# Patient Record
Sex: MALE | Race: WHITE | NOT HISPANIC OR LATINO | Employment: OTHER | URBAN - NONMETROPOLITAN AREA
[De-identification: names, ages, dates, MRNs, and addresses within clinical notes are randomized per-mention and may not be internally consistent; named-entity substitution may affect disease eponyms.]

---

## 2019-05-05 ENCOUNTER — HOSPITAL ENCOUNTER (EMERGENCY)
Facility: HOSPITAL | Age: 44
Discharge: LEFT BEFORE DISCHARGE | End: 2019-05-06
Attending: EMERGENCY MEDICINE | Admitting: EMERGENCY MEDICINE

## 2019-05-05 ENCOUNTER — APPOINTMENT (OUTPATIENT)
Dept: GENERAL RADIOLOGY | Facility: HOSPITAL | Age: 44
End: 2019-05-05

## 2019-05-05 DIAGNOSIS — N18.9 CHRONIC RENAL FAILURE, UNSPECIFIED CKD STAGE: Primary | ICD-10-CM

## 2019-05-05 LAB
ALBUMIN SERPL-MCNC: 2.82 G/DL (ref 3.5–5.2)
ALBUMIN/GLOB SERPL: 0.3 G/DL
ALP SERPL-CCNC: 265 U/L (ref 39–117)
ALT SERPL W P-5'-P-CCNC: 25 U/L (ref 1–41)
ANION GAP SERPL CALCULATED.3IONS-SCNC: 18.1 MMOL/L
APTT PPP: 35.1 SECONDS (ref 23.8–36.1)
AST SERPL-CCNC: 24 U/L (ref 1–40)
BASOPHILS # BLD AUTO: 0.13 10*3/MM3 (ref 0–0.2)
BASOPHILS NFR BLD AUTO: 1 % (ref 0–1.5)
BILIRUB SERPL-MCNC: 0.4 MG/DL (ref 0.2–1.2)
BUN BLD-MCNC: 95 MG/DL (ref 6–20)
BUN/CREAT SERPL: 16.3 (ref 7–25)
CALCIUM SPEC-SCNC: 8.3 MG/DL (ref 8.6–10.5)
CHLORIDE SERPL-SCNC: 93 MMOL/L (ref 98–107)
CO2 SERPL-SCNC: 17.9 MMOL/L (ref 22–29)
CREAT BLD-MCNC: 5.83 MG/DL (ref 0.76–1.27)
D-LACTATE SERPL-SCNC: 0.9 MMOL/L (ref 0.5–2)
DEPRECATED RDW RBC AUTO: 47.9 FL (ref 37–54)
EOSINOPHIL # BLD AUTO: 0.87 10*3/MM3 (ref 0–0.4)
EOSINOPHIL NFR BLD AUTO: 6.4 % (ref 0.3–6.2)
ERYTHROCYTE [DISTWIDTH] IN BLOOD BY AUTOMATED COUNT: 15.3 % (ref 12.3–15.4)
GFR SERPL CREATININE-BSD FRML MDRD: 11 ML/MIN/1.73
GFR SERPL CREATININE-BSD FRML MDRD: ABNORMAL ML/MIN/1.73
GLOBULIN UR ELPH-MCNC: 8.5 GM/DL
GLUCOSE BLD-MCNC: 104 MG/DL (ref 65–99)
HCT VFR BLD AUTO: 26.7 % (ref 37.5–51)
HGB BLD-MCNC: 8.7 G/DL (ref 13–17.7)
IMM GRANULOCYTES # BLD AUTO: 0.1 10*3/MM3 (ref 0–0.05)
IMM GRANULOCYTES NFR BLD AUTO: 0.7 % (ref 0–0.5)
INR PPP: 1.21 (ref 0.9–1.1)
LYMPHOCYTES # BLD AUTO: 2.37 10*3/MM3 (ref 0.7–3.1)
LYMPHOCYTES NFR BLD AUTO: 17.5 % (ref 19.6–45.3)
MAGNESIUM SERPL-MCNC: 1.9 MG/DL (ref 1.6–2.6)
MCH RBC QN AUTO: 28.5 PG (ref 26.6–33)
MCHC RBC AUTO-ENTMCNC: 32.6 G/DL (ref 31.5–35.7)
MCV RBC AUTO: 87.5 FL (ref 79–97)
MONOCYTES # BLD AUTO: 0.69 10*3/MM3 (ref 0.1–0.9)
MONOCYTES NFR BLD AUTO: 5.1 % (ref 5–12)
NEUTROPHILS # BLD AUTO: 9.4 10*3/MM3 (ref 1.7–7)
NEUTROPHILS NFR BLD AUTO: 69.3 % (ref 42.7–76)
PLATELET # BLD AUTO: 355 10*3/MM3 (ref 140–450)
PMV BLD AUTO: 8.5 FL (ref 6–12)
POTASSIUM BLD-SCNC: 4.9 MMOL/L (ref 3.5–5.2)
PROT SERPL-MCNC: 11.3 G/DL (ref 6–8.5)
PROTHROMBIN TIME: 15.9 SECONDS (ref 11–15.4)
RBC # BLD AUTO: 3.05 10*6/MM3 (ref 4.14–5.8)
SODIUM BLD-SCNC: 129 MMOL/L (ref 136–145)
TROPONIN T SERPL-MCNC: 0.07 NG/ML (ref 0–0.03)
WBC NRBC COR # BLD: 13.56 10*3/MM3 (ref 3.4–10.8)

## 2019-05-05 PROCEDURE — 93005 ELECTROCARDIOGRAM TRACING: CPT | Performed by: EMERGENCY MEDICINE

## 2019-05-05 PROCEDURE — 84484 ASSAY OF TROPONIN QUANT: CPT | Performed by: EMERGENCY MEDICINE

## 2019-05-05 PROCEDURE — 71045 X-RAY EXAM CHEST 1 VIEW: CPT

## 2019-05-05 PROCEDURE — 85610 PROTHROMBIN TIME: CPT | Performed by: EMERGENCY MEDICINE

## 2019-05-05 PROCEDURE — 99284 EMERGENCY DEPT VISIT MOD MDM: CPT

## 2019-05-05 PROCEDURE — 71045 X-RAY EXAM CHEST 1 VIEW: CPT | Performed by: RADIOLOGY

## 2019-05-05 PROCEDURE — 93010 ELECTROCARDIOGRAM REPORT: CPT | Performed by: INTERNAL MEDICINE

## 2019-05-05 PROCEDURE — 83735 ASSAY OF MAGNESIUM: CPT | Performed by: EMERGENCY MEDICINE

## 2019-05-05 PROCEDURE — 83605 ASSAY OF LACTIC ACID: CPT | Performed by: EMERGENCY MEDICINE

## 2019-05-05 PROCEDURE — 85730 THROMBOPLASTIN TIME PARTIAL: CPT | Performed by: EMERGENCY MEDICINE

## 2019-05-05 PROCEDURE — 87040 BLOOD CULTURE FOR BACTERIA: CPT | Performed by: EMERGENCY MEDICINE

## 2019-05-05 PROCEDURE — 85025 COMPLETE CBC W/AUTO DIFF WBC: CPT | Performed by: EMERGENCY MEDICINE

## 2019-05-05 PROCEDURE — 80053 COMPREHEN METABOLIC PANEL: CPT | Performed by: EMERGENCY MEDICINE

## 2019-05-05 RX ORDER — SODIUM CHLORIDE 0.9 % (FLUSH) 0.9 %
10 SYRINGE (ML) INJECTION AS NEEDED
Status: DISCONTINUED | OUTPATIENT
Start: 2019-05-05 | End: 2019-05-06 | Stop reason: HOSPADM

## 2019-05-06 VITALS
BODY MASS INDEX: 20.32 KG/M2 | HEIGHT: 72 IN | SYSTOLIC BLOOD PRESSURE: 130 MMHG | OXYGEN SATURATION: 83 % | HEART RATE: 107 BPM | WEIGHT: 150 LBS | RESPIRATION RATE: 18 BRPM | DIASTOLIC BLOOD PRESSURE: 92 MMHG | TEMPERATURE: 98.5 F

## 2019-05-06 LAB
6-ACETYL MORPHINE: NEGATIVE
AMPHET+METHAMPHET UR QL: NEGATIVE
BACTERIA UR QL AUTO: ABNORMAL /HPF
BARBITURATES UR QL SCN: NEGATIVE
BENZODIAZ UR QL SCN: NEGATIVE
BILIRUB UR QL STRIP: NEGATIVE
BUPRENORPHINE SERPL-MCNC: NEGATIVE NG/ML
CANNABINOIDS SERPL QL: NEGATIVE
CLARITY UR: CLEAR
COCAINE UR QL: NEGATIVE
COLOR UR: YELLOW
GLUCOSE UR STRIP-MCNC: NEGATIVE MG/DL
HGB UR QL STRIP.AUTO: ABNORMAL
HYALINE CASTS UR QL AUTO: ABNORMAL /LPF
KETONES UR QL STRIP: NEGATIVE
LEUKOCYTE ESTERASE UR QL STRIP.AUTO: ABNORMAL
METHADONE UR QL SCN: NEGATIVE
NITRITE UR QL STRIP: NEGATIVE
OPIATES UR QL: POSITIVE
OXYCODONE UR QL SCN: NEGATIVE
PCP UR QL SCN: NEGATIVE
PH UR STRIP.AUTO: 7 [PH] (ref 5–8)
PROT UR QL STRIP: ABNORMAL
RBC # UR: ABNORMAL /HPF
REF LAB TEST METHOD: ABNORMAL
SP GR UR STRIP: 1.01 (ref 1–1.03)
SQUAMOUS #/AREA URNS HPF: ABNORMAL /HPF
UROBILINOGEN UR QL STRIP: ABNORMAL
WBC UR QL AUTO: ABNORMAL /HPF

## 2019-05-06 PROCEDURE — 80307 DRUG TEST PRSMV CHEM ANLYZR: CPT | Performed by: EMERGENCY MEDICINE

## 2019-05-06 PROCEDURE — 36415 COLL VENOUS BLD VENIPUNCTURE: CPT

## 2019-05-06 PROCEDURE — 81001 URINALYSIS AUTO W/SCOPE: CPT | Performed by: EMERGENCY MEDICINE

## 2019-05-06 PROCEDURE — 99283 EMERGENCY DEPT VISIT LOW MDM: CPT

## 2019-05-06 NOTE — ED NOTES
"Pt verbalizes not wanting an iv. Pt states \" I'm not staying in the hospital, I have to get home to my kids and get them to school and back. I stayed in the [facility] too long [facility] because I had to but I am not going to stay now, I just want to be set-up for dialysis\"     Ella Faust, RN  05/05/19 0948    "

## 2019-05-06 NOTE — ED PROVIDER NOTES
Subjective   Pt comes in with concern for need for dialysis.  Pt was admitted to [facility] for 32 days but signed out AMA on Friday for family concerns.  Pt developed renal failure due to bacteremia/sepsis likely secondary to IVDA.  Pt admits to prior use of methamphetamine and Suboxone.  He to using methamphetamine over the weekend.  He states he will not stay admitted under any circumstances.  He denies feeling ill        Illness   Location:  Systemic  Quality:  Asymptomatic  Severity:  Unable to specify  Onset quality:  Gradual  Timing:  Constant  Progression:  Unchanged  Chronicity:  Chronic  Context:  Hemodialysis dependent renal failure  Relieved by:  Hemodialysis  Worsened by:  Noncomplaince  Ineffective treatments:  None tried  Associated symptoms: no abdominal pain, no chest pain, no congestion, no cough, no diarrhea, no ear pain, no fatigue, no fever, no headaches, no loss of consciousness, no myalgias, no nausea, no rash, no rhinorrhea, no shortness of breath, no sore throat, no vomiting and no wheezing        Review of Systems   Constitutional: Positive for activity change. Negative for appetite change, fatigue and fever.   HENT: Negative.  Negative for congestion, ear pain, nosebleeds, rhinorrhea and sore throat.    Eyes: Negative.  Negative for visual disturbance.   Respiratory: Negative.  Negative for cough, chest tightness, shortness of breath and wheezing.    Cardiovascular: Negative.  Negative for chest pain.   Gastrointestinal: Negative.  Negative for abdominal pain, diarrhea, nausea and vomiting.   Endocrine: Negative.    Genitourinary: Negative.    Musculoskeletal: Negative.  Negative for myalgias.   Skin: Negative.  Negative for rash.   Allergic/Immunologic: Negative.    Neurological: Negative.  Negative for loss of consciousness and headaches.   Hematological: Negative.    Psychiatric/Behavioral: Negative.    All other systems reviewed and are negative.      No past medical history on  file.    Allergies   Allergen Reactions   • Penicillins Rash       No past surgical history on file.    No family history on file.    Social History     Socioeconomic History   • Marital status:      Spouse name: Not on file   • Number of children: Not on file   • Years of education: Not on file   • Highest education level: Not on file           Objective   Physical Exam   Constitutional: He is oriented to person, place, and time. He appears well-developed and well-nourished. No distress.   HENT:   Head: Normocephalic and atraumatic.   Nose: Nose normal.   Mouth/Throat: Oropharynx is clear and moist. No oropharyngeal exudate.   Eyes: Conjunctivae and EOM are normal. Pupils are equal, round, and reactive to light. Right eye exhibits no discharge. Left eye exhibits no discharge. No scleral icterus.   Neck: Normal range of motion. Neck supple. No JVD present. No tracheal deviation present. No thyromegaly present.   Cardiovascular: Normal heart sounds and intact distal pulses. Exam reveals no gallop and no friction rub.   No murmur heard.  Regular tachycardia   Pulmonary/Chest: Effort normal and breath sounds normal. No stridor. No respiratory distress. He has no wheezes. He has no rales.   Abdominal: Soft. He exhibits no distension and no mass. There is no tenderness. There is no guarding.   Genitourinary:   Genitourinary Comments: No CVAT   Musculoskeletal: Normal range of motion. He exhibits no edema or deformity.   Neurological: He is alert and oriented to person, place, and time. No cranial nerve deficit or sensory deficit. He exhibits normal muscle tone. Coordination normal.   Skin: Skin is warm and dry. Capillary refill takes less than 2 seconds. He is not diaphoretic. No pallor.   Nursing note and vitals reviewed.      Procedures           ED Course      XR Chest 1 View    (Results Pending)     Labs Reviewed   COMPREHENSIVE METABOLIC PANEL - Abnormal; Notable for the following components:       Result  Value    Glucose 104 (*)     BUN 95 (*)     Creatinine 5.83 (*)     Sodium 129 (*)     Chloride 93 (*)     CO2 17.9 (*)     Calcium 8.3 (*)     Total Protein 11.3 (*)     Albumin 2.82 (*)     Alkaline Phosphatase 265 (*)     eGFR Non  Amer 11 (*)     All other components within normal limits    Narrative:     GFR Normal >60  Chronic Kidney Disease <60  Kidney Failure <15   PROTIME-INR - Abnormal; Notable for the following components:    Protime 15.9 (*)     INR 1.21 (*)     All other components within normal limits    Narrative:     Suggested INR therapeutic range for stable oral anticoagulant therapy:    Low Intensity therapy:   1.5-2.0  Moderate Intensity therapy:   2.0-3.0  High Intensity therapy:   2.5-4.0   TROPONIN (IN-HOUSE) - Abnormal; Notable for the following components:    Troponin T 0.075 (*)     All other components within normal limits    Narrative:     Troponin T Reference Range:  <= 0.03 ng/mL-   Negative for AMI  >0.03 ng/mL-     Abnormal for myocardial necrosis.  Clinicians would have to utilize clinical acumen, EKG, Troponin and serial changes to determine if it is an Acute Myocardial Infarction or myocardial injury due to an underlying chronic condition.    CBC WITH AUTO DIFFERENTIAL - Abnormal; Notable for the following components:    WBC 13.56 (*)     RBC 3.05 (*)     Hemoglobin 8.7 (*)     Hematocrit 26.7 (*)     Lymphocyte % 17.5 (*)     Eosinophil % 6.4 (*)     Immature Grans % 0.7 (*)     Neutrophils, Absolute 9.40 (*)     Eosinophils, Absolute 0.87 (*)     Immature Grans, Absolute 0.10 (*)     All other components within normal limits   APTT - Normal    Narrative:     PTT Heparin Therapeutic Range:  59 - 95 seconds   MAGNESIUM - Normal   LACTIC ACID, PLASMA - Normal   BLOOD CULTURE   BLOOD CULTURE   URINALYSIS W/ MICROSCOPIC IF INDICATED (NO CULTURE)   URINE DRUG SCREEN   CBC AND DIFFERENTIAL    Narrative:     The following orders were created for panel order CBC &  Differential.  Procedure                               Abnormality         Status                     ---------                               -----------         ------                     CBC Auto Differential[[id number]]        Abnormal            Final result                 Please view results for these tests on the individual orders.        Medication List      You have not been prescribed any medications.                 MDM  Number of Diagnoses or Management Options  Chronic renal failure, unspecified CKD stage: established and worsening     Amount and/or Complexity of Data Reviewed  Clinical lab tests: ordered and reviewed  Decide to obtain previous medical records or to obtain history from someone other than the patient: yes    Risk of Complications, Morbidity, and/or Mortality  Presenting problems: high  Diagnostic procedures: high  Management options: high    Patient Progress  Patient progress: (guarded)        Final diagnoses:   Chronic renal failure, unspecified CKD stage            Juan Miguel Beck MD  05/06/19 0010

## 2019-05-07 ENCOUNTER — APPOINTMENT (OUTPATIENT)
Dept: CARDIOLOGY | Facility: HOSPITAL | Age: 44
End: 2019-05-07

## 2019-05-07 ENCOUNTER — HOSPITAL ENCOUNTER (OUTPATIENT)
Facility: HOSPITAL | Age: 44
Setting detail: OBSERVATION
Discharge: LEFT BEFORE DISCHARGE | End: 2019-05-07
Attending: EMERGENCY MEDICINE | Admitting: EMERGENCY MEDICINE

## 2019-05-07 VITALS
DIASTOLIC BLOOD PRESSURE: 62 MMHG | HEIGHT: 74 IN | RESPIRATION RATE: 20 BRPM | OXYGEN SATURATION: 100 % | WEIGHT: 148.2 LBS | TEMPERATURE: 97.5 F | BODY MASS INDEX: 19.02 KG/M2 | HEART RATE: 75 BPM | SYSTOLIC BLOOD PRESSURE: 92 MMHG

## 2019-05-07 DIAGNOSIS — N19 RENAL FAILURE, UNSPECIFIED CHRONICITY: ICD-10-CM

## 2019-05-07 DIAGNOSIS — E87.5 HYPERKALEMIA: Primary | ICD-10-CM

## 2019-05-07 DIAGNOSIS — E87.20 METABOLIC ACIDOSIS: ICD-10-CM

## 2019-05-07 LAB
A-A DO2: 55.2 MMHG (ref 0–300)
ALBUMIN SERPL-MCNC: 2.7 G/DL (ref 3.5–5.2)
ALBUMIN/GLOB SERPL: 0.3 G/DL
ALP SERPL-CCNC: 240 U/L (ref 39–117)
ALT SERPL W P-5'-P-CCNC: 24 U/L (ref 1–41)
ANION GAP SERPL CALCULATED.3IONS-SCNC: 14.8 MMOL/L
ANION GAP SERPL CALCULATED.3IONS-SCNC: 21.3 MMOL/L
APTT PPP: 37.4 SECONDS (ref 23.8–36.1)
AST SERPL-CCNC: 21 U/L (ref 1–40)
ATMOSPHERIC PRESS: 727 MMHG
BACTERIA UR QL AUTO: ABNORMAL /HPF
BASE EXCESS BLDV CALC-SCNC: -9.6 MMOL/L
BASOPHILS # BLD AUTO: 0.1 10*3/MM3 (ref 0–0.2)
BASOPHILS NFR BLD AUTO: 0.8 % (ref 0–1.5)
BDY SITE: ABNORMAL
BILIRUB SERPL-MCNC: 0.4 MG/DL (ref 0.2–1.2)
BILIRUB UR QL STRIP: NEGATIVE
BODY TEMPERATURE: 98.6 C
BUN BLD-MCNC: 106 MG/DL (ref 6–20)
BUN BLD-MCNC: 94 MG/DL (ref 6–20)
BUN/CREAT SERPL: 16.4 (ref 7–25)
BUN/CREAT SERPL: 17 (ref 7–25)
CALCIUM SPEC-SCNC: 8.3 MG/DL (ref 8.6–10.5)
CALCIUM SPEC-SCNC: 8.5 MG/DL (ref 8.6–10.5)
CHLORIDE SERPL-SCNC: 96 MMOL/L (ref 98–107)
CHLORIDE SERPL-SCNC: 97 MMOL/L (ref 98–107)
CLARITY UR: CLEAR
CO2 SERPL-SCNC: 14.7 MMOL/L (ref 22–29)
CO2 SERPL-SCNC: 19.2 MMOL/L (ref 22–29)
COLOR UR: YELLOW
CREAT BLD-MCNC: 5.52 MG/DL (ref 0.76–1.27)
CREAT BLD-MCNC: 6.45 MG/DL (ref 0.76–1.27)
DEPRECATED RDW RBC AUTO: 47.7 FL (ref 37–54)
EOSINOPHIL # BLD AUTO: 0.78 10*3/MM3 (ref 0–0.4)
EOSINOPHIL NFR BLD AUTO: 6.1 % (ref 0.3–6.2)
ERYTHROCYTE [DISTWIDTH] IN BLOOD BY AUTOMATED COUNT: 15.3 % (ref 12.3–15.4)
GFR SERPL CREATININE-BSD FRML MDRD: 11 ML/MIN/1.73
GFR SERPL CREATININE-BSD FRML MDRD: 9 ML/MIN/1.73
GFR SERPL CREATININE-BSD FRML MDRD: ABNORMAL ML/MIN/1.73
GFR SERPL CREATININE-BSD FRML MDRD: ABNORMAL ML/MIN/1.73
GLOBULIN UR ELPH-MCNC: 7.9 GM/DL
GLUCOSE BLD-MCNC: 119 MG/DL (ref 65–99)
GLUCOSE BLD-MCNC: 139 MG/DL (ref 65–99)
GLUCOSE UR STRIP-MCNC: NEGATIVE MG/DL
HAV IGM SERPL QL IA: ABNORMAL
HBA1C MFR BLD: 5.5 % (ref 4.8–5.6)
HBV CORE IGM SERPL QL IA: ABNORMAL
HBV SURFACE AG SERPL QL IA: REACTIVE
HCO3 BLDV-SCNC: 15.7 MMOL/L
HCT VFR BLD AUTO: 26.1 % (ref 37.5–51)
HCV AB SER DONR QL: REACTIVE
HGB BLD-MCNC: 8.4 G/DL (ref 13–17.7)
HGB BLDA-MCNC: 9 G/DL (ref 12–16)
HGB UR QL STRIP.AUTO: ABNORMAL
HOROWITZ INDEX BLD+IHG-RTO: 21 %
HYALINE CASTS UR QL AUTO: ABNORMAL /LPF
IMM GRANULOCYTES # BLD AUTO: 0.07 10*3/MM3 (ref 0–0.05)
IMM GRANULOCYTES NFR BLD AUTO: 0.5 % (ref 0–0.5)
INR PPP: 1.19 (ref 0.9–1.1)
KETONES UR QL STRIP: NEGATIVE
LEUKOCYTE ESTERASE UR QL STRIP.AUTO: ABNORMAL
LYMPHOCYTES # BLD AUTO: 2.19 10*3/MM3 (ref 0.7–3.1)
LYMPHOCYTES NFR BLD AUTO: 17.2 % (ref 19.6–45.3)
MCH RBC QN AUTO: 27.9 PG (ref 26.6–33)
MCHC RBC AUTO-ENTMCNC: 32.2 G/DL (ref 31.5–35.7)
MCV RBC AUTO: 86.7 FL (ref 79–97)
MODALITY: ABNORMAL
MONOCYTES # BLD AUTO: 0.65 10*3/MM3 (ref 0.1–0.9)
MONOCYTES NFR BLD AUTO: 5.1 % (ref 5–12)
NEUTROPHILS # BLD AUTO: 8.94 10*3/MM3 (ref 1.7–7)
NEUTROPHILS NFR BLD AUTO: 70.3 % (ref 42.7–76)
NITRITE UR QL STRIP: NEGATIVE
PCO2 BLDV: 31.7 MM HG
PH BLDV: 7.31 PH UNITS
PH UR STRIP.AUTO: 6.5 [PH] (ref 5–8)
PLATELET # BLD AUTO: 337 10*3/MM3 (ref 140–450)
PMV BLD AUTO: 8.4 FL (ref 6–12)
PO2 BLDV: 49.6 MM HG
POTASSIUM BLD-SCNC: 5.1 MMOL/L (ref 3.5–5.2)
POTASSIUM BLD-SCNC: 5.4 MMOL/L (ref 3.5–5.2)
PROT SERPL-MCNC: 10.6 G/DL (ref 6–8.5)
PROT UR QL STRIP: ABNORMAL
PROTHROMBIN TIME: 15.7 SECONDS (ref 11–15.4)
RBC # BLD AUTO: 3.01 10*6/MM3 (ref 4.14–5.8)
RBC # UR: ABNORMAL /HPF
REF LAB TEST METHOD: ABNORMAL
SAO2 % BLDCOV: 80.9 %
SODIUM BLD-SCNC: 131 MMOL/L (ref 136–145)
SODIUM BLD-SCNC: 132 MMOL/L (ref 136–145)
SP GR UR STRIP: 1.01 (ref 1–1.03)
SQUAMOUS #/AREA URNS HPF: ABNORMAL /HPF
TROPONIN T SERPL-MCNC: 0.07 NG/ML (ref 0–0.03)
TSH SERPL DL<=0.05 MIU/L-ACNC: 1.55 MIU/ML (ref 0.27–4.2)
UROBILINOGEN UR QL STRIP: ABNORMAL
WBC NRBC COR # BLD: 12.73 10*3/MM3 (ref 3.4–10.8)
WBC UR QL AUTO: ABNORMAL /HPF

## 2019-05-07 PROCEDURE — G0378 HOSPITAL OBSERVATION PER HR: HCPCS

## 2019-05-07 PROCEDURE — 80048 BASIC METABOLIC PNL TOTAL CA: CPT | Performed by: INTERNAL MEDICINE

## 2019-05-07 PROCEDURE — 93010 ELECTROCARDIOGRAM REPORT: CPT | Performed by: INTERNAL MEDICINE

## 2019-05-07 PROCEDURE — 80050 GENERAL HEALTH PANEL: CPT | Performed by: EMERGENCY MEDICINE

## 2019-05-07 PROCEDURE — G0257 UNSCHED DIALYSIS ESRD PT HOS: HCPCS

## 2019-05-07 PROCEDURE — 85730 THROMBOPLASTIN TIME PARTIAL: CPT | Performed by: EMERGENCY MEDICINE

## 2019-05-07 PROCEDURE — 83036 HEMOGLOBIN GLYCOSYLATED A1C: CPT | Performed by: INTERNAL MEDICINE

## 2019-05-07 PROCEDURE — 81001 URINALYSIS AUTO W/SCOPE: CPT | Performed by: EMERGENCY MEDICINE

## 2019-05-07 PROCEDURE — 82805 BLOOD GASES W/O2 SATURATION: CPT | Performed by: EMERGENCY MEDICINE

## 2019-05-07 PROCEDURE — 84484 ASSAY OF TROPONIN QUANT: CPT | Performed by: EMERGENCY MEDICINE

## 2019-05-07 PROCEDURE — 93005 ELECTROCARDIOGRAM TRACING: CPT | Performed by: EMERGENCY MEDICINE

## 2019-05-07 PROCEDURE — 87341 HEP B SURFACE AG NEUTRLZJ IA: CPT | Performed by: INTERNAL MEDICINE

## 2019-05-07 PROCEDURE — 80074 ACUTE HEPATITIS PANEL: CPT | Performed by: INTERNAL MEDICINE

## 2019-05-07 PROCEDURE — 25010000002 HEPARIN (PORCINE) PER 1000 UNITS: Performed by: INTERNAL MEDICINE

## 2019-05-07 PROCEDURE — 87086 URINE CULTURE/COLONY COUNT: CPT | Performed by: INTERNAL MEDICINE

## 2019-05-07 PROCEDURE — 85610 PROTHROMBIN TIME: CPT | Performed by: EMERGENCY MEDICINE

## 2019-05-07 PROCEDURE — 99236 HOSP IP/OBS SAME DATE HI 85: CPT | Performed by: INTERNAL MEDICINE

## 2019-05-07 RX ORDER — SODIUM CHLORIDE 0.9 % (FLUSH) 0.9 %
3-10 SYRINGE (ML) INJECTION AS NEEDED
Status: DISCONTINUED | OUTPATIENT
Start: 2019-05-07 | End: 2019-05-07 | Stop reason: HOSPADM

## 2019-05-07 RX ORDER — HEPARIN SODIUM 5000 [USP'U]/ML
5000 INJECTION, SOLUTION INTRAVENOUS; SUBCUTANEOUS EVERY 12 HOURS SCHEDULED
Status: DISCONTINUED | OUTPATIENT
Start: 2019-05-07 | End: 2019-05-07 | Stop reason: HOSPADM

## 2019-05-07 RX ORDER — SODIUM CHLORIDE 0.9 % (FLUSH) 0.9 %
3 SYRINGE (ML) INJECTION EVERY 12 HOURS SCHEDULED
Status: DISCONTINUED | OUTPATIENT
Start: 2019-05-07 | End: 2019-05-07 | Stop reason: HOSPADM

## 2019-05-07 RX ORDER — SODIUM CHLORIDE 0.9 % (FLUSH) 0.9 %
10 SYRINGE (ML) INJECTION AS NEEDED
Status: DISCONTINUED | OUTPATIENT
Start: 2019-05-07 | End: 2019-05-07 | Stop reason: HOSPADM

## 2019-05-07 RX ORDER — SODIUM POLYSTYRENE SULFONATE 4.1 MEQ/G
30 POWDER, FOR SUSPENSION ORAL; RECTAL ONCE
Status: COMPLETED | OUTPATIENT
Start: 2019-05-07 | End: 2019-05-07

## 2019-05-07 RX ORDER — SODIUM BICARBONATE 650 MG/1
650 TABLET ORAL 3 TIMES DAILY
Status: DISCONTINUED | OUTPATIENT
Start: 2019-05-07 | End: 2019-05-07 | Stop reason: HOSPADM

## 2019-05-07 RX ADMIN — SODIUM BICARBONATE TAB 650 MG 650 MG: 650 TAB at 16:24

## 2019-05-07 RX ADMIN — SODIUM BICARBONATE TAB 650 MG 650 MG: 650 TAB at 11:14

## 2019-05-07 RX ADMIN — SODIUM POLYSTYRENE SULFONATE 30 G: 1 POWDER ORAL; RECTAL at 04:11

## 2019-05-07 RX ADMIN — SODIUM CHLORIDE 1000 ML: 9 INJECTION, SOLUTION INTRAVENOUS at 11:46

## 2019-05-07 NOTE — PROGRESS NOTES
Discharge Planning Assessment  [facility]     Patient Name: Mario Nair  MRN: [id number]  Today's Date: 5/7/2019    Admit Date: 5/7/2019      Discharge Plan     Row Name 05/07/19 1717       Plan    Plan  Pt admitted on 5/7/19.  SS consulted per Physician for social situation and need for dialysis chair.  SS spoke with pt on this date.  Pt lives at home at [address] [place] [address].  Pt states he has to move from residence by Friday due to landlord evicting him.  Pt states he has another residence to move to and has paid rent.  Pt states he has custody of his two children Constance age 16 and Niko age 14.  Pt states his uncle Ravin has been caring for his children for the past approx 2 months.  SS faxed pt information to [facility] and spoke with  Hailey.  [facility] refused to accept pt.  SS notified pt.  SS explained to pt that dialysis arrangements may take some time.  Pt not agreeable to staying in hospital.  SS notified pt that [facility] SS will continue to attempt dialysis placement.  Pt stated understanding.      Final Discharge Disposition Code  07 - left AMA    Final Note  Pt signed out of hospital AMA on this date.         Destination      No service coordination in this encounter.      Durable Medical Equipment      No service coordination in this encounter.      Dialysis/Infusion      No service coordination in this encounter.      Home Medical Care      No service coordination in this encounter.      Therapy      No service coordination in this encounter.      Community Resources      No service coordination in this encounter.        Expected Discharge Date and Time     Expected Discharge Date Expected Discharge Time    May 10, 2019         Demographic Summary    No documentation.       Functional Status    No documentation.       Psychosocial    No documentation.       Abuse/Neglect    No documentation.       Legal    No documentation.       Substance Abuse     No documentation.       Patient Forms    No documentation.           Dulce Miguel

## 2019-05-07 NOTE — DISCHARGE PLACEMENT REQUEST
"Nair, Mario BRITTANY (44 y.o. Male)     Date of Birth Social Security Number Address Home Phone MRN    1975  [address] [place] [address] [phone] [id number]    Anabaptist Marital Status          None        Admission Date Admission Type Admitting Provider Attending Provider Department, Room/Bed    5/7/19 Emergency Opal Ocampo MD Khan, Hafiz Sarfraz, MD [address], 3324/    Discharge Date Discharge Disposition Discharge Destination                       Attending Provider:  Taniya Castillo MD    Allergies:  Penicillins    Isolation:  None   Infection:  None   Code Status:  CPR    Ht:  188 cm (74\")   Wt:  67.2 kg (148 lb 3.2 oz)    Admission Cmt:  None   Principal Problem:  Hyperkalemia [E87.5]                 Active Insurance as of 5/7/2019     Primary Coverage     Payor Plan Insurance Group Employer/Plan Group    [facility] MEDICAID      Payor Plan Address Payor Plan Phone Number Payor Plan Fax Number Effective Dates    [address] [address] [phone]  5/5/2019 - None Entered    [facility] [id number]       Subscriber Name Subscriber Birth Date Member ID       MARIO NAIR 1975 [id number]                 Emergency Contacts      (Rel.) Home Phone Work Phone Mobile Phone    SHANI WILKES (Relative) [phone] -- --            Emergency Contact Information     Name Relation Home Work Mobile    SHANI WILKES Relative [phone]            Insurance Information                [facility] MEDICAID Phone: [phone]    Subscriber: Mario Nair Subscriber#: [id number]    Group#:  Precert#:           Treatment Team     Provider Relationship Specialty Contact    Taniya Castillo MD Attending -- [phone]    Federico Diallo MD Consulting Physician Nephrology [phone]    Ana Davila PCT Patient Care Technician --     Opal Ocampo MD Consulting Physician Hospitalist [phone]    Markell, " KILEY Villafuerte Respiratory Therapist --     Billie Covington RN Registered Nurse --           Problem List           Codes Noted - Resolved       Hospital    * (Principal) Hyperkalemia ICD-10-CM: E87.5  ICD-9-CM: 276.7 2019 - Present             History & Physical      Opal Ocampo MD at 2019  6:30 AM              [facility] HOSPITALIST HISTORY AND PHYSICAL    Patient Identification:  Name:  Mario Nair  Age:  44 y.o.  Sex:  male  :  1975  MRN:  [id number]   Visit Number:  [id number]  Room number:  3324/1P  Primary Care Physician:  Provider, No Known     Subjective     Chief complaint:    Chief Complaint   Patient presents with   • Fatigue       History of presenting illness:  44 y.o. male who presented to [facility] ED at the request of nephrology for hemodialysis.  The patient was recently admitted to [facility] for over 1 month; per verbal report from the emergency department it was for septic shock due to endocarditis.  When asked about the reason he was in the hospital for a month, the patient was unable to tell me the details.  All he knows is that he thought that everything was going okay but he had to leave AGAINST MEDICAL ADVICE because he had lost his home and has custody of 2 of his children and needed to secure housing for the kids; the date that he left the hospital was 5/3/2019.  He came to our emergency department on 2019 and was told to follow up with nephrology.  He saw nephrology on 2019 was told to come to the emergency department again.  He states that he feels tired but other than that he denies chest pain, shortness of air, swelling, nausea, vomiting, and diarrhea.  He still has the tunneled right sided chest hemodialysis catheter.  He states that he has had a catheter for hemodialysis placed in both groins and in his left chest as well.  Please note that the patient was by himself and did not have any family present.  He is a poor  historian.  ---------------------------------------------------------------------------------------------------------------------   Review of Systems   Constitutional: Positive for fatigue. Negative for chills and fever.   HENT: Negative for postnasal drip, rhinorrhea, sneezing and sore throat.    Eyes: Negative for discharge and redness.   Respiratory: Negative for cough, shortness of breath and wheezing.    Cardiovascular: Negative for chest pain, palpitations and leg swelling.   Gastrointestinal: Negative for diarrhea, nausea and vomiting.   Genitourinary: Negative for decreased urine volume, dysuria and hematuria.   Musculoskeletal: Negative for arthralgias and joint swelling.   Skin: Negative for pallor, rash and wound.   Neurological: Negative for seizures, speech difficulty and headaches.   Hematological: Does not bruise/bleed easily.   Psychiatric/Behavioral: Negative for confusion, self-injury and suicidal ideas. The patient is not nervous/anxious.    ---------------------------------------------------------------------------------------------------------------------   Past Medical History:   Diagnosis Date   • Endocarditis    • ESRD (end stage renal disease) (CMS/[facility])    • IV drug abuse (CMS/[facility])    • Unable to read or write      Past Surgical History:   Procedure Laterality Date   • CENTRAL VENOUS CATHETER TUNNELED INSERTION DOUBLE LUMEN Right     Chest for hemodialysis     Family History   Problem Relation Age of Onset   • Alcohol abuse Mother    • Hypertension Mother    • Hypertension Other      Social History     Socioeconomic History   • Marital status:    Tobacco Use   • Smoking status: Current Every Day Smoker     Packs/day: 1.00     Years: 20.00     Pack years: 20.00     Types: Cigarettes   • Smokeless tobacco: Never Used   Substance and Sexual Activity   • Alcohol use: No     Frequency: Never   • Drug use: Yes     Types: Methamphetamines     Comment: last used on Friday 5/3/2019   • Sexual  activity: Defer     ---------------------------------------------------------------------------------------------------------------------   Allergies:  Penicillins  ---------------------------------------------------------------------------------------------------------------------   Medications below are reported home medications pulling from within the system; at this time, these medications have not been reconciled unless otherwise specified and are in the verification process for further verifcation as current home medications.    Prior to Admission Medications     None        Objective     Vital Signs:  Temp:  [96.8 °F (36 °C)-98.5 °F (36.9 °C)] 96.8 °F (36 °C)  Heart Rate:  [] 106  Resp:  [16-18] 18  BP: (125-147)/(80-86) 132/86    Mean Arterial Pressure (Non-Invasive) for the past 24 hrs (Last 3 readings):   Noninvasive MAP (mmHg)   05/07/19 0624 101     SpO2:  [99 %-100 %] 100 %  Device (Oxygen Therapy): room air  Body mass index is 19.03 kg/m².    Wt Readings from Last 3 Encounters:   05/07/19 67.2 kg (148 lb 3.2 oz)   05/05/19 68 kg (150 lb)     ---------------------------------------------------------------------------------------------------------------------   Physical Exam:  Constitutional:  Well-developed and well-nourished.  No respiratory distress.  Thin in appearance.  HENT:  Head: Normocephalic and atraumatic.  Mouth:  Moist mucous membranes.    Eyes:  Conjunctivae and EOM are normal.  Pupils are equal, round, and reactive to light.  No scleral icterus.  Neck:  Neck supple.  No JVD present.    Cardiovascular:  Normal rate, regular rhythm and normal heart sounds with no murmur.  Pulmonary/Chest:  No respiratory distress, no wheezes, no crackles, with normal breath sounds and good air movement.  Right tunneled hemodialysis catheter in place.  There is a Tegaderm over it that is dated 4/29/2019.  Abdominal:  Soft.  Bowel sounds are normal.  No distension and no tenderness.   Musculoskeletal:   No edema, no tenderness, and no deformity.  No red or swollen joints anywhere.    Neurological:  Alert and oriented to person, place, and time.  No cranial nerve deficit.  No tongue deviation.  No facial droop.  No slurred speech.   Skin:  Skin is warm and dry.  No rash noted.  No pallor.   Peripheral vascular:  No edema and strong pulses on all 4 extremities.  Genitourinary:  No resendiz catheter in place.  ---------------------------------------------------------------------------------------------------------------------  EKG:  Sinus tachycardia, heart rate 101, QTc 466 ms; I compared this to an EKG dated 5/5/2019 and I do not see a change.    Telemetry:  Sinus tachycardia 100-110.    I have personally looked at both the EKG and the telemetry strips.    Last echocardiogram:  None in our system  --------------------------------------------------------------------------------------------------------------------  Labs:  Results from last 7 days   Lab Units 05/07/19  0134 05/07/19  0133 05/05/19  2240   LACTATE mmol/L  --   --  0.9   WBC 10*3/mm3  --  12.73* 13.56*   HEMOGLOBIN g/dL  --  8.4* 8.7*   HEMATOCRIT %  --  26.1* 26.7*   MCV fL  --  86.7 87.5   MCHC g/dL  --  32.2 32.6   PLATELETS 10*3/mm3  --  337 355   INR  1.19*  --  1.21*         Results from last 7 days   Lab Units 05/07/19  0134 05/05/19  2240   SODIUM mmol/L 132* 129*   POTASSIUM mmol/L 5.4* 4.9   MAGNESIUM mg/dL  --  1.9   CHLORIDE mmol/L 96* 93*   CO2 mmol/L 14.7* 17.9*   BUN mg/dL 106* 95*   CREATININE mg/dL 6.45* 5.83*   EGFR IF NONAFRICN AM mL/min/1.73 9* 11*   CALCIUM mg/dL 8.5* 8.3*   GLUCOSE mg/dL 139* 104*   ALBUMIN g/dL 2.70* 2.82*   BILIRUBIN mg/dL 0.4 0.4   ALK PHOS U/L 240* 265*   AST (SGOT) U/L 21 24   ALT (SGPT) U/L 24 25   Estimated Creatinine Clearance: 14 mL/min (A) (by C-G formula based on SCr of 6.45 mg/dL (H)).    Results from last 7 days   Lab Units 05/07/19 0134 05/05/19  2240   TROPONIN T ng/mL 0.067* 0.075*         Brief Urine  Lab Results  (Last result in the past 365 days)      Color   Clarity   Blood   Leuk Est   Nitrite   Protein   CREAT   Urine HCG        05/07/19 0321 Yellow Clear Small (1+) Moderate (2+) Negative 30 mg/dL (1+)             Blood Culture   Date Value Ref Range Status   05/05/2019 No growth at 24 hours  Preliminary   05/05/2019 No growth at 24 hours  Preliminary         Results from last 7 days   Lab Units 05/05/19  2240   LACTATE mmol/L 0.9       I have personally looked at the labs and they are summarized above.  ----------------------------------------------------------------------------------------------------------------------    Final impressions for the last 30 days of radiology reports:    Xr Chest 1 View  Result Date: 5/6/2019  1. Right-sided dialysis catheter placement. 2. No pneumothorax. 3. Trace pleural effusions.  This report was finalized on 5/6/2019 8:24 AM by Dr. Maxi Lowry MD.      I have personally looked at the radiology images and read the final radiology report.    Assessment & Plan       -ESRD of unknown etiology but suspect due to recent septic shock diagnosis  -Prolonged QTc of 466 ms  -Uremia  -Elevated troponin, possibly due to end-stage renal disease as the troponin level has not really changed in the last 2 days  -Normocytic anemia, suspicious for anemia of chronic disease  -Pseudo-hypocalcemia due to hypoalbuminemia with a corrected calcium level of 9.53  -History of IV drug abuse  -Possible recent septic shock due to endocarditis with details unknown at this time  -Light tobacco smoking addiction  -Inability to read and write    Patient has been admitted to the telemetry floor due to the hyperkalemia noted in the ED; will send for a cortisol level and continue to monitor the electrolytes closely.  Will obtain an ECHO to evaluate his heart due to the prolonged QTc and the recent report of endocarditis and the elevated troponins.  I have attempted to obtain the medical records from  [facility] but when these were faxed to the emergency department every page was just solid black and the writing was illegible.  We have asked for repeat records to be sent.  We will consult nephrology for hemodialysis, especially since the patient's uremia has worsened since his emergency department visit 2 days ago.  We will continue to monitor his anemia closely; he may need iron infusions.  Blood cultures were obtained in the emergency department and since his urine analysis has a large white blood cell count burden we have sent the urine for culture.  We have consulted social work due to his social needs.  The patient does not know what a renal diet is and we will consult nutrition to educate him on this, especially since patient cannot read and write.       VTE Prophylaxis:   Mechanical Order History:     None      Pharmalogical Order History:     Ordered     Dose Route Frequency Stop    05/07/19 0510  heparin (porcine) 5000 UNIT/ML injection 5,000 Units      5,000 Units SC Every 12 Hours Scheduled --          The patient is high risk due to the following diagnoses/reasons:  ESRD    Opal Ocampo MD  [facility]  05/07/19  6:32 AM        Electronically signed by Opal Ocampo MD at 5/7/2019  7:04 AM       Lines, Drains & Airways    Active LDAs     Name:   Placement date:   Placement time:   Site:   Days:    Peripheral IV 05/07/19 0140 Left Forearm   05/07/19    0140    Forearm   less than 1    Hemodialysis Cath Double   05/07/19 present on admission    0525    Subclavian   less than 1                Hospital Medications (active)       Dose Frequency Start End    heparin (porcine) 5000 UNIT/ML injection 5,000 Units 5,000 Units Every 12 Hours Scheduled 5/7/2019     Sig - Route: Inject 1 mL under the skin into the appropriate area as directed Every 12 (Twelve) Hours. - Subcutaneous    sodium bicarbonate tablet 650 mg 650 mg 3 Times Daily 5/7/2019     Sig - Route: Take 1 tablet by  "mouth 3 (Three) Times a Day. - Oral    sodium chloride 0.9 % bolus 1,000 mL 1,000 mL Once in Dialysis 5/7/2019     Sig - Route: Infuse 1,000 mL into a venous catheter Once. - Intravenous    sodium chloride 0.9 % flush 10 mL 10 mL As Needed 5/7/2019     Sig - Route: Infuse 10 mL into a venous catheter As Needed for Line Care. - Intravenous    Linked Group 1:  \"And\" Linked Group Details        sodium chloride 0.9 % flush 3 mL 3 mL Every 12 Hours Scheduled 5/7/2019     Sig - Route: Infuse 3 mL into a venous catheter Every 12 (Twelve) Hours. - Intravenous    sodium chloride 0.9 % flush 3-10 mL 3-10 mL As Needed 5/7/2019     Sig - Route: Infuse 3-10 mL into a venous catheter As Needed for Line Care. - Intravenous    sodium polystyrene (KAYEXALATE) powder 30 g 30 g Once 5/7/2019 5/7/2019    Sig - Route: Take 30 g by mouth 1 (One) Time. - Oral          Lab Results (all)     Procedure Component Value Units Date/Time    Hemoglobin A1c [[id number]]  (Normal) Collected:  05/07/19 0133    Specimen:  Blood Updated:  05/07/19 0741     Hemoglobin A1C 5.50 %     Narrative:       Hemoglobin A1C Ranges:    Increased Risk for Diabetes  5.7% to 6.4%  Diabetes                     >= 6.5%  Diabetic Goal                < 7.0%    TSH [[id number]]  (Normal) Collected:  05/07/19 0134    Specimen:  Blood Updated:  05/07/19 0734     TSH 1.550 mIU/mL     Urine Culture - Urine, Urine, Clean Catch [[id number]] Collected:  05/07/19 0321    Specimen:  Urine, Clean Catch Updated:  05/07/19 0659    Blood Gas, Venous [[id number]]  (Abnormal) Collected:  05/07/19 0357    Specimen:  Venous Blood Updated:  05/07/19 0404     Site Venous     pH, Venous 7.312 pH Units      pCO2, Venous 31.7 mm Hg      pO2, Venous 49.6 mm Hg      HCO3, Venous 15.7 mmol/L      Base Excess, Venous -9.6 mmol/L      O2 Saturation, Venous 80.9 %      Hemoglobin, Blood Gas 9.0 g/dL      A-a Gradiant 55.2 mmHg      Temperature 98.6 C      Barometric Pressure for Blood Gas 727 mmHg      " Modality Room Air     FIO2 21 %     Urinalysis, Microscopic Only - Urine, Clean Catch [[id number]]  (Abnormal) Collected:  05/07/19 0321    Specimen:  Urine, Clean Catch Updated:  05/07/19 0352     RBC, UA 3-5 /HPF      WBC, UA Too Numerous to Count /HPF      Bacteria, UA Trace /HPF      Squamous Epithelial Cells, UA 0-2 /HPF      Hyaline Casts, UA None Seen /LPF      Methodology Manual Light Microscopy    Urinalysis With Microscopic If Indicated (No Culture) - Urine, Clean Catch [[id number]]  (Abnormal) Collected:  05/07/19 0321    Specimen:  Urine, Clean Catch Updated:  05/07/19 0337     Color, UA Yellow     Appearance, UA Clear     pH, UA 6.5     Specific Gravity, UA 1.011     Glucose, UA Negative     Ketones, UA Negative     Bilirubin, UA Negative     Blood, UA Small (1+)     Protein, UA 30 mg/dL (1+)     Leuk Esterase, UA Moderate (2+)     Nitrite, UA Negative     Urobilinogen, UA 0.2 E.U./dL    Troponin [[id number]]  (Abnormal) Collected:  05/07/19 0134    Specimen:  Blood Updated:  05/07/19 0200     Troponin T 0.067 ng/mL     Narrative:       Troponin T Reference Range:  <= 0.03 ng/mL-   Negative for AMI  >0.03 ng/mL-     Abnormal for myocardial necrosis.  Clinicians would have to utilize clinical acumen, EKG, Troponin and serial changes to determine if it is an Acute Myocardial Infarction or myocardial injury due to an underlying chronic condition.     Comprehensive Metabolic Panel [[id number]]  (Abnormal) Collected:  05/07/19 0134    Specimen:  Blood Updated:  05/07/19 0159     Glucose 139 mg/dL       mg/dL      Creatinine 6.45 mg/dL      Sodium 132 mmol/L      Potassium 5.4 mmol/L      Chloride 96 mmol/L      CO2 14.7 mmol/L      Calcium 8.5 mg/dL      Total Protein 10.6 g/dL      Albumin 2.70 g/dL      ALT (SGPT) 24 U/L      AST (SGOT) 21 U/L      Alkaline Phosphatase 240 U/L      Total Bilirubin 0.4 mg/dL      eGFR Non African Amer 9 mL/min/1.73      Comment: <15 Indicative of kidney failure.         eGFR   Amer -- mL/min/1.73      Comment: <15 Indicative of kidney failure.        Globulin 7.9 gm/dL      A/G Ratio 0.3 g/dL      BUN/Creatinine Ratio 16.4     Anion Gap 21.3 mmol/L     Narrative:       GFR Normal >60  Chronic Kidney Disease <60  Kidney Failure <15    Protime-INR [[id number]]  (Abnormal) Collected:  05/07/19 0134    Specimen:  Blood Updated:  05/07/19 0148     Protime 15.7 Seconds      INR 1.19    Narrative:       Suggested INR therapeutic range for stable oral anticoagulant therapy:    Low Intensity therapy:   1.5-2.0  Moderate Intensity therapy:   2.0-3.0  High Intensity therapy:   2.5-4.0    aPTT [[id number]]  (Abnormal) Collected:  05/07/19 0134    Specimen:  Blood Updated:  05/07/19 0148     PTT 37.4 seconds     Narrative:       PTT Heparin Therapeutic Range:  59 - 95 seconds    CBC & Differential [[id number]] Collected:  05/07/19 0133    Specimen:  Blood Updated:  05/07/19 0139    Narrative:       The following orders were created for panel order CBC & Differential.  Procedure                               Abnormality         Status                     ---------                               -----------         ------                     CBC Auto Differential[[id number]]        Abnormal            Final result                 Please view results for these tests on the individual orders.    CBC Auto Differential [[id number]]  (Abnormal) Collected:  05/07/19 0133    Specimen:  Blood Updated:  05/07/19 0139     WBC 12.73 10*3/mm3      RBC 3.01 10*6/mm3      Hemoglobin 8.4 g/dL      Hematocrit 26.1 %      MCV 86.7 fL      MCH 27.9 pg      MCHC 32.2 g/dL      RDW 15.3 %      RDW-SD 47.7 fl      MPV 8.4 fL      Platelets 337 10*3/mm3      Neutrophil % 70.3 %      Lymphocyte % 17.2 %      Monocyte % 5.1 %      Eosinophil % 6.1 %      Basophil % 0.8 %      Immature Grans % 0.5 %      Neutrophils, Absolute 8.94 10*3/mm3      Lymphocytes, Absolute 2.19 10*3/mm3      Monocytes, Absolute 0.65 10*3/mm3       Eosinophils, Absolute 0.78 10*3/mm3      Basophils, Absolute 0.10 10*3/mm3      Immature Grans, Absolute 0.07 10*3/mm3           Orders (last 24 hrs)     Start     Ordered    05/08/19 0600  Hepatitis Panel, Acute  Morning Draw,   Status:  Canceled      05/07/19 0847    05/07/19 1230  sodium chloride 0.9 % bolus 1,000 mL  Once in Dialysis      05/07/19 1142    05/07/19 1200  Basic Metabolic Panel  Once      05/07/19 0912    05/07/19 1012  Wound Care  Every 12 Hours      05/07/19 1011    05/07/19 1011  Hepatitis Panel, Acute  Once      05/07/19 1012    05/07/19 1009  Hemodialysis Inpatient  Once      05/07/19 1012    05/07/19 0900  sodium chloride 0.9 % flush 3 mL  Every 12 Hours Scheduled      05/07/19 0510    05/07/19 0900  heparin (porcine) 5000 UNIT/ML injection 5,000 Units  Every 12 Hours Scheduled      05/07/19 0510    05/07/19 0900  sodium bicarbonate tablet 650 mg  3 Times Daily      05/07/19 0848    05/07/19 0848  Creatinine Clearance, Urine, 24 Hour - Urine, Clean Catch  Once      05/07/19 0847    05/07/19 0704  Hemoglobin A1c  Once      05/07/19 0703    05/07/19 0703  Inpatient Nephrology Consult  Once     Specialty:  Nephrology  Provider:  Federico Diallo MD    05/07/19 0702    05/07/19 0700  Urine Culture - Urine,  Once      05/07/19 0659    05/07/19 0657  Urinalysis With Culture If Indicated - Urine, Clean Catch  Once,   Status:  Canceled      05/07/19 0656    05/07/19 0656  TSH  Once      05/07/19 0656    05/07/19 0637  Adult Transthoracic Echo Complete W/ Cont if Necessary Per Protocol  Once      05/07/19 0636    05/07/19 0548  Unstageable Pressure Ulcer Care - Dry  Continuous     Comments:  Leave Open To Air    05/07/19 0548    05/07/19 0547  Wound Ostomy Eval & Treat  Once      05/07/19 0548    05/07/19 0533  Inpatient Case Management  Consult  Once     Comments:  Patient recently lost housing while in Hospital   Provider:  (Not yet assigned)    05/07/19 0533    05/07/19 0531   Wound Ostomy Eval & Treat  Once,   Status:  Canceled      05/07/19 0533    05/07/19 0527  Inpatient Nutrition Consult  Once     Comments:  Patient needs information on Renal Diet patient is unable to read or write   Provider:  (Not yet assigned)    05/07/19 0533    05/07/19 0511  Vital Signs  Per Hospital Policy      05/07/19 0510    05/07/19 0511  Intake & Output  Every Shift      05/07/19 0510    05/07/19 0511  Weigh Patient  Once      05/07/19 0510    05/07/19 0511  Oxygen Therapy- Nasal Cannula; Titrate for SPO2: 90% - 95%  Continuous      05/07/19 0510    05/07/19 0511  Notify PCP of Patient Admission  Once      05/07/19 0510    05/07/19 0511  Insert Peripheral IV  Once      05/07/19 0510    05/07/19 0511  Saline Lock & Maintain IV Access  Continuous      05/07/19 0510    05/07/19 0511  Activity - Bed Rest With Exceptions (Specify)  Until Discontinued      05/07/19 0510    05/07/19 0511  Fall Precautions  Continuous      05/07/19 0510    05/07/19 0511  Diet Regular; Renal  Diet Effective Now      05/07/19 0510    05/07/19 0510  sodium chloride 0.9 % flush 3-10 mL  As Needed      05/07/19 0510    05/07/19 0352  Obtain medical records  Once     Comments:  Discharge summary     Notify physician when available.    05/07/19 0352    05/07/19 0351  Code Status and Medical Interventions:  Continuous      05/07/19 0351    05/07/19 0351  Inpatient Admission  Once      05/07/19 0351    05/07/19 0351  Hospitalist (on-call MD unless specified)  Once     Specialty:  Hospitalist  Provider:  Opal Ocampo MD    05/07/19 0351    05/07/19 0350  sodium polystyrene (KAYEXALATE) powder 30 g  Once      05/07/19 0348    05/07/19 0347  Blood Gas, Venous  Once      05/07/19 0346    05/07/19 0344  Fabricio Report  Until Discontinued      05/07/19 0343    05/07/19 0335  Urinalysis, Microscopic Only - Urine, Clean Catch  Once      05/07/19 0334    05/07/19 0111  CBC & Differential  Once      05/07/19 0110    05/07/19 0111   Comprehensive Metabolic Panel  Once      05/07/19 0110    05/07/19 0111  Protime-INR  Once      05/07/19 0110    05/07/19 0111  aPTT  Once      05/07/19 0110    05/07/19 0111  Urinalysis With Microscopic If Indicated (No Culture) - Urine, Clean Catch  Once      05/07/19 0110    05/07/19 0111  Insert peripheral IV  Once      05/07/19 0110    05/07/19 0111  Monitor Blood Pressure  Per Hospital Policy      05/07/19 0110    05/07/19 0111  Cardiac Monitoring  Once      05/07/19 0110    05/07/19 0111  Pulse Oximetry, Continuous  Per Hospital Policy      05/07/19 0110    05/07/19 0111  ECG 12 Lead  Once      05/07/19 0110    05/07/19 0111  Troponin  Once      05/07/19 0110    05/07/19 0111  CBC Auto Differential  PROCEDURE ONCE      05/07/19 0110    05/07/19 0110  sodium chloride 0.9 % flush 10 mL  As Needed      05/07/19 0110          Operative/Procedure Notes (last 24 hours) (Notes from 5/6/2019 11:45 AM through 5/7/2019 11:45 AM)     No notes of this type exist for this encounter.           Physician Progress Notes (last 24 hours) (Notes from 5/6/2019 11:45 AM through 5/7/2019 11:45 AM)      Federico Diallo MD at 5/7/2019  8:48 AM        Assessed.  Consult to follow    Electronically signed by Federico Diallo MD at 5/7/2019  8:48 AM       Consult Notes (last 24 hours) (Notes from 5/6/2019 11:45 AM through 5/7/2019 11:45 AM)     No notes of this type exist for this encounter.        Nutrition Notes (last 24 hours) (Notes from 5/6/2019 11:45 AM through 5/7/2019 11:45 AM)     No notes of this type exist for this encounter.        Physical Therapy Notes (last 24 hours) (Notes from 5/6/2019 11:45 AM through 5/7/2019 11:45 AM)     No notes of this type exist for this encounter.        Occupational Therapy Notes (last 24 hours) (Notes from 5/6/2019 11:45 AM through 5/7/2019 11:45 AM)     No notes of this type exist for this encounter.           Nursing Assessments (last 24 hours)      Adult PCS Body System     Row Name  05/07/19 1000 05/07/19 0830 05/07/19 0515             Pain/Comfort/Sleep    Presence of Pain  --  denies pain/discomfort  denies pain/discomfort      Preferred Pain Scale  --  number (Numeric Rating Pain Scale)  number (Numeric Rating Pain Scale)      Pain Rating (0-10): Rest  --  0  0      Pain Rating (0-10): Activity  --  0  --      POSS (Pasero Opioid-Induced Sed Scale)  --  1 - Awake and alert  --      Sleep/Rest/Relaxation  --  awake;no problem identified  awake         Pain/Comfort/Sleep Interventions    Sleep/Rest Enhancement  --  consistent schedule promoted  --         Coping/Psychosocial    Observed Emotional State  --  restless;cooperative;anxious  restless;cooperative;anxious      Plan of Care Reviewed With  --  patient  patient         Psychosocial Support    Trust Relationship/Rapport  --  care explained;choices provided  care explained;choices provided;emotional support provided;empathic listening provided;questions answered;questions encouraged;reassurance provided;thoughts/feelings acknowledged         HEENT    HEENT WDL  --  WDL  WDL         Cognitive    Cognitive/Neuro/Behavioral WDL  --  WDL;orientation  WDL;orientation      Orientation  --  oriented x 4  oriented x 4         Respiratory    Respiratory WDL  --  WDL except;breath sounds  WDL except;breath sounds      Rhythm/Pattern, Respiratory  --  no shortness of breath reported  no shortness of breath reported      Cough And Deep Breathing  --  done independently per patient  done independently per patient         Breath Sounds    Breath Sounds  --  All Fields  All Fields      All Lung Fields Breath Sounds  --  clear  clear         Oxygen Therapy    Device (Oxygen Therapy)  --  room air  room air         Cardiac    Cardiac WDL  --  WDL  WDL      Cardiac Rhythm  --  tachycardic  --         ECG    Lead Monitored  --  Lead II  Lead II      Rhythm  --  normal sinus rhythm  normal sinus rhythm         Peripheral Neurovascular    Peripheral  Neurovascular WDL  --  WDL  WDL         Neurovascular Assessment    Neurovascular Assessment  --  General  General      All Extremities Temperature  --  warm  warm      All Extremities Color  --  no discoloration  no discoloration      All Extremities Sensation  --  no tingling;no numbness  no tingling;no numbness         Gastrointestinal    GI WDL  --  WDL  WDL         Genitourinary    Genitourinary WDL  --  WDL  WDL         Skin    Skin WDL  --  WDL except;all  WDL except;all      Skin Temperature  --  warm  warm      Skin Moisture  --  dry  dry      Skin Integrity  --  bruised (ecchymotic);abrasion;scab;pressure injury  bruised (ecchymotic);abrasion;scab;pressure injury         Andrew Risk Assessment (If Andrew score </= 18, add the appropriate CPG to the care plan)    Sensory Perception  --  4-->no impairment  4-->no impairment      Moisture  --  4-->rarely moist  4-->rarely moist      Activity  --  4-->walks frequently  4-->walks frequently      Mobility  --  4-->no limitation  4-->no limitation      Nutrition  --  2-->probably inadequate  2-->probably inadequate      Friction and Shear  --  2-->potential problem  2-->potential problem      Andrew Score  --  20  20         Wound 05/07/19 0527 Bilateral midline coccyx pressure injury    Wound - Properties Group Date first assessed: 05/07/19 Time first assessed: 0527 Present On Admission : yes;picture taken Side: Bilateral Orientation: midline Location: coccyx Type: pressure injury Stage, Pressure Injury: unstageable    Dressing Appearance  --  open to air  --      Base  --  blanchable;scab;dry;pink  --      Periwound  --  intact;pink;dry  --      Drainage Amount  --  none  --      Dressing Care, Wound  --  open to air  --         Musculoskeletal    Musculoskeletal WDL  --  WDL  WDL         Functional Screen (every 3 days/change)    Ambulation  --  0 - independent  0 - independent      Transferring  --  0 - independent  0 - independent      Toileting  --  0 -  independent  0 - independent      Bathing  --  0 - independent  0 - independent      Dressing  --  0 - independent  0 - independent      Eating  --  0 - independent  0 - independent      Communication  --  0 - understands/communicates without difficulty  0 - understands/communicates without difficulty      Swallowing  --  0 - swallows foods/liquids without difficulty  0 - swallows foods/liquids without difficulty         Nutrition    Diet/Nutrition Received  --  regular renal  --      Nutrition Risk Screen  --  no indicators present  --         Hemodialysis Cath Double    Hemodialysis Properties Placement Date: 05/07/19 , present on admission  Placement Time: 0525 Placed by External Staff?: Other hospital Orientation: Right Access Location: Subclavian    Site Assessment  --  Clean;Dry;Intact  --      #1 Lumen Status  --  Capped  --      Dressing Status  --  Old drainage  --      Dressing Intervention  --  Dressing changed  --         Peripheral IV 05/07/19 0140 Left Forearm    IV Properties Placement Date: 05/07/19 Placement Time: 0140 Hand Hygiene Completed: Yes Size (Gauge): 22 G Orientation: Left Location: Forearm Site Prep: Chlorhexidine Total insertion attempts: 1 Patient Tolerance: Tolerated well    Site Assessment  --  Clean;Dry  Clean;Dry      Dressing Type  --  Transparent  Transparent      Line Status  --  Saline locked  Saline locked      Dressing Status  --  Clean;Dry;Intact  Clean;Dry;Intact      Phlebitis  --  0-->no symptoms  0-->no symptoms         Sepsis Screening Options    Which Sepsis Screen to be Used?  --  Adult Sepsis Screen  Adult Sepsis Screen         Adult Sepsis Screening Tool    Previous adult screen positive?  --  no  no      Are 2 or > of the above criteria present?  --  no: STOP/negative screen  no: STOP/negative screen         Safety    Safety WDL  --  WDL;safety factors  WDL      Safety Factors  --  upper side rails raised x 2, lower side rail raised x 1;call light in reach;wheels  locked;bed in low position;ID band on  --      All Alarms  --  none present  none present         [facility] High Risk Falls Assessment (If Fall score is >/=13, add the Fall Risk CPG to the care plan)     Fallen in past 6 months  --  0--> No  0--> No      Mental Status  --  0--> no mental status change  0--> no mental status change      Elimination  --  0--> No elimination issues  0--> No elimination issues      Mobility  --  0--> No mobility issues  0--> No mobility issues      Medications  --  0--> No meds  0--> No meds      Nurses' Clinical Judgement  --  8  7      Total Fall Risk Score  --  8  7         Safety Management    Safety Promotion/Fall Prevention  activity supervised;fall prevention program maintained;nonskid shoes/slippers when out of bed;safety round/check completed  activity supervised;fall prevention program maintained;safety round/check completed;nonskid shoes/slippers when out of bed  safety round/check completed      Medication Review/Management  medications reviewed  medications reviewed  medications reviewed      Environmental Safety Modification  assistive device/personal items within reach  assistive device/personal items within reach  assistive device/personal items within reach;clutter free environment maintained;room organization consistent         Daily Care    Activity Management  --  --  bedrest      Activity Assistance Provided  --  --  independent

## 2019-05-07 NOTE — DISCHARGE SUMMARY
[facility] HOSPITALIST MEDICINE DISCHARGE SUMMARY    Patient Identification:  Name:  Mario Nair  Age:  44 y.o.  Sex:  male  :  1975  MRN:  [id number]  Visit Number:  [id number]    Date of Admission: 2019  Date of leaving AMA:  2019     PCP: Provider, No Known    DIAGNOSIS  · End-stage renal disease  · Prolonged QTC  · Elevated troponin likely due to end-stage renal disease  · Normocytic anemia  · Recent history of septic shock due to endocarditis      CONSULTS   · Nephrology    PROCEDURES PERFORMED  · Hemodialysis    REASON FOR ADMISSION  Patient is a 44 y.o. male presented to [facility] complaining of fatigue.  Please see the admitting history and physical for further details.       HOSPITAL COURSE   Pt is a 44 years old male who was recently admitted to [facility] for possibly septic shock due to endocarditis and had developed renal failure.  Patient left AGAINST MEDICAL ADVICE before his dialysis could be set up as outpatient.  He presented to the emergency department complaints of fatigue after he was seen in nephrology clinic and advised to come to the emergency department.  Patient was found to have hyperkalemia and admitted.  Patient was seen by nephrology and advised hemodialysis.  Patient felt much better after that dialysis.  He stated that he is feeling better and will need to go home because of issues at home.  Nursing staff as well as myself tried to clean to the patient the need for completing treatment but patient remained insistent on going left AGAINST MEDICAL ADVICE.  His blood cultures, urine culture is still pending and previous records have been requested from [facility] to evaluate for the diagnosis of endocarditis and if patient has completed the treatment but patient has left before all that could be completed.       DISPOSITION   Patient left AMA.  No discharge medications of follow-up appointments could be made as patient did not complete her  treatment.  Pt was advised to fup with her PCP.     TEST  RESULTS PENDING AT DISCHARGE   Order Current Status    Hepatitis B Surface Antigen In process    Urine Culture - Urine, Urine, Clean Catch In process           Taniya Castillo MD  05/07/19  5:34 PM    Please note that this discharge summary required more than 30 minutes to complete.    Please send a copy of this dictation to the following providers:    Provider, No Known

## 2019-05-07 NOTE — ED PROVIDER NOTES
Subjective   Pt comes in with concern for renal failure.  Pt has had recent acute renal failure requiring hemodialysis.  Pt was here yesterday and wants to get set up for outpatient dialysis.  Unable to be seen in Dr Hylton's office.  Advised to come back to ED.  Pt has fatigue and malaise.  Pt appears to have developed acute and now chronic renal failure secondary to septic shock from IVDA.        History provided by:  Patient  Illness   Location:  Systemic  Severity:  Severe  Onset quality:  Unable to specify  Timing:  Constant  Progression:  Unchanged  Chronicity:  Chronic  Context:  Chronic renal failure  Relieved by:  Hemodialysis  Worsened by:  Nothing  Ineffective treatments:  Noen tried  Associated symptoms: fatigue, headaches, myalgias and nausea    Associated symptoms: no abdominal pain, no chest pain, no congestion, no cough, no diarrhea, no ear pain, no fever, no loss of consciousness, no rash, no rhinorrhea, no shortness of breath, no sore throat, no vomiting and no wheezing        Review of Systems   Constitutional: Positive for activity change, appetite change and fatigue. Negative for chills, diaphoresis and fever.   HENT: Negative for congestion, ear pain, rhinorrhea and sore throat.    Eyes: Negative.    Respiratory: Negative.  Negative for cough, chest tightness, shortness of breath and wheezing.    Cardiovascular: Negative.  Negative for chest pain.   Gastrointestinal: Positive for nausea. Negative for abdominal pain, diarrhea and vomiting.   Endocrine: Negative.    Genitourinary: Negative for difficulty urinating and flank pain.   Musculoskeletal: Positive for arthralgias and myalgias.   Skin: Negative.  Negative for color change and rash.   Allergic/Immunologic: Negative.    Neurological: Positive for weakness and headaches. Negative for loss of consciousness.   Hematological: Negative.    Psychiatric/Behavioral: Negative.    All other systems reviewed and are negative.      Past Medical  History:   Diagnosis Date   • Endocarditis    • ESRD (end stage renal disease) (CMS/[facility])    • IV drug abuse (CMS/[facility])    • Unable to read or write        Allergies   Allergen Reactions   • Penicillins Shortness Of Breath, Itching and Rash       Past Surgical History:   Procedure Laterality Date   • CENTRAL VENOUS CATHETER TUNNELED INSERTION DOUBLE LUMEN Right     Chest for hemodialysis       Family History   Problem Relation Age of Onset   • Alcohol abuse Mother    • Hypertension Mother    • Hypertension Other        Social History     Socioeconomic History   • Marital status:      Spouse name: Not on file   • Number of children: Not on file   • Years of education: Not on file   • Highest education level: Not on file   Tobacco Use   • Smoking status: Current Every Day Smoker     Packs/day: 1.00     Years: 20.00     Pack years: 20.00     Types: Cigarettes   • Smokeless tobacco: Never Used   Substance and Sexual Activity   • Alcohol use: No     Frequency: Never   • Drug use: Yes     Types: Methamphetamines     Comment: last used on Friday 5/3/2019   • Sexual activity: Defer           Objective   Physical Exam   Constitutional: He is oriented to person, place, and time. He appears well-developed and well-nourished. No distress.   HENT:   Head: Normocephalic and atraumatic.   Mouth/Throat: Oropharynx is clear and moist.   Eyes: Conjunctivae and EOM are normal. Pupils are equal, round, and reactive to light. Right eye exhibits no discharge. Left eye exhibits no discharge. No scleral icterus.   Neck: Normal range of motion. No tracheal deviation present. No thyromegaly present.   Cardiovascular: Normal heart sounds and intact distal pulses. Exam reveals no gallop and no friction rub.   No murmur heard.  Regular tachycardia   Pulmonary/Chest: Effort normal. No stridor. No respiratory distress. He has no wheezes. He has no rales.   Dialysis catheter upper right chest   Abdominal: Soft. He exhibits no distension and  no mass. There is no tenderness. There is no guarding.   Musculoskeletal: Normal range of motion. He exhibits no deformity.   Neurological: He is alert and oriented to person, place, and time. No cranial nerve deficit or sensory deficit. He exhibits normal muscle tone. Coordination normal.   Skin: Skin is warm and dry. Capillary refill takes less than 2 seconds. He is not diaphoretic. No pallor.   Psychiatric: He has a normal mood and affect. His behavior is normal. Judgment and thought content normal.   Nursing note and vitals reviewed.      Procedures           ED Course  ED Course as of May 12 0525   Tue May 07, 2019   [address] [place] [employer] report [id number] requested received and reviewed.  0 entries.  [TZ]      ED Course User Index  [TZ] Juan Miguel Beck MD      No orders to display     Labs Reviewed   COMPREHENSIVE METABOLIC PANEL - Abnormal; Notable for the following components:       Result Value    Glucose 139 (*)      (*)     Creatinine 6.45 (*)     Sodium 132 (*)     Potassium 5.4 (*)     Chloride 96 (*)     CO2 14.7 (*)     Calcium 8.5 (*)     Total Protein 10.6 (*)     Albumin 2.70 (*)     Alkaline Phosphatase 240 (*)     eGFR Non  Amer 9 (*)     All other components within normal limits    Narrative:     GFR Normal >60  Chronic Kidney Disease <60  Kidney Failure <15   PROTIME-INR - Abnormal; Notable for the following components:    Protime 15.7 (*)     INR 1.19 (*)     All other components within normal limits    Narrative:     Suggested INR therapeutic range for stable oral anticoagulant therapy:    Low Intensity therapy:   1.5-2.0  Moderate Intensity therapy:   2.0-3.0  High Intensity therapy:   2.5-4.0   APTT - Abnormal; Notable for the following components:    PTT 37.4 (*)     All other components within normal limits    Narrative:     PTT Heparin Therapeutic Range:  59 - 95 seconds   URINALYSIS W/ MICROSCOPIC IF INDICATED (NO CULTURE) - Abnormal; Notable for the  following components:    Blood, UA Small (1+) (*)     Protein, UA 30 mg/dL (1+) (*)     Leuk Esterase, UA Moderate (2+) (*)     All other components within normal limits   TROPONIN (IN-HOUSE) - Abnormal; Notable for the following components:    Troponin T 0.067 (*)     All other components within normal limits    Narrative:     Troponin T Reference Range:  <= 0.03 ng/mL-   Negative for AMI  >0.03 ng/mL-     Abnormal for myocardial necrosis.  Clinicians would have to utilize clinical acumen, EKG, Troponin and serial changes to determine if it is an Acute Myocardial Infarction or myocardial injury due to an underlying chronic condition.    CBC WITH AUTO DIFFERENTIAL - Abnormal; Notable for the following components:    WBC 12.73 (*)     RBC 3.01 (*)     Hemoglobin 8.4 (*)     Hematocrit 26.1 (*)     Lymphocyte % 17.2 (*)     Neutrophils, Absolute 8.94 (*)     Eosinophils, Absolute 0.78 (*)     Immature Grans, Absolute 0.07 (*)     All other components within normal limits   URINALYSIS, MICROSCOPIC ONLY - Abnormal; Notable for the following components:    RBC, UA 3-5 (*)     WBC, UA Too Numerous to Count (*)     Bacteria, UA Trace (*)     All other components within normal limits   BLOOD GAS, VENOUS - Abnormal; Notable for the following components:    Hemoglobin, Blood Gas 9.0 (*)     All other components within normal limits   BASIC METABOLIC PANEL - Abnormal; Notable for the following components:    Glucose 119 (*)     BUN 94 (*)     Creatinine 5.52 (*)     Sodium 131 (*)     Chloride 97 (*)     CO2 19.2 (*)     Calcium 8.3 (*)     eGFR Non  Amer 11 (*)     All other components within normal limits    Narrative:     GFR Normal >60  Chronic Kidney Disease <60  Kidney Failure <15   HEPATITIS PANEL, ACUTE - Abnormal; Notable for the following components:    Hepatitis B Surface Ag Reactive (*)     Hepatitis C Ab Reactive (*)     All other components within normal limits   ~HBSAG CONFIRMATION - Abnormal; Notable for  the following components:    Hepatitis B Surface Ag Confirm Positive (*)     All other components within normal limits    Narrative:     Performed at:  01 - [facility] [address]  [id number]  : Paolo Staley PhD, Phone:  [phone]   URINE CULTURE - Normal   TSH - Normal   HEMOGLOBIN A1C - Normal    Narrative:     Hemoglobin A1C Ranges:    Increased Risk for Diabetes  5.7% to 6.4%  Diabetes                     >= 6.5%  Diabetic Goal                < 7.0%   HEPATITIS B SURFACE ANTIGEN    Narrative:     Performed at:  01 - [facility] [address]  [id number]  : Paolo Staley PhD, Phone:  [phone]   CBC AND DIFFERENTIAL    Narrative:     The following orders were created for panel order CBC & Differential.  Procedure                               Abnormality         Status                     ---------                               -----------         ------                     CBC Auto Differential[[id number]]        Abnormal            Final result                 Please view results for these tests on the individual orders.        Medication List      You have not been prescribed any medications.                 MDM  Number of Diagnoses or Management Options  Hyperkalemia: new and requires workup  Metabolic acidosis: new and requires workup  Renal failure, unspecified chronicity: established and worsening     Amount and/or Complexity of Data Reviewed  Clinical lab tests: ordered and reviewed  Decide to obtain previous medical records or to obtain history from someone other than the patient: yes    Risk of Complications, Morbidity, and/or Mortality  Presenting problems: high  Diagnostic procedures: high  Management options: high    Patient Progress  Patient progress: (fair)        Final diagnoses:   Hyperkalemia   Metabolic acidosis   Renal failure, unspecified chronicity            Juan Miguel Beck MD  05/12/19 4953

## 2019-05-07 NOTE — ED NOTES
Checked to see if patient has provided a urine yet. He states he will try to go, but might take him a few minutes. GOPAL Faust RN is aware.      Symes, Heather  05/07/19 0220

## 2019-05-07 NOTE — PAYOR COMM NOTE
"Mario Nair (44 y.o. Male)     Date of Birth Social Security Number Address Home Phone MRN    1975  [address] [place] [address] [phone] [id number]    [employer] Marital Status          None        Admission Date Admission Type Admitting Provider Attending Provider Department, Room/Bed    19 Emergency Opal Ocampo MD Khan, Hafiz Sarfraz, MD [address], 3324/    Discharge Date Discharge Disposition Discharge Destination                       Attending Provider:  Taniya Castillo MD    Allergies:  Penicillins    Isolation:  None   Infection:  None   Code Status:  CPR    Ht:  188 cm (74\")   Wt:  67.2 kg (148 lb 3.2 oz)    Admission Cmt:  None   Principal Problem:  Hyperkalemia [E87.5]                 Active Insurance as of 2019     Primary Coverage     Payor Plan Insurance Group Employer/Plan Group    [employer] [place] [employer] [place]      Payor Plan Address Payor Plan Phone Number Payor Plan Fax Number Effective Dates    [address] [address] [phone]  2019 - None Entered    [facility] [address]       Subscriber Name Subscriber Birth Date Member ID       MARIO NAIR 1975 [id number]                 Emergency Contacts      (Rel.) Home Phone Work Phone Mobile Phone    SHANI WILKES (Relative) [phone] -- --               History & Physical      Opal Ocampo MD at 2019  6:30 AM              [facility] HOSPITALIST HISTORY AND PHYSICAL    Patient Identification:  Name:  Mario Nair  Age:  44 y.o.  Sex:  male  :  1975  MRN:  [id number]   Visit Number:  [id number]  Room number:  3324/1P  Primary Care Physician:  Provider, No Known     Subjective     Chief complaint:    Chief Complaint   Patient presents with   • Fatigue       History of presenting illness:  44 y.o. male who presented to [facility] ED at the request of nephrology for hemodialysis.  The patient was recently " admitted to [facility] for [facility]; per verbal report from the emergency department it was for septic shock due to endocarditis.  When asked about the reason he was in the hospital for a month, the patient was unable to tell me the details.  All he knows is that he thought that everything was going okay but he had to leave AGAINST MEDICAL ADVICE because he had lost his home and has custody of 2 of his children and needed to secure housing for the kids; the date that he left the hospital was 5/3/2019.  He came to our emergency department on 5/5/2019 and was told to follow up with nephrology.  He saw nephrology on 5/6/2019 was told to come to the emergency department again.  He states that he feels tired but other than that he denies chest pain, shortness of air, swelling, nausea, vomiting, and diarrhea.  He still has the tunneled right sided chest hemodialysis catheter.  He states that he has had a catheter for hemodialysis placed in both groins and in his left chest as well.  Please note that the patient was by himself and did not have any family present.  He is a poor historian.  ---------------------------------------------------------------------------------------------------------------------   Review of Systems   Constitutional: Positive for fatigue. Negative for chills and fever.   HENT: Negative for postnasal drip, rhinorrhea, sneezing and sore throat.    Eyes: Negative for discharge and redness.   Respiratory: Negative for cough, shortness of breath and wheezing.    Cardiovascular: Negative for chest pain, palpitations and leg swelling.   Gastrointestinal: Negative for diarrhea, nausea and vomiting.   Genitourinary: Negative for decreased urine volume, dysuria and hematuria.   Musculoskeletal: Negative for arthralgias and joint swelling.   Skin: Negative for pallor, rash and wound.   Neurological: Negative for seizures, speech difficulty and headaches.   Hematological: Does not bruise/bleed  easily.   Psychiatric/Behavioral: Negative for confusion, self-injury and suicidal ideas. The patient is not nervous/anxious.    ---------------------------------------------------------------------------------------------------------------------   Past Medical History:   Diagnosis Date   • Endocarditis    • ESRD (end stage renal disease) (CMS/[facility])    • IV drug abuse (CMS/[facility])    • Unable to read or write      Past Surgical History:   Procedure Laterality Date   • CENTRAL VENOUS CATHETER TUNNELED INSERTION DOUBLE LUMEN Right     Chest for hemodialysis     Family History   Problem Relation Age of Onset   • Alcohol abuse Mother    • Hypertension Mother    • Hypertension Other      Social History     Socioeconomic History   • Marital status:    Tobacco Use   • Smoking status: Current Every Day Smoker     Packs/day: 1.00     Years: 20.00     Pack years: 20.00     Types: Cigarettes   • Smokeless tobacco: Never Used   Substance and Sexual Activity   • Alcohol use: No     Frequency: Never   • Drug use: Yes     Types: Methamphetamines     Comment: last used on Friday 5/3/2019   • Sexual activity: Defer     ---------------------------------------------------------------------------------------------------------------------   Allergies:  Penicillins  ---------------------------------------------------------------------------------------------------------------------   Medications below are reported home medications pulling from within the system; at this time, these medications have not been reconciled unless otherwise specified and are in the verification process for further verifcation as current home medications.    Prior to Admission Medications     None        Objective     Vital Signs:  Temp:  [96.8 °F (36 °C)-98.5 °F (36.9 °C)] 96.8 °F (36 °C)  Heart Rate:  [] 106  Resp:  [16-18] 18  BP: (125-147)/(80-86) 132/86    Mean Arterial Pressure (Non-Invasive) for the past 24 hrs (Last 3 readings):   Noninvasive  MAP (mmHg)   05/07/19 0624 101     SpO2:  [99 %-100 %] 100 %  Device (Oxygen Therapy): room air  Body mass index is 19.03 kg/m².    Wt Readings from Last 3 Encounters:   05/07/19 67.2 kg (148 lb 3.2 oz)   05/05/19 68 kg (150 lb)     ---------------------------------------------------------------------------------------------------------------------   Physical Exam:  Constitutional:  Well-developed and well-nourished.  No respiratory distress.  Thin in appearance.  HENT:  Head: Normocephalic and atraumatic.  Mouth:  Moist mucous membranes.    Eyes:  Conjunctivae and EOM are normal.  Pupils are equal, round, and reactive to light.  No scleral icterus.  Neck:  Neck supple.  No JVD present.    Cardiovascular:  Normal rate, regular rhythm and normal heart sounds with no murmur.  Pulmonary/Chest:  No respiratory distress, no wheezes, no crackles, with normal breath sounds and good air movement.  Right tunneled hemodialysis catheter in place.  There is a Tegaderm over it that is dated 4/29/2019.  Abdominal:  Soft.  Bowel sounds are normal.  No distension and no tenderness.   Musculoskeletal:  No edema, no tenderness, and no deformity.  No red or swollen joints anywhere.    Neurological:  Alert and oriented to person, place, and time.  No cranial nerve deficit.  No tongue deviation.  No facial droop.  No slurred speech.   Skin:  Skin is warm and dry.  No rash noted.  No pallor.   Peripheral vascular:  No edema and strong pulses on all 4 extremities.  Genitourinary:  No resendiz catheter in place.  ---------------------------------------------------------------------------------------------------------------------  EKG:  Sinus tachycardia, heart rate 101, QTc 466 ms; I compared this to an EKG dated 5/5/2019 and I do not see a change.    Telemetry:  Sinus tachycardia 100-110.    I have personally looked at both the EKG and the telemetry strips.    Last echocardiogram:  None in our  system  --------------------------------------------------------------------------------------------------------------------  Labs:  Results from last 7 days   Lab Units 05/07/19 0134 05/07/19 0133 05/05/19  2240   LACTATE mmol/L  --   --  0.9   WBC 10*3/mm3  --  12.73* 13.56*   HEMOGLOBIN g/dL  --  8.4* 8.7*   HEMATOCRIT %  --  26.1* 26.7*   MCV fL  --  86.7 87.5   MCHC g/dL  --  32.2 32.6   PLATELETS 10*3/mm3  --  337 355   INR  1.19*  --  1.21*         Results from last 7 days   Lab Units 05/07/19 0134 05/05/19  2240   SODIUM mmol/L 132* 129*   POTASSIUM mmol/L 5.4* 4.9   MAGNESIUM mg/dL  --  1.9   CHLORIDE mmol/L 96* 93*   CO2 mmol/L 14.7* 17.9*   BUN mg/dL 106* 95*   CREATININE mg/dL 6.45* 5.83*   EGFR IF NONAFRICN AM mL/min/1.73 9* 11*   CALCIUM mg/dL 8.5* 8.3*   GLUCOSE mg/dL 139* 104*   ALBUMIN g/dL 2.70* 2.82*   BILIRUBIN mg/dL 0.4 0.4   ALK PHOS U/L 240* 265*   AST (SGOT) U/L 21 24   ALT (SGPT) U/L 24 25   Estimated Creatinine Clearance: 14 mL/min (A) (by C-G formula based on SCr of 6.45 mg/dL (H)).    Results from last 7 days   Lab Units 05/07/19 0134 05/05/19  2240   TROPONIN T ng/mL 0.067* 0.075*         Brief Urine Lab Results  (Last result in the past 365 days)      Color   Clarity   Blood   Leuk Est   Nitrite   Protein   CREAT   Urine HCG        05/07/19 0321 Yellow Clear Small (1+) Moderate (2+) Negative 30 mg/dL (1+)             Blood Culture   Date Value Ref Range Status   05/05/2019 No growth at 24 hours  Preliminary   05/05/2019 No growth at 24 hours  Preliminary         Results from last 7 days   Lab Units 05/05/19  2240   LACTATE mmol/L 0.9       I have personally looked at the labs and they are summarized above.  ----------------------------------------------------------------------------------------------------------------------    Final impressions for the last 30 days of radiology reports:    Xr Chest 1 View  Result Date: 5/6/2019  1. Right-sided dialysis catheter placement. 2. No  pneumothorax. 3. Trace pleural effusions.  This report was finalized on 5/6/2019 8:24 AM by Dr. Maxi Lowry MD.      I have personally looked at the radiology images and read the final radiology report.    Assessment & Plan       -ESRD of unknown etiology but suspect due to recent septic shock diagnosis  -Prolonged QTc of 466 ms  -Uremia  -Elevated troponin, possibly due to end-stage renal disease as the troponin level has not really changed in the last 2 days  -Normocytic anemia, suspicious for anemia of chronic disease  -Pseudo-hypocalcemia due to hypoalbuminemia with a corrected calcium level of 9.53  -History of IV drug abuse  -Possible recent septic shock due to endocarditis with details unknown at this time  -Light tobacco smoking addiction  -Inability to read and write    Patient has been admitted to the telemetry floor due to the hyperkalemia noted in the ED; will send for a cortisol level and continue to monitor the electrolytes closely.  Will obtain an ECHO to evaluate his heart due to the prolonged QTc and the recent report of endocarditis and the elevated troponins.  I have attempted to obtain the medical records from [facility] but when these were faxed to the emergency department every page was just solid black and the writing was illegible.  We have asked for repeat records to be sent.  We will consult nephrology for hemodialysis, especially since the patient's uremia has worsened since his emergency department visit 2 days ago.  We will continue to monitor his anemia closely; he may need iron infusions.  Blood cultures were obtained in the emergency department and since his urine analysis has a large white blood cell count burden we have sent the urine for culture.  We have consulted social work due to his social needs.  The patient does not know what a renal diet is and we will consult nutrition to educate him on this, especially since patient cannot read and write.       VTE Prophylaxis:  "  Mechanical Order History:     None      Pharmalogical Order History:     Ordered     Dose Route Frequency Stop    05/07/19 0510  heparin (porcine) 5000 UNIT/ML injection 5,000 Units      5,000 Units SC Every 12 Hours Scheduled --          The patient is high risk due to the following diagnoses/reasons:  ESRD    Opal Ocampo MD  [facility]  05/07/19  6:32 AM        Electronically signed by Opal Ocampo MD at 5/7/2019  7:04 AM          Emergency Department Notes      Ana Wyatt, RN at 5/6/2019 10:20 PM        Triage call x1 with no answer. WCTM.      Ana Wyatt RN  05/06/19 2220      Electronically signed by Ana Wyatt, RN at 5/6/2019 10:20 PM     Symes, Heather at 5/7/2019  1:42 AM        EKG completed by me @0142, and was given to Dr. Beck.      Symes, Heather  05/07/19 0147      Electronically signed by Symes, Heather at 5/7/2019  1:47 AM     Symes, Heather at 5/7/2019  2:21 AM        Checked to see if patient has provided a urine yet. He states he will try to go, but might take him a few minutes. GOPAL Faust RN is aware.      Symes, Heather  05/07/19 0221      Electronically signed by Symes, Heather at 5/7/2019  2:21 AM       ICU Vital Signs     Row Name 05/07/19 0900 05/07/19 0830 05/07/19 0624 05/07/19 0515 05/07/19 04:34:05       Height and Weight    Height  --  --  --  188 cm (74\")  --    Height Method  --  --  --  Stated  --    Weight  --  --  --  67.2 kg (148 lb 3.2 oz)  --    Weight Method  --  --  --  Bed scale  --    Ideal Body Weight (IBW) (kg)  --  --  --  87.66  --    BSA (Calculated - sq m)  --  --  --  1.91 sq meters  --    BMI (Calculated)  --  --  --  19  --    Weight in (lb) to have BMI = 25  --  --  --  194.3  --       Vitals    Temp  -- off the floor   --  96.8 °F (36 °C)  97.1 °F (36.2 °C)  98.2 °F (36.8 °C)    Temp src  --  --  Oral  Oral  --    Pulse  --  --  106  99  99    Heart Rate Source  --  --  Monitor  Monitor  --    " "Resp  --  --  18  18  18    Resp Rate Source  --  --  Visual  Visual  --    BP  --  --  132/86  143/80  125/81    Noninvasive MAP (mmHg)  --  --  101  --  --    BP Location  --  --  Right arm  Right arm  --    BP Method  --  --  Automatic  Automatic  --    Patient Position  --  --  Sitting  Lying  --       Oxygen Therapy    SpO2  --  --  100 %  99 %  100 %    Device (Oxygen Therapy)  --  room air  --  room air  --    Row Name 05/06/19 2242                   Height and Weight    Height  188 cm (74\")        Height Method  Stated        Weight  70.3 kg (155 lb)        Weight Method  Stated        Ideal Body Weight (IBW) (kg)  87.66        BSA (Calculated - sq m)  1.95 sq meters        BMI (Calculated)  19.9        Weight in (lb) to have BMI = 25  194.3           Vitals    Temp  98.5 °F (36.9 °C)        Temp src  Oral        Pulse  120        Heart Rate Source  Monitor        Resp  16        Resp Rate Source  Visual        BP  147/84        BP Location  Right arm        BP Method  Automatic           Oxygen Therapy    SpO2  100 %        Device (Oxygen Therapy)  room air            Hospital Medications (all)       Dose Frequency Start End    heparin (porcine) 5000 UNIT/ML injection 5,000 Units 5,000 Units Every 12 Hours Scheduled 5/7/2019     Sig - Route: Inject 1 mL under the skin into the appropriate area as directed Every 12 (Twelve) Hours. - Subcutaneous    sodium bicarbonate tablet 650 mg 650 mg 3 Times Daily 5/7/2019     Sig - Route: Take 1 tablet by mouth 3 (Three) Times a Day. - Oral    sodium chloride 0.9 % flush 10 mL 10 mL As Needed 5/7/2019     Sig - Route: Infuse 10 mL into a venous catheter As Needed for Line Care. - Intravenous    Linked Group 1:  \"And\" Linked Group Details        sodium chloride 0.9 % flush 3 mL 3 mL Every 12 Hours Scheduled 5/7/2019     Sig - Route: Infuse 3 mL into a venous catheter Every 12 (Twelve) Hours. - Intravenous    sodium chloride 0.9 % flush 3-10 mL 3-10 mL As Needed 5/7/2019  "    Sig - Route: Infuse 3-10 mL into a venous catheter As Needed for Line Care. - Intravenous    sodium polystyrene (KAYEXALATE) powder 30 g 30 g Once 5/7/2019 5/7/2019    Sig - Route: Take 30 g by mouth 1 (One) Time. - Oral             Physician Progress Notes (all)      Federico Diallo MD at 5/7/2019  8:48 AM        Assessed.  Consult to follow    Electronically signed by Federico Diallo MD at 5/7/2019  8:48 AM       Consult Notes (all)     No notes of this type exist for this encounter.

## 2019-05-07 NOTE — CONSULTS
Referring Provider: Dr. Ocampo  Reason for Consultation: Acute renal failure needing dialysis    Chief complaint needs dialysis    Subjective .     History of present illness: Patient has no physical complaints.  Patient is currently homeless as he was evicted from his last apartment when he was hospitalized for 4 weeks at [place].  He was found to be in acute renal failure with baseline creatinine unknown and staph bacteremia.  His hospital stay was complicated by development of pneumonia and the need for a chest tube.  It appears he was also on the ventilator.  He has a known history of intravenous drug use and he is hepatitis B positive per history.  He says he is not actively using drugs anymore.  He denies any hematemesis or melena or fever or chills or hemoptysis or backache.  He has a right internal jugular tunneled dialysis catheter.    The patient had took a discharge AGAINST MEDICAL ADVICE and does not have placement at a dialysis clinic    History  Past Medical History:   Diagnosis Date   • Endocarditis    • ESRD (end stage renal disease) (CMS/[facility])    • IV drug abuse (CMS/[facility])    • Unable to read or write    ,   Past Surgical History:   Procedure Laterality Date   • CENTRAL VENOUS CATHETER TUNNELED INSERTION DOUBLE LUMEN Right     Chest for hemodialysis   ,   Family History   Problem Relation Age of Onset   • Alcohol abuse Mother    • Hypertension Mother    • Hypertension Other    ,   Social History     Tobacco Use   • Smoking status: Current Every Day Smoker     Packs/day: 1.00     Years: 20.00     Pack years: 20.00     Types: Cigarettes   • Smokeless tobacco: Never Used   Substance Use Topics   • Alcohol use: No     Frequency: Never   • Drug use: Yes     Types: Methamphetamines     Comment: last used on Friday 5/3/2019    and Allergies:  Penicillins      Vital Signs   Temp:  [96.8 °F (36 °C)-98.5 °F (36.9 °C)] 96.8 °F (36 °C)  Heart Rate:  [] 106  Resp:  [16-18] 18  BP: (125-147)/(80-86)  132/86    Physical Exam:     General Appearance:    Alert, cooperative, in no acute distress, oriented times three   Head:    Normocephalic, without obvious abnormality   Eyes:            no icterus, no pallor, pupils CCERLA   Ears:    Ears appear intact    Throat:   No oral lesions, no thrush, oral mucosa moist   Neck:  no JVD   Back:    no tenderness to percussion, range of motion normal   Lungs:     Clear to auscultation,respirations regular, even and                  unlabored    Heart:    Regular rhythm and normal rate, normal S1 and S2, no rub   Chest Wall:   Tunneled right IJ catheter   Abdomen:     Normal bowel sounds, no tenderness   Rectal:     Deferred   Extremities:   Moves all extremities well, no edema   Pulses:   Pulses palpable and equal bilaterally   Skin:   No petechiae       Neurologic:   Cr Ns grossly intact     Results Review:   I reviewed the patient's new clinical results.      Lab Results (last 24 hours)     Procedure Component Value Units Date/Time    Hemoglobin A1c [[id number]]  (Normal) Collected:  05/07/19 0133    Specimen:  Blood Updated:  05/07/19 0741     Hemoglobin A1C 5.50 %     Narrative:       Hemoglobin A1C Ranges:    Increased Risk for Diabetes  5.7% to 6.4%  Diabetes                     >= 6.5%  Diabetic Goal                < 7.0%    TSH [[id number]]  (Normal) Collected:  05/07/19 0134    Specimen:  Blood Updated:  05/07/19 0734     TSH 1.550 mIU/mL     Urine Culture - Urine, Urine, Clean Catch [[id number]] Collected:  05/07/19 0321    Specimen:  Urine, Clean Catch Updated:  05/07/19 0659    Blood Gas, Venous [[id number]]  (Abnormal) Collected:  05/07/19 0357    Specimen:  Venous Blood Updated:  05/07/19 0404     Site Venous     pH, Venous 7.312 pH Units      pCO2, Venous 31.7 mm Hg      pO2, Venous 49.6 mm Hg      HCO3, Venous 15.7 mmol/L      Base Excess, Venous -9.6 mmol/L      O2 Saturation, Venous 80.9 %      Hemoglobin, Blood Gas 9.0 g/dL      A-a Gradiant 55.2 mmHg       Temperature 98.6 C      Barometric Pressure for Blood Gas 727 mmHg      Modality Room Air     FIO2 21 %     Urinalysis, Microscopic Only - Urine, Clean Catch [[id number]]  (Abnormal) Collected:  05/07/19 0321    Specimen:  Urine, Clean Catch Updated:  05/07/19 0352     RBC, UA 3-5 /HPF      WBC, UA Too Numerous to Count /HPF      Bacteria, UA Trace /HPF      Squamous Epithelial Cells, UA 0-2 /HPF      Hyaline Casts, UA None Seen /LPF      Methodology Manual Light Microscopy    Urinalysis With Microscopic If Indicated (No Culture) - Urine, Clean Catch [[id number]]  (Abnormal) Collected:  05/07/19 0321    Specimen:  Urine, Clean Catch Updated:  05/07/19 0337     Color, UA Yellow     Appearance, UA Clear     pH, UA 6.5     Specific Gravity, UA 1.011     Glucose, UA Negative     Ketones, UA Negative     Bilirubin, UA Negative     Blood, UA Small (1+)     Protein, UA 30 mg/dL (1+)     Leuk Esterase, UA Moderate (2+)     Nitrite, UA Negative     Urobilinogen, UA 0.2 E.U./dL    Troponin [[id number]]  (Abnormal) Collected:  05/07/19 0134    Specimen:  Blood Updated:  05/07/19 0200     Troponin T 0.067 ng/mL     Narrative:       Troponin T Reference Range:  <= 0.03 ng/mL-   Negative for AMI  >0.03 ng/mL-     Abnormal for myocardial necrosis.  Clinicians would have to utilize clinical acumen, EKG, Troponin and serial changes to determine if it is an Acute Myocardial Infarction or myocardial injury due to an underlying chronic condition.     Comprehensive Metabolic Panel [[id number]]  (Abnormal) Collected:  05/07/19 0134    Specimen:  Blood Updated:  05/07/19 0159     Glucose 139 mg/dL       mg/dL      Creatinine 6.45 mg/dL      Sodium 132 mmol/L      Potassium 5.4 mmol/L      Chloride 96 mmol/L      CO2 14.7 mmol/L      Calcium 8.5 mg/dL      Total Protein 10.6 g/dL      Albumin 2.70 g/dL      ALT (SGPT) 24 U/L      AST (SGOT) 21 U/L      Alkaline Phosphatase 240 U/L      Total Bilirubin 0.4 mg/dL      eGFR Non   Amer 9 mL/min/1.73      Comment: <15 Indicative of kidney failure.        eGFR   Amer -- mL/min/1.73      Comment: <15 Indicative of kidney failure.        Globulin 7.9 gm/dL      A/G Ratio 0.3 g/dL      BUN/Creatinine Ratio 16.4     Anion Gap 21.3 mmol/L     Narrative:       GFR Normal >60  Chronic Kidney Disease <60  Kidney Failure <15    Protime-INR [[id number]]  (Abnormal) Collected:  05/07/19 0134    Specimen:  Blood Updated:  05/07/19 0148     Protime 15.7 Seconds      INR 1.19    Narrative:       Suggested INR therapeutic range for stable oral anticoagulant therapy:    Low Intensity therapy:   1.5-2.0  Moderate Intensity therapy:   2.0-3.0  High Intensity therapy:   2.5-4.0    aPTT [[id number]]  (Abnormal) Collected:  05/07/19 0134    Specimen:  Blood Updated:  05/07/19 0148     PTT 37.4 seconds     Narrative:       PTT Heparin Therapeutic Range:  59 - 95 seconds    CBC & Differential [[id number]] Collected:  05/07/19 0133    Specimen:  Blood Updated:  05/07/19 0139    Narrative:       The following orders were created for panel order CBC & Differential.  Procedure                               Abnormality         Status                     ---------                               -----------         ------                     CBC Auto Differential[[id number]]        Abnormal            Final result                 Please view results for these tests on the individual orders.    CBC Auto Differential [[id number]]  (Abnormal) Collected:  05/07/19 0133    Specimen:  Blood Updated:  05/07/19 0139     WBC 12.73 10*3/mm3      RBC 3.01 10*6/mm3      Hemoglobin 8.4 g/dL      Hematocrit 26.1 %      MCV 86.7 fL      MCH 27.9 pg      MCHC 32.2 g/dL      RDW 15.3 %      RDW-SD 47.7 fl      MPV 8.4 fL      Platelets 337 10*3/mm3      Neutrophil % 70.3 %      Lymphocyte % 17.2 %      Monocyte % 5.1 %      Eosinophil % 6.1 %      Basophil % 0.8 %      Immature Grans % 0.5 %      Neutrophils, Absolute 8.94 10*3/mm3       Lymphocytes, Absolute 2.19 10*3/mm3      Monocytes, Absolute 0.65 10*3/mm3      Eosinophils, Absolute 0.78 10*3/mm3      Basophils, Absolute 0.10 10*3/mm3      Immature Grans, Absolute 0.07 10*3/mm3           Imaging Results (last 24 hours)     ** No results found for the last 24 hours. **                    Assessment and Plan:    1.  Dialysis dependent acute renal failure: Chronicity of disease unknown  2.  Staph bacteremia with sepsis with shock history of  3.  Positive hepatitis B status  4.  History of intravenous drug abuse  5.  Discharge AGAINST MEDICAL ADVICE from [facility]  6.  Metabolic acidosis.  7.  Hyperkalemia     Basically needs placement in an outpatient facility.  I have discussed this with the .  I am not optimistic that he will regain renal function.  I have ordered a 24-hour urine for creatinine clearance but I think he may need dialysis before the collection will be complete              Federico Diallo MD  05/07/19  9:09 AM

## 2019-05-07 NOTE — NURSING NOTE
Consult received for pressure injury.  Patient refuses assessment by myself and primary RN today.  Spoke extensively to patient regarding care per request of Dr. Diallo.  Reported patient revealed that he is homeless so therefore peritoneal dialysis is not feasible.  Will use topical medication from looking at photo and nursing admission documentation.

## 2019-05-07 NOTE — H&P
[facility] HOSPITALIST HISTORY AND PHYSICAL    Patient Identification:  Name:  Mario Nair  Age:  44 y.o.  Sex:  male  :  1975  MRN:  [id number]   Visit Number:  [id number]  Room number:  3324/1P  Primary Care Physician:  Provider, No Known     Subjective     Chief complaint:    Chief Complaint   Patient presents with   • Fatigue       History of presenting illness:  44 y.o. male who presented to [facility] ED at the request of nephrology for hemodialysis.  The patient was recently admitted to [facility] for over 1 month; per verbal report from the emergency department it was for septic shock due to endocarditis.  When asked about the reason he was in the hospital for a month, the patient was unable to tell me the details.  All he knows is that he thought that everything was going okay but he had to leave AGAINST MEDICAL ADVICE because he had lost his home and has custody of 2 of his children and needed to secure housing for the kids; the date that he left the hospital was 5/3/2019.  He came to our emergency department on 2019 and was told to follow up with nephrology.  He saw nephrology on 2019 was told to come to the emergency department again.  He states that he feels tired but other than that he denies chest pain, shortness of air, swelling, nausea, vomiting, and diarrhea.  He still has the tunneled right sided chest hemodialysis catheter.  He states that he has had a catheter for hemodialysis placed in both groins and in his left chest as well.  Please note that the patient was by himself and did not have any family present.  He is a poor historian.  ---------------------------------------------------------------------------------------------------------------------   Review of Systems   Constitutional: Positive for fatigue. Negative for chills and fever.   HENT: Negative for postnasal drip, rhinorrhea, sneezing and sore throat.    Eyes: Negative for  discharge and redness.   Respiratory: Negative for cough, shortness of breath and wheezing.    Cardiovascular: Negative for chest pain, palpitations and leg swelling.   Gastrointestinal: Negative for diarrhea, nausea and vomiting.   Genitourinary: Negative for decreased urine volume, dysuria and hematuria.   Musculoskeletal: Negative for arthralgias and joint swelling.   Skin: Negative for pallor, rash and wound.   Neurological: Negative for seizures, speech difficulty and headaches.   Hematological: Does not bruise/bleed easily.   Psychiatric/Behavioral: Negative for confusion, self-injury and suicidal ideas. The patient is not nervous/anxious.    ---------------------------------------------------------------------------------------------------------------------   Past Medical History:   Diagnosis Date   • Endocarditis    • ESRD (end stage renal disease) (CMS/[facility])    • IV drug abuse (CMS/[facility])    • Unable to read or write      Past Surgical History:   Procedure Laterality Date   • CENTRAL VENOUS CATHETER TUNNELED INSERTION DOUBLE LUMEN Right     Chest for hemodialysis     Family History   Problem Relation Age of Onset   • Alcohol abuse Mother    • Hypertension Mother    • Hypertension Other      Social History     Socioeconomic History   • Marital status:    Tobacco Use   • Smoking status: Current Every Day Smoker     Packs/day: 1.00     Years: 20.00     Pack years: 20.00     Types: Cigarettes   • Smokeless tobacco: Never Used   Substance and Sexual Activity   • Alcohol use: No     Frequency: Never   • Drug use: Yes     Types: Methamphetamines     Comment: last used on Friday 5/3/2019   • Sexual activity: Defer     ---------------------------------------------------------------------------------------------------------------------   Allergies:  Penicillins  ---------------------------------------------------------------------------------------------------------------------   Medications below are reported  home medications pulling from within the system; at this time, these medications have not been reconciled unless otherwise specified and are in the verification process for further verifcation as current home medications.    Prior to Admission Medications     None        Objective     Vital Signs:  Temp:  [96.8 °F (36 °C)-98.5 °F (36.9 °C)] 96.8 °F (36 °C)  Heart Rate:  [] 106  Resp:  [16-18] 18  BP: (125-147)/(80-86) 132/86    Mean Arterial Pressure (Non-Invasive) for the past 24 hrs (Last 3 readings):   Noninvasive MAP (mmHg)   05/07/19 0624 101     SpO2:  [99 %-100 %] 100 %  Device (Oxygen Therapy): room air  Body mass index is 19.03 kg/m².    Wt Readings from Last 3 Encounters:   05/07/19 67.2 kg (148 lb 3.2 oz)   05/05/19 68 kg (150 lb)     ---------------------------------------------------------------------------------------------------------------------   Physical Exam:  Constitutional:  Well-developed and well-nourished.  No respiratory distress.  Thin in appearance.  HENT:  Head: Normocephalic and atraumatic.  Mouth:  Moist mucous membranes.    Eyes:  Conjunctivae and EOM are normal.  Pupils are equal, round, and reactive to light.  No scleral icterus.  Neck:  Neck supple.  No JVD present.    Cardiovascular:  Normal rate, regular rhythm and normal heart sounds with no murmur.  Pulmonary/Chest:  No respiratory distress, no wheezes, no crackles, with normal breath sounds and good air movement.  Right tunneled hemodialysis catheter in place.  There is a Tegaderm over it that is dated 4/29/2019.  Abdominal:  Soft.  Bowel sounds are normal.  No distension and no tenderness.   Musculoskeletal:  No edema, no tenderness, and no deformity.  No red or swollen joints anywhere.    Neurological:  Alert and oriented to person, place, and time.  No cranial nerve deficit.  No tongue deviation.  No facial droop.  No slurred speech.   Skin:  Skin is warm and dry.  No rash noted.  No pallor.   Peripheral vascular:  No  edema and strong pulses on all 4 extremities.  Genitourinary:  No resendiz catheter in place.  ---------------------------------------------------------------------------------------------------------------------  EKG:  Sinus tachycardia, heart rate 101, QTc 466 ms; I compared this to an EKG dated 5/5/2019 and I do not see a change.    Telemetry:  Sinus tachycardia 100-110.    I have personally looked at both the EKG and the telemetry strips.    Last echocardiogram:  None in our system  --------------------------------------------------------------------------------------------------------------------  Labs:  Results from last 7 days   Lab Units 05/07/19 0134 05/07/19 0133 05/05/19  2240   LACTATE mmol/L  --   --  0.9   WBC 10*3/mm3  --  12.73* 13.56*   HEMOGLOBIN g/dL  --  8.4* 8.7*   HEMATOCRIT %  --  26.1* 26.7*   MCV fL  --  86.7 87.5   MCHC g/dL  --  32.2 32.6   PLATELETS 10*3/mm3  --  337 355   INR  1.19*  --  1.21*         Results from last 7 days   Lab Units 05/07/19 0134 05/05/19  2240   SODIUM mmol/L 132* 129*   POTASSIUM mmol/L 5.4* 4.9   MAGNESIUM mg/dL  --  1.9   CHLORIDE mmol/L 96* 93*   CO2 mmol/L 14.7* 17.9*   BUN mg/dL 106* 95*   CREATININE mg/dL 6.45* 5.83*   EGFR IF NONAFRICN AM mL/min/1.73 9* 11*   CALCIUM mg/dL 8.5* 8.3*   GLUCOSE mg/dL 139* 104*   ALBUMIN g/dL 2.70* 2.82*   BILIRUBIN mg/dL 0.4 0.4   ALK PHOS U/L 240* 265*   AST (SGOT) U/L 21 24   ALT (SGPT) U/L 24 25   Estimated Creatinine Clearance: 14 mL/min (A) (by C-G formula based on SCr of 6.45 mg/dL (H)).    Results from last 7 days   Lab Units 05/07/19  0134 05/05/19  2240   TROPONIN T ng/mL 0.067* 0.075*         Brief Urine Lab Results  (Last result in the past 365 days)      Color   Clarity   Blood   Leuk Est   Nitrite   Protein   CREAT   Urine HCG        05/07/19 0321 Yellow Clear Small (1+) Moderate (2+) Negative 30 mg/dL (1+)             Blood Culture   Date Value Ref Range Status   05/05/2019 No growth at 24 hours  Preliminary    05/05/2019 No growth at 24 hours  Preliminary         Results from last 7 days   Lab Units 05/05/19  2240   LACTATE mmol/L 0.9       I have personally looked at the labs and they are summarized above.  ----------------------------------------------------------------------------------------------------------------------    Final impressions for the last 30 days of radiology reports:    Xr Chest 1 View  Result Date: 5/6/2019  1. Right-sided dialysis catheter placement. 2. No pneumothorax. 3. Trace pleural effusions.  This report was finalized on 5/6/2019 8:24 AM by Dr. Maxi Lowry MD.      I have personally looked at the radiology images and read the final radiology report.    Assessment & Plan      -ESRD of unknown etiology but suspect due to recent septic shock diagnosis  -Prolonged QTc of 466 ms  -Uremia  -Elevated troponin, possibly due to end-stage renal disease as the troponin level has not really changed in the last 2 days  -Normocytic anemia, suspicious for anemia of chronic disease  -Pseudo-hypocalcemia due to hypoalbuminemia with a corrected calcium level of 9.53  -History of IV drug abuse  -Possible recent septic shock due to endocarditis with details unknown at this time  -Light tobacco smoking addiction  -Inability to read and write    Patient has been admitted to the telemetry floor due to the hyperkalemia noted in the ED; will send for a cortisol level and continue to monitor the electrolytes closely.  Will obtain an ECHO to evaluate his heart due to the prolonged QTc and the recent report of endocarditis and the elevated troponins.  I have attempted to obtain the medical records from [facility] but when these were faxed to the emergency department every page was just solid black and the writing was illegible.  We have asked for repeat records to be sent.  We will consult nephrology for hemodialysis, especially since the patient's uremia has worsened since his emergency department visit 2 days ago.   We will continue to monitor his anemia closely; he may need iron infusions.  Blood cultures were obtained in the emergency department and since his urine analysis has a large white blood cell count burden we have sent the urine for culture.  We have consulted social work due to his social needs.  The patient does not know what a renal diet is and we will consult nutrition to educate him on this, especially since patient cannot read and write.       VTE Prophylaxis:   Mechanical Order History:     None      Pharmalogical Order History:     Ordered     Dose Route Frequency Stop    05/07/19 0510  heparin (porcine) 5000 UNIT/ML injection 5,000 Units      5,000 Units SC Every 12 Hours Scheduled --          The patient is high risk due to the following diagnoses/reasons:  ESRD    Opal Ocampo MD  [facility] Hospitalist  05/07/19  6:32 AM

## 2019-05-08 LAB — BACTERIA SPEC AEROBE CULT: NO GROWTH

## 2019-05-09 LAB
HBV SURFACE AG SERPL QL IA: NORMAL
HBV SURFACE AG SERPL QL NT: POSITIVE

## 2019-05-10 ENCOUNTER — HOSPITAL ENCOUNTER (EMERGENCY)
Facility: HOSPITAL | Age: 44
Discharge: HOME | End: 2019-05-10
Attending: EMERGENCY MEDICINE | Admitting: EMERGENCY MEDICINE

## 2019-05-10 VITALS
DIASTOLIC BLOOD PRESSURE: 58 MMHG | HEIGHT: 74 IN | RESPIRATION RATE: 18 BRPM | WEIGHT: 148 LBS | OXYGEN SATURATION: 100 % | TEMPERATURE: 97.8 F | BODY MASS INDEX: 18.99 KG/M2 | SYSTOLIC BLOOD PRESSURE: 121 MMHG | HEART RATE: 110 BPM

## 2019-05-10 DIAGNOSIS — Z99.2 STAGE 5 CHRONIC KIDNEY DISEASE ON CHRONIC DIALYSIS (HCC): Primary | ICD-10-CM

## 2019-05-10 DIAGNOSIS — N18.6 STAGE 5 CHRONIC KIDNEY DISEASE ON CHRONIC DIALYSIS (HCC): Primary | ICD-10-CM

## 2019-05-10 LAB
ALBUMIN SERPL-MCNC: 2.74 G/DL (ref 3.5–5.2)
ALBUMIN/GLOB SERPL: 0.4 G/DL
ALP SERPL-CCNC: 174 U/L (ref 39–117)
ALT SERPL W P-5'-P-CCNC: 20 U/L (ref 1–41)
ANION GAP SERPL CALCULATED.3IONS-SCNC: 17.3 MMOL/L
AST SERPL-CCNC: 19 U/L (ref 1–40)
BACTERIA SPEC AEROBE CULT: NORMAL
BACTERIA SPEC AEROBE CULT: NORMAL
BASOPHILS # BLD AUTO: 0.08 10*3/MM3 (ref 0–0.2)
BASOPHILS NFR BLD AUTO: 0.9 % (ref 0–1.5)
BILIRUB SERPL-MCNC: 0.4 MG/DL (ref 0.2–1.2)
BUN BLD-MCNC: 91 MG/DL (ref 6–20)
BUN/CREAT SERPL: 13.3 (ref 7–25)
CALCIUM SPEC-SCNC: 8.2 MG/DL (ref 8.6–10.5)
CHLORIDE SERPL-SCNC: 95 MMOL/L (ref 98–107)
CO2 SERPL-SCNC: 19.7 MMOL/L (ref 22–29)
CREAT BLD-MCNC: 6.82 MG/DL (ref 0.76–1.27)
DEPRECATED RDW RBC AUTO: 47.7 FL (ref 37–54)
EOSINOPHIL # BLD AUTO: 0.62 10*3/MM3 (ref 0–0.4)
EOSINOPHIL NFR BLD AUTO: 7.2 % (ref 0.3–6.2)
ERYTHROCYTE [DISTWIDTH] IN BLOOD BY AUTOMATED COUNT: 15.1 % (ref 12.3–15.4)
GFR SERPL CREATININE-BSD FRML MDRD: 9 ML/MIN/1.73
GFR SERPL CREATININE-BSD FRML MDRD: ABNORMAL ML/MIN/1.73
GLOBULIN UR ELPH-MCNC: 6.7 GM/DL
GLUCOSE BLD-MCNC: 91 MG/DL (ref 65–99)
HCT VFR BLD AUTO: 24.3 % (ref 37.5–51)
HGB BLD-MCNC: 7.8 G/DL (ref 13–17.7)
IMM GRANULOCYTES # BLD AUTO: 0.02 10*3/MM3 (ref 0–0.05)
IMM GRANULOCYTES NFR BLD AUTO: 0.2 % (ref 0–0.5)
LYMPHOCYTES # BLD AUTO: 2.33 10*3/MM3 (ref 0.7–3.1)
LYMPHOCYTES NFR BLD AUTO: 27 % (ref 19.6–45.3)
MCH RBC QN AUTO: 28.2 PG (ref 26.6–33)
MCHC RBC AUTO-ENTMCNC: 32.1 G/DL (ref 31.5–35.7)
MCV RBC AUTO: 87.7 FL (ref 79–97)
MONOCYTES # BLD AUTO: 0.64 10*3/MM3 (ref 0.1–0.9)
MONOCYTES NFR BLD AUTO: 7.4 % (ref 5–12)
NEUTROPHILS # BLD AUTO: 4.93 10*3/MM3 (ref 1.7–7)
NEUTROPHILS NFR BLD AUTO: 57.3 % (ref 42.7–76)
PLATELET # BLD AUTO: 286 10*3/MM3 (ref 140–450)
PMV BLD AUTO: 8.2 FL (ref 6–12)
POTASSIUM BLD-SCNC: 4.9 MMOL/L (ref 3.5–5.2)
PROT SERPL-MCNC: 9.4 G/DL (ref 6–8.5)
RBC # BLD AUTO: 2.77 10*6/MM3 (ref 4.14–5.8)
SODIUM BLD-SCNC: 132 MMOL/L (ref 136–145)
WBC NRBC COR # BLD: 8.62 10*3/MM3 (ref 3.4–10.8)

## 2019-05-10 PROCEDURE — 85025 COMPLETE CBC W/AUTO DIFF WBC: CPT | Performed by: EMERGENCY MEDICINE

## 2019-05-10 PROCEDURE — 99283 EMERGENCY DEPT VISIT LOW MDM: CPT

## 2019-05-10 PROCEDURE — 80053 COMPREHEN METABOLIC PANEL: CPT | Performed by: EMERGENCY MEDICINE

## 2019-05-10 NOTE — ED PROVIDER NOTES
Subjective   Patient here for generalized weakness.        Weakness - Generalized   Severity:  Moderate  Onset quality:  Gradual  Timing:  Constant  Progression:  Worsening  Chronicity:  Chronic  Context comment:  Chronic renal failure  Relieved by:  Nothing  Worsened by:  Standing  Ineffective treatments:  None tried      Review of Systems   Constitutional: Positive for activity change and fatigue.   HENT: Negative.    Eyes: Negative.    Respiratory: Negative.    Cardiovascular: Negative.    Gastrointestinal: Negative.    Endocrine: Negative.    Genitourinary: Negative.    Musculoskeletal: Negative.    Skin: Negative.    Allergic/Immunologic: Negative.    Hematological: Negative.    Psychiatric/Behavioral: Negative.        Past Medical History:   Diagnosis Date   • Endocarditis    • ESRD (end stage renal disease) (CMS/[facility])    • IV drug abuse (CMS/[facility])    • Unable to read or write        Allergies   Allergen Reactions   • Penicillins Shortness Of Breath, Itching and Rash       Past Surgical History:   Procedure Laterality Date   • CENTRAL VENOUS CATHETER TUNNELED INSERTION DOUBLE LUMEN Right     Chest for hemodialysis       Family History   Problem Relation Age of Onset   • Alcohol abuse Mother    • Hypertension Mother    • Hypertension Other        Social History     Socioeconomic History   • Marital status:      Spouse name: Not on file   • Number of children: Not on file   • Years of education: Not on file   • Highest education level: Not on file   Tobacco Use   • Smoking status: Current Every Day Smoker     Packs/day: 1.00     Years: 20.00     Pack years: 20.00     Types: Cigarettes   • Smokeless tobacco: Never Used   Substance and Sexual Activity   • Alcohol use: No     Frequency: Never   • Drug use: Yes     Types: Methamphetamines     Comment: last used on Friday 5/3/2019   • Sexual activity: Defer           Objective   Physical Exam   Constitutional: He appears well-developed and well-nourished.    HENT:   Head: Normocephalic.   Eyes: EOM are normal. Pupils are equal, round, and reactive to light.   Neck: Normal range of motion.   Cardiovascular: Normal rate and regular rhythm.   Pulmonary/Chest: Effort normal.   Abdominal: Soft.   Musculoskeletal: Normal range of motion.   Neurological: He is alert.   Skin: Skin is warm.   Psychiatric: He has a normal mood and affect.   Nursing note and vitals reviewed.      Procedures           ED Course                  MDM      Final diagnoses:   Stage 5 chronic kidney disease on chronic dialysis (CMS/[facility])            Lloyd Lorenzana MD  05/10/19 9523

## 2019-05-13 ENCOUNTER — HOSPITAL ENCOUNTER (OUTPATIENT)
Facility: HOSPITAL | Age: 44
Setting detail: OBSERVATION
Discharge: LEFT BEFORE DISCHARGE | End: 2019-05-14
Attending: EMERGENCY MEDICINE | Admitting: INTERNAL MEDICINE

## 2019-05-13 DIAGNOSIS — E87.20 METABOLIC ACIDOSIS: Primary | ICD-10-CM

## 2019-05-13 DIAGNOSIS — Z99.2 STAGE 5 CHRONIC KIDNEY DISEASE ON CHRONIC DIALYSIS (HCC): ICD-10-CM

## 2019-05-13 DIAGNOSIS — N18.6 STAGE 5 CHRONIC KIDNEY DISEASE ON CHRONIC DIALYSIS (HCC): ICD-10-CM

## 2019-05-13 LAB
ALBUMIN SERPL-MCNC: 2.89 G/DL (ref 3.5–5.2)
ALBUMIN/GLOB SERPL: 0.4 G/DL
ALP SERPL-CCNC: 165 U/L (ref 39–117)
ALT SERPL W P-5'-P-CCNC: 24 U/L (ref 1–41)
ANION GAP SERPL CALCULATED.3IONS-SCNC: 17.4 MMOL/L
ANION GAP SERPL CALCULATED.3IONS-SCNC: 17.7 MMOL/L
AST SERPL-CCNC: 20 U/L (ref 1–40)
BACTERIA UR QL AUTO: ABNORMAL /HPF
BASOPHILS # BLD AUTO: 0.07 10*3/MM3 (ref 0–0.2)
BASOPHILS # BLD AUTO: 0.08 10*3/MM3 (ref 0–0.2)
BASOPHILS NFR BLD AUTO: 0.7 % (ref 0–1.5)
BASOPHILS NFR BLD AUTO: 0.8 % (ref 0–1.5)
BILIRUB SERPL-MCNC: 0.4 MG/DL (ref 0.2–1.2)
BILIRUB UR QL STRIP: NEGATIVE
BUN BLD-MCNC: 100 MG/DL (ref 6–20)
BUN BLD-MCNC: 101 MG/DL (ref 6–20)
BUN/CREAT SERPL: 13 (ref 7–25)
BUN/CREAT SERPL: 13.1 (ref 7–25)
CALCIUM SPEC-SCNC: 7.6 MG/DL (ref 8.6–10.5)
CALCIUM SPEC-SCNC: 7.9 MG/DL (ref 8.6–10.5)
CHLORIDE SERPL-SCNC: 101 MMOL/L (ref 98–107)
CHLORIDE SERPL-SCNC: 101 MMOL/L (ref 98–107)
CLARITY UR: CLEAR
CO2 SERPL-SCNC: 15.3 MMOL/L (ref 22–29)
CO2 SERPL-SCNC: 15.6 MMOL/L (ref 22–29)
COLOR UR: YELLOW
CREAT BLD-MCNC: 7.71 MG/DL (ref 0.76–1.27)
CREAT BLD-MCNC: 7.71 MG/DL (ref 0.76–1.27)
DEPRECATED RDW RBC AUTO: 47.3 FL (ref 37–54)
DEPRECATED RDW RBC AUTO: 49.6 FL (ref 37–54)
EOSINOPHIL # BLD AUTO: 0.62 10*3/MM3 (ref 0–0.4)
EOSINOPHIL # BLD AUTO: 0.81 10*3/MM3 (ref 0–0.4)
EOSINOPHIL NFR BLD AUTO: 7.1 % (ref 0.3–6.2)
EOSINOPHIL NFR BLD AUTO: 7.3 % (ref 0.3–6.2)
ERYTHROCYTE [DISTWIDTH] IN BLOOD BY AUTOMATED COUNT: 15.3 % (ref 12.3–15.4)
ERYTHROCYTE [DISTWIDTH] IN BLOOD BY AUTOMATED COUNT: 15.5 % (ref 12.3–15.4)
GFR SERPL CREATININE-BSD FRML MDRD: 8 ML/MIN/1.73
GFR SERPL CREATININE-BSD FRML MDRD: 8 ML/MIN/1.73
GFR SERPL CREATININE-BSD FRML MDRD: ABNORMAL ML/MIN/1.73
GFR SERPL CREATININE-BSD FRML MDRD: ABNORMAL ML/MIN/1.73
GLOBULIN UR ELPH-MCNC: 6.8 GM/DL
GLUCOSE BLD-MCNC: 100 MG/DL (ref 65–99)
GLUCOSE BLD-MCNC: 118 MG/DL (ref 65–99)
GLUCOSE UR STRIP-MCNC: NEGATIVE MG/DL
HCT VFR BLD AUTO: 25.4 % (ref 37.5–51)
HCT VFR BLD AUTO: 27.8 % (ref 37.5–51)
HGB BLD-MCNC: 8.1 G/DL (ref 13–17.7)
HGB BLD-MCNC: 8.8 G/DL (ref 13–17.7)
HGB UR QL STRIP.AUTO: ABNORMAL
HYALINE CASTS UR QL AUTO: ABNORMAL /LPF
IMM GRANULOCYTES # BLD AUTO: 0.01 10*3/MM3 (ref 0–0.05)
IMM GRANULOCYTES # BLD AUTO: 0.02 10*3/MM3 (ref 0–0.05)
IMM GRANULOCYTES NFR BLD AUTO: 0.1 % (ref 0–0.5)
IMM GRANULOCYTES NFR BLD AUTO: 0.2 % (ref 0–0.5)
KETONES UR QL STRIP: NEGATIVE
LEUKOCYTE ESTERASE UR QL STRIP.AUTO: ABNORMAL
LYMPHOCYTES # BLD AUTO: 2.38 10*3/MM3 (ref 0.7–3.1)
LYMPHOCYTES # BLD AUTO: 2.63 10*3/MM3 (ref 0.7–3.1)
LYMPHOCYTES NFR BLD AUTO: 23 % (ref 19.6–45.3)
LYMPHOCYTES NFR BLD AUTO: 28.1 % (ref 19.6–45.3)
MAGNESIUM SERPL-MCNC: 1.7 MG/DL (ref 1.6–2.6)
MAGNESIUM SERPL-MCNC: 1.7 MG/DL (ref 1.6–2.6)
MCH RBC QN AUTO: 27.8 PG (ref 26.6–33)
MCH RBC QN AUTO: 28.1 PG (ref 26.6–33)
MCHC RBC AUTO-ENTMCNC: 31.7 G/DL (ref 31.5–35.7)
MCHC RBC AUTO-ENTMCNC: 31.9 G/DL (ref 31.5–35.7)
MCV RBC AUTO: 87.3 FL (ref 79–97)
MCV RBC AUTO: 88.8 FL (ref 79–97)
MONOCYTES # BLD AUTO: 0.58 10*3/MM3 (ref 0.1–0.9)
MONOCYTES # BLD AUTO: 0.62 10*3/MM3 (ref 0.1–0.9)
MONOCYTES NFR BLD AUTO: 5.4 % (ref 5–12)
MONOCYTES NFR BLD AUTO: 6.8 % (ref 5–12)
NEUTROPHILS # BLD AUTO: 4.82 10*3/MM3 (ref 1.7–7)
NEUTROPHILS # BLD AUTO: 7.29 10*3/MM3 (ref 1.7–7)
NEUTROPHILS NFR BLD AUTO: 56.9 % (ref 42.7–76)
NEUTROPHILS NFR BLD AUTO: 63.6 % (ref 42.7–76)
NITRITE UR QL STRIP: NEGATIVE
PH UR STRIP.AUTO: 6.5 [PH] (ref 5–8)
PHOSPHATE SERPL-MCNC: 9.1 MG/DL (ref 2.5–4.5)
PLATELET # BLD AUTO: 302 10*3/MM3 (ref 140–450)
PLATELET # BLD AUTO: 352 10*3/MM3 (ref 140–450)
PMV BLD AUTO: 8 FL (ref 6–12)
PMV BLD AUTO: 8.4 FL (ref 6–12)
POTASSIUM BLD-SCNC: 4.7 MMOL/L (ref 3.5–5.2)
POTASSIUM BLD-SCNC: 4.8 MMOL/L (ref 3.5–5.2)
PROT SERPL-MCNC: 9.7 G/DL (ref 6–8.5)
PROT UR QL STRIP: ABNORMAL
RBC # BLD AUTO: 2.91 10*6/MM3 (ref 4.14–5.8)
RBC # BLD AUTO: 3.13 10*6/MM3 (ref 4.14–5.8)
RBC # UR: ABNORMAL /HPF
REF LAB TEST METHOD: ABNORMAL
SODIUM BLD-SCNC: 134 MMOL/L (ref 136–145)
SODIUM BLD-SCNC: 134 MMOL/L (ref 136–145)
SP GR UR STRIP: 1.01 (ref 1–1.03)
SQUAMOUS #/AREA URNS HPF: ABNORMAL /HPF
UROBILINOGEN UR QL STRIP: ABNORMAL
WBC NRBC COR # BLD: 11.45 10*3/MM3 (ref 3.4–10.8)
WBC NRBC COR # BLD: 8.48 10*3/MM3 (ref 3.4–10.8)
WBC UR QL AUTO: ABNORMAL /HPF

## 2019-05-13 PROCEDURE — 81001 URINALYSIS AUTO W/SCOPE: CPT | Performed by: EMERGENCY MEDICINE

## 2019-05-13 PROCEDURE — 94799 UNLISTED PULMONARY SVC/PX: CPT

## 2019-05-13 PROCEDURE — 83735 ASSAY OF MAGNESIUM: CPT | Performed by: NURSE PRACTITIONER

## 2019-05-13 PROCEDURE — 80053 COMPREHEN METABOLIC PANEL: CPT | Performed by: EMERGENCY MEDICINE

## 2019-05-13 PROCEDURE — 85025 COMPLETE CBC W/AUTO DIFF WBC: CPT | Performed by: EMERGENCY MEDICINE

## 2019-05-13 PROCEDURE — G0378 HOSPITAL OBSERVATION PER HR: HCPCS

## 2019-05-13 PROCEDURE — 85025 COMPLETE CBC W/AUTO DIFF WBC: CPT | Performed by: NURSE PRACTITIONER

## 2019-05-13 PROCEDURE — 84100 ASSAY OF PHOSPHORUS: CPT | Performed by: NURSE PRACTITIONER

## 2019-05-13 PROCEDURE — 99284 EMERGENCY DEPT VISIT MOD MDM: CPT

## 2019-05-13 RX ORDER — ONDANSETRON 2 MG/ML
4 INJECTION INTRAMUSCULAR; INTRAVENOUS EVERY 6 HOURS PRN
Status: DISCONTINUED | OUTPATIENT
Start: 2019-05-13 | End: 2019-05-14 | Stop reason: HOSPADM

## 2019-05-13 RX ORDER — ONDANSETRON 4 MG/1
4 TABLET, FILM COATED ORAL EVERY 6 HOURS PRN
Status: DISCONTINUED | OUTPATIENT
Start: 2019-05-13 | End: 2019-05-14 | Stop reason: HOSPADM

## 2019-05-13 RX ORDER — NITROGLYCERIN 0.4 MG/1
0.4 TABLET SUBLINGUAL
Status: DISCONTINUED | OUTPATIENT
Start: 2019-05-13 | End: 2019-05-14 | Stop reason: HOSPADM

## 2019-05-13 RX ORDER — SODIUM CHLORIDE 0.9 % (FLUSH) 0.9 %
3 SYRINGE (ML) INJECTION EVERY 12 HOURS SCHEDULED
Status: DISCONTINUED | OUTPATIENT
Start: 2019-05-13 | End: 2019-05-14 | Stop reason: HOSPADM

## 2019-05-13 RX ORDER — ACETAMINOPHEN 325 MG/1
650 TABLET ORAL EVERY 4 HOURS PRN
Status: DISCONTINUED | OUTPATIENT
Start: 2019-05-13 | End: 2019-05-14 | Stop reason: HOSPADM

## 2019-05-13 RX ORDER — SODIUM CHLORIDE 0.9 % (FLUSH) 0.9 %
3-10 SYRINGE (ML) INJECTION AS NEEDED
Status: DISCONTINUED | OUTPATIENT
Start: 2019-05-13 | End: 2019-05-14 | Stop reason: HOSPADM

## 2019-05-13 RX ORDER — ALUMINA, MAGNESIA, AND SIMETHICONE 2400; 2400; 240 MG/30ML; MG/30ML; MG/30ML
15 SUSPENSION ORAL EVERY 6 HOURS PRN
Status: DISCONTINUED | OUTPATIENT
Start: 2019-05-13 | End: 2019-05-14 | Stop reason: HOSPADM

## 2019-05-13 NOTE — PLAN OF CARE
Problem: Patient Care Overview  Goal: Plan of Care Review  Outcome: Ongoing (interventions implemented as appropriate)    Goal: Individualization and Mutuality  Outcome: Ongoing (interventions implemented as appropriate)    Goal: Discharge Needs Assessment  Outcome: Ongoing (interventions implemented as appropriate)    Goal: Interprofessional Rounds/Family Conf  Outcome: Ongoing (interventions implemented as appropriate)      Problem: Hemodialysis (Adult)  Goal: Signs and Symptoms of Listed Potential Problems Will be Absent, Minimized or Managed (Hemodialysis)  Outcome: Ongoing (interventions implemented as appropriate)      Problem: Fall Risk (Adult)  Goal: Identify Related Risk Factors and Signs and Symptoms  Outcome: Ongoing (interventions implemented as appropriate)    Goal: Absence of Fall  Outcome: Ongoing (interventions implemented as appropriate)

## 2019-05-13 NOTE — PLAN OF CARE
Problem: Hemodialysis (Adult)  Goal: Signs and Symptoms of Listed Potential Problems Will be Absent, Minimized or Managed (Hemodialysis)  Outcome: Ongoing (interventions implemented as appropriate)   05/13/19 1809   Goal/Outcome Evaluation   Problems Assessed (Hemodialysis) all   Problems Present (Hemodialysis) none       Problem: Fall Risk (Adult)  Goal: Identify Related Risk Factors and Signs and Symptoms  Outcome: Ongoing (interventions implemented as appropriate)   05/13/19 1809   Fall Risk (Adult)   Related Risk Factors (Fall Risk) bladder function altered;environment unfamiliar   Signs and Symptoms (Fall Risk) presence of risk factors     Goal: Absence of Fall  Outcome: Ongoing (interventions implemented as appropriate)   05/13/19 1809   Fall Risk (Adult)   Absence of Fall making progress toward outcome

## 2019-05-13 NOTE — NURSING NOTE
PATIENT REQUEST TO GO OFF FLOOR TO SMOKE, INFORMED OF NO SMOKING POLICY PATIENT V/U, LEFT OFF FLOOR ANYWAY.

## 2019-05-13 NOTE — NURSING NOTE
Dr BECKMAN NOTIFIED OF CONSULT, ORDERS TO CONSULT CASE MANAGEMENT FOR OUTPATIENT DIALYSIS, AND NOTIFY DIALYSIS CLINIC OF DIALYSIS IN AM, ALSO PLACE ON DR DANIEL PATIENT LIST. READ BACK TELEPHONE ORDERS/PRRN

## 2019-05-14 VITALS
HEART RATE: 111 BPM | WEIGHT: 150 LBS | DIASTOLIC BLOOD PRESSURE: 79 MMHG | SYSTOLIC BLOOD PRESSURE: 146 MMHG | RESPIRATION RATE: 20 BRPM | OXYGEN SATURATION: 97 % | TEMPERATURE: 94.6 F | BODY MASS INDEX: 19.25 KG/M2 | HEIGHT: 74 IN

## 2019-05-14 LAB
ANION GAP SERPL CALCULATED.3IONS-SCNC: 17.8 MMOL/L
BASOPHILS # BLD AUTO: 0.08 10*3/MM3 (ref 0–0.2)
BASOPHILS NFR BLD AUTO: 1 % (ref 0–1.5)
BUN BLD-MCNC: 103 MG/DL (ref 6–20)
BUN/CREAT SERPL: 13.3 (ref 7–25)
CALCIUM SPEC-SCNC: 7.5 MG/DL (ref 8.6–10.5)
CHLORIDE SERPL-SCNC: 101 MMOL/L (ref 98–107)
CO2 SERPL-SCNC: 13.2 MMOL/L (ref 22–29)
CREAT BLD-MCNC: 7.75 MG/DL (ref 0.76–1.27)
DEPRECATED RDW RBC AUTO: 48.7 FL (ref 37–54)
EOSINOPHIL # BLD AUTO: 0.62 10*3/MM3 (ref 0–0.4)
EOSINOPHIL NFR BLD AUTO: 8 % (ref 0.3–6.2)
ERYTHROCYTE [DISTWIDTH] IN BLOOD BY AUTOMATED COUNT: 15.5 % (ref 12.3–15.4)
GFR SERPL CREATININE-BSD FRML MDRD: 8 ML/MIN/1.73
GFR SERPL CREATININE-BSD FRML MDRD: ABNORMAL ML/MIN/1.73
GLUCOSE BLD-MCNC: 172 MG/DL (ref 65–99)
HAV IGM SERPL QL IA: ABNORMAL
HBV CORE IGM SERPL QL IA: ABNORMAL
HBV SURFACE AG SERPL QL IA: REACTIVE
HCT VFR BLD AUTO: 24.6 % (ref 37.5–51)
HCV AB SER DONR QL: REACTIVE
HGB BLD-MCNC: 7.6 G/DL (ref 13–17.7)
IMM GRANULOCYTES # BLD AUTO: 0.01 10*3/MM3 (ref 0–0.05)
IMM GRANULOCYTES NFR BLD AUTO: 0.1 % (ref 0–0.5)
LYMPHOCYTES # BLD AUTO: 2.28 10*3/MM3 (ref 0.7–3.1)
LYMPHOCYTES NFR BLD AUTO: 29.3 % (ref 19.6–45.3)
MAGNESIUM SERPL-MCNC: 1.6 MG/DL (ref 1.6–2.6)
MCH RBC QN AUTO: 27.6 PG (ref 26.6–33)
MCHC RBC AUTO-ENTMCNC: 30.9 G/DL (ref 31.5–35.7)
MCV RBC AUTO: 89.5 FL (ref 79–97)
MONOCYTES # BLD AUTO: 0.5 10*3/MM3 (ref 0.1–0.9)
MONOCYTES NFR BLD AUTO: 6.4 % (ref 5–12)
NEUTROPHILS # BLD AUTO: 4.28 10*3/MM3 (ref 1.7–7)
NEUTROPHILS NFR BLD AUTO: 55.2 % (ref 42.7–76)
PLATELET # BLD AUTO: 314 10*3/MM3 (ref 140–450)
PMV BLD AUTO: 8.9 FL (ref 6–12)
POTASSIUM BLD-SCNC: 4.8 MMOL/L (ref 3.5–5.2)
RBC # BLD AUTO: 2.75 10*6/MM3 (ref 4.14–5.8)
SODIUM BLD-SCNC: 132 MMOL/L (ref 136–145)
WBC NRBC COR # BLD: 7.77 10*3/MM3 (ref 3.4–10.8)

## 2019-05-14 PROCEDURE — G0378 HOSPITAL OBSERVATION PER HR: HCPCS

## 2019-05-14 PROCEDURE — 83735 ASSAY OF MAGNESIUM: CPT | Performed by: NURSE PRACTITIONER

## 2019-05-14 PROCEDURE — 85025 COMPLETE CBC W/AUTO DIFF WBC: CPT | Performed by: NURSE PRACTITIONER

## 2019-05-14 PROCEDURE — G0257 UNSCHED DIALYSIS ESRD PT HOS: HCPCS

## 2019-05-14 PROCEDURE — 80074 ACUTE HEPATITIS PANEL: CPT | Performed by: INTERNAL MEDICINE

## 2019-05-14 PROCEDURE — 80048 BASIC METABOLIC PNL TOTAL CA: CPT | Performed by: NURSE PRACTITIONER

## 2019-05-14 RX ADMIN — SODIUM CHLORIDE 1000 ML: 9 INJECTION, SOLUTION INTRAVENOUS at 08:20

## 2019-05-14 NOTE — NURSING NOTE
PATIENT RETURNED FROM DIALYSIS REQUESTING TO LEAVE, DR STEPHENSON ON UNIT NOTIFIED OF PATIENT'S REQUEST. RISK OF LEAVING DESCRIBED TO PATIENT, PATIENT VERBALIZED UNDERSTANDING, IV REMOVED, SIGNED AMA AND AMBULATED OFF FLOOR WITH BELONGINGS, NO DISTRESS NOTED.

## 2019-05-14 NOTE — H&P
"         HISTORY AND PHYSICAL        Patient Identification:  Name:  Mario Nair  Age:  44 y.o.  Sex:  male  :  1975  MRN:  [id number]   Visit Number:  [id number]  Primary Care Physician:  Provider, No Known       Subjective     Subjective     Chief complaint:     Chief Complaint   Patient presents with   • Labs       History of presenting illness:     Patient is a 44 year old male with a past medical history of endocarditis, end stage renal disease, hepatitis C, and IV drug abuse. Patient presents to the ED today with complaints of needing hemodialysis. Patient reports he is in end-stage renal failure and is unable to obtain a PCP or get in to see Nephrology to arrange outpatient dialysis. Patient reports he was recently discharged from [facility] several weeks ago related to his chronic renal failure and inability to receive dialysis. Patient states he typically just comes to the hospital when he feels like he is \"getting bad\" in order to get dialysis. Patient reports he has a history of drug abuse. He states that he recently quit taking suboxone \"30-40 days ago\" but states he used Meth about 3 days ago to help with his anxiety. He denies any ETOH use. He denies any other drug usage. Patient very anxious about receiving dialysis tomorrow, he states that he needs to be discharged from this facility by 2:45 pm to be able to pick his children up from school. He reports that if he isnt able to get his dialysis and be discharged he will sign out AMA. Patient denies any chest pain, shortness of breath, nausea, vomiting, cough, diarrhea, fever, chills, headache, syncope, dizziness, numbness or tingling.     Workup in the ED included: WBC 8.48. Hemoglobin 8.1. Creatinine 7.71. . Sodium 134. Potassium 4.7. WBC 11.45. Urinalysis reveals WBC-too numerous to count, large leukocytes.     CXR reveals right sied dialysis catheter placement. No pneumothorax. Trace pleural effusions.     Patient admitted to the " observation unit for further evaluation and monitoring.     ---------------------------------------------------------------------------------------------------------------------     Review Of Systems:    Constitutional: no fever, chills and night sweats. No appetite change or unexpected weight change. Positive for fatigue.  Eyes: no eye drainage, itching or redness.  HEENT: no mouth sores, dysphagia or nose bleed.  Respiratory: no for shortness of breath, cough or production of sputum.  Cardiovascular: no chest pain, no palpitations, no orthopnea.  Gastrointestinal: no nausea, vomiting or diarrhea. No abdominal pain, hematemesis or rectal bleeding.  Genitourinary: no dysuria or polyuria.  Hematologic/lymphatic: no lymph node abnormalities, no easy bruising or easy bleeding.  Musculoskeletal: Positive for myalgias.   Skin: No rash and no itching.  Neurological: no loss of consciousness, no seizure, no headache.  Behavioral/Psych: no depression or suicidal ideation. Anxiety noted.   Endocrine: no hot flashes.  Immunologic: negative.    ---------------------------------------------------------------------------------------------------------------------     Past Medical History    Past Medical History:   Diagnosis Date   • Endocarditis    • ESRD (end stage renal disease) (CMS/[facility])    • IV drug abuse (CMS/[facility])    • Unable to read or write        Past Surgical History    Past Surgical History:   Procedure Laterality Date   • CENTRAL VENOUS CATHETER TUNNELED INSERTION DOUBLE LUMEN Right     Chest for hemodialysis       Family History    Family History   Problem Relation Age of Onset   • Alcohol abuse Mother    • Hypertension Mother    • Hypertension Other        Social History    Social History     Tobacco Use   • Smoking status: Current Every Day Smoker     Packs/day: 1.00     Years: 20.00     Pack years: 20.00     Types: Cigarettes   • Smokeless tobacco: Never Used   Substance Use Topics   • Alcohol use: No      Frequency: Never   • Drug use: Yes     Types: Methamphetamines     Comment: last used on Friday 5/3/2019       Allergies    Penicillins  ---------------------------------------------------------------------------------------------------------------------     Home Medications:    Prior to Admission Medications     None        ---------------------------------------------------------------------------------------------------------------------    Objective     Objective     Hospital Scheduled Meds:    sodium chloride 3 mL Intravenous Q12H        ---------------------------------------------------------------------------------------------------------------------   Vital Signs:  Temp:  [97.7 °F (36.5 °C)-97.9 °F (36.6 °C)] 97.9 °F (36.6 °C)  Heart Rate:  [] 110  Resp:  [16-20] 20  BP: (125-160)/(70-93) 155/80  Mean Arterial Pressure (Non-Invasive) for the past 24 hrs (Last 3 readings):   Noninvasive MAP (mmHg)   05/13/19 1957 97 05/13/19 1754 91     SpO2 Percentage    05/13/19 1950 05/13/19 1957 05/14/19 0100   SpO2: 99% 98% 99%     SpO2:  [98 %-100 %] 99 %  on   ;   Device (Oxygen Therapy): room air    Body mass index is 19.26 kg/m².  Wt Readings from Last 3 Encounters:   05/13/19 68 kg (150 lb)   05/10/19 67.1 kg (148 lb)   05/07/19 67.2 kg (148 lb 3.2 oz)     ---------------------------------------------------------------------------------------------------------------------     Physical Exam:    Constitutional:  Well-developed and well-nourished.  No respiratory distress.      HENT:  Head: Normocephalic and atraumatic.  Mouth:  Moist mucous membranes.    Eyes:  Conjunctivae and EOM are normal.  No scleral icterus.  Neck:  Neck supple.  No JVD present.    Cardiovascular:  Normal rate, regular rhythm and normal heart sounds with no murmur. No edema.  Pulmonary/Chest:  No respiratory distress, no wheezes, no crackles, with normal breath sounds and good air movement.  Abdominal:  Soft.  Bowel sounds are normal.   No distension and no tenderness.   Musculoskeletal:  No edema, no tenderness, and no deformity.  No swelling or redness of joints.  Neurological:  Alert and oriented to person, place, and time.  No facial droop.  No slurred speech.   Skin:  Skin is warm and dry.  No rash noted.  No pallor. Scaring noted to bilateral arms and antecubital areas.   Psychiatric:  Normal mood and affect.  Behavior is normal.    ---------------------------------------------------------------------------------------------------------------------  I have personally reviewed the EKG/Telemetry strip  ---------------------------------------------------------------------------------------------------------------------             Results from last 7 days   Lab Units 05/13/19  1852 05/13/19  1553 05/10/19  1636   WBC 10*3/mm3 11.45* 8.48 8.62   HEMOGLOBIN g/dL 8.8* 8.1* 7.8*   HEMATOCRIT % 27.8* 25.4* 24.3*   MCV fL 88.8 87.3 87.7   MCHC g/dL 31.7 31.9 32.1   PLATELETS 10*3/mm3 352 302 286     Results from last 7 days   Lab Units 05/13/19  1852 05/13/19  1553 05/10/19  1636   SODIUM mmol/L 134* 134* 132*   POTASSIUM mmol/L 4.8 4.7 4.9   MAGNESIUM mg/dL 1.7 1.7  --    CHLORIDE mmol/L 101 101 95*   CO2 mmol/L 15.3* 15.6* 19.7*   BUN mg/dL 101* 100* 91*   CREATININE mg/dL 7.71* 7.71* 6.82*   EGFR IF NONAFRICN AM mL/min/1.73 8* 8* 9*   CALCIUM mg/dL 7.9* 7.6* 8.2*   GLUCOSE mg/dL 100* 118* 91   ALBUMIN g/dL  --  2.89* 2.74*   BILIRUBIN mg/dL  --  0.4 0.4   ALK PHOS U/L  --  165* 174*   AST (SGOT) U/L  --  20 19   ALT (SGPT) U/L  --  24 20   Estimated Creatinine Clearance: 11.9 mL/min (A) (by C-G formula based on SCr of 7.71 mg/dL (H)).  No results found for: AMMONIA    No results found for: HGBA1C, POCGLU  Lab Results   Component Value Date    HGBA1C 5.50 05/07/2019     Lab Results   Component Value Date    TSH 1.550 05/07/2019                      Pain Management Panel     Pain Management Panel Latest Ref Rng & Units 5/6/2019    AMPHETAMINES  "SCREEN, URINE Negative Negative    BARBITURATES SCREEN Negative Negative    BENZODIAZEPINE SCREEN, URINE Negative Negative    BUPRENORPHINEUR Negative Negative    COCAINE SCREEN, URINE Negative Negative    METHADONE SCREEN, URINE Negative Negative        I have personally reviewed the above laboratory results.   ---------------------------------------------------------------------------------------------------------------------  Imaging Results (last 7 days)     ** No results found for the last 168 hours. **        I have personally reviewed the above radiology results.   ---------------------------------------------------------------------------------------------------------------------      Assessment & Plan        Assessment/Plan       ASSESSMENT:  1. Renal failure      PLAN:  Patient is a 44 year old male with a past medical history of endocarditis, end stage renal disease, hepatitis C, and IV drug abuse. Patient presents to the ED today with complaints of needing hemodialysis. Patient reports he is in end-stage renal failure and is unable to obtain a PCP or get in to see Nephrology to arrange outpatient dialysis. Patient reports he was recently discharged from [facility] several weeks ago related to his chronic renal failure and inability to receive dialysis. Patient states he typically just comes to the hospital when he feels like he is \"getting bad\" in order to get dialysis. Patient reports he has a history of drug abuse. He states that he recently quit taking suboxone \"30-40 days ago\" but states he used Meth about 3 days ago to help with his anxiety. He denies any ETOH use. He denies any other drug usage. Patient very anxious about receiving dialysis tomorrow, he states that he needs to be discharged from this facility by 2:45 pm to be able to pick his children up from school. He reports that if he isnt able to get his dialysis and be discharged he will sign out AMA. Patient denies any chest pain, shortness " of breath, nausea, vomiting, cough, diarrhea, fever, chills, headache, syncope, dizziness, numbness or tingling.     Workup in the ED included: WBC 8.48. Hemoglobin 8.1. Creatinine 7.71. . Sodium 134. Potassium 4.7. WBC 11.45. Urinalysis reveals WBC-too numerous to count, large leukocytes.     CXR reveals right sied dialysis catheter placement. No pneumothorax. Trace pleural effusions.     Patient admitted to the observation unit for further evaluation and monitoring.     Labs in the AM. Electrolyte replacement protocol in place. VTE prophylaxsis ordered- SCUDS.     We will continue to monitor the patient on continuous pulse oximetry and cardiac monitoring.    Consult placed to Nephrology.    Consult placed to case management to help arrange outpatient dialysis.    We will continue to monitor patient for any acute changes or abnormalities.       Patient's findings and recommendations were discussed with patient, family and nursing staff      Code Status:   Code Status and Medical Interventions:   Ordered at: 05/13/19 1701     Code Status:    CPR     Medical Interventions (Level of Support Prior to Arrest):    Full           NICKY Wolf  05/14/19  4:11 AM

## 2019-05-14 NOTE — CONSULTS
Nephrology Consult Note    Referring Provider: Dr. Lynn  Reason for Consultation: For hemodialysis    Subjective Missed dialysis      History of present illness:  Mario Nair is a 44 y.o. male who presented to [facility] emergency department with chief complaint of missing dialysis.  Patient has history of endocarditis and end-stage renal disease according to the records hepatitis C and IV drug abuse patient was initially admitted at [facility] where he had been started on dialysis but he does not remember was temporary or permanent he stayed there for a month and then signed out AMA also has been to [facility] had dialysis and signed out AMA from here also patient stated that he has 2 kids and he has to take care of them and he has a home in [facility] also but he stated that he cannot stay in the hospital although his uncle took the kids to school this morning but he is also very busy patient is unable to obtain a PCP or any nephrology arrangement as outpatient dialysis because of his noncompliance patient came to the hospital only when he feels he is getting bad.  Patient has quit taking Suboxone about 30 days back but he has used meth about 3 days ago for anxiety he denies any alcohol or any other drug but has used IV drugs in the past patient right now denies any chest pain no shortness of breath no cough is making urine no fever no chills no headache no passing out has a right IJ tunneled dialysis catheter        History  Past Medical History:   Diagnosis Date   • Endocarditis    • ESRD (end stage renal disease) (CMS/[facility])    • IV drug abuse (CMS/[facility])    • Unable to read or write    , Past Surgical History:   Procedure Laterality Date   • CENTRAL VENOUS CATHETER TUNNELED INSERTION DOUBLE LUMEN Right     Chest for hemodialysis   , Family History   Problem Relation Age of Onset   • Alcohol abuse Mother    • Hypertension Mother    • Hypertension Other    , Social History      Tobacco Use   • Smoking status: Current Every Day Smoker     Packs/day: 1.00     Years: 20.00     Pack years: 20.00     Types: Cigarettes   • Smokeless tobacco: Never Used   Substance Use Topics   • Alcohol use: No     Frequency: Never   • Drug use: Yes     Types: Methamphetamines     Comment: last used on Friday 5/3/2019   , No medications prior to admission.   , Scheduled Meds:    sodium chloride 3 mL Intravenous Q12H   , Continuous Infusions:   , PRN Meds:  •  acetaminophen  •  aluminum-magnesium hydroxide-simethicone  •  magnesium hydroxide  •  nitroglycerin  •  ondansetron **OR** ondansetron  •  sodium chloride and Allergies:  Penicillins    Review of Systems  More than 10 point review of systems was done. Pertinent items are noted in HPI, all other systems reviewed and negative    Objective     Vital Signs  Temp:  [97.7 °F (36.5 °C)-98.6 °F (37 °C)] 98.6 °F (37 °C)  Heart Rate:  [] 104  Resp:  [16-20] 20  BP: (125-160)/(70-95) 146/95    No intake/output data recorded.  No intake/output data recorded.    Physical Examination:    General Appearance : alert, appears stated age, cooperative   Head : normocephalic, without obvious abnormality and atraumatic  Eyes : conjunctivae and sclerae normal, no icterus, no pallor and PERRLA  Throat : oral mucosa moist  Neck: no adenopathy, suppple, no carotid bruit and no JVD  Lungs : clear to auscultation, respirations regular and unlabored  Heart : regular rhythm & normal rate, normal S1, S2, no murmur, no pierce, no rub Abdomen :  normal bowel sounds, no masses and soft non-tender  Rectal : Deferred  Extremities : moves extremities well, no redness and no edema  Pulses :  palpable and equal bilaterally  Skin : no bleeding, bruising or rash  Neurologic : orientated to person, place, time, grossly no focal deficitis  Access: Right IJ tunneled dialysis catheter no signs of redness warmth or discharge    Laboratory Data :      WBC WBC   Date Value Ref Range Status    05/14/2019 7.77 3.40 - 10.80 10*3/mm3 Final   05/13/2019 11.45 (H) 3.40 - 10.80 10*3/mm3 Final   05/13/2019 8.48 3.40 - 10.80 10*3/mm3 Final      HGB Hemoglobin   Date Value Ref Range Status   05/14/2019 7.6 (L) 13.0 - 17.7 g/dL Final   05/13/2019 8.8 (L) 13.0 - 17.7 g/dL Final   05/13/2019 8.1 (L) 13.0 - 17.7 g/dL Final      HCT Hematocrit   Date Value Ref Range Status   05/14/2019 24.6 (L) 37.5 - 51.0 % Final   05/13/2019 27.8 (L) 37.5 - 51.0 % Final   05/13/2019 25.4 (L) 37.5 - 51.0 % Final      Platlets No results found for: LABPLAT   MCV MCV   Date Value Ref Range Status   05/14/2019 89.5 79.0 - 97.0 fL Final   05/13/2019 88.8 79.0 - 97.0 fL Final   05/13/2019 87.3 79.0 - 97.0 fL Final          Sodium Sodium   Date Value Ref Range Status   05/14/2019 132 (L) 136 - 145 mmol/L Final   05/13/2019 134 (L) 136 - 145 mmol/L Final   05/13/2019 134 (L) 136 - 145 mmol/L Final      Potassium Potassium   Date Value Ref Range Status   05/14/2019 4.8 3.5 - 5.2 mmol/L Final   05/13/2019 4.8 3.5 - 5.2 mmol/L Final   05/13/2019 4.7 3.5 - 5.2 mmol/L Final      Chloride Chloride   Date Value Ref Range Status   05/14/2019 101 98 - 107 mmol/L Final   05/13/2019 101 98 - 107 mmol/L Final   05/13/2019 101 98 - 107 mmol/L Final      CO2 CO2   Date Value Ref Range Status   05/14/2019 13.2 (L) 22.0 - 29.0 mmol/L Final   05/13/2019 15.3 (L) 22.0 - 29.0 mmol/L Final   05/13/2019 15.6 (L) 22.0 - 29.0 mmol/L Final      BUN BUN   Date Value Ref Range Status   05/14/2019 103 (H) 6 - 20 mg/dL Final   05/13/2019 101 (H) 6 - 20 mg/dL Final   05/13/2019 100 (H) 6 - 20 mg/dL Final      Creatinine Creatinine   Date Value Ref Range Status   05/14/2019 7.75 (H) 0.76 - 1.27 mg/dL Final   05/13/2019 7.71 (H) 0.76 - 1.27 mg/dL Final   05/13/2019 7.71 (H) 0.76 - 1.27 mg/dL Final      Calcium Calcium   Date Value Ref Range Status   05/14/2019 7.5 (L) 8.6 - 10.5 mg/dL Final   05/13/2019 7.9 (L) 8.6 - 10.5 mg/dL Final   05/13/2019 7.6 (L) 8.6 - 10.5  mg/dL Final      PO4 No results found for: CAPO4   Albumin Albumin   Date Value Ref Range Status   05/13/2019 2.89 (L) 3.50 - 5.20 g/dL Final      Magnesium Magnesium   Date Value Ref Range Status   05/14/2019 1.6 1.6 - 2.6 mg/dL Final   05/13/2019 1.7 1.6 - 2.6 mg/dL Final   05/13/2019 1.7 1.6 - 2.6 mg/dL Final      Uric Acid No results found for: URICACID     Radiology results :     Imaging Results (last 72 hours)     ** No results found for the last 72 hours. **            Medications:        sodium chloride 3 mL Intravenous Q12H          Assessment/Plan       Metabolic acidosis      1.  Acute renal failure requiring dialysis: Patient stated that and has that when he was started on dialysis he was told that it can be temporary we will get the records from at that hospital and see what was the cause of his acute kidney injury and needing dialysis but at this moment patient's BUN and creatinine are rising we will do a short treatment today of 3 hours and do a full treatment tomorrow.  I counseled in detail patient about staying until we have find out a spot for him for outpatient dialysis and also find out whether he will stay on dialysis permanently or temporarily from getting the records from [facility].  Patient is again insisting that he will leave after dialysis    2.  Azotemia: Most likely secondary to missing dialysis    3.  Hepatitis C: Patient has not seen anybody for hepatitis C I counseled him to get a gastroenterology    4.  Noncompliance: patient was initially admitted at [facility] where he had been started on dialysis but he does not remember was temporary or permanent he stayed there for a month and then signed out AMA also has been to [facility] had dialysis and signed out AMA from here also patient stated that he has 2 kids and he has to take care of them and he has a home in [place] [facility] [place] [facility] also but he stated that he cannot stay in the hospital although his uncle took the kids to  school this morning but he is also very busy patient is unable to obtain a PCP or any nephrology arrangement as outpatient dialysis because of his noncompliance patient came to the hospital only when he feels he is getting bad.  I counseled the patient on the consequences of noncompliance including death stroke etc. and benefits of compliance he verbalized understanding RN was present with me at this time    Prognosis is guarded    Thanks Dr loeps for the consult. We will follow with you.  I discussed the patient's findings and my recommendations with patient, nursing staff and primary care team    KATHARINA Hylton MD  05/14/19  6:34 AM

## 2019-05-14 NOTE — PROGRESS NOTES
Discharge Planning Assessment   [place]     Patient Name: Mario Nair  MRN: [id number]  Today's Date: 5/14/2019    Admit Date: 5/13/2019    Discharge Needs Assessment     Row Name 05/14/19 1317       Living Environment    Lives With  child(tammy), dependent Pt lives at home with his two children, 16 and 14.     Current Living Arrangements  home/apartment/condo    Primary Care Provided by  self    Provides Primary Care For  child(tammy)    Caregiving Concerns  -- Pt's current drug abuse.  made a referral to Central Intake (intake ID# [id number]) due to pt admitting he used meth 3 days ago and has dependent children in the home.     Family Caregiver if Needed  none    Quality of Family Relationships  helpful    Able to Return to Prior Arrangements  yes       Transition Planning    Patient/Family Anticipates Transition to  home    Transportation Anticipated  family or friend will provide       Discharge Needs Assessment    Concerns to be Addressed  substance/tobacco abuse/use;patient refuses services;compliance issue    Equipment Currently Used at Home  none    Equipment Needed After Discharge  none    Outpatient/Agency/Support Group Needs  -- Pt does not currently utilize home health services. Pt needs an outpatient dialysis chair arranged, but refuses to stay in the hospital while a dialysis chair can be obtained. Pt's states he was admitted to [facility] for over a month and they were unable to find him a dialysis chair. SS educated pt on the importance of being compliant. Pt sates understanding and was adamant to sign out AMA. Pt states he has a  with [facility] that is assisting him with finding a dialysis chair.        Discharge Plan     Row Name 05/14/19 1321       Plan    Plan  Pt lives at home with his children and plans to return home at discharge. Pt does not utilize home health services or DME. Pt does not have a PCP due to non-compliance. Pt does not have a POA or advance directive. Pt provides  his own transportation. SS will follow and assist as needed with discharge planning.     Patient/Family in Agreement with Plan  yes       Demographic Summary     Row Name 05/14/19 3377       General Information    Admission Type  observation    Referral Source  nursing    Reason for Consult  discharge planning    Preferred Language  English     Used During This Interaction  no     Josie Thompson

## 2019-05-14 NOTE — NURSING NOTE
Lab came and stated that Pt has not been in his room the past three times she has been in there. Security notified.

## 2019-05-14 NOTE — PROGRESS NOTES
Pharmacy was consulted to dose Azactam to treat urinary tract infection. Due to the patient receiving hemodialysis, will dose Azactam as 1 gram followed by 500mg every 12 hours. Pharmacy will continue to follow.    Thank you,    Melodie Reid [facility]  05/14/19  7:15 AM

## 2019-05-14 NOTE — DISCHARGE SUMMARY
DISCHARGE SUMMARY        Patient Identification:  Name:  Mario Nair  Age:  44 y.o.  Sex:  male  :  1975  MRN:  [id number]  Visit Number:  [id number]    Date of Admission: 2019  Date of Discharge:  2019    PCP: Provider, No Known    Discharging Provider: NICKY Mendoza      Discharge Diagnoses     Acute renal failure      Consults/Procedures     Consults:   Consults     Date and Time Order Name Status Description    2019 1837 Inpatient Nephrology Consult      2019 1706 IP General Consult (Use specialty-specific consult if known)      2019 1705 IP General Consult (Use specialty-specific consult if known)      2019 0702 Inpatient Nephrology Consult Completed           Procedures Performed:         History of Presenting Illness     Patient is a 44 year old male with a past medical history of endocarditis, end stage renal disease, hepatitis C, and IV drug abuse. Patient presents to the ED with complaints of needing hemodialysis. See admission history and physical.      Hospital Course     Patient was admitted to the Observation unit for further monitoring and evaluation. Nephrology was consulted, appreciate their input. Patient was evaluated by Nephrology with plans to receive 3 hour dialysis treatment today and full treatment tomorrow. However, after 3 hour treatment patient demanded to be discharged. Unable to safely discharge patient, and he signed himself out AMA.     Discharge Vitals/Physical Examination     Vital Signs:  Temp:  [94.6 °F (34.8 °C)-98.6 °F (37 °C)] 94.6 °F (34.8 °C)  Heart Rate:  [] 111  Resp:  [16-20] 20  BP: (103-160)/(59-95) 146/79  Mean Arterial Pressure (Non-Invasive) for the past 24 hrs (Last 3 readings):   Noninvasive MAP (mmHg)   19 1120 94   19 1957 97   19 1754 91     SpO2 Percentage    19 0400 19 0745 19 1120   SpO2: 97% 97% 97%     SpO2:  [97 %-100 %] 97 %  on   ;   Device (Oxygen Therapy):  room air    Body mass index is 19.26 kg/m².  Wt Readings from Last 3 Encounters:   05/13/19 68 kg (150 lb)   05/10/19 67.1 kg (148 lb)   05/07/19 67.2 kg (148 lb 3.2 oz)         Physical Exam:    Constitutional:  Well-developed and well-nourished.  No respiratory distress.      HENT:  Head: Normocephalic and atraumatic.  Mouth:  Moist mucous membranes.    Eyes:  Conjunctivae and EOM are normal.  No scleral icterus.  Neck:  Neck supple.  No JVD present.    Cardiovascular:  Normal rate, regular rhythm and normal heart sounds with no murmur. No edema.  Pulmonary/Chest:  No respiratory distress, no wheezes, no crackles, with normal breath sounds and good air movement.  Abdominal:  Soft.  Bowel sounds are normal.  No distension and no tenderness.   Musculoskeletal:  No edema, no tenderness, and no deformity.  No swelling or redness of joints.  Neurological:  Alert and oriented to person, place, and time.  No facial droop.  No slurred speech.   Skin:  Skin is warm and dry.  No rash noted.  No pallor.   Psychiatric:  Normal mood and affect.  Behavior is normal.      Pertinent Laboratory/Radiology Results     Pertinent Laboratory Results:              Results from last 7 days   Lab Units 05/14/19  0336 05/13/19  1852 05/13/19  1553   WBC 10*3/mm3 7.77 11.45* 8.48   HEMOGLOBIN g/dL 7.6* 8.8* 8.1*   HEMATOCRIT % 24.6* 27.8* 25.4*   MCV fL 89.5 88.8 87.3   MCHC g/dL 30.9* 31.7 31.9   PLATELETS 10*3/mm3 314 352 302     Results from last 7 days   Lab Units 05/14/19  0336 05/13/19  1852 05/13/19  1553 05/10/19  1636   SODIUM mmol/L 132* 134* 134* 132*   POTASSIUM mmol/L 4.8 4.8 4.7 4.9   MAGNESIUM mg/dL 1.6 1.7 1.7  --    CHLORIDE mmol/L 101 101 101 95*   CO2 mmol/L 13.2* 15.3* 15.6* 19.7*   BUN mg/dL 103* 101* 100* 91*   CREATININE mg/dL 7.75* 7.71* 7.71* 6.82*   EGFR IF NONAFRICN AM mL/min/1.73 8* 8* 8* 9*   CALCIUM mg/dL 7.5* 7.9* 7.6* 8.2*   GLUCOSE mg/dL 172* 100* 118* 91   ALBUMIN g/dL  --   --  2.89* 2.74*   BILIRUBIN  mg/dL  --   --  0.4 0.4   ALK PHOS U/L  --   --  165* 174*   AST (SGOT) U/L  --   --  20 19   ALT (SGPT) U/L  --   --  24 20   Estimated Creatinine Clearance: 11.8 mL/min (A) (by C-G formula based on SCr of 7.75 mg/dL (H)).  No results found for: AMMONIA    No results found for: HGBA1C, POCGLU  Lab Results   Component Value Date    HGBA1C 5.50 05/07/2019     Lab Results   Component Value Date    TSH 1.550 05/07/2019                      Pain Management Panel     Pain Management Panel Latest Ref Rng & Units 5/6/2019    AMPHETAMINES SCREEN, URINE Negative Negative    BARBITURATES SCREEN Negative Negative    BENZODIAZEPINE SCREEN, URINE Negative Negative    BUPRENORPHINEUR Negative Negative    COCAINE SCREEN, URINE Negative Negative    METHADONE SCREEN, URINE Negative Negative          Pertinent Radiology Results:  Imaging Results (all)     None          Test Results Pending at Discharge:   Order Current Status    HBsAg Confirmation In process          Discharge Disposition/Discharge Medications/Discharge Appointments     Discharge Disposition:   Left Against Medical Advice    Condition at Discharge:  Stable, much improved with no issues today     Code Status While Inpatient:  Code Status and Medical Interventions:   Ordered at: 05/13/19 1701     Code Status:    CPR     Medical Interventions (Level of Support Prior to Arrest):    Full       Discharge Medications:     Discharge Medications      Patient Not Prescribed Medications Upon Discharge         Discharge Diet:  Renal    Discharge Activity:  As tolerated    Discharge Appointments:     Patient left AMA          Scribed for Dr. Tiffanie Norton, APRN  05/14/19  12:33 PM          Please note that this discharge summary required more than 30 minutes to complete.    Physician Attestation:    I have personally seen and examined the patient. I reviewed the patient's data including history of present illness, review of systems, physical examination,  assessment and treatment plan and agree with findings above. The assessment and plan are my own.  I have reviewed and edited the note above after discussing the findings with the midlevel.    Tiffanie Lynn MD  Infectious Diseases  05/14/19  12:53 PM

## 2019-05-15 NOTE — ED PROVIDER NOTES
Subjective   Patient presents to the ER to get labs checked.  He is in end-stage renal failure requiring dialysis but is not in a dialysis clinic.  He has not had dialysis in 6 days.              Review of Systems   Constitutional: Negative.  Negative for fever.   HENT: Negative.    Respiratory: Negative.    Cardiovascular: Negative.  Negative for chest pain.   Gastrointestinal: Negative.  Negative for abdominal pain.   Endocrine: Negative.    Genitourinary: Negative.  Negative for dysuria.   Skin: Negative.    Neurological: Negative.    Psychiatric/Behavioral: Negative.    All other systems reviewed and are negative.      Past Medical History:   Diagnosis Date   • Endocarditis    • ESRD (end stage renal disease) (CMS/[facility])    • IV drug abuse (CMS/[facility])    • Unable to read or write        Allergies   Allergen Reactions   • Penicillins Shortness Of Breath, Itching and Rash       Past Surgical History:   Procedure Laterality Date   • CENTRAL VENOUS CATHETER TUNNELED INSERTION DOUBLE LUMEN Right     Chest for hemodialysis       Family History   Problem Relation Age of Onset   • Alcohol abuse Mother    • Hypertension Mother    • Hypertension Other        Social History     Socioeconomic History   • Marital status:      Spouse name: Not on file   • Number of children: Not on file   • Years of education: Not on file   • Highest education level: Not on file   Tobacco Use   • Smoking status: Current Every Day Smoker     Packs/day: 1.00     Years: 20.00     Pack years: 20.00     Types: Cigarettes   • Smokeless tobacco: Never Used   Substance and Sexual Activity   • Alcohol use: No     Frequency: Never   • Drug use: Yes     Types: Methamphetamines     Comment: last used on Friday 5/3/2019   • Sexual activity: Defer           Objective   Physical Exam   Constitutional: He is oriented to person, place, and time. He appears well-developed and well-nourished. No distress.   HENT:   Head: Normocephalic and atraumatic.   Right  Ear: External ear normal.   Left Ear: External ear normal.   Nose: Nose normal.   Eyes: Conjunctivae and EOM are normal. Pupils are equal, round, and reactive to light.   Neck: Normal range of motion. Neck supple. No JVD present. No tracheal deviation present.   Cardiovascular: Normal rate, regular rhythm and normal heart sounds.   No murmur heard.  Pulmonary/Chest: Effort normal and breath sounds normal. No respiratory distress. He has no wheezes.   Abdominal: Soft. Bowel sounds are normal. There is no tenderness.   Musculoskeletal: Normal range of motion. He exhibits no edema or deformity.   Neurological: He is alert and oriented to person, place, and time. No cranial nerve deficit.   Skin: Skin is warm and dry. No rash noted. He is not diaphoretic. No erythema. No pallor.   Psychiatric: He has a normal mood and affect. His behavior is normal. Thought content normal.   Nursing note and vitals reviewed.      Procedures           ED Course                  MDM  Number of Diagnoses or Management Options  Metabolic acidosis: new and requires workup  Stage 5 chronic kidney disease on chronic dialysis (CMS/HCC): established and worsening     Amount and/or Complexity of Data Reviewed  Decide to obtain previous medical records or to obtain history from someone other than the patient: yes          Final diagnoses:   Metabolic acidosis   Stage 5 chronic kidney disease on chronic dialysis (CMS/HCC)            Angela Mg, APRN  05/14/19 [id number]

## 2019-05-22 ENCOUNTER — HOSPITAL ENCOUNTER (EMERGENCY)
Facility: HOSPITAL | Age: 44
Discharge: LEFT BEFORE DISCHARGE | End: 2019-05-22
Attending: FAMILY MEDICINE | Admitting: FAMILY MEDICINE

## 2019-05-22 VITALS
SYSTOLIC BLOOD PRESSURE: 140 MMHG | HEIGHT: 72 IN | TEMPERATURE: 97.7 F | HEART RATE: 98 BPM | BODY MASS INDEX: 18.96 KG/M2 | OXYGEN SATURATION: 100 % | DIASTOLIC BLOOD PRESSURE: 84 MMHG | WEIGHT: 140 LBS | RESPIRATION RATE: 18 BRPM

## 2019-05-22 DIAGNOSIS — N18.9 ACUTE RENAL FAILURE SUPERIMPOSED ON CHRONIC KIDNEY DISEASE, UNSPECIFIED CKD STAGE, UNSPECIFIED ACUTE RENAL FAILURE TYPE (HCC): Primary | ICD-10-CM

## 2019-05-22 DIAGNOSIS — N17.9 ACUTE RENAL FAILURE SUPERIMPOSED ON CHRONIC KIDNEY DISEASE, UNSPECIFIED CKD STAGE, UNSPECIFIED ACUTE RENAL FAILURE TYPE (HCC): Primary | ICD-10-CM

## 2019-05-22 LAB
ALBUMIN SERPL-MCNC: 2.97 G/DL (ref 3.5–5.2)
ALBUMIN/GLOB SERPL: 0.5 G/DL
ALP SERPL-CCNC: 126 U/L (ref 39–117)
ALT SERPL W P-5'-P-CCNC: 41 U/L (ref 1–41)
ANION GAP SERPL CALCULATED.3IONS-SCNC: 17.3 MMOL/L
AST SERPL-CCNC: 26 U/L (ref 1–40)
BASOPHILS # BLD AUTO: 0.02 10*3/MM3 (ref 0–0.2)
BASOPHILS NFR BLD AUTO: 0.3 % (ref 0–1.5)
BILIRUB SERPL-MCNC: 0.3 MG/DL (ref 0.2–1.2)
BUN BLD-MCNC: 92 MG/DL (ref 6–20)
BUN/CREAT SERPL: 13.8 (ref 7–25)
CALCIUM SPEC-SCNC: 8 MG/DL (ref 8.6–10.5)
CHLORIDE SERPL-SCNC: 106 MMOL/L (ref 98–107)
CO2 SERPL-SCNC: 13.7 MMOL/L (ref 22–29)
CREAT BLD-MCNC: 6.66 MG/DL (ref 0.76–1.27)
DEPRECATED RDW RBC AUTO: 52.4 FL (ref 37–54)
EOSINOPHIL # BLD AUTO: 0.31 10*3/MM3 (ref 0–0.4)
EOSINOPHIL NFR BLD AUTO: 5.3 % (ref 0.3–6.2)
ERYTHROCYTE [DISTWIDTH] IN BLOOD BY AUTOMATED COUNT: 16.2 % (ref 12.3–15.4)
GFR SERPL CREATININE-BSD FRML MDRD: 9 ML/MIN/1.73
GFR SERPL CREATININE-BSD FRML MDRD: ABNORMAL ML/MIN/1.73
GLOBULIN UR ELPH-MCNC: 6 GM/DL
GLUCOSE BLD-MCNC: 110 MG/DL (ref 65–99)
HCT VFR BLD AUTO: 27.3 % (ref 37.5–51)
HGB BLD-MCNC: 8.4 G/DL (ref 13–17.7)
IMM GRANULOCYTES # BLD AUTO: 0.01 10*3/MM3 (ref 0–0.05)
IMM GRANULOCYTES NFR BLD AUTO: 0.2 % (ref 0–0.5)
LYMPHOCYTES # BLD AUTO: 1.92 10*3/MM3 (ref 0.7–3.1)
LYMPHOCYTES NFR BLD AUTO: 32.8 % (ref 19.6–45.3)
MCH RBC QN AUTO: 27.7 PG (ref 26.6–33)
MCHC RBC AUTO-ENTMCNC: 30.8 G/DL (ref 31.5–35.7)
MCV RBC AUTO: 90.1 FL (ref 79–97)
MONOCYTES # BLD AUTO: 0.36 10*3/MM3 (ref 0.1–0.9)
MONOCYTES NFR BLD AUTO: 6.1 % (ref 5–12)
NEUTROPHILS # BLD AUTO: 3.24 10*3/MM3 (ref 1.7–7)
NEUTROPHILS NFR BLD AUTO: 55.3 % (ref 42.7–76)
NT-PROBNP SERPL-MCNC: 598.9 PG/ML (ref 5–450)
PLATELET # BLD AUTO: 213 10*3/MM3 (ref 140–450)
PMV BLD AUTO: 8.2 FL (ref 6–12)
POTASSIUM BLD-SCNC: 4.5 MMOL/L (ref 3.5–5.2)
PROT SERPL-MCNC: 9 G/DL (ref 6–8.5)
RBC # BLD AUTO: 3.03 10*6/MM3 (ref 4.14–5.8)
SODIUM BLD-SCNC: 137 MMOL/L (ref 136–145)
TROPONIN T SERPL-MCNC: 0.06 NG/ML (ref 0–0.03)
WBC NRBC COR # BLD: 5.86 10*3/MM3 (ref 3.4–10.8)

## 2019-05-22 PROCEDURE — 83880 ASSAY OF NATRIURETIC PEPTIDE: CPT | Performed by: FAMILY MEDICINE

## 2019-05-22 PROCEDURE — 80053 COMPREHEN METABOLIC PANEL: CPT | Performed by: FAMILY MEDICINE

## 2019-05-22 PROCEDURE — 84484 ASSAY OF TROPONIN QUANT: CPT | Performed by: FAMILY MEDICINE

## 2019-05-22 PROCEDURE — 36415 COLL VENOUS BLD VENIPUNCTURE: CPT

## 2019-05-22 PROCEDURE — 99282 EMERGENCY DEPT VISIT SF MDM: CPT

## 2019-05-22 PROCEDURE — 85025 COMPLETE CBC W/AUTO DIFF WBC: CPT | Performed by: FAMILY MEDICINE

## 2019-05-27 ENCOUNTER — HOSPITAL ENCOUNTER (OUTPATIENT)
Facility: HOSPITAL | Age: 44
Setting detail: OBSERVATION
Discharge: LEFT BEFORE DISCHARGE | End: 2019-05-28
Attending: EMERGENCY MEDICINE | Admitting: INTERNAL MEDICINE

## 2019-05-27 DIAGNOSIS — N17.9 ACUTE RENAL FAILURE SUPERIMPOSED ON STAGE 5 CHRONIC KIDNEY DISEASE, NOT ON CHRONIC DIALYSIS, UNSPECIFIED ACUTE RENAL FAILURE TYPE (HCC): Primary | ICD-10-CM

## 2019-05-27 DIAGNOSIS — N18.5 ACUTE RENAL FAILURE SUPERIMPOSED ON STAGE 5 CHRONIC KIDNEY DISEASE, NOT ON CHRONIC DIALYSIS, UNSPECIFIED ACUTE RENAL FAILURE TYPE (HCC): Primary | ICD-10-CM

## 2019-05-27 PROBLEM — N18.9 CHRONIC RENAL FAILURE: Status: ACTIVE | Noted: 2019-05-27

## 2019-05-27 LAB
ALBUMIN SERPL-MCNC: 2.81 G/DL (ref 3.5–5.2)
ALBUMIN/GLOB SERPL: 0.5 G/DL
ALP SERPL-CCNC: 112 U/L (ref 39–117)
ALT SERPL W P-5'-P-CCNC: 52 U/L (ref 1–41)
ANION GAP SERPL CALCULATED.3IONS-SCNC: 16.5 MMOL/L
AST SERPL-CCNC: 32 U/L (ref 1–40)
BASOPHILS # BLD AUTO: 0.02 10*3/MM3 (ref 0–0.2)
BASOPHILS NFR BLD AUTO: 0.3 % (ref 0–1.5)
BILIRUB SERPL-MCNC: 0.4 MG/DL (ref 0.2–1.2)
BUN BLD-MCNC: 84 MG/DL (ref 6–20)
BUN/CREAT SERPL: 13.8 (ref 7–25)
CALCIUM SPEC-SCNC: 7.7 MG/DL (ref 8.6–10.5)
CHLORIDE SERPL-SCNC: 109 MMOL/L (ref 98–107)
CO2 SERPL-SCNC: 9.5 MMOL/L (ref 22–29)
CREAT BLD-MCNC: 6.1 MG/DL (ref 0.76–1.27)
DEPRECATED RDW RBC AUTO: 53.5 FL (ref 37–54)
EOSINOPHIL # BLD AUTO: 0.2 10*3/MM3 (ref 0–0.4)
EOSINOPHIL NFR BLD AUTO: 2.5 % (ref 0.3–6.2)
ERYTHROCYTE [DISTWIDTH] IN BLOOD BY AUTOMATED COUNT: 16.8 % (ref 12.3–15.4)
GFR SERPL CREATININE-BSD FRML MDRD: 10 ML/MIN/1.73
GFR SERPL CREATININE-BSD FRML MDRD: ABNORMAL ML/MIN/1.73
GLOBULIN UR ELPH-MCNC: 5.7 GM/DL
GLUCOSE BLD-MCNC: 101 MG/DL (ref 65–99)
HCT VFR BLD AUTO: 29.5 % (ref 37.5–51)
HGB BLD-MCNC: 9.3 G/DL (ref 13–17.7)
IMM GRANULOCYTES # BLD AUTO: 0.01 10*3/MM3 (ref 0–0.05)
IMM GRANULOCYTES NFR BLD AUTO: 0.1 % (ref 0–0.5)
LYMPHOCYTES # BLD AUTO: 2.23 10*3/MM3 (ref 0.7–3.1)
LYMPHOCYTES NFR BLD AUTO: 28 % (ref 19.6–45.3)
MCH RBC QN AUTO: 28.4 PG (ref 26.6–33)
MCHC RBC AUTO-ENTMCNC: 31.5 G/DL (ref 31.5–35.7)
MCV RBC AUTO: 90.2 FL (ref 79–97)
MONOCYTES # BLD AUTO: 0.5 10*3/MM3 (ref 0.1–0.9)
MONOCYTES NFR BLD AUTO: 6.3 % (ref 5–12)
NEUTROPHILS # BLD AUTO: 5 10*3/MM3 (ref 1.7–7)
NEUTROPHILS NFR BLD AUTO: 62.8 % (ref 42.7–76)
PLATELET # BLD AUTO: 201 10*3/MM3 (ref 140–450)
PMV BLD AUTO: 8.5 FL (ref 6–12)
POTASSIUM BLD-SCNC: 4.9 MMOL/L (ref 3.5–5.2)
PROT SERPL-MCNC: 8.5 G/DL (ref 6–8.5)
RBC # BLD AUTO: 3.27 10*6/MM3 (ref 4.14–5.8)
SODIUM BLD-SCNC: 135 MMOL/L (ref 136–145)
WBC NRBC COR # BLD: 7.96 10*3/MM3 (ref 3.4–10.8)

## 2019-05-27 PROCEDURE — 99284 EMERGENCY DEPT VISIT MOD MDM: CPT

## 2019-05-27 PROCEDURE — G0378 HOSPITAL OBSERVATION PER HR: HCPCS

## 2019-05-27 PROCEDURE — 96375 TX/PRO/DX INJ NEW DRUG ADDON: CPT

## 2019-05-27 PROCEDURE — 83735 ASSAY OF MAGNESIUM: CPT | Performed by: NURSE PRACTITIONER

## 2019-05-27 PROCEDURE — 94799 UNLISTED PULMONARY SVC/PX: CPT

## 2019-05-27 PROCEDURE — 85025 COMPLETE CBC W/AUTO DIFF WBC: CPT | Performed by: EMERGENCY MEDICINE

## 2019-05-27 PROCEDURE — 80053 COMPREHEN METABOLIC PANEL: CPT | Performed by: EMERGENCY MEDICINE

## 2019-05-27 RX ORDER — CALCIUM CARBONATE 200(500)MG
2 TABLET,CHEWABLE ORAL 2 TIMES DAILY PRN
Status: DISCONTINUED | OUTPATIENT
Start: 2019-05-27 | End: 2019-05-28 | Stop reason: HOSPADM

## 2019-05-27 RX ORDER — SODIUM CHLORIDE 0.9 % (FLUSH) 0.9 %
3 SYRINGE (ML) INJECTION EVERY 12 HOURS SCHEDULED
Status: DISCONTINUED | OUTPATIENT
Start: 2019-05-27 | End: 2019-05-28 | Stop reason: HOSPADM

## 2019-05-27 RX ORDER — SODIUM CHLORIDE 0.9 % (FLUSH) 0.9 %
10 SYRINGE (ML) INJECTION AS NEEDED
Status: DISCONTINUED | OUTPATIENT
Start: 2019-05-27 | End: 2019-05-28 | Stop reason: HOSPADM

## 2019-05-27 RX ORDER — MAGNESIUM SULFATE HEPTAHYDRATE 40 MG/ML
2 INJECTION, SOLUTION INTRAVENOUS AS NEEDED
Status: DISCONTINUED | OUTPATIENT
Start: 2019-05-27 | End: 2019-05-28 | Stop reason: HOSPADM

## 2019-05-27 RX ORDER — SENNA AND DOCUSATE SODIUM 50; 8.6 MG/1; MG/1
2 TABLET, FILM COATED ORAL NIGHTLY PRN
Status: DISCONTINUED | OUTPATIENT
Start: 2019-05-27 | End: 2019-05-28 | Stop reason: HOSPADM

## 2019-05-27 RX ORDER — ACETAMINOPHEN 325 MG/1
650 TABLET ORAL EVERY 4 HOURS PRN
Status: DISCONTINUED | OUTPATIENT
Start: 2019-05-27 | End: 2019-05-28 | Stop reason: HOSPADM

## 2019-05-27 RX ORDER — ONDANSETRON 2 MG/ML
4 INJECTION INTRAMUSCULAR; INTRAVENOUS EVERY 6 HOURS PRN
Status: DISCONTINUED | OUTPATIENT
Start: 2019-05-27 | End: 2019-05-28 | Stop reason: HOSPADM

## 2019-05-27 RX ORDER — MAGNESIUM SULFATE HEPTAHYDRATE 40 MG/ML
4 INJECTION, SOLUTION INTRAVENOUS AS NEEDED
Status: DISCONTINUED | OUTPATIENT
Start: 2019-05-27 | End: 2019-05-28 | Stop reason: HOSPADM

## 2019-05-27 RX ORDER — MAGNESIUM SULFATE 1 G/100ML
1 INJECTION INTRAVENOUS AS NEEDED
Status: DISCONTINUED | OUTPATIENT
Start: 2019-05-27 | End: 2019-05-28 | Stop reason: HOSPADM

## 2019-05-27 RX ORDER — NITROGLYCERIN 0.4 MG/1
0.4 TABLET SUBLINGUAL
Status: DISCONTINUED | OUTPATIENT
Start: 2019-05-27 | End: 2019-05-28 | Stop reason: HOSPADM

## 2019-05-27 RX ORDER — POTASSIUM CHLORIDE 20 MEQ/1
40 TABLET, EXTENDED RELEASE ORAL AS NEEDED
Status: DISCONTINUED | OUTPATIENT
Start: 2019-05-27 | End: 2019-05-28 | Stop reason: HOSPADM

## 2019-05-27 RX ORDER — POTASSIUM CHLORIDE 29.8 MG/ML
20 INJECTION INTRAVENOUS
Status: DISCONTINUED | OUTPATIENT
Start: 2019-05-27 | End: 2019-05-28 | Stop reason: HOSPADM

## 2019-05-27 RX ORDER — SODIUM CHLORIDE 0.9 % (FLUSH) 0.9 %
3-10 SYRINGE (ML) INJECTION AS NEEDED
Status: DISCONTINUED | OUTPATIENT
Start: 2019-05-27 | End: 2019-05-28 | Stop reason: HOSPADM

## 2019-05-27 RX ORDER — NICOTINE 21 MG/24HR
1 PATCH, TRANSDERMAL 24 HOURS TRANSDERMAL EVERY 24 HOURS
Status: DISCONTINUED | OUTPATIENT
Start: 2019-05-28 | End: 2019-05-28 | Stop reason: HOSPADM

## 2019-05-27 RX ORDER — ONDANSETRON 4 MG/1
4 TABLET, FILM COATED ORAL EVERY 6 HOURS PRN
Status: DISCONTINUED | OUTPATIENT
Start: 2019-05-27 | End: 2019-05-28 | Stop reason: HOSPADM

## 2019-05-27 RX ORDER — POTASSIUM CHLORIDE 1.5 G/1.77G
40 POWDER, FOR SOLUTION ORAL AS NEEDED
Status: DISCONTINUED | OUTPATIENT
Start: 2019-05-27 | End: 2019-05-28 | Stop reason: HOSPADM

## 2019-05-27 RX ORDER — HEPARIN SODIUM 5000 [USP'U]/ML
5000 INJECTION, SOLUTION INTRAVENOUS; SUBCUTANEOUS EVERY 12 HOURS SCHEDULED
Status: DISCONTINUED | OUTPATIENT
Start: 2019-05-27 | End: 2019-05-28 | Stop reason: HOSPADM

## 2019-05-27 RX ADMIN — SODIUM CHLORIDE, PRESERVATIVE FREE 3 ML: 5 INJECTION INTRAVENOUS at 23:30

## 2019-05-27 RX ADMIN — SODIUM BICARBONATE 50 MEQ: 84 INJECTION INTRAVENOUS at 21:56

## 2019-05-28 VITALS
TEMPERATURE: 98 F | WEIGHT: 148.2 LBS | HEART RATE: 60 BPM | DIASTOLIC BLOOD PRESSURE: 56 MMHG | BODY MASS INDEX: 20.07 KG/M2 | OXYGEN SATURATION: 99 % | RESPIRATION RATE: 20 BRPM | SYSTOLIC BLOOD PRESSURE: 100 MMHG | HEIGHT: 72 IN

## 2019-05-28 LAB
ANION GAP SERPL CALCULATED.3IONS-SCNC: 13.8 MMOL/L
ANION GAP SERPL CALCULATED.3IONS-SCNC: 16.2 MMOL/L
BASOPHILS # BLD AUTO: 0.02 10*3/MM3 (ref 0–0.2)
BASOPHILS NFR BLD AUTO: 0.3 % (ref 0–1.5)
BUN BLD-MCNC: 87 MG/DL (ref 6–20)
BUN BLD-MCNC: 88 MG/DL (ref 6–20)
BUN/CREAT SERPL: 14.3 (ref 7–25)
BUN/CREAT SERPL: 15.1 (ref 7–25)
CALCIUM SPEC-SCNC: 7.8 MG/DL (ref 8.6–10.5)
CALCIUM SPEC-SCNC: 7.8 MG/DL (ref 8.6–10.5)
CHLORIDE SERPL-SCNC: 109 MMOL/L (ref 98–107)
CHLORIDE SERPL-SCNC: 110 MMOL/L (ref 98–107)
CO2 SERPL-SCNC: 11.8 MMOL/L (ref 22–29)
CO2 SERPL-SCNC: 13.2 MMOL/L (ref 22–29)
CREAT BLD-MCNC: 5.83 MG/DL (ref 0.76–1.27)
CREAT BLD-MCNC: 6.1 MG/DL (ref 0.76–1.27)
DEPRECATED RDW RBC AUTO: 55.9 FL (ref 37–54)
EOSINOPHIL # BLD AUTO: 0.17 10*3/MM3 (ref 0–0.4)
EOSINOPHIL NFR BLD AUTO: 2.5 % (ref 0.3–6.2)
ERYTHROCYTE [DISTWIDTH] IN BLOOD BY AUTOMATED COUNT: 17.1 % (ref 12.3–15.4)
GFR SERPL CREATININE-BSD FRML MDRD: 10 ML/MIN/1.73
GFR SERPL CREATININE-BSD FRML MDRD: 11 ML/MIN/1.73
GFR SERPL CREATININE-BSD FRML MDRD: ABNORMAL ML/MIN/1.73
GFR SERPL CREATININE-BSD FRML MDRD: ABNORMAL ML/MIN/1.73
GLUCOSE BLD-MCNC: 112 MG/DL (ref 65–99)
GLUCOSE BLD-MCNC: 121 MG/DL (ref 65–99)
HCT VFR BLD AUTO: 28.8 % (ref 37.5–51)
HGB BLD-MCNC: 8.6 G/DL (ref 13–17.7)
IMM GRANULOCYTES # BLD AUTO: 0.02 10*3/MM3 (ref 0–0.05)
IMM GRANULOCYTES NFR BLD AUTO: 0.3 % (ref 0–0.5)
LYMPHOCYTES # BLD AUTO: 2.43 10*3/MM3 (ref 0.7–3.1)
LYMPHOCYTES NFR BLD AUTO: 35.2 % (ref 19.6–45.3)
MAGNESIUM SERPL-MCNC: 1 MG/DL (ref 1.6–2.6)
MAGNESIUM SERPL-MCNC: 1.1 MG/DL (ref 1.6–2.6)
MCH RBC QN AUTO: 28 PG (ref 26.6–33)
MCHC RBC AUTO-ENTMCNC: 29.9 G/DL (ref 31.5–35.7)
MCV RBC AUTO: 93.8 FL (ref 79–97)
MONOCYTES # BLD AUTO: 0.46 10*3/MM3 (ref 0.1–0.9)
MONOCYTES NFR BLD AUTO: 6.7 % (ref 5–12)
NEUTROPHILS # BLD AUTO: 3.81 10*3/MM3 (ref 1.7–7)
NEUTROPHILS NFR BLD AUTO: 55 % (ref 42.7–76)
PLATELET # BLD AUTO: 165 10*3/MM3 (ref 140–450)
PMV BLD AUTO: 8.6 FL (ref 6–12)
POTASSIUM BLD-SCNC: 4.4 MMOL/L (ref 3.5–5.2)
POTASSIUM BLD-SCNC: 4.5 MMOL/L (ref 3.5–5.2)
RBC # BLD AUTO: 3.07 10*6/MM3 (ref 4.14–5.8)
SODIUM BLD-SCNC: 137 MMOL/L (ref 136–145)
SODIUM BLD-SCNC: 137 MMOL/L (ref 136–145)
WBC NRBC COR # BLD: 6.91 10*3/MM3 (ref 3.4–10.8)

## 2019-05-28 PROCEDURE — G0257 UNSCHED DIALYSIS ESRD PT HOS: HCPCS

## 2019-05-28 PROCEDURE — G0378 HOSPITAL OBSERVATION PER HR: HCPCS

## 2019-05-28 PROCEDURE — 96365 THER/PROPH/DIAG IV INF INIT: CPT

## 2019-05-28 PROCEDURE — 83735 ASSAY OF MAGNESIUM: CPT | Performed by: NURSE PRACTITIONER

## 2019-05-28 PROCEDURE — 96361 HYDRATE IV INFUSION ADD-ON: CPT

## 2019-05-28 PROCEDURE — 80048 BASIC METABOLIC PNL TOTAL CA: CPT | Performed by: NURSE PRACTITIONER

## 2019-05-28 PROCEDURE — 96366 THER/PROPH/DIAG IV INF ADDON: CPT

## 2019-05-28 PROCEDURE — 85025 COMPLETE CBC W/AUTO DIFF WBC: CPT | Performed by: NURSE PRACTITIONER

## 2019-05-28 RX ORDER — MAGNESIUM SULFATE HEPTAHYDRATE 40 MG/ML
4 INJECTION, SOLUTION INTRAVENOUS ONCE
Status: COMPLETED | OUTPATIENT
Start: 2019-05-28 | End: 2019-05-28

## 2019-05-28 RX ORDER — ALBUMIN (HUMAN) 12.5 G/50ML
25 SOLUTION INTRAVENOUS AS NEEDED
Status: DISCONTINUED | OUTPATIENT
Start: 2019-05-28 | End: 2019-05-28 | Stop reason: HOSPADM

## 2019-05-28 RX ADMIN — SODIUM CHLORIDE 1000 ML: 9 INJECTION, SOLUTION INTRAVENOUS at 09:37

## 2019-05-28 RX ADMIN — MAGNESIUM SULFATE HEPTAHYDRATE 4 G: 40 INJECTION, SOLUTION INTRAVENOUS at 04:47

## 2019-06-09 ENCOUNTER — HOSPITAL ENCOUNTER (EMERGENCY)
Facility: HOSPITAL | Age: 44
Discharge: LEFT BEFORE DISCHARGE | End: 2019-06-09
Admitting: EMERGENCY MEDICINE

## 2019-06-09 DIAGNOSIS — N18.6 END STAGE RENAL DISEASE (HCC): Primary | ICD-10-CM

## 2019-06-09 LAB
ALBUMIN SERPL-MCNC: 3.27 G/DL (ref 3.5–5.2)
ALBUMIN/GLOB SERPL: 0.7 G/DL
ALP SERPL-CCNC: 110 U/L (ref 39–117)
ALT SERPL W P-5'-P-CCNC: 65 U/L (ref 1–41)
ANION GAP SERPL CALCULATED.3IONS-SCNC: 15.5 MMOL/L
AST SERPL-CCNC: 39 U/L (ref 1–40)
BASOPHILS # BLD AUTO: 0.05 10*3/MM3 (ref 0–0.2)
BASOPHILS NFR BLD AUTO: 0.8 % (ref 0–1.5)
BILIRUB SERPL-MCNC: 0.3 MG/DL (ref 0.2–1.2)
BUN BLD-MCNC: 78 MG/DL (ref 6–20)
BUN/CREAT SERPL: 11.6 (ref 7–25)
CALCIUM SPEC-SCNC: 7.5 MG/DL (ref 8.6–10.5)
CHLORIDE SERPL-SCNC: 109 MMOL/L (ref 98–107)
CO2 SERPL-SCNC: 16.5 MMOL/L (ref 22–29)
CREAT BLD-MCNC: 6.72 MG/DL (ref 0.76–1.27)
DEPRECATED RDW RBC AUTO: 55.7 FL (ref 37–54)
EOSINOPHIL # BLD AUTO: 0.27 10*3/MM3 (ref 0–0.4)
EOSINOPHIL NFR BLD AUTO: 4.1 % (ref 0.3–6.2)
ERYTHROCYTE [DISTWIDTH] IN BLOOD BY AUTOMATED COUNT: 17 % (ref 12.3–15.4)
GFR SERPL CREATININE-BSD FRML MDRD: 9 ML/MIN/1.73
GFR SERPL CREATININE-BSD FRML MDRD: ABNORMAL ML/MIN/1.73
GLOBULIN UR ELPH-MCNC: 4.8 GM/DL
GLUCOSE BLD-MCNC: 102 MG/DL (ref 65–99)
HCT VFR BLD AUTO: 26.7 % (ref 37.5–51)
HGB BLD-MCNC: 8.1 G/DL (ref 13–17.7)
IMM GRANULOCYTES # BLD AUTO: 0.01 10*3/MM3 (ref 0–0.05)
IMM GRANULOCYTES NFR BLD AUTO: 0.2 % (ref 0–0.5)
LYMPHOCYTES # BLD AUTO: 2.61 10*3/MM3 (ref 0.7–3.1)
LYMPHOCYTES NFR BLD AUTO: 39.2 % (ref 19.6–45.3)
MAGNESIUM SERPL-MCNC: 1.2 MG/DL (ref 1.6–2.6)
MCH RBC QN AUTO: 29 PG (ref 26.6–33)
MCHC RBC AUTO-ENTMCNC: 30.3 G/DL (ref 31.5–35.7)
MCV RBC AUTO: 95.7 FL (ref 79–97)
MONOCYTES # BLD AUTO: 0.47 10*3/MM3 (ref 0.1–0.9)
MONOCYTES NFR BLD AUTO: 7.1 % (ref 5–12)
NEUTROPHILS # BLD AUTO: 3.24 10*3/MM3 (ref 1.7–7)
NEUTROPHILS NFR BLD AUTO: 48.6 % (ref 42.7–76)
PHOSPHATE SERPL-MCNC: 7.3 MG/DL (ref 2.5–4.5)
PLATELET # BLD AUTO: 208 10*3/MM3 (ref 140–450)
PMV BLD AUTO: 8.5 FL (ref 6–12)
POTASSIUM BLD-SCNC: 5.1 MMOL/L (ref 3.5–5.2)
PROT SERPL-MCNC: 8.1 G/DL (ref 6–8.5)
RBC # BLD AUTO: 2.79 10*6/MM3 (ref 4.14–5.8)
SODIUM BLD-SCNC: 141 MMOL/L (ref 136–145)
WBC NRBC COR # BLD: 6.65 10*3/MM3 (ref 3.4–10.8)

## 2019-06-09 PROCEDURE — 36415 COLL VENOUS BLD VENIPUNCTURE: CPT

## 2019-06-09 PROCEDURE — 80053 COMPREHEN METABOLIC PANEL: CPT | Performed by: EMERGENCY MEDICINE

## 2019-06-09 PROCEDURE — 83735 ASSAY OF MAGNESIUM: CPT | Performed by: EMERGENCY MEDICINE

## 2019-06-09 PROCEDURE — 84100 ASSAY OF PHOSPHORUS: CPT | Performed by: EMERGENCY MEDICINE

## 2019-06-09 PROCEDURE — 85025 COMPLETE CBC W/AUTO DIFF WBC: CPT | Performed by: EMERGENCY MEDICINE

## 2019-06-09 PROCEDURE — 99283 EMERGENCY DEPT VISIT LOW MDM: CPT

## 2019-06-10 VITALS
RESPIRATION RATE: 16 BRPM | BODY MASS INDEX: 19.25 KG/M2 | HEIGHT: 74 IN | SYSTOLIC BLOOD PRESSURE: 144 MMHG | OXYGEN SATURATION: 100 % | DIASTOLIC BLOOD PRESSURE: 90 MMHG | HEART RATE: 109 BPM | TEMPERATURE: 98.5 F | WEIGHT: 150 LBS

## 2019-06-10 VITALS
OXYGEN SATURATION: 99 % | BODY MASS INDEX: 18.99 KG/M2 | SYSTOLIC BLOOD PRESSURE: 146 MMHG | DIASTOLIC BLOOD PRESSURE: 90 MMHG | RESPIRATION RATE: 18 BRPM | WEIGHT: 148 LBS | HEIGHT: 74 IN | HEART RATE: 105 BPM | TEMPERATURE: 98.2 F

## 2019-06-10 PROCEDURE — 99283 EMERGENCY DEPT VISIT LOW MDM: CPT

## 2019-06-10 PROCEDURE — 36415 COLL VENOUS BLD VENIPUNCTURE: CPT

## 2019-06-11 ENCOUNTER — HOSPITAL ENCOUNTER (EMERGENCY)
Facility: HOSPITAL | Age: 44
Discharge: LEFT BEFORE DISCHARGE | End: 2019-06-11
Attending: EMERGENCY MEDICINE | Admitting: EMERGENCY MEDICINE

## 2019-06-11 DIAGNOSIS — N18.9 CHRONIC RENAL FAILURE, UNSPECIFIED CKD STAGE: Primary | ICD-10-CM

## 2019-06-11 LAB
ALBUMIN SERPL-MCNC: 3.41 G/DL (ref 3.5–5.2)
ALBUMIN/GLOB SERPL: 0.7 G/DL
ALP SERPL-CCNC: 111 U/L (ref 39–117)
ALT SERPL W P-5'-P-CCNC: 70 U/L (ref 1–41)
ANION GAP SERPL CALCULATED.3IONS-SCNC: 14.8 MMOL/L
AST SERPL-CCNC: 45 U/L (ref 1–40)
BASOPHILS # BLD AUTO: 0.05 10*3/MM3 (ref 0–0.2)
BASOPHILS NFR BLD AUTO: 0.7 % (ref 0–1.5)
BILIRUB SERPL-MCNC: 0.3 MG/DL (ref 0.2–1.2)
BUN BLD-MCNC: 77 MG/DL (ref 6–20)
BUN/CREAT SERPL: 10.9 (ref 7–25)
CALCIUM SPEC-SCNC: 7.6 MG/DL (ref 8.6–10.5)
CHLORIDE SERPL-SCNC: 110 MMOL/L (ref 98–107)
CO2 SERPL-SCNC: 15.2 MMOL/L (ref 22–29)
CREAT BLD-MCNC: 7.09 MG/DL (ref 0.76–1.27)
DEPRECATED RDW RBC AUTO: 55.2 FL (ref 37–54)
EOSINOPHIL # BLD AUTO: 0.34 10*3/MM3 (ref 0–0.4)
EOSINOPHIL NFR BLD AUTO: 5.1 % (ref 0.3–6.2)
ERYTHROCYTE [DISTWIDTH] IN BLOOD BY AUTOMATED COUNT: 16.7 % (ref 12.3–15.4)
GFR SERPL CREATININE-BSD FRML MDRD: 9 ML/MIN/1.73
GFR SERPL CREATININE-BSD FRML MDRD: ABNORMAL ML/MIN/1.73
GLOBULIN UR ELPH-MCNC: 4.6 GM/DL
GLUCOSE BLD-MCNC: 91 MG/DL (ref 65–99)
HCT VFR BLD AUTO: 27.3 % (ref 37.5–51)
HGB BLD-MCNC: 8.4 G/DL (ref 13–17.7)
IMM GRANULOCYTES # BLD AUTO: 0.01 10*3/MM3 (ref 0–0.05)
IMM GRANULOCYTES NFR BLD AUTO: 0.1 % (ref 0–0.5)
LYMPHOCYTES # BLD AUTO: 2.56 10*3/MM3 (ref 0.7–3.1)
LYMPHOCYTES NFR BLD AUTO: 38.2 % (ref 19.6–45.3)
MCH RBC QN AUTO: 29.8 PG (ref 26.6–33)
MCHC RBC AUTO-ENTMCNC: 30.8 G/DL (ref 31.5–35.7)
MCV RBC AUTO: 96.8 FL (ref 79–97)
MONOCYTES # BLD AUTO: 0.53 10*3/MM3 (ref 0.1–0.9)
MONOCYTES NFR BLD AUTO: 7.9 % (ref 5–12)
NEUTROPHILS # BLD AUTO: 3.21 10*3/MM3 (ref 1.7–7)
NEUTROPHILS NFR BLD AUTO: 48 % (ref 42.7–76)
NT-PROBNP SERPL-MCNC: 1334 PG/ML (ref 5–450)
PLATELET # BLD AUTO: 206 10*3/MM3 (ref 140–450)
PMV BLD AUTO: 8.5 FL (ref 6–12)
POTASSIUM BLD-SCNC: 5.9 MMOL/L (ref 3.5–5.2)
PROT SERPL-MCNC: 8 G/DL (ref 6–8.5)
RBC # BLD AUTO: 2.82 10*6/MM3 (ref 4.14–5.8)
SODIUM BLD-SCNC: 140 MMOL/L (ref 136–145)
WBC NRBC COR # BLD: 6.7 10*3/MM3 (ref 3.4–10.8)

## 2019-06-11 PROCEDURE — 85025 COMPLETE CBC W/AUTO DIFF WBC: CPT | Performed by: EMERGENCY MEDICINE

## 2019-06-11 PROCEDURE — 83880 ASSAY OF NATRIURETIC PEPTIDE: CPT | Performed by: EMERGENCY MEDICINE

## 2019-06-11 PROCEDURE — 80053 COMPREHEN METABOLIC PANEL: CPT | Performed by: EMERGENCY MEDICINE

## 2019-06-14 ENCOUNTER — HOSPITAL ENCOUNTER (INPATIENT)
Facility: HOSPITAL | Age: 44
LOS: 1 days | Discharge: HOME | End: 2019-06-15
Attending: FAMILY MEDICINE | Admitting: HOSPITALIST

## 2019-06-14 DIAGNOSIS — E87.5 HYPERKALEMIA: Primary | ICD-10-CM

## 2019-06-14 LAB
ALBUMIN SERPL-MCNC: 3.37 G/DL (ref 3.5–5.2)
ALBUMIN/GLOB SERPL: 0.7 G/DL
ALP SERPL-CCNC: 126 U/L (ref 39–117)
ALT SERPL W P-5'-P-CCNC: 79 U/L (ref 1–41)
ANION GAP SERPL CALCULATED.3IONS-SCNC: 16.2 MMOL/L
AST SERPL-CCNC: 43 U/L (ref 1–40)
BASOPHILS # BLD AUTO: 0.03 10*3/MM3 (ref 0–0.2)
BASOPHILS NFR BLD AUTO: 0.6 % (ref 0–1.5)
BILIRUB SERPL-MCNC: 0.3 MG/DL (ref 0.2–1.2)
BUN BLD-MCNC: 77 MG/DL (ref 6–20)
BUN/CREAT SERPL: 11.4 (ref 7–25)
CALCIUM SPEC-SCNC: 7.9 MG/DL (ref 8.6–10.5)
CHLORIDE SERPL-SCNC: 110 MMOL/L (ref 98–107)
CO2 SERPL-SCNC: 13.8 MMOL/L (ref 22–29)
CREAT BLD-MCNC: 6.73 MG/DL (ref 0.76–1.27)
DEPRECATED RDW RBC AUTO: 51.6 FL (ref 37–54)
EOSINOPHIL # BLD AUTO: 0.18 10*3/MM3 (ref 0–0.4)
EOSINOPHIL NFR BLD AUTO: 3.3 % (ref 0.3–6.2)
ERYTHROCYTE [DISTWIDTH] IN BLOOD BY AUTOMATED COUNT: 15.7 % (ref 12.3–15.4)
GFR SERPL CREATININE-BSD FRML MDRD: 9 ML/MIN/1.73
GFR SERPL CREATININE-BSD FRML MDRD: ABNORMAL ML/MIN/1.73
GLOBULIN UR ELPH-MCNC: 4.9 GM/DL
GLUCOSE BLD-MCNC: 108 MG/DL (ref 65–99)
HCT VFR BLD AUTO: 27.5 % (ref 37.5–51)
HGB BLD-MCNC: 8.3 G/DL (ref 13–17.7)
IMM GRANULOCYTES # BLD AUTO: 0 10*3/MM3 (ref 0–0.05)
IMM GRANULOCYTES NFR BLD AUTO: 0 % (ref 0–0.5)
LYMPHOCYTES # BLD AUTO: 1.93 10*3/MM3 (ref 0.7–3.1)
LYMPHOCYTES NFR BLD AUTO: 35.9 % (ref 19.6–45.3)
MAGNESIUM SERPL-MCNC: 1.2 MG/DL (ref 1.6–2.6)
MCH RBC QN AUTO: 28.8 PG (ref 26.6–33)
MCHC RBC AUTO-ENTMCNC: 30.2 G/DL (ref 31.5–35.7)
MCV RBC AUTO: 95.5 FL (ref 79–97)
MONOCYTES # BLD AUTO: 0.39 10*3/MM3 (ref 0.1–0.9)
MONOCYTES NFR BLD AUTO: 7.2 % (ref 5–12)
NEUTROPHILS # BLD AUTO: 2.85 10*3/MM3 (ref 1.7–7)
NEUTROPHILS NFR BLD AUTO: 53 % (ref 42.7–76)
PLATELET # BLD AUTO: 198 10*3/MM3 (ref 140–450)
PMV BLD AUTO: 8.8 FL (ref 6–12)
POTASSIUM BLD-SCNC: 6.3 MMOL/L (ref 3.5–5.2)
PROT SERPL-MCNC: 8.3 G/DL (ref 6–8.5)
RBC # BLD AUTO: 2.88 10*6/MM3 (ref 4.14–5.8)
SODIUM BLD-SCNC: 140 MMOL/L (ref 136–145)
WBC NRBC COR # BLD: 5.38 10*3/MM3 (ref 3.4–10.8)

## 2019-06-14 PROCEDURE — 93005 ELECTROCARDIOGRAM TRACING: CPT | Performed by: FAMILY MEDICINE

## 2019-06-14 PROCEDURE — 80053 COMPREHEN METABOLIC PANEL: CPT | Performed by: FAMILY MEDICINE

## 2019-06-14 PROCEDURE — 93010 ELECTROCARDIOGRAM REPORT: CPT | Performed by: INTERNAL MEDICINE

## 2019-06-14 PROCEDURE — 5A1D70Z PERFORMANCE OF URINARY FILTRATION, INTERMITTENT, LESS THAN 6 HOURS PER DAY: ICD-10-PCS | Performed by: HOSPITALIST

## 2019-06-14 PROCEDURE — 25010000002 CALCIUM GLUCONATE PER 10 ML: Performed by: FAMILY MEDICINE

## 2019-06-14 PROCEDURE — 83735 ASSAY OF MAGNESIUM: CPT | Performed by: FAMILY MEDICINE

## 2019-06-14 PROCEDURE — 94640 AIRWAY INHALATION TREATMENT: CPT

## 2019-06-14 PROCEDURE — 94799 UNLISTED PULMONARY SVC/PX: CPT

## 2019-06-14 PROCEDURE — 85025 COMPLETE CBC W/AUTO DIFF WBC: CPT | Performed by: FAMILY MEDICINE

## 2019-06-14 PROCEDURE — 99223 1ST HOSP IP/OBS HIGH 75: CPT | Performed by: HOSPITALIST

## 2019-06-14 PROCEDURE — 25010000002 MAGNESIUM SULFATE IN D5W 1G/100ML (PREMIX) 1-5 GM/100ML-% SOLUTION: Performed by: HOSPITALIST

## 2019-06-14 PROCEDURE — 63710000001 INSULIN REGULAR HUMAN PER 5 UNITS: Performed by: FAMILY MEDICINE

## 2019-06-14 PROCEDURE — 25010000002 HEPARIN (PORCINE) PER 1000 UNITS: Performed by: NURSE PRACTITIONER

## 2019-06-14 PROCEDURE — 99284 EMERGENCY DEPT VISIT MOD MDM: CPT

## 2019-06-14 RX ORDER — MAGNESIUM SULFATE 1 G/100ML
1 INJECTION INTRAVENOUS AS NEEDED
Status: DISCONTINUED | OUTPATIENT
Start: 2019-06-14 | End: 2019-06-15 | Stop reason: HOSPADM

## 2019-06-14 RX ORDER — SODIUM CHLORIDE 0.9 % (FLUSH) 0.9 %
10 SYRINGE (ML) INJECTION AS NEEDED
Status: DISCONTINUED | OUTPATIENT
Start: 2019-06-14 | End: 2019-06-15 | Stop reason: HOSPADM

## 2019-06-14 RX ORDER — HEPARIN SODIUM 5000 [USP'U]/ML
5000 INJECTION, SOLUTION INTRAVENOUS; SUBCUTANEOUS EVERY 12 HOURS SCHEDULED
Status: DISCONTINUED | OUTPATIENT
Start: 2019-06-14 | End: 2019-06-15 | Stop reason: HOSPADM

## 2019-06-14 RX ORDER — DEXTROSE MONOHYDRATE 25 G/50ML
50 INJECTION, SOLUTION INTRAVENOUS
Status: DISCONTINUED | OUTPATIENT
Start: 2019-06-14 | End: 2019-06-15 | Stop reason: HOSPADM

## 2019-06-14 RX ORDER — ALBUMIN (HUMAN) 12.5 G/50ML
25 SOLUTION INTRAVENOUS AS NEEDED
Status: DISCONTINUED | OUTPATIENT
Start: 2019-06-14 | End: 2019-06-15 | Stop reason: HOSPADM

## 2019-06-14 RX ORDER — SODIUM POLYSTYRENE SULFONATE 4.1 MEQ/G
30 POWDER, FOR SUSPENSION ORAL; RECTAL ONCE
Status: DISCONTINUED | OUTPATIENT
Start: 2019-06-14 | End: 2019-06-14

## 2019-06-14 RX ORDER — ALBUTEROL SULFATE 2.5 MG/3ML
2.5 SOLUTION RESPIRATORY (INHALATION) ONCE
Status: COMPLETED | OUTPATIENT
Start: 2019-06-14 | End: 2019-06-14

## 2019-06-14 RX ORDER — SODIUM CHLORIDE 0.9 % (FLUSH) 0.9 %
3 SYRINGE (ML) INJECTION EVERY 12 HOURS SCHEDULED
Status: DISCONTINUED | OUTPATIENT
Start: 2019-06-14 | End: 2019-06-15 | Stop reason: HOSPADM

## 2019-06-14 RX ORDER — MAGNESIUM SULFATE HEPTAHYDRATE 40 MG/ML
2 INJECTION, SOLUTION INTRAVENOUS AS NEEDED
Status: DISCONTINUED | OUTPATIENT
Start: 2019-06-14 | End: 2019-06-15 | Stop reason: HOSPADM

## 2019-06-14 RX ORDER — MAGNESIUM SULFATE 1 G/100ML
1 INJECTION INTRAVENOUS
Status: COMPLETED | OUTPATIENT
Start: 2019-06-14 | End: 2019-06-14

## 2019-06-14 RX ORDER — SODIUM CHLORIDE 0.9 % (FLUSH) 0.9 %
3-10 SYRINGE (ML) INJECTION AS NEEDED
Status: DISCONTINUED | OUTPATIENT
Start: 2019-06-14 | End: 2019-06-15 | Stop reason: HOSPADM

## 2019-06-14 RX ORDER — MAGNESIUM SULFATE HEPTAHYDRATE 40 MG/ML
4 INJECTION, SOLUTION INTRAVENOUS AS NEEDED
Status: DISCONTINUED | OUTPATIENT
Start: 2019-06-14 | End: 2019-06-15 | Stop reason: HOSPADM

## 2019-06-14 RX ADMIN — MAGNESIUM SULFATE IN DEXTROSE 1 G: 10 INJECTION, SOLUTION INTRAVENOUS at 22:23

## 2019-06-14 RX ADMIN — ALBUTEROL SULFATE 2.5 MG: 2.5 SOLUTION RESPIRATORY (INHALATION) at 17:24

## 2019-06-14 RX ADMIN — HEPARIN SODIUM 5000 UNITS: 5000 INJECTION INTRAVENOUS; SUBCUTANEOUS at 22:01

## 2019-06-14 RX ADMIN — MAGNESIUM SULFATE IN DEXTROSE 1 G: 10 INJECTION, SOLUTION INTRAVENOUS at 20:16

## 2019-06-14 RX ADMIN — MAGNESIUM GLUCONATE 500 MG ORAL TABLET 400 MG: 500 TABLET ORAL at 17:04

## 2019-06-14 RX ADMIN — SODIUM CHLORIDE 1000 ML: 9 INJECTION, SOLUTION INTRAVENOUS at 20:51

## 2019-06-14 RX ADMIN — MAGNESIUM SULFATE IN DEXTROSE 1 G: 10 INJECTION, SOLUTION INTRAVENOUS at 21:21

## 2019-06-14 RX ADMIN — SODIUM CHLORIDE, PRESERVATIVE FREE 10 ML: 5 INJECTION INTRAVENOUS at 23:27

## 2019-06-14 NOTE — ED PROVIDER NOTES
Subjective   Patient presents to the ER stating that he needs dialysis.  He reports that it has been 10 days since his last session.  He does not have a dialysis clinic here yet, states he is on a list.  He has no physical complaints today.             Review of Systems   Constitutional: Negative.  Negative for fever.   HENT: Negative.    Respiratory: Negative.    Cardiovascular: Negative.  Negative for chest pain.   Gastrointestinal: Negative.  Negative for abdominal pain.   Endocrine: Negative.    Genitourinary: Negative.  Negative for dysuria.   Skin: Negative.    Neurological: Negative.    Psychiatric/Behavioral: Negative.    All other systems reviewed and are negative.      Past Medical History:   Diagnosis Date   • Endocarditis    • ESRD (end stage renal disease) (CMS/[facility])    • IV drug abuse (CMS/[facility])    • Unable to read or write        Allergies   Allergen Reactions   • Penicillins Shortness Of Breath, Itching and Rash       Past Surgical History:   Procedure Laterality Date   • CENTRAL VENOUS CATHETER TUNNELED INSERTION DOUBLE LUMEN Right     Chest for hemodialysis       Family History   Problem Relation Age of Onset   • Alcohol abuse Mother    • Hypertension Mother    • Hypertension Other        Social History     Socioeconomic History   • Marital status:      Spouse name: Not on file   • Number of children: Not on file   • Years of education: Not on file   • Highest education level: Not on file   Tobacco Use   • Smoking status: Current Every Day Smoker     Packs/day: 1.00     Years: 20.00     Pack years: 20.00     Types: Cigarettes   • Smokeless tobacco: Never Used   Substance and Sexual Activity   • Alcohol use: No     Frequency: Never   • Drug use: Yes     Types: Methamphetamines     Comment: last used on Friday 5/18/2019   • Sexual activity: Defer           Objective   Physical Exam   Constitutional: He is oriented to person, place, and time. He appears well-developed and well-nourished. No  distress.   HENT:   Head: Normocephalic and atraumatic.   Right Ear: External ear normal.   Left Ear: External ear normal.   Nose: Nose normal.   Eyes: Conjunctivae and EOM are normal. Pupils are equal, round, and reactive to light.   Neck: Normal range of motion. Neck supple. No JVD present. No tracheal deviation present.   Cardiovascular: Normal rate, regular rhythm and normal heart sounds.   No murmur heard.  Pulmonary/Chest: Effort normal and breath sounds normal. No respiratory distress. He has no wheezes.   Abdominal: Soft. Bowel sounds are normal. There is no tenderness.   Musculoskeletal: Normal range of motion. He exhibits no edema or deformity.   Neurological: He is alert and oriented to person, place, and time. No cranial nerve deficit.   Skin: Skin is warm and dry. No rash noted. He is not diaphoretic. No erythema. No pallor.   Psychiatric: He has a normal mood and affect. His behavior is normal. Thought content normal.   Nursing note and vitals reviewed.      Procedures           ED Course  ED Course as of Jun 14 1426   Sun Jun 09, 2019   2330 Patient refuses to be admitted, stating that he just wanted his levels checked.  He states he will come back in a couple of days.  He is signing out AGAINST MEDICAL ADVICE.  He verbalizes the risk in doing so including death.   [ROMEO]      ED Course User Index  [ROMEO] Angela Mg, NICKY                  [facility]  Number of Diagnoses or Management Options  End stage renal disease (CMS/HCC): new and requires workup     Amount and/or Complexity of Data Reviewed  Decide to obtain previous medical records or to obtain history from someone other than the patient: yes          Final diagnoses:   End stage renal disease (CMS/HCC)            Angela Mg APRN  06/14/19 [id number]

## 2019-06-15 VITALS
BODY MASS INDEX: 17.97 KG/M2 | DIASTOLIC BLOOD PRESSURE: 99 MMHG | WEIGHT: 140 LBS | HEART RATE: 96 BPM | OXYGEN SATURATION: 100 % | TEMPERATURE: 97.9 F | SYSTOLIC BLOOD PRESSURE: 152 MMHG | HEIGHT: 74 IN | RESPIRATION RATE: 20 BRPM

## 2019-06-15 LAB
ALBUMIN SERPL-MCNC: 3.28 G/DL (ref 3.5–5.2)
ALBUMIN/GLOB SERPL: 0.7 G/DL
ALP SERPL-CCNC: 125 U/L (ref 39–117)
ALT SERPL W P-5'-P-CCNC: 80 U/L (ref 1–41)
ANION GAP SERPL CALCULATED.3IONS-SCNC: 14.6 MMOL/L
AST SERPL-CCNC: 47 U/L (ref 1–40)
BASOPHILS # BLD AUTO: 0.03 10*3/MM3 (ref 0–0.2)
BASOPHILS NFR BLD AUTO: 0.4 % (ref 0–1.5)
BILIRUB SERPL-MCNC: 0.3 MG/DL (ref 0.2–1.2)
BUN BLD-MCNC: 29 MG/DL (ref 6–20)
BUN/CREAT SERPL: 9.6 (ref 7–25)
CALCIUM SPEC-SCNC: 8.4 MG/DL (ref 8.6–10.5)
CHLORIDE SERPL-SCNC: 98 MMOL/L (ref 98–107)
CO2 SERPL-SCNC: 25.4 MMOL/L (ref 22–29)
CREAT BLD-MCNC: 3.03 MG/DL (ref 0.76–1.27)
DEPRECATED RDW RBC AUTO: 50.3 FL (ref 37–54)
EOSINOPHIL # BLD AUTO: 0.16 10*3/MM3 (ref 0–0.4)
EOSINOPHIL NFR BLD AUTO: 2.2 % (ref 0.3–6.2)
ERYTHROCYTE [DISTWIDTH] IN BLOOD BY AUTOMATED COUNT: 15.4 % (ref 12.3–15.4)
GFR SERPL CREATININE-BSD FRML MDRD: 23 ML/MIN/1.73
GLOBULIN UR ELPH-MCNC: 5 GM/DL
GLUCOSE BLD-MCNC: 134 MG/DL (ref 65–99)
HCT VFR BLD AUTO: 27 % (ref 37.5–51)
HGB BLD-MCNC: 8.4 G/DL (ref 13–17.7)
IMM GRANULOCYTES # BLD AUTO: 0.01 10*3/MM3 (ref 0–0.05)
IMM GRANULOCYTES NFR BLD AUTO: 0.1 % (ref 0–0.5)
LYMPHOCYTES # BLD AUTO: 1.9 10*3/MM3 (ref 0.7–3.1)
LYMPHOCYTES NFR BLD AUTO: 26.5 % (ref 19.6–45.3)
MAGNESIUM SERPL-MCNC: 1.9 MG/DL (ref 1.6–2.6)
MCH RBC QN AUTO: 29.3 PG (ref 26.6–33)
MCHC RBC AUTO-ENTMCNC: 31.1 G/DL (ref 31.5–35.7)
MCV RBC AUTO: 94.1 FL (ref 79–97)
MONOCYTES # BLD AUTO: 0.4 10*3/MM3 (ref 0.1–0.9)
MONOCYTES NFR BLD AUTO: 5.6 % (ref 5–12)
NEUTROPHILS # BLD AUTO: 4.66 10*3/MM3 (ref 1.7–7)
NEUTROPHILS NFR BLD AUTO: 65.2 % (ref 42.7–76)
PHOSPHATE SERPL-MCNC: 4 MG/DL (ref 2.5–4.5)
PLATELET # BLD AUTO: 228 10*3/MM3 (ref 140–450)
PMV BLD AUTO: 8.6 FL (ref 6–12)
POTASSIUM BLD-SCNC: 3.9 MMOL/L (ref 3.5–5.2)
PROT SERPL-MCNC: 8.3 G/DL (ref 6–8.5)
RBC # BLD AUTO: 2.87 10*6/MM3 (ref 4.14–5.8)
SODIUM BLD-SCNC: 138 MMOL/L (ref 136–145)
WBC NRBC COR # BLD: 7.16 10*3/MM3 (ref 3.4–10.8)

## 2019-06-15 PROCEDURE — 84100 ASSAY OF PHOSPHORUS: CPT | Performed by: NURSE PRACTITIONER

## 2019-06-15 PROCEDURE — 83735 ASSAY OF MAGNESIUM: CPT | Performed by: NURSE PRACTITIONER

## 2019-06-15 PROCEDURE — 80053 COMPREHEN METABOLIC PANEL: CPT | Performed by: NURSE PRACTITIONER

## 2019-06-15 PROCEDURE — 85025 COMPLETE CBC W/AUTO DIFF WBC: CPT | Performed by: NURSE PRACTITIONER

## 2019-06-16 ENCOUNTER — READMISSION MANAGEMENT (OUTPATIENT)
Dept: CALL CENTER | Facility: HOSPITAL | Age: 44
End: 2019-06-16

## 2019-06-16 NOTE — OUTREACH NOTE
Prep Survey      Responses   Facility patient discharged from?  [facility]   Is patient eligible?  No   What are the reasons patient is not eligible?  Other [left AMA,  not compliant with HD and no follow-up apmts set]   Discharge diagnosis  hyperkalemia,  hypomagnesemia,  metabolic acidosis,  ESRD on HD,  left AMA after HD   Does the patient have one of the following disease processes/diagnoses(primary or secondary)?  Other   Does the patient have Home health ordered?  No   Is there a DME ordered?  No   Prep survey completed?  Yes          Zahra Guillaume RN

## 2019-06-22 ENCOUNTER — HOSPITAL ENCOUNTER (EMERGENCY)
Facility: HOSPITAL | Age: 44
Discharge: HOME | End: 2019-06-23
Attending: EMERGENCY MEDICINE | Admitting: EMERGENCY MEDICINE

## 2019-06-22 DIAGNOSIS — N18.9 CHRONIC RENAL FAILURE, UNSPECIFIED CKD STAGE: Primary | ICD-10-CM

## 2019-06-22 LAB
6-ACETYL MORPHINE: NEGATIVE
ALBUMIN SERPL-MCNC: 3.62 G/DL (ref 3.5–5.2)
ALBUMIN/GLOB SERPL: 0.7 G/DL
ALP SERPL-CCNC: 145 U/L (ref 39–117)
ALT SERPL W P-5'-P-CCNC: 98 U/L (ref 1–41)
AMPHET+METHAMPHET UR QL: POSITIVE
ANION GAP SERPL CALCULATED.3IONS-SCNC: 17.7 MMOL/L
AST SERPL-CCNC: 69 U/L (ref 1–40)
BACTERIA UR QL AUTO: ABNORMAL /HPF
BARBITURATES UR QL SCN: NEGATIVE
BASOPHILS # BLD AUTO: 0.04 10*3/MM3 (ref 0–0.2)
BASOPHILS NFR BLD AUTO: 0.6 % (ref 0–1.5)
BENZODIAZ UR QL SCN: NEGATIVE
BILIRUB SERPL-MCNC: 0.2 MG/DL (ref 0.2–1.2)
BILIRUB UR QL STRIP: NEGATIVE
BUN BLD-MCNC: 78 MG/DL (ref 6–20)
BUN/CREAT SERPL: 11.3 (ref 7–25)
BUPRENORPHINE SERPL-MCNC: NEGATIVE NG/ML
CALCIUM SPEC-SCNC: 8.5 MG/DL (ref 8.6–10.5)
CANNABINOIDS SERPL QL: NEGATIVE
CHLORIDE SERPL-SCNC: 105 MMOL/L (ref 98–107)
CLARITY UR: CLEAR
CO2 SERPL-SCNC: 14.3 MMOL/L (ref 22–29)
COCAINE UR QL: NEGATIVE
COLOR UR: YELLOW
CREAT BLD-MCNC: 6.92 MG/DL (ref 0.76–1.27)
DEPRECATED RDW RBC AUTO: 47.1 FL (ref 37–54)
EOSINOPHIL # BLD AUTO: 0.24 10*3/MM3 (ref 0–0.4)
EOSINOPHIL NFR BLD AUTO: 3.7 % (ref 0.3–6.2)
ERYTHROCYTE [DISTWIDTH] IN BLOOD BY AUTOMATED COUNT: 14.5 % (ref 12.3–15.4)
GFR SERPL CREATININE-BSD FRML MDRD: 9 ML/MIN/1.73
GFR SERPL CREATININE-BSD FRML MDRD: ABNORMAL ML/MIN/1.73
GLOBULIN UR ELPH-MCNC: 5.2 GM/DL
GLUCOSE BLD-MCNC: 112 MG/DL (ref 65–99)
GLUCOSE UR STRIP-MCNC: NEGATIVE MG/DL
HCT VFR BLD AUTO: 30.5 % (ref 37.5–51)
HGB BLD-MCNC: 9.4 G/DL (ref 13–17.7)
HGB UR QL STRIP.AUTO: ABNORMAL
HYALINE CASTS UR QL AUTO: ABNORMAL /LPF
IMM GRANULOCYTES # BLD AUTO: 0.01 10*3/MM3 (ref 0–0.05)
IMM GRANULOCYTES NFR BLD AUTO: 0.2 % (ref 0–0.5)
KETONES UR QL STRIP: NEGATIVE
LEUKOCYTE ESTERASE UR QL STRIP.AUTO: ABNORMAL
LYMPHOCYTES # BLD AUTO: 2.31 10*3/MM3 (ref 0.7–3.1)
LYMPHOCYTES NFR BLD AUTO: 35.4 % (ref 19.6–45.3)
MCH RBC QN AUTO: 29.1 PG (ref 26.6–33)
MCHC RBC AUTO-ENTMCNC: 30.8 G/DL (ref 31.5–35.7)
MCV RBC AUTO: 94.4 FL (ref 79–97)
METHADONE UR QL SCN: NEGATIVE
MONOCYTES # BLD AUTO: 0.4 10*3/MM3 (ref 0.1–0.9)
MONOCYTES NFR BLD AUTO: 6.1 % (ref 5–12)
NEUTROPHILS # BLD AUTO: 3.53 10*3/MM3 (ref 1.7–7)
NEUTROPHILS NFR BLD AUTO: 54 % (ref 42.7–76)
NITRITE UR QL STRIP: NEGATIVE
OPIATES UR QL: NEGATIVE
OXYCODONE UR QL SCN: NEGATIVE
PCP UR QL SCN: NEGATIVE
PH UR STRIP.AUTO: 6.5 [PH] (ref 5–8)
PLATELET # BLD AUTO: 220 10*3/MM3 (ref 140–450)
PMV BLD AUTO: 8.3 FL (ref 6–12)
POTASSIUM BLD-SCNC: 7.3 MMOL/L (ref 3.5–5.2)
PROT SERPL-MCNC: 8.8 G/DL (ref 6–8.5)
PROT UR QL STRIP: ABNORMAL
RBC # BLD AUTO: 3.23 10*6/MM3 (ref 4.14–5.8)
RBC # UR: ABNORMAL /HPF
REF LAB TEST METHOD: ABNORMAL
SODIUM BLD-SCNC: 137 MMOL/L (ref 136–145)
SP GR UR STRIP: 1.01 (ref 1–1.03)
SQUAMOUS #/AREA URNS HPF: ABNORMAL /HPF
UROBILINOGEN UR QL STRIP: ABNORMAL
WBC CLUMPS # UR AUTO: ABNORMAL /HPF
WBC NRBC COR # BLD: 6.53 10*3/MM3 (ref 3.4–10.8)
WBC UR QL AUTO: ABNORMAL /HPF

## 2019-06-22 PROCEDURE — 81001 URINALYSIS AUTO W/SCOPE: CPT | Performed by: PHYSICIAN ASSISTANT

## 2019-06-22 PROCEDURE — 80307 DRUG TEST PRSMV CHEM ANLYZR: CPT | Performed by: PHYSICIAN ASSISTANT

## 2019-06-22 PROCEDURE — 93005 ELECTROCARDIOGRAM TRACING: CPT | Performed by: PHYSICIAN ASSISTANT

## 2019-06-22 PROCEDURE — 85025 COMPLETE CBC W/AUTO DIFF WBC: CPT | Performed by: PHYSICIAN ASSISTANT

## 2019-06-22 PROCEDURE — 80053 COMPREHEN METABOLIC PANEL: CPT | Performed by: PHYSICIAN ASSISTANT

## 2019-06-22 PROCEDURE — 94640 AIRWAY INHALATION TREATMENT: CPT

## 2019-06-22 PROCEDURE — 63710000001 INSULIN REGULAR HUMAN PER 5 UNITS: Performed by: PHYSICIAN ASSISTANT

## 2019-06-22 PROCEDURE — 94799 UNLISTED PULMONARY SVC/PX: CPT

## 2019-06-22 PROCEDURE — G0257 UNSCHED DIALYSIS ESRD PT HOS: HCPCS

## 2019-06-22 PROCEDURE — 96361 HYDRATE IV INFUSION ADD-ON: CPT

## 2019-06-22 PROCEDURE — 99284 EMERGENCY DEPT VISIT MOD MDM: CPT

## 2019-06-22 PROCEDURE — 96374 THER/PROPH/DIAG INJ IV PUSH: CPT

## 2019-06-22 RX ORDER — DEXTROSE MONOHYDRATE 25 G/50ML
25 INJECTION, SOLUTION INTRAVENOUS ONCE
Status: COMPLETED | OUTPATIENT
Start: 2019-06-22 | End: 2019-06-22

## 2019-06-22 RX ORDER — ALBUTEROL SULFATE 2.5 MG/3ML
2.5 SOLUTION RESPIRATORY (INHALATION) ONCE
Status: COMPLETED | OUTPATIENT
Start: 2019-06-22 | End: 2019-06-22

## 2019-06-22 RX ORDER — SODIUM CHLORIDE 0.9 % (FLUSH) 0.9 %
10 SYRINGE (ML) INJECTION AS NEEDED
Status: DISCONTINUED | OUTPATIENT
Start: 2019-06-22 | End: 2019-06-23 | Stop reason: HOSPADM

## 2019-06-22 RX ORDER — SODIUM POLYSTYRENE SULFONATE 4.1 MEQ/G
15 POWDER, FOR SUSPENSION ORAL; RECTAL ONCE
Status: COMPLETED | OUTPATIENT
Start: 2019-06-22 | End: 2019-06-22

## 2019-06-22 RX ADMIN — SODIUM CHLORIDE 1000 ML: 9 INJECTION, SOLUTION INTRAVENOUS at 23:40

## 2019-06-22 RX ADMIN — HUMAN INSULIN 10 UNITS: 100 INJECTION, SOLUTION SUBCUTANEOUS at 21:51

## 2019-06-22 RX ADMIN — DEXTROSE MONOHYDRATE 25 G: 25 INJECTION, SOLUTION INTRAVENOUS at 21:51

## 2019-06-22 RX ADMIN — SODIUM POLYSTYRENE SULFONATE 15 G: 1 POWDER ORAL; RECTAL at 20:54

## 2019-06-22 RX ADMIN — ALBUTEROL SULFATE 2.5 MG: 2.5 SOLUTION RESPIRATORY (INHALATION) at 22:03

## 2019-06-23 VITALS
RESPIRATION RATE: 16 BRPM | HEART RATE: 103 BPM | HEIGHT: 74 IN | DIASTOLIC BLOOD PRESSURE: 93 MMHG | WEIGHT: 140 LBS | TEMPERATURE: 98.2 F | OXYGEN SATURATION: 99 % | BODY MASS INDEX: 17.97 KG/M2 | SYSTOLIC BLOOD PRESSURE: 138 MMHG

## 2019-06-23 LAB
ANION GAP SERPL CALCULATED.3IONS-SCNC: 16.3 MMOL/L
BASOPHILS # BLD AUTO: 0.06 10*3/MM3 (ref 0–0.2)
BASOPHILS NFR BLD AUTO: 0.8 % (ref 0–1.5)
BUN BLD-MCNC: 31 MG/DL (ref 6–20)
BUN/CREAT SERPL: 9.2 (ref 7–25)
CALCIUM SPEC-SCNC: 8.4 MG/DL (ref 8.6–10.5)
CHLORIDE SERPL-SCNC: 92 MMOL/L (ref 98–107)
CO2 SERPL-SCNC: 26.7 MMOL/L (ref 22–29)
CREAT BLD-MCNC: 3.36 MG/DL (ref 0.76–1.27)
DEPRECATED RDW RBC AUTO: 47 FL (ref 37–54)
EOSINOPHIL # BLD AUTO: 0.11 10*3/MM3 (ref 0–0.4)
EOSINOPHIL NFR BLD AUTO: 1.4 % (ref 0.3–6.2)
ERYTHROCYTE [DISTWIDTH] IN BLOOD BY AUTOMATED COUNT: 14.6 % (ref 12.3–15.4)
GFR SERPL CREATININE-BSD FRML MDRD: 20 ML/MIN/1.73
GLUCOSE BLD-MCNC: 139 MG/DL (ref 65–99)
GLUCOSE BLDC GLUCOMTR-MCNC: 135 MG/DL (ref 70–130)
HCT VFR BLD AUTO: 32.8 % (ref 37.5–51)
HGB BLD-MCNC: 10.2 G/DL (ref 13–17.7)
IMM GRANULOCYTES # BLD AUTO: 0.02 10*3/MM3 (ref 0–0.05)
IMM GRANULOCYTES NFR BLD AUTO: 0.3 % (ref 0–0.5)
LYMPHOCYTES # BLD AUTO: 2.13 10*3/MM3 (ref 0.7–3.1)
LYMPHOCYTES NFR BLD AUTO: 27.1 % (ref 19.6–45.3)
MCH RBC QN AUTO: 29.5 PG (ref 26.6–33)
MCHC RBC AUTO-ENTMCNC: 31.1 G/DL (ref 31.5–35.7)
MCV RBC AUTO: 94.8 FL (ref 79–97)
MONOCYTES # BLD AUTO: 0.67 10*3/MM3 (ref 0.1–0.9)
MONOCYTES NFR BLD AUTO: 8.5 % (ref 5–12)
NEUTROPHILS # BLD AUTO: 4.87 10*3/MM3 (ref 1.7–7)
NEUTROPHILS NFR BLD AUTO: 61.9 % (ref 42.7–76)
PLATELET # BLD AUTO: 218 10*3/MM3 (ref 140–450)
PMV BLD AUTO: 8.7 FL (ref 6–12)
POTASSIUM BLD-SCNC: 4.4 MMOL/L (ref 3.5–5.2)
RBC # BLD AUTO: 3.46 10*6/MM3 (ref 4.14–5.8)
SODIUM BLD-SCNC: 135 MMOL/L (ref 136–145)
WBC NRBC COR # BLD: 7.86 10*3/MM3 (ref 3.4–10.8)

## 2019-06-23 PROCEDURE — 85025 COMPLETE CBC W/AUTO DIFF WBC: CPT | Performed by: NURSE PRACTITIONER

## 2019-06-23 PROCEDURE — 80048 BASIC METABOLIC PNL TOTAL CA: CPT | Performed by: NURSE PRACTITIONER

## 2019-06-23 PROCEDURE — 82962 GLUCOSE BLOOD TEST: CPT

## 2019-07-01 ENCOUNTER — APPOINTMENT (OUTPATIENT)
Dept: GENERAL RADIOLOGY | Facility: HOSPITAL | Age: 44
End: 2019-07-01

## 2019-07-01 ENCOUNTER — HOSPITAL ENCOUNTER (INPATIENT)
Facility: HOSPITAL | Age: 44
LOS: 1 days | Discharge: LEFT BEFORE DISCHARGE | End: 2019-07-02
Attending: EMERGENCY MEDICINE | Admitting: FAMILY MEDICINE

## 2019-07-01 DIAGNOSIS — N18.5 CHRONIC RENAL FAILURE SYNDROME, STAGE 5 (HCC): ICD-10-CM

## 2019-07-01 DIAGNOSIS — E87.5 HYPERKALEMIA: Primary | ICD-10-CM

## 2019-07-01 LAB
6-ACETYL MORPHINE: NEGATIVE
ALBUMIN SERPL-MCNC: 3.86 G/DL (ref 3.5–5.2)
ALBUMIN/GLOB SERPL: 0.8 G/DL
ALP SERPL-CCNC: 140 U/L (ref 39–117)
ALT SERPL W P-5'-P-CCNC: 76 U/L (ref 1–41)
AMPHET+METHAMPHET UR QL: POSITIVE
ANION GAP SERPL CALCULATED.3IONS-SCNC: 13.1 MMOL/L (ref 5–15)
ANION GAP SERPL CALCULATED.3IONS-SCNC: 17.9 MMOL/L (ref 5–15)
AST SERPL-CCNC: 45 U/L (ref 1–40)
BACTERIA UR QL AUTO: ABNORMAL /HPF
BARBITURATES UR QL SCN: NEGATIVE
BASOPHILS # BLD AUTO: 0.01 10*3/MM3 (ref 0–0.2)
BASOPHILS NFR BLD AUTO: 0.1 % (ref 0–1.5)
BENZODIAZ UR QL SCN: NEGATIVE
BILIRUB SERPL-MCNC: 0.3 MG/DL (ref 0.2–1.2)
BILIRUB UR QL STRIP: NEGATIVE
BUN BLD-MCNC: 79 MG/DL (ref 6–20)
BUN BLD-MCNC: 82 MG/DL (ref 6–20)
BUN/CREAT SERPL: 9.4 (ref 7–25)
BUN/CREAT SERPL: 9.9 (ref 7–25)
BUPRENORPHINE SERPL-MCNC: NEGATIVE NG/ML
CALCIUM SPEC-SCNC: 8.6 MG/DL (ref 8.6–10.5)
CALCIUM SPEC-SCNC: 8.7 MG/DL (ref 8.6–10.5)
CANNABINOIDS SERPL QL: NEGATIVE
CHLORIDE SERPL-SCNC: 102 MMOL/L (ref 98–107)
CHLORIDE SERPL-SCNC: 105 MMOL/L (ref 98–107)
CLARITY UR: CLEAR
CO2 SERPL-SCNC: 13.1 MMOL/L (ref 22–29)
CO2 SERPL-SCNC: 13.9 MMOL/L (ref 22–29)
COCAINE UR QL: NEGATIVE
COLOR UR: YELLOW
CREAT BLD-MCNC: 8.3 MG/DL (ref 0.76–1.27)
CREAT BLD-MCNC: 8.36 MG/DL (ref 0.76–1.27)
DEPRECATED RDW RBC AUTO: 46.9 FL (ref 37–54)
EOSINOPHIL # BLD AUTO: 0.07 10*3/MM3 (ref 0–0.4)
EOSINOPHIL NFR BLD AUTO: 1 % (ref 0.3–6.2)
ERYTHROCYTE [DISTWIDTH] IN BLOOD BY AUTOMATED COUNT: 14.2 % (ref 12.3–15.4)
GFR SERPL CREATININE-BSD FRML MDRD: 7 ML/MIN/1.73
GFR SERPL CREATININE-BSD FRML MDRD: 7 ML/MIN/1.73
GFR SERPL CREATININE-BSD FRML MDRD: ABNORMAL ML/MIN/1.73
GFR SERPL CREATININE-BSD FRML MDRD: ABNORMAL ML/MIN/1.73
GLOBULIN UR ELPH-MCNC: 4.7 GM/DL
GLUCOSE BLD-MCNC: 142 MG/DL (ref 65–99)
GLUCOSE BLD-MCNC: 240 MG/DL (ref 65–99)
GLUCOSE BLDC GLUCOMTR-MCNC: 167 MG/DL (ref 70–130)
GLUCOSE UR STRIP-MCNC: NEGATIVE MG/DL
HCT VFR BLD AUTO: 28.6 % (ref 37.5–51)
HGB BLD-MCNC: 9 G/DL (ref 13–17.7)
HGB UR QL STRIP.AUTO: ABNORMAL
HOLD SPECIMEN: NORMAL
HOLD SPECIMEN: NORMAL
HYALINE CASTS UR QL AUTO: ABNORMAL /LPF
IMM GRANULOCYTES # BLD AUTO: 0 10*3/MM3 (ref 0–0.05)
IMM GRANULOCYTES NFR BLD AUTO: 0 % (ref 0–0.5)
KETONES UR QL STRIP: NEGATIVE
LEUKOCYTE ESTERASE UR QL STRIP.AUTO: ABNORMAL
LYMPHOCYTES # BLD AUTO: 1.54 10*3/MM3 (ref 0.7–3.1)
LYMPHOCYTES NFR BLD AUTO: 22.2 % (ref 19.6–45.3)
MAGNESIUM SERPL-MCNC: 2 MG/DL (ref 1.6–2.6)
MCH RBC QN AUTO: 29.5 PG (ref 26.6–33)
MCHC RBC AUTO-ENTMCNC: 31.5 G/DL (ref 31.5–35.7)
MCV RBC AUTO: 93.8 FL (ref 79–97)
METHADONE UR QL SCN: NEGATIVE
MONOCYTES # BLD AUTO: 0.59 10*3/MM3 (ref 0.1–0.9)
MONOCYTES NFR BLD AUTO: 8.5 % (ref 5–12)
NEUTROPHILS # BLD AUTO: 4.74 10*3/MM3 (ref 1.7–7)
NEUTROPHILS NFR BLD AUTO: 68.2 % (ref 42.7–76)
NITRITE UR QL STRIP: NEGATIVE
OPIATES UR QL: NEGATIVE
OXYCODONE UR QL SCN: NEGATIVE
PCP UR QL SCN: NEGATIVE
PH UR STRIP.AUTO: 6 [PH] (ref 5–8)
PLATELET # BLD AUTO: 205 10*3/MM3 (ref 140–450)
PMV BLD AUTO: 8.3 FL (ref 6–12)
POTASSIUM BLD-SCNC: 6.7 MMOL/L (ref 3.5–5.2)
POTASSIUM BLD-SCNC: 7.5 MMOL/L (ref 3.5–5.2)
PROT SERPL-MCNC: 8.6 G/DL (ref 6–8.5)
PROT UR QL STRIP: ABNORMAL
RBC # BLD AUTO: 3.05 10*6/MM3 (ref 4.14–5.8)
RBC # UR: ABNORMAL /HPF
REF LAB TEST METHOD: ABNORMAL
SODIUM BLD-SCNC: 132 MMOL/L (ref 136–145)
SODIUM BLD-SCNC: 133 MMOL/L (ref 136–145)
SP GR UR STRIP: 1.01 (ref 1–1.03)
SQUAMOUS #/AREA URNS HPF: ABNORMAL /HPF
TROPONIN T SERPL-MCNC: 0.06 NG/ML (ref 0–0.03)
UROBILINOGEN UR QL STRIP: ABNORMAL
WBC NRBC COR # BLD: 6.95 10*3/MM3 (ref 3.4–10.8)
WBC UR QL AUTO: ABNORMAL /HPF
WHOLE BLOOD HOLD SPECIMEN: NORMAL
WHOLE BLOOD HOLD SPECIMEN: NORMAL

## 2019-07-01 PROCEDURE — 80307 DRUG TEST PRSMV CHEM ANLYZR: CPT | Performed by: EMERGENCY MEDICINE

## 2019-07-01 PROCEDURE — 83605 ASSAY OF LACTIC ACID: CPT | Performed by: EMERGENCY MEDICINE

## 2019-07-01 PROCEDURE — 87040 BLOOD CULTURE FOR BACTERIA: CPT | Performed by: EMERGENCY MEDICINE

## 2019-07-01 PROCEDURE — 63710000001 INSULIN REGULAR HUMAN PER 5 UNITS: Performed by: EMERGENCY MEDICINE

## 2019-07-01 PROCEDURE — 36415 COLL VENOUS BLD VENIPUNCTURE: CPT

## 2019-07-01 PROCEDURE — 80074 ACUTE HEPATITIS PANEL: CPT | Performed by: INTERNAL MEDICINE

## 2019-07-01 PROCEDURE — 87341 HEP B SURFACE AG NEUTRLZJ IA: CPT | Performed by: FAMILY MEDICINE

## 2019-07-01 PROCEDURE — 85025 COMPLETE CBC W/AUTO DIFF WBC: CPT | Performed by: EMERGENCY MEDICINE

## 2019-07-01 PROCEDURE — 83735 ASSAY OF MAGNESIUM: CPT | Performed by: EMERGENCY MEDICINE

## 2019-07-01 PROCEDURE — 82962 GLUCOSE BLOOD TEST: CPT

## 2019-07-01 PROCEDURE — 71045 X-RAY EXAM CHEST 1 VIEW: CPT

## 2019-07-01 PROCEDURE — 99285 EMERGENCY DEPT VISIT HI MDM: CPT

## 2019-07-01 PROCEDURE — 80053 COMPREHEN METABOLIC PANEL: CPT | Performed by: EMERGENCY MEDICINE

## 2019-07-01 PROCEDURE — 81001 URINALYSIS AUTO W/SCOPE: CPT | Performed by: EMERGENCY MEDICINE

## 2019-07-01 PROCEDURE — 84484 ASSAY OF TROPONIN QUANT: CPT | Performed by: EMERGENCY MEDICINE

## 2019-07-01 PROCEDURE — 93005 ELECTROCARDIOGRAM TRACING: CPT | Performed by: EMERGENCY MEDICINE

## 2019-07-01 PROCEDURE — 71045 X-RAY EXAM CHEST 1 VIEW: CPT | Performed by: RADIOLOGY

## 2019-07-01 PROCEDURE — 25010000002 CEFTRIAXONE: Performed by: EMERGENCY MEDICINE

## 2019-07-01 RX ORDER — SODIUM POLYSTYRENE SULFONATE 4.1 MEQ/G
30 POWDER, FOR SUSPENSION ORAL; RECTAL ONCE
Status: COMPLETED | OUTPATIENT
Start: 2019-07-01 | End: 2019-07-01

## 2019-07-01 RX ORDER — SODIUM CHLORIDE 0.9 % (FLUSH) 0.9 %
10 SYRINGE (ML) INJECTION AS NEEDED
Status: DISCONTINUED | OUTPATIENT
Start: 2019-07-01 | End: 2019-07-02 | Stop reason: HOSPADM

## 2019-07-01 RX ORDER — LACTULOSE 10 G/15ML
30 SOLUTION ORAL EVERY 6 HOURS
Status: DISCONTINUED | OUTPATIENT
Start: 2019-07-01 | End: 2019-07-02 | Stop reason: HOSPADM

## 2019-07-01 RX ORDER — CALCIUM GLUCONATE 20 MG/ML
1 INJECTION, SOLUTION INTRAVENOUS ONCE
Status: COMPLETED | OUTPATIENT
Start: 2019-07-01 | End: 2019-07-01

## 2019-07-01 RX ORDER — LACTULOSE 10 G/15ML
90 SOLUTION ORAL ONCE
Status: DISCONTINUED | OUTPATIENT
Start: 2019-07-01 | End: 2019-07-01 | Stop reason: DRUGHIGH

## 2019-07-01 RX ORDER — DEXTROSE MONOHYDRATE 25 G/50ML
25 INJECTION, SOLUTION INTRAVENOUS ONCE
Status: COMPLETED | OUTPATIENT
Start: 2019-07-01 | End: 2019-07-01

## 2019-07-01 RX ADMIN — LACTULOSE 30 G: 10 SOLUTION ORAL at 23:17

## 2019-07-01 RX ADMIN — CALCIUM GLUCONATE 50 ML: 20 INJECTION, SOLUTION INTRAVENOUS at 23:35

## 2019-07-01 RX ADMIN — SODIUM POLYSTYRENE SULFONATE 30 G: 1 POWDER ORAL; RECTAL at 23:13

## 2019-07-01 RX ADMIN — Medication 50 MEQ: at 23:22

## 2019-07-01 RX ADMIN — HUMAN INSULIN 6 UNITS: 100 INJECTION, SOLUTION SUBCUTANEOUS at 23:08

## 2019-07-01 RX ADMIN — DEXTROSE MONOHYDRATE 25 G: 25 INJECTION, SOLUTION INTRAVENOUS at 23:10

## 2019-07-02 VITALS
BODY MASS INDEX: 20.79 KG/M2 | TEMPERATURE: 98.1 F | OXYGEN SATURATION: 100 % | WEIGHT: 162 LBS | HEART RATE: 102 BPM | HEIGHT: 74 IN | DIASTOLIC BLOOD PRESSURE: 85 MMHG | SYSTOLIC BLOOD PRESSURE: 117 MMHG | RESPIRATION RATE: 18 BRPM

## 2019-07-02 LAB
ALBUMIN SERPL-MCNC: 3.67 G/DL (ref 3.5–5.2)
ALBUMIN/GLOB SERPL: 0.8 G/DL
ALP SERPL-CCNC: 130 U/L (ref 39–117)
ALT SERPL W P-5'-P-CCNC: 76 U/L (ref 1–41)
ANION GAP SERPL CALCULATED.3IONS-SCNC: 16.3 MMOL/L (ref 5–15)
AST SERPL-CCNC: 42 U/L (ref 1–40)
BASOPHILS # BLD AUTO: 0.02 10*3/MM3 (ref 0–0.2)
BASOPHILS NFR BLD AUTO: 0.3 % (ref 0–1.5)
BILIRUB SERPL-MCNC: 0.3 MG/DL (ref 0.2–1.2)
BUN BLD-MCNC: 79 MG/DL (ref 6–20)
BUN/CREAT SERPL: 9.2 (ref 7–25)
CALCIUM SPEC-SCNC: 8.9 MG/DL (ref 8.6–10.5)
CHLORIDE SERPL-SCNC: 106 MMOL/L (ref 98–107)
CO2 SERPL-SCNC: 13.7 MMOL/L (ref 22–29)
CREAT BLD-MCNC: 8.63 MG/DL (ref 0.76–1.27)
D-LACTATE SERPL-SCNC: 1.6 MMOL/L (ref 0.5–2)
DEPRECATED RDW RBC AUTO: 46.7 FL (ref 37–54)
EOSINOPHIL # BLD AUTO: 0.02 10*3/MM3 (ref 0–0.4)
EOSINOPHIL NFR BLD AUTO: 0.3 % (ref 0.3–6.2)
ERYTHROCYTE [DISTWIDTH] IN BLOOD BY AUTOMATED COUNT: 14.3 % (ref 12.3–15.4)
GFR SERPL CREATININE-BSD FRML MDRD: 7 ML/MIN/1.73
GFR SERPL CREATININE-BSD FRML MDRD: ABNORMAL ML/MIN/1.73
GLOBULIN UR ELPH-MCNC: 4.7 GM/DL
GLUCOSE BLD-MCNC: 161 MG/DL (ref 65–99)
HAV IGM SERPL QL IA: ABNORMAL
HBV CORE IGM SERPL QL IA: ABNORMAL
HBV SURFACE AG SERPL QL IA: REACTIVE
HCT VFR BLD AUTO: 29.8 % (ref 37.5–51)
HCV AB SER DONR QL: REACTIVE
HGB BLD-MCNC: 9.3 G/DL (ref 13–17.7)
IMM GRANULOCYTES # BLD AUTO: 0.01 10*3/MM3 (ref 0–0.05)
IMM GRANULOCYTES NFR BLD AUTO: 0.2 % (ref 0–0.5)
LYMPHOCYTES # BLD AUTO: 1.21 10*3/MM3 (ref 0.7–3.1)
LYMPHOCYTES NFR BLD AUTO: 19.9 % (ref 19.6–45.3)
MAGNESIUM SERPL-MCNC: 2 MG/DL (ref 1.6–2.6)
MCH RBC QN AUTO: 29.3 PG (ref 26.6–33)
MCHC RBC AUTO-ENTMCNC: 31.2 G/DL (ref 31.5–35.7)
MCV RBC AUTO: 94 FL (ref 79–97)
MONOCYTES # BLD AUTO: 0.43 10*3/MM3 (ref 0.1–0.9)
MONOCYTES NFR BLD AUTO: 7.1 % (ref 5–12)
NEUTROPHILS # BLD AUTO: 4.38 10*3/MM3 (ref 1.7–7)
NEUTROPHILS NFR BLD AUTO: 72.2 % (ref 42.7–76)
PHOSPHATE SERPL-MCNC: 7.5 MG/DL (ref 2.5–4.5)
PLATELET # BLD AUTO: 176 10*3/MM3 (ref 140–450)
PMV BLD AUTO: 9 FL (ref 6–12)
POTASSIUM BLD-SCNC: 5.9 MMOL/L (ref 3.5–5.2)
POTASSIUM BLD-SCNC: 6.4 MMOL/L (ref 3.5–5.2)
PROT SERPL-MCNC: 8.4 G/DL (ref 6–8.5)
RBC # BLD AUTO: 3.17 10*6/MM3 (ref 4.14–5.8)
SODIUM BLD-SCNC: 136 MMOL/L (ref 136–145)
TROPONIN T SERPL-MCNC: 0.05 NG/ML (ref 0–0.03)
TROPONIN T SERPL-MCNC: 0.05 NG/ML (ref 0–0.03)
WBC NRBC COR # BLD: 6.07 10*3/MM3 (ref 3.4–10.8)

## 2019-07-02 PROCEDURE — 84132 ASSAY OF SERUM POTASSIUM: CPT | Performed by: INTERNAL MEDICINE

## 2019-07-02 PROCEDURE — 84100 ASSAY OF PHOSPHORUS: CPT | Performed by: INTERNAL MEDICINE

## 2019-07-02 PROCEDURE — 85025 COMPLETE CBC W/AUTO DIFF WBC: CPT | Performed by: INTERNAL MEDICINE

## 2019-07-02 PROCEDURE — 25010000002 MORPHINE PER 10 MG: Performed by: INTERNAL MEDICINE

## 2019-07-02 PROCEDURE — 99223 1ST HOSP IP/OBS HIGH 75: CPT | Performed by: INTERNAL MEDICINE

## 2019-07-02 PROCEDURE — 99253 IP/OBS CNSLTJ NEW/EST LOW 45: CPT | Performed by: NURSE PRACTITIONER

## 2019-07-02 PROCEDURE — 25010000002 CEFTRIAXONE: Performed by: EMERGENCY MEDICINE

## 2019-07-02 PROCEDURE — 25010000002 HEPARIN (PORCINE) PER 1000 UNITS: Performed by: INTERNAL MEDICINE

## 2019-07-02 PROCEDURE — 80053 COMPREHEN METABOLIC PANEL: CPT | Performed by: INTERNAL MEDICINE

## 2019-07-02 PROCEDURE — 83735 ASSAY OF MAGNESIUM: CPT | Performed by: INTERNAL MEDICINE

## 2019-07-02 PROCEDURE — 93005 ELECTROCARDIOGRAM TRACING: CPT | Performed by: INTERNAL MEDICINE

## 2019-07-02 PROCEDURE — 84484 ASSAY OF TROPONIN QUANT: CPT | Performed by: INTERNAL MEDICINE

## 2019-07-02 PROCEDURE — 5A1D70Z PERFORMANCE OF URINARY FILTRATION, INTERMITTENT, LESS THAN 6 HOURS PER DAY: ICD-10-PCS | Performed by: FAMILY MEDICINE

## 2019-07-02 RX ORDER — SODIUM CHLORIDE 0.9 % (FLUSH) 0.9 %
3-10 SYRINGE (ML) INJECTION AS NEEDED
Status: DISCONTINUED | OUTPATIENT
Start: 2019-07-02 | End: 2019-07-02 | Stop reason: HOSPADM

## 2019-07-02 RX ORDER — METOPROLOL TARTRATE 5 MG/5ML
5 INJECTION INTRAVENOUS ONCE
Status: COMPLETED | OUTPATIENT
Start: 2019-07-02 | End: 2019-07-02

## 2019-07-02 RX ORDER — HEPARIN SODIUM 5000 [USP'U]/ML
5000 INJECTION, SOLUTION INTRAVENOUS; SUBCUTANEOUS EVERY 12 HOURS SCHEDULED
Status: DISCONTINUED | OUTPATIENT
Start: 2019-07-02 | End: 2019-07-02 | Stop reason: HOSPADM

## 2019-07-02 RX ORDER — LACTULOSE 10 G/15ML
60 SOLUTION ORAL ONCE
Status: COMPLETED | OUTPATIENT
Start: 2019-07-02 | End: 2019-07-02

## 2019-07-02 RX ORDER — SODIUM CHLORIDE 0.9 % (FLUSH) 0.9 %
3 SYRINGE (ML) INJECTION EVERY 12 HOURS SCHEDULED
Status: DISCONTINUED | OUTPATIENT
Start: 2019-07-02 | End: 2019-07-02 | Stop reason: HOSPADM

## 2019-07-02 RX ORDER — ASPIRIN 81 MG/1
81 TABLET ORAL DAILY
Status: DISCONTINUED | OUTPATIENT
Start: 2019-07-03 | End: 2019-07-02 | Stop reason: HOSPADM

## 2019-07-02 RX ORDER — ASPIRIN 81 MG/1
324 TABLET, CHEWABLE ORAL ONCE
Status: COMPLETED | OUTPATIENT
Start: 2019-07-02 | End: 2019-07-02

## 2019-07-02 RX ORDER — SODIUM POLYSTYRENE SULFONATE 4.1 MEQ/G
15 POWDER, FOR SUSPENSION ORAL; RECTAL ONCE
Status: DISCONTINUED | OUTPATIENT
Start: 2019-07-02 | End: 2019-07-02 | Stop reason: HOSPADM

## 2019-07-02 RX ORDER — NITROGLYCERIN 0.4 MG/1
0.4 TABLET SUBLINGUAL
Status: DISCONTINUED | OUTPATIENT
Start: 2019-07-02 | End: 2019-07-02 | Stop reason: HOSPADM

## 2019-07-02 RX ADMIN — SODIUM CHLORIDE 1000 ML: 9 INJECTION, SOLUTION INTRAVENOUS at 07:59

## 2019-07-02 RX ADMIN — NITROGLYCERIN 0.4 MG: 0.4 TABLET, ORALLY DISINTEGRATING SUBLINGUAL at 02:44

## 2019-07-02 RX ADMIN — ASPIRIN 324 MG: 81 TABLET, CHEWABLE ORAL at 05:33

## 2019-07-02 RX ADMIN — CEFTRIAXONE 1 G: 1 INJECTION, POWDER, FOR SOLUTION INTRAMUSCULAR; INTRAVENOUS at 00:03

## 2019-07-02 RX ADMIN — ASPIRIN 324 MG: 81 TABLET, CHEWABLE ORAL at 02:30

## 2019-07-02 RX ADMIN — MORPHINE SULFATE 4 MG: 4 INJECTION INTRAVENOUS at 02:36

## 2019-07-02 RX ADMIN — SODIUM CHLORIDE, PRESERVATIVE FREE 3 ML: 5 INJECTION INTRAVENOUS at 02:37

## 2019-07-02 RX ADMIN — METOPROLOL TARTRATE 5 MG: 1 INJECTION, SOLUTION INTRAVENOUS at 05:36

## 2019-07-02 RX ADMIN — LACTULOSE 60 ML: 10 SOLUTION ORAL at 02:38

## 2019-07-02 RX ADMIN — HEPARIN SODIUM 5000 UNITS: 5000 INJECTION INTRAVENOUS; SUBCUTANEOUS at 02:36

## 2019-07-02 RX ADMIN — MORPHINE SULFATE 4 MG: 4 INJECTION INTRAVENOUS at 07:20

## 2019-07-02 RX ADMIN — NITROGLYCERIN 0.4 MG: 0.4 TABLET, ORALLY DISINTEGRATING SUBLINGUAL at 02:47

## 2019-07-02 RX ADMIN — LACTULOSE 30 G: 10 SOLUTION ORAL at 05:33

## 2019-07-02 NOTE — NURSING NOTE
"Dialysis completed at this time. Patient stated \"I wish I could stay but I've got to go.\" Signed the AMA paperwork at this time but did not state a valid reason for leaving. IVs removed. Dr. Glaser notified.   "

## 2019-07-02 NOTE — H&P
[facility] HOSPITALIST HISTORY AND PHYSICAL    Patient Identification:  Name:  Mario Nair  Age:  44 y.o.  Sex:  male  :  1975  MRN:  [id number]   Visit Number:  [id number]  Room number:  P220/1P  Primary Care Physician:  Provider, No Known     Subjective     Chief complaint:    Chief Complaint   Patient presents with   • Fatigue       History of presenting illness:  44 y.o. male who presented to [facility] emergency department as he felt that he needed dialysis.  The patient's last session was during his hospitalization 2019 through 6/15/2019.  He states that he has generalized weakness, nausea, and shortness of air; he attributes this to needing hemodialysis.  The patient is no longer in a hemodialysis clinic with our nephrologist due to noncompliance and ongoing drug use.  This time however, the patient has chest pain.  He states that the chest pain never really occurs when he needs hemodialysis.  He describes chest pain is substernal, intermittent, pressure-like, and does not radiate.  It is so severe that he is having to walk around to help with the pain.  The patient has never had a heart attack before.  He does state that the chest pain is worse with laying flat on his back and better when he leans forward.  In the emergency department, the patient had a potassium level of 7.5, which came down to 6.7 with some Kayexalate and other interventions.  ---------------------------------------------------------------------------------------------------------------------   Review of Systems   Constitutional: Positive for fatigue. Negative for chills and fever.   HENT: Negative for postnasal drip, rhinorrhea, sneezing and sore throat.    Eyes: Negative for discharge and redness.   Respiratory: Positive for shortness of breath. Negative for cough and wheezing.    Cardiovascular: Positive for chest pain. Negative for palpitations and leg swelling.   Gastrointestinal: Positive  for nausea. Negative for diarrhea and vomiting.   Genitourinary: Negative for decreased urine volume, dysuria and hematuria.   Musculoskeletal: Negative for arthralgias and joint swelling.   Skin: Negative for pallor, rash and wound.   Neurological: Negative for seizures, speech difficulty and headaches.   Hematological: Does not bruise/bleed easily.   Psychiatric/Behavioral: Negative for confusion, self-injury and suicidal ideas. The patient is not nervous/anxious.      ---------------------------------------------------------------------------------------------------------------------   Past Medical History:   Diagnosis Date   • Endocarditis    • ESRD (end stage renal disease) (CMS/[facility])    • IV drug abuse (CMS/HCC)    • Unable to read or write      Past Surgical History:   Procedure Laterality Date   • CENTRAL VENOUS CATHETER TUNNELED INSERTION DOUBLE LUMEN Right     Chest for hemodialysis     Family History   Problem Relation Age of Onset   • Alcohol abuse Mother    • Hypertension Mother    • Hypertension Other      Social History     Socioeconomic History   • Marital status:    Tobacco Use   • Smoking status: Current Every Day Smoker     Packs/day: 1.00     Years: 20.00     Pack years: 20.00     Types: Cigarettes   • Smokeless tobacco: Never Used   Substance and Sexual Activity   • Alcohol use: No     Frequency: Never   • Drug use: Yes     Types: Methamphetamines     Comment: last used on Friday 5/18/2019   • Sexual activity: Defer     ---------------------------------------------------------------------------------------------------------------------   Allergies:  Penicillins  ---------------------------------------------------------------------------------------------------------------------   Medications below are reported home medications pulling from within the system; at this time, these medications have not been reconciled unless otherwise specified and are in the verification process for further  verifcation as current home medications.    Prior to Admission Medications     None     Objective     Vital Signs:  Temp:  [97.6 °F (36.4 °C)-98.2 °F (36.8 °C)] 98.2 °F (36.8 °C)  Heart Rate:  [] 118  Resp:  [18-20] 20  BP: (118-145)/() 145/100    Mean Arterial Pressure (Non-Invasive) for the past 24 hrs (Last 3 readings):   Noninvasive MAP (mmHg)   07/02/19 0205 120   07/02/19 0120 106     SpO2:  [98 %-100 %] 100 %  Device (Oxygen Therapy): room air  Body mass index is 20.72 kg/m².    Wt Readings from Last 3 Encounters:   07/02/19 73.2 kg (161 lb 6.4 oz)   06/22/19 63.5 kg (140 lb)   06/14/19 63.5 kg (140 lb)      ---------------------------------------------------------------------------------------------------------------------   Physical Exam:  Constitutional:  Well-developed and well-nourished.  No respiratory distress.  Appears to be in pain.  HENT:  Head: Normocephalic and atraumatic.  Mouth:  Moist mucous membranes.    Eyes:  Conjunctivae and EOM are normal.  Pupils are equal, round, and reactive to light.  No scleral icterus.  Neck:  Neck supple.  No JVD present.    Cardiovascular:  Normal rate, regular rhythm and normal heart sounds with no murmur.  Pulmonary/Chest:  No respiratory distress, no wheezes, no crackles, with normal breath sounds and good air movement.  Right chest tunneled hemodialysis catheter.  Abdominal:  Soft.  Bowel sounds are normal.  No distension and no tenderness.   Musculoskeletal:  No edema, no tenderness, and no deformity.  No red or swollen joints anywhere.    Neurological:  Alert and oriented to person, place, and time.  No cranial nerve deficit.  No tongue deviation.  No facial droop.  No slurred speech.   Skin:  Skin is warm and dry.  No rash noted.  No pallor.   Peripheral vascular:  No edema and strong pulses on all 4 extremities.  Genitourinary: No Fry catheter in  place.  ---------------------------------------------------------------------------------------------------------------------  EKG: Sinus tachycardia of 112, QTc 447 ms, no ischemic changes.  The EKG prior to that in the emergency department showed a heart rate of 97 with a normal sinus rhythm and a  ms.  There may be some mild and diffuse ST elevation in most of the leads that is 1 mm or less on both EKGs.    Telemetry: Sinus tachycardia with heart rates 110.    I have personally looked at both the EKG and the telemetry strips.    Last echocardiogram:  None   --------------------------------------------------------------------------------------------------------------------  Labs:  Results from last 7 days   Lab Units 07/01/19 2355 07/01/19 2114   LACTATE mmol/L 1.6  --    WBC 10*3/mm3  --  6.95   HEMOGLOBIN g/dL  --  9.0*   HEMATOCRIT %  --  28.6*   MCV fL  --  93.8   MCHC g/dL  --  31.5   PLATELETS 10*3/mm3  --  205       Results from last 7 days   Lab Units 07/02/19  0158 07/01/19 2318 07/01/19 2114   SODIUM mmol/L  --  132* 133*   POTASSIUM mmol/L 5.9* 6.7* 7.5*   MAGNESIUM mg/dL  --   --  2.0   CHLORIDE mmol/L  --  105 102   CO2 mmol/L  --  13.9* 13.1*   BUN mg/dL  --  79* 82*   CREATININE mg/dL  --  8.36* 8.30*   EGFR IF NONAFRICN AM mL/min/1.73  --  7* 7*   CALCIUM mg/dL  --  8.6 8.7   GLUCOSE mg/dL  --  240* 142*   ALBUMIN g/dL  --   --  3.86   BILIRUBIN mg/dL  --   --  0.3   ALK PHOS U/L  --   --  140*   AST (SGOT) U/L  --   --  45*   ALT (SGPT) U/L  --   --  76*   Estimated Creatinine Clearance: 11.8 mL/min (A) (by C-G formula based on SCr of 8.36 mg/dL (H)).    Results from last 7 days   Lab Units 07/02/19  0158 07/01/19  2114   TROPONIN T ng/mL 0.049* 0.064*       Glucose   Date/Time Value Ref Range Status   07/01/2019 2327 167 (H) 70 - 130 mg/dL Final     Lab Results   Component Value Date    TSH 1.550 05/07/2019         Brief Urine Lab Results  (Last result in the past 365 days)      Color    Clarity   Blood   Leuk Est   Nitrite   Protein   CREAT   Urine HCG        07/01/19 2153 Yellow Clear Small (1+) Trace Negative 30 mg/dL (1+)             I have personally looked at the labs and they are summarized above.  ----------------------------------------------------------------------------------------------------------------------  Detailed radiology reports for the last 24 hours:    Imaging Results (last 24 hours)     Procedure Component Value Units Date/Time    XR Chest 1 View [[id number]]: I have personally at the chest x-ray and compared to one dated 5/6/2019.  There are no infiltrates.  There is a right-sided tunneled catheter. Updated:  07/01/19 2146        Assessment & Plan       -Life-threatening hyperkalemia due to lack of hemodialysis  -Ongoing methamphetamine use in a patient with known IV drug abuse  -Atypical chest pain suspicious for pericarditis  -End-stage renal disease  -Inability to read and write  -Ongoing tobacco smoking addiction    Dr. Diallo told the emergency department that if the potassium level came down with medical interventions that the hemodialysis would occur on the morning of 7/2/2019.  I am repeating 1 more dose of Kayexalate we will continue to monitor the letter lites closely.  This is a very difficult situation as the patient lost his hemodialysis chair due to noncompliance and ongoing drug use.  We will have social work see the patient, if he does not leave AGAINST MEDICAL ADVICE as he is wanting to go outside to smoke, to see if we can secure the patient a different hemodialysis chair.  I will obtain an echocardiogram to evaluate the patient's chest pain; I will also perform serial cardiac enzymes and give him aspirin.  The patient could have uremic pericarditis in addition to a multitude of other reasons for the atypical chest pain.  The patient is tachycardic with the chest pain; I will give a one-time dose of Lopressor 5 mg IV and see how his blood pressure and heart  rates respond.    VTE Prophylaxis:   Mechanical Order History:     None      Pharmalogical Order History:     Ordered     Dose Route Frequency Stop    07/02/19 0028  heparin (porcine) 5000 UNIT/ML injection 5,000 Units      5,000 Units SC Every 12 Hours Scheduled --        The patient is high risk due to the following diagnoses/reasons: Life-threatening hyperkalemia    Opal Ocampo MD  [facility]  07/02/19  2:32 AM

## 2019-07-02 NOTE — ED NOTES
Pt resting quietly on stretcher with continued complaints of chest pain, Dr. Lorenzana aware  Pt AAOx4 with no resp distress noted, respirations even and unlabored.  Pt denies any needs at this time.  Pt family at bedside. Will continue to monitor and follow plan of care.  Bed rails up x2, bed in lowest position, call light in reach.           Josie Lockhart, RN  07/02/19 0025

## 2019-07-02 NOTE — PROGRESS NOTES
[facility] HOSPITALIST PROGRESS NOTE     Patient Identification:  Name:  Mario Nair  Age:  44 y.o.  Sex:  male  :  1975  MRN:  [id number]  Visit Number:  [id number]  ROOM: [address]     Primary Care Provider:  Provider, No Known    Length of stay in inpatient status:  1    Subjective     Chief Compliant:    Chief Complaint   Patient presents with   • Fatigue       History of Presenting Illness: 44-year-old gentleman with a history of end-stage renal disease, substance abuse, noncompliance.  Patient has not had dialysis for the last 9 to 10 days.  Patient came in severely hyperkalemic last evening.  Patient did have severe chest pain last evening and did have gray zone troponins.  EKG did not show any acute ischemic changes last evening.  Patient no longer having chest pain this morning.  No other complaints    Objective     Current Hospital Meds:  [START ON 7/3/2019] aspirin 81 mg Oral Daily   heparin (porcine) 5,000 Units Subcutaneous Q12H   lactulose 30 g Oral Q6H   sodium chloride 1,000 mL Intravenous Once in Dialysis   sodium chloride 3 mL Intravenous Q12H   sodium polystyrene 15 g Oral Once      ----------------------------------------------------------------------------------------------------------------------  Vital Signs:  Temp:  [97.6 °F (36.4 °C)-98.8 °F (37.1 °C)] 98 °F (36.7 °C)  Heart Rate:  [] 79  Resp:  [16-20] 18  BP: (115-146)/() 134/95  SpO2:  [98 %-100 %] 99 %  on   ;   Device (Oxygen Therapy): room air  Body mass index is 20.8 kg/m².    Wt Readings from Last 3 Encounters:   19 73.5 kg (162 lb)   19 63.5 kg (140 lb)   19 63.5 kg (140 lb)     Intake & Output (last 3 days)       701 -  -  07    Urine (mL/kg/hr)    200 (1.2)    Total Output    200    Net    -200            Urine Unmeasured Occurrence   2 x         NPO  Diet  ----------------------------------------------------------------------------------------------------------------------  Physical exam:  Constitutional: No acute distress  HEENT: Normocephalic atraumatic  Neck: Supple  Cardiovascular: Regular rate and rhythm  Pulmonary/Chest: Clear to auscultation  Abdominal: Positive bowel sounds soft.   Musculoskeletal: No arthropathy  Neurological: No focal deficits  Skin: No rash  Peripheral vascular:  Genitourinary:  ----------------------------------------------------------------------------------------------------------------------    Last echocardiogram:     ----------------------------------------------------------------------------------------------------------------------  Results from last 7 days   Lab Units 07/02/19  0512 07/01/19  6278 07/01/19  2114   LACTATE mmol/L  --  1.6  --    WBC 10*3/mm3 6.07  --  6.95   HEMOGLOBIN g/dL 9.3*  --  9.0*   HEMATOCRIT % 29.8*  --  28.6*   MCV fL 94.0  --  93.8   MCHC g/dL 31.2*  --  31.5   PLATELETS 10*3/mm3 176  --  205         Results from last 7 days   Lab Units 07/02/19 0512 07/02/19 0158 07/01/19 2318 07/01/19 2114   SODIUM mmol/L 136  --  132* 133*   POTASSIUM mmol/L 6.4* 5.9* 6.7* 7.5*   MAGNESIUM mg/dL 2.0  --   --  2.0   CHLORIDE mmol/L 106  --  105 102   CO2 mmol/L 13.7*  --  13.9* 13.1*   BUN mg/dL 79*  --  79* 82*   CREATININE mg/dL 8.63*  --  8.36* 8.30*   EGFR IF NONAFRICN AM mL/min/1.73 7*  --  7* 7*   CALCIUM mg/dL 8.9  --  8.6 8.7   PHOSPHORUS mg/dL 7.5*  --   --   --    GLUCOSE mg/dL 161*  --  240* 142*   ALBUMIN g/dL 3.67  --   --  3.86   BILIRUBIN mg/dL 0.3  --   --  0.3   ALK PHOS U/L 130*  --   --  140*   AST (SGOT) U/L 42*  --   --  45*   ALT (SGPT) U/L 76*  --   --  76*   Estimated Creatinine Clearance: 11.5 mL/min (A) (by C-G formula based on SCr of 8.63 mg/dL (H)).  No results found for: AMMONIA  Results from last 7 days   Lab Units 07/02/19 0512 07/02/19 0158 07/01/19 2114   TROPONIN T ng/mL  0.048* 0.049* 0.064*             Glucose   Date/Time Value Ref Range Status   07/01/2019 2327 167 (H) 70 - 130 mg/dL Final     Lab Results   Component Value Date    TSH 1.550 05/07/2019     No results found for: PREGTESTUR, PREGSERUM, HCG, HCGQUANT  Pain Management Panel     Pain Management Panel Latest Ref Rng & Units 7/1/2019 6/22/2019    AMPHETAMINES SCREEN, URINE Negative Positive(A) Positive(A)    BARBITURATES SCREEN Negative Negative Negative    BENZODIAZEPINE SCREEN, URINE Negative Negative Negative    BUPRENORPHINEUR Negative Negative Negative    COCAINE SCREEN, URINE Negative Negative Negative    METHADONE SCREEN, URINE Negative Negative Negative        Brief Urine Lab Results  (Last result in the past 365 days)      Color   Clarity   Blood   Leuk Est   Nitrite   Protein   CREAT   Urine HCG        07/01/19 2153 Yellow Clear Small (1+) Trace Negative 30 mg/dL (1+)                Results from last 7 days   Lab Units 07/01/19  2153   NITRITE UA  Negative   WBC UA /HPF 6-12*   BACTERIA UA /HPF None Seen   SQUAM EPITHEL UA /HPF 0-2              Results from last 7 days   Lab Units 07/01/19  2355   LACTATE mmol/L 1.6       I have personally looked at the labs and they are summarized above.  ----------------------------------------------------------------------------------------------------------------------  Detailed radiology reports for the last 24 hours:    Imaging Results (last 24 hours)     Procedure Component Value Units Date/Time    XR Chest 1 View [[id number]] Collected:  07/02/19 0736     Updated:  07/02/19 0739    Narrative:       XR CHEST 1 VW-     CLINICAL INDICATION: Weak/Dizzy/AMS triage protocol        COMPARISON: 5/5/2019      TECHNIQUE: Single frontal view of the chest.     FINDINGS:     There is no focal alveolar infiltrate or effusion.  The cardiac silhouette is normal. The pulmonary vasculature is  unremarkable.  There is no evidence of an acute osseous abnormality.   There are no  suspicious-appearing parenchymal soft tissue nodules.          Impression:       No evidence of active or acute cardiopulmonary disease on today's chest  radiograph.     This report was finalized on 7/2/2019 7:37 AM by Dr. Wesly Reynolds MD.           Final impressions for the last 30 days of radiology reports:    Xr Chest 1 View    Result Date: 7/2/2019  No evidence of active or acute cardiopulmonary disease on today's chest radiograph.  This report was finalized on 7/2/2019 7:37 AM by Dr. Wesly Reynolds MD.      I have personally looked at the radiology images and read the final radiology report.    Assessment & Plan    End Stage renal disease with severe hyperkalemia--patient receiving hemodialysis at this time.    Chest pain--patient did have gray zone troponins which may be secondary to his end-stage renal disease.  Echocardiogram is pending.  Will obtain cardiology consultation for help with evaluation.  Obtain lipid panel in the a.m.    Substance abuse--recommend cessation    Medical noncompliance.            VTE Prophylaxis:   Mechanical Order History:     None      Pharmalogical Order History:     Ordered     Dose Route Frequency Stop    07/02/19 0028  heparin (porcine) 5000 UNIT/ML injection 5,000 Units      5,000 Units SC Every 12 Hours Scheduled --          The patient is high risk due to the following diagnoses/reasons: End-stage renal disease    Mauri Glaser MD  [facility]  07/02/19  9:11 AM

## 2019-07-02 NOTE — ED NOTES
"Pt presents to ED with complaints of fatigue and chest pain, pt reports he is due for dialysis because \"I always know I need to have dialysis when my chest starts hurting like this\". Pt reports he has been unable to be placed in a dialysis clinic due to his hepatitis B. Pt states \"I always have to come here for dialysis and I think I waited too long because I feel bad.\"     Josie Lockhart, RN  07/02/19 0027    "

## 2019-07-02 NOTE — CONSULTS
Consults  Date of Admit: 7/1/2019  Date of Consult: 07/02/19  No ref. provider found  Mario Nair  1975    Consulting Physician: Mandeep Brown MD    Cardiology consultation    Reason for consultation:  Chest pain    Assessment:  1. Chest pain, atypical  2. End stage renal disease on hemodialysis  3. IV drug abuse  4. Medical noncompliance      Recommendations:  1. Pt has informed me that he intends to sign out AMA today.  I have advised him against this, stating that with new onset of chest pain and mildly elevated troponin T, even in the setting of no acute ischemic changes on EKG, he should have an echocardiogram and possibly nuclear stress test for coronary artery disease.  He states that he has to go home and take care of his children.  He did agree to outpatient testing.  I will order outpatient echocardiogram and have him see me in the office in 3 weeks.        History of Present Illness    Subjective     Chief Complaint   Patient presents with   • Fatigue       Mario Nair is a 44 y.o. male with past medical history significant for IV drug use, end-stage renal disease requiring hemodialysis, tobacco use, and medical noncompliance.  He presented to the ED on 7/1/2019 with report of chest pain.  He described the pain as a tightness which lasted all day.  He denied any associated shortness of breath or radiation of pain.  He denied nausea and diaphoresis.  Tightness was mid substernal and pressure-like.  The pain was intermittent.  In the ED the patient's potassium level was 7.5.  The patient apparently is no longer associated with a dialysis clinic due to noncompliance and ongoing drug use.  Nephrology dismissed him.  Cardiology was consulted due to patient report of pain.    Patient was seen and examined.  He states that the chest tightness went away about 1/2-hour after starting dialysis.  He reports a history of end-stage renal disease on hemodialysis x2 to 3 months.  He also reports that  he uses both meth a ketamine's and Suboxone x25 years.  Last use was about 1 month ago.    The patient denies known family history.  He states that he is an alcoholic and left when he was 3 days old.  His father is also an alcoholic and  of liver disease.      Cardiac risk factors: smoking, Sedentary life style and ESRD    Last Echo: Ordered, not yet done    Last Stress: NA    Last Cath: NA      Past Medical History:   Diagnosis Date   • Endocarditis    • ESRD (end stage renal disease) (CMS/[facility])    • IV drug abuse (CMS/HCC)    • Unable to read or write      Past Surgical History:   Procedure Laterality Date   • CENTRAL VENOUS CATHETER TUNNELED INSERTION DOUBLE LUMEN Right     Chest for hemodialysis     Family History   Problem Relation Age of Onset   • Alcohol abuse Mother    • Hypertension Mother    • Hypertension Other      Social History     Tobacco Use   • Smoking status: Current Every Day Smoker     Packs/day: 1.00     Years: 20.00     Pack years: 20.00     Types: Cigarettes   • Smokeless tobacco: Never Used   Substance Use Topics   • Alcohol use: No     Frequency: Never   • Drug use: Yes     Types: Methamphetamines     Comment: last used on 2019     No medications prior to admission.     Allergies:  Penicillins    Review of Systems   Constitutional: Negative for fatigue.   Respiratory: Negative for shortness of breath.    Cardiovascular: Negative for chest pain, palpitations and leg swelling.   Gastrointestinal: Negative for blood in stool.   Neurological: Negative for dizziness, syncope, weakness and light-headedness.   Hematological: Does not bruise/bleed easily.         Objective      Vital Signs  Temp:  [97.6 °F (36.4 °C)-98.8 °F (37.1 °C)] 98 °F (36.7 °C)  Heart Rate:  [] 93  Resp:  [10-20] 10  BP: (112-146)/() 112/77  Body mass index is 20.8 kg/m².    Intake/Output Summary (Last 24 hours) at 2019 1147  Last data filed at 2019 1000  Gross per 24 hour   Intake --    Output 200 ml   Net -200 ml       Physical Exam   Constitutional: He appears well-developed and well-nourished.   HENT:   Head: Normocephalic and atraumatic.   Eyes: Pupils are equal, round, and reactive to light.   Neck: No JVD present. Carotid bruit is not present.   Cardiovascular: Normal rate, regular rhythm and intact distal pulses. Exam reveals no gallop and no friction rub.   No murmur heard.  No LE edema   Pulmonary/Chest: Effort normal and breath sounds normal. No respiratory distress. He has no wheezes. He has no rales.   Abdominal: Soft. He exhibits no mass. There is no tenderness. No hernia.   Skin: Skin is warm and dry.   Psychiatric: He has a normal mood and affect.   Vitals reviewed.      Telemetry:  Sinus 90s  Results Review:   I reviewed the patient's new clinical results.  Results from last 7 days   Lab Units 07/02/19  0512 07/02/19  0158 07/01/19  2114   TROPONIN T ng/mL 0.048* 0.049* 0.064*     Results from last 7 days   Lab Units 07/02/19  0512 07/01/19  2114   WBC 10*3/mm3 6.07 6.95   HEMOGLOBIN g/dL 9.3* 9.0*   PLATELETS 10*3/mm3 176 205     Results from last 7 days   Lab Units 07/02/19  0512 07/02/19  0158 07/01/19  2318 07/01/19  2114   SODIUM mmol/L 136  --  132* 133*   POTASSIUM mmol/L 6.4* 5.9* 6.7* 7.5*   CHLORIDE mmol/L 106  --  105 102   CO2 mmol/L 13.7*  --  13.9* 13.1*   BUN mg/dL 79*  --  79* 82*   CREATININE mg/dL 8.63*  --  8.36* 8.30*   CALCIUM mg/dL 8.9  --  8.6 8.7   GLUCOSE mg/dL 161*  --  240* 142*   ALT (SGPT) U/L 76*  --   --  76*   AST (SGOT) U/L 42*  --   --  45*     Lab Results   Component Value Date    INR 1.19 (H) 05/07/2019    INR 1.21 (H) 05/05/2019     Lab Results   Component Value Date    MG 2.0 07/02/2019    MG 2.0 07/01/2019    MG 1.9 06/15/2019     Lab Results   Component Value Date    TSH 1.550 05/07/2019      No results found for: BNP     EKG:         Imaging Results (last 72 hours)     Procedure Component Value Units Date/Time    XR Chest 1 View [[id number]]  Collected:  07/02/19 0736     Updated:  07/02/19 0739    Narrative:       XR CHEST 1 VW-     CLINICAL INDICATION: Weak/Dizzy/AMS triage protocol        COMPARISON: 5/5/2019      TECHNIQUE: Single frontal view of the chest.     FINDINGS:     There is no focal alveolar infiltrate or effusion.  The cardiac silhouette is normal. The pulmonary vasculature is  unremarkable.  There is no evidence of an acute osseous abnormality.   There are no suspicious-appearing parenchymal soft tissue nodules.          Impression:       No evidence of active or acute cardiopulmonary disease on today's chest  radiograph.     This report was finalized on 7/2/2019 7:37 AM by Dr. Wesly Reynolds MD.                Thank you very much for asking us to be involved in this patient's care.  We will follow along with you.    Mariama Souza, NICKY   07/02/19  11:47 AM

## 2019-07-02 NOTE — PROGRESS NOTES
Discharge Planning Assessment   Reza     Patient Name: Mario Nair  MRN: [id number]  Today's Date: 7/2/2019    Admit Date: 7/1/2019    Discharge Needs Assessment    No documentation.       Discharge Plan     Row Name 07/02/19 1257       Plan    Final Discharge Disposition Code  07 - left AMA    Final Note  Patient left AMA on 7-2-19.        Destination      No service coordination in this encounter.      Durable Medical Equipment      No service coordination in this encounter.      Dialysis/Infusion      No service coordination in this encounter.      Home Medical Care      No service coordination in this encounter.      Therapy      No service coordination in this encounter.      Community Resources      No service coordination in this encounter.          Demographic Summary    No documentation.       Functional Status    No documentation.       Psychosocial    No documentation.       Abuse/Neglect    No documentation.       Legal    No documentation.       Substance Abuse    No documentation.       Patient Forms    No documentation.           Ella Monzon RN

## 2019-07-02 NOTE — DISCHARGE SUMMARY
[facility] DISCHARGE SUMMARY    Patient Identification:  Name:  Mario Nair  Age:  44 y.o.  Sex:  male  :  1975  MRN:  [id number]  Visit Number:  [id number]    Date of Admission: 2019  Date of Discharge:  2019     PCP: Provider, No Known    DISCHARGE DIAGNOSIS  End-stage renal disease with severe hyperkalemia  Chest pain    CONSULTS   Dr Hylton  cardiology  PROCEDURES PERFORMED    Dialysis  HOSPITAL COURSE  Patient is a 44 y.o. male presented to [facility] presented to the emergency department because he had not had recent dialysis.  She did complain of generalized weakness, nausea, shortness of breath.  Patient apparently is no longer in hemodialysis clinic secondary to noncompliance and ongoing drug use.  Patient did present with chest pain.  Patient states that the chest pain was substernal, intermittent and pressure-like with no radiation.  He states that it was fairly severe.  EKG did not show any acute ST or T wave changes but did have gray zone troponins.  In the emergency department patient had potassium of 7.5 was given Kayexalate.  Patient was brought in for hemodialysis.  Dialysis was performed this morning.  We had ordered echocardiogram and cardiology consultation for help with the evaluation of chest pain.  However, patient after dialysis states that he has to go and he left the hospital AGAINST MEDICAL ADVICE.  It has been explained to patient that if this noncompliance that he continues to exhibit is going to cause some potentially life-threatening circumstances.  Patient states he understands and decided leaving AMA in any event.      VITAL SIGNS:  Temp:  [97.6 °F (36.4 °C)-98.8 °F (37.1 °C)] 98 °F (36.7 °C)  Heart Rate:  [] 102  Resp:  [10-20] 10  BP: (112-146)/() 117/85  SpO2:  [98 %-100 %] 100 %  on   ;   Device (Oxygen Therapy): room air    Body mass index is 20.8 kg/m².  Wt Readings from Last 3 Encounters:   19 73.5 kg  (162 lb)   06/22/19 63.5 kg (140 lb)   06/14/19 63.5 kg (140 lb)       PHYSICAL EXAM:  Constitutional: No acute distress  HEENT: Normocephalic atraumatic  Neck: Supple  Cardiovascular: Regular rate and rhythm  Pulmonary/Chest: Clear to auscultation  Abdominal: Positive bowel sounds soft.   Musculoskeletal: No arthropathy  Neurological: No focal deficits  Skin: No rashes  Peripheral vascular:  Genitourinary::    DISCHARGE DISPOSITION   Stable    DISCHARGE MEDICATIONS:     Discharge Medications      Patient Not Prescribed Medications Upon Discharge          Your medication list      You have not been prescribed any medications.           No future appointments.       TEST  RESULTS PENDING AT DISCHARGE   Order Current Status    Blood Culture - Blood, Arm, Left In process    Blood Culture - Blood, Hand, Left In process    Hepatitis B Surface Antigen In process           CODE STATUS  Code Status and Medical Interventions:   Ordered at: 07/01/19 3732     Level Of Support Discussed With:    Patient     Code Status:    CPR     Medical Interventions (Level of Support Prior to Arrest):    Full       Mauri Glaser MD  [facility]  07/02/19  12:29 PM

## 2019-07-02 NOTE — ED PROVIDER NOTES
Subjective   Patient presents to ER with fatigue        Fatigue   Severity:  Moderate  Onset quality:  Gradual  Timing:  Constant  Progression:  Worsening  Chronicity:  Chronic  Associated symptoms: fatigue and nausea        Review of Systems   Constitutional: Positive for fatigue.   HENT: Negative.    Eyes: Negative.    Respiratory: Negative.    Cardiovascular: Negative.    Gastrointestinal: Positive for nausea.   Endocrine: Negative.    Genitourinary: Negative.    Musculoskeletal: Negative.    Skin: Negative.    Allergic/Immunologic: Negative.    Neurological: Negative.    Hematological: Negative.    Psychiatric/Behavioral: Negative.        Past Medical History:   Diagnosis Date   • Endocarditis    • ESRD (end stage renal disease) (CMS/[facility])    • IV drug abuse (CMS/[facility])    • Unable to read or write        Allergies   Allergen Reactions   • Penicillins Shortness Of Breath, Itching and Rash       Past Surgical History:   Procedure Laterality Date   • CENTRAL VENOUS CATHETER TUNNELED INSERTION DOUBLE LUMEN Right     Chest for hemodialysis       Family History   Problem Relation Age of Onset   • Alcohol abuse Mother    • Hypertension Mother    • Hypertension Other        Social History     Socioeconomic History   • Marital status:      Spouse name: Not on file   • Number of children: Not on file   • Years of education: Not on file   • Highest education level: Not on file   Tobacco Use   • Smoking status: Current Every Day Smoker     Packs/day: 1.00     Years: 20.00     Pack years: 20.00     Types: Cigarettes   • Smokeless tobacco: Never Used   Substance and Sexual Activity   • Alcohol use: No     Frequency: Never   • Drug use: Yes     Types: Methamphetamines     Comment: last used on Friday 5/18/2019   • Sexual activity: Defer           Objective   Physical Exam   Constitutional: He appears well-developed and well-nourished.   HENT:   Head: Normocephalic and atraumatic.   Eyes: EOM are normal. Pupils are equal,  round, and reactive to light.   Neck: Normal range of motion.   Cardiovascular: Normal rate.   Pulmonary/Chest: Effort normal.   Abdominal: Soft. There is no tenderness.   Mildly tender RUQ   Musculoskeletal: Normal range of motion.   Neurological: He is alert.   Skin: Skin is warm and dry.   Psychiatric: He has a normal mood and affect.   Nursing note and vitals reviewed.      Procedures           ED Course  ED Course as of Jul 02 0811   Mon Jul 01, 2019   2224 ECG 22:10 NSR rate 97. No acute ST-T changes noted  [LINUS]   2306 Dr Diallo consulted. Recommends Lactulose 90, Kayexelate 30GM repeat BMP at 23:30.  [LINUS]   2307 Pharmacy advised lactulose 90 exceeds limit, but can do 30 TID  [LINUS]      ED Course User Index  [LINUS] Lloyd Lorenzana MD                  [facility]      Final diagnoses:   Hyperkalemia   Chronic renal failure syndrome, stage 5 (CMS/HCC)            Lloyd Lorenzana MD  07/02/19 0811

## 2019-07-02 NOTE — PLAN OF CARE
Problem: Fall Risk (Adult)  Goal: Identify Related Risk Factors and Signs and Symptoms  Outcome: Ongoing (interventions implemented as appropriate)    Goal: Absence of Fall  Outcome: Ongoing (interventions implemented as appropriate)      Problem: Pain, Acute (Adult)  Goal: Acceptable Pain Control/Comfort Level  Outcome: Ongoing (interventions implemented as appropriate)

## 2019-07-02 NOTE — PLAN OF CARE
Problem: Patient Care Overview  Goal: Plan of Care Review  Outcome: Ongoing (interventions implemented as appropriate)   07/02/19 0519   Coping/Psychosocial   Plan of Care Reviewed With patient;son     Goal: Discharge Needs Assessment  Outcome: Ongoing (interventions implemented as appropriate)   07/02/19 0519   Discharge Needs Assessment   Patient/Family Anticipates Transition to home   Patient/Family Anticipated Services at Transition none   Transportation Anticipated car, drives self   Outpatient/Agency/Support Group Needs outpatient hemodialysis   Disability   Equipment Currently Used at Home none       Problem: Fall Risk (Adult)  Goal: Identify Related Risk Factors and Signs and Symptoms  Outcome: Ongoing (interventions implemented as appropriate)   07/02/19 0519   Fall Risk (Adult)   Related Risk Factors (Fall Risk) environment unfamiliar;sleep pattern alteration   Signs and Symptoms (Fall Risk) presence of risk factors     Goal: Absence of Fall  Outcome: Ongoing (interventions implemented as appropriate)   07/02/19 0519   Fall Risk (Adult)   Absence of Fall making progress toward outcome       Problem: Pain, Acute (Adult)  Goal: Acceptable Pain Control/Comfort Level  Outcome: Ongoing (interventions implemented as appropriate)   07/02/19 0519   Pain, Acute (Adult)   Acceptable Pain Control/Comfort Level making progress toward outcome

## 2019-07-02 NOTE — PAYOR COMM NOTE
"[facility]   MARCELL NUNO  PHONE  [phone]  FAX  [phone]  NPI:  [id number]    REQUEST FOR INPATIENT AUTH    Mario Nair (44 y.o. Male)     Date of Birth Social Security Number Address Home Phone MRN    1975  [address] [place] [address] [phone] [id number]    Islam Marital Status          None        Admission Date Admission Type Admitting Provider Attending Provider Department, Room/Bed    19 Emergency Opal Ocampo MD Hays, Ray G, MD [facility] PROGRESS CARE, P22    Discharge Date Discharge Disposition Discharge Destination                       Attending Provider:  Mauri Glaser MD    Allergies:  Penicillins    Isolation:  None   Infection:  Hepatitis B (19)   Code Status:  CPR    Ht:  188 cm (74\")   Wt:  73.5 kg (162 lb)    Admission Cmt:  None   Principal Problem:  None                Active Insurance as of 2019     Primary Coverage     Payor Plan Insurance Group Employer/Plan Group    [employer] [place] [employer] [place]      Payor Plan Address Payor Plan Phone Number Payor Plan Fax Number Effective Dates    [address] [address] [phone]  2019 - None Entered    [facility] [id number]       Subscriber Name Subscriber Birth Date Member ID       MARIO NAIR 1975 [id number]                 Emergency Contacts      (Rel.) Home Phone Work Phone Mobile Phone    SHANI WILKES (Relative) [phone] -- --    Constance Nair (Daughter) -- -- [phone]               History & Physical      Opal Ocampo MD at 2019 12:42 AM              [facility] HOSPITALIST HISTORY AND PHYSICAL    Patient Identification:  Name:  Mario Nair  Age:  44 y.o.  Sex:  male  :  1975  MRN:  [id number]   Visit Number:  [id number]  Room number:    Primary Care Physician:  Provider, No Known     Subjective     Chief complaint:    Chief Complaint   Patient presents with   • Fatigue       History " of presenting illness:  44 y.o. male who presented to [facility] emergency department as he felt that he needed dialysis.  The patient's last session was during his hospitalization 6/14/2019 through 6/15/2019.  He states that he has generalized weakness, nausea, and shortness of air; he attributes this to needing hemodialysis.  The patient is no longer in a hemodialysis clinic with our nephrologist due to noncompliance and ongoing drug use.  This time however, the patient has chest pain.  He states that the chest pain never really occurs when he needs hemodialysis.  He describes chest pain is substernal, intermittent, pressure-like, and does not radiate.  It is so severe that he is having to walk around to help with the pain.  The patient has never had a heart attack before.  He does state that the chest pain is worse with laying flat on his back and better when he leans forward.  In the emergency department, the patient had a potassium level of 7.5, which came down to 6.7 with some Kayexalate and other interventions.  ---------------------------------------------------------------------------------------------------------------------   Review of Systems   Constitutional: Positive for fatigue. Negative for chills and fever.   HENT: Negative for postnasal drip, rhinorrhea, sneezing and sore throat.    Eyes: Negative for discharge and redness.   Respiratory: Positive for shortness of breath. Negative for cough and wheezing.    Cardiovascular: Positive for chest pain. Negative for palpitations and leg swelling.   Gastrointestinal: Positive for nausea. Negative for diarrhea and vomiting.   Genitourinary: Negative for decreased urine volume, dysuria and hematuria.   Musculoskeletal: Negative for arthralgias and joint swelling.   Skin: Negative for pallor, rash and wound.   Neurological: Negative for seizures, speech difficulty and headaches.   Hematological: Does not bruise/bleed easily.    Psychiatric/Behavioral: Negative for confusion, self-injury and suicidal ideas. The patient is not nervous/anxious.      ---------------------------------------------------------------------------------------------------------------------   Past Medical History:   Diagnosis Date   • Endocarditis    • ESRD (end stage renal disease) (CMS/[facility])    • IV drug abuse (CMS/[facility])    • Unable to read or write      Past Surgical History:   Procedure Laterality Date   • CENTRAL VENOUS CATHETER TUNNELED INSERTION DOUBLE LUMEN Right     Chest for hemodialysis     Family History   Problem Relation Age of Onset   • Alcohol abuse Mother    • Hypertension Mother    • Hypertension Other      Social History     Socioeconomic History   • Marital status:    Tobacco Use   • Smoking status: Current Every Day Smoker     Packs/day: 1.00     Years: 20.00     Pack years: 20.00     Types: Cigarettes   • Smokeless tobacco: Never Used   Substance and Sexual Activity   • Alcohol use: No     Frequency: Never   • Drug use: Yes     Types: Methamphetamines     Comment: last used on Friday 5/18/2019   • Sexual activity: Defer     ---------------------------------------------------------------------------------------------------------------------   Allergies:  Penicillins  ---------------------------------------------------------------------------------------------------------------------   Medications below are reported home medications pulling from within the system; at this time, these medications have not been reconciled unless otherwise specified and are in the verification process for further verifcation as current home medications.    Prior to Admission Medications     None     Objective     Vital Signs:  Temp:  [97.6 °F (36.4 °C)-98.2 °F (36.8 °C)] 98.2 °F (36.8 °C)  Heart Rate:  [] 118  Resp:  [18-20] 20  BP: (118-145)/() 145/100    Mean Arterial Pressure (Non-Invasive) for the past 24 hrs (Last 3 readings):   Noninvasive MAP  (mmHg)   07/02/19 0205 120   07/02/19 0120 106     SpO2:  [98 %-100 %] 100 %  Device (Oxygen Therapy): room air  Body mass index is 20.72 kg/m².    Wt Readings from Last 3 Encounters:   07/02/19 73.2 kg (161 lb 6.4 oz)   06/22/19 63.5 kg (140 lb)   06/14/19 63.5 kg (140 lb)      ---------------------------------------------------------------------------------------------------------------------   Physical Exam:  Constitutional:  Well-developed and well-nourished.  No respiratory distress.  Appears to be in pain.  HENT:  Head: Normocephalic and atraumatic.  Mouth:  Moist mucous membranes.    Eyes:  Conjunctivae and EOM are normal.  Pupils are equal, round, and reactive to light.  No scleral icterus.  Neck:  Neck supple.  No JVD present.    Cardiovascular:  Normal rate, regular rhythm and normal heart sounds with no murmur.  Pulmonary/Chest:  No respiratory distress, no wheezes, no crackles, with normal breath sounds and good air movement.  Right chest tunneled hemodialysis catheter.  Abdominal:  Soft.  Bowel sounds are normal.  No distension and no tenderness.   Musculoskeletal:  No edema, no tenderness, and no deformity.  No red or swollen joints anywhere.    Neurological:  Alert and oriented to person, place, and time.  No cranial nerve deficit.  No tongue deviation.  No facial droop.  No slurred speech.   Skin:  Skin is warm and dry.  No rash noted.  No pallor.   Peripheral vascular:  No edema and strong pulses on all 4 extremities.  Genitourinary: No Fry catheter in place.  ---------------------------------------------------------------------------------------------------------------------  EKG: Sinus tachycardia of 112, QTc 447 ms, no ischemic changes.  The EKG prior to that in the emergency department showed a heart rate of 97 with a normal sinus rhythm and a  ms.  There may be some mild and diffuse ST elevation in most of the leads that is 1 mm or less on both EKGs.    Telemetry: Sinus tachycardia  with heart rates 110.    I have personally looked at both the EKG and the telemetry strips.    Last echocardiogram:  None   --------------------------------------------------------------------------------------------------------------------  Labs:  Results from last 7 days   Lab Units 07/01/19 2355 07/01/19 2114   LACTATE mmol/L 1.6  --    WBC 10*3/mm3  --  6.95   HEMOGLOBIN g/dL  --  9.0*   HEMATOCRIT %  --  28.6*   MCV fL  --  93.8   MCHC g/dL  --  31.5   PLATELETS 10*3/mm3  --  205       Results from last 7 days   Lab Units 07/02/19 0158 07/01/19 2318 07/01/19 2114   SODIUM mmol/L  --  132* 133*   POTASSIUM mmol/L 5.9* 6.7* 7.5*   MAGNESIUM mg/dL  --   --  2.0   CHLORIDE mmol/L  --  105 102   CO2 mmol/L  --  13.9* 13.1*   BUN mg/dL  --  79* 82*   CREATININE mg/dL  --  8.36* 8.30*   EGFR IF NONAFRICN AM mL/min/1.73  --  7* 7*   CALCIUM mg/dL  --  8.6 8.7   GLUCOSE mg/dL  --  240* 142*   ALBUMIN g/dL  --   --  3.86   BILIRUBIN mg/dL  --   --  0.3   ALK PHOS U/L  --   --  140*   AST (SGOT) U/L  --   --  45*   ALT (SGPT) U/L  --   --  76*   Estimated Creatinine Clearance: 11.8 mL/min (A) (by C-G formula based on SCr of 8.36 mg/dL (H)).    Results from last 7 days   Lab Units 07/02/19 0158 07/01/19 2114   TROPONIN T ng/mL 0.049* 0.064*       Glucose   Date/Time Value Ref Range Status   07/01/2019 2327 167 (H) 70 - 130 mg/dL Final     Lab Results   Component Value Date    TSH 1.550 05/07/2019         Brief Urine Lab Results  (Last result in the past 365 days)      Color   Clarity   Blood   Leuk Est   Nitrite   Protein   CREAT   Urine HCG        07/01/19 2153 Yellow Clear Small (1+) Trace Negative 30 mg/dL (1+)             I have personally looked at the labs and they are summarized above.  ----------------------------------------------------------------------------------------------------------------------  Detailed radiology reports for the last 24 hours:    Imaging Results (last 24 hours)     Procedure  Component Value Units Date/Time    XR Chest 1 View [[id number]]: I have personally at the chest x-ray and compared to one dated 5/6/2019.  There are no infiltrates.  There is a right-sided tunneled catheter. Updated:  07/01/19 2146        Assessment & Plan        -Life-threatening hyperkalemia due to lack of hemodialysis  -Ongoing methamphetamine use in a patient with known IV drug abuse  -Atypical chest pain suspicious for pericarditis  -End-stage renal disease  -Inability to read and write  -Ongoing tobacco smoking addiction    Dr. Diallo told the emergency department that if the potassium level came down with medical interventions that the hemodialysis would occur on the morning of 7/2/2019.  I am repeating 1 more dose of Kayexalate we will continue to monitor the letter lites closely.  This is a very difficult situation as the patient lost his hemodialysis chair due to noncompliance and ongoing drug use.  We will have social work see the patient, if he does not leave AGAINST MEDICAL ADVICE as he is wanting to go outside to smoke, to see if we can secure the patient a different hemodialysis chair.  I will obtain an echocardiogram to evaluate the patient's chest pain; I will also perform serial cardiac enzymes and give him aspirin.  The patient could have uremic pericarditis in addition to a multitude of other reasons for the atypical chest pain.  The patient is tachycardic with the chest pain; I will give a one-time dose of Lopressor 5 mg IV and see how his blood pressure and heart rates respond.    VTE Prophylaxis:   Mechanical Order History:     None      Pharmalogical Order History:     Ordered     Dose Route Frequency Stop    07/02/19 0028  heparin (porcine) 5000 UNIT/ML injection 5,000 Units      5,000 Units SC Every 12 Hours Scheduled --        The patient is high risk due to the following diagnoses/reasons: Life-threatening hyperkalemia    Opal Ocampo MD  Psychiatric  "Hospitalist  07/02/19  2:32 AM      Electronically signed by Opal Ocampo MD at 7/2/2019  2:58 AM          Emergency Department Notes      Rinku Gomez at 7/1/2019  8:38 PM        The patient said he is going to go outside for a little bit.     Gomez Rinku Garcia  07/01/19 2038      Electronically signed by Rinku Gomez at 7/1/2019  8:38 PM     Chely Metz, RN at 7/1/2019  9:17 PM        Urine cup provided to patient at this time     Chely Metz, KALA  07/01/19 2117      Electronically signed by Chely Metz, RN at 7/1/2019  9:17 PM     Rinku Gomez at 7/1/2019 10:10 PM        EKG performed by me @ 2210 and was shown to Dr. Luna @ 2213.     Rinku Gomez  07/01/19 2215      Electronically signed by Rinku Gomez at 7/1/2019 10:15 PM     Josie Lockhart, RN at 7/1/2019 10:15 PM        Pt presents to ED with complaints of fatigue and chest pain, pt reports he is due for dialysis because \"I always know I need to have dialysis when my chest starts hurting like this\". Pt reports he has been unable to be placed in a dialysis clinic due to his hepatitis B. Pt states \"I always have to come here for dialysis and I think I waited too long because I feel bad.\"     Josie Lockhart RN  07/02/19 0027      Electronically signed by Josie Lockhart, RN at 7/2/2019 12:27 AM     Josie Lockhart, RN at 7/1/2019 10:20 PM        Pt refusing to undress and gown.      Josie Lockhart, KALA  07/02/19 0025      Electronically signed by Josie Lockhart, RN at 7/2/2019 12:25 AM     Jillian Reddy at 7/1/2019 10:27 PM        Called Dr. Diallo per Dr. Lorenzana. Dr. Lorenzana speaking with Dr. Diallo at this time.     Jillian Reddy  07/01/19 2228      Electronically signed by Jillian Reddy at 7/1/2019 10:28 PM     Josie Lockhart, RN at 7/1/2019 11:15 PM        Pt resting quietly on stretcher with continuing complaints of chest " pain, Dr. Lorenzana is aware.  Pt AAOx4 with no resp distress noted, respirations even and unlabored.  Pt denies any needs at this time.  Pt family at bedside. Will continue to monitor and follow plan of care.  Bed rails up x2, bed in lowest position, call light in reach.           Josie Lockhart, RN  07/02/19 0023      Electronically signed by Josie Lockhart RN at 7/2/2019 12:23 AM     Josie Lockhart RN at 7/2/2019 12:10 AM        Report called to Sarita ARMENDARIZ at this time.      Josie Lockhart RN  07/02/19 0011      Electronically signed by Josie Lockhart RN at 7/2/2019 12:11 AM     Josie Lockhart RN at 7/2/2019 12:15 AM        Pt resting quietly on stretcher with continued complaints of chest pain, Dr. Lorenzana aware  Pt AAOx4 with no resp distress noted, respirations even and unlabored.  Pt denies any needs at this time.  Pt family at bedside. Will continue to monitor and follow plan of care.  Bed rails up x2, bed in lowest position, call light in reach.           Josie Lockhart RN  07/02/19 0025      Electronically signed by Josie Lockhart RN at 7/2/2019 12:25 AM     Josie Lockhart RN at 7/2/2019 12:23 AM        Pt transferred to inpatient floor at this time. NADN, skin PWD, no resp distress noted. IV intact with no signs of infiltration. All pt belongings transferred with pt. Pt transferred via stretcher with ED tech and Nurse, Zols monitor in place.        Josie Lockhart RN  07/02/19 0024      Electronically signed by Josie Lockhart RN at 7/2/2019 12:24 AM     Lloyd Lorenzana MD at 7/2/2019  8:07 AM          Subjective   Patient presents to ER with fatigue        Fatigue   Severity:  Moderate  Onset quality:  Gradual  Timing:  Constant  Progression:  Worsening  Chronicity:  Chronic  Associated symptoms: fatigue and nausea        Review of Systems   Constitutional: Positive for fatigue.   HENT: Negative.    Eyes: Negative.    Respiratory: Negative.     Cardiovascular: Negative.    Gastrointestinal: Positive for nausea.   Endocrine: Negative.    Genitourinary: Negative.    Musculoskeletal: Negative.    Skin: Negative.    Allergic/Immunologic: Negative.    Neurological: Negative.    Hematological: Negative.    Psychiatric/Behavioral: Negative.        Past Medical History:   Diagnosis Date   • Endocarditis    • ESRD (end stage renal disease) (CMS/[facility])    • IV drug abuse (CMS/[facility])    • Unable to read or write        Allergies   Allergen Reactions   • Penicillins Shortness Of Breath, Itching and Rash       Past Surgical History:   Procedure Laterality Date   • CENTRAL VENOUS CATHETER TUNNELED INSERTION DOUBLE LUMEN Right     Chest for hemodialysis       Family History   Problem Relation Age of Onset   • Alcohol abuse Mother    • Hypertension Mother    • Hypertension Other        Social History     Socioeconomic History   • Marital status:      Spouse name: Not on file   • Number of children: Not on file   • Years of education: Not on file   • Highest education level: Not on file   Tobacco Use   • Smoking status: Current Every Day Smoker     Packs/day: 1.00     Years: 20.00     Pack years: 20.00     Types: Cigarettes   • Smokeless tobacco: Never Used   Substance and Sexual Activity   • Alcohol use: No     Frequency: Never   • Drug use: Yes     Types: Methamphetamines     Comment: last used on Friday 5/18/2019   • Sexual activity: Defer           Objective   Physical Exam   Constitutional: He appears well-developed and well-nourished.   HENT:   Head: Normocephalic and atraumatic.   Eyes: EOM are normal. Pupils are equal, round, and reactive to light.   Neck: Normal range of motion.   Cardiovascular: Normal rate.   Pulmonary/Chest: Effort normal.   Abdominal: Soft. There is no tenderness.   Mildly tender RUQ   Musculoskeletal: Normal range of motion.   Neurological: He is alert.   Skin: Skin is warm and dry.   Psychiatric: He has a normal mood and affect.    Nursing note and vitals reviewed.      Procedures          ED Course  ED Course as of Jul 02 0811   Mon Jul 01, 2019   2224 ECG 22:10 NSR rate 97. No acute ST-T changes noted  [LINUS]   2306 Dr Diallo consulted. Recommends Lactulose 90, Kayexelate 30GM repeat BMP at 23:30.  [LINUS]   2307 Pharmacy advised lactulose 90 exceeds limit, but can do 30 TID  [LINUS]      ED Course User Index  [LINUS] Lloyd Lorenzana MD                  [facility]      Final diagnoses:   Hyperkalemia   Chronic renal failure syndrome, stage 5 (CMS/HCC)            Lloyd Lorenzana MD  07/02/19 0811      Electronically signed by Lloyd Lorenzana MD at 7/2/2019  8:11 AM       Hospital Medications (active)       Dose Frequency Start End    aspirin chewable tablet 324 mg 324 mg Once 7/2/2019 7/2/2019    Sig - Route: Chew 4 tablets 1 (One) Time. - Oral    aspirin chewable tablet 324 mg 324 mg Once 7/2/2019 7/2/2019    Sig - Route: Chew 4 tablets 1 (One) Time. - Oral    aspirin EC tablet 81 mg 81 mg Daily 7/3/2019     Sig - Route: Take 1 tablet by mouth Daily. - Oral    calcium gluconate 1g/50ml 0.675% NaCl IV SOLN 1 g Once 7/1/2019 7/1/2019    Sig - Route: Infuse 50 mL into a venous catheter 1 (One) Time. - Intravenous    cefTRIAXone (ROCEPHIN) 1 g/100 mL 0.9% NS (MBP) 1 g Once 7/1/2019 7/2/2019    Sig - Route: Infuse 100 mL into a venous catheter 1 (One) Time. - Intravenous    dextrose (D50W) 25 g/ 50mL Intravenous Solution 25 g 25 g Once 7/1/2019 7/1/2019    Sig - Route: Infuse 50 mL into a venous catheter 1 (One) Time. - Intravenous    heparin (porcine) 5000 UNIT/ML injection 5,000 Units 5,000 Units Every 12 Hours Scheduled 7/2/2019     Sig - Route: Inject 1 mL under the skin into the appropriate area as directed Every 12 (Twelve) Hours. - Subcutaneous    insulin regular (humuLIN R,novoLIN R) injection 6 Units 6 Units Once 7/1/2019 7/1/2019    Sig - Route: Infuse 6 Units into a venous catheter 1 (One) Time. - Intravenous    lactulose (CHRONULAC) 10 GM/15ML  solution 30 g 30 g Every 6 Hours 7/1/2019 7/2/2019    Sig - Route: Take 45 mL by mouth Every 6 (Six) Hours. - Oral    lactulose (CHRONULAC) 10 GM/15ML solution 60 mL 60 mL Once 7/2/2019 7/2/2019    Sig - Route: Take 60 mL by mouth 1 (One) Time. - Oral    metoprolol tartrate (LOPRESSOR) injection 5 mg 5 mg Once 7/2/2019 7/2/2019    Sig - Route: Infuse 5 mL into a venous catheter 1 (One) Time. - Intravenous    morphine injection 4 mg 4 mg Every 4 Hours PRN 7/2/2019 7/12/2019    Sig - Route: Infuse 1 mL into a venous catheter Every 4 (Four) Hours As Needed for Severe Pain . - Intravenous    nitroglycerin (NITROSTAT) SL tablet 0.4 mg 0.4 mg Every 5 Minutes PRN 7/2/2019     Sig - Route: Place 1 tablet under the tongue Every 5 (Five) Minutes As Needed for Chest Pain (Chest Pain With Systolic Blood Pressure Greater Than 100). - Sublingual    sodium bicarbonate injection 8.4% 50 mEq 50 mEq Once 7/1/2019 7/1/2019    Sig - Route: Infuse 50 mL into a venous catheter 1 (One) Time. - Intravenous    sodium chloride 0.9 % bolus 1,000 mL 1,000 mL Once in Dialysis 7/2/2019     Sig - Route: Infuse 1,000 mL into a venous catheter Once. - Intravenous    sodium chloride 0.9 % flush 10 mL 10 mL As Needed 7/1/2019     Sig - Route: Infuse 10 mL into a venous catheter As Needed for Line Care. - Intravenous    Cosign for Ordering: Accepted by Lloyd Lorenzana MD on 7/1/2019  9:33 PM    sodium chloride 0.9 % flush 3 mL 3 mL Every 12 Hours Scheduled 7/2/2019     Sig - Route: Infuse 3 mL into a venous catheter Every 12 (Twelve) Hours. - Intravenous    sodium chloride 0.9 % flush 3-10 mL 3-10 mL As Needed 7/2/2019     Sig - Route: Infuse 3-10 mL into a venous catheter As Needed for Line Care. - Intravenous    sodium polystyrene (KAYEXALATE) powder 15 g 15 g Once 7/2/2019     Sig - Route: Take 15 g by mouth 1 (One) Time. - Oral    sodium polystyrene (KAYEXALATE) powder 30 g 30 g Once 7/1/2019 7/1/2019    Sig - Route: Take 30 g by mouth 1 (One)  Time. - Oral    lactulose (CHRONULAC) 10 GM/15ML solution 90 g (Discontinued) 90 g Once 7/1/2019 7/1/2019    Sig - Route: Take 135 mL by mouth 1 (One) Time. - Oral    Reason for Discontinue: Dose adjustment          Orders (last 24 hrs)     Start     Ordered    07/03/19 0900  aspirin EC tablet 81 mg  Daily      07/02/19 0255    07/03/19 0400  Comprehensive Metabolic Panel  Morning Draw      07/02/19 0807    07/03/19 0400  CBC & Differential  Morning Draw      07/02/19 0807    07/03/19 0400  Magnesium  Morning Draw      07/02/19 0807    07/02/19 0745  sodium chloride 0.9 % bolus 1,000 mL  Once in Dialysis      07/02/19 0659    07/02/19 0741  HBsAg Confirmation  Once      07/02/19 0740    07/02/19 0656  Hemodialysis Inpatient  Once      07/02/19 0659    07/02/19 0625  Hepatitis Panel, Acute  Once      07/02/19 0625    07/02/19 0600  CBC & Differential  Morning Draw      07/02/19 0256    07/02/19 0600  Comprehensive Metabolic Panel  Morning Draw      07/02/19 0256    07/02/19 0600  Phosphorus  Morning Draw      07/02/19 0256    07/02/19 0600  Magnesium  Morning Draw      07/02/19 0256    07/02/19 0600  Troponin  Morning Draw      07/02/19 0256    07/02/19 0600  CBC Auto Differential  PROCEDURE ONCE      07/02/19 0256    07/02/19 0345  aspirin chewable tablet 324 mg  Once      07/02/19 0254    07/02/19 0315  metoprolol tartrate (LOPRESSOR) injection 5 mg  Once      07/02/19 0223    07/02/19 0255  Adult Transthoracic Echo Complete W/ Cont if Necessary Per Protocol  Once      07/02/19 0254    07/02/19 0223  morphine injection 4 mg  Every 4 Hours PRN      07/02/19 0223    07/02/19 0215  aspirin chewable tablet 324 mg  Once      07/02/19 0115    07/02/19 0130  heparin (porcine) 5000 UNIT/ML injection 5,000 Units  Every 12 Hours Scheduled      07/02/19 0028    07/02/19 0116  Troponin  STAT      07/02/19 0115    07/02/19 0116  Potassium  STAT      07/02/19 0115    07/02/19 0115  sodium chloride 0.9 % flush 3 mL  Every 12  Hours Scheduled      07/02/19 0028    07/02/19 0115  ECG 12 Lead  STAT      07/02/19 0115    07/02/19 0045  lactulose (CHRONULAC) 10 GM/15ML solution 60 mL  Once      07/02/19 0013    07/02/19 0029  Vital Signs  Per Hospital Policy      07/02/19 0028    07/02/19 0029  Intake & Output  Every Shift      07/02/19 0028    07/02/19 0029  Weigh Patient  Once      07/02/19 0028    07/02/19 0029  Oxygen Therapy- Nasal Cannula; Titrate for SPO2: 90% - 95%  Continuous,   Status:  Canceled      07/02/19 0028    07/02/19 0029  Insert Peripheral IV  Once      07/02/19 0028    07/02/19 0029  Saline Lock & Maintain IV Access  Continuous,   Status:  Canceled      07/02/19 0028    07/02/19 0029  Saline Lock  Continuous      07/02/19 0028    07/02/19 0029  Continuous Cardiac Monitoring  Continuous      07/02/19 0028    07/02/19 0029  Continuous Pulse Oximetry  Continuous     Comments:  For Unresponsiveness, Acute Dyspnea, Cyanosis or Suspected Hypoxemia.  Notify Physician    07/02/19 0028    07/02/19 0029  Oxygen Therapy- Nasal Cannula; Titrate for SPO2: 90% - 95%  Continuous     Comments:  For Unresponsiveness, Acute Dyspnea, Cyanosis or Suspected Hypoxemia.  Notify Physician    07/02/19 0028    07/02/19 0029  May Be Off Telemetry for Tests  Continuous      07/02/19 0028    07/02/19 0029  ACLS Protocol For Life Threatening Dysrhythmias (Unless Code Status Indicates Otherwise)  Continuous      07/02/19 0028    07/02/19 0029  Notify Provider if ACLS Protocol Activated  Until Discontinued      07/02/19 0028    07/02/19 0029  Activity - Strict Bed Rest  Until Discontinued      07/02/19 0028    07/02/19 0029  Fall Precautions  Continuous      07/02/19 0028    07/02/19 0029  NPO Diet  Diet Effective Now      07/02/19 0028    07/02/19 0028  sodium chloride 0.9 % flush 3-10 mL  As Needed      07/02/19 0028    07/02/19 0028  nitroglycerin (NITROSTAT) SL tablet 0.4 mg  Every 5 Minutes PRN      07/02/19 0028    07/02/19 0005  sodium  polystyrene (KAYEXALATE) powder 15 g  Once      07/02/19 0003    07/02/19 0004  Inpatient Nephrology Consult  Once     Specialty:  Nephrology  Provider:  Pastor Hylton MD    07/02/19 0004    07/01/19 2347  Lactic Acid, Plasma  Once      07/01/19 2347    07/01/19 2347  Blood Culture - Blood,  Once      07/01/19 2347    07/01/19 2347  Blood Culture - Blood,  Once      07/01/19 2347    07/01/19 2334  POC Glucose Once  Once      07/01/19 2327    07/01/19 2300  lactulose (CHRONULAC) 10 GM/15ML solution 30 g  Every 6 Hours      07/01/19 2244    07/01/19 2255  Basic Metabolic Panel  Once      07/01/19 2254 07/01/19 2255  Hospitalist (on-call MD unless specified)  Once     Specialty:  Hospitalist  Provider:  Opal Ocampo MD    07/01/19 2255    07/01/19 2254  Code Status and Medical Interventions:  Continuous      07/01/19 2255    07/01/19 2254  Inpatient Admission  Once      07/01/19 2255    07/01/19 2232  sodium polystyrene (KAYEXALATE) powder 30 g  Once      07/01/19 2230 07/01/19 2232  lactulose (CHRONULAC) 10 GM/15ML solution 90 g  Once,   Status:  Discontinued      07/01/19 2230 07/01/19 2226  sodium bicarbonate injection 8.4% 50 mEq  Once      07/01/19 2224    07/01/19 2226  dextrose (D50W) 25 g/ 50mL Intravenous Solution 25 g  Once      07/01/19 2224    07/01/19 2226  insulin regular (humuLIN R,novoLIN R) injection 6 Units  Once      07/01/19 2224    07/01/19 2226  calcium gluconate 1g/50ml 0.675% NaCl IV SOLN  Once      07/01/19 2224    07/01/19 2226  cefTRIAXone (ROCEPHIN) 1 g/100 mL 0.9% NS (MBP)  Once      07/01/19 2224    07/01/19 2202  Urinalysis, Microscopic Only - Urine, Clean Catch  Once      07/01/19 2201 07/01/19 2119  Urine Drug Screen - Urine, Clean Catch  STAT      07/01/19 2118 07/01/19 2110  NPO Diet  Diet Effective Now,   Status:  Canceled      07/01/19 2109 07/01/19 2110  Undress and Gown  Once      07/01/19 2109 07/01/19 2110  Cardiac monitoring  Per Hospital  Policy,   Status:  Canceled      07/01/19 2109 07/01/19 2110  Continuous Pulse Oximetry  Continuous,   Status:  Canceled      07/01/19 2109 07/01/19 2110  Vital signs  Per Hospital Policy      07/01/19 2109 07/01/19 2110  Fall precautions  Continuous      07/01/19 2109 07/01/19 2110  XR Chest 1 View  1 Time Imaging      07/01/19 2109 07/01/19 2110  ECG 12 Lead  Once      07/01/19 2109 07/01/19 2110  POC Glucose Once  Once      07/01/19 2109 07/01/19 2110  Insert peripheral IV  Once      07/01/19 2109 07/01/19 2110  Harleigh Draw  Once      07/01/19 2109 07/01/19 2110  CBC & Differential  Once      07/01/19 2109 07/01/19 2110  Comprehensive Metabolic Panel  Once      07/01/19 2109 07/01/19 2110  Troponin  Once      07/01/19 2109 07/01/19 2110  Magnesium  Once      07/01/19 2109 07/01/19 2110  Urinalysis With Microscopic If Indicated (No Culture) - Urine, Clean Catch  Once      07/01/19 2109 07/01/19 2110  Light Blue Top  PROCEDURE ONCE      07/01/19 2109 07/01/19 2110  Green Top (Gel)  PROCEDURE ONCE      07/01/19 2109 07/01/19 2110  Lavender Top  PROCEDURE ONCE      07/01/19 2109 07/01/19 2110  Gold Top - SST  PROCEDURE ONCE      07/01/19 2109 07/01/19 2110  CBC Auto Differential  PROCEDURE ONCE      07/01/19 2109 07/01/19 2109  sodium chloride 0.9 % flush 10 mL  As Needed      07/01/19 2109    Unscheduled  ECG 12 Lead  As Needed     Comments:  Nurse to Release if Patient Expericences Acute Chest Pain or Dysrhythmias    07/02/19 0028    Unscheduled  Potassium  As Needed     Comments:  For Ventricular Arrhythmias      07/02/19 0028    Unscheduled  Magnesium  As Needed     Comments:  For Ventricular Arrhythmias      07/02/19 0028    Unscheduled  Troponin  As Needed     Comments:  For Chest Pain      07/02/19 0028    Unscheduled  Digoxin Level  As Needed     Comments:  For Atrial Arrhythmias      07/02/19 0028    Unscheduled  Blood Gas, Arterial  As Needed      Comments:  Per O2 PolicyNotify Physician      07/02/19 0028          Physician Progress Notes (last 72 hours) (Notes from 6/29/2019  8:52 AM through 7/2/2019  8:52 AM)     No notes of this type exist for this encounter.           Consult Notes (last 72 hours) (Notes from 6/29/2019  8:52 AM through 7/2/2019  8:52 AM)      Pastor Hylton MD at 7/2/2019  8:07 AM          Nephrology Consult Note    Referring Provider: Dr. Ocampo  Reason for Consultation: End-stage renal disease requiring dialysis    Subjective       History of present illness:  Mario Nair is a 44 y.o. male who presented to [facility] emergency department with chief complaint of of feeling that he needs dialysis.  Patient has multiple hospitalization and had dialysis and then signed out AGAINST MEDICAL ADVICE every time patient has been very noncompliant and has been counseled multiple times now he is coming with some chest pain and thought he needs dialysis he has not had dialysis since 614 which is about 2 weeks back when he came in his potassium was 7.5 is down to 6.7 after Kayexalate patient denies any chest pain now no urgency no frequency no shortness of breath    History  Past Medical History:   Diagnosis Date   • Endocarditis    • ESRD (end stage renal disease) (CMS/[facility])    • IV drug abuse (CMS/[facility])    • Unable to read or write    , Past Surgical History:   Procedure Laterality Date   • CENTRAL VENOUS CATHETER TUNNELED INSERTION DOUBLE LUMEN Right     Chest for hemodialysis   , Family History   Problem Relation Age of Onset   • Alcohol abuse Mother    • Hypertension Mother    • Hypertension Other    , Social History     Tobacco Use   • Smoking status: Current Every Day Smoker     Packs/day: 1.00     Years: 20.00     Pack years: 20.00     Types: Cigarettes   • Smokeless tobacco: Never Used   Substance Use Topics   • Alcohol use: No     Frequency: Never   • Drug use: Yes     Types: Methamphetamines     Comment: last used on  Friday 5/18/2019   , No medications prior to admission.   , Scheduled Meds:    [START ON 7/3/2019] aspirin 81 mg Oral Daily   heparin (porcine) 5,000 Units Subcutaneous Q12H   lactulose 30 g Oral Q6H   sodium chloride 1,000 mL Intravenous Once in Dialysis   sodium chloride 3 mL Intravenous Q12H   sodium polystyrene 15 g Oral Once   , Continuous Infusions:   , PRN Meds:  Morphine  •  nitroglycerin  •  sodium chloride  •  sodium chloride and Allergies:  Penicillins    Review of Systems  More than 10 point review of systems was done. Pertinent items are noted in HPI, all other systems reviewed and negative    Objective     Vital Signs  Temp:  [97.6 °F (36.4 °C)-98.8 °F (37.1 °C)] 97.9 °F (36.6 °C)  Heart Rate:  [] 80  Resp:  [16-20] 18  BP: (118-146)/() 119/77    No intake/output data recorded.  No intake/output data recorded.    Physical Examination:    General Appearance : alert, appears stated age, cooperative   Head : normocephalic, without obvious abnormality and atraumatic  Eyes : conjunctivae and sclerae normal, no icterus, no pallor and PERRLA  Throat : oral mucosa moist  Neck: no adenopathy, suppple, no carotid bruit and NO JVD  Lungs : clear to auscultation, respirations regular and unlabored  Heart : regular rhythm & normal rate, normal S1, S2, no murmur, no pierce, no rub Abdomen :  normal bowel sounds, no masses and soft non-tender  Rectal : Deferred  Extremities : moves extremities well, no redness and NO edema  Pulses :  palpable and equal bilaterally  Skin : no bleeding, bruising or rash  Neurologic : orientated to person, place, time, grossly no focal deficitis  Right IJ tunneled hemodialysis catheter  Laboratory Data :      WBC WBC   Date Value Ref Range Status   07/02/2019 6.07 3.40 - 10.80 10*3/mm3 Final   07/01/2019 6.95 3.40 - 10.80 10*3/mm3 Final      HGB Hemoglobin   Date Value Ref Range Status   07/02/2019 9.3 (L) 13.0 - 17.7 g/dL Final   07/01/2019 9.0 (L) 13.0 - 17.7 g/dL Final       HCT Hematocrit   Date Value Ref Range Status   07/02/2019 29.8 (L) 37.5 - 51.0 % Final   07/01/2019 28.6 (L) 37.5 - 51.0 % Final      Platlets No results found for: LABPLAT   MCV MCV   Date Value Ref Range Status   07/02/2019 94.0 79.0 - 97.0 fL Final   07/01/2019 93.8 79.0 - 97.0 fL Final          Sodium Sodium   Date Value Ref Range Status   07/02/2019 136 136 - 145 mmol/L Final   07/01/2019 132 (L) 136 - 145 mmol/L Final   07/01/2019 133 (L) 136 - 145 mmol/L Final      Potassium Potassium   Date Value Ref Range Status   07/02/2019 6.4 (C) 3.5 - 5.2 mmol/L Final   07/02/2019 5.9 (H) 3.5 - 5.2 mmol/L Final   07/01/2019 6.7 (C) 3.5 - 5.2 mmol/L Final   07/01/2019 7.5 (C) 3.5 - 5.2 mmol/L Final      Chloride Chloride   Date Value Ref Range Status   07/02/2019 106 98 - 107 mmol/L Final   07/01/2019 105 98 - 107 mmol/L Final   07/01/2019 102 98 - 107 mmol/L Final      CO2 CO2   Date Value Ref Range Status   07/02/2019 13.7 (L) 22.0 - 29.0 mmol/L Final   07/01/2019 13.9 (L) 22.0 - 29.0 mmol/L Final   07/01/2019 13.1 (L) 22.0 - 29.0 mmol/L Final      BUN BUN   Date Value Ref Range Status   07/02/2019 79 (H) 6 - 20 mg/dL Final   07/01/2019 79 (H) 6 - 20 mg/dL Final   07/01/2019 82 (H) 6 - 20 mg/dL Final      Creatinine Creatinine   Date Value Ref Range Status   07/02/2019 8.63 (H) 0.76 - 1.27 mg/dL Final   07/01/2019 8.36 (H) 0.76 - 1.27 mg/dL Final   07/01/2019 8.30 (H) 0.76 - 1.27 mg/dL Final      Calcium Calcium   Date Value Ref Range Status   07/02/2019 8.9 8.6 - 10.5 mg/dL Final   07/01/2019 8.6 8.6 - 10.5 mg/dL Final   07/01/2019 8.7 8.6 - 10.5 mg/dL Final      PO4 No results found for: CAPO4   Albumin Albumin   Date Value Ref Range Status   07/02/2019 3.67 3.50 - 5.20 g/dL Final   07/01/2019 3.86 3.50 - 5.20 g/dL Final      Magnesium Magnesium   Date Value Ref Range Status   07/02/2019 2.0 1.6 - 2.6 mg/dL Final   07/01/2019 2.0 1.6 - 2.6 mg/dL Final      Uric Acid No results found for: URICACID     Radiology  results :     Imaging Results (last 72 hours)     Procedure Component Value Units Date/Time    XR Chest 1 View [[id number]] Collected:  07/02/19 0736     Updated:  07/02/19 0739    Narrative:       XR CHEST 1 VW-     CLINICAL INDICATION: Weak/Dizzy/AMS triage protocol        COMPARISON: 5/5/2019      TECHNIQUE: Single frontal view of the chest.     FINDINGS:     There is no focal alveolar infiltrate or effusion.  The cardiac silhouette is normal. The pulmonary vasculature is  unremarkable.  There is no evidence of an acute osseous abnormality.   There are no suspicious-appearing parenchymal soft tissue nodules.          Impression:       No evidence of active or acute cardiopulmonary disease on today's chest  radiograph.     This report was finalized on 7/2/2019 7:37 AM by Dr. Wesly Reynolds MD.               Medications:        [START ON 7/3/2019] aspirin 81 mg Oral Daily   heparin (porcine) 5,000 Units Subcutaneous Q12H   lactulose 30 g Oral Q6H   sodium chloride 1,000 mL Intravenous Once in Dialysis   sodium chloride 3 mL Intravenous Q12H   sodium polystyrene 15 g Oral Once          Assessment/Plan       Hyperkalemia      1.  End-stage renal disease: We will do dialysis now and repeat the potassium again in the morning    2.  Hyperkalemia: Secondary to noncompliance with dialysis patient already got Kayexalate is better to 6.7 we will do dialysis with a 1 potassium bath    3.  Medical noncompliance: Patient been admitted multiple times with similar complaints of feeling needing dialysis and then signing out AGAINST MEDICAL ADVICE patient has been counseled multiple times that he can die if he is not willing to get dialysis as prescribed 3 times a week at least but because of his noncompliance he always sounds out AGAINST MEDICAL ADVICE after getting dialysis.  Patient is again counseled on the consequences of noncompliance including but not limited to stroke death etc. also benefits of compliance has been offered  different options of dialysis also and also  has talked to him multiple times    4.  IV drug abuse: Counseled    5.  History of hepatitis B: Patient has not had any treatment for that to    6.  Chest pain: Cardiology has been consulted    Prognosis is critical  Thanks Dr Ocampo for the consult. We will follow with you.  I discussed the patient's findings and my recommendations with patient and nursing staff    KATHARINA Hylton MD  07/02/19  8:07 AM            Electronically signed by Pastor Hylton MD at 7/2/2019  8:13 AM

## 2019-07-02 NOTE — ED NOTES
Pt transferred to inpatient floor at this time. NADN, skin PWD, no resp distress noted. IV intact with no signs of infiltration. All pt belongings transferred with pt. Pt transferred via stretcher with ED tech and Nurse, Spencer monitor in place.        Josie Lockhart RN  07/02/19 0024

## 2019-07-02 NOTE — ED NOTES
Pt resting quietly on stretcher with continuing complaints of chest pain, Dr. Lorenzana is aware.  Pt AAOx4 with no resp distress noted, respirations even and unlabored.  Pt denies any needs at this time.  Pt family at bedside. Will continue to monitor and follow plan of care.  Bed rails up x2, bed in lowest position, call light in reach.           Josie Lockhart, RN  07/02/19 0023

## 2019-07-02 NOTE — ED NOTES
Called Dr. Diallo per Dr. Lorenzana. Dr. Lorenzana speaking with Dr. Diallo at this time.     Jillian Reddy  07/01/19 [id number]

## 2019-07-02 NOTE — CONSULTS
Nephrology Consult Note    Referring Provider: Dr. Ocampo  Reason for Consultation: End-stage renal disease requiring dialysis    Subjective       History of present illness:  Mario Nair is a 44 y.o. male who presented to [facility] emergency department with chief complaint of of feeling that he needs dialysis.  Patient has multiple hospitalization and had dialysis and then signed out AGAINST MEDICAL ADVICE every time patient has been very noncompliant and has been counseled multiple times now he is coming with some chest pain and thought he needs dialysis he has not had dialysis since 614 which is about 2 weeks back when he came in his potassium was 7.5 is down to 6.7 after Kayexalate patient denies any chest pain now no urgency no frequency no shortness of breath    History  Past Medical History:   Diagnosis Date   • Endocarditis    • ESRD (end stage renal disease) (CMS/[facility])    • IV drug abuse (CMS/[facility])    • Unable to read or write    , Past Surgical History:   Procedure Laterality Date   • CENTRAL VENOUS CATHETER TUNNELED INSERTION DOUBLE LUMEN Right     Chest for hemodialysis   , Family History   Problem Relation Age of Onset   • Alcohol abuse Mother    • Hypertension Mother    • Hypertension Other    , Social History     Tobacco Use   • Smoking status: Current Every Day Smoker     Packs/day: 1.00     Years: 20.00     Pack years: 20.00     Types: Cigarettes   • Smokeless tobacco: Never Used   Substance Use Topics   • Alcohol use: No     Frequency: Never   • Drug use: Yes     Types: Methamphetamines     Comment: last used on Friday 5/18/2019   , No medications prior to admission.   , Scheduled Meds:    [START ON 7/3/2019] aspirin 81 mg Oral Daily   heparin (porcine) 5,000 Units Subcutaneous Q12H   lactulose 30 g Oral Q6H   sodium chloride 1,000 mL Intravenous Once in Dialysis   sodium chloride 3 mL Intravenous Q12H   sodium polystyrene 15 g Oral Once   , Continuous Infusions:   , PRN Meds:   Morphine  •  nitroglycerin  •  sodium chloride  •  sodium chloride and Allergies:  Penicillins    Review of Systems  More than 10 point review of systems was done. Pertinent items are noted in HPI, all other systems reviewed and negative    Objective     Vital Signs  Temp:  [97.6 °F (36.4 °C)-98.8 °F (37.1 °C)] 97.9 °F (36.6 °C)  Heart Rate:  [] 80  Resp:  [16-20] 18  BP: (118-146)/() 119/77    No intake/output data recorded.  No intake/output data recorded.    Physical Examination:    General Appearance : alert, appears stated age, cooperative   Head : normocephalic, without obvious abnormality and atraumatic  Eyes : conjunctivae and sclerae normal, no icterus, no pallor and PERRLA  Throat : oral mucosa moist  Neck: no adenopathy, suppple, no carotid bruit and NO JVD  Lungs : clear to auscultation, respirations regular and unlabored  Heart : regular rhythm & normal rate, normal S1, S2, no murmur, no pierce, no rub Abdomen :  normal bowel sounds, no masses and soft non-tender  Rectal : Deferred  Extremities : moves extremities well, no redness and NO edema  Pulses :  palpable and equal bilaterally  Skin : no bleeding, bruising or rash  Neurologic : orientated to person, place, time, grossly no focal deficitis  Right IJ tunneled hemodialysis catheter  Laboratory Data :      WBC WBC   Date Value Ref Range Status   07/02/2019 6.07 3.40 - 10.80 10*3/mm3 Final   07/01/2019 6.95 3.40 - 10.80 10*3/mm3 Final      HGB Hemoglobin   Date Value Ref Range Status   07/02/2019 9.3 (L) 13.0 - 17.7 g/dL Final   07/01/2019 9.0 (L) 13.0 - 17.7 g/dL Final      HCT Hematocrit   Date Value Ref Range Status   07/02/2019 29.8 (L) 37.5 - 51.0 % Final   07/01/2019 28.6 (L) 37.5 - 51.0 % Final      Platlets No results found for: LABPLAT   MCV MCV   Date Value Ref Range Status   07/02/2019 94.0 79.0 - 97.0 fL Final   07/01/2019 93.8 79.0 - 97.0 fL Final          Sodium Sodium   Date Value Ref Range Status   07/02/2019 136 136 - 283  mmol/L Final   07/01/2019 132 (L) 136 - 145 mmol/L Final   07/01/2019 133 (L) 136 - 145 mmol/L Final      Potassium Potassium   Date Value Ref Range Status   07/02/2019 6.4 (C) 3.5 - 5.2 mmol/L Final   07/02/2019 5.9 (H) 3.5 - 5.2 mmol/L Final   07/01/2019 6.7 (C) 3.5 - 5.2 mmol/L Final   07/01/2019 7.5 (C) 3.5 - 5.2 mmol/L Final      Chloride Chloride   Date Value Ref Range Status   07/02/2019 106 98 - 107 mmol/L Final   07/01/2019 105 98 - 107 mmol/L Final   07/01/2019 102 98 - 107 mmol/L Final      CO2 CO2   Date Value Ref Range Status   07/02/2019 13.7 (L) 22.0 - 29.0 mmol/L Final   07/01/2019 13.9 (L) 22.0 - 29.0 mmol/L Final   07/01/2019 13.1 (L) 22.0 - 29.0 mmol/L Final      BUN BUN   Date Value Ref Range Status   07/02/2019 79 (H) 6 - 20 mg/dL Final   07/01/2019 79 (H) 6 - 20 mg/dL Final   07/01/2019 82 (H) 6 - 20 mg/dL Final      Creatinine Creatinine   Date Value Ref Range Status   07/02/2019 8.63 (H) 0.76 - 1.27 mg/dL Final   07/01/2019 8.36 (H) 0.76 - 1.27 mg/dL Final   07/01/2019 8.30 (H) 0.76 - 1.27 mg/dL Final      Calcium Calcium   Date Value Ref Range Status   07/02/2019 8.9 8.6 - 10.5 mg/dL Final   07/01/2019 8.6 8.6 - 10.5 mg/dL Final   07/01/2019 8.7 8.6 - 10.5 mg/dL Final      PO4 No results found for: CAPO4   Albumin Albumin   Date Value Ref Range Status   07/02/2019 3.67 3.50 - 5.20 g/dL Final   07/01/2019 3.86 3.50 - 5.20 g/dL Final      Magnesium Magnesium   Date Value Ref Range Status   07/02/2019 2.0 1.6 - 2.6 mg/dL Final   07/01/2019 2.0 1.6 - 2.6 mg/dL Final      Uric Acid No results found for: URICACID     Radiology results :     Imaging Results (last 72 hours)     Procedure Component Value Units Date/Time    XR Chest 1 View [[id number]] Collected:  07/02/19 0736     Updated:  07/02/19 0739    Narrative:       XR CHEST 1 VW-     CLINICAL INDICATION: Weak/Dizzy/AMS triage protocol        COMPARISON: 5/5/2019      TECHNIQUE: Single frontal view of the chest.     FINDINGS:     There is no  focal alveolar infiltrate or effusion.  The cardiac silhouette is normal. The pulmonary vasculature is  unremarkable.  There is no evidence of an acute osseous abnormality.   There are no suspicious-appearing parenchymal soft tissue nodules.          Impression:       No evidence of active or acute cardiopulmonary disease on today's chest  radiograph.     This report was finalized on 7/2/2019 7:37 AM by Dr. Wesly Reynolds MD.               Medications:        [START ON 7/3/2019] aspirin 81 mg Oral Daily   heparin (porcine) 5,000 Units Subcutaneous Q12H   lactulose 30 g Oral Q6H   sodium chloride 1,000 mL Intravenous Once in Dialysis   sodium chloride 3 mL Intravenous Q12H   sodium polystyrene 15 g Oral Once          Assessment/Plan       Hyperkalemia      1.  End-stage renal disease: We will do dialysis now and repeat the potassium again in the morning    2.  Hyperkalemia: Secondary to noncompliance with dialysis patient already got Kayexalate is better to 6.7 we will do dialysis with a 1 potassium bath    3.  Medical noncompliance: Patient been admitted multiple times with similar complaints of feeling needing dialysis and then signing out AGAINST MEDICAL ADVICE patient has been counseled multiple times that he can die if he is not willing to get dialysis as prescribed 3 times a week at least but because of his noncompliance he always sounds out AGAINST MEDICAL ADVICE after getting dialysis.  Patient is again counseled on the consequences of noncompliance including but not limited to stroke death etc. also benefits of compliance has been offered different options of dialysis also and also  has talked to him multiple times    4.  IV drug abuse: Counseled    5.  History of hepatitis B: Patient has not had any treatment for that to    6.  Chest pain: Cardiology has been consulted    Prognosis is critical  Thanks Dr Ocampo for the consult. We will follow with you.  I discussed the patient's findings and my  recommendations with patient and nursing staff    KATHARINA Hylton MD  07/02/19  8:07 AM

## 2019-07-03 ENCOUNTER — READMISSION MANAGEMENT (OUTPATIENT)
Dept: CALL CENTER | Facility: HOSPITAL | Age: 44
End: 2019-07-03

## 2019-07-03 LAB
HBV SURFACE AG SERPL QL IA: NORMAL
HBV SURFACE AG SERPL QL NT: POSITIVE

## 2019-07-03 NOTE — OUTREACH NOTE
Prep Survey      Responses   Facility patient discharged from?  [place]   Is patient eligible?  No   What are the reasons patient is not eligible?  Other [AMA]   Does the patient have one of the following disease processes/diagnoses(primary or secondary)?  Other   Prep survey completed?  Yes          Zahra Guillaume RN

## 2019-07-07 LAB
BACTERIA SPEC AEROBE CULT: NORMAL
BACTERIA SPEC AEROBE CULT: NORMAL

## 2019-07-10 ENCOUNTER — HOSPITAL ENCOUNTER (EMERGENCY)
Facility: HOSPITAL | Age: 44
Discharge: HOME | End: 2019-07-10
Attending: EMERGENCY MEDICINE | Admitting: EMERGENCY MEDICINE

## 2019-07-10 ENCOUNTER — APPOINTMENT (OUTPATIENT)
Dept: GENERAL RADIOLOGY | Facility: HOSPITAL | Age: 44
End: 2019-07-10

## 2019-07-10 VITALS
TEMPERATURE: 97.7 F | RESPIRATION RATE: 18 BRPM | HEIGHT: 74 IN | DIASTOLIC BLOOD PRESSURE: 76 MMHG | WEIGHT: 157 LBS | SYSTOLIC BLOOD PRESSURE: 123 MMHG | HEART RATE: 86 BPM | OXYGEN SATURATION: 95 % | BODY MASS INDEX: 20.15 KG/M2

## 2019-07-10 DIAGNOSIS — N18.9 CRF (CHRONIC RENAL FAILURE), UNSPECIFIED STAGE: Primary | ICD-10-CM

## 2019-07-10 LAB
6-ACETYL MORPHINE: NEGATIVE
ALBUMIN SERPL-MCNC: 3.36 G/DL (ref 3.5–5.2)
ALBUMIN/GLOB SERPL: 0.8 G/DL
ALP SERPL-CCNC: 110 U/L (ref 39–117)
ALT SERPL W P-5'-P-CCNC: 53 U/L (ref 1–41)
AMPHET+METHAMPHET UR QL: POSITIVE
ANION GAP SERPL CALCULATED.3IONS-SCNC: 15.2 MMOL/L (ref 5–15)
AST SERPL-CCNC: 41 U/L (ref 1–40)
BACTERIA UR QL AUTO: ABNORMAL /HPF
BARBITURATES UR QL SCN: NEGATIVE
BASOPHILS # BLD AUTO: 0.05 10*3/MM3 (ref 0–0.2)
BASOPHILS NFR BLD AUTO: 0.8 % (ref 0–1.5)
BENZODIAZ UR QL SCN: NEGATIVE
BILIRUB SERPL-MCNC: 0.2 MG/DL (ref 0.2–1.2)
BILIRUB UR QL STRIP: NEGATIVE
BUN BLD-MCNC: 65 MG/DL (ref 6–20)
BUN/CREAT SERPL: 8.4 (ref 7–25)
BUPRENORPHINE SERPL-MCNC: NEGATIVE NG/ML
CALCIUM SPEC-SCNC: 8.3 MG/DL (ref 8.6–10.5)
CANNABINOIDS SERPL QL: NEGATIVE
CHLORIDE SERPL-SCNC: 108 MMOL/L (ref 98–107)
CLARITY UR: CLEAR
CO2 SERPL-SCNC: 19.8 MMOL/L (ref 22–29)
COCAINE UR QL: NEGATIVE
COLOR UR: YELLOW
CREAT BLD-MCNC: 7.72 MG/DL (ref 0.76–1.27)
DEPRECATED RDW RBC AUTO: 45.1 FL (ref 37–54)
EOSINOPHIL # BLD AUTO: 0.26 10*3/MM3 (ref 0–0.4)
EOSINOPHIL NFR BLD AUTO: 4.1 % (ref 0.3–6.2)
ERYTHROCYTE [DISTWIDTH] IN BLOOD BY AUTOMATED COUNT: 13.9 % (ref 12.3–15.4)
GFR SERPL CREATININE-BSD FRML MDRD: 8 ML/MIN/1.73
GFR SERPL CREATININE-BSD FRML MDRD: ABNORMAL ML/MIN/1.73
GLOBULIN UR ELPH-MCNC: 4.4 GM/DL
GLUCOSE BLD-MCNC: 98 MG/DL (ref 65–99)
GLUCOSE BLDC GLUCOMTR-MCNC: 89 MG/DL (ref 70–130)
GLUCOSE UR STRIP-MCNC: NEGATIVE MG/DL
HCT VFR BLD AUTO: 29.2 % (ref 37.5–51)
HGB BLD-MCNC: 9 G/DL (ref 13–17.7)
HGB UR QL STRIP.AUTO: ABNORMAL
HOLD SPECIMEN: NORMAL
HOLD SPECIMEN: NORMAL
HYALINE CASTS UR QL AUTO: ABNORMAL /LPF
IMM GRANULOCYTES # BLD AUTO: 0.01 10*3/MM3 (ref 0–0.05)
IMM GRANULOCYTES NFR BLD AUTO: 0.2 % (ref 0–0.5)
KETONES UR QL STRIP: NEGATIVE
LEUKOCYTE ESTERASE UR QL STRIP.AUTO: ABNORMAL
LYMPHOCYTES # BLD AUTO: 2.42 10*3/MM3 (ref 0.7–3.1)
LYMPHOCYTES NFR BLD AUTO: 37.9 % (ref 19.6–45.3)
MAGNESIUM SERPL-MCNC: 2.1 MG/DL (ref 1.6–2.6)
MCH RBC QN AUTO: 29.3 PG (ref 26.6–33)
MCHC RBC AUTO-ENTMCNC: 30.8 G/DL (ref 31.5–35.7)
MCV RBC AUTO: 95.1 FL (ref 79–97)
METHADONE UR QL SCN: NEGATIVE
MONOCYTES # BLD AUTO: 0.46 10*3/MM3 (ref 0.1–0.9)
MONOCYTES NFR BLD AUTO: 7.2 % (ref 5–12)
NEUTROPHILS # BLD AUTO: 3.18 10*3/MM3 (ref 1.7–7)
NEUTROPHILS NFR BLD AUTO: 49.8 % (ref 42.7–76)
NITRITE UR QL STRIP: NEGATIVE
OPIATES UR QL: NEGATIVE
OXYCODONE UR QL SCN: NEGATIVE
PCP UR QL SCN: NEGATIVE
PH UR STRIP.AUTO: 7 [PH] (ref 5–8)
PLATELET # BLD AUTO: 210 10*3/MM3 (ref 140–450)
PMV BLD AUTO: 8.7 FL (ref 6–12)
POTASSIUM BLD-SCNC: 6.3 MMOL/L (ref 3.5–5.2)
PROT SERPL-MCNC: 7.8 G/DL (ref 6–8.5)
PROT UR QL STRIP: ABNORMAL
RBC # BLD AUTO: 3.07 10*6/MM3 (ref 4.14–5.8)
RBC # UR: ABNORMAL /HPF
REF LAB TEST METHOD: ABNORMAL
SODIUM BLD-SCNC: 143 MMOL/L (ref 136–145)
SP GR UR STRIP: 1.01 (ref 1–1.03)
SQUAMOUS #/AREA URNS HPF: ABNORMAL /HPF
TROPONIN T SERPL-MCNC: 0.06 NG/ML (ref 0–0.03)
UROBILINOGEN UR QL STRIP: ABNORMAL
WBC NRBC COR # BLD: 6.38 10*3/MM3 (ref 3.4–10.8)
WBC UR QL AUTO: ABNORMAL /HPF
WHOLE BLOOD HOLD SPECIMEN: NORMAL
WHOLE BLOOD HOLD SPECIMEN: NORMAL

## 2019-07-10 PROCEDURE — 83735 ASSAY OF MAGNESIUM: CPT | Performed by: EMERGENCY MEDICINE

## 2019-07-10 PROCEDURE — 84484 ASSAY OF TROPONIN QUANT: CPT | Performed by: EMERGENCY MEDICINE

## 2019-07-10 PROCEDURE — 80307 DRUG TEST PRSMV CHEM ANLYZR: CPT | Performed by: EMERGENCY MEDICINE

## 2019-07-10 PROCEDURE — 80053 COMPREHEN METABOLIC PANEL: CPT | Performed by: EMERGENCY MEDICINE

## 2019-07-10 PROCEDURE — 85025 COMPLETE CBC W/AUTO DIFF WBC: CPT | Performed by: EMERGENCY MEDICINE

## 2019-07-10 PROCEDURE — 93010 ELECTROCARDIOGRAM REPORT: CPT | Performed by: INTERNAL MEDICINE

## 2019-07-10 PROCEDURE — 81001 URINALYSIS AUTO W/SCOPE: CPT | Performed by: EMERGENCY MEDICINE

## 2019-07-10 PROCEDURE — 93005 ELECTROCARDIOGRAM TRACING: CPT | Performed by: EMERGENCY MEDICINE

## 2019-07-10 PROCEDURE — 82962 GLUCOSE BLOOD TEST: CPT

## 2019-07-10 PROCEDURE — 99283 EMERGENCY DEPT VISIT LOW MDM: CPT

## 2019-07-10 RX ORDER — SODIUM CHLORIDE 0.9 % (FLUSH) 0.9 %
10 SYRINGE (ML) INJECTION AS NEEDED
Status: DISCONTINUED | OUTPATIENT
Start: 2019-07-10 | End: 2019-07-11 | Stop reason: HOSPADM

## 2019-07-11 NOTE — ED PROVIDER NOTES
Subjective   Pt comes in with fatigue.  Pt is knonw ESRD and requires dialysis.  He does not have a regualr HD clinic and is seen here frequently.  Pt has not had dialysis in 8 days.  Just wants to have his levels checked.        History provided by:  Patient  Fatigue   Location:  Systemic  Quality:  Assymptomatic  Severity:  Mild  Onset quality:  Unable to specify  Timing:  Constant  Progression:  Worsening  Chronicity:  Chronic  Context:  Known HD-dependent ESRD  Relieved by:  Hemodialysis  Worsened by:  Nothing  Ineffective treatments:  Nothing  Associated symptoms: fatigue    Associated symptoms: no abdominal pain, no chest pain, no congestion, no cough, no diarrhea, no ear pain, no fever, no headaches, no loss of consciousness, no myalgias, no nausea, no rash, no rhinorrhea, no shortness of breath, no sore throat, no vomiting and no wheezing        Review of Systems   Constitutional: Positive for fatigue. Negative for activity change, appetite change and fever.   HENT: Negative.  Negative for congestion, ear pain, rhinorrhea and sore throat.    Eyes: Negative.    Respiratory: Negative.  Negative for cough, chest tightness, shortness of breath and wheezing.    Cardiovascular: Negative.  Negative for chest pain.   Gastrointestinal: Negative.  Negative for abdominal pain, diarrhea, nausea and vomiting.   Endocrine: Negative.    Genitourinary: Negative.    Musculoskeletal: Negative.  Negative for myalgias.   Skin: Negative.  Negative for rash.   Allergic/Immunologic: Negative.    Neurological: Negative.  Negative for loss of consciousness and headaches.   Hematological: Negative.    Psychiatric/Behavioral: Negative.    All other systems reviewed and are negative.      Past Medical History:   Diagnosis Date   • Endocarditis    • ESRD (end stage renal disease) (CMS/[facility])    • IV drug abuse (CMS/[facility])    • Unable to read or write        Allergies   Allergen Reactions   • Penicillins Shortness Of Breath, Itching and Rash        Past Surgical History:   Procedure Laterality Date   • CENTRAL VENOUS CATHETER TUNNELED INSERTION DOUBLE LUMEN Right     Chest for hemodialysis       Family History   Problem Relation Age of Onset   • Alcohol abuse Mother    • Hypertension Mother    • Hypertension Other        Social History     Socioeconomic History   • Marital status:      Spouse name: Not on file   • Number of children: Not on file   • Years of education: Not on file   • Highest education level: Not on file   Tobacco Use   • Smoking status: Current Every Day Smoker     Packs/day: 1.00     Years: 20.00     Pack years: 20.00     Types: Cigarettes   • Smokeless tobacco: Never Used   Substance and Sexual Activity   • Alcohol use: No     Frequency: Never   • Drug use: Yes     Types: Methamphetamines     Comment: last used on Friday 5/18/2019   • Sexual activity: Defer           Objective   Physical Exam   Constitutional: He is oriented to person, place, and time. He appears well-developed and well-nourished. No distress.   HENT:   Head: Normocephalic and atraumatic.   Nose: Nose normal.   Mouth/Throat: Oropharynx is clear and moist. No oropharyngeal exudate.   Eyes: Conjunctivae and EOM are normal. Right eye exhibits no discharge. Left eye exhibits no discharge. No scleral icterus.   Neck: Normal range of motion. Neck supple. No JVD present. No tracheal deviation present. No thyromegaly present.   Cardiovascular: Normal rate, regular rhythm and intact distal pulses. Exam reveals no gallop and no friction rub.   Murmur heard.  Pulmonary/Chest: Effort normal and breath sounds normal. No stridor. No respiratory distress. He has no wheezes. He has no rales.   Abdominal: Soft. He exhibits no distension and no mass. There is no tenderness. There is no guarding.   Musculoskeletal: Normal range of motion. He exhibits no edema, tenderness or deformity.   Neurological: He is alert and oriented to person, place, and time. He exhibits normal muscle  tone.   Skin: Skin is warm and dry. Capillary refill takes less than 2 seconds. He is not diaphoretic. No pallor.   Psychiatric: He has a normal mood and affect. His behavior is normal. Judgment and thought content normal.   Nursing note and vitals reviewed.      Procedures           ED Course  ED Course as of Jul 10 2236   Wed Jul 10, 2019   2220 Lead EKG performed at 2214 hrs.  Interpreted by me at 2216 hrs.  Normal sinus rhythm.  Normal EKG.  Ventricular rate 85.  IL interval 126.  QRS duration 90.  .  QTc 447.  No pathologic blocks.  No sustained ectopy or dysrhythmia.  No acute ischemic ST segment elevation.  No pathologic T wave changes.  No overt criteria for acute infarct/STEMI. ECG 12 Lead [TZ]      ED Course User Index  [TZ] Juan Miguel Beck MD      XR Chest 1 View    (Results Pending)     Labs Reviewed   COMPREHENSIVE METABOLIC PANEL - Abnormal; Notable for the following components:       Result Value    BUN 65 (*)     Creatinine 7.72 (*)     Potassium 6.3 (*)     Chloride 108 (*)     CO2 19.8 (*)     Calcium 8.3 (*)     Albumin 3.36 (*)     ALT (SGPT) 53 (*)     AST (SGOT) 41 (*)     eGFR Non  Amer 8 (*)     Anion Gap 15.2 (*)     All other components within normal limits    Narrative:     GFR Normal >60  Chronic Kidney Disease <60  Kidney Failure <15   TROPONIN (IN-HOUSE) - Abnormal; Notable for the following components:    Troponin T 0.061 (*)     All other components within normal limits    Narrative:     Troponin T Reference Range:  <= 0.03 ng/mL-   Negative for AMI  >0.03 ng/mL-     Abnormal for myocardial necrosis.  Clinicians would have to utilize clinical acumen, EKG, Troponin and serial changes to determine if it is an Acute Myocardial Infarction or myocardial injury due to an underlying chronic condition.    URINALYSIS W/ MICROSCOPIC IF INDICATED (NO CULTURE) - Abnormal; Notable for the following components:    Blood, UA Small (1+) (*)     Protein, UA 30 mg/dL (1+) (*)      Leuk Esterase, UA Moderate (2+) (*)     All other components within normal limits   CBC WITH AUTO DIFFERENTIAL - Abnormal; Notable for the following components:    RBC 3.07 (*)     Hemoglobin 9.0 (*)     Hematocrit 29.2 (*)     MCHC 30.8 (*)     All other components within normal limits   URINE DRUG SCREEN - Abnormal; Notable for the following components:    Amphetamine Screen, Urine Positive (*)     All other components within normal limits    Narrative:     Negative Thresholds For Drugs Screened:                  Amphetamines              1000 ng/ml               Barbiturates               200 ng/ml               Benzodiazepines            200 ng/ml              Cocaine                    300 ng/ml              Methadone                  300 ng/ml              Opiates                    300 ng/ml               Phencyclidine               25 ng/ml               THC                         50 ng/ml              6-Acetyl Morphine           10 ng/ml              Buprenorphine                5 ng/ml              Oxycodone                  300 ng/ml    The reference range for all drugs tested is negative. This report includes final unconfirmed qualitative results to be used for medical treatment purposes only. Unconfirmed results must not be used for non-medical purposes such as employment or legal testing. Clinical consideration should be applied to any drug of abuse test, especially when unconfirmed quantitative results are used.     URINALYSIS, MICROSCOPIC ONLY - Abnormal; Notable for the following components:    WBC, UA 21-30 (*)     All other components within normal limits   MAGNESIUM - Normal   POCT GLUCOSE FINGERSTICK - Normal   RAINBOW DRAW    Narrative:     The following orders were created for panel order [place] Draw.  Procedure                               Abnormality         Status                     ---------                               -----------         ------                     Light Blue  Top[[id number]]                                   Final result               Green Top (Gel)[[id number]]                                  Final result               Lavender Top[[id number]]                                     Final result               Gold Top - SST[[id number]]                                   Final result                 Please view results for these tests on the individual orders.   POCT GLUCOSE FINGERSTICK   CBC AND DIFFERENTIAL    Narrative:     The following orders were created for panel order CBC & Differential.  Procedure                               Abnormality         Status                     ---------                               -----------         ------                     CBC Auto Differential[[id number]]        Abnormal            Final result                 Please view results for these tests on the individual orders.   LIGHT BLUE TOP   GREEN TOP   LAVENDER TOP   GOLD TOP - SST        Medication List      You have not been prescribed any medications.                 MDM  Number of Diagnoses or Management Options  CRF (chronic renal failure), unspecified stage: new and requires workup     Amount and/or Complexity of Data Reviewed  Clinical lab tests: ordered and reviewed  Decide to obtain previous medical records or to obtain history from someone other than the patient: yes  Independent visualization of images, tracings, or specimens: yes    Risk of Complications, Morbidity, and/or Mortality  Presenting problems: high  Diagnostic procedures: high  Management options: moderate    Patient Progress  Patient progress: stable        Final diagnoses:   CRF (chronic renal failure), unspecified stage            Juan Miguel Beck MD  07/10/19 [id number]

## 2019-07-15 ENCOUNTER — HOSPITAL ENCOUNTER (EMERGENCY)
Facility: HOSPITAL | Age: 44
Discharge: LEFT BEFORE DISCHARGE | End: 2019-07-15
Attending: FAMILY MEDICINE | Admitting: FAMILY MEDICINE

## 2019-07-15 ENCOUNTER — APPOINTMENT (OUTPATIENT)
Dept: GENERAL RADIOLOGY | Facility: HOSPITAL | Age: 44
End: 2019-07-15

## 2019-07-15 VITALS
RESPIRATION RATE: 16 BRPM | WEIGHT: 167 LBS | BODY MASS INDEX: 21.43 KG/M2 | SYSTOLIC BLOOD PRESSURE: 127 MMHG | DIASTOLIC BLOOD PRESSURE: 89 MMHG | HEART RATE: 85 BPM | OXYGEN SATURATION: 100 % | TEMPERATURE: 97.8 F | HEIGHT: 74 IN

## 2019-07-15 DIAGNOSIS — E87.5 HYPERKALEMIA: ICD-10-CM

## 2019-07-15 DIAGNOSIS — N18.6 ESRD (END STAGE RENAL DISEASE) (HCC): Primary | ICD-10-CM

## 2019-07-15 LAB
ALBUMIN SERPL-MCNC: 3.45 G/DL (ref 3.5–5.2)
ALBUMIN/GLOB SERPL: 0.8 G/DL
ALP SERPL-CCNC: 121 U/L (ref 39–117)
ALT SERPL W P-5'-P-CCNC: 66 U/L (ref 1–41)
ANION GAP SERPL CALCULATED.3IONS-SCNC: 14.7 MMOL/L (ref 5–15)
AST SERPL-CCNC: 50 U/L (ref 1–40)
BASOPHILS # BLD AUTO: 0.06 10*3/MM3 (ref 0–0.2)
BASOPHILS NFR BLD AUTO: 1 % (ref 0–1.5)
BILIRUB SERPL-MCNC: 0.2 MG/DL (ref 0.2–1.2)
BUN BLD-MCNC: 76 MG/DL (ref 6–20)
BUN/CREAT SERPL: 8.8 (ref 7–25)
CALCIUM SPEC-SCNC: 8.7 MG/DL (ref 8.6–10.5)
CHLORIDE SERPL-SCNC: 109 MMOL/L (ref 98–107)
CO2 SERPL-SCNC: 14.3 MMOL/L (ref 22–29)
CREAT BLD-MCNC: 8.62 MG/DL (ref 0.76–1.27)
DEPRECATED RDW RBC AUTO: 45.6 FL (ref 37–54)
EOSINOPHIL # BLD AUTO: 0.39 10*3/MM3 (ref 0–0.4)
EOSINOPHIL NFR BLD AUTO: 6.5 % (ref 0.3–6.2)
ERYTHROCYTE [DISTWIDTH] IN BLOOD BY AUTOMATED COUNT: 13.9 % (ref 12.3–15.4)
GFR SERPL CREATININE-BSD FRML MDRD: 7 ML/MIN/1.73
GFR SERPL CREATININE-BSD FRML MDRD: ABNORMAL ML/MIN/1.73
GLOBULIN UR ELPH-MCNC: 4.3 GM/DL
GLUCOSE BLD-MCNC: 93 MG/DL (ref 65–99)
HCT VFR BLD AUTO: 27.4 % (ref 37.5–51)
HGB BLD-MCNC: 8.8 G/DL (ref 13–17.7)
HOLD SPECIMEN: NORMAL
HOLD SPECIMEN: NORMAL
IMM GRANULOCYTES # BLD AUTO: 0.01 10*3/MM3 (ref 0–0.05)
IMM GRANULOCYTES NFR BLD AUTO: 0.2 % (ref 0–0.5)
LYMPHOCYTES # BLD AUTO: 2.49 10*3/MM3 (ref 0.7–3.1)
LYMPHOCYTES NFR BLD AUTO: 41.3 % (ref 19.6–45.3)
MAGNESIUM SERPL-MCNC: 2.3 MG/DL (ref 1.6–2.6)
MCH RBC QN AUTO: 30.1 PG (ref 26.6–33)
MCHC RBC AUTO-ENTMCNC: 32.1 G/DL (ref 31.5–35.7)
MCV RBC AUTO: 93.8 FL (ref 79–97)
MONOCYTES # BLD AUTO: 0.63 10*3/MM3 (ref 0.1–0.9)
MONOCYTES NFR BLD AUTO: 10.4 % (ref 5–12)
NEUTROPHILS # BLD AUTO: 2.45 10*3/MM3 (ref 1.7–7)
NEUTROPHILS NFR BLD AUTO: 40.6 % (ref 42.7–76)
PLATELET # BLD AUTO: 182 10*3/MM3 (ref 140–450)
PMV BLD AUTO: 8.5 FL (ref 6–12)
POTASSIUM BLD-SCNC: 6.5 MMOL/L (ref 3.5–5.2)
PROT SERPL-MCNC: 7.7 G/DL (ref 6–8.5)
RBC # BLD AUTO: 2.92 10*6/MM3 (ref 4.14–5.8)
SODIUM BLD-SCNC: 138 MMOL/L (ref 136–145)
TROPONIN T SERPL-MCNC: 0.04 NG/ML (ref 0–0.03)
WBC NRBC COR # BLD: 6.03 10*3/MM3 (ref 3.4–10.8)
WHOLE BLOOD HOLD SPECIMEN: NORMAL
WHOLE BLOOD HOLD SPECIMEN: NORMAL

## 2019-07-15 PROCEDURE — 80053 COMPREHEN METABOLIC PANEL: CPT | Performed by: FAMILY MEDICINE

## 2019-07-15 PROCEDURE — 85025 COMPLETE CBC W/AUTO DIFF WBC: CPT | Performed by: FAMILY MEDICINE

## 2019-07-15 PROCEDURE — 84484 ASSAY OF TROPONIN QUANT: CPT | Performed by: FAMILY MEDICINE

## 2019-07-15 PROCEDURE — 99284 EMERGENCY DEPT VISIT MOD MDM: CPT

## 2019-07-15 PROCEDURE — 93010 ELECTROCARDIOGRAM REPORT: CPT | Performed by: INTERNAL MEDICINE

## 2019-07-15 PROCEDURE — 93005 ELECTROCARDIOGRAM TRACING: CPT | Performed by: FAMILY MEDICINE

## 2019-07-15 PROCEDURE — 83735 ASSAY OF MAGNESIUM: CPT | Performed by: FAMILY MEDICINE

## 2019-07-18 ENCOUNTER — HOSPITAL ENCOUNTER (INPATIENT)
Facility: HOSPITAL | Age: 44
LOS: 1 days | Discharge: LEFT BEFORE DISCHARGE | End: 2019-07-19
Attending: EMERGENCY MEDICINE | Admitting: FAMILY MEDICINE

## 2019-07-18 DIAGNOSIS — N18.6 ESRD (END STAGE RENAL DISEASE) (HCC): Primary | ICD-10-CM

## 2019-07-18 DIAGNOSIS — E87.20 METABOLIC ACIDOSIS: ICD-10-CM

## 2019-07-18 DIAGNOSIS — E87.5 HYPERKALEMIA: ICD-10-CM

## 2019-07-18 LAB
ALBUMIN SERPL-MCNC: 3.55 G/DL (ref 3.5–5.2)
ALBUMIN/GLOB SERPL: 0.7 G/DL
ALP SERPL-CCNC: 132 U/L (ref 39–117)
ALT SERPL W P-5'-P-CCNC: 54 U/L (ref 1–41)
ANION GAP SERPL CALCULATED.3IONS-SCNC: 12.8 MMOL/L (ref 5–15)
AST SERPL-CCNC: 36 U/L (ref 1–40)
BASOPHILS # BLD AUTO: 0.05 10*3/MM3 (ref 0–0.2)
BASOPHILS NFR BLD AUTO: 0.7 % (ref 0–1.5)
BILIRUB SERPL-MCNC: 0.3 MG/DL (ref 0.2–1.2)
BUN BLD-MCNC: 78 MG/DL (ref 6–20)
BUN/CREAT SERPL: 9.9 (ref 7–25)
CALCIUM SPEC-SCNC: 8.9 MG/DL (ref 8.6–10.5)
CHLORIDE SERPL-SCNC: 107 MMOL/L (ref 98–107)
CO2 SERPL-SCNC: 15.2 MMOL/L (ref 22–29)
CREAT BLD-MCNC: 7.89 MG/DL (ref 0.76–1.27)
DEPRECATED RDW RBC AUTO: 45.4 FL (ref 37–54)
EOSINOPHIL # BLD AUTO: 0.29 10*3/MM3 (ref 0–0.4)
EOSINOPHIL NFR BLD AUTO: 3.9 % (ref 0.3–6.2)
ERYTHROCYTE [DISTWIDTH] IN BLOOD BY AUTOMATED COUNT: 13.9 % (ref 12.3–15.4)
GFR SERPL CREATININE-BSD FRML MDRD: 8 ML/MIN/1.73
GFR SERPL CREATININE-BSD FRML MDRD: ABNORMAL ML/MIN/1.73
GLOBULIN UR ELPH-MCNC: 4.8 GM/DL
GLUCOSE BLD-MCNC: 90 MG/DL (ref 65–99)
HCT VFR BLD AUTO: 31.8 % (ref 37.5–51)
HGB BLD-MCNC: 9.9 G/DL (ref 13–17.7)
IMM GRANULOCYTES # BLD AUTO: 0.01 10*3/MM3 (ref 0–0.05)
IMM GRANULOCYTES NFR BLD AUTO: 0.1 % (ref 0–0.5)
LYMPHOCYTES # BLD AUTO: 2.44 10*3/MM3 (ref 0.7–3.1)
LYMPHOCYTES NFR BLD AUTO: 33 % (ref 19.6–45.3)
MAGNESIUM SERPL-MCNC: 1.9 MG/DL (ref 1.6–2.6)
MCH RBC QN AUTO: 28.8 PG (ref 26.6–33)
MCHC RBC AUTO-ENTMCNC: 31.1 G/DL (ref 31.5–35.7)
MCV RBC AUTO: 92.4 FL (ref 79–97)
MONOCYTES # BLD AUTO: 0.56 10*3/MM3 (ref 0.1–0.9)
MONOCYTES NFR BLD AUTO: 7.6 % (ref 5–12)
NEUTROPHILS # BLD AUTO: 4.04 10*3/MM3 (ref 1.7–7)
NEUTROPHILS NFR BLD AUTO: 54.7 % (ref 42.7–76)
PHOSPHATE SERPL-MCNC: 6.1 MG/DL (ref 2.5–4.5)
PLATELET # BLD AUTO: 219 10*3/MM3 (ref 140–450)
PMV BLD AUTO: 8.7 FL (ref 6–12)
POTASSIUM BLD-SCNC: 6.5 MMOL/L (ref 3.5–5.2)
PROT SERPL-MCNC: 8.3 G/DL (ref 6–8.5)
RBC # BLD AUTO: 3.44 10*6/MM3 (ref 4.14–5.8)
SODIUM BLD-SCNC: 135 MMOL/L (ref 136–145)
WBC NRBC COR # BLD: 7.39 10*3/MM3 (ref 3.4–10.8)

## 2019-07-18 PROCEDURE — 99284 EMERGENCY DEPT VISIT MOD MDM: CPT

## 2019-07-18 PROCEDURE — 93005 ELECTROCARDIOGRAM TRACING: CPT | Performed by: EMERGENCY MEDICINE

## 2019-07-18 PROCEDURE — 80053 COMPREHEN METABOLIC PANEL: CPT | Performed by: EMERGENCY MEDICINE

## 2019-07-18 PROCEDURE — 85025 COMPLETE CBC W/AUTO DIFF WBC: CPT | Performed by: EMERGENCY MEDICINE

## 2019-07-18 PROCEDURE — 83735 ASSAY OF MAGNESIUM: CPT | Performed by: EMERGENCY MEDICINE

## 2019-07-18 PROCEDURE — 84100 ASSAY OF PHOSPHORUS: CPT | Performed by: EMERGENCY MEDICINE

## 2019-07-18 RX ORDER — SODIUM CHLORIDE 0.9 % (FLUSH) 0.9 %
3-10 SYRINGE (ML) INJECTION AS NEEDED
Status: DISCONTINUED | OUTPATIENT
Start: 2019-07-18 | End: 2019-07-19 | Stop reason: HOSPADM

## 2019-07-18 RX ORDER — SODIUM CHLORIDE 0.9 % (FLUSH) 0.9 %
3 SYRINGE (ML) INJECTION EVERY 12 HOURS SCHEDULED
Status: DISCONTINUED | OUTPATIENT
Start: 2019-07-19 | End: 2019-07-19 | Stop reason: HOSPADM

## 2019-07-18 RX ORDER — HEPARIN SODIUM 5000 [USP'U]/ML
5000 INJECTION, SOLUTION INTRAVENOUS; SUBCUTANEOUS EVERY 12 HOURS SCHEDULED
Status: DISCONTINUED | OUTPATIENT
Start: 2019-07-19 | End: 2019-07-19 | Stop reason: HOSPADM

## 2019-07-18 RX ORDER — SODIUM POLYSTYRENE SULFONATE 4.1 MEQ/G
30 POWDER, FOR SUSPENSION ORAL; RECTAL ONCE
Status: COMPLETED | OUTPATIENT
Start: 2019-07-18 | End: 2019-07-18

## 2019-07-18 RX ADMIN — CALCIUM CHLORIDE 1 G: 100 INJECTION, SOLUTION INTRAVENOUS at 23:43

## 2019-07-18 RX ADMIN — SODIUM POLYSTYRENE SULFONATE 30 G: 1 POWDER ORAL; RECTAL at 23:11

## 2019-07-19 VITALS
BODY MASS INDEX: 21.34 KG/M2 | RESPIRATION RATE: 16 BRPM | WEIGHT: 166.3 LBS | TEMPERATURE: 97.7 F | SYSTOLIC BLOOD PRESSURE: 121 MMHG | DIASTOLIC BLOOD PRESSURE: 94 MMHG | OXYGEN SATURATION: 100 % | HEART RATE: 102 BPM | HEIGHT: 74 IN

## 2019-07-19 LAB
ALBUMIN SERPL-MCNC: 3.09 G/DL (ref 3.5–5.2)
ALBUMIN/GLOB SERPL: 0.6 G/DL
ALP SERPL-CCNC: 129 U/L (ref 39–117)
ALT SERPL W P-5'-P-CCNC: 53 U/L (ref 1–41)
ANION GAP SERPL CALCULATED.3IONS-SCNC: 13 MMOL/L (ref 5–15)
AST SERPL-CCNC: 39 U/L (ref 1–40)
BASOPHILS # BLD AUTO: 0.05 10*3/MM3 (ref 0–0.2)
BASOPHILS NFR BLD AUTO: 0.7 % (ref 0–1.5)
BILIRUB SERPL-MCNC: 0.2 MG/DL (ref 0.2–1.2)
BUN BLD-MCNC: 80 MG/DL (ref 6–20)
BUN/CREAT SERPL: 10.6 (ref 7–25)
CALCIUM SPEC-SCNC: 8.7 MG/DL (ref 8.6–10.5)
CHLORIDE SERPL-SCNC: 106 MMOL/L (ref 98–107)
CO2 SERPL-SCNC: 14 MMOL/L (ref 22–29)
CREAT BLD-MCNC: 7.55 MG/DL (ref 0.76–1.27)
DEPRECATED RDW RBC AUTO: 45.8 FL (ref 37–54)
EOSINOPHIL # BLD AUTO: 0.26 10*3/MM3 (ref 0–0.4)
EOSINOPHIL NFR BLD AUTO: 3.4 % (ref 0.3–6.2)
ERYTHROCYTE [DISTWIDTH] IN BLOOD BY AUTOMATED COUNT: 14 % (ref 12.3–15.4)
GFR SERPL CREATININE-BSD FRML MDRD: 8 ML/MIN/1.73
GFR SERPL CREATININE-BSD FRML MDRD: ABNORMAL ML/MIN/1.73
GLOBULIN UR ELPH-MCNC: 4.8 GM/DL
GLUCOSE BLD-MCNC: 109 MG/DL (ref 65–99)
HAV IGM SERPL QL IA: ABNORMAL
HBV CORE IGM SERPL QL IA: ABNORMAL
HBV SURFACE AG SERPL QL IA: REACTIVE
HCT VFR BLD AUTO: 31.8 % (ref 37.5–51)
HCV AB SER DONR QL: REACTIVE
HGB BLD-MCNC: 10 G/DL (ref 13–17.7)
IMM GRANULOCYTES # BLD AUTO: 0.01 10*3/MM3 (ref 0–0.05)
IMM GRANULOCYTES NFR BLD AUTO: 0.1 % (ref 0–0.5)
LYMPHOCYTES # BLD AUTO: 2.92 10*3/MM3 (ref 0.7–3.1)
LYMPHOCYTES NFR BLD AUTO: 38.3 % (ref 19.6–45.3)
MCH RBC QN AUTO: 30.2 PG (ref 26.6–33)
MCHC RBC AUTO-ENTMCNC: 31.4 G/DL (ref 31.5–35.7)
MCV RBC AUTO: 96.1 FL (ref 79–97)
MONOCYTES # BLD AUTO: 0.57 10*3/MM3 (ref 0.1–0.9)
MONOCYTES NFR BLD AUTO: 7.5 % (ref 5–12)
NEUTROPHILS # BLD AUTO: 3.82 10*3/MM3 (ref 1.7–7)
NEUTROPHILS NFR BLD AUTO: 50 % (ref 42.7–76)
PLATELET # BLD AUTO: 198 10*3/MM3 (ref 140–450)
PMV BLD AUTO: 9 FL (ref 6–12)
POTASSIUM BLD-SCNC: 5.9 MMOL/L (ref 3.5–5.2)
PROT SERPL-MCNC: 7.9 G/DL (ref 6–8.5)
RBC # BLD AUTO: 3.31 10*6/MM3 (ref 4.14–5.8)
SODIUM BLD-SCNC: 133 MMOL/L (ref 136–145)
WBC NRBC COR # BLD: 7.63 10*3/MM3 (ref 3.4–10.8)

## 2019-07-19 PROCEDURE — 25010000002 HEPARIN (PORCINE) PER 1000 UNITS: Performed by: INTERNAL MEDICINE

## 2019-07-19 PROCEDURE — 99223 1ST HOSP IP/OBS HIGH 75: CPT | Performed by: INTERNAL MEDICINE

## 2019-07-19 PROCEDURE — 85025 COMPLETE CBC W/AUTO DIFF WBC: CPT | Performed by: INTERNAL MEDICINE

## 2019-07-19 PROCEDURE — 80074 ACUTE HEPATITIS PANEL: CPT | Performed by: INTERNAL MEDICINE

## 2019-07-19 PROCEDURE — 80053 COMPREHEN METABOLIC PANEL: CPT | Performed by: INTERNAL MEDICINE

## 2019-07-19 PROCEDURE — 5A1D70Z PERFORMANCE OF URINARY FILTRATION, INTERMITTENT, LESS THAN 6 HOURS PER DAY: ICD-10-PCS | Performed by: INTERNAL MEDICINE

## 2019-07-19 PROCEDURE — 93010 ELECTROCARDIOGRAM REPORT: CPT | Performed by: INTERNAL MEDICINE

## 2019-07-19 RX ORDER — NICOTINE 21 MG/24HR
1 PATCH, TRANSDERMAL 24 HOURS TRANSDERMAL
Status: DISCONTINUED | OUTPATIENT
Start: 2019-07-19 | End: 2019-07-19 | Stop reason: HOSPADM

## 2019-07-19 RX ADMIN — SODIUM CHLORIDE, PRESERVATIVE FREE 3 ML: 5 INJECTION INTRAVENOUS at 00:01

## 2019-07-19 RX ADMIN — SODIUM BICARBONATE 150 MEQ: 84 INJECTION, SOLUTION INTRAVENOUS at 00:02

## 2019-07-19 RX ADMIN — HEPARIN SODIUM 5000 UNITS: 5000 INJECTION INTRAVENOUS; SUBCUTANEOUS at 00:01

## 2019-07-19 RX ADMIN — SODIUM CHLORIDE 1000 ML: 9 INJECTION, SOLUTION INTRAVENOUS at 08:08

## 2019-07-19 RX ADMIN — SODIUM CHLORIDE, PRESERVATIVE FREE 3 ML: 5 INJECTION INTRAVENOUS at 10:12

## 2019-07-20 LAB — HBV SURFACE AG SERPL QL IA: REACTIVE

## 2019-07-31 PROCEDURE — 99283 EMERGENCY DEPT VISIT LOW MDM: CPT

## 2019-07-31 PROCEDURE — 36415 COLL VENOUS BLD VENIPUNCTURE: CPT

## 2019-08-01 ENCOUNTER — HOSPITAL ENCOUNTER (EMERGENCY)
Facility: HOSPITAL | Age: 44
Discharge: HOME | End: 2019-08-01
Attending: EMERGENCY MEDICINE | Admitting: EMERGENCY MEDICINE

## 2019-08-01 VITALS
SYSTOLIC BLOOD PRESSURE: 160 MMHG | RESPIRATION RATE: 18 BRPM | BODY MASS INDEX: 21.82 KG/M2 | TEMPERATURE: 99.6 F | HEART RATE: 98 BPM | DIASTOLIC BLOOD PRESSURE: 89 MMHG | HEIGHT: 74 IN | OXYGEN SATURATION: 99 % | WEIGHT: 170 LBS

## 2019-08-01 DIAGNOSIS — N18.6 END STAGE RENAL DISEASE (HCC): Primary | ICD-10-CM

## 2019-08-01 LAB
ALBUMIN SERPL-MCNC: 3.72 G/DL (ref 3.5–5.2)
ALBUMIN/GLOB SERPL: 0.8 G/DL
ALP SERPL-CCNC: 137 U/L (ref 39–117)
ALT SERPL W P-5'-P-CCNC: 67 U/L (ref 1–41)
ANION GAP SERPL CALCULATED.3IONS-SCNC: 15.1 MMOL/L (ref 5–15)
APTT PPP: 36.6 SECONDS (ref 23.8–36.1)
AST SERPL-CCNC: 47 U/L (ref 1–40)
BASOPHILS # BLD AUTO: 0.02 10*3/MM3 (ref 0–0.2)
BASOPHILS NFR BLD AUTO: 0.3 % (ref 0–1.5)
BILIRUB SERPL-MCNC: 0.3 MG/DL (ref 0.2–1.2)
BUN BLD-MCNC: 87 MG/DL (ref 6–20)
BUN/CREAT SERPL: 11.9 (ref 7–25)
CALCIUM SPEC-SCNC: 9.1 MG/DL (ref 8.6–10.5)
CHLORIDE SERPL-SCNC: 106 MMOL/L (ref 98–107)
CO2 SERPL-SCNC: 14.9 MMOL/L (ref 22–29)
CREAT BLD-MCNC: 7.29 MG/DL (ref 0.76–1.27)
DEPRECATED RDW RBC AUTO: 44.5 FL (ref 37–54)
EOSINOPHIL # BLD AUTO: 0.21 10*3/MM3 (ref 0–0.4)
EOSINOPHIL NFR BLD AUTO: 3.2 % (ref 0.3–6.2)
ERYTHROCYTE [DISTWIDTH] IN BLOOD BY AUTOMATED COUNT: 13.9 % (ref 12.3–15.4)
GFR SERPL CREATININE-BSD FRML MDRD: 8 ML/MIN/1.73
GFR SERPL CREATININE-BSD FRML MDRD: ABNORMAL ML/MIN/1.73
GLOBULIN UR ELPH-MCNC: 4.9 GM/DL
GLUCOSE BLD-MCNC: 119 MG/DL (ref 65–99)
HCT VFR BLD AUTO: 34.9 % (ref 37.5–51)
HGB BLD-MCNC: 11.6 G/DL (ref 13–17.7)
IMM GRANULOCYTES # BLD AUTO: 0 10*3/MM3 (ref 0–0.05)
IMM GRANULOCYTES NFR BLD AUTO: 0 % (ref 0–0.5)
INR PPP: 1.11 (ref 0.9–1.1)
LYMPHOCYTES # BLD AUTO: 1.54 10*3/MM3 (ref 0.7–3.1)
LYMPHOCYTES NFR BLD AUTO: 23.2 % (ref 19.6–45.3)
MAGNESIUM SERPL-MCNC: 2 MG/DL (ref 1.6–2.6)
MCH RBC QN AUTO: 29.9 PG (ref 26.6–33)
MCHC RBC AUTO-ENTMCNC: 33.2 G/DL (ref 31.5–35.7)
MCV RBC AUTO: 89.9 FL (ref 79–97)
MONOCYTES # BLD AUTO: 0.46 10*3/MM3 (ref 0.1–0.9)
MONOCYTES NFR BLD AUTO: 6.9 % (ref 5–12)
NEUTROPHILS # BLD AUTO: 4.42 10*3/MM3 (ref 1.7–7)
NEUTROPHILS NFR BLD AUTO: 66.4 % (ref 42.7–76)
PHOSPHATE SERPL-MCNC: 5.1 MG/DL (ref 2.5–4.5)
PLATELET # BLD AUTO: 169 10*3/MM3 (ref 140–450)
PMV BLD AUTO: 9.2 FL (ref 6–12)
POTASSIUM BLD-SCNC: 5.6 MMOL/L (ref 3.5–5.2)
PROT SERPL-MCNC: 8.6 G/DL (ref 6–8.5)
PROTHROMBIN TIME: 14.8 SECONDS (ref 11–15.4)
RBC # BLD AUTO: 3.88 10*6/MM3 (ref 4.14–5.8)
SODIUM BLD-SCNC: 136 MMOL/L (ref 136–145)
WBC NRBC COR # BLD: 6.65 10*3/MM3 (ref 3.4–10.8)

## 2019-08-01 PROCEDURE — 85610 PROTHROMBIN TIME: CPT | Performed by: EMERGENCY MEDICINE

## 2019-08-01 PROCEDURE — 80053 COMPREHEN METABOLIC PANEL: CPT | Performed by: EMERGENCY MEDICINE

## 2019-08-01 PROCEDURE — 85025 COMPLETE CBC W/AUTO DIFF WBC: CPT | Performed by: EMERGENCY MEDICINE

## 2019-08-01 PROCEDURE — 83735 ASSAY OF MAGNESIUM: CPT | Performed by: EMERGENCY MEDICINE

## 2019-08-01 PROCEDURE — 84100 ASSAY OF PHOSPHORUS: CPT | Performed by: EMERGENCY MEDICINE

## 2019-08-01 PROCEDURE — 85730 THROMBOPLASTIN TIME PARTIAL: CPT | Performed by: EMERGENCY MEDICINE

## 2019-08-01 NOTE — ED PROVIDER NOTES
Subjective   44-year-old male patient presents to the emergency room today for lab check.  Patient states is been 2 weeks since he has had dialysis.        History provided by:  Patient   used: No        Review of Systems   Constitutional: Negative.    HENT: Negative.    Eyes: Negative.    Respiratory: Negative.    Cardiovascular: Negative.    Gastrointestinal: Negative.    Endocrine: Negative.    Genitourinary: Negative.    Musculoskeletal: Negative.    Skin: Negative.    Allergic/Immunologic: Negative.    Neurological: Negative.    Hematological: Negative.    Psychiatric/Behavioral: Negative.    All other systems reviewed and are negative.      Past Medical History:   Diagnosis Date   • Endocarditis    • ESRD (end stage renal disease) (CMS/[facility])    • IV drug abuse (CMS/[facility])    • Unable to read or write        Allergies   Allergen Reactions   • Penicillins Shortness Of Breath, Itching and Rash       Past Surgical History:   Procedure Laterality Date   • CENTRAL VENOUS CATHETER TUNNELED INSERTION DOUBLE LUMEN Right     Chest for hemodialysis       Family History   Problem Relation Age of Onset   • Alcohol abuse Mother    • Hypertension Mother    • Hypertension Other        Social History     Socioeconomic History   • Marital status:      Spouse name: Not on file   • Number of children: Not on file   • Years of education: Not on file   • Highest education level: Not on file   Tobacco Use   • Smoking status: Current Every Day Smoker     Packs/day: 1.00     Years: 20.00     Pack years: 20.00     Types: Cigarettes   • Smokeless tobacco: Never Used   Substance and Sexual Activity   • Alcohol use: No     Frequency: Never   • Drug use: Yes     Types: Methamphetamines     Comment: last used on Friday 5/18/2019   • Sexual activity: Defer           Objective   Physical Exam   Constitutional: He is oriented to person, place, and time. He appears well-developed and well-nourished.   HENT:   Head:  Normocephalic and atraumatic.   Right Ear: External ear normal.   Left Ear: External ear normal.   Nose: Nose normal.   Mouth/Throat: Oropharynx is clear and moist.   Eyes: Conjunctivae and EOM are normal. Pupils are equal, round, and reactive to light.   Neck: Normal range of motion. Neck supple.   Cardiovascular: Normal rate, regular rhythm, normal heart sounds and intact distal pulses.   Pulmonary/Chest: Effort normal and breath sounds normal.   Abdominal: Soft. Bowel sounds are normal.   Musculoskeletal: Normal range of motion.   Neurological: He is alert and oriented to person, place, and time.   Skin: Skin is warm and dry.   Nursing note and vitals reviewed.      Procedures           ED Course  ED Course as of Aug 01 0534   Thu Aug 01, 2019   0248 D/W Dr. Beck - patient will be DC home.   [ML]      ED Course User Index  [ML] Melissa Ramirez PA                  [facility]      Final diagnoses:   End stage renal disease (CMS/[facility])            Melissa Ramirez PA  08/01/19 0534

## 2019-08-09 ENCOUNTER — HOSPITAL ENCOUNTER (EMERGENCY)
Facility: HOSPITAL | Age: 44
Discharge: LEFT BEFORE DISCHARGE | End: 2019-08-09
Attending: EMERGENCY MEDICINE | Admitting: EMERGENCY MEDICINE

## 2019-08-09 ENCOUNTER — APPOINTMENT (OUTPATIENT)
Dept: GENERAL RADIOLOGY | Facility: HOSPITAL | Age: 44
End: 2019-08-09

## 2019-08-09 VITALS
DIASTOLIC BLOOD PRESSURE: 85 MMHG | BODY MASS INDEX: 21.82 KG/M2 | OXYGEN SATURATION: 100 % | TEMPERATURE: 99 F | HEART RATE: 100 BPM | HEIGHT: 74 IN | SYSTOLIC BLOOD PRESSURE: 139 MMHG | RESPIRATION RATE: 20 BRPM | WEIGHT: 170 LBS

## 2019-08-09 DIAGNOSIS — N18.6 END STAGE RENAL DISEASE (HCC): Primary | ICD-10-CM

## 2019-08-09 LAB
ALBUMIN SERPL-MCNC: 3.85 G/DL (ref 3.5–5.2)
ALBUMIN/GLOB SERPL: 0.8 G/DL
ALP SERPL-CCNC: 151 U/L (ref 39–117)
ALT SERPL W P-5'-P-CCNC: 51 U/L (ref 1–41)
ANION GAP SERPL CALCULATED.3IONS-SCNC: 16.9 MMOL/L (ref 5–15)
AST SERPL-CCNC: 33 U/L (ref 1–40)
BASOPHILS # BLD AUTO: 0.03 10*3/MM3 (ref 0–0.2)
BASOPHILS NFR BLD AUTO: 0.5 % (ref 0–1.5)
BILIRUB SERPL-MCNC: 0.3 MG/DL (ref 0.2–1.2)
BUN BLD-MCNC: 78 MG/DL (ref 6–20)
BUN/CREAT SERPL: 11.6 (ref 7–25)
CALCIUM SPEC-SCNC: 8.6 MG/DL (ref 8.6–10.5)
CHLORIDE SERPL-SCNC: 105 MMOL/L (ref 98–107)
CO2 SERPL-SCNC: 14.1 MMOL/L (ref 22–29)
CREAT BLD-MCNC: 6.72 MG/DL (ref 0.76–1.27)
DEPRECATED RDW RBC AUTO: 45.2 FL (ref 37–54)
EOSINOPHIL # BLD AUTO: 0.18 10*3/MM3 (ref 0–0.4)
EOSINOPHIL NFR BLD AUTO: 3.1 % (ref 0.3–6.2)
ERYTHROCYTE [DISTWIDTH] IN BLOOD BY AUTOMATED COUNT: 14.3 % (ref 12.3–15.4)
GFR SERPL CREATININE-BSD FRML MDRD: 9 ML/MIN/1.73
GFR SERPL CREATININE-BSD FRML MDRD: ABNORMAL ML/MIN/1.73
GLOBULIN UR ELPH-MCNC: 5.1 GM/DL
GLUCOSE BLD-MCNC: 124 MG/DL (ref 65–99)
HCT VFR BLD AUTO: 34.5 % (ref 37.5–51)
HGB BLD-MCNC: 11.1 G/DL (ref 13–17.7)
HOLD SPECIMEN: NORMAL
HOLD SPECIMEN: NORMAL
IMM GRANULOCYTES # BLD AUTO: 0.01 10*3/MM3 (ref 0–0.05)
IMM GRANULOCYTES NFR BLD AUTO: 0.2 % (ref 0–0.5)
LYMPHOCYTES # BLD AUTO: 1.87 10*3/MM3 (ref 0.7–3.1)
LYMPHOCYTES NFR BLD AUTO: 32.6 % (ref 19.6–45.3)
MCH RBC QN AUTO: 28.9 PG (ref 26.6–33)
MCHC RBC AUTO-ENTMCNC: 32.2 G/DL (ref 31.5–35.7)
MCV RBC AUTO: 89.8 FL (ref 79–97)
MONOCYTES # BLD AUTO: 0.44 10*3/MM3 (ref 0.1–0.9)
MONOCYTES NFR BLD AUTO: 7.7 % (ref 5–12)
NEUTROPHILS # BLD AUTO: 3.2 10*3/MM3 (ref 1.7–7)
NEUTROPHILS NFR BLD AUTO: 55.9 % (ref 42.7–76)
PLATELET # BLD AUTO: 164 10*3/MM3 (ref 140–450)
PMV BLD AUTO: 8.8 FL (ref 6–12)
POTASSIUM BLD-SCNC: 4.3 MMOL/L (ref 3.5–5.2)
PROT SERPL-MCNC: 8.9 G/DL (ref 6–8.5)
RBC # BLD AUTO: 3.84 10*6/MM3 (ref 4.14–5.8)
SODIUM BLD-SCNC: 136 MMOL/L (ref 136–145)
TROPONIN T SERPL-MCNC: 0.04 NG/ML (ref 0–0.03)
WBC NRBC COR # BLD: 5.73 10*3/MM3 (ref 3.4–10.8)
WHOLE BLOOD HOLD SPECIMEN: NORMAL
WHOLE BLOOD HOLD SPECIMEN: NORMAL

## 2019-08-09 PROCEDURE — 84484 ASSAY OF TROPONIN QUANT: CPT | Performed by: EMERGENCY MEDICINE

## 2019-08-09 PROCEDURE — 80053 COMPREHEN METABOLIC PANEL: CPT | Performed by: EMERGENCY MEDICINE

## 2019-08-09 PROCEDURE — 99282 EMERGENCY DEPT VISIT SF MDM: CPT

## 2019-08-09 PROCEDURE — 85025 COMPLETE CBC W/AUTO DIFF WBC: CPT | Performed by: EMERGENCY MEDICINE

## 2019-08-09 PROCEDURE — 36415 COLL VENOUS BLD VENIPUNCTURE: CPT

## 2019-08-09 RX ORDER — SODIUM CHLORIDE 0.9 % (FLUSH) 0.9 %
10 SYRINGE (ML) INJECTION AS NEEDED
Status: DISCONTINUED | OUTPATIENT
Start: 2019-08-09 | End: 2019-08-09 | Stop reason: HOSPADM

## 2019-08-09 NOTE — ED NOTES
"Patient refused EKG. Pt stated \" Im not hurting anywhere, I just want labs\" nurse made aware.      Rashawn Licea  08/09/19 1931    "

## 2019-08-09 NOTE — ED PROVIDER NOTES
"Subjective   Patient is a 44-year-old male who has chronic kidney disease.  He states that he never gets dialysis on a regular basis, has not been dialyzed in 20 days.  He presents today with a chief complaint of \"I just want to get my kidney levels checked to see if I need dialysis\".  He states that he is completely asymptomatic, not feeling ill in any way, states that he has been making plenty of urine.            Review of Systems   Constitutional: Negative for chills, diaphoresis and fever.   HENT: Negative for ear pain, sore throat and trouble swallowing.    Eyes: Negative for photophobia and pain.   Respiratory: Negative for shortness of breath, wheezing and stridor.    Cardiovascular: Negative for chest pain and palpitations.   Gastrointestinal: Negative for abdominal distention, abdominal pain, blood in stool, diarrhea, nausea and vomiting.   Endocrine: Negative for polydipsia and polyphagia.   Genitourinary: Negative for difficulty urinating and flank pain.   Musculoskeletal: Negative for back pain, neck pain and neck stiffness.   Skin: Negative for color change and pallor.   Neurological: Negative for seizures, syncope and speech difficulty.   Psychiatric/Behavioral: Negative for confusion.   All other systems reviewed and are negative.      Past Medical History:   Diagnosis Date   • Endocarditis    • ESRD (end stage renal disease) (CMS/[facility])    • IV drug abuse (CMS/[facility])    • Unable to read or write        Allergies   Allergen Reactions   • Penicillins Shortness Of Breath, Itching and Rash       Past Surgical History:   Procedure Laterality Date   • CENTRAL VENOUS CATHETER TUNNELED INSERTION DOUBLE LUMEN Right     Chest for hemodialysis       Family History   Problem Relation Age of Onset   • Alcohol abuse Mother    • Hypertension Mother    • Hypertension Other        Social History     Socioeconomic History   • Marital status:      Spouse name: Not on file   • Number of children: Not on file   • " Years of education: Not on file   • Highest education level: Not on file   Tobacco Use   • Smoking status: Current Every Day Smoker     Packs/day: 1.00     Years: 20.00     Pack years: 20.00     Types: Cigarettes   • Smokeless tobacco: Never Used   Substance and Sexual Activity   • Alcohol use: No     Frequency: Never   • Drug use: Yes     Types: Methamphetamines     Comment: last used on Friday 5/18/2019   • Sexual activity: Defer           Objective   Physical Exam   Constitutional: He is oriented to person, place, and time. He appears well-developed and well-nourished. No distress.   HENT:   Head: Normocephalic and atraumatic.   Eyes: EOM are normal. Pupils are equal, round, and reactive to light. No scleral icterus.   Neck: Normal range of motion. Neck supple. No neck rigidity. No tracheal deviation present.   Cardiovascular: Normal rate, regular rhythm and intact distal pulses.   Pulmonary/Chest: Effort normal and breath sounds normal. No respiratory distress. He exhibits no tenderness.   Abdominal: Soft. Bowel sounds are normal. There is no tenderness. There is no rebound and no guarding.   Musculoskeletal: Normal range of motion. He exhibits no tenderness.   Neurological: He is alert and oriented to person, place, and time. He has normal strength. No sensory deficit. He exhibits normal muscle tone. Coordination normal. GCS eye subscore is 4. GCS verbal subscore is 5. GCS motor subscore is 6.   Skin: Skin is warm and dry. Capillary refill takes less than 2 seconds. No cyanosis. No pallor.   Psychiatric: He has a normal mood and affect. His behavior is normal.   Nursing note and vitals reviewed.      Procedures           ED Course  ED Course as of Aug 14 2218   Fri Aug 09, 2019   1924 Endorsed to Dr. Luna at shift change.  [CM]   2109 ED stay has been uneventful. Pt reports that he can not stay for EKG and troponin and will sign out AMA - verbalizes understanding that risks include death.   [MH]      ED  Course User Index  [CM] Ajay Simmons MD  [MH] Marla Luna,                   MDM  Number of Diagnoses or Management Options  End stage renal disease (CMS/HCC): new and requires workup     Amount and/or Complexity of Data Reviewed  Clinical lab tests: ordered and reviewed  Tests in the radiology section of CPT®: reviewed and ordered  Tests in the medicine section of CPT®: reviewed and ordered  Decide to obtain previous medical records or to obtain history from someone other than the patient: yes  Independent visualization of images, tracings, or specimens: yes    Risk of Complications, Morbidity, and/or Mortality  Presenting problems: high  Diagnostic procedures: high  Management options: high    Patient Progress  Patient progress: (AMA)        Final diagnoses:   End stage renal disease (CMS/HCC)            Marla Luna DO  08/09/19 2118       Marla Luna DO  08/14/19 0929

## 2019-08-10 NOTE — ED NOTES
Patient reports that he does not want any more lab work. He refuses a IV or EKG. Patient states he wants to leave Against Medical Advice. Dr. Luna at bedside talking to patient about the risk of leaving AMA. Patient verbalized understanding of risk for leaving AMA..      Douglas Fletcher RN  08/09/19 1306

## 2019-09-09 ENCOUNTER — TELEPHONE (OUTPATIENT)
Dept: CARDIOLOGY | Facility: CLINIC | Age: 44
End: 2019-09-09

## 2020-01-01 ENCOUNTER — APPOINTMENT (OUTPATIENT)
Dept: NUCLEAR MEDICINE | Facility: HOSPITAL | Age: 45
End: 2020-01-01

## 2020-01-01 ENCOUNTER — HOSPITAL ENCOUNTER (EMERGENCY)
Facility: HOSPITAL | Age: 45
Discharge: LEFT BEFORE DISCHARGE | End: 2020-10-12
Attending: FAMILY MEDICINE | Admitting: FAMILY MEDICINE

## 2020-01-01 ENCOUNTER — OFFICE VISIT (OUTPATIENT)
Dept: FAMILY MEDICINE CLINIC | Facility: CLINIC | Age: 45
End: 2020-01-01

## 2020-01-01 ENCOUNTER — APPOINTMENT (OUTPATIENT)
Dept: GENERAL RADIOLOGY | Facility: HOSPITAL | Age: 45
End: 2020-01-01

## 2020-01-01 ENCOUNTER — HOSPITAL ENCOUNTER (INPATIENT)
Facility: HOSPITAL | Age: 45
LOS: 1 days | Discharge: LEFT BEFORE DISCHARGE | End: 2020-11-15
Attending: EMERGENCY MEDICINE | Admitting: HOSPITALIST

## 2020-01-01 ENCOUNTER — APPOINTMENT (OUTPATIENT)
Dept: CT IMAGING | Facility: HOSPITAL | Age: 45
End: 2020-01-01

## 2020-01-01 ENCOUNTER — HOSPITAL ENCOUNTER (EMERGENCY)
Facility: HOSPITAL | Age: 45
Discharge: HOME | End: 2020-10-21
Attending: EMERGENCY MEDICINE | Admitting: FAMILY MEDICINE

## 2020-01-01 ENCOUNTER — TELEPHONE (OUTPATIENT)
Dept: INTERNAL MEDICINE | Facility: HOSPITAL | Age: 45
End: 2020-01-01

## 2020-01-01 ENCOUNTER — HOSPITAL ENCOUNTER (INPATIENT)
Facility: HOSPITAL | Age: 45
LOS: 1 days | Discharge: LEFT BEFORE DISCHARGE | End: 2020-10-16
Attending: EMERGENCY MEDICINE | Admitting: HOSPITALIST

## 2020-01-01 ENCOUNTER — HOSPITAL ENCOUNTER (INPATIENT)
Facility: HOSPITAL | Age: 45
LOS: 2 days | Discharge: LEFT BEFORE DISCHARGE | End: 2021-01-02
Attending: EMERGENCY MEDICINE | Admitting: INTERNAL MEDICINE

## 2020-01-01 ENCOUNTER — HOSPITAL ENCOUNTER (EMERGENCY)
Facility: HOSPITAL | Age: 45
Discharge: LEFT BEFORE DISCHARGE | End: 2020-10-14
Attending: EMERGENCY MEDICINE | Admitting: FAMILY MEDICINE

## 2020-01-01 VITALS
HEART RATE: 80 BPM | HEIGHT: 74 IN | TEMPERATURE: 98 F | WEIGHT: 152 LBS | DIASTOLIC BLOOD PRESSURE: 72 MMHG | OXYGEN SATURATION: 100 % | SYSTOLIC BLOOD PRESSURE: 102 MMHG | RESPIRATION RATE: 16 BRPM | BODY MASS INDEX: 19.51 KG/M2

## 2020-01-01 VITALS
HEART RATE: 96 BPM | BODY MASS INDEX: 22.2 KG/M2 | RESPIRATION RATE: 14 BRPM | WEIGHT: 173 LBS | TEMPERATURE: 97.1 F | SYSTOLIC BLOOD PRESSURE: 122 MMHG | OXYGEN SATURATION: 97 % | HEIGHT: 74 IN | DIASTOLIC BLOOD PRESSURE: 60 MMHG

## 2020-01-01 VITALS
TEMPERATURE: 97 F | HEIGHT: 74 IN | OXYGEN SATURATION: 99 % | HEART RATE: 100 BPM | RESPIRATION RATE: 17 BRPM | WEIGHT: 165 LBS | DIASTOLIC BLOOD PRESSURE: 103 MMHG | BODY MASS INDEX: 21.17 KG/M2 | SYSTOLIC BLOOD PRESSURE: 158 MMHG

## 2020-01-01 VITALS
WEIGHT: 152 LBS | HEIGHT: 74 IN | OXYGEN SATURATION: 99 % | DIASTOLIC BLOOD PRESSURE: 66 MMHG | TEMPERATURE: 98 F | SYSTOLIC BLOOD PRESSURE: 96 MMHG | BODY MASS INDEX: 19.51 KG/M2 | HEART RATE: 81 BPM | RESPIRATION RATE: 18 BRPM

## 2020-01-01 VITALS
HEIGHT: 74 IN | OXYGEN SATURATION: 98 % | DIASTOLIC BLOOD PRESSURE: 80 MMHG | SYSTOLIC BLOOD PRESSURE: 132 MMHG | BODY MASS INDEX: 21.3 KG/M2 | RESPIRATION RATE: 14 BRPM | WEIGHT: 166 LBS | HEART RATE: 91 BPM | TEMPERATURE: 97.3 F

## 2020-01-01 VITALS
HEIGHT: 74 IN | TEMPERATURE: 97.2 F | OXYGEN SATURATION: 97 % | RESPIRATION RATE: 20 BRPM | BODY MASS INDEX: 23.13 KG/M2 | WEIGHT: 180.2 LBS | HEART RATE: 96 BPM | DIASTOLIC BLOOD PRESSURE: 78 MMHG | SYSTOLIC BLOOD PRESSURE: 145 MMHG

## 2020-01-01 VITALS
OXYGEN SATURATION: 99 % | TEMPERATURE: 98.1 F | RESPIRATION RATE: 20 BRPM | HEART RATE: 88 BPM | DIASTOLIC BLOOD PRESSURE: 68 MMHG | SYSTOLIC BLOOD PRESSURE: 104 MMHG | BODY MASS INDEX: 20.15 KG/M2 | HEIGHT: 74 IN | WEIGHT: 157 LBS

## 2020-01-01 DIAGNOSIS — E87.5 HYPERKALEMIA: ICD-10-CM

## 2020-01-01 DIAGNOSIS — J44.9 COPD MIXED TYPE (HCC): ICD-10-CM

## 2020-01-01 DIAGNOSIS — K21.9 GASTROESOPHAGEAL REFLUX DISEASE WITHOUT ESOPHAGITIS: Chronic | ICD-10-CM

## 2020-01-01 DIAGNOSIS — D64.9 ANEMIA, UNSPECIFIED TYPE: Primary | ICD-10-CM

## 2020-01-01 DIAGNOSIS — N18.6 ESRD (END STAGE RENAL DISEASE) (HCC): Primary | ICD-10-CM

## 2020-01-01 DIAGNOSIS — Z00.00 HEALTHCARE MAINTENANCE: ICD-10-CM

## 2020-01-01 DIAGNOSIS — E87.20 METABOLIC ACIDOSIS: ICD-10-CM

## 2020-01-01 DIAGNOSIS — R77.8 ELEVATED TROPONIN: ICD-10-CM

## 2020-01-01 DIAGNOSIS — N18.6 ESRD (END STAGE RENAL DISEASE) (HCC): ICD-10-CM

## 2020-01-01 DIAGNOSIS — N18.4 ANEMIA DUE TO STAGE 4 CHRONIC KIDNEY DISEASE (HCC): Chronic | ICD-10-CM

## 2020-01-01 DIAGNOSIS — R60.0 BILATERAL EDEMA OF LOWER EXTREMITY: Primary | ICD-10-CM

## 2020-01-01 DIAGNOSIS — R76.8 HEPATITIS C ANTIBODY TEST POSITIVE: ICD-10-CM

## 2020-01-01 DIAGNOSIS — N18.6 END STAGE RENAL DISEASE (HCC): Primary | ICD-10-CM

## 2020-01-01 DIAGNOSIS — N18.4 CHRONIC RENAL FAILURE, STAGE 4 (SEVERE) (HCC): Primary | Chronic | ICD-10-CM

## 2020-01-01 DIAGNOSIS — A49.9 BACTERIAL UTI: ICD-10-CM

## 2020-01-01 DIAGNOSIS — Z23 ENCOUNTER FOR IMMUNIZATION: ICD-10-CM

## 2020-01-01 DIAGNOSIS — N32.89 BLADDER WALL THICKENING: ICD-10-CM

## 2020-01-01 DIAGNOSIS — D63.1 ANEMIA DUE TO STAGE 5 CHRONIC KIDNEY DISEASE, NOT ON CHRONIC DIALYSIS (HCC): Primary | ICD-10-CM

## 2020-01-01 DIAGNOSIS — R59.0 RETROPERITONEAL LYMPHADENOPATHY: Primary | ICD-10-CM

## 2020-01-01 DIAGNOSIS — F17.200 CURRENT SMOKER: ICD-10-CM

## 2020-01-01 DIAGNOSIS — N18.6 END-STAGE KIDNEY DISEASE (HCC): Primary | ICD-10-CM

## 2020-01-01 DIAGNOSIS — N18.5 ANEMIA DUE TO STAGE 5 CHRONIC KIDNEY DISEASE, NOT ON CHRONIC DIALYSIS (HCC): Primary | ICD-10-CM

## 2020-01-01 DIAGNOSIS — N39.0 BACTERIAL UTI: ICD-10-CM

## 2020-01-01 DIAGNOSIS — N39.0 URINARY TRACT INFECTION WITHOUT HEMATURIA, SITE UNSPECIFIED: ICD-10-CM

## 2020-01-01 DIAGNOSIS — Z91.199 PATIENT'S NONCOMPLIANCE WITH OTHER MEDICAL TREATMENT AND REGIMEN: ICD-10-CM

## 2020-01-01 DIAGNOSIS — D63.1 ANEMIA DUE TO STAGE 4 CHRONIC KIDNEY DISEASE (HCC): Chronic | ICD-10-CM

## 2020-01-01 LAB
6-ACETYL MORPHINE: NEGATIVE
A-A DO2: 13.2 MMHG (ref 0–300)
A-A DO2: 17.4 MMHG (ref 0–300)
A-A DO2: 19 MMHG (ref 0–300)
ABO GROUP BLD: NORMAL
ALBUMIN SERPL-MCNC: 2.84 G/DL (ref 3.5–5.2)
ALBUMIN SERPL-MCNC: 2.94 G/DL (ref 3.5–5.2)
ALBUMIN SERPL-MCNC: 3.17 G/DL (ref 3.5–5.2)
ALBUMIN SERPL-MCNC: 3.29 G/DL (ref 3.5–5.2)
ALBUMIN SERPL-MCNC: 3.39 G/DL (ref 3.5–5.2)
ALBUMIN SERPL-MCNC: 3.39 G/DL (ref 3.5–5.2)
ALBUMIN SERPL-MCNC: 3.57 G/DL (ref 3.5–5.2)
ALBUMIN SERPL-MCNC: 3.6 G/DL (ref 3.5–5.2)
ALBUMIN/GLOB SERPL: 0.7 G/DL
ALBUMIN/GLOB SERPL: 0.8 G/DL
ALP SERPL-CCNC: 101 U/L (ref 39–117)
ALP SERPL-CCNC: 113 U/L (ref 39–117)
ALP SERPL-CCNC: 115 U/L (ref 39–117)
ALP SERPL-CCNC: 226 U/L (ref 39–117)
ALP SERPL-CCNC: 344 U/L (ref 39–117)
ALP SERPL-CCNC: 399 U/L (ref 39–117)
ALP SERPL-CCNC: 462 U/L (ref 39–117)
ALP SERPL-CCNC: 81 U/L (ref 39–117)
ALT SERPL W P-5'-P-CCNC: 123 U/L (ref 1–41)
ALT SERPL W P-5'-P-CCNC: 16 U/L (ref 1–41)
ALT SERPL W P-5'-P-CCNC: 16 U/L (ref 1–41)
ALT SERPL W P-5'-P-CCNC: 19 U/L (ref 1–41)
ALT SERPL W P-5'-P-CCNC: 21 U/L (ref 1–41)
ALT SERPL W P-5'-P-CCNC: 63 U/L (ref 1–41)
ALT SERPL W P-5'-P-CCNC: 71 U/L (ref 1–41)
ALT SERPL W P-5'-P-CCNC: 74 U/L (ref 1–41)
AMPHET+METHAMPHET UR QL: NEGATIVE
AMYLASE SERPL-CCNC: 144 U/L (ref 28–100)
ANION GAP SERPL CALCULATED.3IONS-SCNC: 16.5 MMOL/L (ref 5–15)
ANION GAP SERPL CALCULATED.3IONS-SCNC: 17.4 MMOL/L (ref 5–15)
ANION GAP SERPL CALCULATED.3IONS-SCNC: 18.1 MMOL/L (ref 5–15)
ANION GAP SERPL CALCULATED.3IONS-SCNC: 18.7 MMOL/L (ref 5–15)
ANION GAP SERPL CALCULATED.3IONS-SCNC: 19.6 MMOL/L (ref 5–15)
ANION GAP SERPL CALCULATED.3IONS-SCNC: 22.9 MMOL/L (ref 5–15)
ANION GAP SERPL CALCULATED.3IONS-SCNC: 23.7 MMOL/L (ref 5–15)
ANION GAP SERPL CALCULATED.3IONS-SCNC: 23.8 MMOL/L (ref 5–15)
ANION GAP SERPL CALCULATED.3IONS-SCNC: 24 MMOL/L (ref 5–15)
ANION GAP SERPL CALCULATED.3IONS-SCNC: 24.2 MMOL/L (ref 5–15)
ANTI-K: NORMAL
APAP SERPL-MCNC: <5 MCG/ML (ref 0–30)
APTT PPP: 36.9 SECONDS (ref 25.6–35.3)
APTT PPP: 37.9 SECONDS (ref 25.6–35.3)
APTT PPP: 40.8 SECONDS (ref 25.6–35.3)
ARTERIAL PATENCY WRIST A: POSITIVE
AST SERPL-CCNC: 12 U/L (ref 1–40)
AST SERPL-CCNC: 13 U/L (ref 1–40)
AST SERPL-CCNC: 13 U/L (ref 1–40)
AST SERPL-CCNC: 15 U/L (ref 1–40)
AST SERPL-CCNC: 17 U/L (ref 1–40)
AST SERPL-CCNC: 26 U/L (ref 1–40)
AST SERPL-CCNC: 51 U/L (ref 1–40)
AST SERPL-CCNC: 55 U/L (ref 1–40)
ATMOSPHERIC PRESS: 721 MMHG
ATMOSPHERIC PRESS: 723 MMHG
ATMOSPHERIC PRESS: 730 MMHG
BACTERIA SPEC AEROBE CULT: ABNORMAL
BACTERIA SPEC AEROBE CULT: NORMAL
BACTERIA UR QL AUTO: ABNORMAL /HPF
BARBITURATES UR QL SCN: NEGATIVE
BASE EXCESS BLDA CALC-SCNC: -20.5 MMOL/L (ref 0–2)
BASE EXCESS BLDA CALC-SCNC: -7.8 MMOL/L (ref 0–2)
BASE EXCESS BLDA CALC-SCNC: -8.6 MMOL/L (ref 0–2)
BASE EXCESS BLDV CALC-SCNC: -14.4 MMOL/L (ref 0–2)
BASE EXCESS BLDV CALC-SCNC: -7 MMOL/L (ref 0–2)
BASOPHILS # BLD AUTO: 0.02 10*3/MM3 (ref 0–0.2)
BASOPHILS # BLD AUTO: 0.04 10*3/MM3 (ref 0–0.2)
BASOPHILS # BLD AUTO: 0.05 10*3/MM3 (ref 0–0.2)
BASOPHILS # BLD AUTO: 0.06 10*3/MM3 (ref 0–0.2)
BASOPHILS # BLD AUTO: 0.06 10*3/MM3 (ref 0–0.2)
BASOPHILS # BLD AUTO: 0.07 10*3/MM3 (ref 0–0.2)
BASOPHILS NFR BLD AUTO: 0.3 % (ref 0–1.5)
BASOPHILS NFR BLD AUTO: 0.5 % (ref 0–1.5)
BASOPHILS NFR BLD AUTO: 0.6 % (ref 0–1.5)
BASOPHILS NFR BLD AUTO: 0.6 % (ref 0–1.5)
BASOPHILS NFR BLD AUTO: 0.7 % (ref 0–1.5)
BASOPHILS NFR BLD AUTO: 0.8 % (ref 0–1.5)
BASOPHILS NFR BLD AUTO: 0.9 % (ref 0–1.5)
BASOPHILS NFR BLD AUTO: 0.9 % (ref 0–1.5)
BDY SITE: ABNORMAL
BENZODIAZ UR QL SCN: NEGATIVE
BH BB BLOOD EXPIRATION DATE: NORMAL
BH BB BLOOD TYPE BARCODE: 5100
BH BB DISPENSE STATUS: NORMAL
BH BB PRODUCT CODE: NORMAL
BH BB UNIT NUMBER: NORMAL
BILIRUB BLD-MCNC: NEGATIVE MG/DL
BILIRUB SERPL-MCNC: 0.3 MG/DL (ref 0–1.2)
BILIRUB SERPL-MCNC: 0.4 MG/DL (ref 0–1.2)
BILIRUB UR QL STRIP: NEGATIVE
BLD GP AB SCN SERPL QL ELUTION: NEGATIVE
BLD GP AB SCN SERPL QL: POSITIVE
BNP SERPL-MCNC: 328.7 PG/ML (ref 0–100)
BODY TEMPERATURE: 0 C
BODY TEMPERATURE: 37 C
BODY TEMPERATURE: 37 C
BUN SERPL-MCNC: 127 MG/DL (ref 6–20)
BUN SERPL-MCNC: 135 MG/DL (ref 6–20)
BUN SERPL-MCNC: 135 MG/DL (ref 6–20)
BUN SERPL-MCNC: 144 MG/DL (ref 6–20)
BUN SERPL-MCNC: 148 MG/DL (ref 6–20)
BUN SERPL-MCNC: 149 MG/DL (ref 6–20)
BUN SERPL-MCNC: 149 MG/DL (ref 6–20)
BUN SERPL-MCNC: 227 MG/DL (ref 6–20)
BUN/CREAT SERPL: 11.5 (ref 7–25)
BUN/CREAT SERPL: 11.9 (ref 7–25)
BUN/CREAT SERPL: 12.1 (ref 7–25)
BUN/CREAT SERPL: 12.4 (ref 7–25)
BUN/CREAT SERPL: 14.1 (ref 7–25)
BUN/CREAT SERPL: 15.2 (ref 7–25)
BUN/CREAT SERPL: 15.3 (ref 7–25)
BUN/CREAT SERPL: 16.5 (ref 7–25)
BUPRENORPHINE SERPL-MCNC: NEGATIVE NG/ML
CALCIUM SPEC-SCNC: 6.4 MG/DL (ref 8.6–10.5)
CALCIUM SPEC-SCNC: 6.6 MG/DL (ref 8.6–10.5)
CALCIUM SPEC-SCNC: 6.9 MG/DL (ref 8.6–10.5)
CALCIUM SPEC-SCNC: 6.9 MG/DL (ref 8.6–10.5)
CALCIUM SPEC-SCNC: 7.2 MG/DL (ref 8.6–10.5)
CALCIUM SPEC-SCNC: 7.4 MG/DL (ref 8.6–10.5)
CALCIUM SPEC-SCNC: 8 MG/DL (ref 8.6–10.5)
CALCIUM SPEC-SCNC: 8.1 MG/DL (ref 8.6–10.5)
CALCIUM SPEC-SCNC: 8.2 MG/DL (ref 8.6–10.5)
CALCIUM SPEC-SCNC: 8.8 MG/DL (ref 8.6–10.5)
CANNABINOIDS SERPL QL: NEGATIVE
CHLORIDE SERPL-SCNC: 102 MMOL/L (ref 98–107)
CHLORIDE SERPL-SCNC: 103 MMOL/L (ref 98–107)
CHLORIDE SERPL-SCNC: 103 MMOL/L (ref 98–107)
CHLORIDE SERPL-SCNC: 104 MMOL/L (ref 98–107)
CHLORIDE SERPL-SCNC: 86 MMOL/L (ref 98–107)
CHLORIDE SERPL-SCNC: 90 MMOL/L (ref 98–107)
CHLORIDE SERPL-SCNC: 91 MMOL/L (ref 98–107)
CHLORIDE SERPL-SCNC: 92 MMOL/L (ref 98–107)
CHLORIDE SERPL-SCNC: 93 MMOL/L (ref 98–107)
CHLORIDE SERPL-SCNC: 98 MMOL/L (ref 98–107)
CK SERPL-CCNC: 113 U/L (ref 20–200)
CLARITY UR: ABNORMAL
CLARITY, POC: ABNORMAL
CO2 BLDA-SCNC: 12.9 MMOL/L (ref 22–33)
CO2 BLDA-SCNC: 17.5 MMOL/L (ref 22–33)
CO2 BLDA-SCNC: 17.6 MMOL/L (ref 22–33)
CO2 BLDA-SCNC: 20.6 MMOL/L (ref 22–33)
CO2 BLDA-SCNC: 7.2 MMOL/L (ref 22–33)
CO2 SERPL-SCNC: 10.9 MMOL/L (ref 22–29)
CO2 SERPL-SCNC: 11.3 MMOL/L (ref 22–29)
CO2 SERPL-SCNC: 11.4 MMOL/L (ref 22–29)
CO2 SERPL-SCNC: 12.5 MMOL/L (ref 22–29)
CO2 SERPL-SCNC: 15 MMOL/L (ref 22–29)
CO2 SERPL-SCNC: 16.1 MMOL/L (ref 22–29)
CO2 SERPL-SCNC: 16.2 MMOL/L (ref 22–29)
CO2 SERPL-SCNC: 16.3 MMOL/L (ref 22–29)
CO2 SERPL-SCNC: 18.6 MMOL/L (ref 22–29)
CO2 SERPL-SCNC: 6.8 MMOL/L (ref 22–29)
COCAINE UR QL: NEGATIVE
COHGB MFR BLD: 1 % (ref 0–5)
COHGB MFR BLD: 1.3 % (ref 0–5)
COHGB MFR BLD: 1.5 % (ref 0–5)
COHGB MFR BLD: 1.6 % (ref 0–5)
COHGB MFR BLD: 1.7 % (ref 0–5)
COLOR UR: YELLOW
CREAT SERPL-MCNC: 10.59 MG/DL (ref 0.76–1.27)
CREAT SERPL-MCNC: 11.3 MG/DL (ref 0.76–1.27)
CREAT SERPL-MCNC: 11.64 MG/DL (ref 0.76–1.27)
CREAT SERPL-MCNC: 11.74 MG/DL (ref 0.76–1.27)
CREAT SERPL-MCNC: 11.87 MG/DL (ref 0.76–1.27)
CREAT SERPL-MCNC: 13.79 MG/DL (ref 0.76–1.27)
CREAT SERPL-MCNC: 8.36 MG/DL (ref 0.76–1.27)
CREAT SERPL-MCNC: 9.42 MG/DL (ref 0.76–1.27)
CREAT SERPL-MCNC: 9.67 MG/DL (ref 0.76–1.27)
CREAT SERPL-MCNC: 9.73 MG/DL (ref 0.76–1.27)
CROSSMATCH INTERPRETATION: NORMAL
CRP SERPL-MCNC: 10 MG/DL (ref 0–0.5)
CRP SERPL-MCNC: 10.6 MG/DL (ref 0–0.5)
CRP SERPL-MCNC: 3.68 MG/DL (ref 0–0.5)
CRP SERPL-MCNC: 9.31 MG/DL (ref 0–0.5)
D DIMER PPP FEU-MCNC: 7.33 MCGFEU/ML (ref 0–0.5)
D-LACTATE SERPL-SCNC: 0.8 MMOL/L (ref 0.5–2)
DAT IGG GEL: POSITIVE
DAT POLY-SP REAG RBC QL: POSITIVE
DAT POLY-SP REAG RBC QL: POSITIVE
DEPRECATED RDW RBC AUTO: 48 FL (ref 37–54)
DEPRECATED RDW RBC AUTO: 48.1 FL (ref 37–54)
DEPRECATED RDW RBC AUTO: 50 FL (ref 37–54)
DEPRECATED RDW RBC AUTO: 50.3 FL (ref 37–54)
DEPRECATED RDW RBC AUTO: 51.9 FL (ref 37–54)
DEPRECATED RDW RBC AUTO: 52.8 FL (ref 37–54)
DEPRECATED RDW RBC AUTO: 53.1 FL (ref 37–54)
DEPRECATED RDW RBC AUTO: 53.6 FL (ref 37–54)
DEPRECATED RDW RBC AUTO: 54.2 FL (ref 37–54)
EOSINOPHIL # BLD AUTO: 0.08 10*3/MM3 (ref 0–0.4)
EOSINOPHIL # BLD AUTO: 0.15 10*3/MM3 (ref 0–0.4)
EOSINOPHIL # BLD AUTO: 0.16 10*3/MM3 (ref 0–0.4)
EOSINOPHIL # BLD AUTO: 0.19 10*3/MM3 (ref 0–0.4)
EOSINOPHIL # BLD AUTO: 0.21 10*3/MM3 (ref 0–0.4)
EOSINOPHIL # BLD AUTO: 0.28 10*3/MM3 (ref 0–0.4)
EOSINOPHIL # BLD AUTO: 0.29 10*3/MM3 (ref 0–0.4)
EOSINOPHIL # BLD AUTO: 0.31 10*3/MM3 (ref 0–0.4)
EOSINOPHIL NFR BLD AUTO: 1.3 % (ref 0.3–6.2)
EOSINOPHIL NFR BLD AUTO: 1.7 % (ref 0.3–6.2)
EOSINOPHIL NFR BLD AUTO: 2 % (ref 0.3–6.2)
EOSINOPHIL NFR BLD AUTO: 2.4 % (ref 0.3–6.2)
EOSINOPHIL NFR BLD AUTO: 3.3 % (ref 0.3–6.2)
EOSINOPHIL NFR BLD AUTO: 4.2 % (ref 0.3–6.2)
EOSINOPHIL NFR BLD AUTO: 4.4 % (ref 0.3–6.2)
EOSINOPHIL NFR BLD AUTO: 4.8 % (ref 0.3–6.2)
ERYTHROCYTE [DISTWIDTH] IN BLOOD BY AUTOMATED COUNT: 14.6 % (ref 12.3–15.4)
ERYTHROCYTE [DISTWIDTH] IN BLOOD BY AUTOMATED COUNT: 14.7 % (ref 12.3–15.4)
ERYTHROCYTE [DISTWIDTH] IN BLOOD BY AUTOMATED COUNT: 14.9 % (ref 12.3–15.4)
ERYTHROCYTE [DISTWIDTH] IN BLOOD BY AUTOMATED COUNT: 14.9 % (ref 12.3–15.4)
ERYTHROCYTE [DISTWIDTH] IN BLOOD BY AUTOMATED COUNT: 15.4 % (ref 12.3–15.4)
ERYTHROCYTE [DISTWIDTH] IN BLOOD BY AUTOMATED COUNT: 15.4 % (ref 12.3–15.4)
ERYTHROCYTE [DISTWIDTH] IN BLOOD BY AUTOMATED COUNT: 15.5 % (ref 12.3–15.4)
ERYTHROCYTE [DISTWIDTH] IN BLOOD BY AUTOMATED COUNT: 15.5 % (ref 12.3–15.4)
ERYTHROCYTE [DISTWIDTH] IN BLOOD BY AUTOMATED COUNT: 15.7 % (ref 12.3–15.4)
ERYTHROCYTE [SEDIMENTATION RATE] IN BLOOD: 74 MM/HR (ref 0–15)
ETHANOL BLD-MCNC: <10 MG/DL (ref 0–10)
ETHANOL BLD-MCNC: <10 MG/DL (ref 0–10)
ETHANOL UR QL: <0.01 %
ETHANOL UR QL: <0.01 %
FERRITIN SERPL-MCNC: 1057 NG/ML (ref 30–400)
FLUAV RNA RESP QL NAA+PROBE: NOT DETECTED
FLUBV RNA RESP QL NAA+PROBE: NOT DETECTED
FOLATE SERPL-MCNC: 11 NG/ML (ref 4.78–24.2)
GFR SERPL CREATININE-BSD FRML MDRD: 4 ML/MIN/1.73
GFR SERPL CREATININE-BSD FRML MDRD: 5 ML/MIN/1.73
GFR SERPL CREATININE-BSD FRML MDRD: 6 ML/MIN/1.73
GFR SERPL CREATININE-BSD FRML MDRD: 7 ML/MIN/1.73
GFR SERPL CREATININE-BSD FRML MDRD: ABNORMAL ML/MIN/{1.73_M2}
GLOBULIN UR ELPH-MCNC: 4.2 GM/DL
GLOBULIN UR ELPH-MCNC: 4.3 GM/DL
GLOBULIN UR ELPH-MCNC: 4.4 GM/DL
GLOBULIN UR ELPH-MCNC: 4.5 GM/DL
GLOBULIN UR ELPH-MCNC: 4.5 GM/DL
GLOBULIN UR ELPH-MCNC: 4.6 GM/DL
GLOBULIN UR ELPH-MCNC: 4.7 GM/DL
GLOBULIN UR ELPH-MCNC: 4.9 GM/DL
GLUCOSE BLDC GLUCOMTR-MCNC: 134 MG/DL (ref 70–130)
GLUCOSE BLDC GLUCOMTR-MCNC: 172 MG/DL (ref 70–130)
GLUCOSE BLDC GLUCOMTR-MCNC: 193 MG/DL (ref 70–130)
GLUCOSE SERPL-MCNC: 106 MG/DL (ref 65–99)
GLUCOSE SERPL-MCNC: 126 MG/DL (ref 65–99)
GLUCOSE SERPL-MCNC: 137 MG/DL (ref 65–99)
GLUCOSE SERPL-MCNC: 141 MG/DL (ref 65–99)
GLUCOSE SERPL-MCNC: 153 MG/DL (ref 65–99)
GLUCOSE SERPL-MCNC: 169 MG/DL (ref 65–99)
GLUCOSE SERPL-MCNC: 64 MG/DL (ref 65–99)
GLUCOSE SERPL-MCNC: 81 MG/DL (ref 65–99)
GLUCOSE SERPL-MCNC: 87 MG/DL (ref 65–99)
GLUCOSE SERPL-MCNC: 93 MG/DL (ref 65–99)
GLUCOSE UR STRIP-MCNC: NEGATIVE MG/DL
HAV IGM SERPL QL IA: ABNORMAL
HBA1C MFR BLD: 5.2 % (ref 4.8–5.6)
HBV CORE IGM SERPL QL IA: ABNORMAL
HBV SURFACE AG SERPL QL IA: ABNORMAL
HCO3 BLDA-SCNC: 16.5 MMOL/L (ref 20–26)
HCO3 BLDA-SCNC: 16.7 MMOL/L (ref 20–26)
HCO3 BLDA-SCNC: 6.6 MMOL/L (ref 20–26)
HCO3 BLDV-SCNC: 12 MMOL/L (ref 22–28)
HCO3 BLDV-SCNC: 19.3 MMOL/L (ref 22–28)
HCT VFR BLD AUTO: 18.7 % (ref 37.5–51)
HCT VFR BLD AUTO: 20.7 % (ref 37.5–51)
HCT VFR BLD AUTO: 22.2 % (ref 37.5–51)
HCT VFR BLD AUTO: 22.3 % (ref 37.5–51)
HCT VFR BLD AUTO: 23 % (ref 37.5–51)
HCT VFR BLD AUTO: 23.2 % (ref 37.5–51)
HCT VFR BLD AUTO: 24.7 % (ref 37.5–51)
HCT VFR BLD AUTO: 29.2 % (ref 37.5–51)
HCT VFR BLD AUTO: 29.2 % (ref 37.5–51)
HCT VFR BLD CALC: 18.9 % (ref 38–51)
HCT VFR BLD CALC: 21.2 % (ref 38–51)
HCT VFR BLD CALC: 23.9 % (ref 38–51)
HCV AB SER DONR QL: REACTIVE
HGB BLD-MCNC: 6 G/DL (ref 13–17.7)
HGB BLD-MCNC: 6.5 G/DL (ref 13–17.7)
HGB BLD-MCNC: 6.8 G/DL (ref 13–17.7)
HGB BLD-MCNC: 6.9 G/DL (ref 13–17.7)
HGB BLD-MCNC: 7.1 G/DL (ref 13–17.7)
HGB BLD-MCNC: 7.2 G/DL (ref 13–17.7)
HGB BLD-MCNC: 7.5 G/DL (ref 13–17.7)
HGB BLD-MCNC: 8.8 G/DL (ref 13–17.7)
HGB BLD-MCNC: 9 G/DL (ref 13–17.7)
HGB BLDA-MCNC: 6.2 G/DL (ref 14–18)
HGB BLDA-MCNC: 6.9 G/DL (ref 14–18)
HGB BLDA-MCNC: 7.1 G/DL (ref 14–18)
HGB BLDA-MCNC: 7.4 G/DL (ref 14–18)
HGB BLDA-MCNC: 7.8 G/DL (ref 14–18)
HGB UR QL STRIP.AUTO: ABNORMAL
HYALINE CASTS UR QL AUTO: ABNORMAL /LPF
IMM GRANULOCYTES # BLD AUTO: 0.03 10*3/MM3 (ref 0–0.05)
IMM GRANULOCYTES # BLD AUTO: 0.04 10*3/MM3 (ref 0–0.05)
IMM GRANULOCYTES # BLD AUTO: 0.04 10*3/MM3 (ref 0–0.05)
IMM GRANULOCYTES # BLD AUTO: 0.07 10*3/MM3 (ref 0–0.05)
IMM GRANULOCYTES # BLD AUTO: 0.13 10*3/MM3 (ref 0–0.05)
IMM GRANULOCYTES NFR BLD AUTO: 0.3 % (ref 0–0.5)
IMM GRANULOCYTES NFR BLD AUTO: 0.4 % (ref 0–0.5)
IMM GRANULOCYTES NFR BLD AUTO: 0.5 % (ref 0–0.5)
IMM GRANULOCYTES NFR BLD AUTO: 0.6 % (ref 0–0.5)
IMM GRANULOCYTES NFR BLD AUTO: 0.9 % (ref 0–0.5)
IMM GRANULOCYTES NFR BLD AUTO: 2.2 % (ref 0–0.5)
INHALED O2 CONCENTRATION: 21 %
INR PPP: 1.34 (ref 0.9–1.1)
INR PPP: 1.43 (ref 0.9–1.1)
INR PPP: 1.45 (ref 0.9–1.1)
INR PPP: 1.57 (ref 0.9–1.1)
IRON 24H UR-MRATE: 52 MCG/DL (ref 59–158)
IRON SATN MFR SERPL: 24 % (ref 20–50)
KETONES UR QL STRIP: NEGATIVE
KETONES UR QL: NEGATIVE
LDH SERPL-CCNC: 211 U/L (ref 135–225)
LEUKOCYTE EST, POC: ABNORMAL
LEUKOCYTE ESTERASE UR QL STRIP.AUTO: ABNORMAL
LIPASE SERPL-CCNC: 188 U/L (ref 13–60)
LIPASE SERPL-CCNC: 35 U/L (ref 13–60)
LYMPHOCYTES # BLD AUTO: 0.82 10*3/MM3 (ref 0.7–3.1)
LYMPHOCYTES # BLD AUTO: 1.04 10*3/MM3 (ref 0.7–3.1)
LYMPHOCYTES # BLD AUTO: 1.06 10*3/MM3 (ref 0.7–3.1)
LYMPHOCYTES # BLD AUTO: 1.07 10*3/MM3 (ref 0.7–3.1)
LYMPHOCYTES # BLD AUTO: 1.1 10*3/MM3 (ref 0.7–3.1)
LYMPHOCYTES # BLD AUTO: 1.28 10*3/MM3 (ref 0.7–3.1)
LYMPHOCYTES # BLD AUTO: 1.57 10*3/MM3 (ref 0.7–3.1)
LYMPHOCYTES # BLD AUTO: 1.78 10*3/MM3 (ref 0.7–3.1)
LYMPHOCYTES NFR BLD AUTO: 12.1 % (ref 19.6–45.3)
LYMPHOCYTES NFR BLD AUTO: 13.4 % (ref 19.6–45.3)
LYMPHOCYTES NFR BLD AUTO: 13.7 % (ref 19.6–45.3)
LYMPHOCYTES NFR BLD AUTO: 16.1 % (ref 19.6–45.3)
LYMPHOCYTES NFR BLD AUTO: 16.3 % (ref 19.6–45.3)
LYMPHOCYTES NFR BLD AUTO: 18.7 % (ref 19.6–45.3)
LYMPHOCYTES NFR BLD AUTO: 23.9 % (ref 19.6–45.3)
LYMPHOCYTES NFR BLD AUTO: 23.9 % (ref 19.6–45.3)
Lab: ABNORMAL
MAGNESIUM SERPL-MCNC: 1.3 MG/DL (ref 1.6–2.6)
MAGNESIUM SERPL-MCNC: 1.9 MG/DL (ref 1.6–2.6)
MAGNESIUM SERPL-MCNC: 2 MG/DL (ref 1.6–2.6)
MAGNESIUM SERPL-MCNC: 2.9 MG/DL (ref 1.6–2.6)
MCH RBC QN AUTO: 27.9 PG (ref 26.6–33)
MCH RBC QN AUTO: 28.1 PG (ref 26.6–33)
MCH RBC QN AUTO: 28.4 PG (ref 26.6–33)
MCH RBC QN AUTO: 28.5 PG (ref 26.6–33)
MCH RBC QN AUTO: 28.6 PG (ref 26.6–33)
MCH RBC QN AUTO: 28.8 PG (ref 26.6–33)
MCH RBC QN AUTO: 29 PG (ref 26.6–33)
MCH RBC QN AUTO: 29.1 PG (ref 26.6–33)
MCH RBC QN AUTO: 29.2 PG (ref 26.6–33)
MCHC RBC AUTO-ENTMCNC: 30.1 G/DL (ref 31.5–35.7)
MCHC RBC AUTO-ENTMCNC: 30.4 G/DL (ref 31.5–35.7)
MCHC RBC AUTO-ENTMCNC: 30.6 G/DL (ref 31.5–35.7)
MCHC RBC AUTO-ENTMCNC: 30.6 G/DL (ref 31.5–35.7)
MCHC RBC AUTO-ENTMCNC: 30.8 G/DL (ref 31.5–35.7)
MCHC RBC AUTO-ENTMCNC: 30.9 G/DL (ref 31.5–35.7)
MCHC RBC AUTO-ENTMCNC: 31.3 G/DL (ref 31.5–35.7)
MCHC RBC AUTO-ENTMCNC: 31.4 G/DL (ref 31.5–35.7)
MCHC RBC AUTO-ENTMCNC: 32.1 G/DL (ref 31.5–35.7)
MCV RBC AUTO: 88.6 FL (ref 79–97)
MCV RBC AUTO: 88.8 FL (ref 79–97)
MCV RBC AUTO: 92.1 FL (ref 79–97)
MCV RBC AUTO: 92.5 FL (ref 79–97)
MCV RBC AUTO: 92.7 FL (ref 79–97)
MCV RBC AUTO: 93.3 FL (ref 79–97)
MCV RBC AUTO: 93.5 FL (ref 79–97)
MCV RBC AUTO: 94.9 FL (ref 79–97)
MCV RBC AUTO: 97 FL (ref 79–97)
METHADONE UR QL SCN: NEGATIVE
METHGB BLD QL: 0.4 % (ref 0–3)
METHGB BLD QL: 0.6 % (ref 0–3)
METHGB BLD QL: 0.8 % (ref 0–3)
METHGB BLD QL: 1 % (ref 0–3)
METHGB BLD QL: 1.4 % (ref 0–3)
MODALITY: ABNORMAL
MONOCYTES # BLD AUTO: 0.43 10*3/MM3 (ref 0.1–0.9)
MONOCYTES # BLD AUTO: 0.47 10*3/MM3 (ref 0.1–0.9)
MONOCYTES # BLD AUTO: 0.53 10*3/MM3 (ref 0.1–0.9)
MONOCYTES # BLD AUTO: 0.56 10*3/MM3 (ref 0.1–0.9)
MONOCYTES # BLD AUTO: 0.57 10*3/MM3 (ref 0.1–0.9)
MONOCYTES # BLD AUTO: 0.59 10*3/MM3 (ref 0.1–0.9)
MONOCYTES # BLD AUTO: 0.65 10*3/MM3 (ref 0.1–0.9)
MONOCYTES # BLD AUTO: 0.72 10*3/MM3 (ref 0.1–0.9)
MONOCYTES NFR BLD AUTO: 12.3 % (ref 5–12)
MONOCYTES NFR BLD AUTO: 6.3 % (ref 5–12)
MONOCYTES NFR BLD AUTO: 6.3 % (ref 5–12)
MONOCYTES NFR BLD AUTO: 7.2 % (ref 5–12)
MONOCYTES NFR BLD AUTO: 7.3 % (ref 5–12)
MONOCYTES NFR BLD AUTO: 7.5 % (ref 5–12)
MONOCYTES NFR BLD AUTO: 8.2 % (ref 5–12)
MONOCYTES NFR BLD AUTO: 9.9 % (ref 5–12)
NEUTROPHILS NFR BLD AUTO: 3.62 10*3/MM3 (ref 1.7–7)
NEUTROPHILS NFR BLD AUTO: 3.97 10*3/MM3 (ref 1.7–7)
NEUTROPHILS NFR BLD AUTO: 4.59 10*3/MM3 (ref 1.7–7)
NEUTROPHILS NFR BLD AUTO: 4.59 10*3/MM3 (ref 1.7–7)
NEUTROPHILS NFR BLD AUTO: 4.78 10*3/MM3 (ref 1.7–7)
NEUTROPHILS NFR BLD AUTO: 5.72 10*3/MM3 (ref 1.7–7)
NEUTROPHILS NFR BLD AUTO: 5.95 10*3/MM3 (ref 1.7–7)
NEUTROPHILS NFR BLD AUTO: 6.98 10*3/MM3 (ref 1.7–7)
NEUTROPHILS NFR BLD AUTO: 60.4 % (ref 42.7–76)
NEUTROPHILS NFR BLD AUTO: 61.7 % (ref 42.7–76)
NEUTROPHILS NFR BLD AUTO: 64.3 % (ref 42.7–76)
NEUTROPHILS NFR BLD AUTO: 71.2 % (ref 42.7–76)
NEUTROPHILS NFR BLD AUTO: 73 % (ref 42.7–76)
NEUTROPHILS NFR BLD AUTO: 75.4 % (ref 42.7–76)
NEUTROPHILS NFR BLD AUTO: 76.6 % (ref 42.7–76)
NEUTROPHILS NFR BLD AUTO: 79 % (ref 42.7–76)
NITRITE UR QL STRIP: NEGATIVE
NITRITE UR-MCNC: NEGATIVE MG/ML
NOTE: ABNORMAL
NOTIFIED BY: ABNORMAL
NOTIFIED WHO: ABNORMAL
NOTIFIED WHO: ABNORMAL
NRBC BLD AUTO-RTO: 0 /100 WBC (ref 0–0.2)
NT-PROBNP SERPL-MCNC: 4428 PG/ML (ref 0–450)
NT-PROBNP SERPL-MCNC: 7002 PG/ML (ref 0–450)
NT-PROBNP SERPL-MCNC: ABNORMAL PG/ML (ref 0–450)
NT-PROBNP SERPL-MCNC: ABNORMAL PG/ML (ref 0–450)
OPIATES UR QL: NEGATIVE
OPIATES UR QL: NEGATIVE
OPIATES UR QL: POSITIVE
OPIATES UR QL: POSITIVE
OXYCODONE UR QL SCN: NEGATIVE
OXYHGB MFR BLDV: 24.3 % (ref 45–75)
OXYHGB MFR BLDV: 78.8 % (ref 45–75)
OXYHGB MFR BLDV: 94.9 % (ref 94–99)
OXYHGB MFR BLDV: 95 % (ref 94–99)
OXYHGB MFR BLDV: 95.4 % (ref 94–99)
PCO2 BLDA: 19.1 MM HG (ref 35–45)
PCO2 BLDA: 28.8 MM HG (ref 35–45)
PCO2 BLDA: 31.5 MM HG (ref 35–45)
PCO2 BLDV: 29 MM HG (ref 41–51)
PCO2 BLDV: 41.6 MM HG (ref 41–51)
PCO2 TEMP ADJ BLD: ABNORMAL MM[HG]
PCP UR QL SCN: NEGATIVE
PH BLDA: 7.15 PH UNITS (ref 7.35–7.45)
PH BLDA: 7.33 PH UNITS (ref 7.35–7.45)
PH BLDA: 7.37 PH UNITS (ref 7.35–7.45)
PH BLDV: 7.22 PH UNITS (ref 7.32–7.42)
PH BLDV: 7.28 PH UNITS (ref 7.32–7.42)
PH UR STRIP.AUTO: 5.5 [PH] (ref 5–8)
PH UR STRIP.AUTO: 5.5 [PH] (ref 5–8)
PH UR STRIP.AUTO: 6 [PH] (ref 5–8)
PH UR: 6 [PH] (ref 5–8)
PH, TEMP CORRECTED: ABNORMAL
PHOSPHATE SERPL-MCNC: 10.7 MG/DL (ref 2.5–4.5)
PHOSPHATE SERPL-MCNC: 12.6 MG/DL (ref 2.5–4.5)
PHOSPHATE SERPL-MCNC: 8.2 MG/DL (ref 2.5–4.5)
PHOSPHATE SERPL-MCNC: 8.4 MG/DL (ref 2.5–4.5)
PLATELET # BLD AUTO: 105 10*3/MM3 (ref 140–450)
PLATELET # BLD AUTO: 114 10*3/MM3 (ref 140–450)
PLATELET # BLD AUTO: 118 10*3/MM3 (ref 140–450)
PLATELET # BLD AUTO: 146 10*3/MM3 (ref 140–450)
PLATELET # BLD AUTO: 149 10*3/MM3 (ref 140–450)
PLATELET # BLD AUTO: 149 10*3/MM3 (ref 140–450)
PLATELET # BLD AUTO: 173 10*3/MM3 (ref 140–450)
PLATELET # BLD AUTO: 192 10*3/MM3 (ref 140–450)
PLATELET # BLD AUTO: 84 10*3/MM3 (ref 140–450)
PMV BLD AUTO: 10.2 FL (ref 6–12)
PMV BLD AUTO: 10.3 FL (ref 6–12)
PMV BLD AUTO: 10.3 FL (ref 6–12)
PMV BLD AUTO: 10.4 FL (ref 6–12)
PMV BLD AUTO: 10.8 FL (ref 6–12)
PMV BLD AUTO: 8.8 FL (ref 6–12)
PMV BLD AUTO: 9.5 FL (ref 6–12)
PMV BLD AUTO: 9.7 FL (ref 6–12)
PMV BLD AUTO: 9.9 FL (ref 6–12)
PO2 BLDA: 109 MM HG (ref 83–108)
PO2 BLDA: 90.4 MM HG (ref 83–108)
PO2 BLDA: 90.5 MM HG (ref 83–108)
PO2 BLDV: 20.7 MM HG (ref 27–53)
PO2 BLDV: 48.3 MM HG (ref 27–53)
PO2 TEMP ADJ BLD: ABNORMAL MM[HG]
POTASSIUM SERPL-SCNC: 4.6 MMOL/L (ref 3.5–5.2)
POTASSIUM SERPL-SCNC: 4.9 MMOL/L (ref 3.5–5.2)
POTASSIUM SERPL-SCNC: 5.1 MMOL/L (ref 3.5–5.2)
POTASSIUM SERPL-SCNC: 5.8 MMOL/L (ref 3.5–5.2)
POTASSIUM SERPL-SCNC: 5.8 MMOL/L (ref 3.5–5.2)
POTASSIUM SERPL-SCNC: 6.1 MMOL/L (ref 3.5–5.2)
POTASSIUM SERPL-SCNC: 6.2 MMOL/L (ref 3.5–5.2)
POTASSIUM SERPL-SCNC: 6.3 MMOL/L (ref 3.5–5.2)
POTASSIUM SERPL-SCNC: 6.6 MMOL/L (ref 3.5–5.2)
POTASSIUM SERPL-SCNC: 6.8 MMOL/L (ref 3.5–5.2)
POTASSIUM SERPL-SCNC: 6.9 MMOL/L (ref 3.5–5.2)
POTASSIUM SERPL-SCNC: 7.6 MMOL/L (ref 3.5–5.2)
PROCALCITONIN SERPL-MCNC: 1.22 NG/ML (ref 0–0.25)
PROT SERPL-MCNC: 7.1 G/DL (ref 6–8.5)
PROT SERPL-MCNC: 7.3 G/DL (ref 6–8.5)
PROT SERPL-MCNC: 7.5 G/DL (ref 6–8.5)
PROT SERPL-MCNC: 7.8 G/DL (ref 6–8.5)
PROT SERPL-MCNC: 7.9 G/DL (ref 6–8.5)
PROT SERPL-MCNC: 8.1 G/DL (ref 6–8.5)
PROT SERPL-MCNC: 8.3 G/DL (ref 6–8.5)
PROT SERPL-MCNC: 8.3 G/DL (ref 6–8.5)
PROT UR QL STRIP: ABNORMAL
PROT UR STRIP-MCNC: ABNORMAL MG/DL
PROTHROMBIN TIME: 16.4 SECONDS (ref 11.9–14.1)
PROTHROMBIN TIME: 17.2 SECONDS (ref 11.9–14.1)
PROTHROMBIN TIME: 17.4 SECONDS (ref 11.9–14.1)
PROTHROMBIN TIME: 18.6 SECONDS (ref 11.9–14.1)
QT INTERVAL: 368 MS
QT INTERVAL: 384 MS
QTC INTERVAL: 448 MS
QTC INTERVAL: 457 MS
RBC # BLD AUTO: 2.11 10*6/MM3 (ref 4.14–5.8)
RBC # BLD AUTO: 2.33 10*6/MM3 (ref 4.14–5.8)
RBC # BLD AUTO: 2.34 10*6/MM3 (ref 4.14–5.8)
RBC # BLD AUTO: 2.42 10*6/MM3 (ref 4.14–5.8)
RBC # BLD AUTO: 2.48 10*6/MM3 (ref 4.14–5.8)
RBC # BLD AUTO: 2.48 10*6/MM3 (ref 4.14–5.8)
RBC # BLD AUTO: 2.67 10*6/MM3 (ref 4.14–5.8)
RBC # BLD AUTO: 3.01 10*6/MM3 (ref 4.14–5.8)
RBC # BLD AUTO: 3.13 10*6/MM3 (ref 4.14–5.8)
RBC # UR STRIP: ABNORMAL /UL
RBC # UR: ABNORMAL /HPF
REF LAB TEST METHOD: ABNORMAL
RH BLD: POSITIVE
SALICYLATES SERPL-MCNC: <0.3 MG/DL
SAO2 % BLDCOA: 96.7 % (ref 94–99)
SAO2 % BLDCOA: 97.3 % (ref 94–99)
SAO2 % BLDCOA: 97.4 % (ref 94–99)
SARS-COV-2 RDRP RESP QL NAA+PROBE: NOT DETECTED
SARS-COV-2 RDRP RESP QL NAA+PROBE: NOT DETECTED
SARS-COV-2 RNA RESP QL NAA+PROBE: NOT DETECTED
SARS-COV-2 RNA RESP QL NAA+PROBE: NOT DETECTED
SODIUM SERPL-SCNC: 126 MMOL/L (ref 136–145)
SODIUM SERPL-SCNC: 126 MMOL/L (ref 136–145)
SODIUM SERPL-SCNC: 129 MMOL/L (ref 136–145)
SODIUM SERPL-SCNC: 131 MMOL/L (ref 136–145)
SODIUM SERPL-SCNC: 131 MMOL/L (ref 136–145)
SODIUM SERPL-SCNC: 132 MMOL/L (ref 136–145)
SODIUM SERPL-SCNC: 133 MMOL/L (ref 136–145)
SODIUM SERPL-SCNC: 134 MMOL/L (ref 136–145)
SP GR UR STRIP: 1.01 (ref 1–1.03)
SP GR UR: 1.01 (ref 1–1.03)
SQUAMOUS #/AREA URNS HPF: ABNORMAL /HPF
T&S EXPIRATION DATE: NORMAL
T4 FREE SERPL-MCNC: 0.85 NG/DL (ref 0.93–1.7)
TIBC SERPL-MCNC: 219 MCG/DL (ref 298–536)
TRANSFERRIN SERPL-MCNC: 147 MG/DL (ref 200–360)
TROPONIN T SERPL-MCNC: 0.06 NG/ML (ref 0–0.03)
TROPONIN T SERPL-MCNC: 0.08 NG/ML (ref 0–0.03)
TROPONIN T SERPL-MCNC: 0.09 NG/ML (ref 0–0.03)
TROPONIN T SERPL-MCNC: 0.1 NG/ML (ref 0–0.03)
TROPONIN T SERPL-MCNC: 0.1 NG/ML (ref 0–0.03)
TROPONIN T SERPL-MCNC: 0.11 NG/ML (ref 0–0.03)
TROPONIN T SERPL-MCNC: 0.15 NG/ML (ref 0–0.03)
TROPONIN T SERPL-MCNC: 0.15 NG/ML (ref 0–0.03)
TROPONIN T SERPL-MCNC: 0.16 NG/ML (ref 0–0.03)
TROPONIN T SERPL-MCNC: 0.16 NG/ML (ref 0–0.03)
TSH SERPL DL<=0.05 MIU/L-ACNC: 2.38 UIU/ML (ref 0.27–4.2)
UNIT  ABO: NORMAL
UNIT  RH: NORMAL
UROBILINOGEN UR QL STRIP: ABNORMAL
UROBILINOGEN UR QL: NORMAL
VENTILATOR MODE: ABNORMAL
VIT B12 BLD-MCNC: 660 PG/ML (ref 211–946)
WBC # BLD AUTO: 4.79 10*3/MM3 (ref 3.4–10.8)
WBC # BLD AUTO: 5.87 10*3/MM3 (ref 3.4–10.8)
WBC # BLD AUTO: 5.99 10*3/MM3 (ref 3.4–10.8)
WBC # BLD AUTO: 6.45 10*3/MM3 (ref 3.4–10.8)
WBC # BLD AUTO: 6.57 10*3/MM3 (ref 3.4–10.8)
WBC # BLD AUTO: 7.44 10*3/MM3 (ref 3.4–10.8)
WBC # BLD AUTO: 7.84 10*3/MM3 (ref 3.4–10.8)
WBC # BLD AUTO: 7.89 10*3/MM3 (ref 3.4–10.8)
WBC # BLD AUTO: 8.84 10*3/MM3 (ref 3.4–10.8)
WBC UR QL AUTO: ABNORMAL /HPF

## 2020-01-01 PROCEDURE — 84100 ASSAY OF PHOSPHORUS: CPT | Performed by: EMERGENCY MEDICINE

## 2020-01-01 PROCEDURE — 93010 ELECTROCARDIOGRAM REPORT: CPT | Performed by: INTERNAL MEDICINE

## 2020-01-01 PROCEDURE — 25010000002 MAGNESIUM SULFATE IN D5W 1G/100ML (PREMIX) 1-5 GM/100ML-% SOLUTION: Performed by: PHYSICIAN ASSISTANT

## 2020-01-01 PROCEDURE — 36415 COLL VENOUS BLD VENIPUNCTURE: CPT

## 2020-01-01 PROCEDURE — 80307 DRUG TEST PRSMV CHEM ANLYZR: CPT | Performed by: EMERGENCY MEDICINE

## 2020-01-01 PROCEDURE — 80307 DRUG TEST PRSMV CHEM ANLYZR: CPT | Performed by: HOSPITALIST

## 2020-01-01 PROCEDURE — 93005 ELECTROCARDIOGRAM TRACING: CPT | Performed by: EMERGENCY MEDICINE

## 2020-01-01 PROCEDURE — 83050 HGB METHEMOGLOBIN QUAN: CPT

## 2020-01-01 PROCEDURE — 86880 COOMBS TEST DIRECT: CPT | Performed by: EMERGENCY MEDICINE

## 2020-01-01 PROCEDURE — 83615 LACTATE (LD) (LDH) ENZYME: CPT | Performed by: PHYSICIAN ASSISTANT

## 2020-01-01 PROCEDURE — 63710000001 INSULIN REGULAR HUMAN PER 5 UNITS: Performed by: NURSE PRACTITIONER

## 2020-01-01 PROCEDURE — 36430 TRANSFUSION BLD/BLD COMPNT: CPT

## 2020-01-01 PROCEDURE — 99284 EMERGENCY DEPT VISIT MOD MDM: CPT

## 2020-01-01 PROCEDURE — 83735 ASSAY OF MAGNESIUM: CPT | Performed by: EMERGENCY MEDICINE

## 2020-01-01 PROCEDURE — 80307 DRUG TEST PRSMV CHEM ANLYZR: CPT | Performed by: FAMILY MEDICINE

## 2020-01-01 PROCEDURE — 86870 RBC ANTIBODY IDENTIFICATION: CPT

## 2020-01-01 PROCEDURE — 36600 WITHDRAWAL OF ARTERIAL BLOOD: CPT

## 2020-01-01 PROCEDURE — 86900 BLOOD TYPING SEROLOGIC ABO: CPT | Performed by: EMERGENCY MEDICINE

## 2020-01-01 PROCEDURE — 80307 DRUG TEST PRSMV CHEM ANLYZR: CPT | Performed by: PHYSICIAN ASSISTANT

## 2020-01-01 PROCEDURE — 80053 COMPREHEN METABOLIC PANEL: CPT | Performed by: EMERGENCY MEDICINE

## 2020-01-01 PROCEDURE — 0 TECHNETIUM ALBUMIN AGGREGATED: Performed by: EMERGENCY MEDICINE

## 2020-01-01 PROCEDURE — 87077 CULTURE AEROBIC IDENTIFY: CPT | Performed by: PHYSICIAN ASSISTANT

## 2020-01-01 PROCEDURE — 85025 COMPLETE CBC W/AUTO DIFF WBC: CPT | Performed by: FAMILY MEDICINE

## 2020-01-01 PROCEDURE — 74176 CT ABD & PELVIS W/O CONTRAST: CPT

## 2020-01-01 PROCEDURE — 86900 BLOOD TYPING SEROLOGIC ABO: CPT | Performed by: PHYSICIAN ASSISTANT

## 2020-01-01 PROCEDURE — 84484 ASSAY OF TROPONIN QUANT: CPT | Performed by: PHYSICIAN ASSISTANT

## 2020-01-01 PROCEDURE — 82150 ASSAY OF AMYLASE: CPT | Performed by: PHYSICIAN ASSISTANT

## 2020-01-01 PROCEDURE — 82805 BLOOD GASES W/O2 SATURATION: CPT

## 2020-01-01 PROCEDURE — 99285 EMERGENCY DEPT VISIT HI MDM: CPT

## 2020-01-01 PROCEDURE — 86850 RBC ANTIBODY SCREEN: CPT | Performed by: NURSE PRACTITIONER

## 2020-01-01 PROCEDURE — 86140 C-REACTIVE PROTEIN: CPT | Performed by: FAMILY MEDICINE

## 2020-01-01 PROCEDURE — 86880 COOMBS TEST DIRECT: CPT | Performed by: FAMILY MEDICINE

## 2020-01-01 PROCEDURE — 87077 CULTURE AEROBIC IDENTIFY: CPT | Performed by: EMERGENCY MEDICINE

## 2020-01-01 PROCEDURE — 83880 ASSAY OF NATRIURETIC PEPTIDE: CPT | Performed by: PHYSICIAN ASSISTANT

## 2020-01-01 PROCEDURE — 86900 BLOOD TYPING SEROLOGIC ABO: CPT | Performed by: FAMILY MEDICINE

## 2020-01-01 PROCEDURE — 86850 RBC ANTIBODY SCREEN: CPT | Performed by: EMERGENCY MEDICINE

## 2020-01-01 PROCEDURE — P9016 RBC LEUKOCYTES REDUCED: HCPCS

## 2020-01-01 PROCEDURE — 87341 HEP B SURFACE AG NEUTRLZJ IA: CPT | Performed by: INTERNAL MEDICINE

## 2020-01-01 PROCEDURE — 86850 RBC ANTIBODY SCREEN: CPT | Performed by: PHYSICIAN ASSISTANT

## 2020-01-01 PROCEDURE — 87040 BLOOD CULTURE FOR BACTERIA: CPT | Performed by: HOSPITALIST

## 2020-01-01 PROCEDURE — 82820 HEMOGLOBIN-OXYGEN AFFINITY: CPT

## 2020-01-01 PROCEDURE — 85025 COMPLETE CBC W/AUTO DIFF WBC: CPT | Performed by: EMERGENCY MEDICINE

## 2020-01-01 PROCEDURE — 80053 COMPREHEN METABOLIC PANEL: CPT | Performed by: FAMILY MEDICINE

## 2020-01-01 PROCEDURE — 86860 RBC ANTIBODY ELUTION: CPT | Performed by: PHYSICIAN ASSISTANT

## 2020-01-01 PROCEDURE — 84132 ASSAY OF SERUM POTASSIUM: CPT | Performed by: EMERGENCY MEDICINE

## 2020-01-01 PROCEDURE — A9540 TC99M MAA: HCPCS | Performed by: EMERGENCY MEDICINE

## 2020-01-01 PROCEDURE — 25010000002 EPOETIN ALFA PER 1000 UNITS: Performed by: INTERNAL MEDICINE

## 2020-01-01 PROCEDURE — 86860 RBC ANTIBODY ELUTION: CPT | Performed by: NURSE PRACTITIONER

## 2020-01-01 PROCEDURE — 71045 X-RAY EXAM CHEST 1 VIEW: CPT | Performed by: RADIOLOGY

## 2020-01-01 PROCEDURE — 86900 BLOOD TYPING SEROLOGIC ABO: CPT

## 2020-01-01 PROCEDURE — 85730 THROMBOPLASTIN TIME PARTIAL: CPT | Performed by: FAMILY MEDICINE

## 2020-01-01 PROCEDURE — 94640 AIRWAY INHALATION TREATMENT: CPT

## 2020-01-01 PROCEDURE — 83690 ASSAY OF LIPASE: CPT | Performed by: FAMILY MEDICINE

## 2020-01-01 PROCEDURE — 93005 ELECTROCARDIOGRAM TRACING: CPT | Performed by: PHYSICIAN ASSISTANT

## 2020-01-01 PROCEDURE — 99223 1ST HOSP IP/OBS HIGH 75: CPT | Performed by: NURSE PRACTITIONER

## 2020-01-01 PROCEDURE — 25010000002 CALCIUM GLUCONATE-NACL 1-0.675 GM/50ML-% SOLUTION: Performed by: NURSE PRACTITIONER

## 2020-01-01 PROCEDURE — 80050 GENERAL HEALTH PANEL: CPT | Performed by: STUDENT IN AN ORGANIZED HEALTH CARE EDUCATION/TRAINING PROGRAM

## 2020-01-01 PROCEDURE — 83880 ASSAY OF NATRIURETIC PEPTIDE: CPT | Performed by: FAMILY MEDICINE

## 2020-01-01 PROCEDURE — 71250 CT THORAX DX C-: CPT | Performed by: RADIOLOGY

## 2020-01-01 PROCEDURE — 86901 BLOOD TYPING SEROLOGIC RH(D): CPT | Performed by: NURSE PRACTITIONER

## 2020-01-01 PROCEDURE — 85379 FIBRIN DEGRADATION QUANT: CPT | Performed by: PHYSICIAN ASSISTANT

## 2020-01-01 PROCEDURE — 83036 HEMOGLOBIN GLYCOSYLATED A1C: CPT | Performed by: PHYSICIAN ASSISTANT

## 2020-01-01 PROCEDURE — 87086 URINE CULTURE/COLONY COUNT: CPT | Performed by: NURSE PRACTITIONER

## 2020-01-01 PROCEDURE — 96375 TX/PRO/DX INJ NEW DRUG ADDON: CPT

## 2020-01-01 PROCEDURE — 85652 RBC SED RATE AUTOMATED: CPT | Performed by: EMERGENCY MEDICINE

## 2020-01-01 PROCEDURE — 84484 ASSAY OF TROPONIN QUANT: CPT | Performed by: EMERGENCY MEDICINE

## 2020-01-01 PROCEDURE — 82962 GLUCOSE BLOOD TEST: CPT

## 2020-01-01 PROCEDURE — 82550 ASSAY OF CK (CPK): CPT | Performed by: EMERGENCY MEDICINE

## 2020-01-01 PROCEDURE — 87077 CULTURE AEROBIC IDENTIFY: CPT | Performed by: HOSPITALIST

## 2020-01-01 PROCEDURE — 96365 THER/PROPH/DIAG IV INF INIT: CPT

## 2020-01-01 PROCEDURE — 96374 THER/PROPH/DIAG INJ IV PUSH: CPT

## 2020-01-01 PROCEDURE — 86140 C-REACTIVE PROTEIN: CPT | Performed by: PHYSICIAN ASSISTANT

## 2020-01-01 PROCEDURE — 71045 X-RAY EXAM CHEST 1 VIEW: CPT

## 2020-01-01 PROCEDURE — 85025 COMPLETE CBC W/AUTO DIFF WBC: CPT | Performed by: HOSPITALIST

## 2020-01-01 PROCEDURE — 99223 1ST HOSP IP/OBS HIGH 75: CPT | Performed by: PHYSICIAN ASSISTANT

## 2020-01-01 PROCEDURE — 86860 RBC ANTIBODY ELUTION: CPT | Performed by: FAMILY MEDICINE

## 2020-01-01 PROCEDURE — 86850 RBC ANTIBODY SCREEN: CPT | Performed by: FAMILY MEDICINE

## 2020-01-01 PROCEDURE — 84466 ASSAY OF TRANSFERRIN: CPT | Performed by: NURSE PRACTITIONER

## 2020-01-01 PROCEDURE — 83735 ASSAY OF MAGNESIUM: CPT | Performed by: PHYSICIAN ASSISTANT

## 2020-01-01 PROCEDURE — 86901 BLOOD TYPING SEROLOGIC RH(D): CPT | Performed by: EMERGENCY MEDICINE

## 2020-01-01 PROCEDURE — 25010000002 HEPARIN (PORCINE) PER 1000 UNITS: Performed by: INTERNAL MEDICINE

## 2020-01-01 PROCEDURE — 83880 ASSAY OF NATRIURETIC PEPTIDE: CPT | Performed by: NURSE PRACTITIONER

## 2020-01-01 PROCEDURE — 86880 COOMBS TEST DIRECT: CPT | Performed by: NURSE PRACTITIONER

## 2020-01-01 PROCEDURE — G0378 HOSPITAL OBSERVATION PER HR: HCPCS

## 2020-01-01 PROCEDURE — 85025 COMPLETE CBC W/AUTO DIFF WBC: CPT | Performed by: NURSE PRACTITIONER

## 2020-01-01 PROCEDURE — 82746 ASSAY OF FOLIC ACID SERUM: CPT | Performed by: NURSE PRACTITIONER

## 2020-01-01 PROCEDURE — 85730 THROMBOPLASTIN TIME PARTIAL: CPT | Performed by: EMERGENCY MEDICINE

## 2020-01-01 PROCEDURE — 84439 ASSAY OF FREE THYROXINE: CPT | Performed by: EMERGENCY MEDICINE

## 2020-01-01 PROCEDURE — 87086 URINE CULTURE/COLONY COUNT: CPT | Performed by: PHYSICIAN ASSISTANT

## 2020-01-01 PROCEDURE — 86901 BLOOD TYPING SEROLOGIC RH(D): CPT | Performed by: PHYSICIAN ASSISTANT

## 2020-01-01 PROCEDURE — 86870 RBC ANTIBODY IDENTIFICATION: CPT | Performed by: NURSE PRACTITIONER

## 2020-01-01 PROCEDURE — 78580 LUNG PERFUSION IMAGING: CPT

## 2020-01-01 PROCEDURE — 86920 COMPATIBILITY TEST SPIN: CPT

## 2020-01-01 PROCEDURE — 86870 RBC ANTIBODY IDENTIFICATION: CPT | Performed by: EMERGENCY MEDICINE

## 2020-01-01 PROCEDURE — 86900 BLOOD TYPING SEROLOGIC ABO: CPT | Performed by: NURSE PRACTITIONER

## 2020-01-01 PROCEDURE — 81001 URINALYSIS AUTO W/SCOPE: CPT | Performed by: HOSPITALIST

## 2020-01-01 PROCEDURE — 25010000002 VANCOMYCIN: Performed by: PHYSICIAN ASSISTANT

## 2020-01-01 PROCEDURE — 83540 ASSAY OF IRON: CPT | Performed by: NURSE PRACTITIONER

## 2020-01-01 PROCEDURE — 36415 COLL VENOUS BLD VENIPUNCTURE: CPT | Performed by: NURSE PRACTITIONER

## 2020-01-01 PROCEDURE — 87086 URINE CULTURE/COLONY COUNT: CPT | Performed by: EMERGENCY MEDICINE

## 2020-01-01 PROCEDURE — 85610 PROTHROMBIN TIME: CPT | Performed by: EMERGENCY MEDICINE

## 2020-01-01 PROCEDURE — 63710000001 INSULIN REGULAR HUMAN PER 5 UNITS: Performed by: EMERGENCY MEDICINE

## 2020-01-01 PROCEDURE — 84484 ASSAY OF TROPONIN QUANT: CPT | Performed by: FAMILY MEDICINE

## 2020-01-01 PROCEDURE — 25010000002 ONDANSETRON PER 1 MG: Performed by: PHYSICIAN ASSISTANT

## 2020-01-01 PROCEDURE — 84100 ASSAY OF PHOSPHORUS: CPT | Performed by: PHYSICIAN ASSISTANT

## 2020-01-01 PROCEDURE — 25010000002 HEPARIN (PORCINE) PER 1000 UNITS: Performed by: HOSPITALIST

## 2020-01-01 PROCEDURE — 83690 ASSAY OF LIPASE: CPT | Performed by: PHYSICIAN ASSISTANT

## 2020-01-01 PROCEDURE — 74176 CT ABD & PELVIS W/O CONTRAST: CPT | Performed by: RADIOLOGY

## 2020-01-01 PROCEDURE — 90471 IMMUNIZATION ADMIN: CPT | Performed by: NURSE PRACTITIONER

## 2020-01-01 PROCEDURE — 85610 PROTHROMBIN TIME: CPT | Performed by: PHYSICIAN ASSISTANT

## 2020-01-01 PROCEDURE — 85025 COMPLETE CBC W/AUTO DIFF WBC: CPT | Performed by: PHYSICIAN ASSISTANT

## 2020-01-01 PROCEDURE — 99235 HOSP IP/OBS SAME DATE MOD 70: CPT | Performed by: INTERNAL MEDICINE

## 2020-01-01 PROCEDURE — 80053 COMPREHEN METABOLIC PANEL: CPT | Performed by: PHYSICIAN ASSISTANT

## 2020-01-01 PROCEDURE — 80053 COMPREHEN METABOLIC PANEL: CPT | Performed by: NURSE PRACTITIONER

## 2020-01-01 PROCEDURE — 86880 COOMBS TEST DIRECT: CPT | Performed by: PHYSICIAN ASSISTANT

## 2020-01-01 PROCEDURE — 87040 BLOOD CULTURE FOR BACTERIA: CPT | Performed by: PHYSICIAN ASSISTANT

## 2020-01-01 PROCEDURE — 80074 ACUTE HEPATITIS PANEL: CPT | Performed by: INTERNAL MEDICINE

## 2020-01-01 PROCEDURE — 83735 ASSAY OF MAGNESIUM: CPT | Performed by: NURSE PRACTITIONER

## 2020-01-01 PROCEDURE — 94799 UNLISTED PULMONARY SVC/PX: CPT

## 2020-01-01 PROCEDURE — 25010000002 VANCOMYCIN: Performed by: EMERGENCY MEDICINE

## 2020-01-01 PROCEDURE — 85027 COMPLETE CBC AUTOMATED: CPT | Performed by: INTERNAL MEDICINE

## 2020-01-01 PROCEDURE — 86922 COMPATIBILITY TEST ANTIGLOB: CPT

## 2020-01-01 PROCEDURE — 96361 HYDRATE IV INFUSION ADD-ON: CPT

## 2020-01-01 PROCEDURE — 87635 SARS-COV-2 COVID-19 AMP PRB: CPT | Performed by: FAMILY MEDICINE

## 2020-01-01 PROCEDURE — 84484 ASSAY OF TROPONIN QUANT: CPT | Performed by: HOSPITALIST

## 2020-01-01 PROCEDURE — 84145 PROCALCITONIN (PCT): CPT | Performed by: PHYSICIAN ASSISTANT

## 2020-01-01 PROCEDURE — 87635 SARS-COV-2 COVID-19 AMP PRB: CPT | Performed by: EMERGENCY MEDICINE

## 2020-01-01 PROCEDURE — 86140 C-REACTIVE PROTEIN: CPT | Performed by: HOSPITALIST

## 2020-01-01 PROCEDURE — 86850 RBC ANTIBODY SCREEN: CPT

## 2020-01-01 PROCEDURE — 82375 ASSAY CARBOXYHB QUANT: CPT

## 2020-01-01 PROCEDURE — 99214 OFFICE O/P EST MOD 30 MIN: CPT | Performed by: NURSE PRACTITIONER

## 2020-01-01 PROCEDURE — 81001 URINALYSIS AUTO W/SCOPE: CPT | Performed by: EMERGENCY MEDICINE

## 2020-01-01 PROCEDURE — 86905 BLOOD TYPING RBC ANTIGENS: CPT

## 2020-01-01 PROCEDURE — 87636 SARSCOV2 & INF A&B AMP PRB: CPT | Performed by: PHYSICIAN ASSISTANT

## 2020-01-01 PROCEDURE — 78580 LUNG PERFUSION IMAGING: CPT | Performed by: RADIOLOGY

## 2020-01-01 PROCEDURE — 93005 ELECTROCARDIOGRAM TRACING: CPT | Performed by: STUDENT IN AN ORGANIZED HEALTH CARE EDUCATION/TRAINING PROGRAM

## 2020-01-01 PROCEDURE — 86902 BLOOD TYPE ANTIGEN DONOR EA: CPT

## 2020-01-01 PROCEDURE — 86860 RBC ANTIBODY ELUTION: CPT | Performed by: EMERGENCY MEDICINE

## 2020-01-01 PROCEDURE — 87186 SC STD MICRODIL/AGAR DIL: CPT | Performed by: PHYSICIAN ASSISTANT

## 2020-01-01 PROCEDURE — 93005 ELECTROCARDIOGRAM TRACING: CPT | Performed by: FAMILY MEDICINE

## 2020-01-01 PROCEDURE — 87077 CULTURE AEROBIC IDENTIFY: CPT | Performed by: NURSE PRACTITIONER

## 2020-01-01 PROCEDURE — 99282 EMERGENCY DEPT VISIT SF MDM: CPT

## 2020-01-01 PROCEDURE — 25010000002 ONDANSETRON PER 1 MG: Performed by: NURSE PRACTITIONER

## 2020-01-01 PROCEDURE — 80053 COMPREHEN METABOLIC PANEL: CPT | Performed by: HOSPITALIST

## 2020-01-01 PROCEDURE — 90686 IIV4 VACC NO PRSV 0.5 ML IM: CPT | Performed by: NURSE PRACTITIONER

## 2020-01-01 PROCEDURE — 83605 ASSAY OF LACTIC ACID: CPT | Performed by: PHYSICIAN ASSISTANT

## 2020-01-01 PROCEDURE — 71250 CT THORAX DX C-: CPT

## 2020-01-01 PROCEDURE — 86870 RBC ANTIBODY IDENTIFICATION: CPT | Performed by: FAMILY MEDICINE

## 2020-01-01 PROCEDURE — 82607 VITAMIN B-12: CPT | Performed by: NURSE PRACTITIONER

## 2020-01-01 PROCEDURE — 87635 SARS-COV-2 COVID-19 AMP PRB: CPT | Performed by: NURSE PRACTITIONER

## 2020-01-01 PROCEDURE — 87186 SC STD MICRODIL/AGAR DIL: CPT | Performed by: NURSE PRACTITIONER

## 2020-01-01 PROCEDURE — 87086 URINE CULTURE/COLONY COUNT: CPT | Performed by: HOSPITALIST

## 2020-01-01 PROCEDURE — 99214 OFFICE O/P EST MOD 30 MIN: CPT | Performed by: GENERAL PRACTICE

## 2020-01-01 PROCEDURE — 84100 ASSAY OF PHOSPHORUS: CPT | Performed by: NURSE PRACTITIONER

## 2020-01-01 PROCEDURE — 86870 RBC ANTIBODY IDENTIFICATION: CPT | Performed by: PHYSICIAN ASSISTANT

## 2020-01-01 PROCEDURE — 85730 THROMBOPLASTIN TIME PARTIAL: CPT | Performed by: PHYSICIAN ASSISTANT

## 2020-01-01 PROCEDURE — 81001 URINALYSIS AUTO W/SCOPE: CPT | Performed by: PHYSICIAN ASSISTANT

## 2020-01-01 PROCEDURE — 82728 ASSAY OF FERRITIN: CPT | Performed by: PHYSICIAN ASSISTANT

## 2020-01-01 PROCEDURE — 63710000001 INSULIN REGULAR HUMAN PER 5 UNITS: Performed by: FAMILY MEDICINE

## 2020-01-01 PROCEDURE — 83880 ASSAY OF NATRIURETIC PEPTIDE: CPT | Performed by: EMERGENCY MEDICINE

## 2020-01-01 PROCEDURE — 25010000002 CALCIUM GLUCONATE-NACL 1-0.675 GM/50ML-% SOLUTION: Performed by: PHYSICIAN ASSISTANT

## 2020-01-01 PROCEDURE — 84132 ASSAY OF SERUM POTASSIUM: CPT | Performed by: FAMILY MEDICINE

## 2020-01-01 PROCEDURE — 63710000001 INSULIN REGULAR HUMAN PER 5 UNITS: Performed by: PHYSICIAN ASSISTANT

## 2020-01-01 PROCEDURE — 86901 BLOOD TYPING SEROLOGIC RH(D): CPT | Performed by: FAMILY MEDICINE

## 2020-01-01 PROCEDURE — 85610 PROTHROMBIN TIME: CPT | Performed by: FAMILY MEDICINE

## 2020-01-01 PROCEDURE — 86140 C-REACTIVE PROTEIN: CPT | Performed by: EMERGENCY MEDICINE

## 2020-01-01 PROCEDURE — 25010000002 MORPHINE PER 10 MG: Performed by: EMERGENCY MEDICINE

## 2020-01-01 PROCEDURE — 99221 1ST HOSP IP/OBS SF/LOW 40: CPT | Performed by: SURGERY

## 2020-01-01 PROCEDURE — 25010000002 HYDROMORPHONE 1 MG/ML SOLUTION: Performed by: EMERGENCY MEDICINE

## 2020-01-01 PROCEDURE — 87186 SC STD MICRODIL/AGAR DIL: CPT | Performed by: EMERGENCY MEDICINE

## 2020-01-01 PROCEDURE — 87186 SC STD MICRODIL/AGAR DIL: CPT | Performed by: HOSPITALIST

## 2020-01-01 PROCEDURE — 5A1D70Z PERFORMANCE OF URINARY FILTRATION, INTERMITTENT, LESS THAN 6 HOURS PER DAY: ICD-10-PCS | Performed by: INTERNAL MEDICINE

## 2020-01-01 RX ORDER — ISOSORBIDE DINITRATE 10 MG/1
10 TABLET ORAL 3 TIMES DAILY
Qty: 90 TABLET | Refills: 0 | Status: SHIPPED | OUTPATIENT
Start: 2020-01-01 | End: 2020-01-01

## 2020-01-01 RX ORDER — PANTOPRAZOLE SODIUM 40 MG/1
40 TABLET, DELAYED RELEASE ORAL DAILY
Status: DISCONTINUED | OUTPATIENT
Start: 2020-01-01 | End: 2020-01-01 | Stop reason: HOSPADM

## 2020-01-01 RX ORDER — CALCIUM ACETATE 667 MG/1
667 CAPSULE ORAL
Qty: 90 CAPSULE | Refills: 0 | Status: SHIPPED | OUTPATIENT
Start: 2020-01-01 | End: 2020-01-01 | Stop reason: SDUPTHER

## 2020-01-01 RX ORDER — ALBUMIN (HUMAN) 12.5 G/50ML
25 SOLUTION INTRAVENOUS AS NEEDED
Status: DISCONTINUED | OUTPATIENT
Start: 2020-01-01 | End: 2020-01-01 | Stop reason: HOSPADM

## 2020-01-01 RX ORDER — HYDRALAZINE HYDROCHLORIDE 10 MG/1
10 TABLET, FILM COATED ORAL EVERY 8 HOURS SCHEDULED
Status: DISCONTINUED | OUTPATIENT
Start: 2020-01-01 | End: 2020-01-01 | Stop reason: HOSPADM

## 2020-01-01 RX ORDER — SODIUM CHLORIDE 0.9 % (FLUSH) 0.9 %
10 SYRINGE (ML) INJECTION AS NEEDED
Status: DISCONTINUED | OUTPATIENT
Start: 2020-01-01 | End: 2021-01-01 | Stop reason: HOSPADM

## 2020-01-01 RX ORDER — ISOSORBIDE MONONITRATE 30 MG/1
30 TABLET, EXTENDED RELEASE ORAL DAILY
Status: ON HOLD | COMMUNITY
End: 2020-01-01

## 2020-01-01 RX ORDER — ALBUTEROL SULFATE 2.5 MG/3ML
20 SOLUTION RESPIRATORY (INHALATION) ONCE
Status: DISCONTINUED | OUTPATIENT
Start: 2020-01-01 | End: 2020-01-01

## 2020-01-01 RX ORDER — SODIUM CHLORIDE 0.9 % (FLUSH) 0.9 %
10 SYRINGE (ML) INJECTION AS NEEDED
Status: DISCONTINUED | OUTPATIENT
Start: 2020-01-01 | End: 2020-01-01 | Stop reason: HOSPADM

## 2020-01-01 RX ORDER — AMLODIPINE BESYLATE 5 MG/1
5 TABLET ORAL NIGHTLY
Qty: 30 TABLET | Refills: 2 | Status: SHIPPED | OUTPATIENT
Start: 2020-01-01 | End: 2021-01-01 | Stop reason: HOSPADM

## 2020-01-01 RX ORDER — PANTOPRAZOLE SODIUM 40 MG/1
40 TABLET, DELAYED RELEASE ORAL DAILY
Status: CANCELLED | OUTPATIENT
Start: 2020-01-01 | End: 2021-01-01

## 2020-01-01 RX ORDER — AMLODIPINE BESYLATE 5 MG/1
5 TABLET ORAL DAILY
Status: CANCELLED | OUTPATIENT
Start: 2020-01-01

## 2020-01-01 RX ORDER — CALCIUM ACETATE 667 MG/1
667 CAPSULE ORAL
Status: CANCELLED | OUTPATIENT
Start: 2020-01-01

## 2020-01-01 RX ORDER — SODIUM POLYSTYRENE SULFONATE 4.1 MEQ/G
30 POWDER, FOR SUSPENSION ORAL; RECTAL ONCE
Status: COMPLETED | OUTPATIENT
Start: 2020-01-01 | End: 2020-01-01

## 2020-01-01 RX ORDER — HEPARIN SODIUM 1000 [USP'U]/ML
2000 INJECTION, SOLUTION INTRAVENOUS; SUBCUTANEOUS
Status: COMPLETED | OUTPATIENT
Start: 2020-01-01 | End: 2020-01-01

## 2020-01-01 RX ORDER — DEXTROSE MONOHYDRATE 25 G/50ML
25 INJECTION, SOLUTION INTRAVENOUS ONCE
Status: COMPLETED | OUTPATIENT
Start: 2020-01-01 | End: 2020-01-01

## 2020-01-01 RX ORDER — HEPARIN SODIUM 5000 [USP'U]/ML
5000 INJECTION, SOLUTION INTRAVENOUS; SUBCUTANEOUS EVERY 12 HOURS SCHEDULED
Status: DISCONTINUED | OUTPATIENT
Start: 2020-01-01 | End: 2020-01-01 | Stop reason: HOSPADM

## 2020-01-01 RX ORDER — CARVEDILOL 6.25 MG/1
12.5 TABLET ORAL 2 TIMES DAILY WITH MEALS
Status: CANCELLED | OUTPATIENT
Start: 2020-01-01

## 2020-01-01 RX ORDER — CALCIUM ACETATE 667 MG/1
667 CAPSULE ORAL
COMMUNITY
End: 2020-01-01 | Stop reason: SDUPTHER

## 2020-01-01 RX ORDER — ONDANSETRON 2 MG/ML
4 INJECTION INTRAMUSCULAR; INTRAVENOUS ONCE
Status: COMPLETED | OUTPATIENT
Start: 2020-01-01 | End: 2020-01-01

## 2020-01-01 RX ORDER — ISOSORBIDE DINITRATE 10 MG/1
10 TABLET ORAL 3 TIMES DAILY
Status: CANCELLED | OUTPATIENT
Start: 2020-01-01

## 2020-01-01 RX ORDER — CALCIUM ACETATE 667 MG/1
667 CAPSULE ORAL
Status: DISCONTINUED | OUTPATIENT
Start: 2020-01-01 | End: 2020-01-01 | Stop reason: HOSPADM

## 2020-01-01 RX ORDER — SODIUM CHLORIDE 0.9 % (FLUSH) 0.9 %
10 SYRINGE (ML) INJECTION EVERY 12 HOURS SCHEDULED
Status: DISCONTINUED | OUTPATIENT
Start: 2020-01-01 | End: 2020-01-01 | Stop reason: HOSPADM

## 2020-01-01 RX ORDER — TRAMADOL HYDROCHLORIDE 50 MG/1
50 TABLET ORAL ONCE
Status: COMPLETED | OUTPATIENT
Start: 2021-01-01 | End: 2021-01-01

## 2020-01-01 RX ORDER — CALCIUM GLUCONATE 20 MG/ML
1 INJECTION, SOLUTION INTRAVENOUS ONCE
Status: COMPLETED | OUTPATIENT
Start: 2020-01-01 | End: 2020-01-01

## 2020-01-01 RX ORDER — CARVEDILOL 12.5 MG/1
12.5 TABLET ORAL 2 TIMES DAILY WITH MEALS
Qty: 60 TABLET | Refills: 2 | Status: SHIPPED | OUTPATIENT
Start: 2020-01-01 | End: 2021-01-01 | Stop reason: HOSPADM

## 2020-01-01 RX ORDER — HEPARIN SODIUM 1000 [USP'U]/ML
1000 INJECTION, SOLUTION INTRAVENOUS; SUBCUTANEOUS ONCE
Status: COMPLETED | OUTPATIENT
Start: 2020-01-01 | End: 2020-01-01

## 2020-01-01 RX ORDER — ONDANSETRON 2 MG/ML
INJECTION INTRAMUSCULAR; INTRAVENOUS
Status: DISCONTINUED
Start: 2020-01-01 | End: 2020-01-01 | Stop reason: HOSPADM

## 2020-01-01 RX ORDER — HYDRALAZINE HYDROCHLORIDE 10 MG/1
10 TABLET, FILM COATED ORAL 3 TIMES DAILY
Status: CANCELLED | OUTPATIENT
Start: 2020-01-01

## 2020-01-01 RX ORDER — PANTOPRAZOLE SODIUM 40 MG/1
40 TABLET, DELAYED RELEASE ORAL DAILY
Qty: 30 TABLET | Refills: 0 | Status: SHIPPED | OUTPATIENT
Start: 2020-01-01 | End: 2020-01-01 | Stop reason: SDUPTHER

## 2020-01-01 RX ORDER — ASPIRIN 81 MG/1
324 TABLET, CHEWABLE ORAL ONCE
Status: COMPLETED | OUTPATIENT
Start: 2020-01-01 | End: 2020-01-01

## 2020-01-01 RX ORDER — LACTULOSE 10 G/15ML
20 SOLUTION ORAL ONCE
Status: COMPLETED | OUTPATIENT
Start: 2020-01-01 | End: 2020-01-01

## 2020-01-01 RX ORDER — ISOSORBIDE DINITRATE 10 MG/1
10 TABLET ORAL
Status: DISCONTINUED | OUTPATIENT
Start: 2020-01-01 | End: 2020-01-01 | Stop reason: HOSPADM

## 2020-01-01 RX ORDER — POLYETHYLENE GLYCOL 3350 17 G/17G
17 POWDER, FOR SOLUTION ORAL DAILY
Status: DISCONTINUED | OUTPATIENT
Start: 2020-01-01 | End: 2021-01-01 | Stop reason: HOSPADM

## 2020-01-01 RX ORDER — ISOSORBIDE MONONITRATE 30 MG/1
30 TABLET, EXTENDED RELEASE ORAL EVERY MORNING
Qty: 30 TABLET | Refills: 2 | Status: SHIPPED | OUTPATIENT
Start: 2020-01-01 | End: 2021-01-01 | Stop reason: HOSPADM

## 2020-01-01 RX ORDER — CARVEDILOL 12.5 MG/1
12.5 TABLET ORAL 2 TIMES DAILY WITH MEALS
COMMUNITY
End: 2020-01-01 | Stop reason: SDUPTHER

## 2020-01-01 RX ORDER — HYDROCODONE BITARTRATE AND ACETAMINOPHEN 10; 325 MG/1; MG/1
1 TABLET ORAL ONCE
Status: COMPLETED | OUTPATIENT
Start: 2020-01-01 | End: 2020-01-01

## 2020-01-01 RX ORDER — CALCIUM ACETATE 667 MG/1
667 CAPSULE ORAL
Qty: 90 CAPSULE | Refills: 2 | Status: SHIPPED | OUTPATIENT
Start: 2020-01-01 | End: 2021-01-01 | Stop reason: HOSPADM

## 2020-01-01 RX ORDER — MAGNESIUM SULFATE 1 G/100ML
1 INJECTION INTRAVENOUS ONCE
Status: COMPLETED | OUTPATIENT
Start: 2020-01-01 | End: 2020-01-01

## 2020-01-01 RX ORDER — HYDRALAZINE HYDROCHLORIDE 10 MG/1
10 TABLET, FILM COATED ORAL 3 TIMES DAILY
COMMUNITY
End: 2020-01-01 | Stop reason: SDUPTHER

## 2020-01-01 RX ORDER — CARVEDILOL 12.5 MG/1
12.5 TABLET ORAL 2 TIMES DAILY WITH MEALS
Qty: 60 TABLET | Refills: 0 | Status: SHIPPED | OUTPATIENT
Start: 2020-01-01 | End: 2020-01-01 | Stop reason: SDUPTHER

## 2020-01-01 RX ORDER — ALBUMIN (HUMAN) 12.5 G/50ML
25 SOLUTION INTRAVENOUS AS NEEDED
Status: DISPENSED | OUTPATIENT
Start: 2020-01-01 | End: 2021-01-01

## 2020-01-01 RX ORDER — ISOSORBIDE MONONITRATE 30 MG/1
30 TABLET, EXTENDED RELEASE ORAL EVERY MORNING
Status: CANCELLED | OUTPATIENT
Start: 2021-01-01

## 2020-01-01 RX ORDER — HYDRALAZINE HYDROCHLORIDE 10 MG/1
10 TABLET, FILM COATED ORAL 3 TIMES DAILY
Qty: 90 TABLET | Refills: 2 | Status: SHIPPED | OUTPATIENT
Start: 2020-01-01 | End: 2021-01-01 | Stop reason: HOSPADM

## 2020-01-01 RX ORDER — HYDROMORPHONE HYDROCHLORIDE 1 MG/ML
0.5 INJECTION, SOLUTION INTRAMUSCULAR; INTRAVENOUS; SUBCUTANEOUS ONCE
Status: COMPLETED | OUTPATIENT
Start: 2020-01-01 | End: 2020-01-01

## 2020-01-01 RX ORDER — PANTOPRAZOLE SODIUM 40 MG/1
40 TABLET, DELAYED RELEASE ORAL DAILY
COMMUNITY
End: 2020-01-01 | Stop reason: SDUPTHER

## 2020-01-01 RX ORDER — ACETAMINOPHEN 325 MG/1
650 TABLET ORAL EVERY 6 HOURS PRN
Status: DISCONTINUED | OUTPATIENT
Start: 2020-01-01 | End: 2021-01-01 | Stop reason: HOSPADM

## 2020-01-01 RX ORDER — HYDRALAZINE HYDROCHLORIDE 10 MG/1
10 TABLET, FILM COATED ORAL 3 TIMES DAILY
Qty: 90 TABLET | Refills: 0 | Status: SHIPPED | OUTPATIENT
Start: 2020-01-01 | End: 2020-01-01 | Stop reason: SDUPTHER

## 2020-01-01 RX ORDER — HEPARIN SODIUM 5000 [USP'U]/ML
5000 INJECTION, SOLUTION INTRAVENOUS; SUBCUTANEOUS EVERY 12 HOURS SCHEDULED
Status: DISCONTINUED | OUTPATIENT
Start: 2020-01-01 | End: 2021-01-01 | Stop reason: HOSPADM

## 2020-01-01 RX ORDER — MORPHINE SULFATE 2 MG/ML
2 INJECTION, SOLUTION INTRAMUSCULAR; INTRAVENOUS ONCE
Status: COMPLETED | OUTPATIENT
Start: 2020-01-01 | End: 2020-01-01

## 2020-01-01 RX ORDER — AMLODIPINE BESYLATE 5 MG/1
5 TABLET ORAL DAILY
Qty: 30 TABLET | Refills: 0 | Status: SHIPPED | OUTPATIENT
Start: 2020-01-01 | End: 2020-01-01 | Stop reason: SDUPTHER

## 2020-01-01 RX ORDER — NITROGLYCERIN 0.4 MG/1
0.4 TABLET SUBLINGUAL
Status: DISCONTINUED | OUTPATIENT
Start: 2020-01-01 | End: 2020-01-01 | Stop reason: HOSPADM

## 2020-01-01 RX ORDER — HYDROCODONE BITARTRATE AND ACETAMINOPHEN 5; 325 MG/1; MG/1
1 TABLET ORAL ONCE
Status: COMPLETED | OUTPATIENT
Start: 2020-01-01 | End: 2020-01-01

## 2020-01-01 RX ORDER — SODIUM CHLORIDE 0.9 % (FLUSH) 0.9 %
10 SYRINGE (ML) INJECTION EVERY 12 HOURS SCHEDULED
Status: DISCONTINUED | OUTPATIENT
Start: 2020-01-01 | End: 2021-01-01 | Stop reason: HOSPADM

## 2020-01-01 RX ORDER — PANTOPRAZOLE SODIUM 40 MG/1
40 TABLET, DELAYED RELEASE ORAL DAILY
Status: CANCELLED | OUTPATIENT
Start: 2020-01-01

## 2020-01-01 RX ORDER — AMLODIPINE BESYLATE 5 MG/1
5 TABLET ORAL DAILY
COMMUNITY
End: 2020-01-01 | Stop reason: SDUPTHER

## 2020-01-01 RX ORDER — PANTOPRAZOLE SODIUM 40 MG/1
40 TABLET, DELAYED RELEASE ORAL DAILY
Qty: 30 TABLET | Refills: 2 | Status: SHIPPED | OUTPATIENT
Start: 2020-01-01 | End: 2021-01-01

## 2020-01-01 RX ORDER — ISOSORBIDE DINITRATE 10 MG/1
10 TABLET ORAL 3 TIMES DAILY
COMMUNITY
End: 2020-01-01 | Stop reason: SDUPTHER

## 2020-01-01 RX ORDER — CARVEDILOL 6.25 MG/1
12.5 TABLET ORAL 2 TIMES DAILY WITH MEALS
Status: DISCONTINUED | OUTPATIENT
Start: 2020-01-01 | End: 2020-01-01 | Stop reason: HOSPADM

## 2020-01-01 RX ORDER — DEXTROSE MONOHYDRATE 25 G/50ML
50 INJECTION, SOLUTION INTRAVENOUS ONCE
Status: COMPLETED | OUTPATIENT
Start: 2020-01-01 | End: 2020-01-01

## 2020-01-01 RX ORDER — AMOXICILLIN 250 MG
2 CAPSULE ORAL NIGHTLY
Status: DISCONTINUED | OUTPATIENT
Start: 2020-01-01 | End: 2021-01-01 | Stop reason: HOSPADM

## 2020-01-01 RX ORDER — NICOTINE 21 MG/24HR
1 PATCH, TRANSDERMAL 24 HOURS TRANSDERMAL
Status: DISCONTINUED | OUTPATIENT
Start: 2020-01-01 | End: 2020-01-01 | Stop reason: HOSPADM

## 2020-01-01 RX ADMIN — ALBUTEROL SULFATE 10 MG: 2.5 SOLUTION RESPIRATORY (INHALATION) at 17:44

## 2020-01-01 RX ADMIN — SODIUM CHLORIDE 2 G: 900 INJECTION INTRAVENOUS at 04:26

## 2020-01-01 RX ADMIN — DEXTROSE MONOHYDRATE 25 G: 500 INJECTION PARENTERAL at 02:20

## 2020-01-01 RX ADMIN — CARVEDILOL 12.5 MG: 6.25 TABLET, FILM COATED ORAL at 10:54

## 2020-01-01 RX ADMIN — ERYTHROPOIETIN 10000 UNITS: 20000 INJECTION, SOLUTION INTRAVENOUS; SUBCUTANEOUS at 12:29

## 2020-01-01 RX ADMIN — SODIUM CHLORIDE 1000 ML: 9 INJECTION, SOLUTION INTRAVENOUS at 18:34

## 2020-01-01 RX ADMIN — HEPARIN SODIUM 2000 UNITS: 1000 INJECTION, SOLUTION INTRAVENOUS; SUBCUTANEOUS at 21:30

## 2020-01-01 RX ADMIN — NICOTINE 1 PATCH: 14 PATCH, EXTENDED RELEASE TRANSDERMAL at 07:56

## 2020-01-01 RX ADMIN — HYDROMORPHONE HYDROCHLORIDE 0.5 MG: 1 INJECTION, SOLUTION INTRAMUSCULAR; INTRAVENOUS; SUBCUTANEOUS at 12:04

## 2020-01-01 RX ADMIN — HUMAN INSULIN 10 UNITS: 100 INJECTION, SOLUTION SUBCUTANEOUS at 03:49

## 2020-01-01 RX ADMIN — HEPARIN SODIUM 5000 UNITS: 5000 INJECTION INTRAVENOUS; SUBCUTANEOUS at 07:56

## 2020-01-01 RX ADMIN — DEXTROSE MONOHYDRATE 25 G: 25 INJECTION, SOLUTION INTRAVENOUS at 17:40

## 2020-01-01 RX ADMIN — LACTULOSE 20 G: 10 SOLUTION ORAL at 17:40

## 2020-01-01 RX ADMIN — ONDANSETRON 4 MG: 2 INJECTION INTRAMUSCULAR; INTRAVENOUS at 17:05

## 2020-01-01 RX ADMIN — AZTREONAM 500 MG: 1 INJECTION, POWDER, LYOPHILIZED, FOR SOLUTION INTRAMUSCULAR; INTRAVENOUS at 14:48

## 2020-01-01 RX ADMIN — HYDROCODONE BITARTRATE AND ACETAMINOPHEN 1 TABLET: 10; 325 TABLET ORAL at 11:01

## 2020-01-01 RX ADMIN — SODIUM BICARBONATE 50 MEQ: 84 INJECTION, SOLUTION INTRAVENOUS at 17:42

## 2020-01-01 RX ADMIN — DEXTROSE MONOHYDRATE 25 G: 25 INJECTION, SOLUTION INTRAVENOUS at 03:49

## 2020-01-01 RX ADMIN — CALCIUM GLUCONATE 1 G: 20 INJECTION, SOLUTION INTRAVENOUS at 08:52

## 2020-01-01 RX ADMIN — CALCIUM GLUCONATE 1 G: 20 INJECTION, SOLUTION INTRAVENOUS at 17:40

## 2020-01-01 RX ADMIN — SODIUM CHLORIDE 1000 ML: 9 INJECTION, SOLUTION INTRAVENOUS at 11:15

## 2020-01-01 RX ADMIN — HUMAN INSULIN 10 UNITS: 100 INJECTION, SOLUTION SUBCUTANEOUS at 02:49

## 2020-01-01 RX ADMIN — ASPIRIN 324 MG: 81 TABLET, CHEWABLE ORAL at 07:52

## 2020-01-01 RX ADMIN — SODIUM POLYSTYRENE SULFONATE 30 G: 1 POWDER ORAL; RECTAL at 17:41

## 2020-01-01 RX ADMIN — VANCOMYCIN HYDROCHLORIDE 1500 MG: 10 INJECTION, POWDER, LYOPHILIZED, FOR SOLUTION INTRAVENOUS at 05:03

## 2020-01-01 RX ADMIN — SODIUM POLYSTYRENE SULFONATE 30 G: 1 POWDER ORAL; RECTAL at 02:49

## 2020-01-01 RX ADMIN — SODIUM CHLORIDE 1000 ML: 9 INJECTION, SOLUTION INTRAVENOUS at 02:16

## 2020-01-01 RX ADMIN — SODIUM POLYSTYRENE SULFONATE 30 G: 1 POWDER ORAL; RECTAL at 03:48

## 2020-01-01 RX ADMIN — DEXTROSE MONOHYDRATE 25 G: 500 INJECTION PARENTERAL at 02:49

## 2020-01-01 RX ADMIN — HYDRALAZINE HYDROCHLORIDE 10 MG: 10 TABLET, FILM COATED ORAL at 12:29

## 2020-01-01 RX ADMIN — ISOSORBIDE DINITRATE 10 MG: 10 TABLET ORAL at 12:28

## 2020-01-01 RX ADMIN — MORPHINE SULFATE 2 MG: 2 INJECTION, SOLUTION INTRAMUSCULAR; INTRAVENOUS at 10:24

## 2020-01-01 RX ADMIN — SODIUM CHLORIDE 1000 ML: 9 INJECTION, SOLUTION INTRAVENOUS at 15:10

## 2020-01-01 RX ADMIN — SODIUM CHLORIDE 500 MG: 9 INJECTION, SOLUTION INTRAVENOUS at 22:59

## 2020-01-01 RX ADMIN — KIT FOR THE PREPARATION OF TECHNETIUM TC 99M ALBUMIN AGGREGATED 1 DOSE: 2.5 INJECTION, POWDER, FOR SOLUTION INTRAVENOUS at 11:15

## 2020-01-01 RX ADMIN — SODIUM POLYSTYRENE SULFONATE 30 G: 1 POWDER ORAL; RECTAL at 02:20

## 2020-01-01 RX ADMIN — SODIUM CHLORIDE 1000 ML: 9 INJECTION, SOLUTION INTRAVENOUS at 20:26

## 2020-01-01 RX ADMIN — HUMAN INSULIN 10 UNITS: 100 INJECTION, SOLUTION SUBCUTANEOUS at 17:40

## 2020-01-01 RX ADMIN — SODIUM CHLORIDE, PRESERVATIVE FREE 10 ML: 5 INJECTION INTRAVENOUS at 08:56

## 2020-01-01 RX ADMIN — DEXTROSE MONOHYDRATE 50 ML: 25 INJECTION, SOLUTION INTRAVENOUS at 08:52

## 2020-01-01 RX ADMIN — PANTOPRAZOLE SODIUM 40 MG: 40 TABLET, DELAYED RELEASE ORAL at 10:53

## 2020-01-01 RX ADMIN — SODIUM BICARBONATE 50 MEQ: 84 INJECTION, SOLUTION INTRAVENOUS at 08:52

## 2020-01-01 RX ADMIN — ACETAMINOPHEN 650 MG: 325 TABLET ORAL at 21:44

## 2020-01-01 RX ADMIN — AZTREONAM 2 G: 2 INJECTION, POWDER, FOR SOLUTION INTRAMUSCULAR; INTRAVENOUS at 10:45

## 2020-01-01 RX ADMIN — CALCIUM ACETATE 667 MG: 667 CAPSULE ORAL at 10:53

## 2020-01-01 RX ADMIN — ONDANSETRON 4 MG: 2 INJECTION INTRAMUSCULAR; INTRAVENOUS at 10:22

## 2020-01-01 RX ADMIN — HUMAN INSULIN 10 UNITS: 100 INJECTION, SOLUTION SUBCUTANEOUS at 08:52

## 2020-01-01 RX ADMIN — HYDROCODONE BITARTRATE AND ACETAMINOPHEN 1 TABLET: 5; 325 TABLET ORAL at 05:47

## 2020-01-01 RX ADMIN — SODIUM CHLORIDE, PRESERVATIVE FREE 10 ML: 5 INJECTION INTRAVENOUS at 21:46

## 2020-01-01 RX ADMIN — MAGNESIUM SULFATE 1 G: 1 INJECTION INTRAVENOUS at 21:44

## 2020-01-01 RX ADMIN — HEPARIN SODIUM 5000 UNITS: 5000 INJECTION INTRAVENOUS; SUBCUTANEOUS at 08:53

## 2020-01-01 RX ADMIN — SODIUM CHLORIDE 500 ML: 9 INJECTION, SOLUTION INTRAVENOUS at 17:05

## 2020-01-01 RX ADMIN — HUMAN INSULIN 10 UNITS: 100 INJECTION, SOLUTION SUBCUTANEOUS at 02:20

## 2020-01-01 RX ADMIN — HEPARIN SODIUM 1000 UNITS: 1000 INJECTION, SOLUTION INTRAVENOUS; SUBCUTANEOUS at 14:48

## 2020-01-01 RX ADMIN — VANCOMYCIN HYDROCHLORIDE 1500 MG: 10 INJECTION, POWDER, LYOPHILIZED, FOR SOLUTION INTRAVENOUS at 11:51

## 2020-09-03 ENCOUNTER — HOSPITAL ENCOUNTER (EMERGENCY)
Facility: HOSPITAL | Age: 45
Discharge: ANOTHER HOSPITAL | End: 2020-09-04
Attending: EMERGENCY MEDICINE | Admitting: EMERGENCY MEDICINE

## 2020-09-03 DIAGNOSIS — A41.9 SEPSIS WITH ACUTE RENAL FAILURE WITHOUT SEPTIC SHOCK, DUE TO UNSPECIFIED ORGANISM, UNSPECIFIED ACUTE RENAL FAILURE TYPE (HCC): ICD-10-CM

## 2020-09-03 DIAGNOSIS — N17.9 SEPSIS WITH ACUTE RENAL FAILURE WITHOUT SEPTIC SHOCK, DUE TO UNSPECIFIED ORGANISM, UNSPECIFIED ACUTE RENAL FAILURE TYPE (HCC): ICD-10-CM

## 2020-09-03 DIAGNOSIS — E87.5 HYPERKALEMIA: ICD-10-CM

## 2020-09-03 DIAGNOSIS — D64.9 ANEMIA, UNSPECIFIED TYPE: ICD-10-CM

## 2020-09-03 DIAGNOSIS — R65.20 SEPSIS WITH ACUTE RENAL FAILURE WITHOUT SEPTIC SHOCK, DUE TO UNSPECIFIED ORGANISM, UNSPECIFIED ACUTE RENAL FAILURE TYPE (HCC): ICD-10-CM

## 2020-09-03 DIAGNOSIS — N19 RENAL FAILURE, UNSPECIFIED CHRONICITY: Primary | ICD-10-CM

## 2020-09-03 PROCEDURE — 99285 EMERGENCY DEPT VISIT HI MDM: CPT

## 2020-09-04 ENCOUNTER — APPOINTMENT (OUTPATIENT)
Dept: CT IMAGING | Facility: HOSPITAL | Age: 45
End: 2020-09-04

## 2020-09-04 VITALS
WEIGHT: 170 LBS | HEIGHT: 68 IN | DIASTOLIC BLOOD PRESSURE: 61 MMHG | OXYGEN SATURATION: 100 % | TEMPERATURE: 98.5 F | BODY MASS INDEX: 25.76 KG/M2 | RESPIRATION RATE: 18 BRPM | SYSTOLIC BLOOD PRESSURE: 126 MMHG | HEART RATE: 85 BPM

## 2020-09-04 LAB
6-ACETYL MORPHINE: NEGATIVE
ABO GROUP BLD: NORMAL
ABO GROUP BLD: NORMAL
ALBUMIN SERPL-MCNC: 2.48 G/DL (ref 3.5–5.2)
ALBUMIN/GLOB SERPL: 0.5 G/DL
ALP SERPL-CCNC: 422 U/L (ref 39–117)
ALT SERPL W P-5'-P-CCNC: 29 U/L (ref 1–41)
AMMONIA BLD-SCNC: 18 UMOL/L (ref 16–60)
AMPHET+METHAMPHET UR QL: NEGATIVE
ANION GAP SERPL CALCULATED.3IONS-SCNC: 24.6 MMOL/L (ref 5–15)
APAP SERPL-MCNC: <5 MCG/ML (ref 10–30)
APTT PPP: 33.1 SECONDS (ref 25.6–35.3)
AST SERPL-CCNC: 19 U/L (ref 1–40)
BACTERIA BLD CULT: ABNORMAL
BARBITURATES UR QL SCN: NEGATIVE
BASOPHILS # BLD AUTO: 0.01 10*3/MM3 (ref 0–0.2)
BASOPHILS NFR BLD AUTO: 0.1 % (ref 0–1.5)
BENZODIAZ UR QL SCN: NEGATIVE
BILIRUB SERPL-MCNC: 0.3 MG/DL (ref 0–1.2)
BLD GP AB SCN SERPL QL: NEGATIVE
BUN SERPL-MCNC: 230 MG/DL (ref 6–20)
BUN/CREAT SERPL: 14.9 (ref 7–25)
BUPRENORPHINE SERPL-MCNC: NEGATIVE NG/ML
CALCIUM SPEC-SCNC: 7.2 MG/DL (ref 8.6–10.5)
CANNABINOIDS SERPL QL: NEGATIVE
CHLORIDE SERPL-SCNC: 86 MMOL/L (ref 98–107)
CK SERPL-CCNC: 40 U/L (ref 20–200)
CO2 SERPL-SCNC: 10.4 MMOL/L (ref 22–29)
COCAINE UR QL: NEGATIVE
CREAT SERPL-MCNC: 15.43 MG/DL (ref 0.76–1.27)
CRP SERPL-MCNC: 11.79 MG/DL (ref 0–0.5)
D-LACTATE SERPL-SCNC: 0.9 MMOL/L (ref 0.5–2)
DEPRECATED RDW RBC AUTO: 43.5 FL (ref 37–54)
EOSINOPHIL # BLD AUTO: 0.01 10*3/MM3 (ref 0–0.4)
EOSINOPHIL NFR BLD AUTO: 0.1 % (ref 0.3–6.2)
ERYTHROCYTE [DISTWIDTH] IN BLOOD BY AUTOMATED COUNT: 14 % (ref 12.3–15.4)
ERYTHROCYTE [SEDIMENTATION RATE] IN BLOOD: >100 MM/HR (ref 0–15)
ETHANOL BLD-MCNC: <10 MG/DL (ref 0–10)
ETHANOL UR QL: <0.01 %
GFR SERPL CREATININE-BSD FRML MDRD: 3 ML/MIN/1.73
GFR SERPL CREATININE-BSD FRML MDRD: ABNORMAL ML/MIN/{1.73_M2}
GLOBULIN UR ELPH-MCNC: 4.7 GM/DL
GLUCOSE SERPL-MCNC: 142 MG/DL (ref 65–99)
HAV IGM SERPL QL IA: ABNORMAL
HBV CORE IGM SERPL QL IA: ABNORMAL
HBV SURFACE AG SERPL QL IA: ABNORMAL
HCT VFR BLD AUTO: 15.9 % (ref 37.5–51)
HCV AB SER DONR QL: REACTIVE
HGB BLD-MCNC: 5 G/DL (ref 13–17.7)
HOLD SPECIMEN: NORMAL
IMM GRANULOCYTES # BLD AUTO: 0.09 10*3/MM3 (ref 0–0.05)
IMM GRANULOCYTES NFR BLD AUTO: 0.9 % (ref 0–0.5)
INR PPP: 1.52 (ref 0.9–1.1)
LIPASE SERPL-CCNC: 27 U/L (ref 13–60)
LYMPHOCYTES # BLD AUTO: 0.49 10*3/MM3 (ref 0.7–3.1)
LYMPHOCYTES NFR BLD AUTO: 4.9 % (ref 19.6–45.3)
MAGNESIUM SERPL-MCNC: 4.5 MG/DL (ref 1.6–2.6)
MCH RBC QN AUTO: 26.9 PG (ref 26.6–33)
MCHC RBC AUTO-ENTMCNC: 31.4 G/DL (ref 31.5–35.7)
MCV RBC AUTO: 85.5 FL (ref 79–97)
METHADONE UR QL SCN: NEGATIVE
MONOCYTES # BLD AUTO: 0.96 10*3/MM3 (ref 0.1–0.9)
MONOCYTES NFR BLD AUTO: 9.6 % (ref 5–12)
NEUTROPHILS NFR BLD AUTO: 8.4 10*3/MM3 (ref 1.7–7)
NEUTROPHILS NFR BLD AUTO: 84.4 % (ref 42.7–76)
NRBC BLD AUTO-RTO: 0 /100 WBC (ref 0–0.2)
NT-PROBNP SERPL-MCNC: 8024 PG/ML (ref 0–450)
OPIATES UR QL: NEGATIVE
OXYCODONE UR QL SCN: NEGATIVE
PCP UR QL SCN: NEGATIVE
PHOSPHATE SERPL-MCNC: 10.3 MG/DL (ref 2.5–4.5)
PLATELET # BLD AUTO: 121 10*3/MM3 (ref 140–450)
PMV BLD AUTO: 10.9 FL (ref 6–12)
POTASSIUM SERPL-SCNC: 7.7 MMOL/L (ref 3.5–5.2)
PROCALCITONIN SERPL-MCNC: 10.71 NG/ML (ref 0–0.25)
PROT SERPL-MCNC: 7.2 G/DL (ref 6–8.5)
PROTHROMBIN TIME: 18.2 SECONDS (ref 11.9–14.1)
RBC # BLD AUTO: 1.86 10*6/MM3 (ref 4.14–5.8)
RH BLD: POSITIVE
RH BLD: POSITIVE
SALICYLATES SERPL-MCNC: <0.3 MG/DL
SARS-COV-2 RDRP RESP QL NAA+PROBE: NOT DETECTED
SODIUM SERPL-SCNC: 121 MMOL/L (ref 136–145)
T&S EXPIRATION DATE: NORMAL
T4 FREE SERPL-MCNC: 0.4 NG/DL (ref 0.93–1.7)
TROPONIN T SERPL-MCNC: 0.13 NG/ML (ref 0–0.03)
TSH SERPL DL<=0.05 MIU/L-ACNC: 1.78 UIU/ML (ref 0.27–4.2)
WBC # BLD AUTO: 9.96 10*3/MM3 (ref 3.4–10.8)

## 2020-09-04 PROCEDURE — 84484 ASSAY OF TROPONIN QUANT: CPT | Performed by: EMERGENCY MEDICINE

## 2020-09-04 PROCEDURE — 93010 ELECTROCARDIOGRAM REPORT: CPT | Performed by: INTERNAL MEDICINE

## 2020-09-04 PROCEDURE — 80307 DRUG TEST PRSMV CHEM ANLYZR: CPT | Performed by: EMERGENCY MEDICINE

## 2020-09-04 PROCEDURE — 80074 ACUTE HEPATITIS PANEL: CPT | Performed by: EMERGENCY MEDICINE

## 2020-09-04 PROCEDURE — 83690 ASSAY OF LIPASE: CPT | Performed by: EMERGENCY MEDICINE

## 2020-09-04 PROCEDURE — P9016 RBC LEUKOCYTES REDUCED: HCPCS

## 2020-09-04 PROCEDURE — 85652 RBC SED RATE AUTOMATED: CPT | Performed by: EMERGENCY MEDICINE

## 2020-09-04 PROCEDURE — 85730 THROMBOPLASTIN TIME PARTIAL: CPT | Performed by: EMERGENCY MEDICINE

## 2020-09-04 PROCEDURE — 86900 BLOOD TYPING SEROLOGIC ABO: CPT | Performed by: EMERGENCY MEDICINE

## 2020-09-04 PROCEDURE — 25010000002 CEFEPIME PER 500 MG: Performed by: EMERGENCY MEDICINE

## 2020-09-04 PROCEDURE — 84145 PROCALCITONIN (PCT): CPT | Performed by: EMERGENCY MEDICINE

## 2020-09-04 PROCEDURE — 70450 CT HEAD/BRAIN W/O DYE: CPT

## 2020-09-04 PROCEDURE — 96375 TX/PRO/DX INJ NEW DRUG ADDON: CPT

## 2020-09-04 PROCEDURE — 96365 THER/PROPH/DIAG IV INF INIT: CPT

## 2020-09-04 PROCEDURE — 86923 COMPATIBILITY TEST ELECTRIC: CPT

## 2020-09-04 PROCEDURE — 71250 CT THORAX DX C-: CPT

## 2020-09-04 PROCEDURE — 86900 BLOOD TYPING SEROLOGIC ABO: CPT

## 2020-09-04 PROCEDURE — 63710000001 INSULIN REGULAR HUMAN PER 5 UNITS: Performed by: EMERGENCY MEDICINE

## 2020-09-04 PROCEDURE — 86850 RBC ANTIBODY SCREEN: CPT | Performed by: EMERGENCY MEDICINE

## 2020-09-04 PROCEDURE — 85610 PROTHROMBIN TIME: CPT | Performed by: EMERGENCY MEDICINE

## 2020-09-04 PROCEDURE — 82550 ASSAY OF CK (CPK): CPT | Performed by: EMERGENCY MEDICINE

## 2020-09-04 PROCEDURE — 74176 CT ABD & PELVIS W/O CONTRAST: CPT

## 2020-09-04 PROCEDURE — 87077 CULTURE AEROBIC IDENTIFY: CPT | Performed by: EMERGENCY MEDICINE

## 2020-09-04 PROCEDURE — 83605 ASSAY OF LACTIC ACID: CPT | Performed by: EMERGENCY MEDICINE

## 2020-09-04 PROCEDURE — 84439 ASSAY OF FREE THYROXINE: CPT | Performed by: EMERGENCY MEDICINE

## 2020-09-04 PROCEDURE — 84100 ASSAY OF PHOSPHORUS: CPT | Performed by: EMERGENCY MEDICINE

## 2020-09-04 PROCEDURE — 87040 BLOOD CULTURE FOR BACTERIA: CPT | Performed by: EMERGENCY MEDICINE

## 2020-09-04 PROCEDURE — 86140 C-REACTIVE PROTEIN: CPT | Performed by: EMERGENCY MEDICINE

## 2020-09-04 PROCEDURE — 25010000002 CALCIUM GLUCONATE-NACL 1-0.675 GM/50ML-% SOLUTION: Performed by: EMERGENCY MEDICINE

## 2020-09-04 PROCEDURE — 93005 ELECTROCARDIOGRAM TRACING: CPT | Performed by: EMERGENCY MEDICINE

## 2020-09-04 PROCEDURE — 86901 BLOOD TYPING SEROLOGIC RH(D): CPT | Performed by: EMERGENCY MEDICINE

## 2020-09-04 PROCEDURE — 87341 HEP B SURFACE AG NEUTRLZJ IA: CPT | Performed by: EMERGENCY MEDICINE

## 2020-09-04 PROCEDURE — 86901 BLOOD TYPING SEROLOGIC RH(D): CPT

## 2020-09-04 PROCEDURE — 96367 TX/PROPH/DG ADDL SEQ IV INF: CPT

## 2020-09-04 PROCEDURE — 36415 COLL VENOUS BLD VENIPUNCTURE: CPT

## 2020-09-04 PROCEDURE — 87186 SC STD MICRODIL/AGAR DIL: CPT | Performed by: EMERGENCY MEDICINE

## 2020-09-04 PROCEDURE — 80050 GENERAL HEALTH PANEL: CPT | Performed by: EMERGENCY MEDICINE

## 2020-09-04 PROCEDURE — 36430 TRANSFUSION BLD/BLD COMPNT: CPT

## 2020-09-04 PROCEDURE — 87635 SARS-COV-2 COVID-19 AMP PRB: CPT | Performed by: EMERGENCY MEDICINE

## 2020-09-04 PROCEDURE — 82140 ASSAY OF AMMONIA: CPT | Performed by: EMERGENCY MEDICINE

## 2020-09-04 PROCEDURE — 87150 DNA/RNA AMPLIFIED PROBE: CPT | Performed by: EMERGENCY MEDICINE

## 2020-09-04 PROCEDURE — 25010000002 VANCOMYCIN 5 G RECONSTITUTED SOLUTION: Performed by: EMERGENCY MEDICINE

## 2020-09-04 PROCEDURE — 83880 ASSAY OF NATRIURETIC PEPTIDE: CPT | Performed by: EMERGENCY MEDICINE

## 2020-09-04 PROCEDURE — 83735 ASSAY OF MAGNESIUM: CPT | Performed by: EMERGENCY MEDICINE

## 2020-09-04 RX ORDER — DEXTROSE MONOHYDRATE 25 G/50ML
25 INJECTION, SOLUTION INTRAVENOUS ONCE
Status: COMPLETED | OUTPATIENT
Start: 2020-09-04 | End: 2020-09-04

## 2020-09-04 RX ORDER — SODIUM POLYSTYRENE SULFONATE 4.1 MEQ/G
30 POWDER, FOR SUSPENSION ORAL; RECTAL ONCE
Status: COMPLETED | OUTPATIENT
Start: 2020-09-04 | End: 2020-09-04

## 2020-09-04 RX ORDER — SODIUM CHLORIDE 0.9 % (FLUSH) 0.9 %
10 SYRINGE (ML) INJECTION AS NEEDED
Status: DISCONTINUED | OUTPATIENT
Start: 2020-09-04 | End: 2020-09-04 | Stop reason: HOSPADM

## 2020-09-04 RX ORDER — CALCIUM GLUCONATE 20 MG/ML
1 INJECTION, SOLUTION INTRAVENOUS ONCE
Status: COMPLETED | OUTPATIENT
Start: 2020-09-04 | End: 2020-09-04

## 2020-09-04 RX ADMIN — VANCOMYCIN HYDROCHLORIDE 1500 MG: 5 INJECTION, POWDER, LYOPHILIZED, FOR SOLUTION INTRAVENOUS at 06:29

## 2020-09-04 RX ADMIN — CEFEPIME 2 G: 2 INJECTION, POWDER, FOR SOLUTION INTRAVENOUS at 05:50

## 2020-09-04 RX ADMIN — DEXTROSE MONOHYDRATE 25 G: 25 INJECTION, SOLUTION INTRAVENOUS at 04:40

## 2020-09-04 RX ADMIN — CALCIUM GLUCONATE 1 G: 20 INJECTION, SOLUTION INTRAVENOUS at 04:43

## 2020-09-04 RX ADMIN — HUMAN INSULIN 10 UNITS: 100 INJECTION, SOLUTION SUBCUTANEOUS at 04:41

## 2020-09-04 RX ADMIN — SODIUM POLYSTYRENE SULFONATE 30 G: 1 POWDER ORAL; RECTAL at 04:42

## 2020-09-04 NOTE — ED NOTES
Pt alert and oriented to self, disoriented to situation and time. skin warm, dry, pale. Respirations even and unlabored, all lung fields clear and equal bilaterally. Ns noted. 18 gauge in left forearm remains patent and continued at time of transfer. 20 gauge in left ac remains patent with no s/s of infiltration and continued at time of transfer.      Shari Gil, RN  09/04/20 7441

## 2020-09-04 NOTE — ED NOTES
[facility] called back they are able to get out of airport but not into [place] yet, he will call back when it is clear.      Theresa Valerio  09/04/20 1002

## 2020-09-04 NOTE — ED PROVIDER NOTES
Subjective   45-year-old male in the emergency department with diffuse pain and confusion.  Per family the patient was found at his house confused and screaming out that he was in pain.  Patient has extensive past medical history of endocarditis, end-stage renal disease, and IV drug abuse.  Patient has not had follow-up in over a year.  Patient was supposed to follow-up with nephrology for dialysis, and has skipped for over a year at this time.  Moreover, family reports that he is a daily IV drug abuser and has poor medical compliance.  Denied nausea, vomiting, diarrhea, chest pain, or shortness of breath.      History provided by:  Relative and patient   used: No        Review of Systems   Constitutional: Positive for activity change, appetite change and fatigue. Negative for chills, diaphoresis and fever.   HENT: Negative for congestion, ear pain and sore throat.    Eyes: Negative for redness.   Respiratory: Negative for cough, chest tightness, shortness of breath and wheezing.    Cardiovascular: Negative for chest pain, palpitations and leg swelling.   Gastrointestinal: Negative for abdominal pain, diarrhea, nausea and vomiting.   Genitourinary: Negative for dysuria and urgency.   Musculoskeletal: Negative for arthralgias, back pain, myalgias and neck pain.   Skin: Negative for pallor, rash and wound.   Neurological: Positive for weakness. Negative for dizziness, speech difficulty and headaches.   Psychiatric/Behavioral: Negative for agitation, behavioral problems, confusion and decreased concentration.   All other systems reviewed and are negative.      Past Medical History:   Diagnosis Date   • Endocarditis    • ESRD (end stage renal disease) (CMS/[facility])    • IV drug abuse (CMS/[facility])    • Unable to read or write        Allergies   Allergen Reactions   • Penicillins Shortness Of Breath, Itching and Rash       Past Surgical History:   Procedure Laterality Date   • CENTRAL VENOUS CATHETER TUNNELED  INSERTION DOUBLE LUMEN Right     Chest for hemodialysis       Family History   Problem Relation Age of Onset   • Alcohol abuse Mother    • Hypertension Mother    • Hypertension Other        Social History     Socioeconomic History   • Marital status:      Spouse name: Not on file   • Number of children: Not on file   • Years of education: Not on file   • Highest education level: Not on file   Tobacco Use   • Smoking status: Current Every Day Smoker     Packs/day: 1.00     Years: 20.00     Pack years: 20.00     Types: Cigarettes   • Smokeless tobacco: Never Used   Substance and Sexual Activity   • Alcohol use: No     Frequency: Never   • Drug use: Yes     Types: Methamphetamines     Comment: last used on Friday 5/18/2019   • Sexual activity: Defer           Objective   Physical Exam   Constitutional: He is oriented to person, place, and time. He appears well-developed and well-nourished. He appears lethargic. He appears toxic. He appears ill. No distress.   HENT:   Head: Normocephalic and atraumatic.   Right Ear: External ear normal.   Left Ear: External ear normal.   Nose: Nose normal.   Mouth/Throat: Oropharynx is clear and moist and mucous membranes are normal. No oropharyngeal exudate. No tonsillar exudate.   Eyes: Pupils are equal, round, and reactive to light. Conjunctivae, EOM and lids are normal. Scleral icterus is present.   Neck: Normal range of motion and full passive range of motion without pain. Neck supple. No thyromegaly present.   Cardiovascular: Normal rate, regular rhythm, S1 normal, S2 normal, normal heart sounds, intact distal pulses and normal pulses.   Pulmonary/Chest: Effort normal and breath sounds normal. No tachypnea. No respiratory distress. He has no decreased breath sounds. He has no wheezes. He has no rales. He exhibits no tenderness.   Abdominal: Soft. Normal appearance and bowel sounds are normal. He exhibits no distension. There is no tenderness. There is no rebound and no  guarding.   Musculoskeletal: Normal range of motion. He exhibits tenderness. He exhibits no edema or deformity.   Lymphadenopathy:     He has no cervical adenopathy.   Neurological: He is oriented to person, place, and time. He has normal strength. He appears lethargic. No cranial nerve deficit or sensory deficit. GCS eye subscore is 4. GCS verbal subscore is 5. GCS motor subscore is 6.   Skin: Skin is warm, dry and intact. No rash noted. He is not diaphoretic. No erythema. No pallor.   Jaundice.   Psychiatric: He has a normal mood and affect. His speech is normal and behavior is normal. Judgment and thought content normal. Cognition and memory are normal.   Nursing note and vitals reviewed.      Procedures           ED Course  ED Course as of Sep 04 0825   Fri Sep 04, 2020   0450 IMPRESSION:  Normal, negative unenhanced head CT.   CT Head Without Contrast [ES]   0451 IMPRESSION:     1. The lungs are emphysematous and demonstrate areas of probable atelectasis/scarring. No effusion.  2. Imaging findings highly suspicious for malignancy with extensive retroperitoneal adenopathy and bilateral hydronephrosis and hydroureter with concentric wall thickening of the bladder. Abnormal appearance of both kidneys demonstrating lobulated  features which may reflect underlying renal masses. Differential considerations would include underlying urothelial malignancy with a bladder primary or potentially lymphomatous involvement of the kidneys bilaterally and the urinary collecting systems.  Renal cell carcinoma also in the differential. Suggest urologic referral for further care and management. Cystoscopy would be complementary.  3. Splenomegaly and small volume of ascites in the abdomen and pelvis.  4. Normal appendix.   CT Abdomen Pelvis Without Contrast [ES]   0451 IMPRESSION:     1. The lungs are emphysematous and demonstrate areas of probable atelectasis/scarring. No effusion.  2. Imaging findings highly suspicious for  malignancy with extensive retroperitoneal adenopathy and bilateral hydronephrosis and hydroureter with concentric wall thickening of the bladder. Abnormal appearance of both kidneys demonstrating lobulated  features which may reflect underlying renal masses. Differential considerations would include underlying urothelial malignancy with a bladder primary or potentially lymphomatous involvement of the kidneys bilaterally and the urinary collecting systems.  Renal cell carcinoma also in the differential. Suggest urologic referral for further care and management. Cystoscopy would be complementary.  3. Splenomegaly and small volume of ascites in the abdomen and pelvis.  4. Normal appendix.   CT Chest Without Contrast [ES]   0822 Normal sinus rhythm  Normal ECG  When compared with ECG of 19-JUL-2019 06:07,  QRS duration has increased  Vent. rate 81 BPM  FL interval 154 ms  QRS duration 116 ms  QT/QTc 384/446 ms  P-R-T axes 77 83 82   ECG 12 Lead [ES]   0823 Discussed case with on-call hospitalist at [facility] and accepted by Dr. Marrufo.    [ES]      ED Course User Index  [ES] Jared Roman MD                                           MDM  Number of Diagnoses or Management Options     Amount and/or Complexity of Data Reviewed  Clinical lab tests: reviewed and ordered  Tests in the radiology section of CPT®: reviewed and ordered  Tests in the medicine section of CPT®: reviewed and ordered  Review and summarize past medical records: yes  Discuss the patient with other providers: yes  Independent visualization of images, tracings, or specimens: yes    Risk of Complications, Morbidity, and/or Mortality  Presenting problems: moderate  Diagnostic procedures: moderate  Management options: moderate    Critical Care  Total time providing critical care: > 105 minutes    Patient Progress  Patient progress: other (comment) (Critical.)      Final diagnoses:   Renal failure, unspecified  chronicity   Hyperkalemia   Sepsis with acute renal failure without septic shock, due to unspecified organism, unspecified acute renal failure type (CMS/[facility])   Anemia, unspecified type            Jared Roman MD  09/04/20 7942

## 2020-09-04 NOTE — ED NOTES
Obtained and verified emtala, consent for transfer, signed by dr villasenor.      Shari Gil, RN  09/04/20 0903

## 2020-09-04 NOTE — ED NOTES
Contacted [employer] for flight, they stated they can not fly due to weather, will recheck on 2 hours.     Theresa Valerio  09/04/20 0903

## 2020-09-04 NOTE — ED NOTES
roman with [facility] called wanting an update on pts status I advised her we were having issues with weather and transportion  and as soon as we have the pt a ride we would call and let her know.      Theresa Valerio  09/04/20 0836

## 2020-09-04 NOTE — ED NOTES
[facility] called checking status. They want CBC and BMP update.     Theresa Valerio  09/04/20 1020

## 2020-09-04 NOTE — ED NOTES
Bedside report given to beverley davies with [facility]. Pt wallet given to her.      Shari Gil, KALA  09/04/20 7950

## 2020-09-04 NOTE — ED NOTES
Blood transfusion consent form signed and placed on patient chart.      Isa Gil RN  09/04/20 3414

## 2020-09-04 NOTE — ED NOTES
Pt alert and oriented to self, skin is warm, dry, and pale. Respirations even and unlabored. Ns noted. Pt resting on stretcher, unit #2 of prbc continues to infuse at 150ml/hr via 18 gauge in left forearm, no s/s of infiltration, pt tolerating well. No reaction suspected. All lung fields clear and equal bilaterally. poc updated, pt waiting on transportation for [employer]. Call light within reach of pt.      Shari Gil, RN  09/04/20 7572

## 2020-09-04 NOTE — ED NOTES
Called [employer] they couldn't fly due to fog check back in an hour     Theresa Valerio  09/04/20 0800

## 2020-09-05 LAB
BH BB BLOOD EXPIRATION DATE: NORMAL
BH BB BLOOD EXPIRATION DATE: NORMAL
BH BB BLOOD TYPE BARCODE: 5100
BH BB BLOOD TYPE BARCODE: 5100
BH BB DISPENSE STATUS: NORMAL
BH BB DISPENSE STATUS: NORMAL
BH BB PRODUCT CODE: NORMAL
BH BB PRODUCT CODE: NORMAL
BH BB UNIT NUMBER: NORMAL
BH BB UNIT NUMBER: NORMAL
CROSSMATCH INTERPRETATION: NORMAL
CROSSMATCH INTERPRETATION: NORMAL
UNIT  ABO: NORMAL
UNIT  ABO: NORMAL
UNIT  RH: NORMAL
UNIT  RH: NORMAL

## 2020-09-06 LAB
BACTERIA SPEC AEROBE CULT: ABNORMAL
BACTERIA SPEC AEROBE CULT: ABNORMAL
GRAM STN SPEC: ABNORMAL
HBV SURFACE AG SERPL QL IA: NORMAL
HBV SURFACE AG SERPL QL NT: POSITIVE
ISOLATED FROM: ABNORMAL
ISOLATED FROM: ABNORMAL

## 2020-10-12 NOTE — ED NOTES
Pt states now that his blood transfusion is complete he wants to leave AMA that he does not want any further treatment or to be transferred or admitted. Educated pt on risks of leaving AMA and refusing further treatment pt verbalized understanding. Pt has called his daughter at this time for transport home. Pt states he thanks for for everything and he feels much better and just wants to be home and sleep.      Chelsea Whalen, RN  10/12/20 0485

## 2020-10-12 NOTE — ED NOTES
Praveen from lab called to say it would be while before pt's blood is matched due to positive antibodies. Provider notified.      Chely Diaz RN  10/12/20 8605

## 2020-10-12 NOTE — ED PROVIDER NOTES
Subjective   45-year-old white male with past medical history of end-stage renal disease, endocarditis history of substance abuse presents emergency department complaining of shortness of breath.  Patient reports that he has issues with shortness of breath when he becomes anemic or if he needs dialyzed he does not dialyze regularly he is sporadic about that however 4 days ago he was at [facility] and he was dialyzed.  Patient reports he really feels like he did the last time he needed a blood transfusion which is why he came to the emergency department.   I reviewed records from [facility] patient was admitted there from 9 28-10 9 he left AGAINST MEDICAL ADVICE on that day he was getting hemodialysis as well as he had found been worked up for anemia and is secondary to his end-stage renal disease however he signed out AGAINST MEDICAL ADVICE he did have a procedure done on 10 5 he had a HDcatheter placed.      History provided by:  Patient  Weakness - Generalized  Severity:  Moderate  Onset quality:  Gradual  Timing:  Constant  Progression:  Unchanged  Associated symptoms: no abdominal pain, no chest pain, no dysuria and no fever        Review of Systems   Constitutional: Negative.  Negative for fever.   HENT: Negative.    Respiratory: Negative.    Cardiovascular: Negative.  Negative for chest pain.   Gastrointestinal: Negative.  Negative for abdominal pain.   Endocrine: Negative.    Genitourinary: Negative.  Negative for dysuria.   Skin: Negative.    Psychiatric/Behavioral: Negative.    All other systems reviewed and are negative.      Past Medical History:   Diagnosis Date   • Endocarditis    • ESRD (end stage renal disease) (CMS/[facility])    • IV drug abuse (CMS/[facility])    • Unable to read or write        Allergies   Allergen Reactions   • Penicillins Shortness Of Breath, Itching and Rash       Past Surgical History:   Procedure Laterality Date   • CENTRAL VENOUS CATHETER TUNNELED INSERTION DOUBLE LUMEN Right     Chest  for hemodialysis       Family History   Problem Relation Age of Onset   • Alcohol abuse Mother    • Hypertension Mother    • Hypertension Other        Social History     Socioeconomic History   • Marital status:      Spouse name: Not on file   • Number of children: Not on file   • Years of education: Not on file   • Highest education level: Not on file   Tobacco Use   • Smoking status: Current Every Day Smoker     Packs/day: 1.00     Years: 20.00     Pack years: 20.00     Types: Cigarettes   • Smokeless tobacco: Never Used   Substance and Sexual Activity   • Alcohol use: No     Frequency: Never   • Drug use: Yes     Types: Methamphetamines     Comment: last used on Friday 5/18/2019   • Sexual activity: Defer           Objective   Physical Exam  Vitals signs and nursing note reviewed.   Constitutional:       Appearance: He is ill-appearing.   HENT:      Head: Normocephalic.      Mouth/Throat:      Mouth: Mucous membranes are moist.      Pharynx: Oropharynx is clear.   Eyes:      Pupils: Pupils are equal, round, and reactive to light.   Neck:      Musculoskeletal: Normal range of motion and neck supple.   Cardiovascular:      Rate and Rhythm: Normal rate and regular rhythm.   Pulmonary:      Effort: Pulmonary effort is normal.      Breath sounds: Decreased breath sounds present.   Abdominal:      General: Bowel sounds are normal.      Palpations: Abdomen is soft.   Musculoskeletal: Normal range of motion.   Skin:     General: Skin is warm.      Capillary Refill: Capillary refill takes less than 2 seconds.   Neurological:      General: No focal deficit present.      Mental Status: He is alert and oriented to person, place, and time.   Psychiatric:         Mood and Affect: Mood normal.         Behavior: Behavior normal.         Procedures           ED Course  ED Course as of Oct 12 2102   Mon Oct 12, 2020   0536 I discussed with patient in detail regarding the need for transfer to another facility due to bed  availability however patient reports that he is almost lost everything dizziness and he will not be transferred to any facility that he will take the blood in and he would sign out AGAINST MEDICAL ADVICE and he is very much aware that death is a possibility.    [MH]   1057 Patient's blood is infusing.  He complains of his chronic back and bilateral hip pain, requesting pain medication.  This is ordered.    [CM]      ED Course User Index  [CM] Ajay Simmons MD  [] Marla Luna DO                                           [facility]      Final diagnoses:   Anemia due to stage 5 chronic kidney disease, not on chronic dialysis (CMS/[facility])   Patient's noncompliance with other medical treatment and regimen            Marla Luna DO  10/12/20 7830

## 2020-10-13 NOTE — ED NOTES
ORANGE FALL BRACELET PLACED ON PT, EDUC. GIVEN, PT VERB UNDERSTANDING     Caroline Pacheco, KALA  10/13/20 8515

## 2020-10-13 NOTE — ED PROVIDER NOTES
Subjective     History provided by:  Patient   used: No    Illness  Location:  Patient reports he was seen 2 days ago and was given blood  Quality:  Reports weakness  Severity:  Moderate  Onset quality:  Gradual  Duration:  3 days  Timing:  Constant  Progression:  Waxing and waning  Chronicity:  Recurrent  Context:  Was seen and given unit of blood 2 days ago  Relieved by:  Nothing  Worsened by:  Nothing  Ineffective treatments:  None tried  Associated symptoms: no chest pain, no cough, no diarrhea, no fatigue, no fever, no loss of consciousness, no rhinorrhea, no shortness of breath and no sore throat    Risk factors:  History of renal problems      Review of Systems   Constitutional: Negative.  Negative for fatigue and fever.   HENT: Negative.  Negative for rhinorrhea and sore throat.    Eyes: Negative.    Respiratory: Negative.  Negative for cough and shortness of breath.    Cardiovascular: Negative.  Negative for chest pain.   Gastrointestinal: Negative.  Negative for diarrhea.   Endocrine: Negative.    Genitourinary: Negative.    Musculoskeletal: Negative.    Skin: Negative.    Allergic/Immunologic: Negative.    Neurological: Negative.  Negative for loss of consciousness.   Hematological: Negative.    Psychiatric/Behavioral: Negative.        Past Medical History:   Diagnosis Date   • Endocarditis    • ESRD (end stage renal disease) (CMS/[facility])    • IV drug abuse (CMS/[facility])    • Unable to read or write        Allergies   Allergen Reactions   • Penicillins Shortness Of Breath, Itching and Rash       Past Surgical History:   Procedure Laterality Date   • CENTRAL VENOUS CATHETER TUNNELED INSERTION DOUBLE LUMEN Right     Chest for hemodialysis       Family History   Problem Relation Age of Onset   • Alcohol abuse Mother    • Hypertension Mother    • Hypertension Other        Social History     Socioeconomic History   • Marital status:      Spouse name: Not on file   • Number of children: Not  on file   • Years of education: Not on file   • Highest education level: Not on file   Tobacco Use   • Smoking status: Current Every Day Smoker     Packs/day: 1.00     Years: 20.00     Pack years: 20.00     Types: Cigarettes   • Smokeless tobacco: Never Used   Substance and Sexual Activity   • Alcohol use: No     Frequency: Never   • Drug use: Yes     Types: Methamphetamines     Comment: last used on Friday 5/18/2019   • Sexual activity: Defer           Objective   Physical Exam  Vitals signs and nursing note reviewed.   Constitutional:       Appearance: He is well-developed.   HENT:      Head: Normocephalic.      Right Ear: External ear normal.      Left Ear: External ear normal.   Eyes:      Conjunctiva/sclera: Conjunctivae normal.      Pupils: Pupils are equal, round, and reactive to light.   Neck:      Musculoskeletal: Normal range of motion and neck supple.   Cardiovascular:      Rate and Rhythm: Normal rate and regular rhythm.      Heart sounds: Normal heart sounds.   Pulmonary:      Effort: Pulmonary effort is normal.      Breath sounds: Normal breath sounds.   Abdominal:      General: Bowel sounds are normal.      Palpations: Abdomen is soft.   Musculoskeletal: Normal range of motion.   Skin:     General: Skin is warm and dry.      Capillary Refill: Capillary refill takes less than 2 seconds.   Neurological:      Mental Status: He is alert and oriented to person, place, and time.   Psychiatric:         Behavior: Behavior normal.         Thought Content: Thought content normal.         Procedures           ED Course  ED Course as of Oct 14 0217   Tue Oct 13, 2020   1800 Discussed with patient necessity for admission. Explained that patient needs to be admitted. Explained that he most likely needs to be evaluated for dialysis. Patient states he is aware of this and states he can't stay. Explained that with labs the way they are I advise admission and explaiend he would have to sign out AMA Patient states he  was aware he would need to and would.     [SHERRIE]   2159 Endorsed to Dr. Guaman    [SHERRIE]   Wed Oct 14, 2020   0216 I pleaded with the patient multiple times to stay in the hospital because of his metabolic acidosis and his anemia.  The patient adamantly refuses to stay.  After bagging 3 times with him to stay in the hospital he appears to have made up his mind.  The patient is awake and alert in his oriented.  He is capable of making this decision.  I feel that there is nothing more can do to convince this patient to stay.    [EG]      ED Course User Index  [EG] Chinyere Guaman,   [SHERRIE] Segun Jim, APRN                                           MDM  Number of Diagnoses or Management Options  Anemia, unspecified type: new and requires workup  Metabolic acidosis: new and requires workup     Amount and/or Complexity of Data Reviewed  Clinical lab tests: ordered and reviewed  Tests in the radiology section of CPT®: ordered and reviewed  Tests in the medicine section of CPT®: ordered and reviewed  Independent visualization of images, tracings, or specimens: yes    Risk of Complications, Morbidity, and/or Mortality  Presenting problems: high  Diagnostic procedures: high  Management options: high    Patient Progress  Patient progress: stable      Final diagnoses:   Anemia, unspecified type   Metabolic acidosis            Chinyere Guaman DO  10/14/20 0216       Chinyere Guaman DO  10/14/20 0217

## 2020-10-14 NOTE — DISCHARGE INSTRUCTIONS
Call one of the offices below to establish a primary care provider.  If you are unable to get an appointment and feel it is an emergency and need to be seen immediately please return to the Emergency Department.    Call one of the office below to set up a primary care provider.    Dr. Danny Rebolledo                                                                                                       [address] [facility] [address]  [phone]    Dr. Angela, Dr. ESTUARDO Rodriguez, Dr. GOPAL Rodriguez ([facility])  [address] [facility] [address]  [phone]    Dr. Toussaint, Dr. Burgos, Dr. Fortune ([facility])  [address] [facility] [address]  [phone]    Dr. Ballard  [address] [facility] [address]  [phone]    Dr. Taylor, Dr. Grant, Dr. Swenson, Dr. León ([facility])  [address] [place] [address] [address]  [facility] [address]  [phone]    Dr. Jennifer Patel  [address] [facility] [place] [address]  [phone]    Dr. Kendra Webster  [address]   [address]  [facility] [address]  [phone]    Dr. Rebolledo  [address] [facility] [address]  [phone]    Dr. Guerra, Dr. Frank  [address] [facility] [place] [address]  [phone]    Dr. Peck  [address]                                                              [address]  [facility] [address]  [phone]    Dr. Byers  [address] [facility] [id number] [phone]    [address]. Wendy Monzon  [address]  [address]  [facility] [address]  [phone]

## 2020-10-16 PROBLEM — N18.6 END STAGE RENAL DISEASE (HCC): Status: ACTIVE | Noted: 2020-01-01

## 2020-10-16 NOTE — ED PROVIDER NOTES
Subjective   45-year-old male in the emergency department reporting generalized lethargy and weakness.  Patient is a dialysis patient, but is highly noncompliant.  He reports he can feel when he needs dialysis, so came to emergency department for further evaluation.  He is currently not established with any dialysis clinic and/or have a nephrologist.  Patient reports he just goes to the hospital when he feels he needs dialysis at this time.  Denies any other symptoms at this time.  Has significant past medical history of endocarditis, end-stage renal disease, and IV drug abuse.      History provided by:  Patient   used: No        Review of Systems   Constitutional: Negative for activity change, appetite change, chills, diaphoresis, fatigue and fever.   HENT: Negative for congestion, ear pain and sore throat.    Eyes: Negative for redness.   Respiratory: Negative for cough, chest tightness, shortness of breath and wheezing.    Cardiovascular: Negative for chest pain, palpitations and leg swelling.   Gastrointestinal: Negative for abdominal pain, diarrhea, nausea and vomiting.   Genitourinary: Negative for dysuria and urgency.   Musculoskeletal: Negative for arthralgias, back pain, myalgias and neck pain.   Skin: Negative for pallor, rash and wound.   Neurological: Positive for weakness. Negative for dizziness, speech difficulty and headaches.   Psychiatric/Behavioral: Negative for agitation, behavioral problems, confusion and decreased concentration.   All other systems reviewed and are negative.      Past Medical History:   Diagnosis Date   • Endocarditis    • ESRD (end stage renal disease) (CMS/[facility])    • IV drug abuse (CMS/[facility])    • Unable to read or write        Allergies   Allergen Reactions   • Penicillins Shortness Of Breath, Itching and Rash       Past Surgical History:   Procedure Laterality Date   • CENTRAL VENOUS CATHETER TUNNELED INSERTION DOUBLE LUMEN Right     Chest for hemodialysis        Family History   Problem Relation Age of Onset   • Alcohol abuse Mother    • Hypertension Mother    • Hypertension Other        Social History     Socioeconomic History   • Marital status:      Spouse name: Not on file   • Number of children: Not on file   • Years of education: Not on file   • Highest education level: Not on file   Tobacco Use   • Smoking status: Current Every Day Smoker     Packs/day: 1.00     Years: 20.00     Pack years: 20.00     Types: Cigarettes   • Smokeless tobacco: Never Used   Substance and Sexual Activity   • Alcohol use: No     Frequency: Never   • Drug use: Yes     Types: Methamphetamines     Comment: last used on Friday 5/18/2019   • Sexual activity: Defer           Objective   Physical Exam  Vitals signs and nursing note reviewed.   Constitutional:       General: He is not in acute distress.     Appearance: Normal appearance. He is well-developed. He is not toxic-appearing or diaphoretic.   HENT:      Head: Normocephalic and atraumatic.      Right Ear: External ear normal.      Left Ear: External ear normal.      Nose: Nose normal.      Mouth/Throat:      Pharynx: No oropharyngeal exudate.      Tonsils: No tonsillar exudate.   Eyes:      General: Lids are normal.      Conjunctiva/sclera: Conjunctivae normal.      Pupils: Pupils are equal, round, and reactive to light.   Neck:      Musculoskeletal: Full passive range of motion without pain, normal range of motion and neck supple.      Thyroid: No thyromegaly.   Cardiovascular:      Rate and Rhythm: Normal rate and regular rhythm.      Pulses: Normal pulses.      Heart sounds: Normal heart sounds, S1 normal and S2 normal.   Pulmonary:      Effort: Pulmonary effort is normal. No tachypnea or respiratory distress.      Breath sounds: Normal breath sounds. No decreased breath sounds, wheezing or rales.   Chest:      Chest wall: No tenderness.   Abdominal:      General: Bowel sounds are normal. There is distension.       Palpations: Abdomen is soft.      Tenderness: There is abdominal tenderness. There is no guarding or rebound.   Musculoskeletal: Normal range of motion.         General: Tenderness present. No deformity.      Right lower leg: Edema present.      Left lower leg: Edema present.   Lymphadenopathy:      Cervical: No cervical adenopathy.   Skin:     General: Skin is warm and dry.      Coloration: Skin is not pale.      Findings: No erythema or rash.   Neurological:      Mental Status: He is alert and oriented to person, place, and time.      GCS: GCS eye subscore is 4. GCS verbal subscore is 5. GCS motor subscore is 6.      Cranial Nerves: No cranial nerve deficit.      Sensory: No sensory deficit.   Psychiatric:         Speech: Speech normal.         Behavior: Behavior normal.         Thought Content: Thought content normal.         Judgment: Judgment normal.         Procedures           ED Course  ED Course as of Oct 16 0625   Fri Oct 16, 2020   0624 IMPRESSION:  1.  Mild interstitial edema and small pleural effusions suggesting volume overload or vascular congestion, new since 10/12/2020.  2.  Stable cardiac silhouette enlargement.  3.  Well-positioned dialysis catheter.   XR Chest 1 View [ES]   0624 Accelerated Junctional rhythm  Low voltage QRS  Nonspecific T wave abnormality  Abnormal ECG  When compared with ECG of 12-OCT-2020 09:37,  Junctional rhythm has replaced Sinus rhythm  Vent. rate 74 BPM  RI interval * ms  QRS duration 94 ms  QT/QTc 394/437 ms  P-R-T axes * 78 109   ECG 12 Lead [ES]   0625 Discussed case with Dr. Conti and will admit for further evaluation and treatment.    [ES]      ED Course User Index  [ES] Jared Roman MD                                           MDM  Number of Diagnoses or Management Options     Amount and/or Complexity of Data Reviewed  Clinical lab tests: reviewed and ordered  Tests in the radiology section of CPT®: reviewed and ordered  Tests in the medicine section  of CPT®: reviewed and ordered  Review and summarize past medical records: yes  Discuss the patient with other providers: yes  Independent visualization of images, tracings, or specimens: yes    Risk of Complications, Morbidity, and/or Mortality  Presenting problems: moderate  Diagnostic procedures: moderate  Management options: moderate    Patient Progress  Patient progress: stable      Final diagnoses:   End stage renal disease (CMS/HCC)            Jared Roman MD  10/16/20 0625

## 2020-10-16 NOTE — PAYOR COMM NOTE
"[facility]  NPI:[id number]    Utilization Review  Contact: Risa Verde RN  Phone: [phone]  Fax:[phone]    INITIATE INPATIENT AUTHORIZATION  ICD: N18.6   E87.5    Mario Nair (45 y.o. Male)     Date of Birth Social Security Number Address Home Phone MRN    1975  [address] [place] [address] [phone] [id number]    Mormon Marital Status          None        Admission Date Admission Type Admitting Provider Attending Provider Department, Room/Bed    10/16/20 Emergency Charlie Conti MD Heinss, Karl F, MD [address], 3304/1S    Discharge Date Discharge Disposition Discharge Destination                       Attending Provider: Beka Colon MD    Allergies: Penicillins    Isolation: None   Infection: Hepatitis B (06/05/19), COVID (rule out) (10/12/20)   Code Status: CPR    Ht: 188 cm (74.02\")   Wt: 81.7 kg (180 lb 3.2 oz)    Admission Cmt: None   Principal Problem: None                Active Insurance as of 10/16/2020     Primary Coverage     Payor Plan Insurance Group Employer/Plan Group    [employer] [place] [employer] [place]      Payor Plan Address Payor Plan Phone Number Payor Plan Fax Number Effective Dates    [address] [address] [phone]  5/5/2019 - None Entered    [facility] [id number]       Subscriber Name Subscriber Birth Date Member ID       MARIO NAIR 1975 [id number]                 Emergency Contacts      (Rel.) Home Phone Work Phone Mobile Phone    SHANI WILKES (Relative) [phone] -- --    Constance Nair (Daughter) -- -- [phone]               History & Physical      Billie Covington APRN at 10/16/20 0435     Attestation signed by Charlie Conti MD at 10/16/20 0603 (Updated)    I have seen and examined the patient independently from NICKY Hernandez, and have reviewed Billie's findings, assessment and plan in the H&P below. Patient reiterates that he has not undergone HD " in 2 weeks. He does not have a dialysis chair, claiming no clinic will accept him because of his viral hepatitis, so he essentially waits until he begins to feel fluid overloaded and then goes to the nearest hospital to be dialyzed. He has rales in his lower lung zones bilaterally. He has 2+ pitting edema in both lower extremities. CXR correlates with exam, showing evidence of volume overload. Initially hyperkalemic but resolved with medication. Nephrology consulted to arrange hemodialysis while in the hospital. CRP is elevated and looking back at prior lab results, patient grew serratia marcescens in the blood in early September. It is unclear if this was ever treated, but later in the month patient was hospitalized at [facility] at which time he reports his HD catheter was replaced and he received several antibiotics, presumably for bacteremia. Will repeat blood cultures now to see if bacteremia has cleared since HD catheter changed and antibiotics administered. Requesting records from [facility] hospital stay.                       [facility]  History & Physical    Patient Identification:  Name:  Mario Nair  Age:  45 y.o.  Sex:  male  :  1975  MRN:  [id number]   Admit Date: 10/16/2020   Visit Number:  [id number]  Primary Care Physician:  Provider, No Known    I have seen the patient in conjunction with NICKY Hernandez and I agree with the following statements:    Subjective     Chief complaint: Weakness    History of presenting illness:      Mario Nair is a 45 y.o. male with past medical history significant for ESRD with non-compliance with hemodialysis, history of IV drug abuse, hx of endocarditis and tobacco abuse.     Mr. Nair presented to [facility] emergency department on 2020 with complaints of weakness.  Mr. Nair has end-stage renal disease and is medically noncompliant with hemodialysis.  Mr. Nair reports that he does  not have an assigned chair at an outpatient dialysis clinic and reports receiving dialysis only on an inpatient basis.  He explained that he has been waiting on his insurance, [employer], to approve outpatient dialysis.  Furthermore, Mr. Nair reports that he does not follow with a nephrologist, despite appointments being made post discharge from multiple facilities.  Mr. Nair reports that he was admitted to [facility] approximately 2 weeks ago during which time his temporary dialysis catheter was replaced and he was dialyzed.  He denies any further dialysis since discharge.  He reports that over the last couple days he has been experiencing shortness of breath with exertion and a nonproductive cough which he reports he recognizes as signs of fluid overload.  The patient denies any fever, chills, recent infection, abdominal pain, nausea, vomiting, diarrhea, urinary symptoms, dizziness, lightheadedness, seizures or syncopal episodes.  He does report that he still produces urine.  The patient denies any alcohol or drug use but does report smoking approximately 1 pack of cigarettes per day.    Upon arrival to the ED, vital signs were temperature 98.1, pulse 70, respirations 18, blood pressure initially 88/47 with most recent /70 and oxygen saturation 96% on room air.  Arterial blood gas notes pH 7.328, PCO2 31.5, PO2 90.5, HCO3 16.5 and oxygen saturation 96.7 on room air.  Troponin T 0.056, proBNP 7002.0, blood glucose 126, sodium 131, potassium initially 6.3 with most recent 4.6, creatinine 10.59, , EGFR 5, ALT 74, AST 26, C-RP 10.0, magnesium 2.9, WBC 8.84, hemoglobin 9.0 and hematocrit 29.2.  Ethanol <10.  Chest x-ray notes mild interstitial edema and small pleural effusions suggesting volume overload or vascular congestion, new since 10/12/2020, stable cardiac silhouette enlargement and well-positioned dialysis catheter, per  radiology.  ---------------------------------------------------------------------------------------------------------------------   Review of Systems   Constitutional: Positive for fatigue. Negative for activity change, appetite change, chills and fever.   HENT: Negative for congestion, rhinorrhea, sore throat and trouble swallowing.    Respiratory: Positive for cough (non-productive ) and shortness of breath.    Cardiovascular: Positive for leg swelling. Negative for chest pain.   Gastrointestinal: Negative for abdominal distention, abdominal pain, constipation, diarrhea, nausea and vomiting.   Genitourinary: Negative for difficulty urinating, dysuria, frequency, hematuria and urgency.   Musculoskeletal: Negative for back pain and gait problem.   Skin: Negative for color change, pallor, rash and wound.   Allergic/Immunologic: Negative for immunocompromised state.   Neurological: Negative for dizziness, seizures, syncope, light-headedness and headaches.   Psychiatric/Behavioral: Negative for agitation, confusion, hallucinations, self-injury and sleep disturbance.      ---------------------------------------------------------------------------------------------------------------------   Past Medical History:   Diagnosis Date   • Endocarditis    • ESRD (end stage renal disease) (CMS/[facility])    • IV drug abuse (CMS/[facility])    • Unable to read or write      Past Surgical History:   Procedure Laterality Date   • CENTRAL VENOUS CATHETER TUNNELED INSERTION DOUBLE LUMEN Right     Chest for hemodialysis     Family History   Problem Relation Age of Onset   • Alcohol abuse Mother    • Hypertension Mother    • Hypertension Other      Social History     Socioeconomic History   • Marital status:      Spouse name: Not on file   • Number of children: Not on file   • Years of education: Not on file   • Highest education level: Not on file   Tobacco Use   • Smoking status: Current Every Day Smoker     Packs/day: 1.00     Years:  20.00     Pack years: 20.00     Types: Cigarettes   • Smokeless tobacco: Never Used   Substance and Sexual Activity   • Alcohol use: No     Frequency: Never   • Drug use: Yes     Types: Methamphetamines     Comment: last used on Friday 5/18/2019   • Sexual activity: Defer     ---------------------------------------------------------------------------------------------------------------------   Allergies:  Penicillins  ---------------------------------------------------------------------------------------------------------------------   Home medications:    Medications below are reported home medications pulling from within the system; at this time, these medications have not been reconciled unless otherwise specified and are in the verification process for further verifcation as current home medications.  (Not in a hospital admission)      Hospital Scheduled Meds:        Current listed hospital scheduled medications may not yet reflect those currently placed in orders that are signed and held awaiting patient's arrival to floor.   ---------------------------------------------------------------------------------------------------------------------     Objective     Vital Signs:  Temp:  [98.1 °F (36.7 °C)] 98.1 °F (36.7 °C)  Heart Rate:  [70-92] 92  Resp:  [18] 18  BP: ()/(47-81) 119/70      10/15/20  2238   Weight: 69.9 kg (154 lb)     Body mass index is 19.77 kg/m².  ---------------------------------------------------------------------------------------------------------------------   Physical Exam   Constitutional: He is oriented to person, place, and time and well-developed, well-nourished, and in no distress.   HENT:   Head: Normocephalic and atraumatic.   Eyes: Pupils are equal, round, and reactive to light. EOM are normal.   Neck: Normal range of motion. No JVD present. No thyromegaly present.   Cardiovascular: Normal rate, regular rhythm and normal heart sounds.   Pulmonary/Chest: Effort normal. No  respiratory distress. He has wheezes.   Abdominal: Soft. Bowel sounds are normal. He exhibits no distension. There is no abdominal tenderness.   Musculoskeletal: Normal range of motion.         General: Edema (3+ edema noted to bilateral lower extremities) present.   Neurological: He is alert and oriented to person, place, and time.   Skin: Skin is warm and dry. No rash noted. No erythema. No pallor.   Psychiatric: Mood, memory, affect and judgment normal.   ---------------------------------------------------------------------------------------------------------------------  EKG:      ---------------------------------------------------------------------------------------------------------------------   Results from last 7 days   Lab Units 10/16/20  0038 10/13/20  1643 10/12/20  0241   CRP mg/dL 10.00*  --  10.60*   WBC 10*3/mm3 8.84 5.99 7.89   HEMOGLOBIN g/dL 9.0* 7.2* 6.8*   HEMATOCRIT % 29.2* 23.0* 22.2*   MCV fL 93.3 92.7 94.9   MCHC g/dL 30.8* 31.3* 30.6*   PLATELETS 10*3/mm3 192 146 149   INR  1.34*  --  1.43*     Results from last 7 days   Lab Units 10/13/20  2352 10/12/20  0233   PH, ARTERIAL pH units 7.328* 7.371   PO2 ART mm Hg 90.5 90.4   PCO2, ARTERIAL mm Hg 31.5* 28.8*   HCO3 ART mmol/L 16.5* 16.7*     Results from last 7 days   Lab Units 10/16/20  0337 10/16/20  0038 10/13/20  2054 10/13/20  1643  10/12/20  0241   SODIUM mmol/L  --  131* 131* 126*  --  126*   POTASSIUM mmol/L 4.6 6.3* 5.8* 6.2*   < > 7.6*   MAGNESIUM mg/dL  --  2.9*  --   --   --   --    CHLORIDE mmol/L  --  91* 92* 86*  --  90*   CO2 mmol/L  --  16.2* 16.1* 16.3*  --  18.6*   BUN mg/dL  --  149* 144* 148*  --  127*   CREATININE mg/dL  --  10.59* 9.42* 9.67*  --  8.36*   EGFR IF NONAFRICN AM mL/min/1.73  --  5* 6* 6*  --  7*   CALCIUM mg/dL  --  8.0* 8.1* 8.2*  --  8.8   GLUCOSE mg/dL  --  126* 81 141*  --  137*   ALBUMIN g/dL  --  3.17*  --  3.29*  --  3.57   BILIRUBIN mg/dL  --  0.3  --  0.3  --  0.4   ALK PHOS U/L  --  462*  --   344*  --  226*   AST (SGOT) U/L  --  26  --  51*  --  55*   ALT (SGPT) U/L  --  74*  --  123*  --  71*    < > = values in this interval not displayed.   Estimated Creatinine Clearance: 8.8 mL/min (A) (by C-G formula based on SCr of 10.59 mg/dL (H)).  No results found for: AMMONIA  Results from last 7 days   Lab Units 10/16/20  0255 10/12/20  0649 10/12/20  0241   TROPONIN T ng/mL 0.056* 0.082* 0.095*     Results from last 7 days   Lab Units 10/16/20  0038 10/12/20  0241   PROBNP pg/mL 7,002.0* 14,263.0*     Lab Results   Component Value Date    HGBA1C 5.50 05/07/2019     Lab Results   Component Value Date    TSH 1.780 09/04/2020    FREET4 0.40 (L) 09/04/2020     No results found for: PREGTESTUR, PREGSERUM, HCG, HCGQUANT  Pain Management Panel     Pain Management Panel Latest Ref Rng & Units 10/12/2020 9/4/2020    AMPHETAMINES SCREEN, URINE Negative Negative Negative    BARBITURATES SCREEN Negative Negative Negative    BENZODIAZEPINE SCREEN, URINE Negative Negative Negative    BUPRENORPHINEUR Negative Negative Negative    COCAINE SCREEN, URINE Negative Negative Negative    METHADONE SCREEN, URINE Negative Negative Negative            ---------------------------------------------------------------------------------------------------------------------  Imaging Results (Last 7 Days)     Procedure Component Value Units Date/Time    XR Chest 1 View [[id number]] Collected: 10/16/20 0253     Updated: 10/16/20 0255    Narrative:      CHEST X-RAY, 10/16/2020      HISTORY:    45-year-old male in the ED complaining of chest tightness, pain. Shortness of breath.      TECHNIQUE:  AP portable upright chest x-ray.    COMPARISON:  *  Chest x-ray, 10/12/2020.    FINDINGS:  Moderate cardiomegaly with pulmonary venous redistribution and mild diffuse interstitial edema, new or increased since the recent prior study. Correlate for vascular congestion or volume overload. Small pleural effusions and basilar atelectasis.    Dialysis catheter  remains in good position.      Impression:      1.  Mild interstitial edema and small pleural effusions suggesting volume overload or vascular congestion, new since 10/12/2020.  2.  Stable cardiac silhouette enlargement.  3.  Well-positioned dialysis catheter.    Signer Name: Rashawn Rojas MD   Signed: 10/16/2020 2:53 AM   Workstation Name: [id number]    [facility]          Cultures:  Blood cultures ordered.     Last echocardiogram:     I have personally reviewed the radiology images and read the final radiology report.  ---------------------------------------------------------------------------------------------------------------------  Assessment / Plan     There are no active hospital problems to display for this patient.      ASSESSMENT/PLAN:  · ESRD with non-compliance with outpatient hemodialysis: Creatinine 10.59,  and EGFR 5.  Patient has a temporary dialysis catheter in place and reports that he does not have a permanent chair and outpatient dialysis and receives dialysis at different facilities when he feels the need.  Patient was last dialyzed approximately 2 weeks ago in [facility].  Inpatient nephrology consult has been placed for management and initiation of inpatient dialysis.  We will continue to monitor with a.m. CMP.  Avoid nephrotoxic agents when possible.    · Elevated Troponin T in the setting of ESRD: Troponin T 0.056.  Patient denies chest pain.  Troponin T appears to be chronically elevated in the setting of ESRD.  Continuous cardiac monitoring has been ordered.    · Hyponatremia: Sodium 131. Neuro checks ordered every 4 hours.  We will continue to monitor with a.m. CMP.    · Hyperkalemia (Resolved) in the setting of ESRD: Potassium initially 6.3. Patient received 10 units of Regular insulin as well as 30 g of Kayexalate. Repeat potassium is 4.6. Nephrology consult has been placed and will arrange inpatient dialysis. Will continue to monitor with  AM CMP.     · Elevated C-RP: C-RP 10.0.  WBC WNL. Chest x-ray is unremarkable for any infectious processes. Blood cultures from 9/4/2020 grew Serratia marcescens. Repeat blood cultures have been ordered. Urinalysis ordered. Will continue to monitor with AM C-RP.     · Normocytic Anemia: Hemoglobin 9.0 and hematocrit 29.2.  No signs or symptoms of bleeding noted.  Vitamin B12, folate and iron profile ordered.  We will continue to monitor with a.m. CBC.    · Hx of IV drug abuse: Patient denies any recent drug use. UDS has been ordered.     · Tobacco abuse: Patient reports smoking approximately 1 pack cigarettes per day.  Smoking cessation education provided.  Nicotine patch ordered.    ----------  -DVT prophylaxis: SQ Heparin  -Diet: NPO  -Activity: Up With Assistance  -Expected length of stay: INPATIENT status due to the need for care which can only be reasonably provided in an hospital setting such as aggressive/expedited ancillary services and/or consultation services, the necessity for IV medications, close physician monitoring and/or the possible need for procedures.  In such, I feel patient’s risk for adverse outcomes and need for care warrant INPATIENT evaluation and predict the patient’s care encounter to likely last beyond 2 midnights.    Patient is high risk due to ESRD with non-compliance with outpatient hemodialysis.     Billie Covington, NICKY  10/16/20  04:35 EDT      Electronically signed by Charlie Conti MD at 10/16/20 0603          Emergency Department Notes      Chely Diaz RN at 10/16/20 0015        [facility] to undergo dialysis.  Pt has chronic kidney disease and states he is having difficulty finding a nephrologist that will accept him so he can get dialysis regularly.      Chely Diaz, RN  10/16/20 0120      Electronically signed by Chely Diaz RN at 10/16/20 0120     Marla Pace at 10/16/20 0050        EKG completed by me  @ 00:46 and given to Dr Roman.      Marla Pace  10/16/20 0050      Electronically signed by Marla Pace at 10/16/20 0050     Sena Dumont, RN at 10/16/20 0512        20gIV LAC in place, patent and secured upon admission. Patient is alert and oriented X4. NADN.      Sena Dumont RN  10/16/20 0513      Electronically signed by Sena Dumont, RN at 10/16/20 0513     Jared Roman MD at 10/16/20 0621          Subjective   45-year-old male in the emergency department reporting generalized lethargy and weakness.  Patient is a dialysis patient, but is highly noncompliant.  He reports he can feel when he needs dialysis, so came to emergency department for further evaluation.  He is currently not established with any dialysis clinic and/or have a nephrologist.  Patient reports he just goes to the hospital when he feels he needs dialysis at this time.  Denies any other symptoms at this time.  Has significant past medical history of endocarditis, end-stage renal disease, and IV drug abuse.      History provided by:  Patient   used: No        Review of Systems   Constitutional: Negative for activity change, appetite change, chills, diaphoresis, fatigue and fever.   HENT: Negative for congestion, ear pain and sore throat.    Eyes: Negative for redness.   Respiratory: Negative for cough, chest tightness, shortness of breath and wheezing.    Cardiovascular: Negative for chest pain, palpitations and leg swelling.   Gastrointestinal: Negative for abdominal pain, diarrhea, nausea and vomiting.   Genitourinary: Negative for dysuria and urgency.   Musculoskeletal: Negative for arthralgias, back pain, myalgias and neck pain.   Skin: Negative for pallor, rash and wound.   Neurological: Positive for weakness. Negative for dizziness, speech difficulty and headaches.   Psychiatric/Behavioral: Negative for agitation, behavioral problems, confusion and decreased  concentration.   All other systems reviewed and are negative.      Past Medical History:   Diagnosis Date   • Endocarditis    • ESRD (end stage renal disease) (CMS/[facility])    • IV drug abuse (CMS/[facility])    • Unable to read or write        Allergies   Allergen Reactions   • Penicillins Shortness Of Breath, Itching and Rash       Past Surgical History:   Procedure Laterality Date   • CENTRAL VENOUS CATHETER TUNNELED INSERTION DOUBLE LUMEN Right     Chest for hemodialysis       Family History   Problem Relation Age of Onset   • Alcohol abuse Mother    • Hypertension Mother    • Hypertension Other        Social History     Socioeconomic History   • Marital status:      Spouse name: Not on file   • Number of children: Not on file   • Years of education: Not on file   • Highest education level: Not on file   Tobacco Use   • Smoking status: Current Every Day Smoker     Packs/day: 1.00     Years: 20.00     Pack years: 20.00     Types: Cigarettes   • Smokeless tobacco: Never Used   Substance and Sexual Activity   • Alcohol use: No     Frequency: Never   • Drug use: Yes     Types: Methamphetamines     Comment: last used on Friday 5/18/2019   • Sexual activity: Defer           Objective   Physical Exam  Vitals signs and nursing note reviewed.   Constitutional:       General: He is not in acute distress.     Appearance: Normal appearance. He is well-developed. He is not toxic-appearing or diaphoretic.   HENT:      Head: Normocephalic and atraumatic.      Right Ear: External ear normal.      Left Ear: External ear normal.      Nose: Nose normal.      Mouth/Throat:      Pharynx: No oropharyngeal exudate.      Tonsils: No tonsillar exudate.   Eyes:      General: Lids are normal.      Conjunctiva/sclera: Conjunctivae normal.      Pupils: Pupils are equal, round, and reactive to light.   Neck:      Musculoskeletal: Full passive range of motion without pain, normal range of motion and neck supple.      Thyroid: No thyromegaly.    Cardiovascular:      Rate and Rhythm: Normal rate and regular rhythm.      Pulses: Normal pulses.      Heart sounds: Normal heart sounds, S1 normal and S2 normal.   Pulmonary:      Effort: Pulmonary effort is normal. No tachypnea or respiratory distress.      Breath sounds: Normal breath sounds. No decreased breath sounds, wheezing or rales.   Chest:      Chest wall: No tenderness.   Abdominal:      General: Bowel sounds are normal. There is distension.      Palpations: Abdomen is soft.      Tenderness: There is abdominal tenderness. There is no guarding or rebound.   Musculoskeletal: Normal range of motion.         General: Tenderness present. No deformity.      Right lower leg: Edema present.      Left lower leg: Edema present.   Lymphadenopathy:      Cervical: No cervical adenopathy.   Skin:     General: Skin is warm and dry.      Coloration: Skin is not pale.      Findings: No erythema or rash.   Neurological:      Mental Status: He is alert and oriented to person, place, and time.      GCS: GCS eye subscore is 4. GCS verbal subscore is 5. GCS motor subscore is 6.      Cranial Nerves: No cranial nerve deficit.      Sensory: No sensory deficit.   Psychiatric:         Speech: Speech normal.         Behavior: Behavior normal.         Thought Content: Thought content normal.         Judgment: Judgment normal.         Procedures          ED Course  ED Course as of Oct 16 0625   Fri Oct 16, 2020   0624 IMPRESSION:  1.  Mild interstitial edema and small pleural effusions suggesting volume overload or vascular congestion, new since 10/12/2020.  2.  Stable cardiac silhouette enlargement.  3.  Well-positioned dialysis catheter.   XR Chest 1 View [ES]   0624 Accelerated Junctional rhythm  Low voltage QRS  Nonspecific T wave abnormality  Abnormal ECG  When compared with ECG of 12-OCT-2020 09:37,  Junctional rhythm has replaced Sinus rhythm  Vent. rate 74 BPM  MO interval * ms  QRS duration 94 ms  QT/QTc 394/437 ms  P-R-T  axes * 78 109   ECG 12 Lead [ES]   0625 Discussed case with Dr. Cnoti and will admit for further evaluation and treatment.    [ES]      ED Course User Index  [ES] Jared Roman MD                                           MDM  Number of Diagnoses or Management Options     Amount and/or Complexity of Data Reviewed  Clinical lab tests: reviewed and ordered  Tests in the radiology section of CPT®: reviewed and ordered  Tests in the medicine section of CPT®: reviewed and ordered  Review and summarize past medical records: yes  Discuss the patient with other providers: yes  Independent visualization of images, tracings, or specimens: yes    Risk of Complications, Morbidity, and/or Mortality  Presenting problems: moderate  Diagnostic procedures: moderate  Management options: moderate    Patient Progress  Patient progress: stable      Final diagnoses:   End stage renal disease (CMS/Formerly [facility])            Jared Roman MD  10/16/20 0625      Electronically signed by Jared Roman MD at 10/16/20 0625       Vital Signs (last day)     Date/Time   Temp   Temp src   Pulse   Resp   BP   Patient Position   SpO2    10/16/20 0650   97.6 (36.4)   Temporal   83   18   140/70   Sitting   97    10/16/20 0544   97.6 (36.4)   Oral   99   18   127/59   Sitting   94    10/16/20 0502   --   --   87   --   124/63   --   94    10/16/20 0447   --   --   87   --   121/65   --   94    10/16/20 0433   --   --   86   --   120/62   --   96    10/16/20 0417   --   --   89   --   125/66   --   93    10/16/20 0402   --   --   90   --   123/65   --   92    10/16/20 0347   --   --   94   --   134/68   --   94    10/16/20 0332   --   --   92   --   128/68   --   95    10/16/20 0317   --   --   90   --   133/69   --   95    10/16/20 0304   --   --   90   --   116/79   --   94    10/16/20 0248   --   --   84   --   122/72   --   96    10/16/20 0233   --   --   92   --   119/70   --   93    10/16/20 0217   --   --   80   --    121/81   --   100    10/16/20 0202   --   --   76   --   105/78   --   97    10/16/20 0147   --   --   77   --   114/80   --   (!) 83    10/16/20 0132   --   --   79   --   103/76   --   97    10/16/20 0116   --   --   --   --   110/75   --   100    10/16/20 0110   --   --   --   --   102/73   --   (!) 82    10/15/20 2238   98.1 (36.7)   Oral   70   18   (!) 88/47   Sitting   96                Facility-Administered Medications as of 10/16/2020   Medication Dose Route Frequency Provider Last Rate Last Dose   • [COMPLETED] dextrose (D50W) 25 g/ 50mL Intravenous Solution 25 g  25 g Intravenous Once Jared Roman MD   25 g at 10/16/20 0249   • heparin (porcine) 5000 UNIT/ML injection 5,000 Units  5,000 Units Subcutaneous Q12H Charlie Conti MD       • [COMPLETED] insulin regular (humuLIN R,novoLIN R) injection 10 Units  10 Units Subcutaneous Once Jared Roman MD   10 Units at 10/16/20 0249   • nicotine (NICODERM CQ) 14 MG/24HR patch 1 patch  1 patch Transdermal Q24H Billie Covington APRN       • nitroglycerin (NITROSTAT) SL tablet 0.4 mg  0.4 mg Sublingual Q5 Min PRN Charlie Conti MD       • [COMPLETED] sodium chloride 0.9 % bolus 1,000 mL  1,000 mL Intravenous Once Jared Roman MD   Stopped at 10/16/20 0513   • sodium chloride 0.9 % flush 10 mL  10 mL Intravenous PRN Charlie Conti MD       • sodium chloride 0.9 % flush 10 mL  10 mL Intravenous Q12H Charlie Conti MD       • sodium chloride 0.9 % flush 10 mL  10 mL Intravenous PRN Charlie Conti MD       • [COMPLETED] sodium polystyrene (KAYEXALATE) powder 30 g  30 g Oral Once Jared Roman MD   30 g at 10/16/20 0249     Physician Progress Notes (all)    No notes of this type exist for this encounter.         Consult Notes (all)    No notes of this type exist for this encounter.

## 2020-10-16 NOTE — PLAN OF CARE
Goal Outcome Evaluation:   Patient to have dialysis today. Otherwise no complaints. SS working to get outpatient dialysis chair. Will continue to monitor and follow plan of care.

## 2020-10-16 NOTE — H&P
[facility] Medicine Services  History & Physical    Patient Identification:  Name:  Mario Nair  Age:  45 y.o.  Sex:  male  :  1975  MRN:  [id number]   Admit Date: 10/16/2020   Visit Number:  [id number]  Primary Care Physician:  Provider, No Known    I have seen the patient in conjunction with NICKY Hernandez and I agree with the following statements:    Subjective     Chief complaint: Weakness    History of presenting illness:      Mario Nair is a 45 y.o. male with past medical history significant for ESRD with non-compliance with hemodialysis, history of IV drug abuse, hx of endocarditis and tobacco abuse.     Mr. Nair presented to [facility] emergency department on 2020 with complaints of weakness.  Mr. Nari has end-stage renal disease and is medically noncompliant with hemodialysis.  Mr. Nair reports that he does not have an assigned chair at an outpatient dialysis clinic and reports receiving dialysis only on an inpatient basis.  He explained that he has been waiting on his insurance, [employer], to approve outpatient dialysis.  Furthermore, Mr. Nair reports that he does not follow with a nephrologist, despite appointments being made post discharge from multiple facilities.  Mr. Nair reports that he was admitted to [facility] approximately 2 weeks ago during which time his temporary dialysis catheter was replaced and he was dialyzed.  He denies any further dialysis since discharge.  He reports that over the last couple days he has been experiencing shortness of breath with exertion and a nonproductive cough which he reports he recognizes as signs of fluid overload.  The patient denies any fever, chills, recent infection, abdominal pain, nausea, vomiting, diarrhea, urinary symptoms, dizziness, lightheadedness, seizures or syncopal episodes.  He does report that he still produces urine.  The patient denies any alcohol or drug use but does  report smoking approximately 1 pack of cigarettes per day.    Upon arrival to the ED, vital signs were temperature 98.1, pulse 70, respirations 18, blood pressure initially 88/47 with most recent /70 and oxygen saturation 96% on room air.  Arterial blood gas notes pH 7.328, PCO2 31.5, PO2 90.5, HCO3 16.5 and oxygen saturation 96.7 on room air.  Troponin T 0.056, proBNP 7002.0, blood glucose 126, sodium 131, potassium initially 6.3 with most recent 4.6, creatinine 10.59, , EGFR 5, ALT 74, AST 26, C-RP 10.0, magnesium 2.9, WBC 8.84, hemoglobin 9.0 and hematocrit 29.2.  Ethanol <10.  Chest x-ray notes mild interstitial edema and small pleural effusions suggesting volume overload or vascular congestion, new since 10/12/2020, stable cardiac silhouette enlargement and well-positioned dialysis catheter, per radiology.  ---------------------------------------------------------------------------------------------------------------------   Review of Systems   Constitutional: Positive for fatigue. Negative for activity change, appetite change, chills and fever.   HENT: Negative for congestion, rhinorrhea, sore throat and trouble swallowing.    Respiratory: Positive for cough (non-productive ) and shortness of breath.    Cardiovascular: Positive for leg swelling. Negative for chest pain.   Gastrointestinal: Negative for abdominal distention, abdominal pain, constipation, diarrhea, nausea and vomiting.   Genitourinary: Negative for difficulty urinating, dysuria, frequency, hematuria and urgency.   Musculoskeletal: Negative for back pain and gait problem.   Skin: Negative for color change, pallor, rash and wound.   Allergic/Immunologic: Negative for immunocompromised state.   Neurological: Negative for dizziness, seizures, syncope, light-headedness and headaches.   Psychiatric/Behavioral: Negative for agitation, confusion, hallucinations, self-injury and sleep disturbance.       ---------------------------------------------------------------------------------------------------------------------   Past Medical History:   Diagnosis Date   • Endocarditis    • ESRD (end stage renal disease) (CMS/Formerly [facility])    • IV drug abuse (CMS/Formerly [facility])    • Unable to read or write      Past Surgical History:   Procedure Laterality Date   • CENTRAL VENOUS CATHETER TUNNELED INSERTION DOUBLE LUMEN Right     Chest for hemodialysis     Family History   Problem Relation Age of Onset   • Alcohol abuse Mother    • Hypertension Mother    • Hypertension Other      Social History     Socioeconomic History   • Marital status:      Spouse name: Not on file   • Number of children: Not on file   • Years of education: Not on file   • Highest education level: Not on file   Tobacco Use   • Smoking status: Current Every Day Smoker     Packs/day: 1.00     Years: 20.00     Pack years: 20.00     Types: Cigarettes   • Smokeless tobacco: Never Used   Substance and Sexual Activity   • Alcohol use: No     Frequency: Never   • Drug use: Yes     Types: Methamphetamines     Comment: last used on Friday 5/18/2019   • Sexual activity: Defer     ---------------------------------------------------------------------------------------------------------------------   Allergies:  Penicillins  ---------------------------------------------------------------------------------------------------------------------   Home medications:    Medications below are reported home medications pulling from within the system; at this time, these medications have not been reconciled unless otherwise specified and are in the verification process for further verifcation as current home medications.  (Not in a hospital admission)      Hospital Scheduled Meds:        Current listed hospital scheduled medications may not yet reflect those currently placed in orders that are signed and held awaiting patient's arrival to floor.    ---------------------------------------------------------------------------------------------------------------------     Objective     Vital Signs:  Temp:  [98.1 °F (36.7 °C)] 98.1 °F (36.7 °C)  Heart Rate:  [70-92] 92  Resp:  [18] 18  BP: ()/(47-81) 119/70      10/15/20  2238   Weight: 69.9 kg (154 lb)     Body mass index is 19.77 kg/m².  ---------------------------------------------------------------------------------------------------------------------   Physical Exam   Constitutional: He is oriented to person, place, and time and well-developed, well-nourished, and in no distress.   HENT:   Head: Normocephalic and atraumatic.   Eyes: Pupils are equal, round, and reactive to light. EOM are normal.   Neck: Normal range of motion. No JVD present. No thyromegaly present.   Cardiovascular: Normal rate, regular rhythm and normal heart sounds.   Pulmonary/Chest: Effort normal. No respiratory distress. He has wheezes.   Abdominal: Soft. Bowel sounds are normal. He exhibits no distension. There is no abdominal tenderness.   Musculoskeletal: Normal range of motion.         General: Edema (3+ edema noted to bilateral lower extremities) present.   Neurological: He is alert and oriented to person, place, and time.   Skin: Skin is warm and dry. No rash noted. No erythema. No pallor.   Psychiatric: Mood, memory, affect and judgment normal.   ---------------------------------------------------------------------------------------------------------------------  EKG:      ---------------------------------------------------------------------------------------------------------------------   Results from last 7 days   Lab Units 10/16/20  0038 10/13/20  1643 10/12/20  0241   CRP mg/dL 10.00*  --  10.60*   WBC 10*3/mm3 8.84 5.99 7.89   HEMOGLOBIN g/dL 9.0* 7.2* 6.8*   HEMATOCRIT % 29.2* 23.0* 22.2*   MCV fL 93.3 92.7 94.9   MCHC g/dL 30.8* 31.3* 30.6*   PLATELETS 10*3/mm3 192 146 149   INR  1.34*  --  1.43*     Results from  last 7 days   Lab Units 10/13/20  2352 10/12/20  0233   PH, ARTERIAL pH units 7.328* 7.371   PO2 ART mm Hg 90.5 90.4   PCO2, ARTERIAL mm Hg 31.5* 28.8*   HCO3 ART mmol/L 16.5* 16.7*     Results from last 7 days   Lab Units 10/16/20  0337 10/16/20  0038 10/13/20  2054 10/13/20  1643  10/12/20  0241   SODIUM mmol/L  --  131* 131* 126*  --  126*   POTASSIUM mmol/L 4.6 6.3* 5.8* 6.2*   < > 7.6*   MAGNESIUM mg/dL  --  2.9*  --   --   --   --    CHLORIDE mmol/L  --  91* 92* 86*  --  90*   CO2 mmol/L  --  16.2* 16.1* 16.3*  --  18.6*   BUN mg/dL  --  149* 144* 148*  --  127*   CREATININE mg/dL  --  10.59* 9.42* 9.67*  --  8.36*   EGFR IF NONAFRICN AM mL/min/1.73  --  5* 6* 6*  --  7*   CALCIUM mg/dL  --  8.0* 8.1* 8.2*  --  8.8   GLUCOSE mg/dL  --  126* 81 141*  --  137*   ALBUMIN g/dL  --  3.17*  --  3.29*  --  3.57   BILIRUBIN mg/dL  --  0.3  --  0.3  --  0.4   ALK PHOS U/L  --  462*  --  344*  --  226*   AST (SGOT) U/L  --  26  --  51*  --  55*   ALT (SGPT) U/L  --  74*  --  123*  --  71*    < > = values in this interval not displayed.   Estimated Creatinine Clearance: 8.8 mL/min (A) (by C-G formula based on SCr of 10.59 mg/dL (H)).  No results found for: AMMONIA  Results from last 7 days   Lab Units 10/16/20  0255 10/12/20  0649 10/12/20  0241   TROPONIN T ng/mL 0.056* 0.082* 0.095*     Results from last 7 days   Lab Units 10/16/20  0038 10/12/20  0241   PROBNP pg/mL 7,002.0* 14,263.0*     Lab Results   Component Value Date    HGBA1C 5.50 05/07/2019     Lab Results   Component Value Date    TSH 1.780 09/04/2020    FREET4 0.40 (L) 09/04/2020     No results found for: PREGTESTUR, PREGSERUM, HCG, HCGQUANT  Pain Management Panel     Pain Management Panel Latest Ref Rng & Units 10/12/2020 9/4/2020    AMPHETAMINES SCREEN, URINE Negative Negative Negative    BARBITURATES SCREEN Negative Negative Negative    BENZODIAZEPINE SCREEN, URINE Negative Negative Negative    BUPRENORPHINEUR Negative Negative Negative    COCAINE SCREEN,  URINE Negative Negative Negative    METHADONE SCREEN, URINE Negative Negative Negative            ---------------------------------------------------------------------------------------------------------------------  Imaging Results (Last 7 Days)     Procedure Component Value Units Date/Time    XR Chest 1 View [[id number]] Collected: 10/16/20 0253     Updated: 10/16/20 0255    Narrative:      CHEST X-RAY, 10/16/2020      HISTORY:    45-year-old male in the ED complaining of chest tightness, pain. Shortness of breath.      TECHNIQUE:  AP portable upright chest x-ray.    COMPARISON:  *  Chest x-ray, 10/12/2020.    FINDINGS:  Moderate cardiomegaly with pulmonary venous redistribution and mild diffuse interstitial edema, new or increased since the recent prior study. Correlate for vascular congestion or volume overload. Small pleural effusions and basilar atelectasis.    Dialysis catheter remains in good position.      Impression:      1.  Mild interstitial edema and small pleural effusions suggesting volume overload or vascular congestion, new since 10/12/2020.  2.  Stable cardiac silhouette enlargement.  3.  Well-positioned dialysis catheter.    Signer Name: Rashawn Rojas MD   Signed: 10/16/2020 2:53 AM   Workstation Name: [email or web address]    [facility]          Cultures:  Blood cultures ordered.     Last echocardiogram:     I have personally reviewed the radiology images and read the final radiology report.  ---------------------------------------------------------------------------------------------------------------------  Assessment / Plan     There are no active hospital problems to display for this patient.      ASSESSMENT/PLAN:  · ESRD with non-compliance with outpatient hemodialysis: Creatinine 10.59,  and EGFR 5.  Patient has a temporary dialysis catheter in place and reports that he does not have a permanent chair and outpatient dialysis and receives dialysis at different  facilities when he feels the need.  Patient was last dialyzed approximately 2 weeks ago in [facility].  Inpatient nephrology consult has been placed for management and initiation of inpatient dialysis.  We will continue to monitor with a.m. CMP.  Avoid nephrotoxic agents when possible.    · Elevated Troponin T in the setting of ESRD: Troponin T 0.056.  Patient denies chest pain.  Troponin T appears to be chronically elevated in the setting of ESRD.  Continuous cardiac monitoring has been ordered.    · Hyponatremia: Sodium 131. Neuro checks ordered every 4 hours.  We will continue to monitor with a.m. CMP.    · Hyperkalemia (Resolved) in the setting of ESRD: Potassium initially 6.3. Patient received 10 units of Regular insulin as well as 30 g of Kayexalate. Repeat potassium is 4.6. Nephrology consult has been placed and will arrange inpatient dialysis. Will continue to monitor with AM CMP.     · Elevated C-RP: C-RP 10.0.  WBC WNL. Chest x-ray is unremarkable for any infectious processes. Blood cultures from 9/4/2020 grew Serratia marcescens. Repeat blood cultures have been ordered. Urinalysis ordered. Will continue to monitor with AM C-RP.     · Normocytic Anemia: Hemoglobin 9.0 and hematocrit 29.2.  No signs or symptoms of bleeding noted.  Vitamin B12, folate and iron profile ordered.  We will continue to monitor with a.m. CBC.    · Hx of IV drug abuse: Patient denies any recent drug use. UDS has been ordered.     · Tobacco abuse: Patient reports smoking approximately 1 pack cigarettes per day.  Smoking cessation education provided.  Nicotine patch ordered.    ----------  -DVT prophylaxis: SQ Heparin  -Diet: NPO  -Activity: Up With Assistance  -Expected length of stay: INPATIENT status due to the need for care which can only be reasonably provided in an hospital setting such as aggressive/expedited ancillary services and/or consultation services, the necessity for IV medications, close physician monitoring  and/or the possible need for procedures.  In such, I feel patient’s risk for adverse outcomes and need for care warrant INPATIENT evaluation and predict the patient’s care encounter to likely last beyond 2 midnights.    Patient is high risk due to ESRD with non-compliance with outpatient hemodialysis.     Billie Covington, APRN  10/16/20  04:35 EDT

## 2020-10-16 NOTE — DISCHARGE PLACEMENT REQUEST
"Mario Nair (45 y.o. Male)     Date of Birth Social Security Number Address Home Phone MRN    1975  [address] [place] [address] [phone] [id number]    Muslim Marital Status          None        Admission Date Admission Type Admitting Provider Attending Provider Department, Room/Bed    10/16/20 Emergency Charlie Conti MD Heinss, Karl F, MD [address], 3304/1S    Discharge Date Discharge Disposition Discharge Destination                       Attending Provider: Beka Colon MD    Allergies: Penicillins    Isolation: None   Infection: Hepatitis B (06/05/19)   Code Status: CPR    Ht: 188 cm (74.02\")   Wt: 81.7 kg (180 lb 3.2 oz)    Admission Cmt: None   Principal Problem: None                Active Insurance as of 10/16/2020     Primary Coverage     Payor Plan Insurance Group Employer/Plan Group    [facility] MEDICAID      Payor Plan Address Payor Plan Phone Number Payor Plan Fax Number Effective Dates    [address] [address] [phone]  5/5/2019 - None Entered    [facility] [id number]       Subscriber Name Subscriber Birth Date Member ID       ANKUSH NAIRVIJAYA SOTO 1975 [id number]                 Emergency Contacts      (Rel.) Home Phone Work Phone Mobile Phone    SHANI WILKES (Relative) [phone] -- --    Constance Nair (Daughter) -- -- [phone]            Emergency Contact Information     Name Relation Home Work Mobile    SHANI WILKES Relative [phone]      Constance Nair Daughter   [phone]          Insurance Information                [facility] MEDICAID Phone: [phone]    Subscriber: Mario Nair Subscriber#: [id number]    Group#:  Precert#:           Treatment Team     Provider Relationship Specialty Contact    Beka Colon MD Attending --  [phone]    Marjorie Edward, RRT Respiratory Therapist --  Tara Cardenas Patient Care Technician --     Azul Guzman " L Unit [place] --     Isa Burger, Nursing Student Nursing Student --     Ella Sandhu Patient Care Technician --     Jennifer Barr RN Dialysis Nurse --     Thiago Guillaume, RN Registered Nurse --     Taniya Corado MD Consulting Physician Nephrology  [phone]          Problem List           Codes Noted - Resolved       Hospital    End stage renal disease (CMS/[facility]) ICD-10-CM: N18.6  ICD-9-CM: 585.6 10/16/2020 - Present       Non-Hospital    ESRD (end stage renal disease) (CMS/[facility]ly [facility]) ICD-10-CM: N18.6  ICD-9-CM: 585.6 7/18/2019 - Present    Chronic renal failure ICD-10-CM: N18.9  ICD-9-CM: 585.9 5/27/2019 - Present    Metabolic acidosis ICD-10-CM: E87.2  ICD-9-CM: 276.2 5/13/2019 - Present    Hyperkalemia ICD-10-CM: E87.5  ICD-9-CM: 276.7 5/7/2019 - Present             History & Physical      Billie Covington APRN at 10/16/20 0435     Attestation signed by Charlie Conti MD at 10/16/20 0603 (Updated)    I have seen and examined the patient independently from NICKY Hernandez, and have reviewed Billie's findings, assessment and plan in the H&P below. Patient reiterates that he has not undergone HD in 2 weeks. He does not have a dialysis chair, claiming no clinic will accept him because of his viral hepatitis, so he essentially waits until he begins to feel fluid overloaded and then goes to the nearest hospital to be dialyzed. He has rales in his lower lung zones bilaterally. He has 2+ pitting edema in both lower extremities. CXR correlates with exam, showing evidence of volume overload. Initially hyperkalemic but resolved with medication. Nephrology consulted to arrange hemodialysis while in the hospital. CRP is elevated and looking back at prior lab results, patient grew serratia marcescens in the blood in early September. It is unclear if this was ever treated, but later in the month patient was hospitalized at [facility] at which time he reports his HD catheter was replaced and he  received several antibiotics, presumably for bacteremia. Will repeat blood cultures now to see if bacteremia has cleared since HD catheter changed and antibiotics administered. Requesting records from [facility] hospital stay.                       [facility] Medicine Services  History & Physical    Patient Identification:  Name:  Mario Nair  Age:  45 y.o.  Sex:  male  :  1975  MRN:  [id number]   Admit Date: 10/16/2020   Visit Number:  [id number]  Primary Care Physician:  Provider, No Known    I have seen the patient in conjunction with NICKY Hernandez and I agree with the following statements:    Subjective     Chief complaint: Weakness    History of presenting illness:      Mario Nair is a 45 y.o. male with past medical history significant for ESRD with non-compliance with hemodialysis, history of IV drug abuse, hx of endocarditis and tobacco abuse.     Mr. Nair presented to [facility] emergency department on 2020 with complaints of weakness.  Mr. Nair has end-stage renal disease and is medically noncompliant with hemodialysis.  Mr. Nair reports that he does not have an assigned chair at an outpatient dialysis clinic and reports receiving dialysis only on an inpatient basis.  He explained that he has been waiting on his insurance, [employer], to approve outpatient dialysis.  Furthermore, Mr. Nair reports that he does not follow with a nephrologist, despite appointments being made post discharge from multiple facilities.  Mr. Nair reports that he was admitted to [facility] approximately 2 weeks ago during which time his temporary dialysis catheter was replaced and he was dialyzed.  He denies any further dialysis since discharge.  He reports that over the last couple days he has been experiencing shortness of breath with exertion and a nonproductive cough which he reports he recognizes as signs of fluid overload.  The patient denies any fever,  chills, recent infection, abdominal pain, nausea, vomiting, diarrhea, urinary symptoms, dizziness, lightheadedness, seizures or syncopal episodes.  He does report that he still produces urine.  The patient denies any alcohol or drug use but does report smoking approximately 1 pack of cigarettes per day.    Upon arrival to the ED, vital signs were temperature 98.1, pulse 70, respirations 18, blood pressure initially 88/47 with most recent /70 and oxygen saturation 96% on room air.  Arterial blood gas notes pH 7.328, PCO2 31.5, PO2 90.5, HCO3 16.5 and oxygen saturation 96.7 on room air.  Troponin T 0.056, proBNP 7002.0, blood glucose 126, sodium 131, potassium initially 6.3 with most recent 4.6, creatinine 10.59, , EGFR 5, ALT 74, AST 26, C-RP 10.0, magnesium 2.9, WBC 8.84, hemoglobin 9.0 and hematocrit 29.2.  Ethanol <10.  Chest x-ray notes mild interstitial edema and small pleural effusions suggesting volume overload or vascular congestion, new since 10/12/2020, stable cardiac silhouette enlargement and well-positioned dialysis catheter, per radiology.  ---------------------------------------------------------------------------------------------------------------------   Review of Systems   Constitutional: Positive for fatigue. Negative for activity change, appetite change, chills and fever.   HENT: Negative for congestion, rhinorrhea, sore throat and trouble swallowing.    Respiratory: Positive for cough (non-productive ) and shortness of breath.    Cardiovascular: Positive for leg swelling. Negative for chest pain.   Gastrointestinal: Negative for abdominal distention, abdominal pain, constipation, diarrhea, nausea and vomiting.   Genitourinary: Negative for difficulty urinating, dysuria, frequency, hematuria and urgency.   Musculoskeletal: Negative for back pain and gait problem.   Skin: Negative for color change, pallor, rash and wound.   Allergic/Immunologic: Negative for immunocompromised state.    Neurological: Negative for dizziness, seizures, syncope, light-headedness and headaches.   Psychiatric/Behavioral: Negative for agitation, confusion, hallucinations, self-injury and sleep disturbance.      ---------------------------------------------------------------------------------------------------------------------   Past Medical History:   Diagnosis Date   • Endocarditis    • ESRD (end stage renal disease) (CMS/[facility])    • IV drug abuse (CMS/[facility])    • Unable to read or write      Past Surgical History:   Procedure Laterality Date   • CENTRAL VENOUS CATHETER TUNNELED INSERTION DOUBLE LUMEN Right     Chest for hemodialysis     Family History   Problem Relation Age of Onset   • Alcohol abuse Mother    • Hypertension Mother    • Hypertension Other      Social History     Socioeconomic History   • Marital status:      Spouse name: Not on file   • Number of children: Not on file   • Years of education: Not on file   • Highest education level: Not on file   Tobacco Use   • Smoking status: Current Every Day Smoker     Packs/day: 1.00     Years: 20.00     Pack years: 20.00     Types: Cigarettes   • Smokeless tobacco: Never Used   Substance and Sexual Activity   • Alcohol use: No     Frequency: Never   • Drug use: Yes     Types: Methamphetamines     Comment: last used on Friday 5/18/2019   • Sexual activity: Defer     ---------------------------------------------------------------------------------------------------------------------   Allergies:  Penicillins  ---------------------------------------------------------------------------------------------------------------------   Home medications:    Medications below are reported home medications pulling from within the system; at this time, these medications have not been reconciled unless otherwise specified and are in the verification process for further verifcation as current home medications.  (Not in a hospital admission)      Hospital Scheduled Meds:         Current listed hospital scheduled medications may not yet reflect those currently placed in orders that are signed and held awaiting patient's arrival to floor.   ---------------------------------------------------------------------------------------------------------------------     Objective     Vital Signs:  Temp:  [98.1 °F (36.7 °C)] 98.1 °F (36.7 °C)  Heart Rate:  [70-92] 92  Resp:  [18] 18  BP: ()/(47-81) 119/70      10/15/20  2238   Weight: 69.9 kg (154 lb)     Body mass index is 19.77 kg/m².  ---------------------------------------------------------------------------------------------------------------------   Physical Exam   Constitutional: He is oriented to person, place, and time and well-developed, well-nourished, and in no distress.   HENT:   Head: Normocephalic and atraumatic.   Eyes: Pupils are equal, round, and reactive to light. EOM are normal.   Neck: Normal range of motion. No JVD present. No thyromegaly present.   Cardiovascular: Normal rate, regular rhythm and normal heart sounds.   Pulmonary/Chest: Effort normal. No respiratory distress. He has wheezes.   Abdominal: Soft. Bowel sounds are normal. He exhibits no distension. There is no abdominal tenderness.   Musculoskeletal: Normal range of motion.         General: Edema (3+ edema noted to bilateral lower extremities) present.   Neurological: He is alert and oriented to person, place, and time.   Skin: Skin is warm and dry. No rash noted. No erythema. No pallor.   Psychiatric: Mood, memory, affect and judgment normal.   ---------------------------------------------------------------------------------------------------------------------  EKG:      ---------------------------------------------------------------------------------------------------------------------   Results from last 7 days   Lab Units 10/16/20  0038 10/13/20  1643 10/12/20  0241   CRP mg/dL 10.00*  --  10.60*   WBC 10*3/mm3 8.84 5.99 7.89   HEMOGLOBIN g/dL 9.0* 7.2* 6.8*    HEMATOCRIT % 29.2* 23.0* 22.2*   MCV fL 93.3 92.7 94.9   MCHC g/dL 30.8* 31.3* 30.6*   PLATELETS 10*3/mm3 192 146 149   INR  1.34*  --  1.43*     Results from last 7 days   Lab Units 10/13/20  2352 10/12/20  0233   PH, ARTERIAL pH units 7.328* 7.371   PO2 ART mm Hg 90.5 90.4   PCO2, ARTERIAL mm Hg 31.5* 28.8*   HCO3 ART mmol/L 16.5* 16.7*     Results from last 7 days   Lab Units 10/16/20  0337 10/16/20  0038 10/13/20  2054 10/13/20  1643  10/12/20  0241   SODIUM mmol/L  --  131* 131* 126*  --  126*   POTASSIUM mmol/L 4.6 6.3* 5.8* 6.2*   < > 7.6*   MAGNESIUM mg/dL  --  2.9*  --   --   --   --    CHLORIDE mmol/L  --  91* 92* 86*  --  90*   CO2 mmol/L  --  16.2* 16.1* 16.3*  --  18.6*   BUN mg/dL  --  149* 144* 148*  --  127*   CREATININE mg/dL  --  10.59* 9.42* 9.67*  --  8.36*   EGFR IF NONAFRICN AM mL/min/1.73  --  5* 6* 6*  --  7*   CALCIUM mg/dL  --  8.0* 8.1* 8.2*  --  8.8   GLUCOSE mg/dL  --  126* 81 141*  --  137*   ALBUMIN g/dL  --  3.17*  --  3.29*  --  3.57   BILIRUBIN mg/dL  --  0.3  --  0.3  --  0.4   ALK PHOS U/L  --  462*  --  344*  --  226*   AST (SGOT) U/L  --  26  --  51*  --  55*   ALT (SGPT) U/L  --  74*  --  123*  --  71*    < > = values in this interval not displayed.   Estimated Creatinine Clearance: 8.8 mL/min (A) (by C-G formula based on SCr of 10.59 mg/dL (H)).  No results found for: AMMONIA  Results from last 7 days   Lab Units 10/16/20  0255 10/12/20  0649 10/12/20  0241   TROPONIN T ng/mL 0.056* 0.082* 0.095*     Results from last 7 days   Lab Units 10/16/20  0038 10/12/20  0241   PROBNP pg/mL 7,002.0* 14,263.0*     Lab Results   Component Value Date    HGBA1C 5.50 05/07/2019     Lab Results   Component Value Date    TSH 1.780 09/04/2020    FREET4 0.40 (L) 09/04/2020     No results found for: PREGTESTUR, PREGSERUM, HCG, HCGQUANT  Pain Management Panel     Pain Management Panel Latest Ref Rng & Units 10/12/2020 9/4/2020    AMPHETAMINES SCREEN, URINE Negative Negative Negative     BARBITURATES SCREEN Negative Negative Negative    BENZODIAZEPINE SCREEN, URINE Negative Negative Negative    BUPRENORPHINEUR Negative Negative Negative    COCAINE SCREEN, URINE Negative Negative Negative    METHADONE SCREEN, URINE Negative Negative Negative            ---------------------------------------------------------------------------------------------------------------------  Imaging Results (Last 7 Days)     Procedure Component Value Units Date/Time    XR Chest 1 View [[id number]] Collected: 10/16/20 0253     Updated: 10/16/20 0255    Narrative:      CHEST X-RAY, 10/16/2020      HISTORY:    45-year-old male in the ED complaining of chest tightness, pain. Shortness of breath.      TECHNIQUE:  AP portable upright chest x-ray.    COMPARISON:  *  Chest x-ray, 10/12/2020.    FINDINGS:  Moderate cardiomegaly with pulmonary venous redistribution and mild diffuse interstitial edema, new or increased since the recent prior study. Correlate for vascular congestion or volume overload. Small pleural effusions and basilar atelectasis.    Dialysis catheter remains in good position.      Impression:      1.  Mild interstitial edema and small pleural effusions suggesting volume overload or vascular congestion, new since 10/12/2020.  2.  Stable cardiac silhouette enlargement.  3.  Well-positioned dialysis catheter.    Signer Name: Rashawn Rojas MD   Signed: 10/16/2020 2:53 AM   Workstation Name: [facility]    [facility]          Cultures:  Blood cultures ordered.     Last echocardiogram:     I have personally reviewed the radiology images and read the final radiology report.  ---------------------------------------------------------------------------------------------------------------------  Assessment / Plan     There are no active hospital problems to display for this patient.      ASSESSMENT/PLAN:  · ESRD with non-compliance with outpatient hemodialysis: Creatinine 10.59,  and  EGFR 5.  Patient has a temporary dialysis catheter in place and reports that he does not have a permanent chair and outpatient dialysis and receives dialysis at different facilities when he feels the need.  Patient was last dialyzed approximately 2 weeks ago in [facility].  Inpatient nephrology consult has been placed for management and initiation of inpatient dialysis.  We will continue to monitor with a.m. CMP.  Avoid nephrotoxic agents when possible.    · Elevated Troponin T in the setting of ESRD: Troponin T 0.056.  Patient denies chest pain.  Troponin T appears to be chronically elevated in the setting of ESRD.  Continuous cardiac monitoring has been ordered.    · Hyponatremia: Sodium 131. Neuro checks ordered every 4 hours.  We will continue to monitor with a.m. CMP.    · Hyperkalemia (Resolved) in the setting of ESRD: Potassium initially 6.3. Patient received 10 units of Regular insulin as well as 30 g of Kayexalate. Repeat potassium is 4.6. Nephrology consult has been placed and will arrange inpatient dialysis. Will continue to monitor with AM CMP.     · Elevated C-RP: C-RP 10.0.  WBC WNL. Chest x-ray is unremarkable for any infectious processes. Blood cultures from 9/4/2020 grew Serratia marcescens. Repeat blood cultures have been ordered. Urinalysis ordered. Will continue to monitor with AM C-RP.     · Normocytic Anemia: Hemoglobin 9.0 and hematocrit 29.2.  No signs or symptoms of bleeding noted.  Vitamin B12, folate and iron profile ordered.  We will continue to monitor with a.m. CBC.    · Hx of IV drug abuse: Patient denies any recent drug use. UDS has been ordered.     · Tobacco abuse: Patient reports smoking approximately 1 pack cigarettes per day.  Smoking cessation education provided.  Nicotine patch ordered.    ----------  -DVT prophylaxis: SQ Heparin  -Diet: NPO  -Activity: Up With Assistance  -Expected length of stay: INPATIENT status due to the need for care which can only be reasonably  provided in an hospital setting such as aggressive/expedited ancillary services and/or consultation services, the necessity for IV medications, close physician monitoring and/or the possible need for procedures.  In such, I feel patient’s risk for adverse outcomes and need for care warrant INPATIENT evaluation and predict the patient’s care encounter to likely last beyond 2 midnights.    Patient is high risk due to ESRD with non-compliance with outpatient hemodialysis.     Billie Covington, APRN  10/16/20  04:35 EDT      Electronically signed by Charlie Conti MD at 10/16/20 0603       Vital Signs (last day)     Date/Time   Temp   Temp src   Pulse   Resp   BP   Patient Position   SpO2    10/16/20 1020   98 (36.7)   Oral   84   18   136/81   Lying   96    10/16/20 0650   97.6 (36.4)   Temporal   83   18   140/70   Sitting   97    10/16/20 0544   97.6 (36.4)   Oral   99   18   127/59   Sitting   94    10/16/20 0502   --   --   87   --   124/63   --   94    10/16/20 0447   --   --   87   --   121/65   --   94    10/16/20 0433   --   --   86   --   120/62   --   96    10/16/20 0417   --   --   89   --   125/66   --   93    10/16/20 0402   --   --   90   --   123/65   --   92    10/16/20 0347   --   --   94   --   134/68   --   94    10/16/20 0332   --   --   92   --   128/68   --   95    10/16/20 0317   --   --   90   --   133/69   --   95    10/16/20 0304   --   --   90   --   116/79   --   94    10/16/20 0248   --   --   84   --   122/72   --   96    10/16/20 0233   --   --   92   --   119/70   --   93    10/16/20 0217   --   --   80   --   121/81   --   100    10/16/20 0202   --   --   76   --   105/78   --   97    10/16/20 0147   --   --   77   --   114/80   --   (!) 83    10/16/20 0132   --   --   79   --   103/76   --   97    10/16/20 0116   --   --   --   --   110/75   --   100    10/16/20 0110   --   --   --   --   102/73   --   (!) 82    10/15/20 2238   98.1 (36.7)   Oral   70   18   (!) 88/47   Sitting    96              Lines, Drains & Airways    Active LDAs     Name:   Placement date:   Placement time:   Site:   Days:    Peripheral IV 10/16/20 0038 Left Antecubital   10/16/20    0038    Antecubital   less than 1    Hemodialysis Cath Double   05/07/19 present on admission    0525    Subclavian   528                  Current Facility-Administered Medications   Medication Dose Route Frequency Provider Last Rate Last Dose   • calcium acetate (PHOS BINDER)) capsule 667 mg  667 mg Oral TID With Meals Beka Colon MD   667 mg at 10/16/20 1053   • carvedilol (COREG) tablet 12.5 mg  12.5 mg Oral BID With Meals Beka Colon MD   12.5 mg at 10/16/20 1054   • epoetin mc (EPOGEN,PROCRIT) injection 10,000 Units  10,000 Units Subcutaneous Weekly Taniya Corado MD   10,000 Units at 10/16/20 1229   • heparin (porcine) 5000 UNIT/ML injection 5,000 Units  5,000 Units Subcutaneous Q12H Charlie Conti MD   5,000 Units at 10/16/20 0756   • hydrALAZINE (APRESOLINE) tablet 10 mg  10 mg Oral Q8H Beka Colon MD   10 mg at 10/16/20 1229   • isosorbide dinitrate (ISORDIL) tablet 10 mg  10 mg Oral TID - Nitrates Beka Colon MD   10 mg at 10/16/20 1228   • nicotine (NICODERM CQ) 14 MG/24HR patch 1 patch  1 patch Transdermal Q24H Billie Covington APRN   1 patch at 10/16/20 0756   • nitroglycerin (NITROSTAT) SL tablet 0.4 mg  0.4 mg Sublingual Q5 Min PRN Charlie Conti MD       • pantoprazole (PROTONIX) EC tablet 40 mg  40 mg Oral Daily Beka Colon MD   40 mg at 10/16/20 1053   • sodium chloride 0.9 % flush 10 mL  10 mL Intravenous PRN Charlie Conti MD       • sodium chloride 0.9 % flush 10 mL  10 mL Intravenous Q12H Charlie Conti MD       • sodium chloride 0.9 % flush 10 mL  10 mL Intravenous PRN Charlie Conti, MD         Lab Results (last 48 hours)     Procedure Component Value Units Date/Time    Folate [[id number]]  (Normal) Collected: 10/16/20 0629    Specimen: Blood Updated:  10/16/20 1224     Folate 11.00 ng/mL     Narrative:      Results may be falsely increased if patient taking Biotin.      Vitamin B12 [[id number]]  (Normal) Collected: 10/16/20 0629    Specimen: Blood Updated: 10/16/20 1224     Vitamin B-12 660 pg/mL     Narrative:      Results may be falsely increased if patient taking Biotin.      Urine Drug Screen - Urine, Clean Catch [[id number]]  (Abnormal) Collected: 10/16/20 0912    Specimen: Urine, Clean Catch Updated: 10/16/20 0942     Amphetamine Screen, Urine Negative     Barbiturates Screen, Urine Negative     Benzodiazepine Screen, Urine Negative     Cocaine Screen, Urine Negative     Methadone Screen, Urine Negative     Opiate Screen Positive     Phencyclidine (PCP), Urine Negative     THC, Screen, Urine Negative     6-ACETYL MORPHINE Negative     Buprenorphine, Screen, Urine Negative     Oxycodone Screen, Urine Negative    Narrative:      Negative Thresholds For Drugs Screened:                  Amphetamines              1000 ng/ml               Barbiturates               200 ng/ml               Benzodiazepines            200 ng/ml              Cocaine                    300 ng/ml              Methadone                  300 ng/ml              Opiates                    300 ng/ml               Phencyclidine               25 ng/ml               THC                         50 ng/ml              6-Acetyl Morphine           10 ng/ml              Buprenorphine                5 ng/ml              Oxycodone                  300 ng/ml    The reference range for all drugs tested is negative. This report includes final unconfirmed qualitative results to be used for medical treatment purposes only. Unconfirmed results must not be used for non-medical purposes such as employment or legal testing. Clinical consideration should be applied to any drug of abuse test, especially when unconfirmed quantitative results are used.        Urinalysis, Microscopic Only - Urine, Clean Catch  [[id number]]  (Abnormal) Collected: 10/16/20 0912    Specimen: Urine, Clean Catch Updated: 10/16/20 0929     RBC, UA 3-5 /HPF      WBC, UA Too Numerous to Count /HPF      Bacteria, UA 1+ /HPF      Squamous Epithelial Cells, UA None Seen /HPF      Hyaline Casts, UA None Seen /LPF      Methodology Automated Microscopy    Urine Culture - Urine, Urine, Clean Catch [[id number]] Collected: 10/16/20 0912    Specimen: Urine, Clean Catch Updated: 10/16/20 0929    Urinalysis With Culture If Indicated - Urine, Clean Catch [[id number]]  (Abnormal) Collected: 10/16/20 0912    Specimen: Urine, Clean Catch Updated: 10/16/20 0926     Color, UA Yellow     Appearance, UA Turbid     pH, UA 5.5     Specific Gravity, UA 1.015     Glucose, UA Negative     Ketones, UA Negative     Bilirubin, UA Negative     Blood, UA Moderate (2+)     Protein,  mg/dL (2+)     Leuk Esterase, UA Large (3+)     Nitrite, UA Negative     Urobilinogen, UA 0.2 E.U./dL    COVID PRE-OP / PRE-PROCEDURE SCREENING ORDER (NO ISOLATION) - Swab, Nasopharynx [[id number]]  (Normal) Collected: 10/16/20 0757    Specimen: Swab from Nasopharynx Updated: 10/16/20 0903    Narrative:      The following orders were created for panel order COVID PRE-OP / PRE-PROCEDURE SCREENING ORDER (NO ISOLATION) - Swab, Nasopharynx.  Procedure                               Abnormality         Status                     ---------                               -----------         ------                     COVID-19,CEPHEID,COR/CALLUM...[[id number]]  Normal              Final result                 Please view results for these tests on the individual orders.    COVID-19,CEPHEID,COR/CALLUM/PAD IN-HOUSE(OR EMERGENT/ADD-ON),NP SWAB IN TRANSPORT MEDIA 3-4 HR TAT - Swab, Nasopharynx [[id number]]  (Normal) Collected: 10/16/20 0757    Specimen: Swab from Nasopharynx Updated: 10/16/20 0903     COVID19 Not Detected    Narrative:      Fact sheet for providers: https://www.fda.gov/media/666001/download     Fact  sheet for patients: https://www.fda.gov/media/861455/download    CBC Auto Differential [[id number]]  (Abnormal) Collected: 10/16/20 0629    Specimen: Blood Updated: 10/16/20 0719     WBC 7.84 10*3/mm3      RBC 3.01 10*6/mm3      Hemoglobin 8.8 g/dL      Hematocrit 29.2 %      MCV 97.0 fL      MCH 29.2 pg      MCHC 30.1 g/dL      RDW 15.4 %      RDW-SD 54.2 fl      MPV 9.5 fL      Platelets 173 10*3/mm3      Neutrophil % 73.0 %      Lymphocyte % 16.3 %      Monocyte % 7.3 %      Eosinophil % 2.4 %      Basophil % 0.5 %      Immature Grans % 0.5 %      Neutrophils, Absolute 5.72 10*3/mm3      Lymphocytes, Absolute 1.28 10*3/mm3      Monocytes, Absolute 0.57 10*3/mm3      Eosinophils, Absolute 0.19 10*3/mm3      Basophils, Absolute 0.04 10*3/mm3      Immature Grans, Absolute 0.04 10*3/mm3      nRBC 0.0 /100 WBC     Comprehensive Metabolic Panel [[id number]]  (Abnormal) Collected: 10/16/20 0629    Specimen: Blood Updated: 10/16/20 0708     Glucose 87 mg/dL       mg/dL      Creatinine 9.73 mg/dL      Sodium 132 mmol/L      Potassium 4.9 mmol/L      Chloride 93 mmol/L      CO2 15.0 mmol/L      Calcium 7.4 mg/dL      Total Protein 7.1 g/dL      Albumin 2.84 g/dL      ALT (SGPT) 63 U/L      AST (SGOT) 17 U/L      Alkaline Phosphatase 399 U/L      Total Bilirubin 0.3 mg/dL      eGFR Non African Amer 6 mL/min/1.73      Comment: <15 Indicative of kidney failure.        eGFR   Amer --     Comment: <15 Indicative of kidney failure.        Globulin 4.3 gm/dL      A/G Ratio 0.7 g/dL      BUN/Creatinine Ratio 15.3     Anion Gap 24.0 mmol/L     Narrative:      GFR Normal >60  Chronic Kidney Disease <60  Kidney Failure <15      Iron Profile [[id number]]  (Abnormal) Collected: 10/16/20 0629    Specimen: Blood Updated: 10/16/20 0702     Iron 52 mcg/dL      Iron Saturation 24 %      Transferrin 147 mg/dL      TIBC 219 mcg/dL     C-reactive Protein [[id number]]  (Abnormal) Collected: 10/16/20 0629    Specimen: Blood Updated:  10/16/20 0702     C-Reactive Protein 9.31 mg/dL     Blood Culture - Blood, Arm, Left [[id number]] Collected: 10/16/20 0629    Specimen: Blood from Arm, Left Updated: 10/16/20 0638    Blood Culture - Blood, Arm, Left [[id number]] Collected: 10/16/20 0629    Specimen: Blood from Arm, Left Updated: 10/16/20 0638    Potassium [[id number]]  (Normal) Collected: 10/16/20 0337    Specimen: Blood Updated: 10/16/20 0359     Potassium 4.6 mmol/L     Troponin [[id number]]  (Abnormal) Collected: 10/16/20 0255    Specimen: Blood from Arm, Left Updated: 10/16/20 0319     Troponin T 0.056 ng/mL     Narrative:      Troponin T Reference Range:  <= 0.03 ng/mL-   Negative for AMI  >0.03 ng/mL-     Abnormal for myocardial necrosis.  Clinicians would have to utilize clinical acumen, EKG, Troponin and serial changes to determine if it is an Acute Myocardial Infarction or myocardial injury due to an underlying chronic condition.       Results may be falsely decreased if patient taking Biotin.      Ethanol [[id number]] Collected: 10/16/20 0255    Specimen: Blood Updated: 10/16/20 0313     Ethanol <10 mg/dL      Ethanol % <0.010 %     Narrative:      >/= 80.0 legally intoxicated    Sedimentation Rate [[id number]]  (Abnormal) Collected: 10/16/20 0038    Specimen: Blood from Arm, Left Updated: 10/16/20 0126     Sed Rate 74 mm/hr     Comprehensive Metabolic Panel [[id number]]  (Abnormal) Collected: 10/16/20 0038    Specimen: Blood from Arm, Left Updated: 10/16/20 0124     Glucose 126 mg/dL       mg/dL      Creatinine 10.59 mg/dL      Sodium 131 mmol/L      Potassium 6.3 mmol/L      Comment: 1+ Hemolysis Specimen hemolyzed.  Results may be affected.        Chloride 91 mmol/L      CO2 16.2 mmol/L      Calcium 8.0 mg/dL      Total Protein 7.8 g/dL      Albumin 3.17 g/dL      ALT (SGPT) 74 U/L      Comment: Specimen hemolyzed.  Results may be affected.        AST (SGOT) 26 U/L      Alkaline Phosphatase 462 U/L      Total Bilirubin 0.3 mg/dL       eGFR Non African Amer 5 mL/min/1.73      Comment: <15 Indicative of kidney failure.        eGFR   Amer --     Comment: <15 Indicative of kidney failure.        Globulin 4.6 gm/dL      A/G Ratio 0.7 g/dL      BUN/Creatinine Ratio 14.1     Anion Gap 23.8 mmol/L     Narrative:      GFR Normal >60  Chronic Kidney Disease <60  Kidney Failure <15      C-reactive Protein [[id number]]  (Abnormal) Collected: 10/16/20 0038    Specimen: Blood from Arm, Left Updated: 10/16/20 0109     C-Reactive Protein 10.00 mg/dL     Phosphorus [[id number]]  (Abnormal) Collected: 10/16/20 0038    Specimen: Blood from Arm, Left Updated: 10/16/20 0109     Phosphorus 12.6 mg/dL     Magnesium [[id number]]  (Abnormal) Collected: 10/16/20 0038    Specimen: Blood from Arm, Left Updated: 10/16/20 0109     Magnesium 2.9 mg/dL     BNP [[id number]]  (Abnormal) Collected: 10/16/20 0038    Specimen: Blood from Arm, Left Updated: 10/16/20 0107     proBNP 7,002.0 pg/mL     Narrative:      Among patients with dyspnea, NT-proBNP is highly sensitive for the detection of acute congestive heart failure. In addition NT-proBNP of <300 pg/ml effectively rules out acute congestive heart failure with 99% negative predictive value.    Results may be falsely decreased if patient taking Biotin.      Protime-INR [[id number]]  (Abnormal) Collected: 10/16/20 0038    Specimen: Blood from Arm, Left Updated: 10/16/20 0102     Protime 16.4 Seconds      Comment: Note new Reference Range        INR 1.34    Narrative:      Suggested INR therapeutic range for stable oral anticoagulant therapy:    Low Intensity therapy:   1.5-2.0  Moderate Intensity therapy:   2.0-3.0  High Intensity therapy:   2.5-4.0    CBC & Differential [[id number]]  (Abnormal) Collected: 10/16/20 0038    Specimen: Blood from Arm, Left Updated: 10/16/20 0048    Narrative:      The following orders were created for panel order CBC & Differential.  Procedure                               Abnormality          Status                     ---------                               -----------         ------                     CBC Auto Differential[[id number]]        Abnormal            Final result                 Please view results for these tests on the individual orders.    CBC Auto Differential [[id number]]  (Abnormal) Collected: 10/16/20 0038    Specimen: Blood from Arm, Left Updated: 10/16/20 0048     WBC 8.84 10*3/mm3      RBC 3.13 10*6/mm3      Hemoglobin 9.0 g/dL      Hematocrit 29.2 %      MCV 93.3 fL      MCH 28.8 pg      MCHC 30.8 g/dL      RDW 15.4 %      RDW-SD 51.9 fl      MPV 10.2 fL      Platelets 192 10*3/mm3      Neutrophil % 79.0 %      Lymphocyte % 12.1 %      Monocyte % 6.3 %      Eosinophil % 1.7 %      Basophil % 0.6 %      Immature Grans % 0.3 %      Neutrophils, Absolute 6.98 10*3/mm3      Lymphocytes, Absolute 1.07 10*3/mm3      Monocytes, Absolute 0.56 10*3/mm3      Eosinophils, Absolute 0.15 10*3/mm3      Basophils, Absolute 0.05 10*3/mm3      Immature Grans, Absolute 0.03 10*3/mm3      nRBC 0.0 /100 WBC           Imaging Results (Last 24 Hours)     Procedure Component Value Units Date/Time    XR Chest 1 View [[id number]] Collected: 10/16/20 0253     Updated: 10/16/20 0255    Narrative:      CHEST X-RAY, 10/16/2020      HISTORY:    45-year-old male in the ED complaining of chest tightness, pain. Shortness of breath.      TECHNIQUE:  AP portable upright chest x-ray.    COMPARISON:  *  Chest x-ray, 10/12/2020.    FINDINGS:  Moderate cardiomegaly with pulmonary venous redistribution and mild diffuse interstitial edema, new or increased since the recent prior study. Correlate for vascular congestion or volume overload. Small pleural effusions and basilar atelectasis.    Dialysis catheter remains in good position.      Impression:      1.  Mild interstitial edema and small pleural effusions suggesting volume overload or vascular congestion, new since 10/12/2020.  2.  Stable cardiac silhouette  enlargement.  3.  Well-positioned dialysis catheter.    Signer Name: Rashawn Rojas MD   Signed: 10/16/2020 2:53 AM   Workstation Name: [id number]    [facility]        ECG/EMG Results (last 24 hours)     Procedure Component Value Units Date/Time    ECG 12 Lead [[id number]] Collected: 10/16/20 0046     Updated: 10/16/20 0046          Orders (last 24 hrs)      Start     Ordered    10/16/20 1400  hydrALAZINE (APRESOLINE) tablet 10 mg  Every 8 Hours Scheduled      10/16/20 1011    10/16/20 1300  isosorbide dinitrate (ISORDIL) tablet 10 mg  3 Times Daily (Nitrates)      10/16/20 1011    10/16/20 1300  epoetin mc (EPOGEN,PROCRIT) injection 10,000 Units  Weekly      10/16/20 1054    10/16/20 1200  calcium acetate (PHOS BINDER)) capsule 667 mg  3 Times Daily With Meals      10/16/20 1011    10/16/20 1200  carvedilol (COREG) tablet 12.5 mg  2 Times Daily With Meals      10/16/20 1011    10/16/20 1200  pantoprazole (PROTONIX) EC tablet 40 mg  Daily      10/16/20 1011    10/16/20 1055  Case Management  Consult  Once     Provider:  (Not yet assigned)    10/16/20 1054    10/16/20 0930  Urine Culture - Urine, Urine, Clean Catch  Once      10/16/20 0929    10/16/20 0924  Urinalysis, Microscopic Only - Urine, Clean Catch  Once      10/16/20 0923    10/16/20 0900  sodium chloride 0.9 % flush 10 mL  Every 12 Hours Scheduled      10/16/20 0548    10/16/20 0900  heparin (porcine) 5000 UNIT/ML injection 5,000 Units  Every 12 Hours Scheduled      10/16/20 0548    10/16/20 0815  Case Management  Consult  Once     Provider:  (Not yet assigned)    10/16/20 0815    10/16/20 0800  Vital Signs  Every 8 Hours     Comments: Per per hospital policy    10/16/20 0548    10/16/20 0730  nicotine (NICODERM CQ) 14 MG/24HR patch 1 patch  Every 24 Hours Scheduled      10/16/20 0548    10/16/20 0726  Diet Regular; Renal  Diet Effective Now      10/16/20 0725    10/16/20 0600  Strict Intake &  Output  Every 6 Hours      10/16/20 0548    10/16/20 0600  CBC Auto Differential  Morning Draw      10/16/20 0548    10/16/20 0600  Comprehensive Metabolic Panel  Morning Draw      10/16/20 0548    10/16/20 0600  C-reactive Protein  Morning Draw      10/16/20 0549    10/16/20 0550  Obtain Medical Records  Until Discontinued     Comments: Obtain medical records from [facility] from most recent hospital admission.    10/16/20 0549    10/16/20 0549  Notify Physician (with Parameters)  Until Discontinued      10/16/20 0548    10/16/20 0549  Oxygen Therapy- Nasal Cannula; Titrate for SPO2: 90% - 95%  Continuous      10/16/20 0548    10/16/20 0549  Insert Peripheral IV  Once      10/16/20 0548    10/16/20 0549  Saline Lock & Maintain IV Access  Continuous,   Status:  Canceled      10/16/20 0548    10/16/20 0549  Pulse Oximetry, Continuous  Continuous      10/16/20 0548    10/16/20 0549  Cardiac Monitoring  Continuous,   Status:  Canceled      10/16/20 0548    10/16/20 0549  Telemetry - Maintain IV Access  Continuous      10/16/20 0548    10/16/20 0549  Continuous Cardiac Monitoring  Continuous      10/16/20 0548    10/16/20 0549  May Be Off Telemetry for Tests  Continuous      10/16/20 0548    10/16/20 0549  ACLS Protocol For Life Threatening Dysrhythmias (Unless Code Status Indicates Otherwise)  Continuous      10/16/20 0548    10/16/20 0549  Notify Provider if ACLS Protocol Activated  Until Discontinued      10/16/20 0548    10/16/20 0549  Activity - Bed Rest With Exceptions (Specify)  Until Discontinued      10/16/20 0548    10/16/20 0549  Daily Weights  Daily      10/16/20 0548    10/16/20 0549  NPO Diet NPO Except: Sips With Meds  Diet Effective Now,   Status:  Canceled      10/16/20 0548    10/16/20 0549  Inpatient Nephrology Consult  Once     Specialty:  Nephrology  Provider:  Taniya Corado MD    10/16/20 0548    10/16/20 0548  sodium chloride 0.9 % flush 10 mL  As Needed      10/16/20 0548    10/16/20 0548   nitroglycerin (NITROSTAT) SL tablet 0.4 mg  Every 5 Minutes PRN      10/16/20 0548    10/16/20 0548  Iron Profile  Once      10/16/20 0547    10/16/20 0548  Folate  Once      10/16/20 0547    10/16/20 0548  Vitamin B12  Once      10/16/20 0547    10/16/20 0548  COVID PRE-OP / PRE-PROCEDURE SCREENING ORDER (NO ISOLATION) - Swab, Nasopharynx  Once      10/16/20 0548    10/16/20 0548  COVID-19,CEPHEID,COR/CALLUM/PAD IN-HOUSE(OR EMERGENT/ADD-ON),NP SWAB IN TRANSPORT MEDIA 3-4 HR TAT - Swab, Nasopharynx  PROCEDURE ONCE      10/16/20 0548    10/16/20 0442  Blood Culture - Blood, Arm, Left  STAT      10/16/20 0441    10/16/20 0442  Blood Culture - Blood, Arm, Left  STAT     Comments: From a different site than #1.      10/16/20 0441    10/16/20 0436  Inpatient Admission  Once      10/16/20 0441    10/16/20 0436  Code Status and Medical Interventions:  Continuous      10/16/20 0441    10/16/20 0306  Potassium  STAT      10/16/20 0305    10/16/20 0228  insulin regular (humuLIN R,novoLIN R) injection 10 Units  Once      10/16/20 0226    10/16/20 0228  dextrose (D50W) 25 g/ 50mL Intravenous Solution 25 g  Once      10/16/20 0226    10/16/20 0228  sodium polystyrene (KAYEXALATE) powder 30 g  Once      10/16/20 0226    10/16/20 0225  Ethanol  Once      10/16/20 0226    10/16/20 0225  Urine Drug Screen - Urine, Clean Catch  Once      10/16/20 0226    10/16/20 0207  sodium chloride 0.9 % bolus 1,000 mL  Once      10/16/20 0205    10/16/20 0113  Troponin  Once      10/16/20 0022    10/16/20 0022  CBC & Differential  Once      10/16/20 0022    10/16/20 0022  Protime-INR  Once      10/16/20 0022    10/16/20 0022  Comprehensive Metabolic Panel  Once      10/16/20 0022    10/16/20 0022  Urinalysis With Culture If Indicated - Urine, Clean Catch  Once      10/16/20 0022    10/16/20 0022  BNP  Once      10/16/20 0022    10/16/20 0022  Sedimentation Rate  Once      10/16/20 0022    10/16/20 0022  C-reactive Protein  Once      10/16/20 0022     10/16/20 0022  Phosphorus  Once      10/16/20 0022    10/16/20 0022  Magnesium  Once      10/16/20 0022    10/16/20 0022  ECG 12 Lead  Once      10/16/20 0022    10/16/20 0022  Insert peripheral IV  Once      10/16/20 0022    10/16/20 0022  XR Chest 1 View  1 Time Imaging      10/16/20 0022    10/16/20 0022  CBC Auto Differential  PROCEDURE ONCE      10/16/20 0022    10/16/20 0021  sodium chloride 0.9 % flush 10 mL  As Needed      10/16/20 0022    Unscheduled  Telemetry - Pulse Oximetry  Continuous PRN     Comments: If Patient Develops Unresponsiveness, Acute Dyspnea, Cyanosis or Suspected Hypoxemia Start Continuous Pulse Ox Monitoring, Apply Oxygen & Notify Provider    10/16/20 0548    Unscheduled  Oxygen Therapy- Nasal Cannula; Titrate for SPO2: 90% - 95%  Continuous PRN     Comments: If Patient Develops Unresponsiveness, Acute Dyspnea, Cyanosis or Suspected Hypoxemia Start Continuous Pulse Ox Monitoring, Apply Oxygen & Notify Provider    10/16/20 0548    Unscheduled  ECG 12 Lead  As Needed     Comments: Nurse to Release if Patient Expericences Acute Chest Pain or Dysrhythmias    10/16/20 0548    Unscheduled  Potassium  As Needed     Comments: For Ventricular Arrhythmias      10/16/20 0548    Unscheduled  Magnesium  As Needed     Comments: For Ventricular Arrhythmias      10/16/20 0548    Unscheduled  Troponin  As Needed     Comments: For Chest Pain      10/16/20 0548    Unscheduled  Digoxin Level  As Needed     Comments: For Atrial Arrhythmias      10/16/20 0548    Unscheduled  Blood Gas, Arterial  As Needed     Comments: Per O2 PolicyNotify Physician      10/16/20 0548    Unscheduled  Up With Assistance  As Needed      10/16/20 0548    --  isosorbide dinitrate (ISORDIL) 10 MG tablet  3 Times Daily      10/16/20 0949    --  amLODIPine (NORVASC) 5 MG tablet  Daily      10/16/20 0949                Physician Progress Notes (last 24 hours) (Notes from 10/15/20 1309 through 10/16/20 1309)    No notes of this type  exist for this encounter.            Consult Notes (last 24 hours) (Notes from 10/15/20 1309 through 10/16/20 1309)      Taniya Corado MD at 10/16/20 0806      Consult Orders    1. Inpatient Nephrology Consult [[id number]] ordered by Charlie Conti MD at 10/16/20 0444               Nephrology Consult Note    Referring Provider: Charlie Conti  Reason for Consultation: ESKD    Subjective       History of present illness:  Mario Nair is a 45 y.o. male who presented to [facility] emergency department with chief complaint of not feeling well. Pt is known to have ESKD and was started on dialysis about 2 years ago, pt is very non compliant and currently does not have any established dialysis chair. He has been going to different hospitals and gets few sessions of dialysis and then leave A. About 10 days ago, he [place] [facility] [place] for 2 wks, had his right IJ TDC placed.   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 He  Has mild shortness of breath but no chest pain or palpitations.     History  Past Medical History:   Diagnosis Date   • Endocarditis    • ESRD (end stage renal disease) (CMS/HCC)    • IV drug abuse (CMS/[facility])    • Unable to read or write    ,   Past Surgical History:   Procedure Laterality Date   • CENTRAL VENOUS CATHETER TUNNELED INSERTION DOUBLE LUMEN Right     Chest for hemodialysis   ,   Family History   Problem Relation Age of Onset   • Alcohol abuse Mother    • Hypertension Mother    • Hypertension Other    ,   Social History     Tobacco Use   • Smoking status: Current Every Day Smoker     Packs/day: 1.00     Years: 20.00     Pack years: 20.00     Types: Cigarettes   • Smokeless tobacco: Never Used   Substance Use Topics   • Alcohol use: No     Frequency: Never   • Drug use: Yes     Types: Methamphetamines     Comment: last used on Friday 5/18/2019   ,   Medications Prior to Admission   Medication Sig Dispense Refill Last  Dose   • calcium acetate (PHOS BINDER,) 667 MG capsule capsule Take 1,334 mg by mouth 3 (Three) Times a Day.      • carvedilol (COREG) 12.5 MG tablet Take 12.5 mg by mouth 2 (Two) Times a Day With Meals.      • hydrALAZINE (APRESOLINE) 10 MG tablet Take 10 mg by mouth 3 (Three) Times a Day.      • isosorbide mononitrate (IMDUR) 30 MG 24 hr tablet Take 30 mg by mouth Daily.      • pantoprazole (PROTONIX) 40 MG EC tablet Take 40 mg by mouth Daily.      , Scheduled Meds:  heparin (porcine), 5,000 Units, Subcutaneous, Q12H  nicotine, 1 patch, Transdermal, Q24H  sodium chloride, 10 mL, Intravenous, Q12H    , Continuous Infusions:   , PRN Meds:  nitroglycerin  •  [COMPLETED] Insert peripheral IV **AND** sodium chloride  •  sodium chloride and Allergies:  Penicillins    Review of Systems  More than 10 point review of systems was done. Pertinent items are noted in HPI, all other systems reviewed and negative    Objective     Vital Signs  Temp:  [97.6 °F (36.4 °C)-98.1 °F (36.7 °C)] 97.6 °F (36.4 °C)  Heart Rate:  [70-99] 83  Resp:  [18] 18  BP: ()/(47-81) 140/70    No intake/output data recorded.  No intake/output data recorded.    Physical Examination:  General Appearance: Not  In acute distress  Head: Normocephalic, without obvious abnormality and atraumatic  Eyes: Conjunctivae and sclerae normal, no icterus, no pallor and PERRLA  Throat: Oral mucosa moist  Neck: No adenopathy, suppple, no carotid bruit and  No JVD  Lungs: Clear to auscultation, respirations regular and unlabored  Heart: Regular rhythm & normal rate, normal S1, S2, no murmur, no gallop, no rub   Abdomen: Normal bowel sounds, no masses and soft non-tender  Extremities: Moves extremities well, no redness and trace.  edema  Pulses: Palpable and equal bilaterally  Skin: No bleeding, bruising or rash  Neurologic: Orientated to person, place, time, grossly no focal deficitis  ACCESS: Right IJ TDC with no signs of infection    Laboratory Data :      WBC  WBC   Date Value Ref Range Status   10/16/2020 7.84 3.40 - 10.80 10*3/mm3 Final   10/16/2020 8.84 3.40 - 10.80 10*3/mm3 Final   10/13/2020 5.99 3.40 - 10.80 10*3/mm3 Final      HGB Hemoglobin   Date Value Ref Range Status   10/16/2020 8.8 (L) 13.0 - 17.7 g/dL Final   10/16/2020 9.0 (L) 13.0 - 17.7 g/dL Final   10/13/2020 7.2 (L) 13.0 - 17.7 g/dL Final      HCT Hematocrit   Date Value Ref Range Status   10/16/2020 29.2 (L) 37.5 - 51.0 % Final   10/16/2020 29.2 (L) 37.5 - 51.0 % Final   10/13/2020 23.0 (L) 37.5 - 51.0 % Final      Platlets No results found for: LABPLAT   MCV MCV   Date Value Ref Range Status   10/16/2020 97.0 79.0 - 97.0 fL Final   10/16/2020 93.3 79.0 - 97.0 fL Final   10/13/2020 92.7 79.0 - 97.0 fL Final          Sodium Sodium   Date Value Ref Range Status   10/16/2020 132 (L) 136 - 145 mmol/L Final   10/16/2020 131 (L) 136 - 145 mmol/L Final   10/13/2020 131 (L) 136 - 145 mmol/L Final   10/13/2020 126 (L) 136 - 145 mmol/L Final      Potassium Potassium   Date Value Ref Range Status   10/16/2020 4.9 3.5 - 5.2 mmol/L Final   10/16/2020 4.6 3.5 - 5.2 mmol/L Final   10/16/2020 6.3 (C) 3.5 - 5.2 mmol/L Final     Comment:     1+ Hemolysis Specimen hemolyzed.  Results may be affected.   10/13/2020 5.8 (H) 3.5 - 5.2 mmol/L Final   10/13/2020 6.2 (C) 3.5 - 5.2 mmol/L Final      Chloride Chloride   Date Value Ref Range Status   10/16/2020 93 (L) 98 - 107 mmol/L Final   10/16/2020 91 (L) 98 - 107 mmol/L Final   10/13/2020 92 (L) 98 - 107 mmol/L Final   10/13/2020 86 (L) 98 - 107 mmol/L Final      CO2 CO2   Date Value Ref Range Status   10/16/2020 15.0 (L) 22.0 - 29.0 mmol/L Final   10/16/2020 16.2 (L) 22.0 - 29.0 mmol/L Final   10/13/2020 16.1 (L) 22.0 - 29.0 mmol/L Final   10/13/2020 16.3 (L) 22.0 - 29.0 mmol/L Final      BUN BUN   Date Value Ref Range Status   10/16/2020 149 (H) 6 - 20 mg/dL Final   10/16/2020 149 (H) 6 - 20 mg/dL Final   10/13/2020 144 (H) 6 - 20 mg/dL Final   10/13/2020 148 (H) 6 - 20  mg/dL Final      Creatinine Creatinine   Date Value Ref Range Status   10/16/2020 9.73 (H) 0.76 - 1.27 mg/dL Final   10/16/2020 10.59 (H) 0.76 - 1.27 mg/dL Final   10/13/2020 9.42 (H) 0.76 - 1.27 mg/dL Final   10/13/2020 9.67 (H) 0.76 - 1.27 mg/dL Final      Calcium Calcium   Date Value Ref Range Status   10/16/2020 7.4 (L) 8.6 - 10.5 mg/dL Final   10/16/2020 8.0 (L) 8.6 - 10.5 mg/dL Final   10/13/2020 8.1 (L) 8.6 - 10.5 mg/dL Final   10/13/2020 8.2 (L) 8.6 - 10.5 mg/dL Final      PO4 No results found for: CAPO4   Albumin Albumin   Date Value Ref Range Status   10/16/2020 2.84 (L) 3.50 - 5.20 g/dL Final   10/16/2020 3.17 (L) 3.50 - 5.20 g/dL Final   10/13/2020 3.29 (L) 3.50 - 5.20 g/dL Final      Magnesium Magnesium   Date Value Ref Range Status   10/16/2020 2.9 (H) 1.6 - 2.6 mg/dL Final      Uric Acid No results found for: URICACID     Radiology results :     Imaging Results (Last 72 Hours)     Procedure Component Value Units Date/Time    XR Chest 1 View [[id number]] Collected: 10/16/20 0253     Updated: 10/16/20 0255    Narrative:      CHEST X-RAY, 10/16/2020      HISTORY:    45-year-old male in the ED complaining of chest tightness, pain. Shortness of breath.      TECHNIQUE:  AP portable upright chest x-ray.    COMPARISON:  *  Chest x-ray, 10/12/2020.    FINDINGS:  Moderate cardiomegaly with pulmonary venous redistribution and mild diffuse interstitial edema, new or increased since the recent prior study. Correlate for vascular congestion or volume overload. Small pleural effusions and basilar atelectasis.    Dialysis catheter remains in good position.      Impression:      1.  Mild interstitial edema and small pleural effusions suggesting volume overload or vascular congestion, new since 10/12/2020.  2.  Stable cardiac silhouette enlargement.  3.  Well-positioned dialysis catheter.    Signer Name: Rashawn Rojas MD   Signed: 10/16/2020 2:53 AM   Workstation Name: [email or web address]    [facility]            Medications:      heparin (porcine), 5,000 Units, Subcutaneous, Q12H  nicotine, 1 patch, Transdermal, Q24H  sodium chloride, 10 mL, Intravenous, Q12H           Assessment/Plan       End stage renal disease (CMS/HCC)    1. ESKD, non compliant to dialysis treatments  2. Hyponatremia likely hypervolemic  3. Anemia, likely 2/2 CKD  4. HO untreated infectious hepatitis  5. Substance use disorder    Pt is very non compliant, does not have outpatient dialysis chair due to his non compliance.   Will do dialysis today, SW to explore options for his out patient dialysis chair.   Anemia, Hb below goal, will start on EPO and will check iron.     Thanks Dr Conti for the consult. Nephrology will follow the patient.   I discussed the patient's findings and my recommendations with patient and nursing staff    Taniya Corado MD  10/16/20  08:06 EDT      Electronically signed by Taniya Corado MD at 10/16/20 105       ADL Documentation (last day)     Date/Time Transferring Toileting Bathing Dressing Eating Communication Swallowing Change in Functional Status Since Onset of Current Illness/Injury Equipment Currently Used at Home    10/16/20 1255  --  --  --  --  --  --  --  --  none    10/16/20 0756  2 - assistive person  2 - assistive person  2 - assistive person  0 - independent  0 - independent  0 - understands/communicates without difficulty  0 - swallows foods/liquids without difficulty  --  --    10/16/20 0623  --  --  --  --  --  --  --  no  none    10/16/20 0530  2 - assistive person  2 - assistive person  2 - assistive person  0 - independent  0 - independent  0 - understands/communicates without difficulty  0 - swallows foods/liquids without difficulty  --  --

## 2020-10-16 NOTE — PROGRESS NOTES
Discharge Planning Assessment  [facility]     Patient Name: Mario Nair  MRN: [id number]  Today's Date: 10/16/2020    Admit Date: 10/16/2020    Discharge Needs Assessment     Row Name 10/16/20 1251       Living Environment    Lives With  child(tammy), adult;child(tammy), dependent    Name(s) of Who Lives With Patient  Pt lives at home with Son and Daughter.    Current Living Arrangements  home/apartment/condo    Primary Care Provided by  self    Provides Primary Care For  no one    Family Caregiver if Needed  child(tammy), adult    Quality of Family Relationships  helpful;involved;supportive    Able to Return to Prior Arrangements  yes       Transition Planning    Patient/Family Anticipates Transition to  home with family    Transportation Anticipated  family or friend will provide;public transportation       Discharge Needs Assessment    Equipment Currently Used at Home  none        Discharge Plan     Row Name 10/16/20 9392       Plan    Plan  Pt admitted on 10/16/20.  SS received consult per Case Management for discharge planning, dialysis pt-missed tx's, reports he has no HD chair.  SS spoke with pt on this date.  Pt lives at home with family and plans to return home at discharge.  Pt currenty does not utilize home health or DME.  Pt stated PCP is Dr. Cerda.  SS spoke with Kristel at [facility] who stated that agency has denied pt for dialysis chair.  SS attempted to contact [facility] and [facility] managers without success.  SS faxed pt referral to [facility] and [facility] for review.  SS will follow.        Continued Care and Services - Admitted Since 10/16/2020    Coordination has not been started for this encounter.         Demographic Summary     Row Name 10/16/20 1244       General Information    Admission Type  inpatient    Referral Source  nursing    Reason for Consult  discharge planning    Preferred Language  English            ZANDRA Ngo

## 2020-10-16 NOTE — CONSULTS
Nephrology Consult Note    Referring Provider: Charlie Conti  Reason for Consultation: ESKD    Subjective       History of present illness:  Mario Nair is a 45 y.o. male who presented to [facility] emergency department with chief complaint of not feeling well. Pt is known to have ESKD and was started on dialysis about 2 years ago, pt is very non compliant and currently does not have any established dialysis chair. He has been going to different hospitals and gets few sessions of dialysis and then leave A. About 10 days ago, he left [facility] where he was admitted for 2 wks, had his right IJ TDC placed.   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 He  Has mild shortness of breath but no chest pain or palpitations.     History  Past Medical History:   Diagnosis Date   • Endocarditis    • ESRD (end stage renal disease) (CMS/[facility])    • IV drug abuse (CMS/[facility])    • Unable to read or write    ,   Past Surgical History:   Procedure Laterality Date   • CENTRAL VENOUS CATHETER TUNNELED INSERTION DOUBLE LUMEN Right     Chest for hemodialysis   ,   Family History   Problem Relation Age of Onset   • Alcohol abuse Mother    • Hypertension Mother    • Hypertension Other    ,   Social History     Tobacco Use   • Smoking status: Current Every Day Smoker     Packs/day: 1.00     Years: 20.00     Pack years: 20.00     Types: Cigarettes   • Smokeless tobacco: Never Used   Substance Use Topics   • Alcohol use: No     Frequency: Never   • Drug use: Yes     Types: Methamphetamines     Comment: last used on Friday 5/18/2019   ,   Medications Prior to Admission   Medication Sig Dispense Refill Last Dose   • calcium acetate (PHOS BINDER,) 667 MG capsule capsule Take 1,334 mg by mouth 3 (Three) Times a Day.      • carvedilol (COREG) 12.5 MG tablet Take 12.5 mg by mouth 2 (Two) Times a Day With Meals.      • hydrALAZINE (APRESOLINE) 10 MG tablet Take 10 mg by mouth 3 (Three) Times a  Day.      • isosorbide mononitrate (IMDUR) 30 MG 24 hr tablet Take 30 mg by mouth Daily.      • pantoprazole (PROTONIX) 40 MG EC tablet Take 40 mg by mouth Daily.      , Scheduled Meds:  heparin (porcine), 5,000 Units, Subcutaneous, Q12H  nicotine, 1 patch, Transdermal, Q24H  sodium chloride, 10 mL, Intravenous, Q12H    , Continuous Infusions:   , PRN Meds:  nitroglycerin  •  [COMPLETED] Insert peripheral IV **AND** sodium chloride  •  sodium chloride and Allergies:  Penicillins    Review of Systems  More than 10 point review of systems was done. Pertinent items are noted in HPI, all other systems reviewed and negative    Objective     Vital Signs  Temp:  [97.6 °F (36.4 °C)-98.1 °F (36.7 °C)] 97.6 °F (36.4 °C)  Heart Rate:  [70-99] 83  Resp:  [18] 18  BP: ()/(47-81) 140/70    No intake/output data recorded.  No intake/output data recorded.    Physical Examination:  General Appearance: Not  In acute distress  Head: Normocephalic, without obvious abnormality and atraumatic  Eyes: Conjunctivae and sclerae normal, no icterus, no pallor and PERRLA  Throat: Oral mucosa moist  Neck: No adenopathy, suppple, no carotid bruit and  No JVD  Lungs: Clear to auscultation, respirations regular and unlabored  Heart: Regular rhythm & normal rate, normal S1, S2, no murmur, no gallop, no rub   Abdomen: Normal bowel sounds, no masses and soft non-tender  Extremities: Moves extremities well, no redness and trace.  edema  Pulses: Palpable and equal bilaterally  Skin: No bleeding, bruising or rash  Neurologic: Orientated to person, place, time, grossly no focal deficitis  ACCESS: Right IJ TDC with no signs of infection    Laboratory Data :      WBC WBC   Date Value Ref Range Status   10/16/2020 7.84 3.40 - 10.80 10*3/mm3 Final   10/16/2020 8.84 3.40 - 10.80 10*3/mm3 Final   10/13/2020 5.99 3.40 - 10.80 10*3/mm3 Final      HGB Hemoglobin   Date Value Ref Range Status   10/16/2020 8.8 (L) 13.0 - 17.7 g/dL Final   10/16/2020 9.0 (L)  13.0 - 17.7 g/dL Final   10/13/2020 7.2 (L) 13.0 - 17.7 g/dL Final      HCT Hematocrit   Date Value Ref Range Status   10/16/2020 29.2 (L) 37.5 - 51.0 % Final   10/16/2020 29.2 (L) 37.5 - 51.0 % Final   10/13/2020 23.0 (L) 37.5 - 51.0 % Final      Platlets No results found for: LABPLAT   MCV MCV   Date Value Ref Range Status   10/16/2020 97.0 79.0 - 97.0 fL Final   10/16/2020 93.3 79.0 - 97.0 fL Final   10/13/2020 92.7 79.0 - 97.0 fL Final          Sodium Sodium   Date Value Ref Range Status   10/16/2020 132 (L) 136 - 145 mmol/L Final   10/16/2020 131 (L) 136 - 145 mmol/L Final   10/13/2020 131 (L) 136 - 145 mmol/L Final   10/13/2020 126 (L) 136 - 145 mmol/L Final      Potassium Potassium   Date Value Ref Range Status   10/16/2020 4.9 3.5 - 5.2 mmol/L Final   10/16/2020 4.6 3.5 - 5.2 mmol/L Final   10/16/2020 6.3 (C) 3.5 - 5.2 mmol/L Final     Comment:     1+ Hemolysis Specimen hemolyzed.  Results may be affected.   10/13/2020 5.8 (H) 3.5 - 5.2 mmol/L Final   10/13/2020 6.2 (C) 3.5 - 5.2 mmol/L Final      Chloride Chloride   Date Value Ref Range Status   10/16/2020 93 (L) 98 - 107 mmol/L Final   10/16/2020 91 (L) 98 - 107 mmol/L Final   10/13/2020 92 (L) 98 - 107 mmol/L Final   10/13/2020 86 (L) 98 - 107 mmol/L Final      CO2 CO2   Date Value Ref Range Status   10/16/2020 15.0 (L) 22.0 - 29.0 mmol/L Final   10/16/2020 16.2 (L) 22.0 - 29.0 mmol/L Final   10/13/2020 16.1 (L) 22.0 - 29.0 mmol/L Final   10/13/2020 16.3 (L) 22.0 - 29.0 mmol/L Final      BUN BUN   Date Value Ref Range Status   10/16/2020 149 (H) 6 - 20 mg/dL Final   10/16/2020 149 (H) 6 - 20 mg/dL Final   10/13/2020 144 (H) 6 - 20 mg/dL Final   10/13/2020 148 (H) 6 - 20 mg/dL Final      Creatinine Creatinine   Date Value Ref Range Status   10/16/2020 9.73 (H) 0.76 - 1.27 mg/dL Final   10/16/2020 10.59 (H) 0.76 - 1.27 mg/dL Final   10/13/2020 9.42 (H) 0.76 - 1.27 mg/dL Final   10/13/2020 9.67 (H) 0.76 - 1.27 mg/dL Final      Calcium Calcium   Date Value  Ref Range Status   10/16/2020 7.4 (L) 8.6 - 10.5 mg/dL Final   10/16/2020 8.0 (L) 8.6 - 10.5 mg/dL Final   10/13/2020 8.1 (L) 8.6 - 10.5 mg/dL Final   10/13/2020 8.2 (L) 8.6 - 10.5 mg/dL Final      PO4 No results found for: CAPO4   Albumin Albumin   Date Value Ref Range Status   10/16/2020 2.84 (L) 3.50 - 5.20 g/dL Final   10/16/2020 3.17 (L) 3.50 - 5.20 g/dL Final   10/13/2020 3.29 (L) 3.50 - 5.20 g/dL Final      Magnesium Magnesium   Date Value Ref Range Status   10/16/2020 2.9 (H) 1.6 - 2.6 mg/dL Final      Uric Acid No results found for: URICACID     Radiology results :     Imaging Results (Last 72 Hours)     Procedure Component Value Units Date/Time    XR Chest 1 View [[id number]] Collected: 10/16/20 0253     Updated: 10/16/20 0255    Narrative:      CHEST X-RAY, 10/16/2020      HISTORY:    45-year-old male in the ED complaining of chest tightness, pain. Shortness of breath.      TECHNIQUE:  AP portable upright chest x-ray.    COMPARISON:  *  Chest x-ray, 10/12/2020.    FINDINGS:  Moderate cardiomegaly with pulmonary venous redistribution and mild diffuse interstitial edema, new or increased since the recent prior study. Correlate for vascular congestion or volume overload. Small pleural effusions and basilar atelectasis.    Dialysis catheter remains in good position.      Impression:      1.  Mild interstitial edema and small pleural effusions suggesting volume overload or vascular congestion, new since 10/12/2020.  2.  Stable cardiac silhouette enlargement.  3.  Well-positioned dialysis catheter.    Signer Name: Rashawn Rojas MD   Signed: 10/16/2020 2:53 AM   Workstation Name: [email or web address]    [facility]            Medications:      heparin (porcine), 5,000 Units, Subcutaneous, Q12H  nicotine, 1 patch, Transdermal, Q24H  sodium chloride, 10 mL, Intravenous, Q12H           Assessment/Plan       End stage renal disease (CMS/HCC)    1. ESKD, non compliant to dialysis treatments  2.  Hyponatremia likely hypervolemic  3. Anemia, likely 2/2 CKD  4. HO untreated infectious hepatitis  5. Substance use disorder    Pt is very non compliant, does not have outpatient dialysis chair due to his non compliance.   Will do dialysis today, SW to explore options for his out patient dialysis chair.   Anemia, Hb below goal, will start on EPO and will check iron.     Thanks Dr Conti for the consult. Nephrology will follow the patient.   I discussed the patient's findings and my recommendations with patient and nursing staff    Taniya Corado MD  10/16/20  08:06 EDT

## 2020-10-16 NOTE — NURSING NOTE
After PT has returned back from Dialysis. PT requests to be AMA. VSS. Afebrile. PT has no complaints/distress noted. Provider Aware. Educated PT on risks of signing out AMA and PT understands the risks. PT signed AMA papers. IV taken out. Telemetry Box returned. PT is awaiting transportation home at this time.

## 2020-10-16 NOTE — PROGRESS NOTES
"Assisted By: Dialysis nurse    CC: Patient was seen while on dialysis with dialysis nurse, his complaint on presentation he tells me he was \"feeling bad\".    Interview History/HPI: Follow-up on Dr. Sushila SAENZ, patient has end-stage renal disease, he is uncertain why he has this however apparently he cannot find a dialysis chair, he was living with a relative but knew that he needed to have dialysis because he was \"feeling bad\".  He came here and was hyperkalemic and thought to be volume overloaded and was admitted.  He has had an hour and a half of dialysis and is continuing and tolerating this, his catheter is working well, he denies any chest pain, states his breathing is doing okay, he does not think he has significant edema, has had no fever at home.  He states he gets around at home with a cane.  He has been tolerating his diet.  He does have known hepatitis that he is seeing Dr. Cerda hoping to get this treated      Vitals:    10/16/20 1510   BP: 134/77   Pulse: 81   Resp: 18   Temp: 97.8 °F (36.6 °C)   SpO2:          Intake/Output Summary (Last 24 hours) at 10/16/2020 1706  Last data filed at 10/16/2020 1435  Gross per 24 hour   Intake 480 ml   Output 200 ml   Net 280 ml       EXAM: Overall the dialysis catheter site looks okay, seems to be functioning well, lungs are clear, heart regular rate and rhythm, trace edema of the right ankle none of the left strength is symmetric abdomen is soft benign mood is good      EKG: EKG was read as \"accelerated junctional\" but this appears to be sinus, there is much artifact with a wildly wandering baseline.    Tele: The telemetry strips appear to be sinus rhythm    LABS:   Lab Results (last 48 hours)     Procedure Component Value Units Date/Time    Folate [[id number]]  (Normal) Collected: 10/16/20 0629    Specimen: Blood Updated: 10/16/20 1224     Folate 11.00 ng/mL     Narrative:      Results may be falsely increased if patient taking Biotin.      Vitamin B12 [[id number]]  " (Normal) Collected: 10/16/20 0629    Specimen: Blood Updated: 10/16/20 1224     Vitamin B-12 660 pg/mL     Narrative:      Results may be falsely increased if patient taking Biotin.      Urine Drug Screen - Urine, Clean Catch [[id number]]  (Abnormal) Collected: 10/16/20 0912    Specimen: Urine, Clean Catch Updated: 10/16/20 0942     Amphetamine Screen, Urine Negative     Barbiturates Screen, Urine Negative     Benzodiazepine Screen, Urine Negative     Cocaine Screen, Urine Negative     Methadone Screen, Urine Negative     Opiate Screen Positive     Phencyclidine (PCP), Urine Negative     THC, Screen, Urine Negative     6-ACETYL MORPHINE Negative     Buprenorphine, Screen, Urine Negative     Oxycodone Screen, Urine Negative    Narrative:      Negative Thresholds For Drugs Screened:                  Amphetamines              1000 ng/ml               Barbiturates               200 ng/ml               Benzodiazepines            200 ng/ml              Cocaine                    300 ng/ml              Methadone                  300 ng/ml              Opiates                    300 ng/ml               Phencyclidine               25 ng/ml               THC                         50 ng/ml              6-Acetyl Morphine           10 ng/ml              Buprenorphine                5 ng/ml              Oxycodone                  300 ng/ml    The reference range for all drugs tested is negative. This report includes final unconfirmed qualitative results to be used for medical treatment purposes only. Unconfirmed results must not be used for non-medical purposes such as employment or legal testing. Clinical consideration should be applied to any drug of abuse test, especially when unconfirmed quantitative results are used.        Urinalysis, Microscopic Only - Urine, Clean Catch [[id number]]  (Abnormal) Collected: 10/16/20 0912    Specimen: Urine, Clean Catch Updated: 10/16/20 0929     RBC, UA 3-5 /HPF      WBC, UA Too Numerous to  Count /HPF      Bacteria, UA 1+ /HPF      Squamous Epithelial Cells, UA None Seen /HPF      Hyaline Casts, UA None Seen /LPF      Methodology Automated Microscopy    Urine Culture - Urine, Urine, Clean Catch [[id number]] Collected: 10/16/20 0912    Specimen: Urine, Clean Catch Updated: 10/16/20 0929    Urinalysis With Culture If Indicated - Urine, Clean Catch [[id number]]  (Abnormal) Collected: 10/16/20 0912    Specimen: Urine, Clean Catch Updated: 10/16/20 0926     Color, UA Yellow     Appearance, UA Turbid     pH, UA 5.5     Specific Gravity, UA 1.015     Glucose, UA Negative     Ketones, UA Negative     Bilirubin, UA Negative     Blood, UA Moderate (2+)     Protein,  mg/dL (2+)     Leuk Esterase, UA Large (3+)     Nitrite, UA Negative     Urobilinogen, UA 0.2 E.U./dL    COVID PRE-OP / PRE-PROCEDURE SCREENING ORDER (NO ISOLATION) - Swab, Nasopharynx [[id number]]  (Normal) Collected: 10/16/20 0757    Specimen: Swab from Nasopharynx Updated: 10/16/20 0903    Narrative:      The following orders were created for panel order COVID PRE-OP / PRE-PROCEDURE SCREENING ORDER (NO ISOLATION) - Swab, Nasopharynx.  Procedure                               Abnormality         Status                     ---------                               -----------         ------                     COVID-19,CEPHEID,COR/CALLUM...[[id number]]  Normal              Final result                 Please view results for these tests on the individual orders.    COVID-19,CEPHEID,COR/CALLUM/PAD IN-HOUSE(OR EMERGENT/ADD-ON),NP SWAB IN TRANSPORT MEDIA 3-4 HR TAT - Swab, Nasopharynx [[id number]]  (Normal) Collected: 10/16/20 0757    Specimen: Swab from Nasopharynx Updated: 10/16/20 0903     COVID19 Not Detected    Narrative:      Fact sheet for providers: https://www.fda.gov/media/965032/download     Fact sheet for patients: https://www.fda.gov/media/968414/download    CBC Auto Differential [[id number]]  (Abnormal) Collected: 10/16/20 0629    Specimen: Blood  Updated: 10/16/20 0719     WBC 7.84 10*3/mm3      RBC 3.01 10*6/mm3      Hemoglobin 8.8 g/dL      Hematocrit 29.2 %      MCV 97.0 fL      MCH 29.2 pg      MCHC 30.1 g/dL      RDW 15.4 %      RDW-SD 54.2 fl      MPV 9.5 fL      Platelets 173 10*3/mm3      Neutrophil % 73.0 %      Lymphocyte % 16.3 %      Monocyte % 7.3 %      Eosinophil % 2.4 %      Basophil % 0.5 %      Immature Grans % 0.5 %      Neutrophils, Absolute 5.72 10*3/mm3      Lymphocytes, Absolute 1.28 10*3/mm3      Monocytes, Absolute 0.57 10*3/mm3      Eosinophils, Absolute 0.19 10*3/mm3      Basophils, Absolute 0.04 10*3/mm3      Immature Grans, Absolute 0.04 10*3/mm3      nRBC 0.0 /100 WBC     Comprehensive Metabolic Panel [[id number]]  (Abnormal) Collected: 10/16/20 0629    Specimen: Blood Updated: 10/16/20 0708     Glucose 87 mg/dL       mg/dL      Creatinine 9.73 mg/dL      Sodium 132 mmol/L      Potassium 4.9 mmol/L      Chloride 93 mmol/L      CO2 15.0 mmol/L      Calcium 7.4 mg/dL      Total Protein 7.1 g/dL      Albumin 2.84 g/dL      ALT (SGPT) 63 U/L      AST (SGOT) 17 U/L      Alkaline Phosphatase 399 U/L      Total Bilirubin 0.3 mg/dL      eGFR Non African Amer 6 mL/min/1.73      Comment: <15 Indicative of kidney failure.        eGFR   Amer --     Comment: <15 Indicative of kidney failure.        Globulin 4.3 gm/dL      A/G Ratio 0.7 g/dL      BUN/Creatinine Ratio 15.3     Anion Gap 24.0 mmol/L     Narrative:      GFR Normal >60  Chronic Kidney Disease <60  Kidney Failure <15      Iron Profile [[id number]]  (Abnormal) Collected: 10/16/20 0629    Specimen: Blood Updated: 10/16/20 0702     Iron 52 mcg/dL      Iron Saturation 24 %      Transferrin 147 mg/dL      TIBC 219 mcg/dL     C-reactive Protein [[id number]]  (Abnormal) Collected: 10/16/20 0629    Specimen: Blood Updated: 10/16/20 0702     C-Reactive Protein 9.31 mg/dL     Blood Culture - Blood, Arm, Left [[id number]] Collected: 10/16/20 0629    Specimen: Blood from Arm, Left  Updated: 10/16/20 0638    Blood Culture - Blood, Arm, Left [[id number]] Collected: 10/16/20 0629    Specimen: Blood from Arm, Left Updated: 10/16/20 0638    Potassium [[id number]]  (Normal) Collected: 10/16/20 0337    Specimen: Blood Updated: 10/16/20 0359     Potassium 4.6 mmol/L     Troponin [[id number]]  (Abnormal) Collected: 10/16/20 0255    Specimen: Blood from Arm, Left Updated: 10/16/20 0319     Troponin T 0.056 ng/mL     Narrative:      Troponin T Reference Range:  <= 0.03 ng/mL-   Negative for AMI  >0.03 ng/mL-     Abnormal for myocardial necrosis.  Clinicians would have to utilize clinical acumen, EKG, Troponin and serial changes to determine if it is an Acute Myocardial Infarction or myocardial injury due to an underlying chronic condition.       Results may be falsely decreased if patient taking Biotin.      Ethanol [[id number]] Collected: 10/16/20 0255    Specimen: Blood Updated: 10/16/20 0313     Ethanol <10 mg/dL      Ethanol % <0.010 %     Narrative:      >/= 80.0 legally intoxicated    Sedimentation Rate [[id number]]  (Abnormal) Collected: 10/16/20 0038    Specimen: Blood from Arm, Left Updated: 10/16/20 0126     Sed Rate 74 mm/hr     Comprehensive Metabolic Panel [[id number]]  (Abnormal) Collected: 10/16/20 0038    Specimen: Blood from Arm, Left Updated: 10/16/20 0124     Glucose 126 mg/dL       mg/dL      Creatinine 10.59 mg/dL      Sodium 131 mmol/L      Potassium 6.3 mmol/L      Comment: 1+ Hemolysis Specimen hemolyzed.  Results may be affected.        Chloride 91 mmol/L      CO2 16.2 mmol/L      Calcium 8.0 mg/dL      Total Protein 7.8 g/dL      Albumin 3.17 g/dL      ALT (SGPT) 74 U/L      Comment: Specimen hemolyzed.  Results may be affected.        AST (SGOT) 26 U/L      Alkaline Phosphatase 462 U/L      Total Bilirubin 0.3 mg/dL      eGFR Non African Amer 5 mL/min/1.73      Comment: <15 Indicative of kidney failure.        eGFR   Amer --     Comment: <15 Indicative of kidney  failure.        Globulin 4.6 gm/dL      A/G Ratio 0.7 g/dL      BUN/Creatinine Ratio 14.1     Anion Gap 23.8 mmol/L     Narrative:      GFR Normal >60  Chronic Kidney Disease <60  Kidney Failure <15      C-reactive Protein [[id number]]  (Abnormal) Collected: 10/16/20 0038    Specimen: Blood from Arm, Left Updated: 10/16/20 0109     C-Reactive Protein 10.00 mg/dL     Phosphorus [[id number]]  (Abnormal) Collected: 10/16/20 0038    Specimen: Blood from Arm, Left Updated: 10/16/20 0109     Phosphorus 12.6 mg/dL     Magnesium [[id number]]  (Abnormal) Collected: 10/16/20 0038    Specimen: Blood from Arm, Left Updated: 10/16/20 0109     Magnesium 2.9 mg/dL     BNP [[id number]]  (Abnormal) Collected: 10/16/20 0038    Specimen: Blood from Arm, Left Updated: 10/16/20 0107     proBNP 7,002.0 pg/mL     Narrative:      Among patients with dyspnea, NT-proBNP is highly sensitive for the detection of acute congestive heart failure. In addition NT-proBNP of <300 pg/ml effectively rules out acute congestive heart failure with 99% negative predictive value.    Results may be falsely decreased if patient taking Biotin.      Protime-INR [[id number]]  (Abnormal) Collected: 10/16/20 0038    Specimen: Blood from Arm, Left Updated: 10/16/20 0102     Protime 16.4 Seconds      Comment: Note new Reference Range        INR 1.34    Narrative:      Suggested INR therapeutic range for stable oral anticoagulant therapy:    Low Intensity therapy:   1.5-2.0  Moderate Intensity therapy:   2.0-3.0  High Intensity therapy:   2.5-4.0    CBC & Differential [[id number]]  (Abnormal) Collected: 10/16/20 0038    Specimen: Blood from Arm, Left Updated: 10/16/20 0048    Narrative:      The following orders were created for panel order CBC & Differential.  Procedure                               Abnormality         Status                     ---------                               -----------         ------                     CBC Auto Differential[[id number]]         Abnormal            Final result                 Please view results for these tests on the individual orders.    CBC Auto Differential [[id number]]  (Abnormal) Collected: 10/16/20 0038    Specimen: Blood from Arm, Left Updated: 10/16/20 0048     WBC 8.84 10*3/mm3      RBC 3.13 10*6/mm3      Hemoglobin 9.0 g/dL      Hematocrit 29.2 %      MCV 93.3 fL      MCH 28.8 pg      MCHC 30.8 g/dL      RDW 15.4 %      RDW-SD 51.9 fl      MPV 10.2 fL      Platelets 192 10*3/mm3      Neutrophil % 79.0 %      Lymphocyte % 12.1 %      Monocyte % 6.3 %      Eosinophil % 1.7 %      Basophil % 0.6 %      Immature Grans % 0.3 %      Neutrophils, Absolute 6.98 10*3/mm3      Lymphocytes, Absolute 1.07 10*3/mm3      Monocytes, Absolute 0.56 10*3/mm3      Eosinophils, Absolute 0.15 10*3/mm3      Basophils, Absolute 0.05 10*3/mm3      Immature Grans, Absolute 0.03 10*3/mm3      nRBC 0.0 /100 WBC           Radiology:    Imaging Results (Last 72 Hours)     Procedure Component Value Units Date/Time    XR Chest 1 View [[id number]] Collected: 10/16/20 0253     Updated: 10/16/20 0255    Narrative:      CHEST X-RAY, 10/16/2020      HISTORY:    45-year-old male in the ED complaining of chest tightness, pain. Shortness of breath.      TECHNIQUE:  AP portable upright chest x-ray.    COMPARISON:  *  Chest x-ray, 10/12/2020.    FINDINGS:  Moderate cardiomegaly with pulmonary venous redistribution and mild diffuse interstitial edema, new or increased since the recent prior study. Correlate for vascular congestion or volume overload. Small pleural effusions and basilar atelectasis.    Dialysis catheter remains in good position.      Impression:      1.  Mild interstitial edema and small pleural effusions suggesting volume overload or vascular congestion, new since 10/12/2020.  2.  Stable cardiac silhouette enlargement.  3.  Well-positioned dialysis catheter.    Signer Name: Rashawn Rojas MD   Signed: 10/16/2020 2:53 AM   Workstation Name:  [id number]    [facility]               Assessment/Plan:   End-stage renal disease in need of dialysis with hyperkalemia and volume overload, also mildly low sodium consistent with fluid overload.  Dialysis is going on currently.   is assisting to find the patient a dialysis chair.      Abnormal UA, culture thus far negative, follow    DVT prophylaxis, subcu heparin    Tobacco abuse, counseled to quit    Gray zone troponin, asymptomatic, most likely related to his chronic renal disease.  Will check echocardiogram.    Anemia, Epogen started by nephrology

## 2020-10-16 NOTE — ED NOTES
Pt [facility] d/c [facility] approx 2 weeks ago to undergo dialysis.  Pt has chronic kidney disease and states he is having difficulty finding a nephrologist that will accept him so he can get dialysis regularly.      Chely Diaz RN  10/16/20 0124

## 2020-10-16 NOTE — ED NOTES
20gIV LAC in place, patent and secured upon admission. Patient is alert and oriented X4. Sena Kimble RN  10/16/20 0513

## 2020-10-16 NOTE — DISCHARGE PLACEMENT REQUEST
"Mario Nair (45 y.o. Male)     Date of Birth Social Security Number Address Home Phone MRN    1975  [address] [place] [address] [phone] [id number]    Sabianism Marital Status          None        Admission Date Admission Type Admitting Provider Attending Provider Department, Room/Bed    10/16/20 Emergency Charlie Conti MD Heinss, Karl F, MD [address], 3304/1S    Discharge Date Discharge Disposition Discharge Destination                       Attending Provider: Beka Colon MD    Allergies: Penicillins    Isolation: None   Infection: Hepatitis B (06/05/19)   Code Status: CPR    Ht: 188 cm (74.02\")   Wt: 81.7 kg (180 lb 3.2 oz)    Admission Cmt: None   Principal Problem: None                Active Insurance as of 10/16/2020     Primary Coverage     Payor Plan Insurance Group Employer/Plan Group    [facility] MEDICAID      Payor Plan Address Payor Plan Phone Number Payor Plan Fax Number Effective Dates    [address] [address] [phone]  5/5/2019 - None Entered    [facility] [id number]       Subscriber Name Subscriber Birth Date Member ID       ANKUSH NAIRVIJAYA SOTO 1975 [id number]                 Emergency Contacts      (Rel.) Home Phone Work Phone Mobile Phone    SHANI WILKES (Relative) [phone] -- --    Constance Nair (Daughter) -- -- [phone]            Emergency Contact Information     Name Relation Home Work Mobile    SHANI WILKES Relative [phone]      Constance Nair Daughter   [phone]          Insurance Information                [facility] MEDICAID Phone: [phone]    Subscriber: Mario Nair Subscriber#: [id number]    Group#:  Precert#:           Treatment Team     Provider Relationship Specialty Contact    Beka Colon MD Attending --  [phone]    Marjorie Edward, RRT Respiratory Therapist --  Tara Cardenas Patient Care Technician --     Azul Guzman " L Unit Reed --     Isa Burger, Nursing Student Nursing Student --     Ella Sandhu Patient Care Technician --     Jennifer Barr RN Dialysis Nurse --     Thiago Guillaume, RN Registered Nurse --     Taniya Corado MD Consulting Physician Nephrology  [phone]          Problem List           Codes Noted - Resolved       Hospital    End stage renal disease (CMS/[facility]) ICD-10-CM: N18.6  ICD-9-CM: 585.6 10/16/2020 - Present       Non-Hospital    ESRD (end stage renal disease) (CMS/[facility]) ICD-10-CM: N18.6  ICD-9-CM: 585.6 7/18/2019 - Present    Chronic renal failure ICD-10-CM: N18.9  ICD-9-CM: 585.9 5/27/2019 - Present    Metabolic acidosis ICD-10-CM: E87.2  ICD-9-CM: 276.2 5/13/2019 - Present    Hyperkalemia ICD-10-CM: E87.5  ICD-9-CM: 276.7 5/7/2019 - Present             History & Physical      Billie Covington APRN at 10/16/20 0435     Attestation signed by Charlie Conti MD at 10/16/20 0603 (Updated)    I have seen and examined the patient independently from NICKY Hernandez, and have reviewed Billie's findings, assessment and plan in the H&P below. Patient reiterates that he has not undergone HD in 2 weeks. He does not have a dialysis chair, claiming no clinic will accept him because of his viral hepatitis, so he essentially waits until he begins to feel fluid overloaded and then goes to the nearest hospital to be dialyzed. He has rales in his lower lung zones bilaterally. He has 2+ pitting edema in both lower extremities. CXR correlates with exam, showing evidence of volume overload. Initially hyperkalemic but resolved with medication. Nephrology consulted to arrange hemodialysis while in the hospital. CRP is elevated and looking back at prior lab results, patient grew serratia marcescens in the blood in early September. It is unclear if this was ever treated, but later in the month patient was hospitalized at [facility] at which time he reports his HD catheter was replaced and he  received several antibiotics, presumably for bacteremia. Will repeat blood cultures now to see if bacteremia has cleared since HD catheter changed and antibiotics administered. Requesting records from [facility] hospital stay.                       [facility]  History & Physical    Patient Identification:  Name:  Mario Nair  Age:  45 y.o.  Sex:  male  :  1975  MRN:  [id number]   Admit Date: 10/16/2020   Visit Number:  [id number]  Primary Care Physician:  Provider, No Known    I have seen the patient in conjunction with NICKY Hernandez and I agree with the following statements:    Subjective     Chief complaint: Weakness    History of presenting illness:      Mario Nair is a 45 y.o. male with past medical history significant for ESRD with non-compliance with hemodialysis, history of IV drug abuse, hx of endocarditis and tobacco abuse.     Mr. Nair presented to [facility] emergency department on 2020 with complaints of weakness.  Mr. Nair has end-stage renal disease and is medically noncompliant with hemodialysis.  Mr. Nair reports that he does not have an assigned chair at an outpatient dialysis clinic and reports receiving dialysis only on an inpatient basis.  He explained that he has been waiting on his insurance, [employer], to approve outpatient dialysis.  Furthermore, Mr. Nair reports that he does not follow with a nephrologist, despite appointments being made post discharge from multiple facilities.  Mr. Nair reports that he was admitted to [facility] approximately 2 weeks ago during which time his temporary dialysis catheter was replaced and he was dialyzed.  He denies any further dialysis since discharge.  He reports that over the last couple days he has been experiencing shortness of breath with exertion and a nonproductive cough which he reports he recognizes as signs of fluid overload.  The patient denies any fever,  chills, recent infection, abdominal pain, nausea, vomiting, diarrhea, urinary symptoms, dizziness, lightheadedness, seizures or syncopal episodes.  He does report that he still produces urine.  The patient denies any alcohol or drug use but does report smoking approximately 1 pack of cigarettes per day.    Upon arrival to the ED, vital signs were temperature 98.1, pulse 70, respirations 18, blood pressure initially 88/47 with most recent /70 and oxygen saturation 96% on room air.  Arterial blood gas notes pH 7.328, PCO2 31.5, PO2 90.5, HCO3 16.5 and oxygen saturation 96.7 on room air.  Troponin T 0.056, proBNP 7002.0, blood glucose 126, sodium 131, potassium initially 6.3 with most recent 4.6, creatinine 10.59, , EGFR 5, ALT 74, AST 26, C-RP 10.0, magnesium 2.9, WBC 8.84, hemoglobin 9.0 and hematocrit 29.2.  Ethanol <10.  Chest x-ray notes mild interstitial edema and small pleural effusions suggesting volume overload or vascular congestion, new since 10/12/2020, stable cardiac silhouette enlargement and well-positioned dialysis catheter, per radiology.  ---------------------------------------------------------------------------------------------------------------------   Review of Systems   Constitutional: Positive for fatigue. Negative for activity change, appetite change, chills and fever.   HENT: Negative for congestion, rhinorrhea, sore throat and trouble swallowing.    Respiratory: Positive for cough (non-productive ) and shortness of breath.    Cardiovascular: Positive for leg swelling. Negative for chest pain.   Gastrointestinal: Negative for abdominal distention, abdominal pain, constipation, diarrhea, nausea and vomiting.   Genitourinary: Negative for difficulty urinating, dysuria, frequency, hematuria and urgency.   Musculoskeletal: Negative for back pain and gait problem.   Skin: Negative for color change, pallor, rash and wound.   Allergic/Immunologic: Negative for immunocompromised state.    Neurological: Negative for dizziness, seizures, syncope, light-headedness and headaches.   Psychiatric/Behavioral: Negative for agitation, confusion, hallucinations, self-injury and sleep disturbance.      ---------------------------------------------------------------------------------------------------------------------   Past Medical History:   Diagnosis Date   • Endocarditis    • ESRD (end stage renal disease) (CMS/[facility])    • IV drug abuse (CMS/[facility])    • Unable to read or write      Past Surgical History:   Procedure Laterality Date   • CENTRAL VENOUS CATHETER TUNNELED INSERTION DOUBLE LUMEN Right     Chest for hemodialysis     Family History   Problem Relation Age of Onset   • Alcohol abuse Mother    • Hypertension Mother    • Hypertension Other      Social History     Socioeconomic History   • Marital status:      Spouse name: Not on file   • Number of children: Not on file   • Years of education: Not on file   • Highest education level: Not on file   Tobacco Use   • Smoking status: Current Every Day Smoker     Packs/day: 1.00     Years: 20.00     Pack years: 20.00     Types: Cigarettes   • Smokeless tobacco: Never Used   Substance and Sexual Activity   • Alcohol use: No     Frequency: Never   • Drug use: Yes     Types: Methamphetamines     Comment: last used on Friday 5/18/2019   • Sexual activity: Defer     ---------------------------------------------------------------------------------------------------------------------   Allergies:  Penicillins  ---------------------------------------------------------------------------------------------------------------------   Home medications:    Medications below are reported home medications pulling from within the system; at this time, these medications have not been reconciled unless otherwise specified and are in the verification process for further verifcation as current home medications.  (Not in a hospital admission)      Hospital Scheduled Meds:         Current listed hospital scheduled medications may not yet reflect those currently placed in orders that are signed and held awaiting patient's arrival to floor.   ---------------------------------------------------------------------------------------------------------------------     Objective     Vital Signs:  Temp:  [98.1 °F (36.7 °C)] 98.1 °F (36.7 °C)  Heart Rate:  [70-92] 92  Resp:  [18] 18  BP: ()/(47-81) 119/70      10/15/20  2238   Weight: 69.9 kg (154 lb)     Body mass index is 19.77 kg/m².  ---------------------------------------------------------------------------------------------------------------------   Physical Exam   Constitutional: He is oriented to person, place, and time and well-developed, well-nourished, and in no distress.   HENT:   Head: Normocephalic and atraumatic.   Eyes: Pupils are equal, round, and reactive to light. EOM are normal.   Neck: Normal range of motion. No JVD present. No thyromegaly present.   Cardiovascular: Normal rate, regular rhythm and normal heart sounds.   Pulmonary/Chest: Effort normal. No respiratory distress. He has wheezes.   Abdominal: Soft. Bowel sounds are normal. He exhibits no distension. There is no abdominal tenderness.   Musculoskeletal: Normal range of motion.         General: Edema (3+ edema noted to bilateral lower extremities) present.   Neurological: He is alert and oriented to person, place, and time.   Skin: Skin is warm and dry. No rash noted. No erythema. No pallor.   Psychiatric: Mood, memory, affect and judgment normal.   ---------------------------------------------------------------------------------------------------------------------  EKG:      ---------------------------------------------------------------------------------------------------------------------   Results from last 7 days   Lab Units 10/16/20  0038 10/13/20  1643 10/12/20  0241   CRP mg/dL 10.00*  --  10.60*   WBC 10*3/mm3 8.84 5.99 7.89   HEMOGLOBIN g/dL 9.0* 7.2* 6.8*    HEMATOCRIT % 29.2* 23.0* 22.2*   MCV fL 93.3 92.7 94.9   MCHC g/dL 30.8* 31.3* 30.6*   PLATELETS 10*3/mm3 192 146 149   INR  1.34*  --  1.43*     Results from last 7 days   Lab Units 10/13/20  2352 10/12/20  0233   PH, ARTERIAL pH units 7.328* 7.371   PO2 ART mm Hg 90.5 90.4   PCO2, ARTERIAL mm Hg 31.5* 28.8*   HCO3 ART mmol/L 16.5* 16.7*     Results from last 7 days   Lab Units 10/16/20  0337 10/16/20  0038 10/13/20  2054 10/13/20  1643  10/12/20  0241   SODIUM mmol/L  --  131* 131* 126*  --  126*   POTASSIUM mmol/L 4.6 6.3* 5.8* 6.2*   < > 7.6*   MAGNESIUM mg/dL  --  2.9*  --   --   --   --    CHLORIDE mmol/L  --  91* 92* 86*  --  90*   CO2 mmol/L  --  16.2* 16.1* 16.3*  --  18.6*   BUN mg/dL  --  149* 144* 148*  --  127*   CREATININE mg/dL  --  10.59* 9.42* 9.67*  --  8.36*   EGFR IF NONAFRICN AM mL/min/1.73  --  5* 6* 6*  --  7*   CALCIUM mg/dL  --  8.0* 8.1* 8.2*  --  8.8   GLUCOSE mg/dL  --  126* 81 141*  --  137*   ALBUMIN g/dL  --  3.17*  --  3.29*  --  3.57   BILIRUBIN mg/dL  --  0.3  --  0.3  --  0.4   ALK PHOS U/L  --  462*  --  344*  --  226*   AST (SGOT) U/L  --  26  --  51*  --  55*   ALT (SGPT) U/L  --  74*  --  123*  --  71*    < > = values in this interval not displayed.   Estimated Creatinine Clearance: 8.8 mL/min (A) (by C-G formula based on SCr of 10.59 mg/dL (H)).  No results found for: AMMONIA  Results from last 7 days   Lab Units 10/16/20  0255 10/12/20  0649 10/12/20  0241   TROPONIN T ng/mL 0.056* 0.082* 0.095*     Results from last 7 days   Lab Units 10/16/20  0038 10/12/20  0241   PROBNP pg/mL 7,002.0* 14,263.0*     Lab Results   Component Value Date    HGBA1C 5.50 05/07/2019     Lab Results   Component Value Date    TSH 1.780 09/04/2020    FREET4 0.40 (L) 09/04/2020     No results found for: PREGTESTUR, PREGSERUM, HCG, HCGQUANT  Pain Management Panel     Pain Management Panel Latest Ref Rng & Units 10/12/2020 9/4/2020    AMPHETAMINES SCREEN, URINE Negative Negative Negative     BARBITURATES SCREEN Negative Negative Negative    BENZODIAZEPINE SCREEN, URINE Negative Negative Negative    BUPRENORPHINEUR Negative Negative Negative    COCAINE SCREEN, URINE Negative Negative Negative    METHADONE SCREEN, URINE Negative Negative Negative            ---------------------------------------------------------------------------------------------------------------------  Imaging Results (Last 7 Days)     Procedure Component Value Units Date/Time    XR Chest 1 View [[id number]] Collected: 10/16/20 0253     Updated: 10/16/20 0255    Narrative:      CHEST X-RAY, 10/16/2020      HISTORY:    45-year-old male in the ED complaining of chest tightness, pain. Shortness of breath.      TECHNIQUE:  AP portable upright chest x-ray.    COMPARISON:  *  Chest x-ray, 10/12/2020.    FINDINGS:  Moderate cardiomegaly with pulmonary venous redistribution and mild diffuse interstitial edema, new or increased since the recent prior study. Correlate for vascular congestion or volume overload. Small pleural effusions and basilar atelectasis.    Dialysis catheter remains in good position.      Impression:      1.  Mild interstitial edema and small pleural effusions suggesting volume overload or vascular congestion, new since 10/12/2020.  2.  Stable cardiac silhouette enlargement.  3.  Well-positioned dialysis catheter.    Signer Name: Rashawn Rojas MD   Signed: 10/16/2020 2:53 AM   [email or web address]    [facility]          Cultures:  Blood cultures ordered.     Last echocardiogram:     I have personally reviewed the radiology images and read the final radiology report.  ---------------------------------------------------------------------------------------------------------------------  Assessment / Plan     There are no active hospital problems to display for this patient.      ASSESSMENT/PLAN:  · ESRD with non-compliance with outpatient hemodialysis: Creatinine 10.59,  and  EGFR 5.  Patient has a temporary dialysis catheter in place and reports that he does not have a permanent chair and outpatient dialysis and receives dialysis at different facilities when he feels the need.  Patient was last dialyzed approximately 2 weeks ago in [facility].  Inpatient nephrology consult has been placed for management and initiation of inpatient dialysis.  We will continue to monitor with a.m. CMP.  Avoid nephrotoxic agents when possible.    · Elevated Troponin T in the setting of ESRD: Troponin T 0.056.  Patient denies chest pain.  Troponin T appears to be chronically elevated in the setting of ESRD.  Continuous cardiac monitoring has been ordered.    · Hyponatremia: Sodium 131. Neuro checks ordered every 4 hours.  We will continue to monitor with a.m. CMP.    · Hyperkalemia (Resolved) in the setting of ESRD: Potassium initially 6.3. Patient received 10 units of Regular insulin as well as 30 g of Kayexalate. Repeat potassium is 4.6. Nephrology consult has been placed and will arrange inpatient dialysis. Will continue to monitor with AM CMP.     · Elevated C-RP: C-RP 10.0.  WBC WNL. Chest x-ray is unremarkable for any infectious processes. Blood cultures from 9/4/2020 grew Serratia marcescens. Repeat blood cultures have been ordered. Urinalysis ordered. Will continue to monitor with AM C-RP.     · Normocytic Anemia: Hemoglobin 9.0 and hematocrit 29.2.  No signs or symptoms of bleeding noted.  Vitamin B12, folate and iron profile ordered.  We will continue to monitor with a.m. CBC.    · Hx of IV drug abuse: Patient denies any recent drug use. UDS has been ordered.     · Tobacco abuse: Patient reports smoking approximately 1 pack cigarettes per day.  Smoking cessation education provided.  Nicotine patch ordered.    ----------  -DVT prophylaxis: SQ Heparin  -Diet: NPO  -Activity: Up With Assistance  -Expected length of stay: INPATIENT status due to the need for care which can only be reasonably  provided in an hospital setting such as aggressive/expedited ancillary services and/or consultation services, the necessity for IV medications, close physician monitoring and/or the possible need for procedures.  In such, I feel patient’s risk for adverse outcomes and need for care warrant INPATIENT evaluation and predict the patient’s care encounter to likely last beyond 2 midnights.    Patient is high risk due to ESRD with non-compliance with outpatient hemodialysis.     Billie Covington, APRN  10/16/20  04:35 EDT      Electronically signed by Charlie Conti MD at 10/16/20 0603       Vital Signs (last day)     Date/Time   Temp   Temp src   Pulse   Resp   BP   Patient Position   SpO2    10/16/20 1020   98 (36.7)   Oral   84   18   136/81   Lying   96    10/16/20 0650   97.6 (36.4)   Temporal   83   18   140/70   Sitting   97    10/16/20 0544   97.6 (36.4)   Oral   99   18   127/59   Sitting   94    10/16/20 0502   --   --   87   --   124/63   --   94    10/16/20 0447   --   --   87   --   121/65   --   94    10/16/20 0433   --   --   86   --   120/62   --   96    10/16/20 0417   --   --   89   --   125/66   --   93    10/16/20 0402   --   --   90   --   123/65   --   92    10/16/20 0347   --   --   94   --   134/68   --   94    10/16/20 0332   --   --   92   --   128/68   --   95    10/16/20 0317   --   --   90   --   133/69   --   95    10/16/20 0304   --   --   90   --   116/79   --   94    10/16/20 0248   --   --   84   --   122/72   --   96    10/16/20 0233   --   --   92   --   119/70   --   93    10/16/20 0217   --   --   80   --   121/81   --   100    10/16/20 0202   --   --   76   --   105/78   --   97    10/16/20 0147   --   --   77   --   114/80   --   (!) 83    10/16/20 0132   --   --   79   --   103/76   --   97    10/16/20 0116   --   --   --   --   110/75   --   100    10/16/20 0110   --   --   --   --   102/73   --   (!) 82    10/15/20 2238   98.1 (36.7)   Oral   70   18   (!) 88/47   Sitting    96              Lines, Drains & Airways    Active LDAs     Name:   Placement date:   Placement time:   Site:   Days:    Peripheral IV 10/16/20 0038 Left Antecubital   10/16/20    0038    Antecubital   less than 1    Hemodialysis Cath Double   05/07/19 present on admission    0525    Subclavian   528                  Current Facility-Administered Medications   Medication Dose Route Frequency Provider Last Rate Last Dose   • calcium acetate (PHOS BINDER)) capsule 667 mg  667 mg Oral TID With Meals Beka Colon MD   667 mg at 10/16/20 1053   • carvedilol (COREG) tablet 12.5 mg  12.5 mg Oral BID With Meals Beka Colon MD   12.5 mg at 10/16/20 1054   • epoetin mc (EPOGEN,PROCRIT) injection 10,000 Units  10,000 Units Subcutaneous Weekly Taniya Corado MD   10,000 Units at 10/16/20 1229   • heparin (porcine) 5000 UNIT/ML injection 5,000 Units  5,000 Units Subcutaneous Q12H Charlie Conti MD   5,000 Units at 10/16/20 0756   • hydrALAZINE (APRESOLINE) tablet 10 mg  10 mg Oral Q8H Beka Colon MD   10 mg at 10/16/20 1229   • isosorbide dinitrate (ISORDIL) tablet 10 mg  10 mg Oral TID - Nitrates Beka Colon MD   10 mg at 10/16/20 1228   • nicotine (NICODERM CQ) 14 MG/24HR patch 1 patch  1 patch Transdermal Q24H Billie Covington APRN   1 patch at 10/16/20 0756   • nitroglycerin (NITROSTAT) SL tablet 0.4 mg  0.4 mg Sublingual Q5 Min PRN Charlie Conti MD       • pantoprazole (PROTONIX) EC tablet 40 mg  40 mg Oral Daily Beka Colon MD   40 mg at 10/16/20 1053   • sodium chloride 0.9 % flush 10 mL  10 mL Intravenous PRN Charlie Conti MD       • sodium chloride 0.9 % flush 10 mL  10 mL Intravenous Q12H Charlie Conti MD       • sodium chloride 0.9 % flush 10 mL  10 mL Intravenous PRN Charlie Conti, MD           Lab Results (last 24 hours)     Procedure Component Value Units Date/Time    Folate [[id number]]  (Normal) Collected: 10/16/20 0629    Specimen: Blood Updated:  10/16/20 1224     Folate 11.00 ng/mL     Narrative:      Results may be falsely increased if patient taking Biotin.      Vitamin B12 [[id number]]  (Normal) Collected: 10/16/20 0629    Specimen: Blood Updated: 10/16/20 1224     Vitamin B-12 660 pg/mL     Narrative:      Results may be falsely increased if patient taking Biotin.      Urine Drug Screen - Urine, Clean Catch [[id number]]  (Abnormal) Collected: 10/16/20 0912    Specimen: Urine, Clean Catch Updated: 10/16/20 0942     Amphetamine Screen, Urine Negative     Barbiturates Screen, Urine Negative     Benzodiazepine Screen, Urine Negative     Cocaine Screen, Urine Negative     Methadone Screen, Urine Negative     Opiate Screen Positive     Phencyclidine (PCP), Urine Negative     THC, Screen, Urine Negative     6-ACETYL MORPHINE Negative     Buprenorphine, Screen, Urine Negative     Oxycodone Screen, Urine Negative    Narrative:      Negative Thresholds For Drugs Screened:                  Amphetamines              1000 ng/ml               Barbiturates               200 ng/ml               Benzodiazepines            200 ng/ml              Cocaine                    300 ng/ml              Methadone                  300 ng/ml              Opiates                    300 ng/ml               Phencyclidine               25 ng/ml               THC                         50 ng/ml              6-Acetyl Morphine           10 ng/ml              Buprenorphine                5 ng/ml              Oxycodone                  300 ng/ml    The reference range for all drugs tested is negative. This report includes final unconfirmed qualitative results to be used for medical treatment purposes only. Unconfirmed results must not be used for non-medical purposes such as employment or legal testing. Clinical consideration should be applied to any drug of abuse test, especially when unconfirmed quantitative results are used.        Urinalysis, Microscopic Only - Urine, Clean Catch  [[id number]]  (Abnormal) Collected: 10/16/20 0912    Specimen: Urine, Clean Catch Updated: 10/16/20 0929     RBC, UA 3-5 /HPF      WBC, UA Too Numerous to Count /HPF      Bacteria, UA 1+ /HPF      Squamous Epithelial Cells, UA None Seen /HPF      Hyaline Casts, UA None Seen /LPF      Methodology Automated Microscopy    Urine Culture - Urine, Urine, Clean Catch [[id number]] Collected: 10/16/20 0912    Specimen: Urine, Clean Catch Updated: 10/16/20 0929    Urinalysis With Culture If Indicated - Urine, Clean Catch [[id number]]  (Abnormal) Collected: 10/16/20 0912    Specimen: Urine, Clean Catch Updated: 10/16/20 0926     Color, UA Yellow     Appearance, UA Turbid     pH, UA 5.5     Specific Gravity, UA 1.015     Glucose, UA Negative     Ketones, UA Negative     Bilirubin, UA Negative     Blood, UA Moderate (2+)     Protein,  mg/dL (2+)     Leuk Esterase, UA Large (3+)     Nitrite, UA Negative     Urobilinogen, UA 0.2 E.U./dL    COVID PRE-OP / PRE-PROCEDURE SCREENING ORDER (NO ISOLATION) - Swab, Nasopharynx [[id number]]  (Normal) Collected: 10/16/20 0757    Specimen: Swab from Nasopharynx Updated: 10/16/20 0903    Narrative:      The following orders were created for panel order COVID PRE-OP / PRE-PROCEDURE SCREENING ORDER (NO ISOLATION) - Swab, Nasopharynx.  Procedure                               Abnormality         Status                     ---------                               -----------         ------                     COVID-19,CEPHEID,COR/CALLUM...[[id number]]  Normal              Final result                 Please view results for these tests on the individual orders.    COVID-19,CEPHEID,COR/CALLUM/PAD IN-HOUSE(OR EMERGENT/ADD-ON),NP SWAB IN TRANSPORT MEDIA 3-4 HR TAT - Swab, Nasopharynx [[id number]]  (Normal) Collected: 10/16/20 0757    Specimen: Swab from Nasopharynx Updated: 10/16/20 0903     COVID19 Not Detected    Narrative:      Fact sheet for providers: https://www.fda.gov/media/325038/download     Fact  sheet for patients: https://www.fda.gov/media/373635/download    CBC Auto Differential [[id number]]  (Abnormal) Collected: 10/16/20 0629    Specimen: Blood Updated: 10/16/20 0719     WBC 7.84 10*3/mm3      RBC 3.01 10*6/mm3      Hemoglobin 8.8 g/dL      Hematocrit 29.2 %      MCV 97.0 fL      MCH 29.2 pg      MCHC 30.1 g/dL      RDW 15.4 %      RDW-SD 54.2 fl      MPV 9.5 fL      Platelets 173 10*3/mm3      Neutrophil % 73.0 %      Lymphocyte % 16.3 %      Monocyte % 7.3 %      Eosinophil % 2.4 %      Basophil % 0.5 %      Immature Grans % 0.5 %      Neutrophils, Absolute 5.72 10*3/mm3      Lymphocytes, Absolute 1.28 10*3/mm3      Monocytes, Absolute 0.57 10*3/mm3      Eosinophils, Absolute 0.19 10*3/mm3      Basophils, Absolute 0.04 10*3/mm3      Immature Grans, Absolute 0.04 10*3/mm3      nRBC 0.0 /100 WBC     Comprehensive Metabolic Panel [[id number]]  (Abnormal) Collected: 10/16/20 0629    Specimen: Blood Updated: 10/16/20 0708     Glucose 87 mg/dL       mg/dL      Creatinine 9.73 mg/dL      Sodium 132 mmol/L      Potassium 4.9 mmol/L      Chloride 93 mmol/L      CO2 15.0 mmol/L      Calcium 7.4 mg/dL      Total Protein 7.1 g/dL      Albumin 2.84 g/dL      ALT (SGPT) 63 U/L      AST (SGOT) 17 U/L      Alkaline Phosphatase 399 U/L      Total Bilirubin 0.3 mg/dL      eGFR Non African Amer 6 mL/min/1.73      Comment: <15 Indicative of kidney failure.        eGFR   Amer --     Comment: <15 Indicative of kidney failure.        Globulin 4.3 gm/dL      A/G Ratio 0.7 g/dL      BUN/Creatinine Ratio 15.3     Anion Gap 24.0 mmol/L     Narrative:      GFR Normal >60  Chronic Kidney Disease <60  Kidney Failure <15      Iron Profile [[id number]]  (Abnormal) Collected: 10/16/20 0629    Specimen: Blood Updated: 10/16/20 0702     Iron 52 mcg/dL      Iron Saturation 24 %      Transferrin 147 mg/dL      TIBC 219 mcg/dL     C-reactive Protein [[id number]]  (Abnormal) Collected: 10/16/20 0629    Specimen: Blood Updated:  10/16/20 0702     C-Reactive Protein 9.31 mg/dL     Blood Culture - Blood, Arm, Left [[id number]] Collected: 10/16/20 0629    Specimen: Blood from Arm, Left Updated: 10/16/20 0638    Blood Culture - Blood, Arm, Left [[id number]] Collected: 10/16/20 0629    Specimen: Blood from Arm, Left Updated: 10/16/20 0638    Potassium [[id number]]  (Normal) Collected: 10/16/20 0337    Specimen: Blood Updated: 10/16/20 0359     Potassium 4.6 mmol/L     Troponin [[id number]]  (Abnormal) Collected: 10/16/20 0255    Specimen: Blood from Arm, Left Updated: 10/16/20 0319     Troponin T 0.056 ng/mL     Narrative:      Troponin T Reference Range:  <= 0.03 ng/mL-   Negative for AMI  >0.03 ng/mL-     Abnormal for myocardial necrosis.  Clinicians would have to utilize clinical acumen, EKG, Troponin and serial changes to determine if it is an Acute Myocardial Infarction or myocardial injury due to an underlying chronic condition.       Results may be falsely decreased if patient taking Biotin.      Ethanol [[id number]] Collected: 10/16/20 0255    Specimen: Blood Updated: 10/16/20 0313     Ethanol <10 mg/dL      Ethanol % <0.010 %     Narrative:      >/= 80.0 legally intoxicated    Sedimentation Rate [[id number]]  (Abnormal) Collected: 10/16/20 0038    Specimen: Blood from Arm, Left Updated: 10/16/20 0126     Sed Rate 74 mm/hr     Comprehensive Metabolic Panel [[id number]]  (Abnormal) Collected: 10/16/20 0038    Specimen: Blood from Arm, Left Updated: 10/16/20 0124     Glucose 126 mg/dL       mg/dL      Creatinine 10.59 mg/dL      Sodium 131 mmol/L      Potassium 6.3 mmol/L      Comment: 1+ Hemolysis Specimen hemolyzed.  Results may be affected.        Chloride 91 mmol/L      CO2 16.2 mmol/L      Calcium 8.0 mg/dL      Total Protein 7.8 g/dL      Albumin 3.17 g/dL      ALT (SGPT) 74 U/L      Comment: Specimen hemolyzed.  Results may be affected.        AST (SGOT) 26 U/L      Alkaline Phosphatase 462 U/L      Total Bilirubin 0.3 mg/dL       eGFR Non African Amer 5 mL/min/1.73      Comment: <15 Indicative of kidney failure.        eGFR   Amer --     Comment: <15 Indicative of kidney failure.        Globulin 4.6 gm/dL      A/G Ratio 0.7 g/dL      BUN/Creatinine Ratio 14.1     Anion Gap 23.8 mmol/L     Narrative:      GFR Normal >60  Chronic Kidney Disease <60  Kidney Failure <15      C-reactive Protein [[id number]]  (Abnormal) Collected: 10/16/20 0038    Specimen: Blood from Arm, Left Updated: 10/16/20 0109     C-Reactive Protein 10.00 mg/dL     Phosphorus [[id number]]  (Abnormal) Collected: 10/16/20 0038    Specimen: Blood from Arm, Left Updated: 10/16/20 0109     Phosphorus 12.6 mg/dL     Magnesium [[id number]]  (Abnormal) Collected: 10/16/20 0038    Specimen: Blood from Arm, Left Updated: 10/16/20 0109     Magnesium 2.9 mg/dL     BNP [[id number]]  (Abnormal) Collected: 10/16/20 0038    Specimen: Blood from Arm, Left Updated: 10/16/20 0107     proBNP 7,002.0 pg/mL     Narrative:      Among patients with dyspnea, NT-proBNP is highly sensitive for the detection of acute congestive heart failure. In addition NT-proBNP of <300 pg/ml effectively rules out acute congestive heart failure with 99% negative predictive value.    Results may be falsely decreased if patient taking Biotin.      Protime-INR [[id number]]  (Abnormal) Collected: 10/16/20 0038    Specimen: Blood from Arm, Left Updated: 10/16/20 0102     Protime 16.4 Seconds      Comment: Note new Reference Range        INR 1.34    Narrative:      Suggested INR therapeutic range for stable oral anticoagulant therapy:    Low Intensity therapy:   1.5-2.0  Moderate Intensity therapy:   2.0-3.0  High Intensity therapy:   2.5-4.0    CBC & Differential [[id number]]  (Abnormal) Collected: 10/16/20 0038    Specimen: Blood from Arm, Left Updated: 10/16/20 0048    Narrative:      The following orders were created for panel order CBC & Differential.  Procedure                               Abnormality          Status                     ---------                               -----------         ------                     CBC Auto Differential[[id number]]        Abnormal            Final result                 Please view results for these tests on the individual orders.    CBC Auto Differential [[id number]]  (Abnormal) Collected: 10/16/20 0038    Specimen: Blood from Arm, Left Updated: 10/16/20 0048     WBC 8.84 10*3/mm3      RBC 3.13 10*6/mm3      Hemoglobin 9.0 g/dL      Hematocrit 29.2 %      MCV 93.3 fL      MCH 28.8 pg      MCHC 30.8 g/dL      RDW 15.4 %      RDW-SD 51.9 fl      MPV 10.2 fL      Platelets 192 10*3/mm3      Neutrophil % 79.0 %      Lymphocyte % 12.1 %      Monocyte % 6.3 %      Eosinophil % 1.7 %      Basophil % 0.6 %      Immature Grans % 0.3 %      Neutrophils, Absolute 6.98 10*3/mm3      Lymphocytes, Absolute 1.07 10*3/mm3      Monocytes, Absolute 0.56 10*3/mm3      Eosinophils, Absolute 0.15 10*3/mm3      Basophils, Absolute 0.05 10*3/mm3      Immature Grans, Absolute 0.03 10*3/mm3      nRBC 0.0 /100 WBC         Imaging Results (Last 24 Hours)     Procedure Component Value Units Date/Time    XR Chest 1 View [[id number]] Collected: 10/16/20 0253     Updated: 10/16/20 0255    Narrative:      CHEST X-RAY, 10/16/2020      HISTORY:    45-year-old male in the ED complaining of chest tightness, pain. Shortness of breath.      TECHNIQUE:  AP portable upright chest x-ray.    COMPARISON:  *  Chest x-ray, 10/12/2020.    FINDINGS:  Moderate cardiomegaly with pulmonary venous redistribution and mild diffuse interstitial edema, new or increased since the recent prior study. Correlate for vascular congestion or volume overload. Small pleural effusions and basilar atelectasis.    Dialysis catheter remains in good position.      Impression:      1.  Mild interstitial edema and small pleural effusions suggesting volume overload or vascular congestion, new since 10/12/2020.  2.  Stable cardiac silhouette  enlargement.  3.  Well-positioned dialysis catheter.    Signer Name: Rashawn Rojas MD   Signed: 10/16/2020 2:53 AM   Workstation Name: [id number]    [facility]        ECG/EMG Results (last 24 hours)     Procedure Component Value Units Date/Time    ECG 12 Lead [[id number]] Collected: 10/16/20 0046     Updated: 10/16/20 0046          Orders (last 24 hrs)      Start     Ordered    10/16/20 1400  hydrALAZINE (APRESOLINE) tablet 10 mg  Every 8 Hours Scheduled      10/16/20 1011    10/16/20 1300  isosorbide dinitrate (ISORDIL) tablet 10 mg  3 Times Daily (Nitrates)      10/16/20 1011    10/16/20 1300  epoetin mc (EPOGEN,PROCRIT) injection 10,000 Units  Weekly      10/16/20 1054    10/16/20 1200  calcium acetate (PHOS BINDER)) capsule 667 mg  3 Times Daily With Meals      10/16/20 1011    10/16/20 1200  carvedilol (COREG) tablet 12.5 mg  2 Times Daily With Meals      10/16/20 1011    10/16/20 1200  pantoprazole (PROTONIX) EC tablet 40 mg  Daily      10/16/20 1011    10/16/20 1055  Case Management  Consult  Once     Provider:  (Not yet assigned)    10/16/20 1054    10/16/20 0930  Urine Culture - Urine, Urine, Clean Catch  Once      10/16/20 0929    10/16/20 0924  Urinalysis, Microscopic Only - Urine, Clean Catch  Once      10/16/20 0923    10/16/20 0900  sodium chloride 0.9 % flush 10 mL  Every 12 Hours Scheduled      10/16/20 0548    10/16/20 0900  heparin (porcine) 5000 UNIT/ML injection 5,000 Units  Every 12 Hours Scheduled      10/16/20 0548    10/16/20 0815  Case Management  Consult  Once     Provider:  (Not yet assigned)    10/16/20 0815    10/16/20 0800  Vital Signs  Every 8 Hours     Comments: Per per hospital policy    10/16/20 0548    10/16/20 0730  nicotine (NICODERM CQ) 14 MG/24HR patch 1 patch  Every 24 Hours Scheduled      10/16/20 0548    10/16/20 0726  Diet Regular; Renal  Diet Effective Now      10/16/20 0725    10/16/20 0600  Strict Intake &  Output  Every 6 Hours      10/16/20 0548    10/16/20 0600  CBC Auto Differential  Morning Draw      10/16/20 0548    10/16/20 0600  Comprehensive Metabolic Panel  Morning Draw      10/16/20 0548    10/16/20 0600  C-reactive Protein  Morning Draw      10/16/20 0549    10/16/20 0550  Obtain Medical Records  Until Discontinued     Comments: Obtain medical records from [facility] from most recent hospital admission.    10/16/20 0549    10/16/20 0549  Notify Physician (with Parameters)  Until Discontinued      10/16/20 0548    10/16/20 0549  Oxygen Therapy- Nasal Cannula; Titrate for SPO2: 90% - 95%  Continuous      10/16/20 0548    10/16/20 0549  Insert Peripheral IV  Once      10/16/20 0548    10/16/20 0549  Saline Lock & Maintain IV Access  Continuous,   Status:  Canceled      10/16/20 0548    10/16/20 0549  Pulse Oximetry, Continuous  Continuous      10/16/20 0548    10/16/20 0549  Cardiac Monitoring  Continuous,   Status:  Canceled      10/16/20 0548    10/16/20 0549  Telemetry - Maintain IV Access  Continuous      10/16/20 0548    10/16/20 0549  Continuous Cardiac Monitoring  Continuous      10/16/20 0548    10/16/20 0549  May Be Off Telemetry for Tests  Continuous      10/16/20 0548    10/16/20 0549  ACLS Protocol For Life Threatening Dysrhythmias (Unless Code Status Indicates Otherwise)  Continuous      10/16/20 0548    10/16/20 0549  Notify Provider if ACLS Protocol Activated  Until Discontinued      10/16/20 0548    10/16/20 0549  Activity - Bed Rest With Exceptions (Specify)  Until Discontinued      10/16/20 0548    10/16/20 0549  Daily Weights  Daily      10/16/20 0548    10/16/20 0549  NPO Diet NPO Except: Sips With Meds  Diet Effective Now,   Status:  Canceled      10/16/20 0548    10/16/20 0549  Inpatient Nephrology Consult  Once     Specialty:  Nephrology  Provider:  Taniya Corado MD    10/16/20 0548    10/16/20 0548  sodium chloride 0.9 % flush 10 mL  As Needed      10/16/20 0548    10/16/20 0548   nitroglycerin (NITROSTAT) SL tablet 0.4 mg  Every 5 Minutes PRN      10/16/20 0548    10/16/20 0548  Iron Profile  Once      10/16/20 0547    10/16/20 0548  Folate  Once      10/16/20 0547    10/16/20 0548  Vitamin B12  Once      10/16/20 0547    10/16/20 0548  COVID PRE-OP / PRE-PROCEDURE SCREENING ORDER (NO ISOLATION) - Swab, Nasopharynx  Once      10/16/20 0548    10/16/20 0548  COVID-19,CEPHEID,COR/CALLUM/PAD IN-HOUSE(OR EMERGENT/ADD-ON),NP SWAB IN TRANSPORT MEDIA 3-4 HR TAT - Swab, Nasopharynx  PROCEDURE ONCE      10/16/20 0548    10/16/20 0442  Blood Culture - Blood, Arm, Left  STAT      10/16/20 0441    10/16/20 0442  Blood Culture - Blood, Arm, Left  STAT     Comments: From a different site than #1.      10/16/20 0441    10/16/20 0436  Inpatient Admission  Once      10/16/20 0441    10/16/20 0436  Code Status and Medical Interventions:  Continuous      10/16/20 0441    10/16/20 0306  Potassium  STAT      10/16/20 0305    10/16/20 0228  insulin regular (humuLIN R,novoLIN R) injection 10 Units  Once      10/16/20 0226    10/16/20 0228  dextrose (D50W) 25 g/ 50mL Intravenous Solution 25 g  Once      10/16/20 0226    10/16/20 0228  sodium polystyrene (KAYEXALATE) powder 30 g  Once      10/16/20 0226    10/16/20 0225  Ethanol  Once      10/16/20 0226    10/16/20 0225  Urine Drug Screen - Urine, Clean Catch  Once      10/16/20 0226    10/16/20 0207  sodium chloride 0.9 % bolus 1,000 mL  Once      10/16/20 0205    10/16/20 0113  Troponin  Once      10/16/20 0022    10/16/20 0022  CBC & Differential  Once      10/16/20 0022    10/16/20 0022  Protime-INR  Once      10/16/20 0022    10/16/20 0022  Comprehensive Metabolic Panel  Once      10/16/20 0022    10/16/20 0022  Urinalysis With Culture If Indicated - Urine, Clean Catch  Once      10/16/20 0022    10/16/20 0022  BNP  Once      10/16/20 0022    10/16/20 0022  Sedimentation Rate  Once      10/16/20 0022    10/16/20 0022  C-reactive Protein  Once      10/16/20 0022     10/16/20 0022  Phosphorus  Once      10/16/20 0022    10/16/20 0022  Magnesium  Once      10/16/20 0022    10/16/20 0022  ECG 12 Lead  Once      10/16/20 0022    10/16/20 0022  Insert peripheral IV  Once      10/16/20 0022    10/16/20 0022  XR Chest 1 View  1 Time Imaging      10/16/20 0022    10/16/20 0022  CBC Auto Differential  PROCEDURE ONCE      10/16/20 0022    10/16/20 0021  sodium chloride 0.9 % flush 10 mL  As Needed      10/16/20 0022    Unscheduled  Telemetry - Pulse Oximetry  Continuous PRN     Comments: If Patient Develops Unresponsiveness, Acute Dyspnea, Cyanosis or Suspected Hypoxemia Start Continuous Pulse Ox Monitoring, Apply Oxygen & Notify Provider    10/16/20 0548    Unscheduled  Oxygen Therapy- Nasal Cannula; Titrate for SPO2: 90% - 95%  Continuous PRN     Comments: If Patient Develops Unresponsiveness, Acute Dyspnea, Cyanosis or Suspected Hypoxemia Start Continuous Pulse Ox Monitoring, Apply Oxygen & Notify Provider    10/16/20 0548    Unscheduled  ECG 12 Lead  As Needed     Comments: Nurse to Release if Patient Expericences Acute Chest Pain or Dysrhythmias    10/16/20 0548    Unscheduled  Potassium  As Needed     Comments: For Ventricular Arrhythmias      10/16/20 0548    Unscheduled  Magnesium  As Needed     Comments: For Ventricular Arrhythmias      10/16/20 0548    Unscheduled  Troponin  As Needed     Comments: For Chest Pain      10/16/20 0548    Unscheduled  Digoxin Level  As Needed     Comments: For Atrial Arrhythmias      10/16/20 0548    Unscheduled  Blood Gas, Arterial  As Needed     Comments: Per O2 PolicyNotify Physician      10/16/20 0548    Unscheduled  Up With Assistance  As Needed      10/16/20 0548    --  isosorbide dinitrate (ISORDIL) 10 MG tablet  3 Times Daily      10/16/20 0949    --  amLODIPine (NORVASC) 5 MG tablet  Daily      10/16/20 0949                Operative/Procedure Notes (last 48 hours) (Notes from 10/14/20 1312 through 10/16/20 1312)    No notes of this type  exist for this encounter.         Physician Progress Notes (last 48 hours) (Notes from 10/14/20 1312 through 10/16/20 1312)    No notes of this type exist for this encounter.          Consult Notes (last 24 hours) (Notes from 10/15/20 1312 through 10/16/20 1312)      Taniya Corado MD at 10/16/20 0806      Consult Orders    1. Inpatient Nephrology Consult [[id number]] ordered by Charlie Conti MD at 10/16/20 0441               Nephrology Consult Note    Referring Provider: Charlie Conti  Reason for Consultation: ESKD    Subjective       History of present illness:  Mario Nair is a 45 y.o. male who presented to [facility] emergency department with chief complaint of not feeling well. Pt is known to have ESKD and was started on dialysis about 2 years ago, pt is very non compliant and currently does not have any established dialysis chair. He has been going to different hospitals and gets few sessions of dialysis and then leave [facility]. About 10 days ago, he left [facility] where he was admitted for 2 wks, had his right IJ TDC placed.   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 He  Has mild shortness of breath but no chest pain or palpitations.     History  Past Medical History:   Diagnosis Date   • Endocarditis    • ESRD (end stage renal disease) (CMS/HCC)    • IV drug abuse (CMS/HCC)    • Unable to read or write    ,   Past Surgical History:   Procedure Laterality Date   • CENTRAL VENOUS CATHETER TUNNELED INSERTION DOUBLE LUMEN Right     Chest for hemodialysis   ,   Family History   Problem Relation Age of Onset   • Alcohol abuse Mother    • Hypertension Mother    • Hypertension Other    ,   Social History     Tobacco Use   • Smoking status: Current Every Day Smoker     Packs/day: 1.00     Years: 20.00     Pack years: 20.00     Types: Cigarettes   • Smokeless tobacco: Never Used   Substance Use Topics   • Alcohol use: No     Frequency: Never   • Drug use:  Yes     Types: Methamphetamines     Comment: last used on Friday 5/18/2019   ,   Medications Prior to Admission   Medication Sig Dispense Refill Last Dose   • calcium acetate (PHOS BINDER,) 667 MG capsule capsule Take 1,334 mg by mouth 3 (Three) Times a Day.      • carvedilol (COREG) 12.5 MG tablet Take 12.5 mg by mouth 2 (Two) Times a Day With Meals.      • hydrALAZINE (APRESOLINE) 10 MG tablet Take 10 mg by mouth 3 (Three) Times a Day.      • isosorbide mononitrate (IMDUR) 30 MG 24 hr tablet Take 30 mg by mouth Daily.      • pantoprazole (PROTONIX) 40 MG EC tablet Take 40 mg by mouth Daily.      , Scheduled Meds:  heparin (porcine), 5,000 Units, Subcutaneous, Q12H  nicotine, 1 patch, Transdermal, Q24H  sodium chloride, 10 mL, Intravenous, Q12H    , Continuous Infusions:   , PRN Meds:  nitroglycerin  •  [COMPLETED] Insert peripheral IV **AND** sodium chloride  •  sodium chloride and Allergies:  Penicillins    Review of Systems  More than 10 point review of systems was done. Pertinent items are noted in HPI, all other systems reviewed and negative    Objective     Vital Signs  Temp:  [97.6 °F (36.4 °C)-98.1 °F (36.7 °C)] 97.6 °F (36.4 °C)  Heart Rate:  [70-99] 83  Resp:  [18] 18  BP: ()/(47-81) 140/70    No intake/output data recorded.  No intake/output data recorded.    Physical Examination:  General Appearance: Not  In acute distress  Head: Normocephalic, without obvious abnormality and atraumatic  Eyes: Conjunctivae and sclerae normal, no icterus, no pallor and PERRLA  Throat: Oral mucosa moist  Neck: No adenopathy, suppple, no carotid bruit and  No JVD  Lungs: Clear to auscultation, respirations regular and unlabored  Heart: Regular rhythm & normal rate, normal S1, S2, no murmur, no gallop, no rub   Abdomen: Normal bowel sounds, no masses and soft non-tender  Extremities: Moves extremities well, no redness and trace.  edema  Pulses: Palpable and equal bilaterally  Skin: No bleeding, bruising or  rash  Neurologic: Orientated to person, place, time, grossly no focal deficitis  ACCESS: Right IJ TDC with no signs of infection    Laboratory Data :      WBC WBC   Date Value Ref Range Status   10/16/2020 7.84 3.40 - 10.80 10*3/mm3 Final   10/16/2020 8.84 3.40 - 10.80 10*3/mm3 Final   10/13/2020 5.99 3.40 - 10.80 10*3/mm3 Final      HGB Hemoglobin   Date Value Ref Range Status   10/16/2020 8.8 (L) 13.0 - 17.7 g/dL Final   10/16/2020 9.0 (L) 13.0 - 17.7 g/dL Final   10/13/2020 7.2 (L) 13.0 - 17.7 g/dL Final      HCT Hematocrit   Date Value Ref Range Status   10/16/2020 29.2 (L) 37.5 - 51.0 % Final   10/16/2020 29.2 (L) 37.5 - 51.0 % Final   10/13/2020 23.0 (L) 37.5 - 51.0 % Final      Platlets No results found for: LABPLAT   MCV MCV   Date Value Ref Range Status   10/16/2020 97.0 79.0 - 97.0 fL Final   10/16/2020 93.3 79.0 - 97.0 fL Final   10/13/2020 92.7 79.0 - 97.0 fL Final          Sodium Sodium   Date Value Ref Range Status   10/16/2020 132 (L) 136 - 145 mmol/L Final   10/16/2020 131 (L) 136 - 145 mmol/L Final   10/13/2020 131 (L) 136 - 145 mmol/L Final   10/13/2020 126 (L) 136 - 145 mmol/L Final      Potassium Potassium   Date Value Ref Range Status   10/16/2020 4.9 3.5 - 5.2 mmol/L Final   10/16/2020 4.6 3.5 - 5.2 mmol/L Final   10/16/2020 6.3 (C) 3.5 - 5.2 mmol/L Final     Comment:     1+ Hemolysis Specimen hemolyzed.  Results may be affected.   10/13/2020 5.8 (H) 3.5 - 5.2 mmol/L Final   10/13/2020 6.2 (C) 3.5 - 5.2 mmol/L Final      Chloride Chloride   Date Value Ref Range Status   10/16/2020 93 (L) 98 - 107 mmol/L Final   10/16/2020 91 (L) 98 - 107 mmol/L Final   10/13/2020 92 (L) 98 - 107 mmol/L Final   10/13/2020 86 (L) 98 - 107 mmol/L Final      CO2 CO2   Date Value Ref Range Status   10/16/2020 15.0 (L) 22.0 - 29.0 mmol/L Final   10/16/2020 16.2 (L) 22.0 - 29.0 mmol/L Final   10/13/2020 16.1 (L) 22.0 - 29.0 mmol/L Final   10/13/2020 16.3 (L) 22.0 - 29.0 mmol/L Final      BUN BUN   Date Value Ref Range  Status   10/16/2020 149 (H) 6 - 20 mg/dL Final   10/16/2020 149 (H) 6 - 20 mg/dL Final   10/13/2020 144 (H) 6 - 20 mg/dL Final   10/13/2020 148 (H) 6 - 20 mg/dL Final      Creatinine Creatinine   Date Value Ref Range Status   10/16/2020 9.73 (H) 0.76 - 1.27 mg/dL Final   10/16/2020 10.59 (H) 0.76 - 1.27 mg/dL Final   10/13/2020 9.42 (H) 0.76 - 1.27 mg/dL Final   10/13/2020 9.67 (H) 0.76 - 1.27 mg/dL Final      Calcium Calcium   Date Value Ref Range Status   10/16/2020 7.4 (L) 8.6 - 10.5 mg/dL Final   10/16/2020 8.0 (L) 8.6 - 10.5 mg/dL Final   10/13/2020 8.1 (L) 8.6 - 10.5 mg/dL Final   10/13/2020 8.2 (L) 8.6 - 10.5 mg/dL Final      PO4 No results found for: CAPO4   Albumin Albumin   Date Value Ref Range Status   10/16/2020 2.84 (L) 3.50 - 5.20 g/dL Final   10/16/2020 3.17 (L) 3.50 - 5.20 g/dL Final   10/13/2020 3.29 (L) 3.50 - 5.20 g/dL Final      Magnesium Magnesium   Date Value Ref Range Status   10/16/2020 2.9 (H) 1.6 - 2.6 mg/dL Final      Uric Acid No results found for: URICACID     Radiology results :     Imaging Results (Last 72 Hours)     Procedure Component Value Units Date/Time    XR Chest 1 View [[id number]] Collected: 10/16/20 0253     Updated: 10/16/20 0255    Narrative:      CHEST X-RAY, 10/16/2020      HISTORY:    45-year-old male in the ED complaining of chest tightness, pain. Shortness of breath.      TECHNIQUE:  AP portable upright chest x-ray.    COMPARISON:  *  Chest x-ray, 10/12/2020.    FINDINGS:  Moderate cardiomegaly with pulmonary venous redistribution and mild diffuse interstitial edema, new or increased since the recent prior study. Correlate for vascular congestion or volume overload. Small pleural effusions and basilar atelectasis.    Dialysis catheter remains in good position.      Impression:      1.  Mild interstitial edema and small pleural effusions suggesting volume overload or vascular congestion, new since 10/12/2020.  2.  Stable cardiac silhouette enlargement.  3.   Well-positioned dialysis catheter.    Signer Name: Rashawn Rojas MD   Signed: 10/16/2020 2:53 AM   Workstation Name: [email or web address]    [facility]            Medications:      heparin (porcine), 5,000 Units, Subcutaneous, Q12H  nicotine, 1 patch, Transdermal, Q24H  sodium chloride, 10 mL, Intravenous, Q12H           Assessment/Plan       End stage renal disease (CMS/HCC)    1. ESKD, non compliant to dialysis treatments  2. Hyponatremia likely hypervolemic  3. Anemia, likely 2/2 CKD  4. HO untreated infectious hepatitis  5. Substance use disorder    Pt is very non compliant, does not have outpatient dialysis chair due to his non compliance.   Will do dialysis today, SW to explore options for his out patient dialysis chair.   Anemia, Hb below goal, will start on EPO and will check iron.     Thanks Dr Conti for the consult. Nephrology will follow the patient.   I discussed the patient's findings and my recommendations with patient and nursing staff    Taniya Corado MD  10/16/20  08:06 EDT      Electronically signed by Taniya Corado MD at 10/16/20 1056       ADL Documentation (last day)     Date/Time Transferring Toileting Bathing Dressing Eating Communication Swallowing Change in Functional Status Since Onset of Current Illness/Injury Equipment Currently Used at Home    10/16/20 1255  --  --  --  --  --  --  --  --  none    10/16/20 5706  2 - assistive person  2 - assistive person  2 - assistive person  0 - independent  0 - independent  0 - understands/communicates without difficulty  0 - swallows foods/liquids without difficulty  --  --    10/16/20 0616  --  --  --  --  --  --  --  no  none    10/16/20 4630  2 - assistive person  2 - assistive person  2 - assistive person  0 - independent  0 - independent  0 - understands/communicates without difficulty  0 - swallows foods/liquids without difficulty  --  --

## 2020-10-17 NOTE — DISCHARGE SUMMARY
Date of admission: 10/16/2020  Date of discharge: 10/16/2020    Principal diagnosis: End-stage renal disease in need of dialysis with hyperkalemia  Secondary diagnoses:  Hypertension  Mild hyponatremia  Elevated troponin flat trend  Possible Proteus UTI, culture pending    Hospital course: Patient came in the need of dialysis, he was dialyzed after dialysis he left AMA even knowing that he did not have a dialysis chair as an outpatient.

## 2020-10-17 NOTE — PAYOR COMM NOTE
"[facility]   MARCELL NUNO  PHONE  [phone]  FAX  [phone]  NPI:  [id number]    PATIENT D/C 10/16/2020      Mario Nair (45 y.o. Male)     Date of Birth Social Security Number Address Home Phone MRN    1975  [address] [place] [address] [phone] [id number]    Scientologist Marital Status          None        Admission Date Admission Type Admitting Provider Attending Provider Department, Room/Bed    10/16/20 Emergency Charlie Conti MD  [address], 3304/1S    Discharge Date Discharge Disposition Discharge Destination        10/16/2020 Left Against Medical Advice              Attending Provider: (none)   Allergies: Penicillins    Isolation: None   Infection: Hepatitis B (06/05/19)   Code Status: Prior    Ht: 188 cm (74.02\")   Wt: 81.7 kg (180 lb 3.2 oz)    Admission Cmt: None   Principal Problem: None                Active Insurance as of 10/16/2020     Primary Coverage     Payor Plan Insurance Group Employer/Plan Group    [employer] [place] [employer] [place]      Payor Plan Address Payor Plan Phone Number Payor Plan Fax Number Effective Dates    [address] [address] [phone]  5/5/2019 - None Entered    [facility] [id number]       Subscriber Name Subscriber Birth Date Member ID       MARIO NAIR [id number]                 Emergency Contacts      (Rel.) Home Phone Work Phone Mobile Phone    SHANI WILKES (Relative) [phone] -- --    Korey Constance (Daughter) -- -- [phone]              "

## 2020-10-21 NOTE — DISCHARGE INSTRUCTIONS
Call one of the offices below to establish a primary care provider.  If you are unable to get an appointment and feel it is an emergency and need to be seen immediately please return to the Emergency Department.    Call one of the office below to set up a primary care provider.    Dr. Danny Rebolledo                                                                                                       [address] [facility] [address]  [phone]    Dr. Angela, Dr. ESTUARDO Rodriguez, Dr. GOPAL Rodriguez ([facility])  [address] [facility] [address]  [phone]    Dr. Toussaint, Dr. Burgos, Dr. Fortune ([facility])  [address] [facility] [address]  [phone]    Dr. Ballard  [address] [facility] [address]  [phone]    Dr. Taylor, Dr. Grant, Dr. Swenson, Dr. León (Critical access hospital)  [address] [place] [address] [address]  [facility] [address]  [phone]    Dr. Jennifer Patel  [address] [facility] [place] [address]  [phone]    Dr. Kendra Webster  [address]   [address]  [facility] [address]  [phone]    Dr. Rebolledo  [address] [facility] [address]  [phone]    Dr. Guerra, Dr. Frank  [address] [facility] [place] [address]  [phone]    Dr. Peck  [facility]                                                              [address]  [facility] [address]  [phone]    Dr. Byers  [address] [facility] [address]  [phone]    Dr. Wendy Monzon  [address]  [address]  [place] [address]  [phone]    Dr. Pacheco and [facility]   [address] [facility]  [address]  [place] [address]  [phone]

## 2020-10-23 NOTE — ED PROVIDER NOTES
Subjective   45-year-old male presents to the ER complaining of ankle swelling.  Patient states he has been on his feet for quite some time.  During history and physical patient requested to go ahead and leave secondary to feeling better.          Review of Systems   Constitutional: Negative.  Negative for fever.   HENT: Negative.    Respiratory: Negative.    Cardiovascular: Positive for leg swelling. Negative for chest pain.   Gastrointestinal: Negative.  Negative for abdominal pain.   Endocrine: Negative.    Genitourinary: Negative.  Negative for dysuria.   Skin: Negative.    Neurological: Negative.    Psychiatric/Behavioral: Negative.    All other systems reviewed and are negative.      Past Medical History:   Diagnosis Date   • Endocarditis    • ESRD (end stage renal disease) (CMS/[facility])    • IV drug abuse (CMS/[facility])    • Unable to read or write        Allergies   Allergen Reactions   • Penicillins Shortness Of Breath, Itching and Rash       Past Surgical History:   Procedure Laterality Date   • CENTRAL VENOUS CATHETER TUNNELED INSERTION DOUBLE LUMEN Right     Chest for hemodialysis       Family History   Problem Relation Age of Onset   • Alcohol abuse Mother    • Hypertension Mother    • Hypertension Other        Social History     Socioeconomic History   • Marital status:      Spouse name: Not on file   • Number of children: Not on file   • Years of education: Not on file   • Highest education level: Not on file   Tobacco Use   • Smoking status: Current Every Day Smoker     Packs/day: 1.00     Years: 20.00     Pack years: 20.00     Types: Cigarettes   • Smokeless tobacco: Never Used   Substance and Sexual Activity   • Alcohol use: No     Frequency: Never   • Drug use: Yes     Types: Methamphetamines     Comment: last used on Friday 5/18/2019   • Sexual activity: Defer           Objective   Physical Exam  Vitals signs and nursing note reviewed.   Constitutional:       General: He is not in acute distress.      Appearance: He is well-developed. He is not diaphoretic.   HENT:      Head: Normocephalic and atraumatic.      Right Ear: External ear normal.      Left Ear: External ear normal.      Nose: Nose normal.   Eyes:      Conjunctiva/sclera: Conjunctivae normal.   Neck:      Musculoskeletal: Normal range of motion and neck supple.      Vascular: No JVD.      Trachea: No tracheal deviation.   Cardiovascular:      Rate and Rhythm: Normal rate and regular rhythm.      Heart sounds: No murmur.   Pulmonary:      Effort: Pulmonary effort is normal. No respiratory distress.      Breath sounds: No wheezing.   Abdominal:      Palpations: Abdomen is soft.      Tenderness: There is no abdominal tenderness.   Musculoskeletal: Normal range of motion.         General: No deformity.      Right lower leg: Edema present.      Left lower leg: Edema present.   Skin:     General: Skin is warm and dry.      Coloration: Skin is not pale.      Findings: No erythema or rash.   Neurological:      Mental Status: He is alert and oriented to person, place, and time.      Cranial Nerves: No cranial nerve deficit.   Psychiatric:      Comments: Erratic behavior.          Procedures           ED Course  ED Course as of Oct 23 1115   Wed Oct 21, 2020   1613 After assessment pt verbalized that he thinks he would rather go home and elevate his feet.  Pt does not wish to have anything done and states he would return to ER should sx worsen.     [RB]      ED Course User Index  [RB] Raleigh Jim II, PA                                           MDM  Number of Diagnoses or Management Options  Bilateral edema of lower extremity: new and does not require workup  Risk of Complications, Morbidity, and/or Mortality  Presenting problems: minimal  Diagnostic procedures: minimal  Management options: minimal    Patient Progress  Patient progress: stable      Final diagnoses:   Bilateral edema of lower extremity            Raleigh Jim II, PA  10/23/20 1117

## 2020-11-03 NOTE — TELEPHONE ENCOUNTER
Attempted to contact the patient on behalf of Dr. Colon without success. The phone number on file is . A letter will be mailed to his address on file today asking him to contact our office.    The PCP on file, Dr. Rebolledo, will be sent a copy of the result below and recommend the patient receive antibiotics.     Of note, the patient left AMA from our facility.

## 2020-11-13 NOTE — PROGRESS NOTES
"  History of Present Illness   Mario Nair is a 45 y.o. male who presents to the clinic today to establish medical care pertaining to his ESRD and GERD. He previously was an IV drug abuser but reports he has not had IV drugs since May 2019. In addition, he has had Endocarditis. He does continue to smoke. He was hospitalized at [facility] on 10/16/2020 and after receiving dialysis left AMA.     Chronic Kidney Disease  This is a chronic problem. The current episode started more than 1 year ago. The problem occurs daily. Associated symptoms include arthralgias (Hips), coughing (Contributes to his smoking), fatigue and nausea. Pertinent negatives include no anorexia, change in bowel habit, chest pain, chills, congestion, fever, headaches, myalgias, rash, sore throat, urinary symptoms, vomiting or weakness. Treatments tried: Dialysis  The treatment provided significant relief.     Heartburn  He complains of coughing (Contributes to his smoking), heartburn, nausea and tooth decay. He reports no belching, no chest pain, no dysphagia, no sore throat, no water brash or no wheezing. The current episode started more than 1 year ago. The problem occurs frequently. The problem has been waxing and waning. The heartburn is of moderate intensity. Associated symptoms include fatigue. Pertinent negatives include no melena, muscle weakness, orthopnea or weight loss. Risk factors include smoking/tobacco exposure and lack of exercise. He has tried a PPI for the symptoms. The treatment provided moderate relief.   Vitals:    11/13/20 1348   BP: 132/80   BP Location: Left arm   Patient Position: Sitting   Cuff Size: Adult   Pulse: 91   Resp: 14   Temp: 97.3 °F (36.3 °C)   TempSrc: Temporal   SpO2: 98%   Weight: 75.3 kg (166 lb)   Height: 188 cm (74.02\")        The following portions of the patient's history were reviewed and updated as appropriate: allergies, current medications, past family history, past medical history, past social history, " past surgical history and problem list.    Review of Systems   Constitutional: Positive for fatigue. Negative for activity change, appetite change, chills, fever and unexpected weight loss.   HENT: Negative for congestion and sore throat.    Eyes: Negative for visual disturbance.   Respiratory: Positive for cough (Contributes to his smoking). Negative for shortness of breath and wheezing.    Cardiovascular: Positive for leg swelling. Negative for chest pain and palpitations.   Gastrointestinal: Positive for nausea and GERD. Negative for anal bleeding, anorexia, blood in stool, change in bowel habit, constipation, diarrhea, dysphagia, melena and vomiting.   Endocrine: Negative for cold intolerance, heat intolerance, polydipsia, polyphagia and polyuria.   Genitourinary: Negative for decreased urine volume, difficulty urinating, dysuria, flank pain, frequency and urgency.   Musculoskeletal: Positive for arthralgias (Hips). Negative for myalgias and muscle weakness.   Skin: Negative for color change and rash.   Neurological: Negative for weakness, light-headedness, headache and confusion.   Hematological: Negative for adenopathy.   Psychiatric/Behavioral: Negative for decreased concentration and depressed mood.        Physical Exam  Vitals signs reviewed.   Constitutional:       General: He is not in acute distress.     Appearance: He is well-developed.      Comments: Pleasant; daughter is with him whom he refers some of the questions to her. Wearing appropriate face covering   HENT:      Head: Normocephalic and atraumatic.      Nose: Nose normal.   Eyes:      General: No scleral icterus.        Right eye: No discharge.         Left eye: No discharge.      Conjunctiva/sclera: Conjunctivae normal.      Pupils: Pupils are equal, round, and reactive to light.   Neck:      Musculoskeletal: Neck supple.      Thyroid: No thyromegaly.      Vascular: No JVD.   Cardiovascular:      Rate and Rhythm: Normal rate and regular  rhythm.      Heart sounds: Normal heart sounds. No murmur. No friction rub.   Pulmonary:      Effort: Pulmonary effort is normal. No respiratory distress.      Breath sounds: Decreased breath sounds present. No wheezing, rhonchi or rales.   Abdominal:      General: Bowel sounds are normal. There is no distension.      Palpations: Abdomen is soft.      Tenderness: There is no abdominal tenderness. There is no right CVA tenderness, left CVA tenderness, guarding or rebound.   Musculoskeletal:         General: No tenderness.   Lymphadenopathy:      Cervical: No cervical adenopathy.   Skin:     General: Skin is warm and dry.      Capillary Refill: Capillary refill takes less than 2 seconds.      Findings: No erythema or rash.      Comments: Multiple tatoos   Neurological:      Mental Status: He is alert and oriented to person, place, and time.      Cranial Nerves: Cranial nerves are intact.   Psychiatric:         Mood and Affect: Affect normal.         Speech: Speech normal.         Behavior: Behavior is cooperative.         Thought Content: Thought content normal.       Assessment/Plan     Problems Addressed this Visit        Genitourinary    Chronic renal failure - Primary    Relevant Medications    calcium acetate (PHOS BINDER,) 667 MG capsule capsule    hydrALAZINE (APRESOLINE) 10 MG tablet    amLODIPine (NORVASC) 5 MG tablet    isosorbide dinitrate (ISORDIL) 10 MG tablet    carvedilol (COREG) 12.5 MG tablet    Other Relevant Orders    Phosphorus (Completed)    Magnesium (Completed)    BNP ([facility] Only) (Completed)    POC Urinalysis Dipstick, Automated (Completed)    Urine Culture - Urine, Urine, Clean Catch (Completed)    Comprehensive Metabolic Panel (Completed)       Hematopoietic and Hemostatic    Anemia due to chronic kidney disease       Other    Hyperkalemia    Current smoker      Other Visit Diagnoses     Gastroesophageal reflux disease without esophagitis  (Chronic)       Needing refill of his Protonix     Relevant Medications    pantoprazole (PROTONIX) 40 MG EC tablet    Other Relevant Orders    CBC & Differential (Completed)    CBC Auto Differential (Completed)    Comprehensive Metabolic Panel (Completed)    Urinary tract infection without hematuria, site unspecified        Noted with a UTI on last hospitalization. Pt was unable to be reached. Repeated UA    Encounter for immunization        Influenza vaccination given    Relevant Orders    FluLaval Quad >6 Months (2239-3023) (Completed)    Comprehensive Metabolic Panel (Completed)      Diagnoses       Codes Comments    Chronic renal failure, stage 4 (severe) (CMS/[facility])    -  Primary ICD-10-CM: N18.4  ICD-9-CM: 585.4 Is requesting referral to a Dialysis Center; discussed options. Will attempt to get into the [facility]. He is receptive to this choice.     Gastroesophageal reflux disease without esophagitis     ICD-10-CM: K21.9  ICD-9-CM: 530.81 Needing refill of his Protonix    Anemia due to stage 4 chronic kidney disease (CMS/[facility])     ICD-10-CM: N18.4, D63.1  ICD-9-CM: 285.21, 585.4 CBC reflected a HGB of 7.5 which I discussed with Dr Rebolledo. Will monitor    Hyperkalemia     ICD-10-CM: E87.5  ICD-9-CM: 276.7 CMP revealed a K+ of 6.6. Mario was notified and was instructed to go to the ED    Urinary tract infection without hematuria, site unspecified     ICD-10-CM: N39.0  ICD-9-CM: 599.0 Noted with a UTI on last hospitalization. Pt was unable to be reached. Repeated UA    Encounter for immunization     ICD-10-CM: Z23  ICD-9-CM: V03.89 Influenza vaccination given    Current smoker     ICD-10-CM: F17.200  ICD-9-CM: 305.1 Smoking cessation encouraged        Is requesting referral to a Dialysis Center; discussed options. Will attempt to get into the [facility]. He is receptive to this choice. Transportation is an issue and he will need to have the assistance of [facility]. He is receptive to a .  Labs ordered and completed. He is  needing refills on his routine medications which were provided for one month. He has an appt with Dr Rebolledo in December which I encouraged him to keep.     This document has been electronically signed by NICKY Higgins, ULISSES-BC, PRAMODE  November 13, 2020 14:42 EST

## 2020-11-13 NOTE — PATIENT INSTRUCTIONS
Food Basics for Chronic Kidney Disease  When your kidneys are not working well, they cannot remove waste and excess substances from your blood as effectively as they did before. This can lead to a buildup and imbalance of these substances, which can worsen kidney damage and affect how your body functions. Certain foods lead to a buildup of these substances in the body. By changing your diet as recommended by your diet and nutrition specialist (dietitian) or health care provider, you could help prevent further kidney damage and delay or prevent the need for dialysis.  What are tips for following this plan?  General instructions    · Work with your health care provider and dietitian to develop a meal plan that is right for you. Foods you can eat, limit, or avoid will be different for each person depending on the stage of kidney disease and any other existing health conditions.  · Talk with your health care provider about whether you should take a vitamin and mineral supplement.  · Use standard measuring cups and spoons to measure servings of foods. Use a kitchen scale to measure portions of protein foods.  · If directed by your health care provider, avoid drinking too much fluid. Measure and count all liquids, including water, ice, soups, flavored gelatin, and frozen desserts such as popsicles or ice cream.  Reading food labels  · Check the amount of sodium in foods. Choose foods that have less than 300 milligrams (mg) per serving.  · Check the ingredient list for phosphorus or potassium-based additives or preservatives.  · Check the amount of saturated and trans fat. Limit or avoid these fats as told by your dietitian.  Shopping  · Avoid buying foods that are:  ? Processed, frozen, or prepackaged.  ? Calcium-enriched or fortified.  · Do not buy foods that have salt or sodium listed among the first five ingredients.  · Do not buy canned vegetables.  Cooking  · Replace animal proteins, such as meat, fish, eggs, or  dairy, with plant proteins from beans, nuts, and soy.  ? Use soy milk instead of cow's milk.  ? Add beans or tofu to soups, casseroles, or pasta dishes instead of meat.  · Soak vegetables, such as potatoes, before cooking to reduce potassium. To do this:  ? Peel and cut into small pieces.  ? Soak in warm water for at least 2 hours. For every 1 cup of vegetables, use 10 cups of water.  ? Drain and rinse with warm water.  ? Boil for at least 5 minutes.  Meal planning  · Limit the amount of protein from plant and animal sources you eat each day.  · Do not add salt to food when cooking or before eating.  · Eat meals and snacks at around the same time each day.  If you have diabetes:  · If you have diabetes (diabetes mellitus) and chronic kidney disease, it is important to keep your blood glucose in the target range recommended by your health care provider. Follow your diabetes management plan. This may include:  ? Checking your blood glucose regularly.  ? Taking oral medicines, insulin, or both.  ? Exercising for at least 30 minutes on 5 or more days each week, or as told by your health care provider.  ? Tracking how many servings of carbohydrates you eat at each meal.  · You may be given specific guidelines on how much of certain foods and nutrients you may eat, depending on your stage of kidney disease and whether you have high blood pressure (hypertension). Follow your meal plan as told by your dietitian.  What nutrients should be limited?  The items listed are not a complete list. Talk with your dietitian about what dietary choices are best for you.  Potassium  Potassium affects how steadily your heart beats. If too much potassium builds up in your blood, it can cause an irregular heartbeat or even a heart attack.  You may need to eat less potassium, depending on your blood potassium levels and the stage of kidney disease. Talk to your dietitian about how much potassium you may have each day.  You may need to limit  or avoid foods that are high in potassium, such as:  · Milk and soy milk.  · Fruits, such as bananas, papaya, apricots, nectarines, melon, prunes, raisins, kiwi, and oranges.  · Vegetables, such as potatoes, sweet potatoes, yams, tomatoes, leafy greens, beets, okra, avocado, pumpkin, and winter squash.  · White and lima beans.  Phosphorus  Phosphorus is a mineral found in your bones. A balance between calcium and phosphorous is needed to build and maintain healthy bones. Too much phosphorus pulls calcium from your bones. This can make your bones weak and more likely to break. Too much phosphorus can also make your skin itch.  You may need to eat less phosphorus depending on your blood phosphorus levels and the stage of kidney disease. Talk to your dietitian about how much potassium you may have each day. You may need to take medicine to lower your blood phosphorus levels if diet changes do not help.  You may need to limit or avoid foods that are high in phosphorus, such as:  · Milk and dairy products.  · Dried beans and peas.  · Tofu, soy milk, and other soy-based meat replacements.  · Chucky.  · Nuts and peanut butter.  · Meat, poultry, and fish.  · Bran cereals and oatmeals.  Protein  Protein helps you to make and keep muscle. It also helps in the repair of your body’s cells and tissues. One of the natural breakdown products of protein is a waste product called urea. When your kidneys are not working properly, they cannot remove wastes, such as urea, like they did before you developed chronic kidney disease. Reducing how much protein you eat can help prevent a buildup of urea in your blood.  Depending on your stage of kidney disease, you may need to limit foods that are high in protein. Sources of animal protein include:  · Meat (all types).  · Fish and seafood.  · Poultry.  · Eggs.  · Dairy.  Other protein foods include:  · Beans and legumes.  · Nuts and nut butter.  · Soy and tofu.  Sodium  Sodium, which is found  in salt, helps maintain a healthy balance of fluids in your body. Too much sodium can increase your blood pressure and have a negative effect on the function of your heart and lungs. Too much sodium can also cause your body to retain too much fluid, making your kidneys work harder.  Most people should have less than 2,300 milligrams (mg) of sodium each day. If you have hypertension, you may need to limit your sodium to 1,500 mg each day. Talk to your dietitian about how much sodium you may have each day.  You may need to limit or avoid foods that are high in sodium, such as:  · Salt seasonings.  · Soy sauce.  · Cured and processed meats.  · Salted crackers and snack foods.  · Fast food.  · Canned soups and most canned foods.  · Pickled foods.  · Vegetable juice.  · Boxed mixes or ready-to-eat boxed meals and side dishes.  · Bottled dressings, sauces, and marinades.  Summary  · Chronic kidney disease can lead to a buildup and imbalance of waste and excess substances in the body. Certain foods lead to a buildup of these substances. By adjusting your intake of these foods, you could help prevent more kidney damage and delay or prevent the need for dialysis.  · Food adjustments are different for each person with chronic kidney disease. Work with a dietitian to set up nutrient goals and a meal plan that is right for you.  · If you have diabetes and chronic kidney disease, it is important to keep your blood glucose in the target range recommended by your health care provider.  This information is not intended to replace advice given to you by your health care provider. Make sure you discuss any questions you have with your health care provider.  Document Released: 03/09/2004 Document Revised: 04/09/2020 Document Reviewed: 12/13/2017  Elsevier Patient Education © 2020 [employer]  Eating Plan for Dialysis  Dialysis is a treatment that cleans your blood. It is used when your kidneys are damaged. When you need dialysis,  "you should watch what you eat. This is because some nutrients can build up in your blood between treatments and make you sick.  Your doctor or diet specialist (dietitian) will:  · Tell you what nutrients you should include or avoid.  · Tell you how much of these nutrients you should get each day.  · Help you plan meals.  · Tell you how much to drink each day.  What are tips for following this plan?  Reading food labels  · Check food labels for:  ? Potassium. This is found in milk, fruits, and vegetables.  ? Phosphorus. This is found in milk, cheese, beans, nuts, and carbonated beverages.  ? Salt (sodium). This is in processed meats, cured meats, ready-made frozen meals, canned vegetables, and salty snack foods.  · Try to find foods that are low in potassium, phosphorus, and sodium.  · Look for foods that are labeled \"sodium free,\" \"reduced sodium,\" or \"low sodium.\"  Shopping  · Do not buy whole-grain and high-fiber foods.  · Do not buy or use salt substitutes.  · Do not buy processed foods.  Cooking  · Drain all fluid from cooked vegetables and canned fruits before you eat them.  · Before you cook potatoes, cut them into small pieces. Then boil them in unsalted water.  · Try using herbs and spices that do not contain sodium to add flavor.  Meal planning  Most people on dialysis should try to eat:  · 6-11 servings of grains each day. One serving is equal to 1 slice of bread or ½ cup of cooked rice or pasta.  · 2-3 servings of low-potassium vegetables each day. One serving is equal to ½ cup.  · 2-3 servings of low-potassium fruits each day. One serving is equal to ½ cup.  · Protein, such as meat, poultry, fish, and eggs. Talk with your doctor or dietitian about the right amount and type of protein to eat.  · ½ cup of dairy each day.  General information  · Follow your doctor's instructions about how much to drink. You may be told to:  ? Write down what you drink.  ? Write down the foods you eat that are made mostly " from water, such as gelatin and soups.  ? Drink from small cups.  · Take vitamin and mineral supplements only as told by your doctor.  · Take over-the-counter and prescription medicines only as told by your doctor.  What foods can I eat?         Fruits  Apples. Fresh or frozen berries. Fresh or canned pears, peaches, and pineapple. Grapes. Plums.  Vegetables  Fresh or frozen broccoli, carrots, and green beans. Cabbage. Cauliflower. Celery. Cucumbers. Eggplant. Radishes. Zucchini.  Grains  White bread. White rice. Cooked cereal. Unsalted popcorn. Tortillas. Pasta.  Meats and other proteins  Fresh or frozen beef, pork, chicken, and fish. Eggs.  Dairy  Cream cheese. Heavy cream. Ricotta cheese.  Beverages  Apple cider. Cranberry juice. Grape juice. Lemonade. Black coffee. Rice milk (that is not enriched or fortified).  Seasonings and condiments  Herbs. Spices. Jam and jelly. Honey.  Sweets and desserts  Sherbet. Cakes. Cookies.  Fats and oils  Olive oil, canola oil, and safflower oil.  Other foods  Non-dairy creamer. Non-dairy whipped topping. Homemade broth without salt.  The items listed above may not be a complete list of foods and beverages you can eat. Contact your dietitian for more options.  What foods should I avoid?  Fruits  Star fruit. Bananas. Oranges. Kiwi. Nectarines. Prunes. Melon. Dried fruit. Avocado.  Vegetables  Potatoes. Beets. Tomatoes. Winter squash and pumpkin. Asparagus. Spinach. Parsnips.  Grains  Whole-grain bread. Whole-grain pasta. High-fiber cereal.  Meats and other proteins  Canned, smoked, and cured meats. Packaged lunch meat. Sardines. Nuts and seeds. Peanut butter. Beans and legumes.  Dairy  Milk. Buttermilk. Yogurt. Cheese and cottage cheese. Processed cheese spreads.  Beverages  Orange juice. Prune juice. Carbonated soft drinks.  Seasonings and condiments  Salt. Salt substitutes. Soy sauce.  Sweets and desserts  Ice cream. Chocolate. Candied nuts.  Fats and oils  Butter.  Margarine.  Other foods  Ready-made frozen meals. Canned soups.  The items listed above may not be a complete list of foods and beverages you should avoid. Contact your dietitian for more information.  Summary  · If you are having dialysis, it is important to watch what you eat. Certain nutrients and wastes can build up in your blood and cause you to get sick.  · Your dietitian will help you make an eating plan that meets your needs.  · Avoid foods that are high in potassium, salt (sodium), and phosphorus. Restrict fluids as told by your doctor or dietitian.  This information is not intended to replace advice given to you by your health care provider. Make sure you discuss any questions you have with your health care provider.  Document Released: 06/18/2013 Document Revised: 03/06/2019 Document Reviewed: 12/19/2018  Elsevier Patient Education © 2020 [employer]

## 2020-11-15 PROBLEM — N18.6 END-STAGE KIDNEY DISEASE (HCC): Status: ACTIVE | Noted: 2020-01-01

## 2020-11-15 NOTE — PROGRESS NOTES
Physician Assistant - Brief Progress Note  PERMANENT  11/15/2020 07:25    [facility]  [facility] - U - 10 - [place], [place] ([facility])    ROBBI CUEVAS    Date of Service 11/15/2020 07:25    HPI/Events of Note Rn reports K+ 5.8, this is down from 6.8; creat 11.87. 45M with h/o ESRD, IVDU, endocarditis who has been non-compliant with scheduled HD was admitted to the ICU this morning and received kayexalate, insulin (dextrose). HR 90, BP   133/85.  Will proceed to order insulin/dextrose, calcium, sodium bicarb, albuterol neb.  Pt will need HD, apparently renal service was consulted.  Rn unavailable at this time, requested that RN call AICU back.      Interventions Intermediate-Electrolyte abnormality - evaluation and management        Electronically Signed by: Enmanuel Kirkpatrick (PAC) on 11/15/2020 07:26

## 2020-11-15 NOTE — PROGRESS NOTES
[facility] Nurse Practitioner - Brief Progress Note  PERMANENT  11/15/2020 05:53    [facility]  [facility] - U - 10 - [place], [place] ([facility])    ROBBI CUEVAS    Date of Service 11/15/2020 05:53    HPI/Events of Note AICU Provider Assessment Note  45 year old male admitted to the ICU for ESRD, hyperkalemia, r/o infection/UTI. Patient does not have an established nephrologist or clinic he goes to regularly to receive HD, last session reported 10/16/2020. Presented to the ED after PCP informed   patient of critical lab values    VS-- > 98.8, HR 87, /86, SATs 99% on room air    Pmhx--  >ESRD, IVDA, Endocarditis    CXR--  >ordered    ESRD. Hyperkalemia  -Nephrology consult  -Will need HD, patient reports hasn’t had HD since 10/16 or the HD catheter dressing changed  -BUN/Cr 135/11.74  -K 6.8? Kayexalate, D50/Insulin-->STAT BMP, if remains greater than 5.0 recommend to treat  -Phos 6.8  -Na 132  -Utox negative  -Trend BMPs  -Strict I/O  -Renally dose medication, avoid nephrotoxic agents    r/o infection  -WBC 6.57  -UA +, urine culture pending  -Blood Cx pending  -CXR ordered  -Consider ECHO.Hx Endocarditis  -Received vancomycin and aztreonam per bedside    Anemia  -Hgb 6.8, will get 1 unit PRBC per notes.    Elevated troponin  -Trending down 0.106? 0.097    __x___   Video Assessment performed  __x___   Most recent labs reviewed  __x___   Vital Signs reviewed  __x___   Best Practices addressed:                 VTE prophylaxis:heparin subq                 SUP (when indicated):not indicated                 Glycemic control:goal < 180                      Please notify bedside physician when present or [facility] if glc > 180 X 2  _x____     Spoke with bedside RN  _x____     Orders written    Contact [facility] for any needs if bedside physician is not present.  Julia Camarena APRN, AGACNP-BC      Interventions Major-Acute renal failure - evaluation and management, Electrolyte abnormality  - evaluation and management  Intermediate-Best-practice therapies (e.g. VTE, beta blocker, etc.), Communication with other healthcare providers and/or family, Diagnostic test evaluation, Infection - evaluation and management        Electronically Signed by: Julia Camarena (NP) on 11/15/2020 06:22    Annotated By: Pippa Flanagan)    Date: 11/15/20 06:55  I agree with above.

## 2020-11-15 NOTE — CONSULTS
Patient Name:  Mario Nair  YOB: 1975  [id number]       Patient Care Team:  Danny Rebolledo MD as PCP - General (Family Medicine)      General Surgery Consult Note     Date of Consultation: 11/15/20    Consulting Physician - Beka Colon MD    Reason for Consult -concern for tunnel dialysis line infection    Subjective     I have been asked to see  Mario Nair , a 45 y.o. male with end-stage renal disease in consultation for possible tunneled dialysis catheter infection.     Patient is end-stage renal disease and has had right IJ tunneled dialysis catheter placement approximately 1 month ago but is been unable to get into a dialysis clinic so has not been compliant with dialysis.  He continues to make urine.  Per report, he had not changed his dressing to his dialysis line since being here a month ago.  General surgery was consulted to evaluate the dialysis line for signs of infection.      Allergy:   Allergies   Allergen Reactions   • Penicillins Shortness Of Breath, Itching and Rash       Medications:  aztreonam, 500 mg, Intravenous, Q12H  heparin (porcine), 5,000 Units, Subcutaneous, Q12H  sodium chloride, 10 mL, Intravenous, Q12H      Pharmacy Consult - Pharmacy to dose,       No current facility-administered medications on file prior to encounter.      Current Outpatient Medications on File Prior to Encounter   Medication Sig   • amLODIPine (NORVASC) 5 MG tablet Take 1 tablet by mouth Daily.   • calcium acetate (PHOS BINDER,) 667 MG capsule capsule Take 1 capsule by mouth 3 (Three) Times a Day With Meals.   • carvedilol (COREG) 12.5 MG tablet Take 1 tablet by mouth 2 (Two) Times a Day With Meals.   • hydrALAZINE (APRESOLINE) 10 MG tablet Take 1 tablet by mouth 3 (Three) Times a Day.   • isosorbide dinitrate (ISORDIL) 10 MG tablet Take 1 tablet by mouth 3 (Three) Times a Day.   • pantoprazole (PROTONIX) 40 MG EC tablet Take 1 tablet by mouth Daily for 30 days.       PMHx:    "  Past Medical History:   Diagnosis Date   • Endocarditis    • ESRD (end stage renal disease) (CMS/[facility])    • IV drug abuse (CMS/[facility])    • Kidney failure    • Unable to read or write          Past Surgical History:  Right IJ tunneled dialysis catheter    Family History: Maternal grandmother had kidney issues    Social History:   Tobacco use: Smoker   EtOH use : Denies   Illicit drug use: History of IV drug abuse     Review of Systems        Constitutional: No fevers, chills or malaise. No unintentional weight loss   Eyes: Denies visual changes    Cardiovascular: Denies chest pain, palpitations   Respiratory: Denies cough or shortness of breath   Genitourinary: Denies dysuria or hematuria   Musculoskeletal: Denies any but chronic joint aches, pains or deformities.  Some leg swelling   Psychiatric: No recent mood changes   Neurologic: No paresthesias or loss of function             Objective     Physical Exam:      Vital Signs  /78   Pulse 89   Temp 98.1 °F (36.7 °C) (Oral)   Resp 15   Ht 188 cm (74\")   Wt 74.8 kg (165 lb)   SpO2 98%   BMI 21.18 kg/m²     Intake/Output Summary (Last 24 hours) at 11/15/2020 0920  Last data filed at 11/15/2020 0503  Gross per 24 hour   Intake 500 ml   Output --   Net 500 ml         Physical Exam:    Constitution: /78   Pulse 89   Temp 98.1 °F (36.7 °C) (Oral)   Resp 15   Ht 188 cm (74\")   Wt 74.8 kg (165 lb)   SpO2 98%   BMI 21.18 kg/m²  . No acute distress  Head: Normocephalic, atraumatic.   Eyes: Aligned without strabismus. Conjunctiva noninjected   Ears, Nose, Mouth:  No lesions appreciated.  Right IJ tunneled dialysis line in place, dirt and particulate matter on exterior portion.  No signs of cellulitis or purulence.  CV: Rhythm  and rate regular  Respiratory: Symmetric chest expansion.  No respiratory distress  Lymphatics: No abnormal cervical or supraclavicular adenopathy  Neurologic: No gross deficits   Psychiatric: alert and oriented x3         Results " Review: I have personally reviewed all of the recent lab and imaging results available at this time.     Lab Results (last 24 hours)     Procedure Component Value Units Date/Time    CBC & Differential [[id number]]  (Abnormal) Collected: 11/14/20 2207    Specimen: Blood from Arm, Left Updated: 11/14/20 2239    Narrative:      The following orders were created for panel order CBC & Differential.  Procedure                               Abnormality         Status                     ---------                               -----------         ------                     CBC Auto Differential[[id number]]        Abnormal            Final result                 Please view results for these tests on the individual orders.    Comprehensive Metabolic Panel [[id number]]  (Abnormal) Collected: 11/14/20 2207    Specimen: Blood from Arm, Left Updated: 11/14/20 2248     Glucose 106 mg/dL       mg/dL      Creatinine 11.74 mg/dL      Sodium 132 mmol/L      Potassium 6.8 mmol/L      Chloride 102 mmol/L      CO2 11.3 mmol/L      Calcium 6.9 mg/dL      Total Protein 7.9 g/dL      Albumin 3.39 g/dL      ALT (SGPT) 16 U/L      AST (SGOT) 13 U/L      Alkaline Phosphatase 113 U/L      Total Bilirubin 0.4 mg/dL      eGFR Non African Amer 5 mL/min/1.73      Comment: <15 Indicative of kidney failure.        eGFR   Amer --     Comment: <15 Indicative of kidney failure.        Globulin 4.5 gm/dL      A/G Ratio 0.8 g/dL      BUN/Creatinine Ratio 11.5     Anion Gap 18.7 mmol/L     Narrative:      GFR Normal >60  Chronic Kidney Disease <60  Kidney Failure <15      CBC Auto Differential [[id number]]  (Abnormal) Collected: 11/14/20 2207    Specimen: Blood from Arm, Left Updated: 11/14/20 2239     WBC 6.57 10*3/mm3      RBC 2.42 10*6/mm3      Hemoglobin 6.9 g/dL      Hematocrit 22.3 %      MCV 92.1 fL      MCH 28.5 pg      MCHC 30.9 g/dL      RDW 15.5 %      RDW-SD 53.1 fl      MPV 9.7 fL      Platelets 118 10*3/mm3      Neutrophil % 60.4 %       Lymphocyte % 23.9 %      Monocyte % 9.9 %      Eosinophil % 4.4 %      Basophil % 0.9 %      Immature Grans % 0.5 %      Neutrophils, Absolute 3.97 10*3/mm3      Lymphocytes, Absolute 1.57 10*3/mm3      Monocytes, Absolute 0.65 10*3/mm3      Eosinophils, Absolute 0.29 10*3/mm3      Basophils, Absolute 0.06 10*3/mm3      Immature Grans, Absolute 0.03 10*3/mm3      nRBC 0.0 /100 WBC     BNP [[id number]]  (Abnormal) Collected: 11/14/20 2207    Specimen: Blood from Arm, Left Updated: 11/14/20 2330     proBNP 4,428.0 pg/mL     Narrative:      Among patients with dyspnea, NT-proBNP is highly sensitive for the detection of acute congestive heart failure. In addition NT-proBNP of <300 pg/ml effectively rules out acute congestive heart failure with 99% negative predictive value.    Results may be falsely decreased if patient taking Biotin.      Troponin [[id number]]  (Abnormal) Collected: 11/14/20 2207    Specimen: Blood from Arm, Left Updated: 11/14/20 2342     Troponin T 0.106 ng/mL     Narrative:      Troponin T Reference Range:  <= 0.03 ng/mL-   Negative for AMI  >0.03 ng/mL-     Abnormal for myocardial necrosis.  Clinicians would have to utilize clinical acumen, EKG, Troponin and serial changes to determine if it is an Acute Myocardial Infarction or myocardial injury due to an underlying chronic condition.       Results may be falsely decreased if patient taking Biotin.      T4, Free [[id number]]  (Abnormal) Collected: 11/14/20 2207    Specimen: Blood from Arm, Left Updated: 11/14/20 2330     Free T4 0.85 ng/dL     Narrative:      Results may be falsely increased if patient taking Biotin.      TSH [[id number]]  (Normal) Collected: 11/14/20 2207    Specimen: Blood from Arm, Left Updated: 11/14/20 2330     TSH 2.380 uIU/mL     Phosphorus [[id number]]  (Abnormal) Collected: 11/14/20 2207    Specimen: Blood from Arm, Left Updated: 11/14/20 2325     Phosphorus 8.4 mg/dL     Magnesium [[id number]]  (Normal) Collected: 11/14/20  2207    Specimen: Blood from Arm, Left Updated: 11/14/20 2325     Magnesium 1.9 mg/dL     CK [[id number]]  (Normal) Collected: 11/14/20 2207    Specimen: Blood from Arm, Left Updated: 11/14/20 2325     Creatine Kinase 113 U/L     Acetaminophen Level [[id number]]  (Normal) Collected: 11/14/20 2207    Specimen: Blood from Arm, Left Updated: 11/14/20 2325     Acetaminophen <5.0 mcg/mL     Ethanol [[id number]] Collected: 11/14/20 2207    Specimen: Blood from Arm, Left Updated: 11/14/20 2325     Ethanol <10 mg/dL      Ethanol % <0.010 %     Narrative:      >/= 80.0 legally intoxicated    Salicylate Level [[id number]]  (Normal) Collected: 11/14/20 2207    Specimen: Blood from Arm, Left Updated: 11/14/20 2325     Salicylate <0.3 mg/dL     Protime-INR [[id number]]  (Abnormal) Collected: 11/14/20 2340    Specimen: Blood Updated: 11/14/20 2353     Protime 17.4 Seconds      Comment: Note new Reference Range        INR 1.45    Narrative:      Suggested INR therapeutic range for stable oral anticoagulant therapy:    Low Intensity therapy:   1.5-2.0  Moderate Intensity therapy:   2.0-3.0  High Intensity therapy:   2.5-4.0    aPTT [[id number]]  (Abnormal) Collected: 11/14/20 2340    Specimen: Blood Updated: 11/14/20 2353     PTT 37.9 seconds      Comment: Note new Reference Range       Narrative:      PTT Heparin Therapeutic Range:  59 - 95 seconds      Urinalysis With Culture If Indicated - Urine, Clean Catch [[id number]]  (Abnormal) Collected: 11/15/20 0143    Specimen: Urine, Clean Catch Updated: 11/15/20 0237     Color, UA Yellow     Appearance, UA Turbid     pH, UA 5.5     Specific Gravity, UA 1.012     Glucose, UA Negative     Ketones, UA Negative     Bilirubin, UA Negative     Blood, UA Moderate (2+)     Protein,  mg/dL (2+)     Leuk Esterase, UA Large (3+)     Nitrite, UA Negative     Urobilinogen, UA 0.2 E.U./dL    Urine Drug Screen - Urine, Clean Catch [[id number]]  (Normal) Collected: 11/15/20 0143    Specimen: Urine,  Clean Catch Updated: 11/15/20 0201     Amphetamine Screen, Urine Negative     Barbiturates Screen, Urine Negative     Benzodiazepine Screen, Urine Negative     Cocaine Screen, Urine Negative     Methadone Screen, Urine Negative     Opiate Screen Negative     Phencyclidine (PCP), Urine Negative     THC, Screen, Urine Negative     6-ACETYL MORPHINE Negative     Buprenorphine, Screen, Urine Negative     Oxycodone Screen, Urine Negative    Narrative:      Negative Thresholds For Drugs Screened:                  Amphetamines              1000 ng/ml               Barbiturates               200 ng/ml               Benzodiazepines            200 ng/ml              Cocaine                    300 ng/ml              Methadone                  300 ng/ml              Opiates                    300 ng/ml               Phencyclidine               25 ng/ml               THC                         50 ng/ml              6-Acetyl Morphine           10 ng/ml              Buprenorphine                5 ng/ml              Oxycodone                  300 ng/ml    The reference range for all drugs tested is negative. This report includes final unconfirmed qualitative results to be used for medical treatment purposes only. Unconfirmed results must not be used for non-medical purposes such as employment or legal testing. Clinical consideration should be applied to any drug of abuse test, especially when unconfirmed quantitative results are used.        Urinalysis, Microscopic Only - Urine, Clean Catch [[id number]]  (Abnormal) Collected: 11/15/20 0143    Specimen: Urine, Clean Catch Updated: 11/15/20 0237     RBC, UA 3-5 /HPF      WBC, UA Too Numerous to Count /HPF      Bacteria, UA 3+ /HPF      Squamous Epithelial Cells, UA 0-2 /HPF      Hyaline Casts, UA None Seen /LPF      Methodology Manual Light Microscopy    Urine Culture - Urine, Urine, Clean Catch [[id number]] Collected: 11/15/20 0143    Specimen: Urine, Clean Catch Updated: 11/15/20  0237    COVID-19, [facility] IN-HOUSE,NP Swab (NO TRANSPORT MEDIA) 2 HR TAT - Swab, Nasopharynx [[id number]]  (Normal) Collected: 11/15/20 0327    Specimen: Swab from Nasopharynx Updated: 11/15/20 0350     COVID19 Not Detected    Narrative:      Fact sheet for providers: https://www.fda.gov/media/091095/download     Fact sheet for patients: https://www.fda.gov/media/031877/download    Troponin [[id number]]  (Abnormal) Collected: 11/15/20 0440    Specimen: Blood from Hand, Right Updated: 11/15/20 0540     Troponin T 0.097 ng/mL     Narrative:      Troponin T Reference Range:  <= 0.03 ng/mL-   Negative for AMI  >0.03 ng/mL-     Abnormal for myocardial necrosis.  Clinicians would have to utilize clinical acumen, EKG, Troponin and serial changes to determine if it is an Acute Myocardial Infarction or myocardial injury due to an underlying chronic condition.       Results may be falsely decreased if patient taking Biotin.      Basic Metabolic Panel [[id number]]  (Abnormal) Collected: 11/15/20 0440    Specimen: Blood from Hand, Right Updated: 11/15/20 0609     Glucose 64 mg/dL       mg/dL      Creatinine 11.87 mg/dL      Sodium 134 mmol/L      Potassium 5.8 mmol/L      Chloride 103 mmol/L      CO2 11.4 mmol/L      Calcium 6.9 mg/dL      eGFR   Amer --     Comment: <15 Indicative of kidney failure.        eGFR Non African Amer 5 mL/min/1.73      Comment: <15 Indicative of kidney failure.        BUN/Creatinine Ratio 12.1     Anion Gap 19.6 mmol/L     Narrative:      GFR Normal >60  Chronic Kidney Disease <60  Kidney Failure <15      POC Glucose Once [[id number]]  (Abnormal) Collected: 11/15/20 0647    Specimen: Blood Updated: 11/15/20 0655     Glucose 172 mg/dL     CBC Auto Differential [[id number]]  (Abnormal) Collected: 11/15/20 0715    Specimen: Blood Updated: 11/15/20 0752     WBC 6.45 10*3/mm3      RBC 2.48 10*6/mm3      Hemoglobin 7.1 g/dL      Hematocrit 23.2 %      MCV 93.5 fL      MCH 28.6 pg      MCHC 30.6  g/dL      RDW 15.7 %      RDW-SD 53.6 fl      MPV 8.8 fL      Platelets 114 10*3/mm3      Neutrophil % 71.2 %      Lymphocyte % 16.1 %      Monocyte % 8.2 %      Eosinophil % 3.3 %      Basophil % 0.6 %      Immature Grans % 0.6 %      Neutrophils, Absolute 4.59 10*3/mm3      Lymphocytes, Absolute 1.04 10*3/mm3      Monocytes, Absolute 0.53 10*3/mm3      Eosinophils, Absolute 0.21 10*3/mm3      Basophils, Absolute 0.04 10*3/mm3      Immature Grans, Absolute 0.04 10*3/mm3      nRBC 0.0 /100 WBC     Blood Culture - Blood, Arm, Left [[id number]] Collected: 11/15/20 0715    Specimen: Blood from Arm, Left Updated: 11/15/20 0736    Troponin [[id number]]  (Abnormal) Collected: 11/15/20 0716    Specimen: Blood Updated: 11/15/20 0822     Troponin T 0.087 ng/mL     Narrative:      Troponin T Reference Range:  <= 0.03 ng/mL-   Negative for AMI  >0.03 ng/mL-     Abnormal for myocardial necrosis.  Clinicians would have to utilize clinical acumen, EKG, Troponin and serial changes to determine if it is an Acute Myocardial Infarction or myocardial injury due to an underlying chronic condition.       Results may be falsely decreased if patient taking Biotin.      Comprehensive Metabolic Panel [[id number]]  (Abnormal) Collected: 11/15/20 0716    Specimen: Blood Updated: 11/15/20 0822     Glucose 153 mg/dL       mg/dL      Creatinine 11.64 mg/dL      Sodium 132 mmol/L      Potassium 6.1 mmol/L      Chloride 103 mmol/L      CO2 10.9 mmol/L      Calcium 6.6 mg/dL      Total Protein 7.3 g/dL      Albumin 2.94 g/dL      ALT (SGPT) 16 U/L      AST (SGOT) 12 U/L      Alkaline Phosphatase 101 U/L      Total Bilirubin 0.3 mg/dL      eGFR Non African Amer 5 mL/min/1.73      Comment: <15 Indicative of kidney failure.        eGFR   Amer --     Comment: <15 Indicative of kidney failure.        Globulin 4.4 gm/dL      A/G Ratio 0.7 g/dL      BUN/Creatinine Ratio 12.4     Anion Gap 18.1 mmol/L     Narrative:      GFR Normal >60  Chronic  Kidney Disease <60  Kidney Failure <15      Blood Culture - Blood, Arm, Left [[id number]] Collected: 11/15/20 0716    Specimen: Blood from Arm, Left Updated: 11/15/20 0736    Blood Gas, Venous With Co-Ox [[id number]] Collected: 11/15/20 0809    Specimen: Venous Blood Updated: 11/15/20 0812    POC Glucose Once [[id number]]  (Abnormal) Collected: 11/15/20 0859    Specimen: Blood Updated: 11/15/20 0905     Glucose 134 mg/dL               Assessment/Plan     Assessment and Plan:    45 y.o. male with end-stage renal disease and complex social issues contributing to poor compliance    -No signs of acute infection at site of dialysis line, however, it is filthy.  I have discussed with the patient that this increases his risk of hardware infection and sepsis.        I discussed the patient's findings and my recommendations with the patient and Dr. Tam Carl MD  [facility]  11/15/20  09:20 EST

## 2020-11-15 NOTE — CONSULTS
Referring Provider: Dr Conti  Reason for Consultation: Hyperkalemia    Chief complaint abnormal labs    Subjective .     History of present illness: Patient has end-stage renal disease.  Patient was getting chronic dialysis at an ambulatory facility in [place].  He left that facility and since then has not been able to find a local facility.  He went to his primary care physician earlier in the week and labs drawn showed a potassium of 6.6 upon which he was directed to the emergency room where repeat potassium came back at 6.8 with a hemoglobin of 6.9.  Emergency room physician whom I spoke to had already given him several medications for management of hyperkalemia.  We decided to admit the patient for transfusion and dialysis.    The patient has already expressed to Dr. Colon that he does not intend to stay in the facility after his dialysis is done and he reiterates the same to me.  He has also been made aware of his UTI by the primary team.    The patient denies any fever or chills or dysuria or nausea or vomiting or chest pain or abdominal pain.  There has been no drainage from the catheter exit site, the dressing of which has not been changed since 16 October.  The catheter blood flow is sluggish according to the dialysis nurse.    Patient says he makes a fair amount of urine.  He denies oliguria or dysuria or hematuria.    To my best recollection the patient does have hepatitis B and has seen Dr. Cerda for the same    Hemodynamic data reviewed     All other systems were reviewed and negative.    History  Past Medical History:   Diagnosis Date   • Endocarditis    • ESRD (end stage renal disease) (CMS/[facility])    • IV drug abuse (CMS/[facility])    • Kidney failure    • Unable to read or write    ,   Past Surgical History:   Procedure Laterality Date   • CENTRAL VENOUS CATHETER TUNNELED INSERTION DOUBLE LUMEN Right     Chest for hemodialysis   ,   Family History   Problem Relation Age of Onset   • Alcohol abuse  Mother    • Hypertension Mother    • Hypertension Other    • Kidney disease Maternal Grandmother    ,   Social History     Tobacco Use   • Smoking status: Current Every Day Smoker     Packs/day: 0.50     Years: 20.00     Pack years: 10.00     Types: Cigarettes   • Smokeless tobacco: Never Used   Substance Use Topics   • Alcohol use: Not Currently     Frequency: Never     Comment: Quit 18 years ago   • Drug use: Yes     Types: Methamphetamines     Comment: last used on Friday 5/18/2019    and Allergies:  Penicillins      Vital Signs   Temp:  [97.9 °F (36.6 °C)-98.8 °F (37.1 °C)] 97.9 °F (36.6 °C)  Heart Rate:  [] 96  Resp:  [15-20] 20  BP: (100-138)/(71-94) 100/94    Physical Exam:     General Appearance:    Alert, cooperative, in no acute distress, oriented times three   Head:    Normocephalic, without obvious abnormality   Eyes:            Conjunctivae and sclerae normal, no icterus, no pallor, pupils CCERLA   Ears:    Ears appear intact    Throat:   No oral lesions, no thrush, oral mucosa moist   Neck:   No JVD   Back:    range of motion normal   Lungs:     Clear to auscultation,respirations regular, even and                  unlabored    Heart:    Regular rhythm and normal rate, normal S1 and S2       Abdomen:     Normal bowel sounds, no tenderness   Rectal:     Deferred   Extremities:  No edema               Neurologic:   Cr Ns grossly intact, moves all extremities.     Results Review:   I reviewed the patient's new clinical results.      Lab Results (last 24 hours)     Procedure Component Value Units Date/Time    POC Glucose Once [[id number]]  (Abnormal) Collected: 11/15/20 0859    Specimen: Blood Updated: 11/15/20 0905     Glucose 134 mg/dL     Troponin [[id number]]  (Abnormal) Collected: 11/15/20 0716    Specimen: Blood Updated: 11/15/20 0822     Troponin T 0.087 ng/mL     Narrative:      Troponin T Reference Range:  <= 0.03 ng/mL-   Negative for AMI  >0.03 ng/mL-     Abnormal for myocardial necrosis.   Clinicians would have to utilize clinical acumen, EKG, Troponin and serial changes to determine if it is an Acute Myocardial Infarction or myocardial injury due to an underlying chronic condition.       Results may be falsely decreased if patient taking Biotin.      Comprehensive Metabolic Panel [[id number]]  (Abnormal) Collected: 11/15/20 0716    Specimen: Blood Updated: 11/15/20 0822     Glucose 153 mg/dL       mg/dL      Creatinine 11.64 mg/dL      Sodium 132 mmol/L      Potassium 6.1 mmol/L      Chloride 103 mmol/L      CO2 10.9 mmol/L      Calcium 6.6 mg/dL      Total Protein 7.3 g/dL      Albumin 2.94 g/dL      ALT (SGPT) 16 U/L      AST (SGOT) 12 U/L      Alkaline Phosphatase 101 U/L      Total Bilirubin 0.3 mg/dL      eGFR Non African Amer 5 mL/min/1.73      Comment: <15 Indicative of kidney failure.        eGFR   Amer --     Comment: <15 Indicative of kidney failure.        Globulin 4.4 gm/dL      A/G Ratio 0.7 g/dL      BUN/Creatinine Ratio 12.4     Anion Gap 18.1 mmol/L     Narrative:      GFR Normal >60  Chronic Kidney Disease <60  Kidney Failure <15      Blood Gas, Venous With Co-Ox [[id number]] Collected: 11/15/20 0809    Specimen: Venous Blood Updated: 11/15/20 0812    CBC Auto Differential [[id number]]  (Abnormal) Collected: 11/15/20 0715    Specimen: Blood Updated: 11/15/20 0752     WBC 6.45 10*3/mm3      RBC 2.48 10*6/mm3      Hemoglobin 7.1 g/dL      Hematocrit 23.2 %      MCV 93.5 fL      MCH 28.6 pg      MCHC 30.6 g/dL      RDW 15.7 %      RDW-SD 53.6 fl      MPV 8.8 fL      Platelets 114 10*3/mm3      Neutrophil % 71.2 %      Lymphocyte % 16.1 %      Monocyte % 8.2 %      Eosinophil % 3.3 %      Basophil % 0.6 %      Immature Grans % 0.6 %      Neutrophils, Absolute 4.59 10*3/mm3      Lymphocytes, Absolute 1.04 10*3/mm3      Monocytes, Absolute 0.53 10*3/mm3      Eosinophils, Absolute 0.21 10*3/mm3      Basophils, Absolute 0.04 10*3/mm3      Immature Grans, Absolute 0.04 10*3/mm3       nRBC 0.0 /100 WBC     Blood Culture - Blood, Arm, Left [[id number]] Collected: 11/15/20 0716    Specimen: Blood from Arm, Left Updated: 11/15/20 0736    Blood Culture - Blood, Arm, Left [[id number]] Collected: 11/15/20 0715    Specimen: Blood from Arm, Left Updated: 11/15/20 0736    POC Glucose Once [[id number]]  (Abnormal) Collected: 11/15/20 0647    Specimen: Blood Updated: 11/15/20 0655     Glucose 172 mg/dL     Basic Metabolic Panel [[id number]]  (Abnormal) Collected: 11/15/20 0440    Specimen: Blood from Hand, Right Updated: 11/15/20 0609     Glucose 64 mg/dL       mg/dL      Creatinine 11.87 mg/dL      Sodium 134 mmol/L      Potassium 5.8 mmol/L      Chloride 103 mmol/L      CO2 11.4 mmol/L      Calcium 6.9 mg/dL      eGFR   Amer --     Comment: <15 Indicative of kidney failure.        eGFR Non African Amer 5 mL/min/1.73      Comment: <15 Indicative of kidney failure.        BUN/Creatinine Ratio 12.1     Anion Gap 19.6 mmol/L     Narrative:      GFR Normal >60  Chronic Kidney Disease <60  Kidney Failure <15      Troponin [[id number]]  (Abnormal) Collected: 11/15/20 0440    Specimen: Blood from Hand, Right Updated: 11/15/20 0540     Troponin T 0.097 ng/mL     Narrative:      Troponin T Reference Range:  <= 0.03 ng/mL-   Negative for AMI  >0.03 ng/mL-     Abnormal for myocardial necrosis.  Clinicians would have to utilize clinical acumen, EKG, Troponin and serial changes to determine if it is an Acute Myocardial Infarction or myocardial injury due to an underlying chronic condition.       Results may be falsely decreased if patient taking Biotin.      COVID-19, [facility] IN-HOUSE,NP Swab (NO TRANSPORT MEDIA) 2 HR TAT - Swab, Nasopharynx [[id number]]  (Normal) Collected: 11/15/20 0327    Specimen: Swab from Nasopharynx Updated: 11/15/20 0350     COVID19 Not Detected    Narrative:      Fact sheet for providers: https://www.fda.gov/media/968045/download     Fact sheet for patients:  https://www.fda.gov/media/582966/download    Urinalysis With Culture If Indicated - Urine, Clean Catch [[id number]]  (Abnormal) Collected: 11/15/20 0143    Specimen: Urine, Clean Catch Updated: 11/15/20 0237     Color, UA Yellow     Appearance, UA Turbid     pH, UA 5.5     Specific Gravity, UA 1.012     Glucose, UA Negative     Ketones, UA Negative     Bilirubin, UA Negative     Blood, UA Moderate (2+)     Protein,  mg/dL (2+)     Leuk Esterase, UA Large (3+)     Nitrite, UA Negative     Urobilinogen, UA 0.2 E.U./dL    Urinalysis, Microscopic Only - Urine, Clean Catch [[id number]]  (Abnormal) Collected: 11/15/20 0143    Specimen: Urine, Clean Catch Updated: 11/15/20 0237     RBC, UA 3-5 /HPF      WBC, UA Too Numerous to Count /HPF      Bacteria, UA 3+ /HPF      Squamous Epithelial Cells, UA 0-2 /HPF      Hyaline Casts, UA None Seen /LPF      Methodology Manual Light Microscopy    Urine Culture - Urine, Urine, Clean Catch [[id number]] Collected: 11/15/20 0143    Specimen: Urine, Clean Catch Updated: 11/15/20 0237    Urine Drug Screen - Urine, Clean Catch [[id number]]  (Normal) Collected: 11/15/20 0143    Specimen: Urine, Clean Catch Updated: 11/15/20 0201     Amphetamine Screen, Urine Negative     Barbiturates Screen, Urine Negative     Benzodiazepine Screen, Urine Negative     Cocaine Screen, Urine Negative     Methadone Screen, Urine Negative     Opiate Screen Negative     Phencyclidine (PCP), Urine Negative     THC, Screen, Urine Negative     6-ACETYL MORPHINE Negative     Buprenorphine, Screen, Urine Negative     Oxycodone Screen, Urine Negative    Narrative:      Negative Thresholds For Drugs Screened:                  Amphetamines              1000 ng/ml               Barbiturates               200 ng/ml               Benzodiazepines            200 ng/ml              Cocaine                    300 ng/ml              Methadone                  300 ng/ml              Opiates                    300 ng/ml                Phencyclidine               25 ng/ml               THC                         50 ng/ml              6-Acetyl Morphine           10 ng/ml              Buprenorphine                5 ng/ml              Oxycodone                  300 ng/ml    The reference range for all drugs tested is negative. This report includes final unconfirmed qualitative results to be used for medical treatment purposes only. Unconfirmed results must not be used for non-medical purposes such as employment or legal testing. Clinical consideration should be applied to any drug of abuse test, especially when unconfirmed quantitative results are used.        Protime-INR [[id number]]  (Abnormal) Collected: 11/14/20 2340    Specimen: Blood Updated: 11/14/20 2353     Protime 17.4 Seconds      Comment: Note new Reference Range        INR 1.45    Narrative:      Suggested INR therapeutic range for stable oral anticoagulant therapy:    Low Intensity therapy:   1.5-2.0  Moderate Intensity therapy:   2.0-3.0  High Intensity therapy:   2.5-4.0    aPTT [[id number]]  (Abnormal) Collected: 11/14/20 2340    Specimen: Blood Updated: 11/14/20 2353     PTT 37.9 seconds      Comment: Note new Reference Range       Narrative:      PTT Heparin Therapeutic Range:  59 - 95 seconds      Troponin [[id number]]  (Abnormal) Collected: 11/14/20 2207    Specimen: Blood from Arm, Left Updated: 11/14/20 2342     Troponin T 0.106 ng/mL     Narrative:      Troponin T Reference Range:  <= 0.03 ng/mL-   Negative for AMI  >0.03 ng/mL-     Abnormal for myocardial necrosis.  Clinicians would have to utilize clinical acumen, EKG, Troponin and serial changes to determine if it is an Acute Myocardial Infarction or myocardial injury due to an underlying chronic condition.       Results may be falsely decreased if patient taking Biotin.      BNP [[id number]]  (Abnormal) Collected: 11/14/20 2207    Specimen: Blood from Arm, Left Updated: 11/14/20 2330     proBNP 4,428.0 pg/mL      Narrative:      Among patients with dyspnea, NT-proBNP is highly sensitive for the detection of acute congestive heart failure. In addition NT-proBNP of <300 pg/ml effectively rules out acute congestive heart failure with 99% negative predictive value.    Results may be falsely decreased if patient taking Biotin.      T4, Free [[id number]]  (Abnormal) Collected: 11/14/20 2207    Specimen: Blood from Arm, Left Updated: 11/14/20 2330     Free T4 0.85 ng/dL     Narrative:      Results may be falsely increased if patient taking Biotin.      TSH [[id number]]  (Normal) Collected: 11/14/20 2207    Specimen: Blood from Arm, Left Updated: 11/14/20 2330     TSH 2.380 uIU/mL     Phosphorus [[id number]]  (Abnormal) Collected: 11/14/20 2207    Specimen: Blood from Arm, Left Updated: 11/14/20 2325     Phosphorus 8.4 mg/dL     Magnesium [[id number]]  (Normal) Collected: 11/14/20 2207    Specimen: Blood from Arm, Left Updated: 11/14/20 2325     Magnesium 1.9 mg/dL     CK [[id number]]  (Normal) Collected: 11/14/20 2207    Specimen: Blood from Arm, Left Updated: 11/14/20 2325     Creatine Kinase 113 U/L     Acetaminophen Level [[id number]]  (Normal) Collected: 11/14/20 2207    Specimen: Blood from Arm, Left Updated: 11/14/20 2325     Acetaminophen <5.0 mcg/mL     Ethanol [[id number]] Collected: 11/14/20 2207    Specimen: Blood from Arm, Left Updated: 11/14/20 2325     Ethanol <10 mg/dL      Ethanol % <0.010 %     Narrative:      >/= 80.0 legally intoxicated    Salicylate Level [[id number]]  (Normal) Collected: 11/14/20 2207    Specimen: Blood from Arm, Left Updated: 11/14/20 2325     Salicylate <0.3 mg/dL     Comprehensive Metabolic Panel [[id number]]  (Abnormal) Collected: 11/14/20 2207    Specimen: Blood from Arm, Left Updated: 11/14/20 2248     Glucose 106 mg/dL       mg/dL      Creatinine 11.74 mg/dL      Sodium 132 mmol/L      Potassium 6.8 mmol/L      Chloride 102 mmol/L      CO2 11.3 mmol/L      Calcium 6.9 mg/dL      Total  Protein 7.9 g/dL      Albumin 3.39 g/dL      ALT (SGPT) 16 U/L      AST (SGOT) 13 U/L      Alkaline Phosphatase 113 U/L      Total Bilirubin 0.4 mg/dL      eGFR Non African Amer 5 mL/min/1.73      Comment: <15 Indicative of kidney failure.        eGFR   Amer --     Comment: <15 Indicative of kidney failure.        Globulin 4.5 gm/dL      A/G Ratio 0.8 g/dL      BUN/Creatinine Ratio 11.5     Anion Gap 18.7 mmol/L     Narrative:      GFR Normal >60  Chronic Kidney Disease <60  Kidney Failure <15      CBC & Differential [[id number]]  (Abnormal) Collected: 11/14/20 2207    Specimen: Blood from Arm, Left Updated: 11/14/20 2239    Narrative:      The following orders were created for panel order CBC & Differential.  Procedure                               Abnormality         Status                     ---------                               -----------         ------                     CBC Auto Differential[[id number]]        Abnormal            Final result                 Please view results for these tests on the individual orders.    CBC Auto Differential [[id number]]  (Abnormal) Collected: 11/14/20 2207    Specimen: Blood from Arm, Left Updated: 11/14/20 2239     WBC 6.57 10*3/mm3      RBC 2.42 10*6/mm3      Hemoglobin 6.9 g/dL      Hematocrit 22.3 %      MCV 92.1 fL      MCH 28.5 pg      MCHC 30.9 g/dL      RDW 15.5 %      RDW-SD 53.1 fl      MPV 9.7 fL      Platelets 118 10*3/mm3      Neutrophil % 60.4 %      Lymphocyte % 23.9 %      Monocyte % 9.9 %      Eosinophil % 4.4 %      Basophil % 0.9 %      Immature Grans % 0.5 %      Neutrophils, Absolute 3.97 10*3/mm3      Lymphocytes, Absolute 1.57 10*3/mm3      Monocytes, Absolute 0.65 10*3/mm3      Eosinophils, Absolute 0.29 10*3/mm3      Basophils, Absolute 0.06 10*3/mm3      Immature Grans, Absolute 0.03 10*3/mm3      nRBC 0.0 /100 WBC           Imaging Results (Last 24 Hours)     Procedure Component Value Units Date/Time    XR Chest 1 View [[id number]]  Collected: 11/15/20 1117     Updated: 11/15/20 1119    Narrative:      EXAM:    XR Chest, 1 View     EXAM DATE:    11/15/2020 6:27 AM     CLINICAL HISTORY:    r/o infection; N18.6-End stage renal disease; E87.5-Hyperkalemia     TECHNIQUE:    Frontal view of the chest.     COMPARISON:    10/16/2020     FINDINGS:       LUNGS:  Potentially patchy right lower lobe airspace disease versus  compressive atelectasis.       PLEURAL SPACE:  Small right pleural effusion appears grossly stable.   No pneumothorax.       HEART:  Cardiomegaly is stable.       MEDIASTINUM:  Unremarkable.       BONES/JOINTS:  Unremarkable.       TUBES, LINES AND DEVICES:  Stable appearance of right dialysis  catheter.       Impression:      1.  Small right pleural effusion appears grossly stable.  2.  Potentially patchy right lower lobe airspace disease versus  compressive atelectasis.     This report was finalized on 11/15/2020 11:17 AM by Dr. Robb Eddy MD.                       Assessment and Plan:    1.  Hyperkalemia  2.  Anemia of CKD  3.  End-stage renal disease on dialysis  4.  Noncompliance with dialysis  5.  Hepatitis B  6.  History of IV drug abuse with endocarditis  7.  Serratia UTI    Plan is to continue dialysis.  Transfusion of blood has already took place.  Patient has demonstrated history of noncompliance.  When he had first initiated dialysis he had been offered the option of going to the [facility] facility for peritoneal dialysis but he had declined and had signed out of the hospital AGAINST MEDICAL ADVICE.  He has left the [facility] facility where he last dialyzed.    We will follow patient while he is in the hospital.  Thank you for consultation          Federico Diallo MD  11/15/20  12:12 EST

## 2020-11-15 NOTE — H&P
Hospitalist History and Physical        Patient Identification  Name: Mario Nair  Age/Sex: 45 y.o. male  :  1975        MRN: [id number]  Visit Number: [id number]  Admit Date: 2020   PCP: Danny Rebolledo MD          Chief complaint sent by PCP for abnormal labs    History of Present Illness:  Patient is a 45 y.o. male with history of ESRD and IV drug abuse complicated by endocarditis who presents to the ED per instructions from his PCP to be evaluated for abnormal labs. He explains that he established care with a PCP recently who saw him in the office for the first time and ordered blood work on him yesterday. Today his lab work resulted, showing hyperkalemia (K+ 6.6) with elevated creatinine of 11.3. His hemoglobin was also lower at 7.5. Today, K+ is 6.8 and creatinine 11.74. Hemoglobin has trended down further to 6.9. Labs also suggest possible UTI. Patient is known to our service. He has ESRD but does not have a dialysis chair. He was admitted to our service on 10/16/2020 but left AMA as soon as he completed a round of dialysis on that date, before  had the opportunity to find him a chair for outpatient dialysis. He states he has not had dialysis since leaving on the . His HD dialysis catheter dressing has not been changed since the  as well.  His new PCP is trying to get him into a dialysis clinic in [facility]. Patient states he feels fine at the moment. He does not feel swollen or short of breath. He does not feel tired or nauseated. He denies dysuria or foul smelling urine. He says there's a possibility he will leave as soon as he receives dialysis today, just as he did before.                                                Review of Systems  Review of Systems   Constitutional: Negative for activity change, appetite change, chills, diaphoresis, fatigue, fever and unexpected weight change.   HENT: Negative for congestion, postnasal drip, rhinorrhea,  sinus pressure, sinus pain and sore throat.    Eyes: Negative for photophobia, pain, discharge, redness, itching and visual disturbance.   Respiratory: Negative for cough, shortness of breath and wheezing.    Cardiovascular: Positive for leg swelling (says it resolves with propping his legs up). Negative for chest pain and palpitations.   Gastrointestinal: Negative for abdominal distention, abdominal pain, constipation, diarrhea, nausea and vomiting.   Endocrine: Negative for cold intolerance, heat intolerance, polydipsia, polyphagia and polyuria.   Genitourinary: Negative for decreased urine volume, difficulty urinating, dysuria, flank pain, frequency and hematuria.   Musculoskeletal: Negative for arthralgias, back pain, joint swelling, myalgias, neck pain and neck stiffness.   Skin: Negative for color change, pallor, rash and wound.   Neurological: Negative for dizziness, tremors, seizures, syncope, weakness, light-headedness, numbness and headaches.   Hematological: Negative for adenopathy. Does not bruise/bleed easily.   Psychiatric/Behavioral: Negative for agitation, behavioral problems and confusion.       History  Past Medical History:   Diagnosis Date   • Endocarditis    • ESRD (end stage renal disease) (CMS/[facility])    • IV drug abuse (CMS/[facility])    • Kidney failure    • Unable to read or write      Past Surgical History:   Procedure Laterality Date   • CENTRAL VENOUS CATHETER TUNNELED INSERTION DOUBLE LUMEN Right     Chest for hemodialysis     Family History   Problem Relation Age of Onset   • Alcohol abuse Mother    • Hypertension Mother    • Hypertension Other    • Kidney disease Maternal Grandmother      Social History     Tobacco Use   • Smoking status: Current Every Day Smoker     Packs/day: 0.50     Years: 20.00     Pack years: 10.00     Types: Cigarettes   • Smokeless tobacco: Never Used   Substance Use Topics   • Alcohol use: Not Currently     Frequency: Never     Comment: Quit 18 years ago   • Drug use:  Yes     Types: Methamphetamines     Comment: last used on Friday 5/18/2019     (Not in a hospital admission)    Allergies:  Penicillins    Objective     Vital Signs  Temp:  [98.5 °F (36.9 °C)-98.8 °F (37.1 °C)] 98.8 °F (37.1 °C)  Heart Rate:  [86-93] 86  Resp:  [16-18] 16  BP: (117-138)/(71-77) 117/77  Body mass index is 20.54 kg/m².    Physical Exam:  Physical Exam  Constitutional:       General: He is not in acute distress.     Appearance: Normal appearance.   HENT:      Head: Normocephalic and atraumatic.      Right Ear: External ear normal.      Left Ear: External ear normal.      Nose: Nose normal.      Mouth/Throat:      Mouth: Mucous membranes are moist.      Pharynx: Oropharynx is clear.   Eyes:      Extraocular Movements: Extraocular movements intact.      Conjunctiva/sclera: Conjunctivae normal.      Pupils: Pupils are equal, round, and reactive to light.   Neck:      Musculoskeletal: Normal range of motion and neck supple.   Cardiovascular:      Rate and Rhythm: Normal rate and regular rhythm.      Pulses: Normal pulses.      Heart sounds: Normal heart sounds.   Pulmonary:      Effort: Pulmonary effort is normal. No respiratory distress.      Breath sounds: Rales (bilateral bases) present. No wheezing.   Abdominal:      General: Abdomen is flat. Bowel sounds are normal. There is no distension.      Palpations: Abdomen is soft.      Tenderness: There is no abdominal tenderness.   Musculoskeletal: Normal range of motion.         General: No signs of injury.      Right lower leg: Edema (1+ pitting) present.      Left lower leg: Edema (1+ pitting) present.   Skin:     General: Skin is warm and dry.      Capillary Refill: Capillary refill takes less than 2 seconds.      Coloration: Skin is pale. Skin is not jaundiced.      Findings: No bruising, lesion or rash.      Comments: HD catheter right upper chest wall. Site of insertion to skin is mildly erythematous, no active drainage. Dressing is dirty, has not  been changed since last hospitalization here on 10/16.    Neurological:      General: No focal deficit present.      Mental Status: He is alert and oriented to person, place, and time.   Psychiatric:         Mood and Affect: Mood normal.         Behavior: Behavior normal.         Thought Content: Thought content normal.         Judgment: Judgment normal.           Results Review:       Lab Results:  Results from last 7 days   Lab Units 11/14/20 2207 11/13/20  1518   WBC 10*3/mm3 6.57 7.44   HEMOGLOBIN g/dL 6.9* 7.5*   PLATELETS 10*3/mm3 118* 149         Results from last 7 days   Lab Units 11/14/20 2207 11/13/20  1518   SODIUM mmol/L 132* 133*   POTASSIUM mmol/L 6.8* 6.6*   CHLORIDE mmol/L 102 104   CO2 mmol/L 11.3* 12.5*   BUN mg/dL 135* 135*   CREATININE mg/dL 11.74* 11.30*   CALCIUM mg/dL 6.9* 7.2*   GLUCOSE mg/dL 106* 93     Results from last 7 days   Lab Units 11/14/20 2207 11/13/20  1518   MAGNESIUM mg/dL 1.9 2.0     No results found for: HGBA1C  Results from last 7 days   Lab Units 11/14/20 2207 11/13/20  1518   BILIRUBIN mg/dL 0.4 0.4   ALK PHOS U/L 113 115   AST (SGOT) U/L 13 15   ALT (SGPT) U/L 16 19     Results from last 7 days   Lab Units 11/14/20 2207   CK TOTAL U/L 113   TROPONIN T ng/mL 0.106*         Results from last 7 days   Lab Units 11/14/20  2340   INR  1.45*           I have reviewed the patient's laboratory results.    Imaging:  Imaging Results (Last 72 Hours)     ** No results found for the last 72 hours. **          I have personally reviewed the patient's radiologic imaging.        EKG: NSR, HR 82, QTc 448. T wave inversion leads I, III, v4-v6 suggesting inferolateral ischemia.     I have personally reviewed the patient's EKG.        Assessment/Plan     - Critical hyperkalemia: treated in the ED with kayexalate and D5 plus insulin. Repeat labs pending. Nephrology consulted to arrange HD in inpatient setting.  - End-stage kidney disease (CMS/HCC) noncompliant with hemodialysis: patient  reports asymptomatic. No imaging performed in the ED. Faint rales appreciated in bilateral bases but otherwise air movement is good. Legs edematous but not severe. Dialysis catheter dressing has not been changed in a month. Insertion site of HD catheter is erythematous. RN reports he is tender in affected area when she tried to clean the area after removing the dressing. Will consult general surgery to evaluate the site. Will order blood cultures.   - Troponin elevation: chronic, suspect secondary to ESRD. Denies chest pain. EKG with some subtle T wave inversion inferolaterally as noted above. No ST elevation or depression appreciated. Continue to trend troponin.    - Possible UTI: based on UA results. Patient denies urinary symptoms. Has penicillin allergy--will treat with IV vancomycin and aztreonam whlie awaiting urine culture results.   - Normocytic anemia: likely secondary to ESRD. Hemoglobin <7 so will transfuse 1 unit pRBC (patient has antibiotics on type and screen so transfusion will be delayed).     DVT Prophylaxis: SQ heparin    Estimated Length of Stay >2 midnights    I discussed the patient's findings, assessment and plan with the patient and his RN Bill who was present during my interview and exam in the CCU.     Charlie Conti MD  11/15/20  04:35 EST

## 2020-11-15 NOTE — PLAN OF CARE
Problem: Device-Related Complication Risk (Hemodialysis)  Goal: Safe, Effective Therapy Delivery  Outcome: Ongoing, Progressing   Goal Outcome Evaluation:  Plan of Care Reviewed With: patient     Outcome Summary: patient is agreeable at this time but states he will be leaving once he recieves his dyalisis. he does not plan on staying no longer than he has too. after some discussion with patient he agreed to wear monitoring leads as well as bp cuff/O2 sat probe.

## 2020-11-15 NOTE — PROGRESS NOTES
Assisted By: Ana ARMENDARIZ    CC: Follow-up on hyperkalemia    Interview History/HPI: Patient has not been dialyzed since he was last dialyzed here.  He walked out of the hospital after his last dialysis AMA.  He has had no dysuria no chest pain no shortness of breath, he was told by his primary care provider his potassium was high and was referred here.  He states his primary care provider is trying to get him in [facility] dialysis, he is not interested in hanging around to try to get a dialysis spot here.        Vitals:    11/15/20 0735   BP: 130/78   Pulse: 89   Resp: 15   Temp:    SpO2: 98%         Intake/Output Summary (Last 24 hours) at 11/15/2020 0817  Last data filed at 11/15/2020 0503  Gross per 24 hour   Intake 500 ml   Output --   Net 500 ml       EXAM: Temperature 98.1, he is in no distress he is sitting up eating, pupils were equal conjunctiva unremarkable lungs have bilateral breath sounds are clear no rhonchi rales or wheezing heard, heart regular rate and rhythm without murmur gallop, his dialysis catheter suture site has some mild erythema but the catheter insertion site itself looks okay.  Abdomen is soft benign, trace edema only mood is good skin warm and dry      EKG: Some anterior lateral T wave inversion appears new to me    Tele: Sinus rhythm    LABS:   Lab Results (last 48 hours)     Procedure Component Value Units Date/Time    Blood Gas, Venous With Co-Ox [[id number]] Collected: 11/15/20 0809    Specimen: Venous Blood Updated: 11/15/20 0812    CBC Auto Differential [[id number]]  (Abnormal) Collected: 11/15/20 0715    Specimen: Blood Updated: 11/15/20 0752     WBC 6.45 10*3/mm3      RBC 2.48 10*6/mm3      Hemoglobin 7.1 g/dL      Hematocrit 23.2 %      MCV 93.5 fL      MCH 28.6 pg      MCHC 30.6 g/dL      RDW 15.7 %      RDW-SD 53.6 fl      MPV 8.8 fL      Platelets 114 10*3/mm3      Neutrophil % 71.2 %      Lymphocyte % 16.1 %      Monocyte % 8.2 %      Eosinophil % 3.3 %      Basophil % 0.6 %       Immature Grans % 0.6 %      Neutrophils, Absolute 4.59 10*3/mm3      Lymphocytes, Absolute 1.04 10*3/mm3      Monocytes, Absolute 0.53 10*3/mm3      Eosinophils, Absolute 0.21 10*3/mm3      Basophils, Absolute 0.04 10*3/mm3      Immature Grans, Absolute 0.04 10*3/mm3      nRBC 0.0 /100 WBC     Troponin [[id number]] Collected: 11/15/20 0716    Specimen: Blood Updated: 11/15/20 0737    Comprehensive Metabolic Panel [[id number]] Collected: 11/15/20 0716    Specimen: Blood Updated: 11/15/20 0737    Blood Culture - Blood, Arm, Left [[id number]] Collected: 11/15/20 0716    Specimen: Blood from Arm, Left Updated: 11/15/20 0736    Blood Culture - Blood, Arm, Left [[id number]] Collected: 11/15/20 0715    Specimen: Blood from Arm, Left Updated: 11/15/20 0736    POC Glucose Once [[id number]]  (Abnormal) Collected: 11/15/20 0647    Specimen: Blood Updated: 11/15/20 0655     Glucose 172 mg/dL     Basic Metabolic Panel [[id number]]  (Abnormal) Collected: 11/15/20 0440    Specimen: Blood from Hand, Right Updated: 11/15/20 0609     Glucose 64 mg/dL       mg/dL      Creatinine 11.87 mg/dL      Sodium 134 mmol/L      Potassium 5.8 mmol/L      Chloride 103 mmol/L      CO2 11.4 mmol/L      Calcium 6.9 mg/dL      eGFR   Amer --     Comment: <15 Indicative of kidney failure.        eGFR Non African Amer 5 mL/min/1.73      Comment: <15 Indicative of kidney failure.        BUN/Creatinine Ratio 12.1     Anion Gap 19.6 mmol/L     Narrative:      GFR Normal >60  Chronic Kidney Disease <60  Kidney Failure <15      Troponin [[id number]]  (Abnormal) Collected: 11/15/20 0440    Specimen: Blood from Hand, Right Updated: 11/15/20 0540     Troponin T 0.097 ng/mL     Narrative:      Troponin T Reference Range:  <= 0.03 ng/mL-   Negative for AMI  >0.03 ng/mL-     Abnormal for myocardial necrosis.  Clinicians would have to utilize clinical acumen, EKG, Troponin and serial changes to determine if it is an Acute Myocardial Infarction or  myocardial injury due to an underlying chronic condition.       Results may be falsely decreased if patient taking Biotin.      COVID-19, [facility] IN-HOUSE,NP Swab (NO TRANSPORT MEDIA) 2 HR TAT - Swab, Nasopharynx [[id number]]  (Normal) Collected: 11/15/20 0327    Specimen: Swab from Nasopharynx Updated: 11/15/20 0350     COVID19 Not Detected    Narrative:      Fact sheet for providers: https://www.fda.gov/media/264278/download     Fact sheet for patients: https://www.fda.gov/media/484196/download    Urinalysis With Culture If Indicated - Urine, Clean Catch [[id number]]  (Abnormal) Collected: 11/15/20 0143    Specimen: Urine, Clean Catch Updated: 11/15/20 0237     Color, UA Yellow     Appearance, UA Turbid     pH, UA 5.5     Specific Gravity, UA 1.012     Glucose, UA Negative     Ketones, UA Negative     Bilirubin, UA Negative     Blood, UA Moderate (2+)     Protein,  mg/dL (2+)     Leuk Esterase, UA Large (3+)     Nitrite, UA Negative     Urobilinogen, UA 0.2 E.U./dL    Urinalysis, Microscopic Only - Urine, Clean Catch [[id number]]  (Abnormal) Collected: 11/15/20 0143    Specimen: Urine, Clean Catch Updated: 11/15/20 0237     RBC, UA 3-5 /HPF      WBC, UA Too Numerous to Count /HPF      Bacteria, UA 3+ /HPF      Squamous Epithelial Cells, UA 0-2 /HPF      Hyaline Casts, UA None Seen /LPF      Methodology Manual Light Microscopy    Urine Culture - Urine, Urine, Clean Catch [[id number]] Collected: 11/15/20 0143    Specimen: Urine, Clean Catch Updated: 11/15/20 0237    Urine Drug Screen - Urine, Clean Catch [[id number]]  (Normal) Collected: 11/15/20 0143    Specimen: Urine, Clean Catch Updated: 11/15/20 0201     Amphetamine Screen, Urine Negative     Barbiturates Screen, Urine Negative     Benzodiazepine Screen, Urine Negative     Cocaine Screen, Urine Negative     Methadone Screen, Urine Negative     Opiate Screen Negative     Phencyclidine (PCP), Urine Negative     THC, Screen, Urine Negative     6-ACETYL MORPHINE  Negative     Buprenorphine, Screen, Urine Negative     Oxycodone Screen, Urine Negative    Narrative:      Negative Thresholds For Drugs Screened:                  Amphetamines              1000 ng/ml               Barbiturates               200 ng/ml               Benzodiazepines            200 ng/ml              Cocaine                    300 ng/ml              Methadone                  300 ng/ml              Opiates                    300 ng/ml               Phencyclidine               25 ng/ml               THC                         50 ng/ml              6-Acetyl Morphine           10 ng/ml              Buprenorphine                5 ng/ml              Oxycodone                  300 ng/ml    The reference range for all drugs tested is negative. This report includes final unconfirmed qualitative results to be used for medical treatment purposes only. Unconfirmed results must not be used for non-medical purposes such as employment or legal testing. Clinical consideration should be applied to any drug of abuse test, especially when unconfirmed quantitative results are used.        Protime-INR [[id number]]  (Abnormal) Collected: 11/14/20 2340    Specimen: Blood Updated: 11/14/20 2353     Protime 17.4 Seconds      Comment: Note new Reference Range        INR 1.45    Narrative:      Suggested INR therapeutic range for stable oral anticoagulant therapy:    Low Intensity therapy:   1.5-2.0  Moderate Intensity therapy:   2.0-3.0  High Intensity therapy:   2.5-4.0    aPTT [[id number]]  (Abnormal) Collected: 11/14/20 2340    Specimen: Blood Updated: 11/14/20 2353     PTT 37.9 seconds      Comment: Note new Reference Range       Narrative:      PTT Heparin Therapeutic Range:  59 - 95 seconds      Troponin [[id number]]  (Abnormal) Collected: 11/14/20 2207    Specimen: Blood from Arm, Left Updated: 11/14/20 2342     Troponin T 0.106 ng/mL     Narrative:      Troponin T Reference Range:  <= 0.03 ng/mL-   Negative for  AMI  >0.03 ng/mL-     Abnormal for myocardial necrosis.  Clinicians would have to utilize clinical acumen, EKG, Troponin and serial changes to determine if it is an Acute Myocardial Infarction or myocardial injury due to an underlying chronic condition.       Results may be falsely decreased if patient taking Biotin.      BNP [[id number]]  (Abnormal) Collected: 11/14/20 2207    Specimen: Blood from Arm, Left Updated: 11/14/20 2330     proBNP 4,428.0 pg/mL     Narrative:      Among patients with dyspnea, NT-proBNP is highly sensitive for the detection of acute congestive heart failure. In addition NT-proBNP of <300 pg/ml effectively rules out acute congestive heart failure with 99% negative predictive value.    Results may be falsely decreased if patient taking Biotin.      T4, Free [[id number]]  (Abnormal) Collected: 11/14/20 2207    Specimen: Blood from Arm, Left Updated: 11/14/20 2330     Free T4 0.85 ng/dL     Narrative:      Results may be falsely increased if patient taking Biotin.      TSH [[id number]]  (Normal) Collected: 11/14/20 2207    Specimen: Blood from Arm, Left Updated: 11/14/20 2330     TSH 2.380 uIU/mL     Phosphorus [[id number]]  (Abnormal) Collected: 11/14/20 2207    Specimen: Blood from Arm, Left Updated: 11/14/20 2325     Phosphorus 8.4 mg/dL     Magnesium [[id number]]  (Normal) Collected: 11/14/20 2207    Specimen: Blood from Arm, Left Updated: 11/14/20 2325     Magnesium 1.9 mg/dL     CK [[id number]]  (Normal) Collected: 11/14/20 2207    Specimen: Blood from Arm, Left Updated: 11/14/20 2325     Creatine Kinase 113 U/L     Acetaminophen Level [[id number]]  (Normal) Collected: 11/14/20 2207    Specimen: Blood from Arm, Left Updated: 11/14/20 2325     Acetaminophen <5.0 mcg/mL     Ethanol [[id number]] Collected: 11/14/20 2207    Specimen: Blood from Arm, Left Updated: 11/14/20 2325     Ethanol <10 mg/dL      Ethanol % <0.010 %     Narrative:      >/= 80.0 legally intoxicated    Salicylate Level  [[id number]]  (Normal) Collected: 11/14/20 2207    Specimen: Blood from Arm, Left Updated: 11/14/20 2325     Salicylate <0.3 mg/dL     Comprehensive Metabolic Panel [[id number]]  (Abnormal) Collected: 11/14/20 2207    Specimen: Blood from Arm, Left Updated: 11/14/20 2248     Glucose 106 mg/dL       mg/dL      Creatinine 11.74 mg/dL      Sodium 132 mmol/L      Potassium 6.8 mmol/L      Chloride 102 mmol/L      CO2 11.3 mmol/L      Calcium 6.9 mg/dL      Total Protein 7.9 g/dL      Albumin 3.39 g/dL      ALT (SGPT) 16 U/L      AST (SGOT) 13 U/L      Alkaline Phosphatase 113 U/L      Total Bilirubin 0.4 mg/dL      eGFR Non African Amer 5 mL/min/1.73      Comment: <15 Indicative of kidney failure.        eGFR   Amer --     Comment: <15 Indicative of kidney failure.        Globulin 4.5 gm/dL      A/G Ratio 0.8 g/dL      BUN/Creatinine Ratio 11.5     Anion Gap 18.7 mmol/L     Narrative:      GFR Normal >60  Chronic Kidney Disease <60  Kidney Failure <15      CBC & Differential [[id number]]  (Abnormal) Collected: 11/14/20 2207    Specimen: Blood from Arm, Left Updated: 11/14/20 2239    Narrative:      The following orders were created for panel order CBC & Differential.  Procedure                               Abnormality         Status                     ---------                               -----------         ------                     CBC Auto Differential[[id number]]        Abnormal            Final result                 Please view results for these tests on the individual orders.    CBC Auto Differential [[id number]]  (Abnormal) Collected: 11/14/20 2207    Specimen: Blood from Arm, Left Updated: 11/14/20 2239     WBC 6.57 10*3/mm3      RBC 2.42 10*6/mm3      Hemoglobin 6.9 g/dL      Hematocrit 22.3 %      MCV 92.1 fL      MCH 28.5 pg      MCHC 30.9 g/dL      RDW 15.5 %      RDW-SD 53.1 fl      MPV 9.7 fL      Platelets 118 10*3/mm3      Neutrophil % 60.4 %      Lymphocyte % 23.9 %      Monocyte % 9.9 %   "    Eosinophil % 4.4 %      Basophil % 0.9 %      Immature Grans % 0.5 %      Neutrophils, Absolute 3.97 10*3/mm3      Lymphocytes, Absolute 1.57 10*3/mm3      Monocytes, Absolute 0.65 10*3/mm3      Eosinophils, Absolute 0.29 10*3/mm3      Basophils, Absolute 0.06 10*3/mm3      Immature Grans, Absolute 0.03 10*3/mm3      nRBC 0.0 /100 WBC           Radiology:    Imaging Results (Last 72 Hours)     Procedure Component Value Units Date/Time    XR Chest 1 View [[id number]] Resulted: 11/15/20 0627     Updated: 11/15/20 0627      During my review chest x-ray has not changed         Assessment/Plan:   Metabolic problems including metabolic acidosis and hyperkalemia as well as uremia secondary to inadequate dialysis due to noncompliance.  Patient will be dialyzed today.  Patient states that after dialysis he was leaving.  I have encouraged him to stay because it he has a UTI and to further evaluate his abnormal EKG, despite this he states he is going to leave most likely.  I told him with his hyperkalemia that he is going to have a fatal rhythm disturbance potentially and death, he voiced understanding but really is not interested in staying.    Anemia probable chronic disease from previous indices, being transfused 1 unit packed red blood cells    UTI, Serratia, on Azactam    Positive troponin and abnormal EKG, asymptomatic.  Troponin overall is flat trend from previous visits.  The EKG abnormality however is noted, the findings would not be consistent with his hyperkalemia and I recommended he stay for stress test to further evaluate for coronary disease to keep him from having a \"heart attack\" however he does not think he will stay around.    Tobacco abuse, counseled to quit  "

## 2020-11-15 NOTE — DISCHARGE SUMMARY
Date of admission: 11/14/2020  Date of discharge: 11/15/2020    Principal diagnosis: Hyperkalemia secondary to noncompliance with dialysis  Secondary diagnosis:  -Elevated troponin, flat trend, possibly due to end-stage renal disease but with some T wave inversion anterior laterally cannot rule out ischemia  -Serratia UTI  -Anemia transfuse 1 unit packed red blood cells here  -Tobacco abuse, counseled to quit  -End-stage renal disease on chronic hemodialysis    Consultants:   dental surgery   nephrology    Procedures:  Transfusion 1 unit packed red blood cells  Dialysis    Exam: See my note from earlier today    Disposition: Home AGAINST MEDICAL ADVICE    Hospital course: Patient was referred here by his primary for hyperkalemia.  This has been the patient's course, he will come in get dialyzed and he walks off the floor.  I have explained to the patient the danger of this including high potassium causing arrhythmia that could be fatal but he states that he was going to stay.  He was dialyzed and did leave the floor after dialysis.  He was anemic below 7, he was transfused 1 unit packed red blood cells.  He had a Serratia UTI, he was treated with Azactam here but left here AMA so no antibiotics given.  He also had some T wave inversion was EKG explained this and the fact that this could be heart block exact cause a heart attack and could be fatal and I recommended he stay and get a stress test to further evaluate however he refuses.  Patient left AMA see chart for full details this visit.  It is noted that his dialysis catheter was very dirty, surgery saw him and although no acute infection counseled him on the need to take care of this.

## 2020-11-15 NOTE — ED NOTES
Pt states his PCP sent him to the ER because his potassium was high. Pt is a dialysis pt but does not have an established nephrologist or clinic he goes to regularly to receive dialysis.      Chely Diaz RN  11/14/20 [id number]

## 2020-11-15 NOTE — PLAN OF CARE
Problem: Adult Inpatient Plan of Care  Goal: Plan of Care Review  Outcome: Unable to Meet, Plan Revised  Flowsheets  Taken 11/15/2020 1513 by Ana Pickens RN  Progress: no change  Outcome Summary:   Patient sighning out AMA after receiving hemodialysis   patient informed myself and Dr. Colon this morning of his plan   provided education on importance of staying and what could happen leaving AMA   education on keeping HD cath clean provided   told patient to seek further medical care if syptoms worsen  Taken 11/15/2020 0730 by Zuhair Jim RN  Plan of Care Reviewed With: patient   Goal Outcome Evaluation:  Plan of Care Reviewed With: patient  Progress: no change  Outcome Summary: Patient sighning out AMA after receiving hemodialysis; patient informed myself and Dr. Colon this morning of his plan; provided education on importance of staying and what could happen leaving AMA; education on keeping HD cath clean provided; told patient to seek further medical care if syptoms worsen  Problem: Device-Related Complication Risk (Hemodialysis)  Goal: Safe, Effective Therapy Delivery  Outcome: Unable to Meet, Plan Revised     Problem: Hemodynamic Instability (Hemodialysis)  Goal: Vital Signs Remain in Desired Range  Outcome: Unable to Meet, Plan Revised     Problem: Infection (Hemodialysis)  Goal: Absence of Infection Signs and Symptoms  Outcome: Unable to Meet, Plan Revised     Problem: Fall Injury Risk  Goal: Absence of Fall and Fall-Related Injury  Outcome: Unable to Meet, Plan Revised     Problem: Skin Injury Risk Increased  Goal: Skin Health and Integrity  Outcome: Unable to Meet, Plan Revised     Problem: Adult Inpatient Plan of Care  Goal: Plan of Care Review  Outcome: Unable to Meet, Plan Revised  Flowsheets  Taken 11/15/2020 1513 by Ana Pickens, RN  Progress: no change  Outcome Summary:   Patient sighning out AMA after receiving hemodialysis   patient informed myself and Dr. Colon this morning of his  plan   provided education on importance of staying and what could happen leaving AMA   education on keeping HD cath clean provided   told patient to seek further medical care if syptoms worsen  Taken 11/15/2020 0730 by Zuhair Jim RN  Plan of Care Reviewed With: patient  Goal: Patient-Specific Goal (Individualized)  Outcome: Unable to Meet, Plan Revised  Goal: Absence of Hospital-Acquired Illness or Injury  Outcome: Unable to Meet, Plan Revised  Goal: Optimal Comfort and Wellbeing  Outcome: Unable to Meet, Plan Revised  Goal: Readiness for Transition of Care  Outcome: Unable to Meet, Plan Revised

## 2020-11-15 NOTE — NURSING NOTE
Patient informed myself and Dr. Colon this morning he was only here to receive dialysis then he was leaving. Patient was encouraged by multiple staff members to stay to receive appropriate care. Patient is very pleasant and just stated he didn't like staying in hospitals and had more important things to do. Education provided about the importance of staying. Instructed patient to seek further medical assistance  if symptoms reoccur. Patient verbalized understanding.

## 2020-11-15 NOTE — NURSING NOTE
Progressive Mobility    Date: 11/15/20     Mobility Initiation Contradictions: None    Day of Mobility Patient is ambulatory; ambulates up in room without any assistance    Patient: tolerated    Assisted by 0 person/persons    Device(s) used: n/a    Ana Pickens RN   11/15/20

## 2020-11-15 NOTE — PAYOR COMM NOTE
"[facility]   MARCELL NUNO  PHONE  [phone]  FAX  [phone]  NPI:  [id number]    REQUEST FOR INPATIENT AUTH    Mario Nair (45 y.o. Male)     Date of Birth Social Security Number Address Home Phone MRN    1975  [address]  [facility] [address] [phone] [id number]    Caodaism Marital Status          None        Admission Date Admission Type Admitting Provider Attending Provider Department, Room/Bed    20 Emergency Charlie Conti MD Hammons, Johnny Bruce, MD [facility] CRITICAL CARE, CC01/1C    Discharge Date Discharge Disposition Discharge Destination                       Attending Provider: Charlie Conti MD    Allergies: Penicillins    Isolation: None   Infection: Hepatitis B (19), COVID (rule out) (11/15/20)   Code Status: CPR    Ht: 188 cm (74\")   Wt: 74.8 kg (165 lb)    Admission Cmt: None   Principal Problem: None                Active Insurance as of 2020     Primary Coverage     Payor Plan Insurance Group Employer/Plan Group    [employer] [place] [employer] [place]ICA[place]      Payor Plan Address Payor Plan Phone Number Payor Plan Fax Number Effective Dates    [address] [address] [phone]  2019 - None Entered    [facility] [id number]       Subscriber Name Subscriber Birth Date Member ID       MARIO NAIR 1975 [id number]                 Emergency Contacts      (Rel.) Home Phone Work Phone Mobile Phone    SHANI SILVA (Relative) [phone] -- --    Constance Nair (Daughter) -- -- [phone]               History & Physical      Charlie Conti MD at 11/15/20 0435          Hospitalist History and Physical        Patient Identification  Name: Mario Nair  Age/Sex: 45 y.o. male  :  1975        MRN: [id number]  Visit Number: [id number]  Admit Date: 2020   PCP: Danny Rebolledo MD          Chief complaint sent by PCP for abnormal labs    History of Present " Illness:  Patient is a 45 y.o. male with history of ESRD and IV drug abuse complicated by endocarditis who presents to the ED per instructions from his PCP to be evaluated for abnormal labs. He explains that he established care with a PCP recently who saw him in the office for the first time and ordered blood work on him yesterday. Today his lab work resulted, showing hyperkalemia (K+ 6.6) with elevated creatinine of 11.3. His hemoglobin was also lower at 7.5. Today, K+ is 6.8 and creatinine 11.74. Hemoglobin has trended down further to 6.9. Labs also suggest possible UTI. Patient is known to our service. He has ESRD but does not have a dialysis chair. He was admitted to our service on 10/16/2020 but left AMA as soon as he completed a round of dialysis on that date, before  had the opportunity to find him a chair for outpatient dialysis. He states he has not had dialysis since leaving on the 16th. His HD dialysis catheter dressing has not been changed since the 16th as well.  His new PCP is trying to get him into a dialysis clinic in [facility]. Patient states he feels fine at the moment. He does not feel swollen or short of breath. He does not feel tired or nauseated. He denies dysuria or foul smelling urine. He says there's a possibility he will leave as soon as he receives dialysis today, just as he did before.                                                Review of Systems  Review of Systems   Constitutional: Negative for activity change, appetite change, chills, diaphoresis, fatigue, fever and unexpected weight change.   HENT: Negative for congestion, postnasal drip, rhinorrhea, sinus pressure, sinus pain and sore throat.    Eyes: Negative for photophobia, pain, discharge, redness, itching and visual disturbance.   Respiratory: Negative for cough, shortness of breath and wheezing.    Cardiovascular: Positive for leg swelling (says it resolves with propping his legs up). Negative for chest  pain and palpitations.   Gastrointestinal: Negative for abdominal distention, abdominal pain, constipation, diarrhea, nausea and vomiting.   Endocrine: Negative for cold intolerance, heat intolerance, polydipsia, polyphagia and polyuria.   Genitourinary: Negative for decreased urine volume, difficulty urinating, dysuria, flank pain, frequency and hematuria.   Musculoskeletal: Negative for arthralgias, back pain, joint swelling, myalgias, neck pain and neck stiffness.   Skin: Negative for color change, pallor, rash and wound.   Neurological: Negative for dizziness, tremors, seizures, syncope, weakness, light-headedness, numbness and headaches.   Hematological: Negative for adenopathy. Does not bruise/bleed easily.   Psychiatric/Behavioral: Negative for agitation, behavioral problems and confusion.       History  Past Medical History:   Diagnosis Date   • Endocarditis    • ESRD (end stage renal disease) (CMS/[facility])    • IV drug abuse (CMS/[facility])    • Kidney failure    • Unable to read or write      Past Surgical History:   Procedure Laterality Date   • CENTRAL VENOUS CATHETER TUNNELED INSERTION DOUBLE LUMEN Right     Chest for hemodialysis     Family History   Problem Relation Age of Onset   • Alcohol abuse Mother    • Hypertension Mother    • Hypertension Other    • Kidney disease Maternal Grandmother      Social History     Tobacco Use   • Smoking status: Current Every Day Smoker     Packs/day: 0.50     Years: 20.00     Pack years: 10.00     Types: Cigarettes   • Smokeless tobacco: Never Used   Substance Use Topics   • Alcohol use: Not Currently     Frequency: Never     Comment: Quit 18 years ago   • Drug use: Yes     Types: Methamphetamines     Comment: last used on Friday 5/18/2019     (Not in a hospital admission)    Allergies:  Penicillins    Objective     Vital Signs  Temp:  [98.5 °F (36.9 °C)-98.8 °F (37.1 °C)] 98.8 °F (37.1 °C)  Heart Rate:  [86-93] 86  Resp:  [16-18] 16  BP: (117-138)/(71-77) 117/77  Body mass  index is 20.54 kg/m².    Physical Exam:  Physical Exam  Constitutional:       General: He is not in acute distress.     Appearance: Normal appearance.   HENT:      Head: Normocephalic and atraumatic.      Right Ear: External ear normal.      Left Ear: External ear normal.      Nose: Nose normal.      Mouth/Throat:      Mouth: Mucous membranes are moist.      Pharynx: Oropharynx is clear.   Eyes:      Extraocular Movements: Extraocular movements intact.      Conjunctiva/sclera: Conjunctivae normal.      Pupils: Pupils are equal, round, and reactive to light.   Neck:      Musculoskeletal: Normal range of motion and neck supple.   Cardiovascular:      Rate and Rhythm: Normal rate and regular rhythm.      Pulses: Normal pulses.      Heart sounds: Normal heart sounds.   Pulmonary:      Effort: Pulmonary effort is normal. No respiratory distress.      Breath sounds: Rales (bilateral bases) present. No wheezing.   Abdominal:      General: Abdomen is flat. Bowel sounds are normal. There is no distension.      Palpations: Abdomen is soft.      Tenderness: There is no abdominal tenderness.   Musculoskeletal: Normal range of motion.         General: No signs of injury.      Right lower leg: Edema (1+ pitting) present.      Left lower leg: Edema (1+ pitting) present.   Skin:     General: Skin is warm and dry.      Capillary Refill: Capillary refill takes less than 2 seconds.      Coloration: Skin is pale. Skin is not jaundiced.      Findings: No bruising, lesion or rash.      Comments: HD catheter right upper chest wall. Site of insertion to skin is mildly erythematous, no active drainage. Dressing is dirty, has not been changed since last hospitalization here on 10/16.    Neurological:      General: No focal deficit present.      Mental Status: He is alert and oriented to person, place, and time.   Psychiatric:         Mood and Affect: Mood normal.         Behavior: Behavior normal.         Thought Content: Thought content  normal.         Judgment: Judgment normal.           Results Review:       Lab Results:  Results from last 7 days   Lab Units 11/14/20 2207 11/13/20  1518   WBC 10*3/mm3 6.57 7.44   HEMOGLOBIN g/dL 6.9* 7.5*   PLATELETS 10*3/mm3 118* 149         Results from last 7 days   Lab Units 11/14/20 2207 11/13/20  1518   SODIUM mmol/L 132* 133*   POTASSIUM mmol/L 6.8* 6.6*   CHLORIDE mmol/L 102 104   CO2 mmol/L 11.3* 12.5*   BUN mg/dL 135* 135*   CREATININE mg/dL 11.74* 11.30*   CALCIUM mg/dL 6.9* 7.2*   GLUCOSE mg/dL 106* 93     Results from last 7 days   Lab Units 11/14/20 2207 11/13/20  1518   MAGNESIUM mg/dL 1.9 2.0     No results found for: HGBA1C  Results from last 7 days   Lab Units 11/14/20 2207 11/13/20  1518   BILIRUBIN mg/dL 0.4 0.4   ALK PHOS U/L 113 115   AST (SGOT) U/L 13 15   ALT (SGPT) U/L 16 19     Results from last 7 days   Lab Units 11/14/20 2207   CK TOTAL U/L 113   TROPONIN T ng/mL 0.106*         Results from last 7 days   Lab Units 11/14/20  2340   INR  1.45*           I have reviewed the patient's laboratory results.    Imaging:  Imaging Results (Last 72 Hours)     ** No results found for the last 72 hours. **          I have personally reviewed the patient's radiologic imaging.        EKG: NSR, HR 82, QTc 448. T wave inversion leads I, III, v4-v6 suggesting inferolateral ischemia.     I have personally reviewed the patient's EKG.        Assessment/Plan     - Critical hyperkalemia: treated in the ED with kayexalate and D5 plus insulin. Repeat labs pending. Nephrology consulted to arrange HD in inpatient setting.  - End-stage kidney disease (CMS/HCC) noncompliant with hemodialysis: patient reports asymptomatic. No imaging performed in the ED. Faint rales appreciated in bilateral bases but otherwise air movement is good. Legs edematous but not severe. Dialysis catheter dressing has not been changed in a month. Insertion site of HD catheter is erythematous. RN reports he is tender in affected area  when she tried to clean the area after removing the dressing. Will consult general surgery to evaluate the site. Will order blood cultures.   - Troponin elevation: chronic, suspect secondary to ESRD. Denies chest pain. EKG with some subtle T wave inversion inferolaterally as noted above. No ST elevation or depression appreciated. Continue to trend troponin.    - Possible UTI: based on UA results. Patient denies urinary symptoms. Has penicillin allergy--will treat with IV vancomycin and aztreonam whlie awaiting urine culture results.   - Normocytic anemia: likely secondary to ESRD. Hemoglobin <7 so will transfuse 1 unit pRBC (patient has antibiotics on type and screen so transfusion will be delayed).     DVT Prophylaxis: SQ heparin    Estimated Length of Stay >2 midnights    I discussed the patient's findings, assessment and plan with the patient and his RN Bill who was present during my interview and exam in the CCU.     Charlie Conti MD  11/15/20  04:35 EST      Electronically signed by Charlie Conti MD at 11/15/20 0545          Emergency Department Notes      Chely Diaz RN at 11/14/20 2100        Pt states his PCP sent him to the ER because his potassium was high. Pt is a dialysis pt but does not have an established nephrologist or clinic he goes to regularly to receive dialysis.      Chely Diaz RN  11/14/20 2214      Electronically signed by Chely Diaz RN at 11/14/20 2214     Chely Diaz RN at 11/15/20 0000        Pt refused CT scan     Chely Diaz RN  11/15/20 0003      Electronically signed by Chely Diaz RN at 11/15/20 0003         Current Facility-Administered Medications   Medication Dose Route Frequency Provider Last Rate Last Dose   • heparin (porcine) 5000 UNIT/ML injection 5,000 Units  5,000 Units Subcutaneous Q12H Charlie Conti MD       • nitroglycerin (NITROSTAT) SL tablet 0.4 mg  0.4 mg Sublingual Q5 Min PRN  Charlie Conti MD       • sodium chloride 0.9 % flush 10 mL  10 mL Intravenous PRN Charlie Conti MD       • sodium chloride 0.9 % flush 10 mL  10 mL Intravenous Q12H Charlie Conti MD       • sodium chloride 0.9 % flush 10 mL  10 mL Intravenous PRN Charlie Conti MD       • vancomycin 1500 mg/500 mL 0.9% NS IVPB ([facility])  20 mg/kg Intravenous Once Jared Roman MD   1,500 mg at 11/15/20 0503     Orders (last 24 hrs)      Start     Ordered    11/15/20 0900  sodium chloride 0.9 % flush 10 mL  Every 12 Hours Scheduled      11/15/20 0523    11/15/20 0900  heparin (porcine) 5000 UNIT/ML injection 5,000 Units  Every 12 Hours Scheduled      11/15/20 0531    11/15/20 0800  Vital Signs  Every 8 Hours     Comments: Per per hospital policy    11/15/20 0523    11/15/20 0700  CBC Auto Differential  Once      11/15/20 0523    11/15/20 0700  Comprehensive Metabolic Panel  Once      11/15/20 0523    11/15/20 0600  Strict Intake & Output  Every 6 Hours      11/15/20 0523    11/15/20 0548  XR Chest 1 View  1 Time Imaging      11/15/20 0547    11/15/20 0546  CBC (No Diff)  Once      11/15/20 0546    11/15/20 0545  Basic Metabolic Panel  STAT      11/15/20 0544    11/15/20 0528  Inpatient General Surgery Consult  Once     Specialty:  General Surgery  Provider:  (Not yet assigned)    11/15/20 0527    11/15/20 0526  Blood Culture - Blood,  Once      11/15/20 0525    11/15/20 0526  Blood Culture - Blood,  Once     Comments: From a different site than #1.      11/15/20 0525    11/15/20 0524  Notify Physician (with Parameters)  Until Discontinued      11/15/20 0523    11/15/20 0524  Oxygen Therapy- Nasal Cannula; Titrate for SPO2: 90% - 95%  Continuous      11/15/20 0523    11/15/20 0524  Insert Peripheral IV  Once      11/15/20 0523    11/15/20 0524  Saline Lock & Maintain IV Access  Continuous,   Status:  Canceled      11/15/20 0523    11/15/20 0524  Place Sequential Compression Device  Once       11/15/20 0523    11/15/20 0524  Maintain Sequential Compression Device  Continuous      11/15/20 0523    11/15/20 0524  Pulse Oximetry, Continuous  Continuous      11/15/20 0523    11/15/20 0524  Cardiac Monitoring  Continuous,   Status:  Canceled      11/15/20 0523    11/15/20 0524  Telemetry - Maintain IV Access  Continuous      11/15/20 0523    11/15/20 0524  Continuous Cardiac Monitoring  Continuous      11/15/20 0523    11/15/20 0524  May Be Off Telemetry for Tests  Continuous      11/15/20 0523    11/15/20 0524  ACLS Protocol For Life Threatening Dysrhythmias (Unless Code Status Indicates Otherwise)  Continuous      11/15/20 0523    11/15/20 0524  Notify Provider if ACLS Protocol Activated  Until Discontinued      11/15/20 0523    11/15/20 0524  Activity - Bed Rest With Exceptions (Specify)  Until Discontinued      11/15/20 0523    11/15/20 0524  Daily Weights  Daily      11/15/20 0523    11/15/20 0524  Diet Regular; Renal  Diet Effective Now      11/15/20 0523    11/15/20 0524  Inpatient Nephrology Consult  Once     Specialty:  Nephrology  Provider:  (Not yet assigned)    11/15/20 0523    11/15/20 0523  sodium chloride 0.9 % flush 10 mL  As Needed      11/15/20 0523    11/15/20 0523  nitroglycerin (NITROSTAT) SL tablet 0.4 mg  Every 5 Minutes PRN      11/15/20 0523    11/15/20 0521  [place] Antibody Screen  Once      11/15/20 0520    11/15/20 0350  vancomycin 1500 mg/500 mL 0.9% NS IVPB ([facility])  Once      11/15/20 0348    11/15/20 0350  aztreonam (AZACTAM) 2 g/100 mL 0.9% NS (mbp)  Once      11/15/20 0348    11/15/20 0330  Troponin  Now Then Every 6 Hours      11/15/20 0329    11/15/20 0328  Verify Informed Consent for Blood Product Administration  Once      11/15/20 0328    11/15/20 0328  RN To Enter The Following Labs When Transfusion Complete  Continuous      11/15/20 0328    11/15/20 0328  Prepare RBC, 1 Units  Blood - Once      11/15/20 0328    11/15/20 0321  Direct Antiglobulin Test (Direct Elizabeth)  Once       11/15/20 0320    11/15/20 0321  MARIAJOSE, IgG  Once      11/15/20 0320    11/15/20 0313  Antibody Identification  Once      11/15/20 0312    11/15/20 0308  COVID-19, ABBOTT IN-HOUSE,NP Swab (NO TRANSPORT MEDIA) 2 HR TAT - Swab, Nasopharynx  Once      11/15/20 0308    11/15/20 0308  Inpatient Admission  Once      11/15/20 0308    11/15/20 0238  Urine Culture - Urine, Urine, Clean Catch  Once      11/15/20 0237    11/15/20 0237  Inpatient Admission  Once      11/15/20 0240    11/15/20 0237  Code Status and Medical Interventions:  Continuous      11/15/20 0240    11/15/20 0153  Urinalysis, Microscopic Only - Urine, Clean Catch  Once      11/15/20 0152    11/15/20 0137  sodium polystyrene (KAYEXALATE) powder 30 g  Once      11/15/20 0135    11/15/20 0137  dextrose (D50W) 25 g/ 50mL Intravenous Solution 25 g  Once      11/15/20 0135    11/15/20 0136  insulin regular (humuLIN R,novoLIN R) injection 10 Units  Once      11/15/20 0135    11/14/20 2336  Type & Screen  STAT      11/14/20 2335    11/14/20 2257  CT Abdomen Pelvis Without Contrast  1 Time Imaging,   Status:  Canceled      11/14/20 2256 11/14/20 2256  Insert peripheral IV  Once      11/14/20 2256 11/14/20 2256  Protime-INR  Once      11/14/20 2256 11/14/20 2256  aPTT  Once      11/14/20 2256 11/14/20 2256  Urinalysis With Culture If Indicated - Urine, Clean Catch  Once      11/14/20 2256 11/14/20 2256  BNP  Once      11/14/20 2256 11/14/20 2256  Troponin  Once      11/14/20 2256 11/14/20 2256  T4, Free  Once      11/14/20 2256 11/14/20 2256  TSH  Once      11/14/20 2256 11/14/20 2256  Phosphorus  Once      11/14/20 2256 11/14/20 2256  Magnesium  Once      11/14/20 2256 11/14/20 2256  CK  Once      11/14/20 2256 11/14/20 2256  Acetaminophen Level  Once      11/14/20 2256 11/14/20 2256  Urine Drug Screen - Urine, Clean Catch  Once      11/14/20 2256 11/14/20 2256  Ethanol  Once      11/14/20 2256 11/14/20 2256  Salicylate  Level  Once      11/14/20 2256 11/14/20 2256  ECG 12 Lead  Once      11/14/20 2256 11/14/20 2255  sodium chloride 0.9 % flush 10 mL  As Needed      11/14/20 2256 11/14/20 2132  CBC & Differential  Once      11/14/20 2131 11/14/20 2132  Comprehensive Metabolic Panel  Once      11/14/20 2131 11/14/20 2132  ECG 12 Lead  Once      11/14/20 2131 11/14/20 2132  CBC Auto Differential  PROCEDURE ONCE      11/14/20 2131    Unscheduled  Telemetry - Pulse Oximetry  Continuous PRN     Comments: If Patient Develops Unresponsiveness, Acute Dyspnea, Cyanosis or Suspected Hypoxemia Start Continuous Pulse Ox Monitoring, Apply Oxygen & Notify Provider    11/15/20 0523    Unscheduled  Oxygen Therapy- Nasal Cannula; Titrate for SPO2: 90% - 95%  Continuous PRN     Comments: If Patient Develops Unresponsiveness, Acute Dyspnea, Cyanosis or Suspected Hypoxemia Start Continuous Pulse Ox Monitoring, Apply Oxygen & Notify Provider    11/15/20 0523    Unscheduled  ECG 12 Lead  As Needed     Comments: Nurse to Release if Patient Expericences Acute Chest Pain or Dysrhythmias    11/15/20 0523    Unscheduled  Potassium  As Needed     Comments: For Ventricular Arrhythmias      11/15/20 0523    Unscheduled  Magnesium  As Needed     Comments: For Ventricular Arrhythmias      11/15/20 0523    Unscheduled  Troponin  As Needed     Comments: For Chest Pain      11/15/20 0523    Unscheduled  Digoxin Level  As Needed     Comments: For Atrial Arrhythmias      11/15/20 0523    Unscheduled  Blood Gas, Arterial -With Co-Ox Panel: Yes  As Needed     Comments: Per O2 PolicyNotify Physician      11/15/20 0523    Unscheduled  Up With Assistance  As Needed      11/15/20 0523    Unscheduled  Transfuse RBC, 1 Units Infuse Each Unit Over: 3.5H  Transfusion      11/15/20 0328                   Physician Progress Notes (last 24 hours) (Notes from 11/14/20 0638 through 11/15/20 0638)      Progress Notes signed by Julia Camarena APRN at 11/15/20 0622          [facility] Nurse Practitioner - Brief Progress Note  PERMANENT  11/15/2020 05:53    [facility]  [facility] - U - 10 - [place], [place] ([facility])    ROBBI CUEVAS    Date of Service 11/15/2020 05:53    HPI/Events of Note AICU Provider Assessment Note  45 year old male admitted to the ICU for ESRD, hyperkalemia, r/o infection/UTI. Patient does not have an established nephrologist or clinic he goes to regularly to receive HD, last session reported 10/16/2020. Presented to the ED after PCP informed   patient of critical lab values    VS-- > 98.8, HR 87, /86, SATs 99% on room air    Pmhx--  >ESRD, IVDA, Endocarditis    CXR--  >ordered    ESRD. Hyperkalemia  -Nephrology consult  -Will need HD, patient reports hasn’t had HD since 10/16 or the HD catheter dressing changed  -BUN/Cr 135/11.74  -K 6.8? Kayexalate, D50/Insulin-->STAT BMP, if remains greater than 5.0 recommend to treat  -Phos 6.8  -Na 132  -Utox negative  -Trend BMPs  -Strict I/O  -Renally dose medication, avoid nephrotoxic agents    r/o infection  -WBC 6.57  -UA +, urine culture pending  -Blood Cx pending  -CXR ordered  -Consider ECHO.Hx Endocarditis  -Received vancomycin and aztreonam per bedside    Anemia  -Hgb 6.8, will get 1 unit PRBC per notes.    Elevated troponin  -Trending down 0.106? 0.097    __x___   Video Assessment performed  __x___   Most recent labs reviewed  __x___   Vital Signs reviewed  __x___   Best Practices addressed:                 VTE prophylaxis:heparin subq                 SUP (when indicated):not indicated                 Glycemic control:goal < 180                      Please notify bedside physician when present or [facility] if glc > 180 X 2  _x____     Spoke with bedside RN  _x____     Orders written    Contact [facility] for any needs if bedside physician is not present.  Julia Camarena APRN, AGACNP-BC      Interventions Major-Acute renal failure - evaluation and management, Electrolyte  abnormality - evaluation and management  Intermediate-Best-practice therapies (e.g. VTE, beta blocker, etc.), Communication with other healthcare providers and/or family, Diagnostic test evaluation, Infection - evaluation and management        Electronically Signed by: Julia Camarena (NP) on 11/15/2020 06:22    Electronically signed by Julia Camarena APRN at 11/15/20 0622       Consult Notes (last 24 hours) (Notes from 11/14/20 0638 through 11/15/20 0638)    No notes of this type exist for this encounter.

## 2020-11-16 NOTE — ED PROVIDER NOTES
Subjective   45-year-old male in the emergency department with reported abnormal labs.  Ports that he was called by his primary care physician secondary to abnormal labs.  Patient is a dialysis patient, whom is noncompliant.  Significant past medical history of endocarditis, ESRD, IV drug abuse, and kidney failure.  Denies any symptoms at this time.      History provided by:  Patient   used: No        Review of Systems   Constitutional: Negative for activity change, appetite change, chills, diaphoresis, fatigue and fever.   HENT: Negative for congestion, ear pain and sore throat.    Eyes: Negative for redness.   Respiratory: Negative for cough, chest tightness, shortness of breath and wheezing.    Cardiovascular: Negative for chest pain, palpitations and leg swelling.   Gastrointestinal: Negative for abdominal pain, diarrhea, nausea and vomiting.   Genitourinary: Negative for dysuria and urgency.   Musculoskeletal: Negative for arthralgias, back pain, myalgias and neck pain.   Skin: Negative for pallor, rash and wound.   Neurological: Negative for dizziness, speech difficulty, weakness and headaches.   Psychiatric/Behavioral: Negative for agitation, behavioral problems, confusion and decreased concentration.   All other systems reviewed and are negative.      Past Medical History:   Diagnosis Date   • Endocarditis    • ESRD (end stage renal disease) (CMS/[facility])    • IV drug abuse (CMS/[facility])    • Kidney failure    • Unable to read or write        Allergies   Allergen Reactions   • Penicillins Shortness Of Breath, Itching and Rash       Past Surgical History:   Procedure Laterality Date   • CENTRAL VENOUS CATHETER TUNNELED INSERTION DOUBLE LUMEN Right     Chest for hemodialysis       Family History   Problem Relation Age of Onset   • Alcohol abuse Mother    • Hypertension Mother    • Hypertension Other    • Kidney disease Maternal Grandmother        Social History     Socioeconomic History   • Marital  status:      Spouse name: Not on file   • Number of children: Not on file   • Years of education: Not on file   • Highest education level: Not on file   Tobacco Use   • Smoking status: Current Every Day Smoker     Packs/day: 0.50     Years: 20.00     Pack years: 10.00     Types: Cigarettes   • Smokeless tobacco: Never Used   Substance and Sexual Activity   • Alcohol use: Not Currently     Frequency: Never     Comment: Quit 18 years ago   • Drug use: Yes     Types: Methamphetamines     Comment: last used on Friday 5/18/2019   • Sexual activity: Defer           Objective   Physical Exam  Vitals signs and nursing note reviewed.   Constitutional:       General: He is not in acute distress.     Appearance: Normal appearance. He is well-developed. He is not toxic-appearing or diaphoretic.   HENT:      Head: Normocephalic and atraumatic.      Right Ear: External ear normal.      Left Ear: External ear normal.      Nose: Nose normal.      Mouth/Throat:      Pharynx: No oropharyngeal exudate.      Tonsils: No tonsillar exudate.   Eyes:      General: Lids are normal.      Conjunctiva/sclera: Conjunctivae normal.      Pupils: Pupils are equal, round, and reactive to light.   Neck:      Musculoskeletal: Full passive range of motion without pain, normal range of motion and neck supple.      Thyroid: No thyromegaly.   Cardiovascular:      Rate and Rhythm: Normal rate and regular rhythm.      Pulses: Normal pulses.      Heart sounds: Normal heart sounds, S1 normal and S2 normal.   Pulmonary:      Effort: Pulmonary effort is normal. No tachypnea or respiratory distress.      Breath sounds: Normal breath sounds. No decreased breath sounds, wheezing or rales.   Chest:      Chest wall: No tenderness.   Abdominal:      General: Bowel sounds are normal. There is no distension.      Palpations: Abdomen is soft.      Tenderness: There is no abdominal tenderness. There is no guarding or rebound.   Musculoskeletal: Normal range of  motion.         General: No tenderness or deformity.   Lymphadenopathy:      Cervical: No cervical adenopathy.   Skin:     General: Skin is warm and dry.      Coloration: Skin is not pale.      Findings: No erythema or rash.   Neurological:      Mental Status: He is alert and oriented to person, place, and time.      GCS: GCS eye subscore is 4. GCS verbal subscore is 5. GCS motor subscore is 6.      Cranial Nerves: No cranial nerve deficit.      Sensory: No sensory deficit.   Psychiatric:         Speech: Speech normal.         Behavior: Behavior normal.         Thought Content: Thought content normal.         Judgment: Judgment normal.         Procedures           ED Course  ED Course as of Nov 16 0353   Mon Nov 16, 2020   0353 Vent. Rate : 082 BPM     Atrial Rate : 082 BPM     P-R Int : 188 ms          QRS Dur : 088 ms      QT Int : 384 ms       P-R-T Axes : 067 067 215 degrees     QTc Int : 448 ms     Normal sinus rhythm  ST & T wave abnormality, consider inferolateral ischemia  Abnormal ECG  When compared with ECG of 16-OCT-2020 00:46,  Nonspecific T wave abnormality now evident in Inferior leads  Inverted T waves have replaced nonspecific T wave abnormality in Lateral  leads   ECG 12 Lead [ES]      ED Course User Index  [ES] Jared Roman MD                                           MDM  Number of Diagnoses or Management Options  End-stage kidney disease (CMS/HCC): new and requires workup  Hyperkalemia: new and requires workup     Amount and/or Complexity of Data Reviewed  Clinical lab tests: reviewed and ordered  Tests in the radiology section of CPT®: ordered and reviewed  Tests in the medicine section of CPT®: reviewed and ordered  Review and summarize past medical records: yes  Discuss the patient with other providers: yes  Independent visualization of images, tracings, or specimens: yes    Risk of Complications, Morbidity, and/or Mortality  Presenting problems: moderate  Diagnostic procedures:  moderate  Management options: moderate    Patient Progress  Patient progress: stable      Final diagnoses:   End-stage kidney disease (CMS/HCC)   Hyperkalemia            Jared Roman MD  11/16/20 0354

## 2020-12-08 PROBLEM — Z23 ENCOUNTER FOR IMMUNIZATION: Status: ACTIVE | Noted: 2020-01-01

## 2020-12-08 PROBLEM — N18.9 CHRONIC RENAL FAILURE: Status: RESOLVED | Noted: 2019-05-27 | Resolved: 2020-01-01

## 2020-12-08 PROBLEM — R59.0 RETROPERITONEAL LYMPHADENOPATHY: Status: ACTIVE | Noted: 2020-01-01

## 2020-12-08 PROBLEM — Z00.00 HEALTHCARE MAINTENANCE: Status: ACTIVE | Noted: 2020-01-01

## 2020-12-08 PROBLEM — N18.6 END-STAGE KIDNEY DISEASE (HCC): Status: RESOLVED | Noted: 2020-01-01 | Resolved: 2020-01-01

## 2020-12-08 PROBLEM — N32.89 BLADDER WALL THICKENING: Status: ACTIVE | Noted: 2020-01-01

## 2020-12-08 PROBLEM — N18.6 END STAGE RENAL DISEASE (HCC): Status: RESOLVED | Noted: 2020-01-01 | Resolved: 2020-01-01

## 2020-12-09 PROBLEM — J44.9 COPD MIXED TYPE (HCC): Status: ACTIVE | Noted: 2020-01-01

## 2020-12-09 PROBLEM — R76.8 HEPATITIS C ANTIBODY TEST POSITIVE: Status: ACTIVE | Noted: 2020-01-01

## 2020-12-09 PROBLEM — B18.1 CHRONIC HEPATITIS B (HCC): Status: ACTIVE | Noted: 2020-01-01

## 2020-12-09 PROBLEM — R76.8 HEPATITIS B SURFACE ANTIGEN POSITIVE: Status: ACTIVE | Noted: 2020-01-01

## 2020-12-09 NOTE — PROGRESS NOTES
Subjective   Mario Nair is a 45 y.o. male.     History of Present Illness     This 45 year old man with a history of IV drug use, positive hepatitis B and C serology, ESRF on hemodialysis, and previous endocarditis returns today for reassessment.    Positive Hepatitis B and C Serology  Records are unavailable but he is apparently followed by Dr. Cerda and will be undergoing reassessment in 2 days.    ESRF  He was followed by Dr. Diallo in the past but was dismissed from his practice and has been unable to obtain dialysis locally since.  He has had repeated admissions for inpatient dialysis.  CT of the chest, abdomen and pelvis performed on 9/4/2020 was reported as showing emphysematous changes, splenomegaly, and atrophic pancreas, bilateral renal atrophy and hydronephrosis with nonspecific lobulation, dilation of both ureters to the level of the bladder, hyperdense thickening of the bladder wall, perihepatic and perisplenic ascites, and degenerative changes of the lumbar spine.    The following portions of the patient's history were reviewed and updated as appropriate: allergies, current medications, past family history, past medical history, past social history, past surgical history and problem list.    Review of Systems   Constitutional: Positive for fatigue. Negative for appetite change, chills, fever and unexpected weight change.   HENT: Negative for congestion, ear pain, rhinorrhea, sneezing, sore throat and voice change.    Eyes: Negative for visual disturbance.   Respiratory: Positive for shortness of breath. Negative for cough and wheezing.    Cardiovascular: Negative for chest pain, palpitations and leg swelling.   Gastrointestinal: Negative for abdominal pain, blood in stool, constipation, diarrhea, nausea and vomiting.   Genitourinary: Negative for dysuria and hematuria.   Musculoskeletal: Positive for arthralgias and back pain. Negative for joint swelling and myalgias.   Skin: Negative for rash.    Neurological: Negative for tremors, weakness, numbness and headaches.   Psychiatric/Behavioral: Positive for sleep disturbance. Negative for dysphoric mood and suicidal ideas. The patient is nervous/anxious.      Objective   Physical Exam  Constitutional:       General: He is not in acute distress.     Appearance: Normal appearance. He is well-developed. He is not ill-appearing or diaphoretic.      Comments: Appeared older than stated age.  Bright and in fair spirits.  No apparent distress.  Right subclavian line   HENT:      Head: Atraumatic.      Right Ear: Tympanic membrane, ear canal and external ear normal.      Left Ear: Tympanic membrane, ear canal and external ear normal.   Eyes:      Conjunctiva/sclera: Conjunctivae normal.   Neck:      Thyroid: No thyroid mass or thyromegaly.      Vascular: No carotid bruit or JVD.      Trachea: Trachea normal. No tracheal deviation.   Cardiovascular:      Rate and Rhythm: Normal rate and regular rhythm.      Heart sounds: Normal heart sounds, S1 normal and S2 normal. No murmur. No gallop. No S3 or S4 sounds.    Pulmonary:      Effort: Pulmonary effort is normal.      Breath sounds: Examination of the right-lower field reveals decreased breath sounds. Examination of the left-lower field reveals decreased breath sounds. Decreased breath sounds present. No wheezing or rales.      Comments: Pulmonary hyperinflation  Abdominal:      General: Bowel sounds are normal. There is no distension.      Palpations: Abdomen is soft. There is no hepatomegaly, splenomegaly or mass.      Tenderness: There is no abdominal tenderness.      Hernia: No hernia is present.   Musculoskeletal:      Right lower leg: No edema.      Left lower leg: No edema.   Lymphadenopathy:      Head:      Right side of head: No submental, submandibular, tonsillar, preauricular, posterior auricular or occipital adenopathy.      Left side of head: No submental, submandibular, tonsillar, preauricular, posterior  auricular or occipital adenopathy.      Cervical: No cervical adenopathy.      Upper Body:      Right upper body: No supraclavicular adenopathy.      Left upper body: No supraclavicular adenopathy.   Skin:     General: Skin is warm and dry.      Coloration: Skin is not cyanotic, jaundiced or pale.      Findings: No rash.      Nails: There is no clubbing.     Neurological:      Mental Status: He is alert and oriented to person, place, and time.      Cranial Nerves: No cranial nerve deficit.      Motor: No tremor.      Coordination: Coordination normal.      Gait: Gait normal.   Psychiatric:         Attention and Perception: Attention normal.         Mood and Affect: Mood normal.         Speech: Speech normal.         Behavior: Behavior normal.       Assessment/Plan   Problems Addressed this Visit        Respiratory    COPD mixed type (CMS/[facility])  Advised of the importance of smoking cessation       Immune and Lymphatic    Retroperitoneal lymphadenopathy   Associated with bladder wall thickening and dilation of the ureters bilaterally  Recommended a urology assessment with a view toward cystoscopy    Relevant Orders    Ambulatory Referral to Urology       Genitourinary    Bladder wall thickening  As above.    Relevant Orders    Ambulatory Referral to Urology    ESRD (end stage renal disease) (CMS/[facility])  We will try to find a nephrologist who is willing to manage his dialysis locally.  If this proves impossible patient will likely have to relocate to another part of the [place]    Relevant Medications    amLODIPine (NORVASC) 5 MG tablet    isosorbide mononitrate (IMDUR) 30 MG 24 hr tablet    hydrALAZINE (APRESOLINE) 10 MG tablet    carvedilol (COREG) 12.5 MG tablet    calcium acetate (PHOS BINDER,) 667 MG capsule capsule    Other Relevant Orders    Ambulatory Referral to Nephrology       Other    Gastroesophageal reflux disease without esophagitis  (Chronic)      Needing refill of his Protonix   Relevant Medications    pantoprazole (PROTONIX) 40 MG EC tablet   Current smoker    As above.    Healthcare maintenance  Patient has already received a flu shot fall.  Recommended a Pneumovax 23 and Tdap.  Patient will consider  We will discuss hepatitis A immunization at his return    Hepatitis C antibody test positive  Follow up with GI   We will try to obtain records  Hepatitis B surface antigen positive  As above.         Diagnoses       Codes Comments    Retroperitoneal lymphadenopathy    -  Primary ICD-10-CM: R59.0  ICD-9-CM: 785.6     Gastroesophageal reflux disease without esophagitis     ICD-10-CM: K21.9  ICD-9-CM: 530.81 Needing refill of his Protonix    ESRD (end stage renal disease) (CMS/HCC)     ICD-10-CM: N18.6  ICD-9-CM: 585.6     Bladder wall thickening     ICD-10-CM: N32.89  ICD-9-CM: 596.89     Current smoker     ICD-10-CM: F17.200  ICD-9-CM: 305.1     Healthcare maintenance     ICD-10-CM: Z00.00  ICD-9-CM: V70.0     Encounter for immunization     ICD-10-CM: Z23  ICD-9-CM: V03.89     Hepatitis C antibody test positive     ICD-10-CM: R76.8  ICD-9-CM: 795.79     COPD mixed type (CMS/HCC)     ICD-10-CM: J44.9  ICD-9-CM: 496

## 2020-12-31 PROBLEM — E87.5 HYPERKALEMIA: Status: RESOLVED | Noted: 2019-05-07 | Resolved: 2020-01-01

## 2020-12-31 PROBLEM — Z23 ENCOUNTER FOR IMMUNIZATION: Status: RESOLVED | Noted: 2020-01-01 | Resolved: 2020-01-01

## 2021-01-01 ENCOUNTER — HOSPITAL ENCOUNTER (INPATIENT)
Facility: HOSPITAL | Age: 46
LOS: 3 days | Discharge: LEFT BEFORE DISCHARGE | End: 2021-08-18
Attending: FAMILY MEDICINE | Admitting: INTERNAL MEDICINE

## 2021-01-01 ENCOUNTER — HOSPITAL ENCOUNTER (INPATIENT)
Facility: HOSPITAL | Age: 46
LOS: 1 days | Discharge: LEFT BEFORE DISCHARGE | End: 2021-02-20
Attending: EMERGENCY MEDICINE | Admitting: INTERNAL MEDICINE

## 2021-01-01 ENCOUNTER — APPOINTMENT (OUTPATIENT)
Dept: GENERAL RADIOLOGY | Facility: HOSPITAL | Age: 46
End: 2021-01-01

## 2021-01-01 ENCOUNTER — APPOINTMENT (OUTPATIENT)
Dept: CT IMAGING | Facility: HOSPITAL | Age: 46
End: 2021-01-01

## 2021-01-01 ENCOUNTER — OFFICE VISIT (OUTPATIENT)
Dept: FAMILY MEDICINE CLINIC | Facility: CLINIC | Age: 46
End: 2021-01-01

## 2021-01-01 ENCOUNTER — APPOINTMENT (OUTPATIENT)
Dept: ULTRASOUND IMAGING | Facility: HOSPITAL | Age: 46
End: 2021-01-01

## 2021-01-01 ENCOUNTER — HOSPITAL ENCOUNTER (INPATIENT)
Facility: HOSPITAL | Age: 46
LOS: 3 days | Discharge: LEFT BEFORE DISCHARGE | End: 2021-02-14
Attending: EMERGENCY MEDICINE | Admitting: INTERNAL MEDICINE

## 2021-01-01 ENCOUNTER — APPOINTMENT (OUTPATIENT)
Dept: CARDIOLOGY | Facility: HOSPITAL | Age: 46
End: 2021-01-01

## 2021-01-01 ENCOUNTER — HOSPITAL ENCOUNTER (INPATIENT)
Facility: HOSPITAL | Age: 46
LOS: 2 days | Discharge: LEFT BEFORE DISCHARGE | End: 2021-02-27
Attending: FAMILY MEDICINE | Admitting: INTERNAL MEDICINE

## 2021-01-01 ENCOUNTER — ANESTHESIA EVENT (OUTPATIENT)
Dept: PERIOP | Facility: HOSPITAL | Age: 46
End: 2021-01-01

## 2021-01-01 ENCOUNTER — HOSPITAL ENCOUNTER (INPATIENT)
Facility: HOSPITAL | Age: 46
LOS: 6 days | Discharge: ANOTHER HOSPITAL | End: 2021-03-13
Attending: FAMILY MEDICINE | Admitting: STUDENT IN AN ORGANIZED HEALTH CARE EDUCATION/TRAINING PROGRAM

## 2021-01-01 ENCOUNTER — APPOINTMENT (OUTPATIENT)
Dept: MRI IMAGING | Facility: HOSPITAL | Age: 46
End: 2021-01-01

## 2021-01-01 ENCOUNTER — HOSPITAL ENCOUNTER (INPATIENT)
Facility: HOSPITAL | Age: 46
LOS: 10 days | Discharge: LEFT BEFORE DISCHARGE | End: 2021-02-04
Attending: EMERGENCY MEDICINE | Admitting: INTERNAL MEDICINE

## 2021-01-01 ENCOUNTER — TELEPHONE (OUTPATIENT)
Dept: FAMILY MEDICINE CLINIC | Facility: CLINIC | Age: 46
End: 2021-01-01

## 2021-01-01 ENCOUNTER — ANESTHESIA (OUTPATIENT)
Dept: PERIOP | Facility: HOSPITAL | Age: 46
End: 2021-01-01

## 2021-01-01 VITALS
HEART RATE: 114 BPM | RESPIRATION RATE: 18 BRPM | DIASTOLIC BLOOD PRESSURE: 97 MMHG | OXYGEN SATURATION: 93 % | BODY MASS INDEX: 19.3 KG/M2 | TEMPERATURE: 98.6 F | HEIGHT: 74 IN | SYSTOLIC BLOOD PRESSURE: 140 MMHG | WEIGHT: 150.4 LBS

## 2021-01-01 VITALS
OXYGEN SATURATION: 99 % | WEIGHT: 156.6 LBS | HEART RATE: 100 BPM | BODY MASS INDEX: 20.1 KG/M2 | SYSTOLIC BLOOD PRESSURE: 116 MMHG | HEIGHT: 74 IN | RESPIRATION RATE: 16 BRPM | DIASTOLIC BLOOD PRESSURE: 81 MMHG | TEMPERATURE: 98.4 F

## 2021-01-01 VITALS
TEMPERATURE: 98.1 F | BODY MASS INDEX: 19.84 KG/M2 | RESPIRATION RATE: 18 BRPM | SYSTOLIC BLOOD PRESSURE: 146 MMHG | WEIGHT: 154.6 LBS | HEART RATE: 99 BPM | HEIGHT: 74 IN | DIASTOLIC BLOOD PRESSURE: 96 MMHG | OXYGEN SATURATION: 95 %

## 2021-01-01 VITALS
TEMPERATURE: 97.1 F | DIASTOLIC BLOOD PRESSURE: 62 MMHG | HEART RATE: 71 BPM | HEIGHT: 74 IN | BODY MASS INDEX: 19.61 KG/M2 | RESPIRATION RATE: 14 BRPM | OXYGEN SATURATION: 97 % | SYSTOLIC BLOOD PRESSURE: 126 MMHG

## 2021-01-01 VITALS
RESPIRATION RATE: 16 BRPM | BODY MASS INDEX: 19.8 KG/M2 | HEIGHT: 74 IN | OXYGEN SATURATION: 98 % | HEART RATE: 92 BPM | TEMPERATURE: 97.5 F | SYSTOLIC BLOOD PRESSURE: 115 MMHG | WEIGHT: 154.3 LBS | DIASTOLIC BLOOD PRESSURE: 78 MMHG

## 2021-01-01 VITALS
TEMPERATURE: 97.7 F | HEART RATE: 100 BPM | DIASTOLIC BLOOD PRESSURE: 90 MMHG | HEIGHT: 74 IN | BODY MASS INDEX: 20.53 KG/M2 | OXYGEN SATURATION: 100 % | SYSTOLIC BLOOD PRESSURE: 140 MMHG | WEIGHT: 160 LBS

## 2021-01-01 VITALS
OXYGEN SATURATION: 98 % | TEMPERATURE: 97.6 F | HEART RATE: 97 BPM | SYSTOLIC BLOOD PRESSURE: 138 MMHG | DIASTOLIC BLOOD PRESSURE: 94 MMHG | WEIGHT: 140 LBS | RESPIRATION RATE: 20 BRPM | HEIGHT: 74 IN | BODY MASS INDEX: 17.97 KG/M2

## 2021-01-01 VITALS
SYSTOLIC BLOOD PRESSURE: 117 MMHG | WEIGHT: 148.8 LBS | HEIGHT: 74 IN | HEART RATE: 95 BPM | OXYGEN SATURATION: 95 % | BODY MASS INDEX: 19.1 KG/M2 | RESPIRATION RATE: 20 BRPM | DIASTOLIC BLOOD PRESSURE: 73 MMHG | TEMPERATURE: 98.1 F

## 2021-01-01 VITALS
HEIGHT: 74 IN | OXYGEN SATURATION: 98 % | RESPIRATION RATE: 12 BRPM | WEIGHT: 152.7 LBS | BODY MASS INDEX: 19.6 KG/M2 | SYSTOLIC BLOOD PRESSURE: 115 MMHG | HEART RATE: 80 BPM | TEMPERATURE: 100.1 F | DIASTOLIC BLOOD PRESSURE: 78 MMHG

## 2021-01-01 DIAGNOSIS — N18.6 ESRD (END STAGE RENAL DISEASE) (HCC): Primary | ICD-10-CM

## 2021-01-01 DIAGNOSIS — D64.9 ANEMIA, UNSPECIFIED TYPE: ICD-10-CM

## 2021-01-01 DIAGNOSIS — N39.42 URINARY INCONTINENCE WITHOUT SENSORY AWARENESS: ICD-10-CM

## 2021-01-01 DIAGNOSIS — E87.5 HYPERKALEMIA: Primary | ICD-10-CM

## 2021-01-01 DIAGNOSIS — N18.6 ESRD (END STAGE RENAL DISEASE) (HCC): ICD-10-CM

## 2021-01-01 DIAGNOSIS — N18.9 ACUTE RENAL FAILURE SUPERIMPOSED ON CHRONIC KIDNEY DISEASE, ON CHRONIC DIALYSIS, UNSPECIFIED ACUTE RENAL FAILURE TYPE (HCC): ICD-10-CM

## 2021-01-01 DIAGNOSIS — N32.89 BLADDER WALL THICKENING: ICD-10-CM

## 2021-01-01 DIAGNOSIS — M54.2 NECK PAIN: ICD-10-CM

## 2021-01-01 DIAGNOSIS — R78.81 GRAM-NEGATIVE BACTEREMIA: Primary | ICD-10-CM

## 2021-01-01 DIAGNOSIS — E87.5 HYPERKALEMIA: ICD-10-CM

## 2021-01-01 DIAGNOSIS — N18.5 CHRONIC RENAL FAILURE, STAGE 5 (HCC): Primary | ICD-10-CM

## 2021-01-01 DIAGNOSIS — N18.5 CHRONIC RENAL FAILURE, STAGE 5 (HCC): ICD-10-CM

## 2021-01-01 DIAGNOSIS — J44.9 COPD MIXED TYPE (HCC): ICD-10-CM

## 2021-01-01 DIAGNOSIS — E87.20 METABOLIC ACIDOSIS: ICD-10-CM

## 2021-01-01 DIAGNOSIS — N30.01 ACUTE CYSTITIS WITH HEMATURIA: ICD-10-CM

## 2021-01-01 DIAGNOSIS — R76.8 HEPATITIS B SURFACE ANTIGEN POSITIVE: ICD-10-CM

## 2021-01-01 DIAGNOSIS — S16.1XXA STRAIN OF NECK MUSCLE, INITIAL ENCOUNTER: ICD-10-CM

## 2021-01-01 DIAGNOSIS — J12.82 PNEUMONIA DUE TO COVID-19 VIRUS: ICD-10-CM

## 2021-01-01 DIAGNOSIS — M54.2 ACUTE NECK PAIN: ICD-10-CM

## 2021-01-01 DIAGNOSIS — Z91.199 HISTORY OF NONCOMPLIANCE WITH MEDICAL TREATMENT: ICD-10-CM

## 2021-01-01 DIAGNOSIS — F17.200 CURRENT SMOKER: ICD-10-CM

## 2021-01-01 DIAGNOSIS — D63.1 ANEMIA DUE TO STAGE 4 CHRONIC KIDNEY DISEASE (HCC): ICD-10-CM

## 2021-01-01 DIAGNOSIS — I65.21 MILD ATHEROSCLEROSIS OF CAROTID ARTERY, RIGHT: ICD-10-CM

## 2021-01-01 DIAGNOSIS — I10 ESSENTIAL HYPERTENSION: ICD-10-CM

## 2021-01-01 DIAGNOSIS — I77.6 VASCULITIS (HCC): ICD-10-CM

## 2021-01-01 DIAGNOSIS — M46.22 OSTEOMYELITIS OF CERVICAL VERTEBRA (HCC): ICD-10-CM

## 2021-01-01 DIAGNOSIS — U07.1 PNEUMONIA DUE TO COVID-19 VIRUS: ICD-10-CM

## 2021-01-01 DIAGNOSIS — R59.0 RETROPERITONEAL LYMPHADENOPATHY: ICD-10-CM

## 2021-01-01 DIAGNOSIS — R76.8 HEPATITIS C ANTIBODY TEST POSITIVE: Primary | ICD-10-CM

## 2021-01-01 DIAGNOSIS — Z00.00 HEALTHCARE MAINTENANCE: ICD-10-CM

## 2021-01-01 DIAGNOSIS — K21.9 GASTROESOPHAGEAL REFLUX DISEASE WITHOUT ESOPHAGITIS: ICD-10-CM

## 2021-01-01 DIAGNOSIS — N18.5 ACUTE RENAL FAILURE WITH ACUTE TUBULAR NECROSIS SUPERIMPOSED ON STAGE 5 CHRONIC KIDNEY DISEASE, NOT ON CHRONIC DIALYSIS (HCC): Primary | ICD-10-CM

## 2021-01-01 DIAGNOSIS — I65.21 MILD ATHEROSCLEROSIS OF CAROTID ARTERY, RIGHT: Primary | ICD-10-CM

## 2021-01-01 DIAGNOSIS — M46.20 PARASPINAL ABSCESS (HCC): ICD-10-CM

## 2021-01-01 DIAGNOSIS — R76.8 HEPATITIS C ANTIBODY TEST POSITIVE: ICD-10-CM

## 2021-01-01 DIAGNOSIS — N18.4 ANEMIA DUE TO STAGE 4 CHRONIC KIDNEY DISEASE (HCC): ICD-10-CM

## 2021-01-01 DIAGNOSIS — Z99.2 ACUTE RENAL FAILURE SUPERIMPOSED ON CHRONIC KIDNEY DISEASE, ON CHRONIC DIALYSIS, UNSPECIFIED ACUTE RENAL FAILURE TYPE (HCC): ICD-10-CM

## 2021-01-01 DIAGNOSIS — N17.0 ACUTE RENAL FAILURE WITH ACUTE TUBULAR NECROSIS SUPERIMPOSED ON STAGE 5 CHRONIC KIDNEY DISEASE, NOT ON CHRONIC DIALYSIS (HCC): Primary | ICD-10-CM

## 2021-01-01 DIAGNOSIS — R53.1 WEAKNESS: ICD-10-CM

## 2021-01-01 DIAGNOSIS — N17.9 ACUTE RENAL FAILURE SUPERIMPOSED ON CHRONIC KIDNEY DISEASE, ON CHRONIC DIALYSIS, UNSPECIFIED ACUTE RENAL FAILURE TYPE (HCC): ICD-10-CM

## 2021-01-01 LAB
25(OH)D3 SERPL-MCNC: 11.9 NG/ML (ref 30–100)
25(OH)D3 SERPL-MCNC: 16.6 NG/ML (ref 30–100)
6-ACETYL MORPHINE: NEGATIVE
A-A DO2: 16.4 MMHG (ref 0–300)
A-A DO2: 18.5 MMHG (ref 0–300)
ABO GROUP BLD: NORMAL
ALBUMIN SERPL-MCNC: 2.37 G/DL (ref 3.5–5.2)
ALBUMIN SERPL-MCNC: 2.42 G/DL (ref 3.5–5.2)
ALBUMIN SERPL-MCNC: 2.6 G/DL (ref 3.5–5.2)
ALBUMIN SERPL-MCNC: 2.61 G/DL (ref 3.5–5.2)
ALBUMIN SERPL-MCNC: 2.61 G/DL (ref 3.5–5.2)
ALBUMIN SERPL-MCNC: 2.63 G/DL (ref 3.5–5.2)
ALBUMIN SERPL-MCNC: 2.63 G/DL (ref 3.5–5.2)
ALBUMIN SERPL-MCNC: 2.69 G/DL (ref 3.5–5.2)
ALBUMIN SERPL-MCNC: 2.71 G/DL (ref 3.5–5.2)
ALBUMIN SERPL-MCNC: 2.73 G/DL (ref 3.5–5.2)
ALBUMIN SERPL-MCNC: 2.74 G/DL (ref 3.5–5.2)
ALBUMIN SERPL-MCNC: 2.75 G/DL (ref 3.5–5.2)
ALBUMIN SERPL-MCNC: 2.81 G/DL (ref 3.5–5.2)
ALBUMIN SERPL-MCNC: 2.83 G/DL (ref 3.5–5.2)
ALBUMIN SERPL-MCNC: 2.91 G/DL (ref 3.5–5.2)
ALBUMIN SERPL-MCNC: 3.03 G/DL (ref 3.5–5.2)
ALBUMIN SERPL-MCNC: 3.13 G/DL (ref 3.5–5.2)
ALBUMIN SERPL-MCNC: 3.2 G/DL (ref 3.5–5.2)
ALBUMIN SERPL-MCNC: 3.21 G/DL (ref 3.5–5.2)
ALBUMIN SERPL-MCNC: 3.22 G/DL (ref 3.5–5.2)
ALBUMIN SERPL-MCNC: 3.34 G/DL (ref 3.5–5.2)
ALBUMIN SERPL-MCNC: 3.41 G/DL (ref 3.5–5.2)
ALBUMIN SERPL-MCNC: 3.56 G/DL (ref 3.5–5.2)
ALBUMIN SERPL-MCNC: 3.58 G/DL (ref 3.5–5.2)
ALBUMIN/GLOB SERPL: 0.5 G/DL
ALBUMIN/GLOB SERPL: 0.6 G/DL
ALBUMIN/GLOB SERPL: 0.7 G/DL
ALBUMIN/GLOB SERPL: 0.9 G/DL
ALBUMIN/GLOB SERPL: 0.9 G/DL
ALP SERPL-CCNC: 107 U/L (ref 39–117)
ALP SERPL-CCNC: 116 U/L (ref 39–117)
ALP SERPL-CCNC: 118 U/L (ref 39–117)
ALP SERPL-CCNC: 119 U/L (ref 39–117)
ALP SERPL-CCNC: 122 U/L (ref 39–117)
ALP SERPL-CCNC: 125 U/L (ref 39–117)
ALP SERPL-CCNC: 126 U/L (ref 39–117)
ALP SERPL-CCNC: 127 U/L (ref 39–117)
ALP SERPL-CCNC: 128 U/L (ref 39–117)
ALP SERPL-CCNC: 130 U/L (ref 39–117)
ALP SERPL-CCNC: 136 U/L (ref 39–117)
ALP SERPL-CCNC: 145 U/L (ref 39–117)
ALP SERPL-CCNC: 165 U/L (ref 39–117)
ALP SERPL-CCNC: 174 U/L (ref 39–117)
ALP SERPL-CCNC: 179 U/L (ref 39–117)
ALP SERPL-CCNC: 195 U/L (ref 39–117)
ALP SERPL-CCNC: 195 U/L (ref 39–117)
ALP SERPL-CCNC: 211 U/L (ref 39–117)
ALP SERPL-CCNC: 229 U/L (ref 39–117)
ALP SERPL-CCNC: 231 U/L (ref 39–117)
ALP SERPL-CCNC: 246 U/L (ref 39–117)
ALP SERPL-CCNC: 75 U/L (ref 39–117)
ALP SERPL-CCNC: 97 U/L (ref 39–117)
ALP SERPL-CCNC: 99 U/L (ref 39–117)
ALT SERPL W P-5'-P-CCNC: 11 U/L (ref 1–41)
ALT SERPL W P-5'-P-CCNC: 11 U/L (ref 1–41)
ALT SERPL W P-5'-P-CCNC: 12 U/L (ref 1–41)
ALT SERPL W P-5'-P-CCNC: 12 U/L (ref 1–41)
ALT SERPL W P-5'-P-CCNC: 13 U/L (ref 1–41)
ALT SERPL W P-5'-P-CCNC: 13 U/L (ref 1–41)
ALT SERPL W P-5'-P-CCNC: 15 U/L (ref 1–41)
ALT SERPL W P-5'-P-CCNC: 17 U/L (ref 1–41)
ALT SERPL W P-5'-P-CCNC: 18 U/L (ref 1–41)
ALT SERPL W P-5'-P-CCNC: 18 U/L (ref 1–41)
ALT SERPL W P-5'-P-CCNC: 19 U/L (ref 1–41)
ALT SERPL W P-5'-P-CCNC: 25 U/L (ref 1–41)
ALT SERPL W P-5'-P-CCNC: 26 U/L (ref 1–41)
ALT SERPL W P-5'-P-CCNC: 32 U/L (ref 1–41)
ALT SERPL W P-5'-P-CCNC: 7 U/L (ref 1–41)
ALT SERPL W P-5'-P-CCNC: 8 U/L (ref 1–41)
ALT SERPL W P-5'-P-CCNC: 9 U/L (ref 1–41)
AMPHET+METHAMPHET UR QL: NEGATIVE
AMYLASE SERPL-CCNC: 491 U/L (ref 28–100)
ANION GAP SERPL CALCULATED.3IONS-SCNC: 10 MMOL/L (ref 5–15)
ANION GAP SERPL CALCULATED.3IONS-SCNC: 10.4 MMOL/L (ref 5–15)
ANION GAP SERPL CALCULATED.3IONS-SCNC: 11.4 MMOL/L (ref 5–15)
ANION GAP SERPL CALCULATED.3IONS-SCNC: 12.1 MMOL/L (ref 5–15)
ANION GAP SERPL CALCULATED.3IONS-SCNC: 12.7 MMOL/L (ref 5–15)
ANION GAP SERPL CALCULATED.3IONS-SCNC: 12.7 MMOL/L (ref 5–15)
ANION GAP SERPL CALCULATED.3IONS-SCNC: 12.9 MMOL/L (ref 5–15)
ANION GAP SERPL CALCULATED.3IONS-SCNC: 12.9 MMOL/L (ref 5–15)
ANION GAP SERPL CALCULATED.3IONS-SCNC: 13 MMOL/L (ref 5–15)
ANION GAP SERPL CALCULATED.3IONS-SCNC: 13.1 MMOL/L (ref 5–15)
ANION GAP SERPL CALCULATED.3IONS-SCNC: 13.7 MMOL/L (ref 5–15)
ANION GAP SERPL CALCULATED.3IONS-SCNC: 15.4 MMOL/L (ref 5–15)
ANION GAP SERPL CALCULATED.3IONS-SCNC: 15.7 MMOL/L (ref 5–15)
ANION GAP SERPL CALCULATED.3IONS-SCNC: 15.8 MMOL/L (ref 5–15)
ANION GAP SERPL CALCULATED.3IONS-SCNC: 15.8 MMOL/L (ref 5–15)
ANION GAP SERPL CALCULATED.3IONS-SCNC: 16.2 MMOL/L (ref 5–15)
ANION GAP SERPL CALCULATED.3IONS-SCNC: 16.3 MMOL/L (ref 5–15)
ANION GAP SERPL CALCULATED.3IONS-SCNC: 17.2 MMOL/L (ref 5–15)
ANION GAP SERPL CALCULATED.3IONS-SCNC: 17.2 MMOL/L (ref 5–15)
ANION GAP SERPL CALCULATED.3IONS-SCNC: 18.2 MMOL/L (ref 5–15)
ANION GAP SERPL CALCULATED.3IONS-SCNC: 20.2 MMOL/L (ref 5–15)
ANION GAP SERPL CALCULATED.3IONS-SCNC: 21.2 MMOL/L (ref 5–15)
ANION GAP SERPL CALCULATED.3IONS-SCNC: 21.7 MMOL/L (ref 5–15)
ANION GAP SERPL CALCULATED.3IONS-SCNC: 21.9 MMOL/L (ref 5–15)
ANION GAP SERPL CALCULATED.3IONS-SCNC: 22 MMOL/L (ref 5–15)
ANION GAP SERPL CALCULATED.3IONS-SCNC: 22.3 MMOL/L (ref 5–15)
ANION GAP SERPL CALCULATED.3IONS-SCNC: 22.4 MMOL/L (ref 5–15)
ANION GAP SERPL CALCULATED.3IONS-SCNC: 22.5 MMOL/L (ref 5–15)
ANION GAP SERPL CALCULATED.3IONS-SCNC: 23.1 MMOL/L (ref 5–15)
ANION GAP SERPL CALCULATED.3IONS-SCNC: 23.1 MMOL/L (ref 5–15)
ANION GAP SERPL CALCULATED.3IONS-SCNC: 24.2 MMOL/L (ref 5–15)
ANION GAP SERPL CALCULATED.3IONS-SCNC: 24.8 MMOL/L (ref 5–15)
ANION GAP SERPL CALCULATED.3IONS-SCNC: 26.3 MMOL/L (ref 5–15)
ANION GAP SERPL CALCULATED.3IONS-SCNC: 27.3 MMOL/L (ref 5–15)
ANION GAP SERPL CALCULATED.3IONS-SCNC: 30.5 MMOL/L (ref 5–15)
ANION GAP SERPL CALCULATED.3IONS-SCNC: 8 MMOL/L (ref 5–15)
ANISOCYTOSIS BLD QL: ABNORMAL
ANTI-K: NORMAL
APTT PPP: 26.8 SECONDS (ref 25.6–35.3)
APTT PPP: 35.7 SECONDS (ref 25.6–35.3)
APTT PPP: 36.2 SECONDS (ref 25.6–35.3)
APTT PPP: 38.3 SECONDS (ref 25.6–35.3)
APTT PPP: 39.3 SECONDS (ref 25.6–35.3)
APTT PPP: 49.2 SECONDS (ref 25.6–35.3)
APTT PPP: 49.5 SECONDS (ref 25.6–35.3)
APTT PPP: 53 SECONDS (ref 25.6–35.3)
APTT PPP: 53.8 SECONDS (ref 25.6–35.3)
APTT PPP: 55.4 SECONDS (ref 25.6–35.3)
APTT PPP: 55.6 SECONDS (ref 25.6–35.3)
APTT PPP: 55.7 SECONDS (ref 25.6–35.3)
APTT PPP: 56.8 SECONDS (ref 25.6–35.3)
APTT PPP: 65.9 SECONDS (ref 25.6–35.3)
APTT PPP: 68.8 SECONDS (ref 25.6–35.3)
APTT PPP: 73 SECONDS (ref 25.6–35.3)
APTT PPP: 96.5 SECONDS (ref 25.6–35.3)
APTT PPP: >100 SECONDS (ref 25.6–35.3)
ARTERIAL PATENCY WRIST A: ABNORMAL
ARTERIAL PATENCY WRIST A: POSITIVE
AST SERPL-CCNC: 10 U/L (ref 1–40)
AST SERPL-CCNC: 12 U/L (ref 1–40)
AST SERPL-CCNC: 13 U/L (ref 1–40)
AST SERPL-CCNC: 14 U/L (ref 1–40)
AST SERPL-CCNC: 14 U/L (ref 1–40)
AST SERPL-CCNC: 15 U/L (ref 1–40)
AST SERPL-CCNC: 17 U/L (ref 1–40)
AST SERPL-CCNC: 18 U/L (ref 1–40)
AST SERPL-CCNC: 19 U/L (ref 1–40)
AST SERPL-CCNC: 21 U/L (ref 1–40)
AST SERPL-CCNC: 21 U/L (ref 1–40)
AST SERPL-CCNC: 25 U/L (ref 1–40)
AST SERPL-CCNC: 26 U/L (ref 1–40)
AST SERPL-CCNC: 30 U/L (ref 1–40)
AST SERPL-CCNC: 30 U/L (ref 1–40)
AST SERPL-CCNC: 33 U/L (ref 1–40)
AST SERPL-CCNC: 48 U/L (ref 1–40)
AST SERPL-CCNC: 9 U/L (ref 1–40)
ATMOSPHERIC PRESS: 719 MMHG
ATMOSPHERIC PRESS: 737 MMHG
BACTERIA BLD CULT: ABNORMAL
BACTERIA SPEC AEROBE CULT: ABNORMAL
BACTERIA SPEC AEROBE CULT: NORMAL
BACTERIA UR QL AUTO: ABNORMAL /HPF
BARBITURATES UR QL SCN: NEGATIVE
BASE EXCESS BLDA CALC-SCNC: -15.9 MMOL/L (ref 0–2)
BASE EXCESS BLDA CALC-SCNC: -16 MMOL/L (ref 0–2)
BASO STIPL COARSE BLD QL SMEAR: ABNORMAL
BASOPHILS # BLD AUTO: 0.01 10*3/MM3 (ref 0–0.2)
BASOPHILS # BLD AUTO: 0.02 10*3/MM3 (ref 0–0.2)
BASOPHILS # BLD AUTO: 0.03 10*3/MM3 (ref 0–0.2)
BASOPHILS # BLD AUTO: 0.04 10*3/MM3 (ref 0–0.2)
BASOPHILS # BLD AUTO: 0.04 10*3/MM3 (ref 0–0.2)
BASOPHILS # BLD AUTO: 0.05 10*3/MM3 (ref 0–0.2)
BASOPHILS # BLD AUTO: 0.06 10*3/MM3 (ref 0–0.2)
BASOPHILS # BLD AUTO: 0.07 10*3/MM3 (ref 0–0.2)
BASOPHILS # BLD AUTO: 0.08 10*3/MM3 (ref 0–0.2)
BASOPHILS # BLD MANUAL: 0.07 10*3/MM3 (ref 0–0.2)
BASOPHILS # BLD MANUAL: 0.09 10*3/MM3 (ref 0–0.2)
BASOPHILS NFR BLD AUTO: 0.1 % (ref 0–1.5)
BASOPHILS NFR BLD AUTO: 0.2 % (ref 0–1.5)
BASOPHILS NFR BLD AUTO: 0.3 % (ref 0–1.5)
BASOPHILS NFR BLD AUTO: 0.4 % (ref 0–1.5)
BASOPHILS NFR BLD AUTO: 0.5 % (ref 0–1.5)
BASOPHILS NFR BLD AUTO: 0.5 % (ref 0–1.5)
BASOPHILS NFR BLD AUTO: 0.6 % (ref 0–1.5)
BASOPHILS NFR BLD AUTO: 0.7 % (ref 0–1.5)
BASOPHILS NFR BLD AUTO: 0.8 % (ref 0–1.5)
BASOPHILS NFR BLD AUTO: 1 % (ref 0–1.5)
BASOPHILS NFR BLD AUTO: 1 % (ref 0–1.5)
BASOPHILS NFR BLD AUTO: 1.1 % (ref 0–1.5)
BDY SITE: ABNORMAL
BDY SITE: ABNORMAL
BENZODIAZ UR QL SCN: NEGATIVE
BH BB BLOOD EXPIRATION DATE: NORMAL
BH BB BLOOD TYPE BARCODE: 5100
BH BB DISPENSE STATUS: NORMAL
BH BB PRODUCT CODE: NORMAL
BH BB UNIT NUMBER: NORMAL
BH CV ECHO MEAS - % IVS THICK: -5.3 %
BH CV ECHO MEAS - % LVPW THICK: 48 %
BH CV ECHO MEAS - ACS: 2.6 CM
BH CV ECHO MEAS - AO MAX PG: 8.5 MMHG
BH CV ECHO MEAS - AO MEAN PG: 5 MMHG
BH CV ECHO MEAS - AO ROOT AREA (BSA CORRECTED): 1.9
BH CV ECHO MEAS - AO ROOT AREA: 10.2 CM^2
BH CV ECHO MEAS - AO ROOT DIAM: 3.6 CM
BH CV ECHO MEAS - AO V2 MAX: 146 CM/SEC
BH CV ECHO MEAS - AO V2 MEAN: 103 CM/SEC
BH CV ECHO MEAS - AO V2 VTI: 23 CM
BH CV ECHO MEAS - BSA(HAYCOCK): 1.8 M^2
BH CV ECHO MEAS - BSA(HAYCOCK): 1.9 M^2
BH CV ECHO MEAS - BSA: 1.9 M^2
BH CV ECHO MEAS - BSA: 2 M^2
BH CV ECHO MEAS - BZI_BMI: 17.7 KILOGRAMS/M^2
BH CV ECHO MEAS - BZI_BMI: 20.5 KILOGRAMS/M^2
BH CV ECHO MEAS - BZI_METRIC_HEIGHT: 188 CM
BH CV ECHO MEAS - BZI_METRIC_HEIGHT: 188 CM
BH CV ECHO MEAS - BZI_METRIC_WEIGHT: 62.6 KG
BH CV ECHO MEAS - BZI_METRIC_WEIGHT: 72.6 KG
BH CV ECHO MEAS - EDV(CUBED): 121.3 ML
BH CV ECHO MEAS - EDV(MOD-SP4): 82.3 ML
BH CV ECHO MEAS - EDV(TEICH): 115.5 ML
BH CV ECHO MEAS - EF(CUBED): 71.7 %
BH CV ECHO MEAS - EF(MOD-SP4): 61.6 %
BH CV ECHO MEAS - EF(TEICH): 63.2 %
BH CV ECHO MEAS - ESV(CUBED): 34.3 ML
BH CV ECHO MEAS - ESV(MOD-SP4): 31.6 ML
BH CV ECHO MEAS - ESV(TEICH): 42.5 ML
BH CV ECHO MEAS - FS: 34.3 %
BH CV ECHO MEAS - IVS/LVPW: 1
BH CV ECHO MEAS - IVSD: 1 CM
BH CV ECHO MEAS - IVSS: 0.97 CM
BH CV ECHO MEAS - LA DIMENSION: 3.1 CM
BH CV ECHO MEAS - LA/AO: 0.85
BH CV ECHO MEAS - LV DIASTOLIC VOL/BSA (35-75): 44.3 ML/M^2
BH CV ECHO MEAS - LV MASS(C)D: 180.6 GRAMS
BH CV ECHO MEAS - LV MASS(C)DI: 97.3 GRAMS/M^2
BH CV ECHO MEAS - LV MASS(C)S: 125 GRAMS
BH CV ECHO MEAS - LV MASS(C)SI: 67.3 GRAMS/M^2
BH CV ECHO MEAS - LV SYSTOLIC VOL/BSA (12-30): 17 ML/M^2
BH CV ECHO MEAS - LVIDD: 5 CM
BH CV ECHO MEAS - LVIDS: 3.3 CM
BH CV ECHO MEAS - LVLD AP4: 6.5 CM
BH CV ECHO MEAS - LVLS AP4: 5.8 CM
BH CV ECHO MEAS - LVOT AREA (M): 2.8 CM^2
BH CV ECHO MEAS - LVOT AREA: 2.8 CM^2
BH CV ECHO MEAS - LVOT DIAM: 1.9 CM
BH CV ECHO MEAS - LVPWD: 0.99 CM
BH CV ECHO MEAS - LVPWS: 1.5 CM
BH CV ECHO MEAS - MV A MAX VEL: 78.8 CM/SEC
BH CV ECHO MEAS - MV E MAX VEL: 62.1 CM/SEC
BH CV ECHO MEAS - MV E/A: 0.79
BH CV ECHO MEAS - PA ACC TIME: 0.07 SEC
BH CV ECHO MEAS - PA PR(ACCEL): 45.7 MMHG
BH CV ECHO MEAS - SI(AO): 126.1 ML/M^2
BH CV ECHO MEAS - SI(CUBED): 46.9 ML/M^2
BH CV ECHO MEAS - SI(MOD-SP4): 27.3 ML/M^2
BH CV ECHO MEAS - SI(TEICH): 39.3 ML/M^2
BH CV ECHO MEAS - SV(AO): 234.1 ML
BH CV ECHO MEAS - SV(CUBED): 87 ML
BH CV ECHO MEAS - SV(MOD-SP4): 50.7 ML
BH CV ECHO MEAS - SV(TEICH): 73 ML
BILIRUB SERPL-MCNC: 0.2 MG/DL (ref 0–1.2)
BILIRUB SERPL-MCNC: 0.3 MG/DL (ref 0–1.2)
BILIRUB SERPL-MCNC: 0.4 MG/DL (ref 0–1.2)
BILIRUB SERPL-MCNC: 0.5 MG/DL (ref 0–1.2)
BILIRUB UR QL STRIP: NEGATIVE
BLD GP AB SCN SERPL QL ELUTION: NEGATIVE
BLD GP AB SCN SERPL QL ELUTION: POSITIVE
BLD GP AB SCN SERPL QL: NEGATIVE
BLD GP AB SCN SERPL QL: POSITIVE
BODY TEMPERATURE: 0 C
BODY TEMPERATURE: 0 C
BUN SERPL-MCNC: 100 MG/DL (ref 6–20)
BUN SERPL-MCNC: 108 MG/DL (ref 6–20)
BUN SERPL-MCNC: 117 MG/DL (ref 6–20)
BUN SERPL-MCNC: 118 MG/DL (ref 6–20)
BUN SERPL-MCNC: 119 MG/DL (ref 6–20)
BUN SERPL-MCNC: 119 MG/DL (ref 6–20)
BUN SERPL-MCNC: 129 MG/DL (ref 6–20)
BUN SERPL-MCNC: 161 MG/DL (ref 6–20)
BUN SERPL-MCNC: 163 MG/DL (ref 6–20)
BUN SERPL-MCNC: 167 MG/DL (ref 6–20)
BUN SERPL-MCNC: 180 MG/DL (ref 6–20)
BUN SERPL-MCNC: 180 MG/DL (ref 6–20)
BUN SERPL-MCNC: 185 MG/DL (ref 6–20)
BUN SERPL-MCNC: 194 MG/DL (ref 6–20)
BUN SERPL-MCNC: 196 MG/DL (ref 6–20)
BUN SERPL-MCNC: 210 MG/DL (ref 6–20)
BUN SERPL-MCNC: 35 MG/DL (ref 6–20)
BUN SERPL-MCNC: 44 MG/DL (ref 6–20)
BUN SERPL-MCNC: 47 MG/DL (ref 6–20)
BUN SERPL-MCNC: 52 MG/DL (ref 6–20)
BUN SERPL-MCNC: 57 MG/DL (ref 6–20)
BUN SERPL-MCNC: 60 MG/DL (ref 6–20)
BUN SERPL-MCNC: 63 MG/DL (ref 6–20)
BUN SERPL-MCNC: 68 MG/DL (ref 6–20)
BUN SERPL-MCNC: 69 MG/DL (ref 6–20)
BUN SERPL-MCNC: 69 MG/DL (ref 6–20)
BUN SERPL-MCNC: 73 MG/DL (ref 6–20)
BUN SERPL-MCNC: 73 MG/DL (ref 6–20)
BUN SERPL-MCNC: 75 MG/DL (ref 6–20)
BUN SERPL-MCNC: 75 MG/DL (ref 6–20)
BUN SERPL-MCNC: 79 MG/DL (ref 6–20)
BUN SERPL-MCNC: 79 MG/DL (ref 6–20)
BUN SERPL-MCNC: 83 MG/DL (ref 6–20)
BUN SERPL-MCNC: 88 MG/DL (ref 6–20)
BUN SERPL-MCNC: 93 MG/DL (ref 6–20)
BUN SERPL-MCNC: 95 MG/DL (ref 6–20)
BUN/CREAT SERPL: 10 (ref 7–25)
BUN/CREAT SERPL: 10 (ref 7–25)
BUN/CREAT SERPL: 10.6 (ref 7–25)
BUN/CREAT SERPL: 10.8 (ref 7–25)
BUN/CREAT SERPL: 10.8 (ref 7–25)
BUN/CREAT SERPL: 11 (ref 7–25)
BUN/CREAT SERPL: 11.3 (ref 7–25)
BUN/CREAT SERPL: 11.9 (ref 7–25)
BUN/CREAT SERPL: 12.1 (ref 7–25)
BUN/CREAT SERPL: 12.7 (ref 7–25)
BUN/CREAT SERPL: 13 (ref 7–25)
BUN/CREAT SERPL: 13.6 (ref 7–25)
BUN/CREAT SERPL: 14 (ref 7–25)
BUN/CREAT SERPL: 14 (ref 7–25)
BUN/CREAT SERPL: 14.3 (ref 7–25)
BUN/CREAT SERPL: 14.6 (ref 7–25)
BUN/CREAT SERPL: 15.3 (ref 7–25)
BUN/CREAT SERPL: 15.5 (ref 7–25)
BUN/CREAT SERPL: 17 (ref 7–25)
BUN/CREAT SERPL: 17 (ref 7–25)
BUN/CREAT SERPL: 17.1 (ref 7–25)
BUN/CREAT SERPL: 17.6 (ref 7–25)
BUN/CREAT SERPL: 17.7 (ref 7–25)
BUN/CREAT SERPL: 18.7 (ref 7–25)
BUN/CREAT SERPL: 19.2 (ref 7–25)
BUN/CREAT SERPL: 19.2 (ref 7–25)
BUN/CREAT SERPL: 19.5 (ref 7–25)
BUN/CREAT SERPL: 20.1 (ref 7–25)
BUN/CREAT SERPL: 20.1 (ref 7–25)
BUN/CREAT SERPL: 20.4 (ref 7–25)
BUN/CREAT SERPL: 22.6 (ref 7–25)
BUN/CREAT SERPL: 8.4 (ref 7–25)
BUN/CREAT SERPL: 9.1 (ref 7–25)
BUN/CREAT SERPL: 9.8 (ref 7–25)
BUN/CREAT SERPL: 9.8 (ref 7–25)
BUN/CREAT SERPL: 9.9 (ref 7–25)
BUPRENORPHINE SERPL-MCNC: NEGATIVE NG/ML
BURR CELLS BLD QL SMEAR: ABNORMAL
CA-I SERPL ISE-MCNC: 0.82 MMOL/L (ref 1.12–1.32)
CA-I SERPL ISE-MCNC: 0.9 MMOL/L (ref 1.12–1.32)
CALCIUM SPEC-SCNC: 5.8 MG/DL (ref 8.6–10.5)
CALCIUM SPEC-SCNC: 6 MG/DL (ref 8.6–10.5)
CALCIUM SPEC-SCNC: 6.2 MG/DL (ref 8.6–10.5)
CALCIUM SPEC-SCNC: 6.8 MG/DL (ref 8.6–10.5)
CALCIUM SPEC-SCNC: 6.8 MG/DL (ref 8.6–10.5)
CALCIUM SPEC-SCNC: 6.9 MG/DL (ref 8.6–10.5)
CALCIUM SPEC-SCNC: 7.6 MG/DL (ref 8.6–10.5)
CALCIUM SPEC-SCNC: 7.7 MG/DL (ref 8.6–10.5)
CALCIUM SPEC-SCNC: 7.7 MG/DL (ref 8.6–10.5)
CALCIUM SPEC-SCNC: 7.9 MG/DL (ref 8.6–10.5)
CALCIUM SPEC-SCNC: 7.9 MG/DL (ref 8.6–10.5)
CALCIUM SPEC-SCNC: 8 MG/DL (ref 8.6–10.5)
CALCIUM SPEC-SCNC: 8.1 MG/DL (ref 8.6–10.5)
CALCIUM SPEC-SCNC: 8.1 MG/DL (ref 8.6–10.5)
CALCIUM SPEC-SCNC: 8.2 MG/DL (ref 8.6–10.5)
CALCIUM SPEC-SCNC: 8.3 MG/DL (ref 8.6–10.5)
CALCIUM SPEC-SCNC: 8.4 MG/DL (ref 8.6–10.5)
CALCIUM SPEC-SCNC: 8.5 MG/DL (ref 8.6–10.5)
CALCIUM SPEC-SCNC: 8.6 MG/DL (ref 8.6–10.5)
CALCIUM SPEC-SCNC: 8.7 MG/DL (ref 8.6–10.5)
CALCIUM SPEC-SCNC: 8.9 MG/DL (ref 8.6–10.5)
CANNABINOIDS SERPL QL: NEGATIVE
CHLORIDE SERPL-SCNC: 85 MMOL/L (ref 98–107)
CHLORIDE SERPL-SCNC: 86 MMOL/L (ref 98–107)
CHLORIDE SERPL-SCNC: 87 MMOL/L (ref 98–107)
CHLORIDE SERPL-SCNC: 89 MMOL/L (ref 98–107)
CHLORIDE SERPL-SCNC: 90 MMOL/L (ref 98–107)
CHLORIDE SERPL-SCNC: 91 MMOL/L (ref 98–107)
CHLORIDE SERPL-SCNC: 92 MMOL/L (ref 98–107)
CHLORIDE SERPL-SCNC: 93 MMOL/L (ref 98–107)
CHLORIDE SERPL-SCNC: 94 MMOL/L (ref 98–107)
CHLORIDE SERPL-SCNC: 95 MMOL/L (ref 98–107)
CHLORIDE SERPL-SCNC: 96 MMOL/L (ref 98–107)
CHLORIDE SERPL-SCNC: 97 MMOL/L (ref 98–107)
CHLORIDE SERPL-SCNC: 99 MMOL/L (ref 98–107)
CHLORIDE SERPL-SCNC: 99 MMOL/L (ref 98–107)
CHOLEST SERPL-MCNC: 72 MG/DL (ref 0–200)
CK SERPL-CCNC: 11 U/L (ref 20–200)
CK SERPL-CCNC: 13 U/L (ref 20–200)
CK SERPL-CCNC: 13 U/L (ref 20–200)
CK SERPL-CCNC: 14 U/L (ref 20–200)
CK SERPL-CCNC: 16 U/L (ref 20–200)
CK SERPL-CCNC: 19 U/L (ref 20–200)
CK SERPL-CCNC: 21 U/L (ref 20–200)
CK SERPL-CCNC: 29 U/L (ref 20–200)
CK SERPL-CCNC: 72 U/L (ref 20–200)
CK SERPL-CCNC: 92 U/L (ref 20–200)
CLARITY UR: ABNORMAL
CLARITY UR: ABNORMAL
CLARITY UR: CLEAR
CO2 BLDA-SCNC: 10.3 MMOL/L (ref 22–33)
CO2 BLDA-SCNC: 9.7 MMOL/L (ref 22–33)
CO2 SERPL-SCNC: 10.7 MMOL/L (ref 22–29)
CO2 SERPL-SCNC: 11 MMOL/L (ref 22–29)
CO2 SERPL-SCNC: 11.1 MMOL/L (ref 22–29)
CO2 SERPL-SCNC: 12.7 MMOL/L (ref 22–29)
CO2 SERPL-SCNC: 13.8 MMOL/L (ref 22–29)
CO2 SERPL-SCNC: 17.3 MMOL/L (ref 22–29)
CO2 SERPL-SCNC: 17.8 MMOL/L (ref 22–29)
CO2 SERPL-SCNC: 18.6 MMOL/L (ref 22–29)
CO2 SERPL-SCNC: 18.8 MMOL/L (ref 22–29)
CO2 SERPL-SCNC: 19.3 MMOL/L (ref 22–29)
CO2 SERPL-SCNC: 19.8 MMOL/L (ref 22–29)
CO2 SERPL-SCNC: 20.5 MMOL/L (ref 22–29)
CO2 SERPL-SCNC: 20.6 MMOL/L (ref 22–29)
CO2 SERPL-SCNC: 20.8 MMOL/L (ref 22–29)
CO2 SERPL-SCNC: 20.9 MMOL/L (ref 22–29)
CO2 SERPL-SCNC: 21.8 MMOL/L (ref 22–29)
CO2 SERPL-SCNC: 21.8 MMOL/L (ref 22–29)
CO2 SERPL-SCNC: 22.2 MMOL/L (ref 22–29)
CO2 SERPL-SCNC: 22.3 MMOL/L (ref 22–29)
CO2 SERPL-SCNC: 22.7 MMOL/L (ref 22–29)
CO2 SERPL-SCNC: 22.9 MMOL/L (ref 22–29)
CO2 SERPL-SCNC: 23.3 MMOL/L (ref 22–29)
CO2 SERPL-SCNC: 23.6 MMOL/L (ref 22–29)
CO2 SERPL-SCNC: 24.2 MMOL/L (ref 22–29)
CO2 SERPL-SCNC: 24.9 MMOL/L (ref 22–29)
CO2 SERPL-SCNC: 25.1 MMOL/L (ref 22–29)
CO2 SERPL-SCNC: 25.3 MMOL/L (ref 22–29)
CO2 SERPL-SCNC: 26 MMOL/L (ref 22–29)
CO2 SERPL-SCNC: 26.1 MMOL/L (ref 22–29)
CO2 SERPL-SCNC: 27.6 MMOL/L (ref 22–29)
CO2 SERPL-SCNC: 28 MMOL/L (ref 22–29)
CO2 SERPL-SCNC: 28 MMOL/L (ref 22–29)
CO2 SERPL-SCNC: 7.7 MMOL/L (ref 22–29)
CO2 SERPL-SCNC: 9.2 MMOL/L (ref 22–29)
CO2 SERPL-SCNC: 9.5 MMOL/L (ref 22–29)
CO2 SERPL-SCNC: 9.9 MMOL/L (ref 22–29)
COCAINE UR QL: NEGATIVE
COD CRY URNS QL: ABNORMAL /HPF
COHGB MFR BLD: 0.9 % (ref 0–5)
COHGB MFR BLD: 1.7 % (ref 0–5)
COLOR UR: YELLOW
CREAT SERPL-MCNC: 10.02 MG/DL (ref 0.76–1.27)
CREAT SERPL-MCNC: 10.2 MG/DL (ref 0.76–1.27)
CREAT SERPL-MCNC: 10.51 MG/DL (ref 0.76–1.27)
CREAT SERPL-MCNC: 10.61 MG/DL (ref 0.76–1.27)
CREAT SERPL-MCNC: 11.65 MG/DL (ref 0.76–1.27)
CREAT SERPL-MCNC: 15.5 MG/DL (ref 0.76–1.27)
CREAT SERPL-MCNC: 16.55 MG/DL (ref 0.76–1.27)
CREAT SERPL-MCNC: 17.56 MG/DL (ref 0.76–1.27)
CREAT SERPL-MCNC: 3.63 MG/DL (ref 0.76–1.27)
CREAT SERPL-MCNC: 3.64 MG/DL (ref 0.76–1.27)
CREAT SERPL-MCNC: 3.91 MG/DL (ref 0.76–1.27)
CREAT SERPL-MCNC: 4.19 MG/DL (ref 0.76–1.27)
CREAT SERPL-MCNC: 4.27 MG/DL (ref 0.76–1.27)
CREAT SERPL-MCNC: 4.38 MG/DL (ref 0.76–1.27)
CREAT SERPL-MCNC: 4.4 MG/DL (ref 0.76–1.27)
CREAT SERPL-MCNC: 4.42 MG/DL (ref 0.76–1.27)
CREAT SERPL-MCNC: 4.71 MG/DL (ref 0.76–1.27)
CREAT SERPL-MCNC: 4.83 MG/DL (ref 0.76–1.27)
CREAT SERPL-MCNC: 5.16 MG/DL (ref 0.76–1.27)
CREAT SERPL-MCNC: 5.59 MG/DL (ref 0.76–1.27)
CREAT SERPL-MCNC: 5.65 MG/DL (ref 0.76–1.27)
CREAT SERPL-MCNC: 5.67 MG/DL (ref 0.76–1.27)
CREAT SERPL-MCNC: 5.73 MG/DL (ref 0.76–1.27)
CREAT SERPL-MCNC: 6.1 MG/DL (ref 0.76–1.27)
CREAT SERPL-MCNC: 6.11 MG/DL (ref 0.76–1.27)
CREAT SERPL-MCNC: 6.2 MG/DL (ref 0.76–1.27)
CREAT SERPL-MCNC: 6.61 MG/DL (ref 0.76–1.27)
CREAT SERPL-MCNC: 6.82 MG/DL (ref 0.76–1.27)
CREAT SERPL-MCNC: 6.9 MG/DL (ref 0.76–1.27)
CREAT SERPL-MCNC: 7.72 MG/DL (ref 0.76–1.27)
CREAT SERPL-MCNC: 8.16 MG/DL (ref 0.76–1.27)
CREAT SERPL-MCNC: 8.37 MG/DL (ref 0.76–1.27)
CREAT SERPL-MCNC: 8.68 MG/DL (ref 0.76–1.27)
CREAT SERPL-MCNC: 8.95 MG/DL (ref 0.76–1.27)
CREAT SERPL-MCNC: 8.96 MG/DL (ref 0.76–1.27)
CREAT SERPL-MCNC: 9.1 MG/DL (ref 0.76–1.27)
CROSSMATCH INTERPRETATION: NORMAL
CRP SERPL-MCNC: 10.42 MG/DL (ref 0–0.5)
CRP SERPL-MCNC: 11.71 MG/DL (ref 0–0.5)
CRP SERPL-MCNC: 13.21 MG/DL (ref 0–0.5)
CRP SERPL-MCNC: 13.61 MG/DL (ref 0–0.5)
CRP SERPL-MCNC: 15.81 MG/DL (ref 0–0.5)
CRP SERPL-MCNC: 15.95 MG/DL (ref 0–0.5)
CRP SERPL-MCNC: 3.71 MG/DL (ref 0–0.5)
CRP SERPL-MCNC: 4.65 MG/DL (ref 0–0.5)
CRP SERPL-MCNC: 4.66 MG/DL (ref 0–0.5)
CRP SERPL-MCNC: 4.69 MG/DL (ref 0–0.5)
CRP SERPL-MCNC: 4.83 MG/DL (ref 0–0.5)
CRP SERPL-MCNC: 5.44 MG/DL (ref 0–0.5)
CRP SERPL-MCNC: 5.54 MG/DL (ref 0–0.5)
CRP SERPL-MCNC: 6.95 MG/DL (ref 0–0.5)
CRP SERPL-MCNC: 8.15 MG/DL (ref 0–0.5)
CRP SERPL-MCNC: 8.33 MG/DL (ref 0–0.5)
CRP SERPL-MCNC: 9.88 MG/DL (ref 0–0.5)
CRP SERPL-MCNC: 9.9 MG/DL (ref 0–0.5)
D DIMER PPP FEU-MCNC: 14.53 MCGFEU/ML (ref 0–0.5)
D DIMER PPP FEU-MCNC: 16.35 MCGFEU/ML (ref 0–0.5)
D DIMER PPP FEU-MCNC: 19.03 MCGFEU/ML (ref 0–0.5)
D DIMER PPP FEU-MCNC: 2.49 MCGFEU/ML (ref 0–0.5)
D DIMER PPP FEU-MCNC: 2.53 MCGFEU/ML (ref 0–0.5)
D DIMER PPP FEU-MCNC: 2.75 MCGFEU/ML (ref 0–0.5)
D DIMER PPP FEU-MCNC: 2.77 MCGFEU/ML (ref 0–0.5)
D DIMER PPP FEU-MCNC: 2.96 MCGFEU/ML (ref 0–0.5)
D DIMER PPP FEU-MCNC: 3.22 MCGFEU/ML (ref 0–0.5)
D DIMER PPP FEU-MCNC: 3.4 MCGFEU/ML (ref 0–0.5)
D DIMER PPP FEU-MCNC: 4.52 MCGFEU/ML (ref 0–0.5)
D DIMER PPP FEU-MCNC: 8.54 MCGFEU/ML (ref 0–0.5)
D-LACTATE SERPL-SCNC: 0.6 MMOL/L (ref 0.5–2)
D-LACTATE SERPL-SCNC: 0.7 MMOL/L (ref 0.5–2)
D-LACTATE SERPL-SCNC: 0.7 MMOL/L (ref 0.5–2)
D-LACTATE SERPL-SCNC: 1.9 MMOL/L (ref 0.5–2)
D-LACTATE SERPL-SCNC: 2.1 MMOL/L (ref 0.5–2)
DACRYOCYTES BLD QL SMEAR: ABNORMAL
DAT IGG GEL: POSITIVE
DEPRECATED RDW RBC AUTO: 43.3 FL (ref 37–54)
DEPRECATED RDW RBC AUTO: 44.5 FL (ref 37–54)
DEPRECATED RDW RBC AUTO: 45.8 FL (ref 37–54)
DEPRECATED RDW RBC AUTO: 45.8 FL (ref 37–54)
DEPRECATED RDW RBC AUTO: 46.7 FL (ref 37–54)
DEPRECATED RDW RBC AUTO: 46.8 FL (ref 37–54)
DEPRECATED RDW RBC AUTO: 47.6 FL (ref 37–54)
DEPRECATED RDW RBC AUTO: 47.6 FL (ref 37–54)
DEPRECATED RDW RBC AUTO: 47.7 FL (ref 37–54)
DEPRECATED RDW RBC AUTO: 48.3 FL (ref 37–54)
DEPRECATED RDW RBC AUTO: 48.5 FL (ref 37–54)
DEPRECATED RDW RBC AUTO: 48.5 FL (ref 37–54)
DEPRECATED RDW RBC AUTO: 48.7 FL (ref 37–54)
DEPRECATED RDW RBC AUTO: 49.1 FL (ref 37–54)
DEPRECATED RDW RBC AUTO: 51 FL (ref 37–54)
DEPRECATED RDW RBC AUTO: 51.6 FL (ref 37–54)
DEPRECATED RDW RBC AUTO: 51.8 FL (ref 37–54)
DEPRECATED RDW RBC AUTO: 53.5 FL (ref 37–54)
DEPRECATED RDW RBC AUTO: 53.8 FL (ref 37–54)
DEPRECATED RDW RBC AUTO: 53.8 FL (ref 37–54)
DEPRECATED RDW RBC AUTO: 54.1 FL (ref 37–54)
DEPRECATED RDW RBC AUTO: 55.2 FL (ref 37–54)
DEPRECATED RDW RBC AUTO: 55.5 FL (ref 37–54)
DEPRECATED RDW RBC AUTO: 55.6 FL (ref 37–54)
DEPRECATED RDW RBC AUTO: 55.9 FL (ref 37–54)
DEPRECATED RDW RBC AUTO: 56 FL (ref 37–54)
DEPRECATED RDW RBC AUTO: 56.1 FL (ref 37–54)
DEPRECATED RDW RBC AUTO: 56.3 FL (ref 37–54)
DEPRECATED RDW RBC AUTO: 57.2 FL (ref 37–54)
DEPRECATED RDW RBC AUTO: 57.3 FL (ref 37–54)
DEPRECATED RDW RBC AUTO: 58 FL (ref 37–54)
DEPRECATED RDW RBC AUTO: 58.5 FL (ref 37–54)
DEPRECATED RDW RBC AUTO: 59.7 FL (ref 37–54)
DEPRECATED RDW RBC AUTO: 59.8 FL (ref 37–54)
EOSINOPHIL # BLD AUTO: 0 10*3/MM3 (ref 0–0.4)
EOSINOPHIL # BLD AUTO: 0.01 10*3/MM3 (ref 0–0.4)
EOSINOPHIL # BLD AUTO: 0.02 10*3/MM3 (ref 0–0.4)
EOSINOPHIL # BLD AUTO: 0.03 10*3/MM3 (ref 0–0.4)
EOSINOPHIL # BLD AUTO: 0.04 10*3/MM3 (ref 0–0.4)
EOSINOPHIL # BLD AUTO: 0.07 10*3/MM3 (ref 0–0.4)
EOSINOPHIL # BLD AUTO: 0.07 10*3/MM3 (ref 0–0.4)
EOSINOPHIL # BLD AUTO: 0.08 10*3/MM3 (ref 0–0.4)
EOSINOPHIL # BLD AUTO: 0.09 10*3/MM3 (ref 0–0.4)
EOSINOPHIL # BLD AUTO: 0.1 10*3/MM3 (ref 0–0.4)
EOSINOPHIL # BLD AUTO: 0.11 10*3/MM3 (ref 0–0.4)
EOSINOPHIL # BLD AUTO: 0.12 10*3/MM3 (ref 0–0.4)
EOSINOPHIL # BLD AUTO: 0.13 10*3/MM3 (ref 0–0.4)
EOSINOPHIL # BLD AUTO: 0.13 10*3/MM3 (ref 0–0.4)
EOSINOPHIL # BLD AUTO: 0.16 10*3/MM3 (ref 0–0.4)
EOSINOPHIL # BLD AUTO: 0.16 10*3/MM3 (ref 0–0.4)
EOSINOPHIL # BLD AUTO: 0.18 10*3/MM3 (ref 0–0.4)
EOSINOPHIL # BLD AUTO: 0.2 10*3/MM3 (ref 0–0.4)
EOSINOPHIL # BLD AUTO: 0.23 10*3/MM3 (ref 0–0.4)
EOSINOPHIL # BLD AUTO: 0.25 10*3/MM3 (ref 0–0.4)
EOSINOPHIL # BLD AUTO: 0.3 10*3/MM3 (ref 0–0.4)
EOSINOPHIL # BLD AUTO: 0.3 10*3/MM3 (ref 0–0.4)
EOSINOPHIL # BLD AUTO: 0.39 10*3/MM3 (ref 0–0.4)
EOSINOPHIL # BLD MANUAL: 0.05 10*3/MM3 (ref 0–0.4)
EOSINOPHIL # BLD MANUAL: 0.38 10*3/MM3 (ref 0–0.4)
EOSINOPHIL NFR BLD AUTO: 0 % (ref 0.3–6.2)
EOSINOPHIL NFR BLD AUTO: 0.1 % (ref 0.3–6.2)
EOSINOPHIL NFR BLD AUTO: 0.2 % (ref 0.3–6.2)
EOSINOPHIL NFR BLD AUTO: 0.2 % (ref 0.3–6.2)
EOSINOPHIL NFR BLD AUTO: 0.3 % (ref 0.3–6.2)
EOSINOPHIL NFR BLD AUTO: 0.4 % (ref 0.3–6.2)
EOSINOPHIL NFR BLD AUTO: 0.5 % (ref 0.3–6.2)
EOSINOPHIL NFR BLD AUTO: 1.1 % (ref 0.3–6.2)
EOSINOPHIL NFR BLD AUTO: 1.1 % (ref 0.3–6.2)
EOSINOPHIL NFR BLD AUTO: 1.2 % (ref 0.3–6.2)
EOSINOPHIL NFR BLD AUTO: 1.2 % (ref 0.3–6.2)
EOSINOPHIL NFR BLD AUTO: 1.3 % (ref 0.3–6.2)
EOSINOPHIL NFR BLD AUTO: 1.4 % (ref 0.3–6.2)
EOSINOPHIL NFR BLD AUTO: 1.5 % (ref 0.3–6.2)
EOSINOPHIL NFR BLD AUTO: 1.6 % (ref 0.3–6.2)
EOSINOPHIL NFR BLD AUTO: 1.7 % (ref 0.3–6.2)
EOSINOPHIL NFR BLD AUTO: 2.3 % (ref 0.3–6.2)
EOSINOPHIL NFR BLD AUTO: 2.6 % (ref 0.3–6.2)
EOSINOPHIL NFR BLD AUTO: 2.9 % (ref 0.3–6.2)
EOSINOPHIL NFR BLD AUTO: 3.1 % (ref 0.3–6.2)
EOSINOPHIL NFR BLD AUTO: 3.1 % (ref 0.3–6.2)
EOSINOPHIL NFR BLD AUTO: 3.3 % (ref 0.3–6.2)
EOSINOPHIL NFR BLD AUTO: 4 % (ref 0.3–6.2)
EOSINOPHIL NFR BLD AUTO: 4.1 % (ref 0.3–6.2)
EOSINOPHIL NFR BLD AUTO: 4.7 % (ref 0.3–6.2)
EOSINOPHIL NFR BLD MANUAL: 1 % (ref 0.3–6.2)
EOSINOPHIL NFR BLD MANUAL: 6 % (ref 0.3–6.2)
ERYTHROCYTE [DISTWIDTH] IN BLOOD BY AUTOMATED COUNT: 14 % (ref 12.3–15.4)
ERYTHROCYTE [DISTWIDTH] IN BLOOD BY AUTOMATED COUNT: 14.1 % (ref 12.3–15.4)
ERYTHROCYTE [DISTWIDTH] IN BLOOD BY AUTOMATED COUNT: 14.2 % (ref 12.3–15.4)
ERYTHROCYTE [DISTWIDTH] IN BLOOD BY AUTOMATED COUNT: 14.3 % (ref 12.3–15.4)
ERYTHROCYTE [DISTWIDTH] IN BLOOD BY AUTOMATED COUNT: 14.3 % (ref 12.3–15.4)
ERYTHROCYTE [DISTWIDTH] IN BLOOD BY AUTOMATED COUNT: 14.4 % (ref 12.3–15.4)
ERYTHROCYTE [DISTWIDTH] IN BLOOD BY AUTOMATED COUNT: 14.5 % (ref 12.3–15.4)
ERYTHROCYTE [DISTWIDTH] IN BLOOD BY AUTOMATED COUNT: 14.6 % (ref 12.3–15.4)
ERYTHROCYTE [DISTWIDTH] IN BLOOD BY AUTOMATED COUNT: 14.7 % (ref 12.3–15.4)
ERYTHROCYTE [DISTWIDTH] IN BLOOD BY AUTOMATED COUNT: 15 % (ref 12.3–15.4)
ERYTHROCYTE [DISTWIDTH] IN BLOOD BY AUTOMATED COUNT: 15.1 % (ref 12.3–15.4)
ERYTHROCYTE [DISTWIDTH] IN BLOOD BY AUTOMATED COUNT: 15.5 % (ref 12.3–15.4)
ERYTHROCYTE [DISTWIDTH] IN BLOOD BY AUTOMATED COUNT: 15.7 % (ref 12.3–15.4)
ERYTHROCYTE [DISTWIDTH] IN BLOOD BY AUTOMATED COUNT: 15.9 % (ref 12.3–15.4)
ERYTHROCYTE [DISTWIDTH] IN BLOOD BY AUTOMATED COUNT: 15.9 % (ref 12.3–15.4)
ERYTHROCYTE [DISTWIDTH] IN BLOOD BY AUTOMATED COUNT: 16.1 % (ref 12.3–15.4)
ERYTHROCYTE [DISTWIDTH] IN BLOOD BY AUTOMATED COUNT: 16.2 % (ref 12.3–15.4)
ERYTHROCYTE [DISTWIDTH] IN BLOOD BY AUTOMATED COUNT: 16.2 % (ref 12.3–15.4)
ERYTHROCYTE [DISTWIDTH] IN BLOOD BY AUTOMATED COUNT: 16.4 % (ref 12.3–15.4)
ERYTHROCYTE [DISTWIDTH] IN BLOOD BY AUTOMATED COUNT: 16.5 % (ref 12.3–15.4)
ERYTHROCYTE [DISTWIDTH] IN BLOOD BY AUTOMATED COUNT: 16.6 % (ref 12.3–15.4)
ERYTHROCYTE [DISTWIDTH] IN BLOOD BY AUTOMATED COUNT: 16.6 % (ref 12.3–15.4)
ERYTHROCYTE [DISTWIDTH] IN BLOOD BY AUTOMATED COUNT: 16.7 % (ref 12.3–15.4)
ERYTHROCYTE [DISTWIDTH] IN BLOOD BY AUTOMATED COUNT: 16.8 % (ref 12.3–15.4)
ERYTHROCYTE [DISTWIDTH] IN BLOOD BY AUTOMATED COUNT: 16.8 % (ref 12.3–15.4)
ERYTHROCYTE [DISTWIDTH] IN BLOOD BY AUTOMATED COUNT: 16.9 % (ref 12.3–15.4)
ERYTHROCYTE [DISTWIDTH] IN BLOOD BY AUTOMATED COUNT: 17 % (ref 12.3–15.4)
ERYTHROCYTE [DISTWIDTH] IN BLOOD BY AUTOMATED COUNT: 17.2 % (ref 12.3–15.4)
ERYTHROCYTE [SEDIMENTATION RATE] IN BLOOD: 99 MM/HR (ref 0–15)
ERYTHROCYTE [SEDIMENTATION RATE] IN BLOOD: >100 MM/HR (ref 0–15)
ERYTHROCYTE [SEDIMENTATION RATE] IN BLOOD: >100 MM/HR (ref 0–15)
ETHANOL BLD-MCNC: <10 MG/DL (ref 0–10)
ETHANOL UR QL: <0.01 %
FERRITIN SERPL-MCNC: 1447 NG/ML (ref 30–400)
FERRITIN SERPL-MCNC: 1458 NG/ML (ref 30–400)
FERRITIN SERPL-MCNC: 1503 NG/ML (ref 30–400)
FERRITIN SERPL-MCNC: 1648 NG/ML (ref 30–400)
FERRITIN SERPL-MCNC: 1714 NG/ML (ref 30–400)
FERRITIN SERPL-MCNC: 1786 NG/ML (ref 30–400)
FERRITIN SERPL-MCNC: 1879 NG/ML (ref 30–400)
FERRITIN SERPL-MCNC: 2072 NG/ML (ref 30–400)
FERRITIN SERPL-MCNC: 2080 NG/ML (ref 30–400)
FERRITIN SERPL-MCNC: 2325 NG/ML (ref 30–400)
FERRITIN SERPL-MCNC: 2533 NG/ML (ref 30–400)
FERRITIN SERPL-MCNC: 960.1 NG/ML (ref 30–400)
FIBRINOGEN PPP-MCNC: 410 MG/DL (ref 173–524)
FIBRINOGEN PPP-MCNC: 433 MG/DL (ref 173–524)
FIBRINOGEN PPP-MCNC: 486 MG/DL (ref 173–524)
FIBRINOGEN PPP-MCNC: 524 MG/DL (ref 173–524)
FIBRINOGEN PPP-MCNC: 576 MG/DL (ref 173–524)
FLUAV RNA RESP QL NAA+PROBE: NOT DETECTED
FLUAV SUBTYP SPEC NAA+PROBE: NOT DETECTED
FLUBV RNA ISLT QL NAA+PROBE: NOT DETECTED
FLUBV RNA RESP QL NAA+PROBE: NOT DETECTED
FOLATE SERPL-MCNC: 11.1 NG/ML (ref 4.78–24.2)
GFR SERPL CREATININE-BSD FRML MDRD: 10 ML/MIN/1.73
GFR SERPL CREATININE-BSD FRML MDRD: 11 ML/MIN/1.73
GFR SERPL CREATININE-BSD FRML MDRD: 12 ML/MIN/1.73
GFR SERPL CREATININE-BSD FRML MDRD: 13 ML/MIN/1.73
GFR SERPL CREATININE-BSD FRML MDRD: 14 ML/MIN/1.73
GFR SERPL CREATININE-BSD FRML MDRD: 15 ML/MIN/1.73
GFR SERPL CREATININE-BSD FRML MDRD: 16 ML/MIN/1.73
GFR SERPL CREATININE-BSD FRML MDRD: 17 ML/MIN/1.73
GFR SERPL CREATININE-BSD FRML MDRD: 18 ML/MIN/1.73
GFR SERPL CREATININE-BSD FRML MDRD: 18 ML/MIN/1.73
GFR SERPL CREATININE-BSD FRML MDRD: 3 ML/MIN/1.73
GFR SERPL CREATININE-BSD FRML MDRD: 5 ML/MIN/1.73
GFR SERPL CREATININE-BSD FRML MDRD: 6 ML/MIN/1.73
GFR SERPL CREATININE-BSD FRML MDRD: 7 ML/MIN/1.73
GFR SERPL CREATININE-BSD FRML MDRD: 8 ML/MIN/1.73
GFR SERPL CREATININE-BSD FRML MDRD: 9 ML/MIN/1.73
GFR SERPL CREATININE-BSD FRML MDRD: ABNORMAL ML/MIN/{1.73_M2}
GLOBULIN UR ELPH-MCNC: 3.9 GM/DL
GLOBULIN UR ELPH-MCNC: 3.9 GM/DL
GLOBULIN UR ELPH-MCNC: 4 GM/DL
GLOBULIN UR ELPH-MCNC: 4 GM/DL
GLOBULIN UR ELPH-MCNC: 4.2 GM/DL
GLOBULIN UR ELPH-MCNC: 4.3 GM/DL
GLOBULIN UR ELPH-MCNC: 4.4 GM/DL
GLOBULIN UR ELPH-MCNC: 4.5 GM/DL
GLOBULIN UR ELPH-MCNC: 4.6 GM/DL
GLOBULIN UR ELPH-MCNC: 4.7 GM/DL
GLOBULIN UR ELPH-MCNC: 5.1 GM/DL
GLOBULIN UR ELPH-MCNC: 5.1 GM/DL
GLOBULIN UR ELPH-MCNC: 5.2 GM/DL
GLOBULIN UR ELPH-MCNC: 5.4 GM/DL
GLUCOSE BLDC GLUCOMTR-MCNC: 101 MG/DL (ref 70–130)
GLUCOSE BLDC GLUCOMTR-MCNC: 102 MG/DL (ref 70–130)
GLUCOSE BLDC GLUCOMTR-MCNC: 104 MG/DL (ref 70–130)
GLUCOSE BLDC GLUCOMTR-MCNC: 104 MG/DL (ref 70–130)
GLUCOSE BLDC GLUCOMTR-MCNC: 106 MG/DL (ref 70–130)
GLUCOSE BLDC GLUCOMTR-MCNC: 107 MG/DL (ref 70–130)
GLUCOSE BLDC GLUCOMTR-MCNC: 107 MG/DL (ref 70–130)
GLUCOSE BLDC GLUCOMTR-MCNC: 108 MG/DL (ref 70–130)
GLUCOSE BLDC GLUCOMTR-MCNC: 109 MG/DL (ref 70–130)
GLUCOSE BLDC GLUCOMTR-MCNC: 112 MG/DL (ref 70–130)
GLUCOSE BLDC GLUCOMTR-MCNC: 112 MG/DL (ref 70–130)
GLUCOSE BLDC GLUCOMTR-MCNC: 113 MG/DL (ref 70–130)
GLUCOSE BLDC GLUCOMTR-MCNC: 116 MG/DL (ref 70–130)
GLUCOSE BLDC GLUCOMTR-MCNC: 117 MG/DL (ref 70–130)
GLUCOSE BLDC GLUCOMTR-MCNC: 117 MG/DL (ref 70–130)
GLUCOSE BLDC GLUCOMTR-MCNC: 120 MG/DL (ref 70–130)
GLUCOSE BLDC GLUCOMTR-MCNC: 122 MG/DL (ref 70–130)
GLUCOSE BLDC GLUCOMTR-MCNC: 122 MG/DL (ref 70–130)
GLUCOSE BLDC GLUCOMTR-MCNC: 123 MG/DL (ref 70–130)
GLUCOSE BLDC GLUCOMTR-MCNC: 124 MG/DL (ref 70–130)
GLUCOSE BLDC GLUCOMTR-MCNC: 125 MG/DL (ref 70–130)
GLUCOSE BLDC GLUCOMTR-MCNC: 126 MG/DL (ref 70–130)
GLUCOSE BLDC GLUCOMTR-MCNC: 129 MG/DL (ref 70–130)
GLUCOSE BLDC GLUCOMTR-MCNC: 132 MG/DL (ref 70–130)
GLUCOSE BLDC GLUCOMTR-MCNC: 133 MG/DL (ref 70–130)
GLUCOSE BLDC GLUCOMTR-MCNC: 133 MG/DL (ref 70–130)
GLUCOSE BLDC GLUCOMTR-MCNC: 135 MG/DL (ref 70–130)
GLUCOSE BLDC GLUCOMTR-MCNC: 136 MG/DL (ref 70–130)
GLUCOSE BLDC GLUCOMTR-MCNC: 139 MG/DL (ref 70–130)
GLUCOSE BLDC GLUCOMTR-MCNC: 144 MG/DL (ref 70–130)
GLUCOSE BLDC GLUCOMTR-MCNC: 145 MG/DL (ref 70–130)
GLUCOSE BLDC GLUCOMTR-MCNC: 146 MG/DL (ref 70–130)
GLUCOSE BLDC GLUCOMTR-MCNC: 146 MG/DL (ref 70–130)
GLUCOSE BLDC GLUCOMTR-MCNC: 147 MG/DL (ref 70–130)
GLUCOSE BLDC GLUCOMTR-MCNC: 147 MG/DL (ref 70–130)
GLUCOSE BLDC GLUCOMTR-MCNC: 148 MG/DL (ref 70–130)
GLUCOSE BLDC GLUCOMTR-MCNC: 149 MG/DL (ref 70–130)
GLUCOSE BLDC GLUCOMTR-MCNC: 154 MG/DL (ref 70–130)
GLUCOSE BLDC GLUCOMTR-MCNC: 154 MG/DL (ref 70–130)
GLUCOSE BLDC GLUCOMTR-MCNC: 156 MG/DL (ref 70–130)
GLUCOSE BLDC GLUCOMTR-MCNC: 162 MG/DL (ref 70–130)
GLUCOSE BLDC GLUCOMTR-MCNC: 163 MG/DL (ref 70–130)
GLUCOSE BLDC GLUCOMTR-MCNC: 169 MG/DL (ref 70–130)
GLUCOSE BLDC GLUCOMTR-MCNC: 172 MG/DL (ref 70–130)
GLUCOSE BLDC GLUCOMTR-MCNC: 172 MG/DL (ref 70–130)
GLUCOSE BLDC GLUCOMTR-MCNC: 174 MG/DL (ref 70–130)
GLUCOSE BLDC GLUCOMTR-MCNC: 175 MG/DL (ref 70–130)
GLUCOSE BLDC GLUCOMTR-MCNC: 189 MG/DL (ref 70–130)
GLUCOSE BLDC GLUCOMTR-MCNC: 208 MG/DL (ref 70–130)
GLUCOSE BLDC GLUCOMTR-MCNC: 208 MG/DL (ref 70–130)
GLUCOSE BLDC GLUCOMTR-MCNC: 210 MG/DL (ref 70–130)
GLUCOSE BLDC GLUCOMTR-MCNC: 221 MG/DL (ref 70–130)
GLUCOSE BLDC GLUCOMTR-MCNC: 223 MG/DL (ref 70–130)
GLUCOSE BLDC GLUCOMTR-MCNC: 228 MG/DL (ref 70–130)
GLUCOSE BLDC GLUCOMTR-MCNC: 24 MG/DL (ref 70–130)
GLUCOSE BLDC GLUCOMTR-MCNC: 257 MG/DL (ref 70–130)
GLUCOSE BLDC GLUCOMTR-MCNC: 73 MG/DL (ref 70–130)
GLUCOSE BLDC GLUCOMTR-MCNC: 73 MG/DL (ref 70–130)
GLUCOSE BLDC GLUCOMTR-MCNC: 77 MG/DL (ref 70–130)
GLUCOSE BLDC GLUCOMTR-MCNC: 79 MG/DL (ref 70–130)
GLUCOSE BLDC GLUCOMTR-MCNC: 85 MG/DL (ref 70–130)
GLUCOSE BLDC GLUCOMTR-MCNC: 92 MG/DL (ref 70–130)
GLUCOSE BLDC GLUCOMTR-MCNC: 97 MG/DL (ref 70–130)
GLUCOSE SERPL-MCNC: 103 MG/DL (ref 65–99)
GLUCOSE SERPL-MCNC: 103 MG/DL (ref 65–99)
GLUCOSE SERPL-MCNC: 104 MG/DL (ref 65–99)
GLUCOSE SERPL-MCNC: 106 MG/DL (ref 65–99)
GLUCOSE SERPL-MCNC: 106 MG/DL (ref 65–99)
GLUCOSE SERPL-MCNC: 107 MG/DL (ref 65–99)
GLUCOSE SERPL-MCNC: 110 MG/DL (ref 65–99)
GLUCOSE SERPL-MCNC: 111 MG/DL (ref 65–99)
GLUCOSE SERPL-MCNC: 115 MG/DL (ref 65–99)
GLUCOSE SERPL-MCNC: 115 MG/DL (ref 65–99)
GLUCOSE SERPL-MCNC: 118 MG/DL (ref 65–99)
GLUCOSE SERPL-MCNC: 121 MG/DL (ref 65–99)
GLUCOSE SERPL-MCNC: 121 MG/DL (ref 65–99)
GLUCOSE SERPL-MCNC: 122 MG/DL (ref 65–99)
GLUCOSE SERPL-MCNC: 122 MG/DL (ref 65–99)
GLUCOSE SERPL-MCNC: 132 MG/DL (ref 65–99)
GLUCOSE SERPL-MCNC: 134 MG/DL (ref 65–99)
GLUCOSE SERPL-MCNC: 136 MG/DL (ref 65–99)
GLUCOSE SERPL-MCNC: 138 MG/DL (ref 65–99)
GLUCOSE SERPL-MCNC: 139 MG/DL (ref 65–99)
GLUCOSE SERPL-MCNC: 140 MG/DL (ref 65–99)
GLUCOSE SERPL-MCNC: 146 MG/DL (ref 65–99)
GLUCOSE SERPL-MCNC: 149 MG/DL (ref 65–99)
GLUCOSE SERPL-MCNC: 153 MG/DL (ref 65–99)
GLUCOSE SERPL-MCNC: 153 MG/DL (ref 65–99)
GLUCOSE SERPL-MCNC: 155 MG/DL (ref 65–99)
GLUCOSE SERPL-MCNC: 155 MG/DL (ref 65–99)
GLUCOSE SERPL-MCNC: 161 MG/DL (ref 65–99)
GLUCOSE SERPL-MCNC: 175 MG/DL (ref 65–99)
GLUCOSE SERPL-MCNC: 72 MG/DL (ref 65–99)
GLUCOSE SERPL-MCNC: 72 MG/DL (ref 65–99)
GLUCOSE SERPL-MCNC: 74 MG/DL (ref 65–99)
GLUCOSE SERPL-MCNC: 81 MG/DL (ref 65–99)
GLUCOSE SERPL-MCNC: 93 MG/DL (ref 65–99)
GLUCOSE UR STRIP-MCNC: ABNORMAL MG/DL
GLUCOSE UR STRIP-MCNC: NEGATIVE MG/DL
GLUCOSE UR STRIP-MCNC: NEGATIVE MG/DL
GRAM STN SPEC: ABNORMAL
HAV IGM SERPL QL IA: ABNORMAL
HBV CORE IGM SERPL QL IA: ABNORMAL
HBV SURFACE AG SERPL QL IA: ABNORMAL
HBV SURFACE AG SERPL QL IA: NORMAL
HBV SURFACE AG SERPL QL NT: POSITIVE
HCO3 BLDA-SCNC: 9.1 MMOL/L (ref 20–26)
HCO3 BLDA-SCNC: 9.7 MMOL/L (ref 20–26)
HCT VFR BLD AUTO: 16.9 % (ref 37.5–51)
HCT VFR BLD AUTO: 19.9 % (ref 37.5–51)
HCT VFR BLD AUTO: 20.5 % (ref 37.5–51)
HCT VFR BLD AUTO: 20.5 % (ref 37.5–51)
HCT VFR BLD AUTO: 21.1 % (ref 37.5–51)
HCT VFR BLD AUTO: 21.3 % (ref 37.5–51)
HCT VFR BLD AUTO: 21.5 % (ref 37.5–51)
HCT VFR BLD AUTO: 21.6 % (ref 37.5–51)
HCT VFR BLD AUTO: 22.3 % (ref 37.5–51)
HCT VFR BLD AUTO: 22.4 % (ref 37.5–51)
HCT VFR BLD AUTO: 22.5 % (ref 37.5–51)
HCT VFR BLD AUTO: 22.5 % (ref 37.5–51)
HCT VFR BLD AUTO: 22.7 % (ref 37.5–51)
HCT VFR BLD AUTO: 22.8 % (ref 37.5–51)
HCT VFR BLD AUTO: 22.8 % (ref 37.5–51)
HCT VFR BLD AUTO: 23.1 % (ref 37.5–51)
HCT VFR BLD AUTO: 23.2 % (ref 37.5–51)
HCT VFR BLD AUTO: 23.6 % (ref 37.5–51)
HCT VFR BLD AUTO: 23.7 % (ref 37.5–51)
HCT VFR BLD AUTO: 23.8 % (ref 37.5–51)
HCT VFR BLD AUTO: 23.9 % (ref 37.5–51)
HCT VFR BLD AUTO: 23.9 % (ref 37.5–51)
HCT VFR BLD AUTO: 24.3 % (ref 37.5–51)
HCT VFR BLD AUTO: 24.4 % (ref 37.5–51)
HCT VFR BLD AUTO: 24.4 % (ref 37.5–51)
HCT VFR BLD AUTO: 24.5 % (ref 37.5–51)
HCT VFR BLD AUTO: 24.6 % (ref 37.5–51)
HCT VFR BLD AUTO: 24.7 % (ref 37.5–51)
HCT VFR BLD AUTO: 24.8 % (ref 37.5–51)
HCT VFR BLD AUTO: 24.9 % (ref 37.5–51)
HCT VFR BLD AUTO: 25.2 % (ref 37.5–51)
HCT VFR BLD AUTO: 25.2 % (ref 37.5–51)
HCT VFR BLD AUTO: 26.1 % (ref 37.5–51)
HCT VFR BLD AUTO: 26.6 % (ref 37.5–51)
HCT VFR BLD AUTO: 26.7 % (ref 37.5–51)
HCT VFR BLD AUTO: 27.1 % (ref 37.5–51)
HCT VFR BLD AUTO: 27.2 % (ref 37.5–51)
HCT VFR BLD AUTO: 27.3 % (ref 37.5–51)
HCT VFR BLD AUTO: 27.6 % (ref 37.5–51)
HCT VFR BLD AUTO: 29.7 % (ref 37.5–51)
HCT VFR BLD CALC: 23.2 % (ref 38–51)
HCT VFR BLD CALC: 25.4 % (ref 38–51)
HCV AB SER DONR QL: REACTIVE
HDLC SERPL-MCNC: 37 MG/DL (ref 40–60)
HGB BLD-MCNC: 5.6 G/DL (ref 13–17.7)
HGB BLD-MCNC: 6 G/DL (ref 13–17.7)
HGB BLD-MCNC: 6.5 G/DL (ref 13–17.7)
HGB BLD-MCNC: 6.5 G/DL (ref 13–17.7)
HGB BLD-MCNC: 6.6 G/DL (ref 13–17.7)
HGB BLD-MCNC: 6.7 G/DL (ref 13–17.7)
HGB BLD-MCNC: 6.9 G/DL (ref 13–17.7)
HGB BLD-MCNC: 7 G/DL (ref 13–17.7)
HGB BLD-MCNC: 7 G/DL (ref 13–17.7)
HGB BLD-MCNC: 7.1 G/DL (ref 13–17.7)
HGB BLD-MCNC: 7.2 G/DL (ref 13–17.7)
HGB BLD-MCNC: 7.3 G/DL (ref 13–17.7)
HGB BLD-MCNC: 7.4 G/DL (ref 13–17.7)
HGB BLD-MCNC: 7.5 G/DL (ref 13–17.7)
HGB BLD-MCNC: 7.5 G/DL (ref 13–17.7)
HGB BLD-MCNC: 7.6 G/DL (ref 13–17.7)
HGB BLD-MCNC: 7.9 G/DL (ref 13–17.7)
HGB BLD-MCNC: 8 G/DL (ref 13–17.7)
HGB BLD-MCNC: 8.2 G/DL (ref 13–17.7)
HGB BLD-MCNC: 8.4 G/DL (ref 13–17.7)
HGB BLD-MCNC: 8.4 G/DL (ref 13–17.7)
HGB BLD-MCNC: 8.6 G/DL (ref 13–17.7)
HGB BLD-MCNC: 9 G/DL (ref 13–17.7)
HGB BLD-MCNC: 9 G/DL (ref 13–17.7)
HGB BLD-MCNC: 9.1 G/DL (ref 13–17.7)
HGB BLDA-MCNC: 7.6 G/DL (ref 14–18)
HGB BLDA-MCNC: 8.3 G/DL (ref 14–18)
HGB UR QL STRIP.AUTO: ABNORMAL
HOLD SPECIMEN: NORMAL
HYALINE CASTS UR QL AUTO: ABNORMAL /LPF
HYPOCHROMIA BLD QL: ABNORMAL
IMM GRANULOCYTES # BLD AUTO: 0.02 10*3/MM3 (ref 0–0.05)
IMM GRANULOCYTES # BLD AUTO: 0.02 10*3/MM3 (ref 0–0.05)
IMM GRANULOCYTES # BLD AUTO: 0.05 10*3/MM3 (ref 0–0.05)
IMM GRANULOCYTES # BLD AUTO: 0.06 10*3/MM3 (ref 0–0.05)
IMM GRANULOCYTES # BLD AUTO: 0.07 10*3/MM3 (ref 0–0.05)
IMM GRANULOCYTES # BLD AUTO: 0.08 10*3/MM3 (ref 0–0.05)
IMM GRANULOCYTES # BLD AUTO: 0.08 10*3/MM3 (ref 0–0.05)
IMM GRANULOCYTES # BLD AUTO: 0.09 10*3/MM3 (ref 0–0.05)
IMM GRANULOCYTES # BLD AUTO: 0.1 10*3/MM3 (ref 0–0.05)
IMM GRANULOCYTES # BLD AUTO: 0.1 10*3/MM3 (ref 0–0.05)
IMM GRANULOCYTES # BLD AUTO: 0.11 10*3/MM3 (ref 0–0.05)
IMM GRANULOCYTES # BLD AUTO: 0.11 10*3/MM3 (ref 0–0.05)
IMM GRANULOCYTES # BLD AUTO: 0.13 10*3/MM3 (ref 0–0.05)
IMM GRANULOCYTES # BLD AUTO: 0.17 10*3/MM3 (ref 0–0.05)
IMM GRANULOCYTES NFR BLD AUTO: 0.3 % (ref 0–0.5)
IMM GRANULOCYTES NFR BLD AUTO: 0.3 % (ref 0–0.5)
IMM GRANULOCYTES NFR BLD AUTO: 0.5 % (ref 0–0.5)
IMM GRANULOCYTES NFR BLD AUTO: 0.6 % (ref 0–0.5)
IMM GRANULOCYTES NFR BLD AUTO: 0.6 % (ref 0–0.5)
IMM GRANULOCYTES NFR BLD AUTO: 0.8 % (ref 0–0.5)
IMM GRANULOCYTES NFR BLD AUTO: 0.9 % (ref 0–0.5)
IMM GRANULOCYTES NFR BLD AUTO: 1 % (ref 0–0.5)
IMM GRANULOCYTES NFR BLD AUTO: 1.1 % (ref 0–0.5)
IMM GRANULOCYTES NFR BLD AUTO: 1.1 % (ref 0–0.5)
IMM GRANULOCYTES NFR BLD AUTO: 1.2 % (ref 0–0.5)
IMM GRANULOCYTES NFR BLD AUTO: 1.3 % (ref 0–0.5)
IMM GRANULOCYTES NFR BLD AUTO: 1.7 % (ref 0–0.5)
IMM GRANULOCYTES NFR BLD AUTO: 1.8 % (ref 0–0.5)
IMM GRANULOCYTES NFR BLD AUTO: 2 % (ref 0–0.5)
IMM GRANULOCYTES NFR BLD AUTO: 2.7 % (ref 0–0.5)
INHALED O2 CONCENTRATION: 21 %
INHALED O2 CONCENTRATION: 21 %
INR PPP: 1.16 (ref 0.9–1.1)
INR PPP: 1.32 (ref 0.9–1.1)
INR PPP: 1.53 (ref 0.9–1.1)
IRON 24H UR-MRATE: 131 MCG/DL (ref 59–158)
IRON 24H UR-MRATE: 49 MCG/DL (ref 59–158)
IRON 24H UR-MRATE: 67 MCG/DL (ref 59–158)
IRON SATN MFR SERPL: 23 % (ref 20–50)
IRON SATN MFR SERPL: 41 % (ref 20–50)
ISOLATED FROM: ABNORMAL
KETONES UR QL STRIP: NEGATIVE
LDH SERPL-CCNC: 129 U/L (ref 135–225)
LDH SERPL-CCNC: 142 U/L (ref 135–225)
LDH SERPL-CCNC: 163 U/L (ref 135–225)
LDH SERPL-CCNC: 174 U/L (ref 135–225)
LDH SERPL-CCNC: 200 U/L (ref 135–225)
LDH SERPL-CCNC: 258 U/L (ref 135–225)
LDH SERPL-CCNC: 278 U/L (ref 135–225)
LDLC SERPL CALC-MCNC: 21 MG/DL (ref 0–100)
LDLC/HDLC SERPL: 0.62 {RATIO}
LEUKOCYTE ESTERASE UR QL STRIP.AUTO: ABNORMAL
LIPASE SERPL-CCNC: 690 U/L (ref 13–60)
LYMPHOCYTES # BLD AUTO: 0.36 10*3/MM3 (ref 0.7–3.1)
LYMPHOCYTES # BLD AUTO: 0.64 10*3/MM3 (ref 0.7–3.1)
LYMPHOCYTES # BLD AUTO: 0.73 10*3/MM3 (ref 0.7–3.1)
LYMPHOCYTES # BLD AUTO: 0.74 10*3/MM3 (ref 0.7–3.1)
LYMPHOCYTES # BLD AUTO: 0.76 10*3/MM3 (ref 0.7–3.1)
LYMPHOCYTES # BLD AUTO: 0.83 10*3/MM3 (ref 0.7–3.1)
LYMPHOCYTES # BLD AUTO: 0.84 10*3/MM3 (ref 0.7–3.1)
LYMPHOCYTES # BLD AUTO: 0.91 10*3/MM3 (ref 0.7–3.1)
LYMPHOCYTES # BLD AUTO: 0.93 10*3/MM3 (ref 0.7–3.1)
LYMPHOCYTES # BLD AUTO: 0.96 10*3/MM3 (ref 0.7–3.1)
LYMPHOCYTES # BLD AUTO: 0.97 10*3/MM3 (ref 0.7–3.1)
LYMPHOCYTES # BLD AUTO: 1.08 10*3/MM3 (ref 0.7–3.1)
LYMPHOCYTES # BLD AUTO: 1.09 10*3/MM3 (ref 0.7–3.1)
LYMPHOCYTES # BLD AUTO: 1.12 10*3/MM3 (ref 0.7–3.1)
LYMPHOCYTES # BLD AUTO: 1.19 10*3/MM3 (ref 0.7–3.1)
LYMPHOCYTES # BLD AUTO: 1.28 10*3/MM3 (ref 0.7–3.1)
LYMPHOCYTES # BLD AUTO: 1.29 10*3/MM3 (ref 0.7–3.1)
LYMPHOCYTES # BLD AUTO: 1.3 10*3/MM3 (ref 0.7–3.1)
LYMPHOCYTES # BLD AUTO: 1.35 10*3/MM3 (ref 0.7–3.1)
LYMPHOCYTES # BLD AUTO: 1.35 10*3/MM3 (ref 0.7–3.1)
LYMPHOCYTES # BLD AUTO: 1.44 10*3/MM3 (ref 0.7–3.1)
LYMPHOCYTES # BLD AUTO: 1.55 10*3/MM3 (ref 0.7–3.1)
LYMPHOCYTES # BLD AUTO: 1.57 10*3/MM3 (ref 0.7–3.1)
LYMPHOCYTES # BLD AUTO: 1.6 10*3/MM3 (ref 0.7–3.1)
LYMPHOCYTES # BLD AUTO: 1.65 10*3/MM3 (ref 0.7–3.1)
LYMPHOCYTES # BLD MANUAL: 0.21 10*3/MM3 (ref 0.7–3.1)
LYMPHOCYTES # BLD MANUAL: 0.6 10*3/MM3 (ref 0.7–3.1)
LYMPHOCYTES # BLD MANUAL: 0.7 10*3/MM3 (ref 0.7–3.1)
LYMPHOCYTES # BLD MANUAL: 1.01 10*3/MM3 (ref 0.7–3.1)
LYMPHOCYTES # BLD MANUAL: 1.38 10*3/MM3 (ref 0.7–3.1)
LYMPHOCYTES NFR BLD AUTO: 10.9 % (ref 19.6–45.3)
LYMPHOCYTES NFR BLD AUTO: 11 % (ref 19.6–45.3)
LYMPHOCYTES NFR BLD AUTO: 11.9 % (ref 19.6–45.3)
LYMPHOCYTES NFR BLD AUTO: 12 % (ref 19.6–45.3)
LYMPHOCYTES NFR BLD AUTO: 13 % (ref 19.6–45.3)
LYMPHOCYTES NFR BLD AUTO: 13.5 % (ref 19.6–45.3)
LYMPHOCYTES NFR BLD AUTO: 14.8 % (ref 19.6–45.3)
LYMPHOCYTES NFR BLD AUTO: 15.3 % (ref 19.6–45.3)
LYMPHOCYTES NFR BLD AUTO: 15.6 % (ref 19.6–45.3)
LYMPHOCYTES NFR BLD AUTO: 15.8 % (ref 19.6–45.3)
LYMPHOCYTES NFR BLD AUTO: 16 % (ref 19.6–45.3)
LYMPHOCYTES NFR BLD AUTO: 16.8 % (ref 19.6–45.3)
LYMPHOCYTES NFR BLD AUTO: 17 % (ref 19.6–45.3)
LYMPHOCYTES NFR BLD AUTO: 17.1 % (ref 19.6–45.3)
LYMPHOCYTES NFR BLD AUTO: 17.2 % (ref 19.6–45.3)
LYMPHOCYTES NFR BLD AUTO: 18.6 % (ref 19.6–45.3)
LYMPHOCYTES NFR BLD AUTO: 19.6 % (ref 19.6–45.3)
LYMPHOCYTES NFR BLD AUTO: 19.9 % (ref 19.6–45.3)
LYMPHOCYTES NFR BLD AUTO: 21.1 % (ref 19.6–45.3)
LYMPHOCYTES NFR BLD AUTO: 21.4 % (ref 19.6–45.3)
LYMPHOCYTES NFR BLD AUTO: 22.1 % (ref 19.6–45.3)
LYMPHOCYTES NFR BLD AUTO: 6.4 % (ref 19.6–45.3)
LYMPHOCYTES NFR BLD AUTO: 7.4 % (ref 19.6–45.3)
LYMPHOCYTES NFR BLD AUTO: 9 % (ref 19.6–45.3)
LYMPHOCYTES NFR BLD AUTO: 9.2 % (ref 19.6–45.3)
LYMPHOCYTES NFR BLD MANUAL: 14 % (ref 19.6–45.3)
LYMPHOCYTES NFR BLD MANUAL: 18 % (ref 5–12)
LYMPHOCYTES NFR BLD MANUAL: 22 % (ref 19.6–45.3)
LYMPHOCYTES NFR BLD MANUAL: 3 % (ref 5–12)
LYMPHOCYTES NFR BLD MANUAL: 4 % (ref 19.6–45.3)
LYMPHOCYTES NFR BLD MANUAL: 5 % (ref 5–12)
LYMPHOCYTES NFR BLD MANUAL: 6 % (ref 5–12)
LYMPHOCYTES NFR BLD MANUAL: 7 % (ref 19.6–45.3)
LYMPHOCYTES NFR BLD MANUAL: 8 % (ref 5–12)
LYMPHOCYTES NFR BLD MANUAL: 9 % (ref 19.6–45.3)
Lab: ABNORMAL
Lab: ABNORMAL
Lab: NORMAL
MAGNESIUM SERPL-MCNC: 1.5 MG/DL (ref 1.6–2.6)
MAGNESIUM SERPL-MCNC: 1.5 MG/DL (ref 1.6–2.6)
MAGNESIUM SERPL-MCNC: 1.6 MG/DL (ref 1.6–2.6)
MAGNESIUM SERPL-MCNC: 1.7 MG/DL (ref 1.6–2.6)
MAGNESIUM SERPL-MCNC: 1.8 MG/DL (ref 1.6–2.6)
MAGNESIUM SERPL-MCNC: 1.9 MG/DL (ref 1.6–2.6)
MAGNESIUM SERPL-MCNC: 2 MG/DL (ref 1.6–2.6)
MAGNESIUM SERPL-MCNC: 2.1 MG/DL (ref 1.6–2.6)
MAGNESIUM SERPL-MCNC: 2.1 MG/DL (ref 1.6–2.6)
MAGNESIUM SERPL-MCNC: 2.3 MG/DL (ref 1.6–2.6)
MAGNESIUM SERPL-MCNC: 2.3 MG/DL (ref 1.6–2.6)
MAGNESIUM SERPL-MCNC: 2.5 MG/DL (ref 1.6–2.6)
MAGNESIUM SERPL-MCNC: 2.5 MG/DL (ref 1.6–2.6)
MAGNESIUM SERPL-MCNC: 2.6 MG/DL (ref 1.6–2.6)
MAXIMAL PREDICTED HEART RATE: 176 BPM
MCH RBC QN AUTO: 27.4 PG (ref 26.6–33)
MCH RBC QN AUTO: 27.5 PG (ref 26.6–33)
MCH RBC QN AUTO: 27.6 PG (ref 26.6–33)
MCH RBC QN AUTO: 27.8 PG (ref 26.6–33)
MCH RBC QN AUTO: 27.9 PG (ref 26.6–33)
MCH RBC QN AUTO: 28 PG (ref 26.6–33)
MCH RBC QN AUTO: 28.1 PG (ref 26.6–33)
MCH RBC QN AUTO: 28.2 PG (ref 26.6–33)
MCH RBC QN AUTO: 28.2 PG (ref 26.6–33)
MCH RBC QN AUTO: 28.3 PG (ref 26.6–33)
MCH RBC QN AUTO: 28.6 PG (ref 26.6–33)
MCH RBC QN AUTO: 28.7 PG (ref 26.6–33)
MCH RBC QN AUTO: 28.7 PG (ref 26.6–33)
MCH RBC QN AUTO: 28.8 PG (ref 26.6–33)
MCH RBC QN AUTO: 28.9 PG (ref 26.6–33)
MCH RBC QN AUTO: 29 PG (ref 26.6–33)
MCH RBC QN AUTO: 29.1 PG (ref 26.6–33)
MCH RBC QN AUTO: 29.2 PG (ref 26.6–33)
MCH RBC QN AUTO: 29.3 PG (ref 26.6–33)
MCH RBC QN AUTO: 29.4 PG (ref 26.6–33)
MCH RBC QN AUTO: 29.5 PG (ref 26.6–33)
MCH RBC QN AUTO: 29.5 PG (ref 26.6–33)
MCH RBC QN AUTO: 29.7 PG (ref 26.6–33)
MCH RBC QN AUTO: 29.9 PG (ref 26.6–33)
MCHC RBC AUTO-ENTMCNC: 29.2 G/DL (ref 31.5–35.7)
MCHC RBC AUTO-ENTMCNC: 29.3 G/DL (ref 31.5–35.7)
MCHC RBC AUTO-ENTMCNC: 29.8 G/DL (ref 31.5–35.7)
MCHC RBC AUTO-ENTMCNC: 29.8 G/DL (ref 31.5–35.7)
MCHC RBC AUTO-ENTMCNC: 30.1 G/DL (ref 31.5–35.7)
MCHC RBC AUTO-ENTMCNC: 30.3 G/DL (ref 31.5–35.7)
MCHC RBC AUTO-ENTMCNC: 30.4 G/DL (ref 31.5–35.7)
MCHC RBC AUTO-ENTMCNC: 30.5 G/DL (ref 31.5–35.7)
MCHC RBC AUTO-ENTMCNC: 30.5 G/DL (ref 31.5–35.7)
MCHC RBC AUTO-ENTMCNC: 30.7 G/DL (ref 31.5–35.7)
MCHC RBC AUTO-ENTMCNC: 30.8 G/DL (ref 31.5–35.7)
MCHC RBC AUTO-ENTMCNC: 31 G/DL (ref 31.5–35.7)
MCHC RBC AUTO-ENTMCNC: 31.1 G/DL (ref 31.5–35.7)
MCHC RBC AUTO-ENTMCNC: 31.2 G/DL (ref 31.5–35.7)
MCHC RBC AUTO-ENTMCNC: 31.3 G/DL (ref 31.5–35.7)
MCHC RBC AUTO-ENTMCNC: 31.3 G/DL (ref 31.5–35.7)
MCHC RBC AUTO-ENTMCNC: 31.6 G/DL (ref 31.5–35.7)
MCHC RBC AUTO-ENTMCNC: 31.7 G/DL (ref 31.5–35.7)
MCHC RBC AUTO-ENTMCNC: 31.8 G/DL (ref 31.5–35.7)
MCHC RBC AUTO-ENTMCNC: 31.8 G/DL (ref 31.5–35.7)
MCHC RBC AUTO-ENTMCNC: 32.2 G/DL (ref 31.5–35.7)
MCHC RBC AUTO-ENTMCNC: 32.5 G/DL (ref 31.5–35.7)
MCHC RBC AUTO-ENTMCNC: 32.9 G/DL (ref 31.5–35.7)
MCHC RBC AUTO-ENTMCNC: 32.9 G/DL (ref 31.5–35.7)
MCHC RBC AUTO-ENTMCNC: 33.1 G/DL (ref 31.5–35.7)
MCHC RBC AUTO-ENTMCNC: 33.3 G/DL (ref 31.5–35.7)
MCHC RBC AUTO-ENTMCNC: 33.7 G/DL (ref 31.5–35.7)
MCV RBC AUTO: 84.5 FL (ref 79–97)
MCV RBC AUTO: 84.9 FL (ref 79–97)
MCV RBC AUTO: 86.1 FL (ref 79–97)
MCV RBC AUTO: 86.8 FL (ref 79–97)
MCV RBC AUTO: 87.7 FL (ref 79–97)
MCV RBC AUTO: 88.4 FL (ref 79–97)
MCV RBC AUTO: 88.5 FL (ref 79–97)
MCV RBC AUTO: 88.9 FL (ref 79–97)
MCV RBC AUTO: 88.9 FL (ref 79–97)
MCV RBC AUTO: 89.9 FL (ref 79–97)
MCV RBC AUTO: 90.1 FL (ref 79–97)
MCV RBC AUTO: 90.3 FL (ref 79–97)
MCV RBC AUTO: 90.6 FL (ref 79–97)
MCV RBC AUTO: 90.9 FL (ref 79–97)
MCV RBC AUTO: 91.1 FL (ref 79–97)
MCV RBC AUTO: 91.3 FL (ref 79–97)
MCV RBC AUTO: 91.9 FL (ref 79–97)
MCV RBC AUTO: 92 FL (ref 79–97)
MCV RBC AUTO: 92.1 FL (ref 79–97)
MCV RBC AUTO: 92.6 FL (ref 79–97)
MCV RBC AUTO: 92.7 FL (ref 79–97)
MCV RBC AUTO: 92.8 FL (ref 79–97)
MCV RBC AUTO: 92.8 FL (ref 79–97)
MCV RBC AUTO: 93.6 FL (ref 79–97)
MCV RBC AUTO: 93.7 FL (ref 79–97)
MCV RBC AUTO: 93.8 FL (ref 79–97)
MCV RBC AUTO: 93.9 FL (ref 79–97)
MCV RBC AUTO: 94 FL (ref 79–97)
MCV RBC AUTO: 94.1 FL (ref 79–97)
MCV RBC AUTO: 96.1 FL (ref 79–97)
MCV RBC AUTO: 96.1 FL (ref 79–97)
MCV RBC AUTO: 97.4 FL (ref 79–97)
MCV RBC AUTO: 97.9 FL (ref 79–97)
MCV RBC AUTO: 98.6 FL (ref 79–97)
METAMYELOCYTES NFR BLD MANUAL: 1 % (ref 0–0)
METHADONE UR QL SCN: NEGATIVE
METHGB BLD QL: 0.7 % (ref 0–3)
METHGB BLD QL: 1.2 % (ref 0–3)
MODALITY: ABNORMAL
MODALITY: ABNORMAL
MONOCYTES # BLD AUTO: 0.22 10*3/MM3 (ref 0.1–0.9)
MONOCYTES # BLD AUTO: 0.39 10*3/MM3 (ref 0.1–0.9)
MONOCYTES # BLD AUTO: 0.43 10*3/MM3 (ref 0.1–0.9)
MONOCYTES # BLD AUTO: 0.51 10*3/MM3 (ref 0.1–0.9)
MONOCYTES # BLD AUTO: 0.51 10*3/MM3 (ref 0.1–0.9)
MONOCYTES # BLD AUTO: 0.52 10*3/MM3 (ref 0.1–0.9)
MONOCYTES # BLD AUTO: 0.56 10*3/MM3 (ref 0.1–0.9)
MONOCYTES # BLD AUTO: 0.57 10*3/MM3 (ref 0.1–0.9)
MONOCYTES # BLD AUTO: 0.6 10*3/MM3 (ref 0.1–0.9)
MONOCYTES # BLD AUTO: 0.6 10*3/MM3 (ref 0.1–0.9)
MONOCYTES # BLD AUTO: 0.63 10*3/MM3 (ref 0.1–0.9)
MONOCYTES # BLD AUTO: 0.64 10*3/MM3 (ref 0.1–0.9)
MONOCYTES # BLD AUTO: 0.64 10*3/MM3 (ref 0.1–0.9)
MONOCYTES # BLD AUTO: 0.69 10*3/MM3 (ref 0.1–0.9)
MONOCYTES # BLD AUTO: 0.7 10*3/MM3 (ref 0.1–0.9)
MONOCYTES # BLD AUTO: 0.73 10*3/MM3 (ref 0.1–0.9)
MONOCYTES # BLD AUTO: 0.78 10*3/MM3 (ref 0.1–0.9)
MONOCYTES # BLD AUTO: 0.78 10*3/MM3 (ref 0.1–0.9)
MONOCYTES # BLD AUTO: 0.79 10*3/MM3 (ref 0.1–0.9)
MONOCYTES # BLD AUTO: 0.81 10*3/MM3 (ref 0.1–0.9)
MONOCYTES # BLD AUTO: 0.85 10*3/MM3 (ref 0.1–0.9)
MONOCYTES # BLD AUTO: 0.96 10*3/MM3 (ref 0.1–0.9)
MONOCYTES # BLD AUTO: 0.99 10*3/MM3 (ref 0.1–0.9)
MONOCYTES # BLD AUTO: 1.04 10*3/MM3 (ref 0.1–0.9)
MONOCYTES # BLD AUTO: 1.09 10*3/MM3 (ref 0.1–0.9)
MONOCYTES # BLD AUTO: 1.13 10*3/MM3 (ref 0.1–0.9)
MONOCYTES # BLD AUTO: 1.13 10*3/MM3 (ref 0.1–0.9)
MONOCYTES # BLD AUTO: 1.18 10*3/MM3 (ref 0.1–0.9)
MONOCYTES NFR BLD AUTO: 11.1 % (ref 5–12)
MONOCYTES NFR BLD AUTO: 12.1 % (ref 5–12)
MONOCYTES NFR BLD AUTO: 12.8 % (ref 5–12)
MONOCYTES NFR BLD AUTO: 13 % (ref 5–12)
MONOCYTES NFR BLD AUTO: 13.8 % (ref 5–12)
MONOCYTES NFR BLD AUTO: 14.1 % (ref 5–12)
MONOCYTES NFR BLD AUTO: 14.3 % (ref 5–12)
MONOCYTES NFR BLD AUTO: 15.6 % (ref 5–12)
MONOCYTES NFR BLD AUTO: 17.8 % (ref 5–12)
MONOCYTES NFR BLD AUTO: 18 % (ref 5–12)
MONOCYTES NFR BLD AUTO: 5.5 % (ref 5–12)
MONOCYTES NFR BLD AUTO: 6.5 % (ref 5–12)
MONOCYTES NFR BLD AUTO: 6.8 % (ref 5–12)
MONOCYTES NFR BLD AUTO: 6.9 % (ref 5–12)
MONOCYTES NFR BLD AUTO: 7.3 % (ref 5–12)
MONOCYTES NFR BLD AUTO: 7.4 % (ref 5–12)
MONOCYTES NFR BLD AUTO: 8.2 % (ref 5–12)
MONOCYTES NFR BLD AUTO: 8.2 % (ref 5–12)
MONOCYTES NFR BLD AUTO: 8.4 % (ref 5–12)
MONOCYTES NFR BLD AUTO: 8.6 % (ref 5–12)
MONOCYTES NFR BLD AUTO: 8.7 % (ref 5–12)
MONOCYTES NFR BLD AUTO: 8.8 % (ref 5–12)
MONOCYTES NFR BLD AUTO: 9 % (ref 5–12)
MONOCYTES NFR BLD AUTO: 9.8 % (ref 5–12)
MONOCYTES NFR BLD AUTO: 9.9 % (ref 5–12)
NEUTROPHILS # BLD AUTO: 3.39 10*3/MM3 (ref 1.7–7)
NEUTROPHILS # BLD AUTO: 4.65 10*3/MM3 (ref 1.7–7)
NEUTROPHILS # BLD AUTO: 5.9 10*3/MM3 (ref 1.7–7)
NEUTROPHILS # BLD AUTO: 6.66 10*3/MM3 (ref 1.7–7)
NEUTROPHILS # BLD AUTO: 7.39 10*3/MM3 (ref 1.7–7)
NEUTROPHILS NFR BLD AUTO: 10.78 10*3/MM3 (ref 1.7–7)
NEUTROPHILS NFR BLD AUTO: 2.67 10*3/MM3 (ref 1.7–7)
NEUTROPHILS NFR BLD AUTO: 3.6 10*3/MM3 (ref 1.7–7)
NEUTROPHILS NFR BLD AUTO: 3.72 10*3/MM3 (ref 1.7–7)
NEUTROPHILS NFR BLD AUTO: 3.87 10*3/MM3 (ref 1.7–7)
NEUTROPHILS NFR BLD AUTO: 3.93 10*3/MM3 (ref 1.7–7)
NEUTROPHILS NFR BLD AUTO: 4.15 10*3/MM3 (ref 1.7–7)
NEUTROPHILS NFR BLD AUTO: 4.44 10*3/MM3 (ref 1.7–7)
NEUTROPHILS NFR BLD AUTO: 4.57 10*3/MM3 (ref 1.7–7)
NEUTROPHILS NFR BLD AUTO: 4.62 10*3/MM3 (ref 1.7–7)
NEUTROPHILS NFR BLD AUTO: 4.72 10*3/MM3 (ref 1.7–7)
NEUTROPHILS NFR BLD AUTO: 4.96 10*3/MM3 (ref 1.7–7)
NEUTROPHILS NFR BLD AUTO: 4.98 10*3/MM3 (ref 1.7–7)
NEUTROPHILS NFR BLD AUTO: 5.12 10*3/MM3 (ref 1.7–7)
NEUTROPHILS NFR BLD AUTO: 5.29 10*3/MM3 (ref 1.7–7)
NEUTROPHILS NFR BLD AUTO: 5.31 10*3/MM3 (ref 1.7–7)
NEUTROPHILS NFR BLD AUTO: 5.55 10*3/MM3 (ref 1.7–7)
NEUTROPHILS NFR BLD AUTO: 5.68 10*3/MM3 (ref 1.7–7)
NEUTROPHILS NFR BLD AUTO: 6.18 10*3/MM3 (ref 1.7–7)
NEUTROPHILS NFR BLD AUTO: 6.32 10*3/MM3 (ref 1.7–7)
NEUTROPHILS NFR BLD AUTO: 6.38 10*3/MM3 (ref 1.7–7)
NEUTROPHILS NFR BLD AUTO: 6.68 10*3/MM3 (ref 1.7–7)
NEUTROPHILS NFR BLD AUTO: 60 % (ref 42.7–76)
NEUTROPHILS NFR BLD AUTO: 62.1 % (ref 42.7–76)
NEUTROPHILS NFR BLD AUTO: 62.5 % (ref 42.7–76)
NEUTROPHILS NFR BLD AUTO: 63.2 % (ref 42.7–76)
NEUTROPHILS NFR BLD AUTO: 63.4 % (ref 42.7–76)
NEUTROPHILS NFR BLD AUTO: 67.1 % (ref 42.7–76)
NEUTROPHILS NFR BLD AUTO: 67.2 % (ref 42.7–76)
NEUTROPHILS NFR BLD AUTO: 67.3 % (ref 42.7–76)
NEUTROPHILS NFR BLD AUTO: 67.9 % (ref 42.7–76)
NEUTROPHILS NFR BLD AUTO: 68.1 % (ref 42.7–76)
NEUTROPHILS NFR BLD AUTO: 69.5 % (ref 42.7–76)
NEUTROPHILS NFR BLD AUTO: 7.54 10*3/MM3 (ref 1.7–7)
NEUTROPHILS NFR BLD AUTO: 7.64 10*3/MM3 (ref 1.7–7)
NEUTROPHILS NFR BLD AUTO: 7.76 10*3/MM3 (ref 1.7–7)
NEUTROPHILS NFR BLD AUTO: 71.7 % (ref 42.7–76)
NEUTROPHILS NFR BLD AUTO: 72.4 % (ref 42.7–76)
NEUTROPHILS NFR BLD AUTO: 72.6 % (ref 42.7–76)
NEUTROPHILS NFR BLD AUTO: 72.8 % (ref 42.7–76)
NEUTROPHILS NFR BLD AUTO: 73.3 % (ref 42.7–76)
NEUTROPHILS NFR BLD AUTO: 74.4 % (ref 42.7–76)
NEUTROPHILS NFR BLD AUTO: 76.4 % (ref 42.7–76)
NEUTROPHILS NFR BLD AUTO: 76.8 % (ref 42.7–76)
NEUTROPHILS NFR BLD AUTO: 76.9 % (ref 42.7–76)
NEUTROPHILS NFR BLD AUTO: 77.8 % (ref 42.7–76)
NEUTROPHILS NFR BLD AUTO: 78 % (ref 42.7–76)
NEUTROPHILS NFR BLD AUTO: 79.4 % (ref 42.7–76)
NEUTROPHILS NFR BLD AUTO: 79.7 % (ref 42.7–76)
NEUTROPHILS NFR BLD AUTO: 83.5 % (ref 42.7–76)
NEUTROPHILS NFR BLD MANUAL: 50 % (ref 42.7–76)
NEUTROPHILS NFR BLD MANUAL: 75 % (ref 42.7–76)
NEUTROPHILS NFR BLD MANUAL: 77 % (ref 42.7–76)
NEUTROPHILS NFR BLD MANUAL: 82 % (ref 42.7–76)
NEUTROPHILS NFR BLD MANUAL: 84 % (ref 42.7–76)
NEUTS BAND NFR BLD MANUAL: 10 % (ref 0–5)
NEUTS BAND NFR BLD MANUAL: 3 % (ref 0–5)
NEUTS BAND NFR BLD MANUAL: 4 % (ref 0–5)
NEUTS BAND NFR BLD MANUAL: 9 % (ref 0–5)
NITRITE UR QL STRIP: NEGATIVE
NITRITE UR QL STRIP: NEGATIVE
NITRITE UR QL STRIP: POSITIVE
NOTE: ABNORMAL
NOTE: ABNORMAL
NRBC BLD AUTO-RTO: 0 /100 WBC (ref 0–0.2)
NT-PROBNP SERPL-MCNC: ABNORMAL PG/ML (ref 0–450)
NT-PROBNP SERPL-MCNC: ABNORMAL PG/ML (ref 0–450)
OPIATES UR QL: NEGATIVE
OVALOCYTES BLD QL SMEAR: ABNORMAL
OXYCODONE UR QL SCN: NEGATIVE
OXYHGB MFR BLDV: 94.9 % (ref 94–99)
OXYHGB MFR BLDV: 95.3 % (ref 94–99)
PCO2 BLDA: 19.8 MM HG (ref 35–45)
PCO2 BLDA: 21.7 MM HG (ref 35–45)
PCO2 TEMP ADJ BLD: ABNORMAL MM[HG]
PCO2 TEMP ADJ BLD: ABNORMAL MM[HG]
PCP UR QL SCN: NEGATIVE
PH BLDA: 7.26 PH UNITS (ref 7.35–7.45)
PH BLDA: 7.27 PH UNITS (ref 7.35–7.45)
PH UR STRIP.AUTO: 6 [PH] (ref 5–8)
PH UR STRIP.AUTO: 7 [PH] (ref 5–8)
PH UR STRIP.AUTO: 8.5 [PH] (ref 5–8)
PH, TEMP CORRECTED: ABNORMAL
PH, TEMP CORRECTED: ABNORMAL
PHOSPHATE SERPL-MCNC: 12.5 MG/DL (ref 2.5–4.5)
PHOSPHATE SERPL-MCNC: 2 MG/DL (ref 2.5–4.5)
PHOSPHATE SERPL-MCNC: 2.4 MG/DL (ref 2.5–4.5)
PHOSPHATE SERPL-MCNC: 2.5 MG/DL (ref 2.5–4.5)
PHOSPHATE SERPL-MCNC: 2.7 MG/DL (ref 2.5–4.5)
PHOSPHATE SERPL-MCNC: 3.7 MG/DL (ref 2.5–4.5)
PHOSPHATE SERPL-MCNC: 3.9 MG/DL (ref 2.5–4.5)
PHOSPHATE SERPL-MCNC: 4 MG/DL (ref 2.5–4.5)
PHOSPHATE SERPL-MCNC: 5.1 MG/DL (ref 2.5–4.5)
PHOSPHATE SERPL-MCNC: 5.3 MG/DL (ref 2.5–4.5)
PHOSPHATE SERPL-MCNC: 5.7 MG/DL (ref 2.5–4.5)
PHOSPHATE SERPL-MCNC: 7.7 MG/DL (ref 2.5–4.5)
PHOSPHATE SERPL-MCNC: 8.2 MG/DL (ref 2.5–4.5)
PLAT MORPH BLD: NORMAL
PLATELET # BLD AUTO: 100 10*3/MM3 (ref 140–450)
PLATELET # BLD AUTO: 103 10*3/MM3 (ref 140–450)
PLATELET # BLD AUTO: 109 10*3/MM3 (ref 140–450)
PLATELET # BLD AUTO: 113 10*3/MM3 (ref 140–450)
PLATELET # BLD AUTO: 114 10*3/MM3 (ref 140–450)
PLATELET # BLD AUTO: 116 10*3/MM3 (ref 140–450)
PLATELET # BLD AUTO: 120 10*3/MM3 (ref 140–450)
PLATELET # BLD AUTO: 121 10*3/MM3 (ref 140–450)
PLATELET # BLD AUTO: 126 10*3/MM3 (ref 140–450)
PLATELET # BLD AUTO: 126 10*3/MM3 (ref 140–450)
PLATELET # BLD AUTO: 130 10*3/MM3 (ref 140–450)
PLATELET # BLD AUTO: 132 10*3/MM3 (ref 140–450)
PLATELET # BLD AUTO: 132 10*3/MM3 (ref 140–450)
PLATELET # BLD AUTO: 135 10*3/MM3 (ref 140–450)
PLATELET # BLD AUTO: 142 10*3/MM3 (ref 140–450)
PLATELET # BLD AUTO: 146 10*3/MM3 (ref 140–450)
PLATELET # BLD AUTO: 148 10*3/MM3 (ref 140–450)
PLATELET # BLD AUTO: 155 10*3/MM3 (ref 140–450)
PLATELET # BLD AUTO: 166 10*3/MM3 (ref 140–450)
PLATELET # BLD AUTO: 170 10*3/MM3 (ref 140–450)
PLATELET # BLD AUTO: 171 10*3/MM3 (ref 140–450)
PLATELET # BLD AUTO: 175 10*3/MM3 (ref 140–450)
PLATELET # BLD AUTO: 178 10*3/MM3 (ref 140–450)
PLATELET # BLD AUTO: 183 10*3/MM3 (ref 140–450)
PLATELET # BLD AUTO: 198 10*3/MM3 (ref 140–450)
PLATELET # BLD AUTO: 56 10*3/MM3 (ref 140–450)
PLATELET # BLD AUTO: 74 10*3/MM3 (ref 140–450)
PLATELET # BLD AUTO: 75 10*3/MM3 (ref 140–450)
PLATELET # BLD AUTO: 81 10*3/MM3 (ref 140–450)
PLATELET # BLD AUTO: 82 10*3/MM3 (ref 140–450)
PLATELET # BLD AUTO: 83 10*3/MM3 (ref 140–450)
PLATELET # BLD AUTO: 85 10*3/MM3 (ref 140–450)
PLATELET # BLD AUTO: 88 10*3/MM3 (ref 140–450)
PLATELET # BLD AUTO: 94 10*3/MM3 (ref 140–450)
PMV BLD AUTO: 10 FL (ref 6–12)
PMV BLD AUTO: 10 FL (ref 6–12)
PMV BLD AUTO: 10.2 FL (ref 6–12)
PMV BLD AUTO: 10.3 FL (ref 6–12)
PMV BLD AUTO: 10.4 FL (ref 6–12)
PMV BLD AUTO: 10.5 FL (ref 6–12)
PMV BLD AUTO: 10.7 FL (ref 6–12)
PMV BLD AUTO: 10.9 FL (ref 6–12)
PMV BLD AUTO: 11 FL (ref 6–12)
PMV BLD AUTO: 11.3 FL (ref 6–12)
PMV BLD AUTO: 11.5 FL (ref 6–12)
PMV BLD AUTO: 11.7 FL (ref 6–12)
PMV BLD AUTO: 11.7 FL (ref 6–12)
PMV BLD AUTO: 11.8 FL (ref 6–12)
PMV BLD AUTO: 11.8 FL (ref 6–12)
PMV BLD AUTO: 12 FL (ref 6–12)
PMV BLD AUTO: 8.7 FL (ref 6–12)
PMV BLD AUTO: 8.8 FL (ref 6–12)
PMV BLD AUTO: 9 FL (ref 6–12)
PMV BLD AUTO: 9 FL (ref 6–12)
PMV BLD AUTO: 9.1 FL (ref 6–12)
PMV BLD AUTO: 9.1 FL (ref 6–12)
PMV BLD AUTO: 9.4 FL (ref 6–12)
PMV BLD AUTO: 9.6 FL (ref 6–12)
PMV BLD AUTO: 9.7 FL (ref 6–12)
PMV BLD AUTO: 9.7 FL (ref 6–12)
PMV BLD AUTO: 9.8 FL (ref 6–12)
PMV BLD AUTO: 9.9 FL (ref 6–12)
PO2 BLDA: 106 MM HG (ref 83–108)
PO2 BLDA: 98.2 MM HG (ref 83–108)
PO2 TEMP ADJ BLD: ABNORMAL MM[HG]
PO2 TEMP ADJ BLD: ABNORMAL MM[HG]
POTASSIUM SERPL-SCNC: 3 MMOL/L (ref 3.5–5.2)
POTASSIUM SERPL-SCNC: 3.3 MMOL/L (ref 3.5–5.2)
POTASSIUM SERPL-SCNC: 3.5 MMOL/L (ref 3.5–5.2)
POTASSIUM SERPL-SCNC: 3.5 MMOL/L (ref 3.5–5.2)
POTASSIUM SERPL-SCNC: 3.7 MMOL/L (ref 3.5–5.2)
POTASSIUM SERPL-SCNC: 3.8 MMOL/L (ref 3.5–5.2)
POTASSIUM SERPL-SCNC: 3.8 MMOL/L (ref 3.5–5.2)
POTASSIUM SERPL-SCNC: 3.9 MMOL/L (ref 3.5–5.2)
POTASSIUM SERPL-SCNC: 3.9 MMOL/L (ref 3.5–5.2)
POTASSIUM SERPL-SCNC: 4.1 MMOL/L (ref 3.5–5.2)
POTASSIUM SERPL-SCNC: 4.1 MMOL/L (ref 3.5–5.2)
POTASSIUM SERPL-SCNC: 4.3 MMOL/L (ref 3.5–5.2)
POTASSIUM SERPL-SCNC: 4.3 MMOL/L (ref 3.5–5.2)
POTASSIUM SERPL-SCNC: 4.4 MMOL/L (ref 3.5–5.2)
POTASSIUM SERPL-SCNC: 4.5 MMOL/L (ref 3.5–5.2)
POTASSIUM SERPL-SCNC: 4.8 MMOL/L (ref 3.5–5.2)
POTASSIUM SERPL-SCNC: 4.9 MMOL/L (ref 3.5–5.2)
POTASSIUM SERPL-SCNC: 4.9 MMOL/L (ref 3.5–5.2)
POTASSIUM SERPL-SCNC: 5.1 MMOL/L (ref 3.5–5.2)
POTASSIUM SERPL-SCNC: 5.2 MMOL/L (ref 3.5–5.2)
POTASSIUM SERPL-SCNC: 5.3 MMOL/L (ref 3.5–5.2)
POTASSIUM SERPL-SCNC: 5.3 MMOL/L (ref 3.5–5.2)
POTASSIUM SERPL-SCNC: 5.4 MMOL/L (ref 3.5–5.2)
POTASSIUM SERPL-SCNC: 5.6 MMOL/L (ref 3.5–5.2)
POTASSIUM SERPL-SCNC: 5.8 MMOL/L (ref 3.5–5.2)
POTASSIUM SERPL-SCNC: 6.1 MMOL/L (ref 3.5–5.2)
POTASSIUM SERPL-SCNC: 6.1 MMOL/L (ref 3.5–5.2)
POTASSIUM SERPL-SCNC: 6.2 MMOL/L (ref 3.5–5.2)
POTASSIUM SERPL-SCNC: 6.4 MMOL/L (ref 3.5–5.2)
POTASSIUM SERPL-SCNC: 7.3 MMOL/L (ref 3.5–5.2)
POTASSIUM SERPL-SCNC: 7.7 MMOL/L (ref 3.5–5.2)
POTASSIUM SERPL-SCNC: 8 MMOL/L (ref 3.5–5.2)
POTASSIUM SERPL-SCNC: 8.1 MMOL/L (ref 3.5–5.2)
POTASSIUM SERPL-SCNC: 8.3 MMOL/L (ref 3.5–5.2)
POTASSIUM SERPL-SCNC: 8.3 MMOL/L (ref 3.5–5.2)
POTASSIUM SERPL-SCNC: 8.6 MMOL/L (ref 3.5–5.2)
POTASSIUM SERPL-SCNC: 8.7 MMOL/L (ref 3.5–5.2)
PROCALCITONIN SERPL-MCNC: 25.39 NG/ML (ref 0–0.25)
PROT SERPL-MCNC: 6.6 G/DL (ref 6–8.5)
PROT SERPL-MCNC: 6.7 G/DL (ref 6–8.5)
PROT SERPL-MCNC: 6.8 G/DL (ref 6–8.5)
PROT SERPL-MCNC: 6.9 G/DL (ref 6–8.5)
PROT SERPL-MCNC: 7 G/DL (ref 6–8.5)
PROT SERPL-MCNC: 7 G/DL (ref 6–8.5)
PROT SERPL-MCNC: 7.1 G/DL (ref 6–8.5)
PROT SERPL-MCNC: 7.1 G/DL (ref 6–8.5)
PROT SERPL-MCNC: 7.2 G/DL (ref 6–8.5)
PROT SERPL-MCNC: 7.3 G/DL (ref 6–8.5)
PROT SERPL-MCNC: 7.6 G/DL (ref 6–8.5)
PROT SERPL-MCNC: 7.8 G/DL (ref 6–8.5)
PROT SERPL-MCNC: 7.8 G/DL (ref 6–8.5)
PROT SERPL-MCNC: 8.2 G/DL (ref 6–8.5)
PROT SERPL-MCNC: 8.3 G/DL (ref 6–8.5)
PROT SERPL-MCNC: 8.5 G/DL (ref 6–8.5)
PROT SERPL-MCNC: 8.8 G/DL (ref 6–8.5)
PROT UR QL STRIP: ABNORMAL
PROTHROMBIN TIME: 14.6 SECONDS (ref 11.9–14.1)
PROTHROMBIN TIME: 16.2 SECONDS (ref 11.9–14.1)
PROTHROMBIN TIME: 18.2 SECONDS (ref 11.9–14.1)
PTH-INTACT SERPL-MCNC: 1274 PG/ML (ref 15–65)
QT INTERVAL: 340 MS
QT INTERVAL: 372 MS
QT INTERVAL: 384 MS
QT INTERVAL: 386 MS
QT INTERVAL: 390 MS
QT INTERVAL: 396 MS
QT INTERVAL: 402 MS
QT INTERVAL: 422 MS
QT INTERVAL: 424 MS
QT INTERVAL: 424 MS
QTC INTERVAL: 464 MS
QTC INTERVAL: 464 MS
QTC INTERVAL: 474 MS
QTC INTERVAL: 484 MS
QTC INTERVAL: 490 MS
QTC INTERVAL: 494 MS
QTC INTERVAL: 498 MS
QTC INTERVAL: 507 MS
QTC INTERVAL: 530 MS
QTC INTERVAL: 599 MS
RBC # BLD AUTO: 2 10*6/MM3 (ref 4.14–5.8)
RBC # BLD AUTO: 2.08 10*6/MM3 (ref 4.14–5.8)
RBC # BLD AUTO: 2.21 10*6/MM3 (ref 4.14–5.8)
RBC # BLD AUTO: 2.29 10*6/MM3 (ref 4.14–5.8)
RBC # BLD AUTO: 2.31 10*6/MM3 (ref 4.14–5.8)
RBC # BLD AUTO: 2.31 10*6/MM3 (ref 4.14–5.8)
RBC # BLD AUTO: 2.33 10*6/MM3 (ref 4.14–5.8)
RBC # BLD AUTO: 2.34 10*6/MM3 (ref 4.14–5.8)
RBC # BLD AUTO: 2.4 10*6/MM3 (ref 4.14–5.8)
RBC # BLD AUTO: 2.41 10*6/MM3 (ref 4.14–5.8)
RBC # BLD AUTO: 2.42 10*6/MM3 (ref 4.14–5.8)
RBC # BLD AUTO: 2.51 10*6/MM3 (ref 4.14–5.8)
RBC # BLD AUTO: 2.52 10*6/MM3 (ref 4.14–5.8)
RBC # BLD AUTO: 2.52 10*6/MM3 (ref 4.14–5.8)
RBC # BLD AUTO: 2.53 10*6/MM3 (ref 4.14–5.8)
RBC # BLD AUTO: 2.53 10*6/MM3 (ref 4.14–5.8)
RBC # BLD AUTO: 2.54 10*6/MM3 (ref 4.14–5.8)
RBC # BLD AUTO: 2.57 10*6/MM3 (ref 4.14–5.8)
RBC # BLD AUTO: 2.58 10*6/MM3 (ref 4.14–5.8)
RBC # BLD AUTO: 2.58 10*6/MM3 (ref 4.14–5.8)
RBC # BLD AUTO: 2.59 10*6/MM3 (ref 4.14–5.8)
RBC # BLD AUTO: 2.61 10*6/MM3 (ref 4.14–5.8)
RBC # BLD AUTO: 2.63 10*6/MM3 (ref 4.14–5.8)
RBC # BLD AUTO: 2.66 10*6/MM3 (ref 4.14–5.8)
RBC # BLD AUTO: 2.68 10*6/MM3 (ref 4.14–5.8)
RBC # BLD AUTO: 2.7 10*6/MM3 (ref 4.14–5.8)
RBC # BLD AUTO: 2.77 10*6/MM3 (ref 4.14–5.8)
RBC # BLD AUTO: 2.79 10*6/MM3 (ref 4.14–5.8)
RBC # BLD AUTO: 2.83 10*6/MM3 (ref 4.14–5.8)
RBC # BLD AUTO: 2.87 10*6/MM3 (ref 4.14–5.8)
RBC # BLD AUTO: 2.88 10*6/MM3 (ref 4.14–5.8)
RBC # BLD AUTO: 2.89 10*6/MM3 (ref 4.14–5.8)
RBC # BLD AUTO: 2.95 10*6/MM3 (ref 4.14–5.8)
RBC # BLD AUTO: 2.98 10*6/MM3 (ref 4.14–5.8)
RBC # UR: ABNORMAL /HPF
REF LAB TEST METHOD: ABNORMAL
RH BLD: POSITIVE
SAO2 % BLDCOA: 96.9 % (ref 94–99)
SAO2 % BLDCOA: 97.6 % (ref 94–99)
SARS-COV-2 RNA PNL SPEC NAA+PROBE: NOT DETECTED
SARS-COV-2 RNA RESP QL NAA+PROBE: DETECTED
SARS-COV-2 RNA RESP QL NAA+PROBE: DETECTED
SARS-COV-2 RNA RESP QL NAA+PROBE: NOT DETECTED
SARS-COV-2 RNA RESP QL NAA+PROBE: NOT DETECTED
SCAN SLIDE: NORMAL
SCHISTOCYTES BLD QL SMEAR: ABNORMAL
SMALL PLATELETS BLD QL SMEAR: ABNORMAL
SMALL PLATELETS BLD QL SMEAR: ABNORMAL
SODIUM SERPL-SCNC: 124 MMOL/L (ref 136–145)
SODIUM SERPL-SCNC: 124 MMOL/L (ref 136–145)
SODIUM SERPL-SCNC: 125 MMOL/L (ref 136–145)
SODIUM SERPL-SCNC: 127 MMOL/L (ref 136–145)
SODIUM SERPL-SCNC: 127 MMOL/L (ref 136–145)
SODIUM SERPL-SCNC: 128 MMOL/L (ref 136–145)
SODIUM SERPL-SCNC: 128 MMOL/L (ref 136–145)
SODIUM SERPL-SCNC: 129 MMOL/L (ref 136–145)
SODIUM SERPL-SCNC: 130 MMOL/L (ref 136–145)
SODIUM SERPL-SCNC: 131 MMOL/L (ref 136–145)
SODIUM SERPL-SCNC: 132 MMOL/L (ref 136–145)
SODIUM SERPL-SCNC: 132 MMOL/L (ref 136–145)
SODIUM SERPL-SCNC: 133 MMOL/L (ref 136–145)
SODIUM SERPL-SCNC: 134 MMOL/L (ref 136–145)
SODIUM SERPL-SCNC: 135 MMOL/L (ref 136–145)
SP GR UR STRIP: 1.01 (ref 1–1.03)
SQUAMOUS #/AREA URNS HPF: ABNORMAL /HPF
STRESS TARGET HR: 150 BPM
T&S EXPIRATION DATE: NORMAL
T4 FREE SERPL-MCNC: 0.73 NG/DL (ref 0.93–1.7)
TIBC SERPL-MCNC: 165 MCG/DL (ref 298–536)
TIBC SERPL-MCNC: 215 MCG/DL (ref 298–536)
TOXIC GRANULATION: ABNORMAL
TRANSFERRIN SERPL-MCNC: 111 MG/DL (ref 200–360)
TRANSFERRIN SERPL-MCNC: 144 MG/DL (ref 200–360)
TRIGL SERPL-MCNC: 61 MG/DL (ref 0–150)
TROPONIN T SERPL-MCNC: 0.12 NG/ML (ref 0–0.03)
TROPONIN T SERPL-MCNC: 0.12 NG/ML (ref 0–0.03)
TROPONIN T SERPL-MCNC: 0.13 NG/ML (ref 0–0.03)
TROPONIN T SERPL-MCNC: 0.13 NG/ML (ref 0–0.03)
TROPONIN T SERPL-MCNC: 0.15 NG/ML (ref 0–0.03)
TROPONIN T SERPL-MCNC: 0.16 NG/ML (ref 0–0.03)
TROPONIN T SERPL-MCNC: 0.16 NG/ML (ref 0–0.03)
TROPONIN T SERPL-MCNC: 0.19 NG/ML (ref 0–0.03)
TROPONIN T SERPL-MCNC: 0.2 NG/ML (ref 0–0.03)
TROPONIN T SERPL-MCNC: 0.21 NG/ML (ref 0–0.03)
UNIT  ABO: NORMAL
UNIT  RH: NORMAL
UROBILINOGEN UR QL STRIP: ABNORMAL
VANCOMYCIN SERPL-MCNC: 18 MCG/ML (ref 5–40)
VANCOMYCIN SERPL-MCNC: 18.6 MCG/ML (ref 5–40)
VANCOMYCIN SERPL-MCNC: 25.6 MCG/ML (ref 5–40)
VANCOMYCIN SERPL-MCNC: 33.6 MCG/ML (ref 5–40)
VANCOMYCIN SERPL-MCNC: <4 MCG/ML (ref 5–40)
VANCOMYCIN TROUGH SERPL-MCNC: 21.3 MCG/ML (ref 5–20)
VENTILATOR MODE: ABNORMAL
VENTILATOR MODE: ABNORMAL
VIT B12 BLD-MCNC: 574 PG/ML (ref 211–946)
VLDLC SERPL-MCNC: 14 MG/DL (ref 5–40)
WBC # BLD AUTO: 10.15 10*3/MM3 (ref 3.4–10.8)
WBC # BLD AUTO: 12.9 10*3/MM3 (ref 3.4–10.8)
WBC # BLD AUTO: 4.45 10*3/MM3 (ref 3.4–10.8)
WBC # BLD AUTO: 4.81 10*3/MM3 (ref 3.4–10.8)
WBC # BLD AUTO: 4.84 10*3/MM3 (ref 3.4–10.8)
WBC # BLD AUTO: 5.35 10*3/MM3 (ref 3.4–10.8)
WBC # BLD AUTO: 5.77 10*3/MM3 (ref 3.4–10.8)
WBC # BLD AUTO: 6.1 10*3/MM3 (ref 3.4–10.8)
WBC # BLD AUTO: 6.12 10*3/MM3 (ref 3.4–10.8)
WBC # BLD AUTO: 6.15 10*3/MM3 (ref 3.4–10.8)
WBC # BLD AUTO: 6.16 10*3/MM3 (ref 3.4–10.8)
WBC # BLD AUTO: 6.21 10*3/MM3 (ref 3.4–10.8)
WBC # BLD AUTO: 6.27 10*3/MM3 (ref 3.4–10.8)
WBC # BLD AUTO: 6.32 10*3/MM3 (ref 3.4–10.8)
WBC # BLD AUTO: 6.43 10*3/MM3 (ref 3.4–10.8)
WBC # BLD AUTO: 6.81 10*3/MM3 (ref 3.4–10.8)
WBC # BLD AUTO: 6.92 10*3/MM3 (ref 3.4–10.8)
WBC # BLD AUTO: 6.98 10*3/MM3 (ref 3.4–10.8)
WBC # BLD AUTO: 7.2 10*3/MM3 (ref 3.4–10.8)
WBC # BLD AUTO: 7.31 10*3/MM3 (ref 3.4–10.8)
WBC # BLD AUTO: 7.34 10*3/MM3 (ref 3.4–10.8)
WBC # BLD AUTO: 7.45 10*3/MM3 (ref 3.4–10.8)
WBC # BLD AUTO: 7.65 10*3/MM3 (ref 3.4–10.8)
WBC # BLD AUTO: 7.79 10*3/MM3 (ref 3.4–10.8)
WBC # BLD AUTO: 7.83 10*3/MM3 (ref 3.4–10.8)
WBC # BLD AUTO: 7.9 10*3/MM3 (ref 3.4–10.8)
WBC # BLD AUTO: 8.2 10*3/MM3 (ref 3.4–10.8)
WBC # BLD AUTO: 8.34 10*3/MM3 (ref 3.4–10.8)
WBC # BLD AUTO: 8.38 10*3/MM3 (ref 3.4–10.8)
WBC # BLD AUTO: 8.59 10*3/MM3 (ref 3.4–10.8)
WBC # BLD AUTO: 9.1 10*3/MM3 (ref 3.4–10.8)
WBC # BLD AUTO: 9.49 10*3/MM3 (ref 3.4–10.8)
WBC # BLD AUTO: 9.81 10*3/MM3 (ref 3.4–10.8)
WBC # BLD AUTO: 9.82 10*3/MM3 (ref 3.4–10.8)
WBC CLUMPS # UR AUTO: ABNORMAL /HPF
WBC UR QL AUTO: ABNORMAL /HPF
WHOLE BLOOD HOLD SPECIMEN: NORMAL

## 2021-01-01 PROCEDURE — 80053 COMPREHEN METABOLIC PANEL: CPT | Performed by: NURSE PRACTITIONER

## 2021-01-01 PROCEDURE — 25010000002 ENOXAPARIN PER 10 MG: Performed by: INTERNAL MEDICINE

## 2021-01-01 PROCEDURE — 25010000002 MAGNESIUM SULFATE 2 GM/50ML SOLUTION

## 2021-01-01 PROCEDURE — 80053 COMPREHEN METABOLIC PANEL: CPT | Performed by: HOSPITALIST

## 2021-01-01 PROCEDURE — 82728 ASSAY OF FERRITIN: CPT | Performed by: SURGERY

## 2021-01-01 PROCEDURE — 25010000002 ONDANSETRON PER 1 MG: Performed by: FAMILY MEDICINE

## 2021-01-01 PROCEDURE — 86860 RBC ANTIBODY ELUTION: CPT | Performed by: INTERNAL MEDICINE

## 2021-01-01 PROCEDURE — 83735 ASSAY OF MAGNESIUM: CPT | Performed by: INTERNAL MEDICINE

## 2021-01-01 PROCEDURE — P9047 ALBUMIN (HUMAN), 25%, 50ML: HCPCS | Performed by: INTERNAL MEDICINE

## 2021-01-01 PROCEDURE — 99233 SBSQ HOSP IP/OBS HIGH 50: CPT | Performed by: INTERNAL MEDICINE

## 2021-01-01 PROCEDURE — 36430 TRANSFUSION BLD/BLD COMPNT: CPT

## 2021-01-01 PROCEDURE — 83605 ASSAY OF LACTIC ACID: CPT | Performed by: PHYSICIAN ASSISTANT

## 2021-01-01 PROCEDURE — 25010000002 MORPHINE PER 10 MG: Performed by: INTERNAL MEDICINE

## 2021-01-01 PROCEDURE — 84466 ASSAY OF TRANSFERRIN: CPT | Performed by: INTERNAL MEDICINE

## 2021-01-01 PROCEDURE — 87086 URINE CULTURE/COLONY COUNT: CPT | Performed by: INTERNAL MEDICINE

## 2021-01-01 PROCEDURE — 5A1D70Z PERFORMANCE OF URINARY FILTRATION, INTERMITTENT, LESS THAN 6 HOURS PER DAY: ICD-10-PCS | Performed by: INTERNAL MEDICINE

## 2021-01-01 PROCEDURE — 25010000002 MORPHINE PER 10 MG: Performed by: PHYSICIAN ASSISTANT

## 2021-01-01 PROCEDURE — 86902 BLOOD TYPE ANTIGEN DONOR EA: CPT

## 2021-01-01 PROCEDURE — 25010000002 MORPHINE PER 10 MG: Performed by: STUDENT IN AN ORGANIZED HEALTH CARE EDUCATION/TRAINING PROGRAM

## 2021-01-01 PROCEDURE — 25010000002 ALBUMIN HUMAN 25% PER 50 ML: Performed by: INTERNAL MEDICINE

## 2021-01-01 PROCEDURE — 25010000002 ONDANSETRON PER 1 MG: Performed by: EMERGENCY MEDICINE

## 2021-01-01 PROCEDURE — 85379 FIBRIN DEGRADATION QUANT: CPT | Performed by: INTERNAL MEDICINE

## 2021-01-01 PROCEDURE — 25010000002 MEROPENEM PER 100 MG: Performed by: INTERNAL MEDICINE

## 2021-01-01 PROCEDURE — 82962 GLUCOSE BLOOD TEST: CPT

## 2021-01-01 PROCEDURE — 84100 ASSAY OF PHOSPHORUS: CPT | Performed by: INTERNAL MEDICINE

## 2021-01-01 PROCEDURE — 93005 ELECTROCARDIOGRAM TRACING: CPT | Performed by: INTERNAL MEDICINE

## 2021-01-01 PROCEDURE — 86900 BLOOD TYPING SEROLOGIC ABO: CPT

## 2021-01-01 PROCEDURE — 86922 COMPATIBILITY TEST ANTIGLOB: CPT

## 2021-01-01 PROCEDURE — 93010 ELECTROCARDIOGRAM REPORT: CPT | Performed by: INTERNAL MEDICINE

## 2021-01-01 PROCEDURE — 80307 DRUG TEST PRSMV CHEM ANLYZR: CPT | Performed by: PHYSICIAN ASSISTANT

## 2021-01-01 PROCEDURE — 80053 COMPREHEN METABOLIC PANEL: CPT | Performed by: SURGERY

## 2021-01-01 PROCEDURE — 99285 EMERGENCY DEPT VISIT HI MDM: CPT

## 2021-01-01 PROCEDURE — 94799 UNLISTED PULMONARY SVC/PX: CPT

## 2021-01-01 PROCEDURE — 86140 C-REACTIVE PROTEIN: CPT | Performed by: NURSE PRACTITIONER

## 2021-01-01 PROCEDURE — P9016 RBC LEUKOCYTES REDUCED: HCPCS

## 2021-01-01 PROCEDURE — 93970 EXTREMITY STUDY: CPT

## 2021-01-01 PROCEDURE — 85027 COMPLETE CBC AUTOMATED: CPT | Performed by: INTERNAL MEDICINE

## 2021-01-01 PROCEDURE — 93308 TTE F-UP OR LMTD: CPT | Performed by: INTERNAL MEDICINE

## 2021-01-01 PROCEDURE — 80307 DRUG TEST PRSMV CHEM ANLYZR: CPT | Performed by: INTERNAL MEDICINE

## 2021-01-01 PROCEDURE — 86850 RBC ANTIBODY SCREEN: CPT | Performed by: INTERNAL MEDICINE

## 2021-01-01 PROCEDURE — 86850 RBC ANTIBODY SCREEN: CPT

## 2021-01-01 PROCEDURE — 85379 FIBRIN DEGRADATION QUANT: CPT | Performed by: SURGERY

## 2021-01-01 PROCEDURE — 85652 RBC SED RATE AUTOMATED: CPT | Performed by: FAMILY MEDICINE

## 2021-01-01 PROCEDURE — 86880 COOMBS TEST DIRECT: CPT | Performed by: INTERNAL MEDICINE

## 2021-01-01 PROCEDURE — 80074 ACUTE HEPATITIS PANEL: CPT | Performed by: PHYSICIAN ASSISTANT

## 2021-01-01 PROCEDURE — C1894 INTRO/SHEATH, NON-LASER: HCPCS | Performed by: SURGERY

## 2021-01-01 PROCEDURE — 82077 ASSAY SPEC XCP UR&BREATH IA: CPT | Performed by: PHYSICIAN ASSISTANT

## 2021-01-01 PROCEDURE — 84439 ASSAY OF FREE THYROXINE: CPT | Performed by: INTERNAL MEDICINE

## 2021-01-01 PROCEDURE — 82550 ASSAY OF CK (CPK): CPT | Performed by: INTERNAL MEDICINE

## 2021-01-01 PROCEDURE — 86972 RBC PRETX INCUBATJ W/DENSITY: CPT

## 2021-01-01 PROCEDURE — 77001 FLUOROGUIDE FOR VEIN DEVICE: CPT | Performed by: SURGERY

## 2021-01-01 PROCEDURE — 93005 ELECTROCARDIOGRAM TRACING: CPT | Performed by: PHYSICIAN ASSISTANT

## 2021-01-01 PROCEDURE — 83735 ASSAY OF MAGNESIUM: CPT | Performed by: PHYSICIAN ASSISTANT

## 2021-01-01 PROCEDURE — 25010000002 DIPHENHYDRAMINE PER 50 MG: Performed by: PHYSICIAN ASSISTANT

## 2021-01-01 PROCEDURE — 80053 COMPREHEN METABOLIC PANEL: CPT | Performed by: STUDENT IN AN ORGANIZED HEALTH CARE EDUCATION/TRAINING PROGRAM

## 2021-01-01 PROCEDURE — 85007 BL SMEAR W/DIFF WBC COUNT: CPT | Performed by: PHYSICIAN ASSISTANT

## 2021-01-01 PROCEDURE — 83615 LACTATE (LD) (LDH) ENZYME: CPT | Performed by: SURGERY

## 2021-01-01 PROCEDURE — 94640 AIRWAY INHALATION TREATMENT: CPT

## 2021-01-01 PROCEDURE — 82550 ASSAY OF CK (CPK): CPT | Performed by: SURGERY

## 2021-01-01 PROCEDURE — 86901 BLOOD TYPING SEROLOGIC RH(D): CPT | Performed by: INTERNAL MEDICINE

## 2021-01-01 PROCEDURE — 25010000002 HEPARIN (PORCINE) PER 1000 UNITS: Performed by: INTERNAL MEDICINE

## 2021-01-01 PROCEDURE — 85025 COMPLETE CBC W/AUTO DIFF WBC: CPT | Performed by: INTERNAL MEDICINE

## 2021-01-01 PROCEDURE — 85730 THROMBOPLASTIN TIME PARTIAL: CPT | Performed by: INTERNAL MEDICINE

## 2021-01-01 PROCEDURE — 83880 ASSAY OF NATRIURETIC PEPTIDE: CPT | Performed by: FAMILY MEDICINE

## 2021-01-01 PROCEDURE — 25010000002 EPOETIN ALFA-EPBX 10000 UNIT/ML SOLUTION: Performed by: INTERNAL MEDICINE

## 2021-01-01 PROCEDURE — 93005 ELECTROCARDIOGRAM TRACING: CPT | Performed by: HOSPITALIST

## 2021-01-01 PROCEDURE — 85018 HEMOGLOBIN: CPT | Performed by: PHYSICIAN ASSISTANT

## 2021-01-01 PROCEDURE — 85007 BL SMEAR W/DIFF WBC COUNT: CPT | Performed by: INTERNAL MEDICINE

## 2021-01-01 PROCEDURE — 25010000002 DEXAMETHASONE PER 1 MG: Performed by: INTERNAL MEDICINE

## 2021-01-01 PROCEDURE — 86860 RBC ANTIBODY ELUTION: CPT

## 2021-01-01 PROCEDURE — 86140 C-REACTIVE PROTEIN: CPT | Performed by: SURGERY

## 2021-01-01 PROCEDURE — 25010000002 IRON SUCROSE PER 1 MG: Performed by: INTERNAL MEDICINE

## 2021-01-01 PROCEDURE — 99291 CRITICAL CARE FIRST HOUR: CPT | Performed by: INTERNAL MEDICINE

## 2021-01-01 PROCEDURE — 99214 OFFICE O/P EST MOD 30 MIN: CPT | Performed by: NURSE PRACTITIONER

## 2021-01-01 PROCEDURE — 63710000001 DIPHENHYDRAMINE PER 50 MG: Performed by: PHYSICIAN ASSISTANT

## 2021-01-01 PROCEDURE — 25010000002 CALCIUM GLUCONATE-NACL 1-0.675 GM/50ML-% SOLUTION: Performed by: FAMILY MEDICINE

## 2021-01-01 PROCEDURE — 80048 BASIC METABOLIC PNL TOTAL CA: CPT | Performed by: INTERNAL MEDICINE

## 2021-01-01 PROCEDURE — 85014 HEMATOCRIT: CPT | Performed by: INTERNAL MEDICINE

## 2021-01-01 PROCEDURE — 25010000002 PROPOFOL 10 MG/ML EMULSION: Performed by: NURSE ANESTHETIST, CERTIFIED REGISTERED

## 2021-01-01 PROCEDURE — 85025 COMPLETE CBC W/AUTO DIFF WBC: CPT | Performed by: SURGERY

## 2021-01-01 PROCEDURE — 87077 CULTURE AEROBIC IDENTIFY: CPT | Performed by: PHYSICIAN ASSISTANT

## 2021-01-01 PROCEDURE — 99232 SBSQ HOSP IP/OBS MODERATE 35: CPT | Performed by: INTERNAL MEDICINE

## 2021-01-01 PROCEDURE — 85384 FIBRINOGEN ACTIVITY: CPT | Performed by: SURGERY

## 2021-01-01 PROCEDURE — 85025 COMPLETE CBC W/AUTO DIFF WBC: CPT | Performed by: NURSE PRACTITIONER

## 2021-01-01 PROCEDURE — 86140 C-REACTIVE PROTEIN: CPT | Performed by: PHYSICIAN ASSISTANT

## 2021-01-01 PROCEDURE — 93005 ELECTROCARDIOGRAM TRACING: CPT | Performed by: FAMILY MEDICINE

## 2021-01-01 PROCEDURE — 5A1D70Z PERFORMANCE OF URINARY FILTRATION, INTERMITTENT, LESS THAN 6 HOURS PER DAY: ICD-10-PCS | Performed by: SURGERY

## 2021-01-01 PROCEDURE — 85652 RBC SED RATE AUTOMATED: CPT | Performed by: PHYSICIAN ASSISTANT

## 2021-01-01 PROCEDURE — 80053 COMPREHEN METABOLIC PANEL: CPT | Performed by: INTERNAL MEDICINE

## 2021-01-01 PROCEDURE — 87186 SC STD MICRODIL/AGAR DIL: CPT | Performed by: PHYSICIAN ASSISTANT

## 2021-01-01 PROCEDURE — 71045 X-RAY EXAM CHEST 1 VIEW: CPT | Performed by: RADIOLOGY

## 2021-01-01 PROCEDURE — 85025 COMPLETE CBC W/AUTO DIFF WBC: CPT | Performed by: STUDENT IN AN ORGANIZED HEALTH CARE EDUCATION/TRAINING PROGRAM

## 2021-01-01 PROCEDURE — 36600 WITHDRAWAL OF ARTERIAL BLOOD: CPT

## 2021-01-01 PROCEDURE — 86920 COMPATIBILITY TEST SPIN: CPT

## 2021-01-01 PROCEDURE — 63710000001 PROMETHAZINE PER 12.5 MG: Performed by: INTERNAL MEDICINE

## 2021-01-01 PROCEDURE — 25010000002 MORPHINE PER 10 MG: Performed by: EMERGENCY MEDICINE

## 2021-01-01 PROCEDURE — 25010000002 MEROPENEM: Performed by: PHYSICIAN ASSISTANT

## 2021-01-01 PROCEDURE — 82150 ASSAY OF AMYLASE: CPT | Performed by: PHYSICIAN ASSISTANT

## 2021-01-01 PROCEDURE — 83540 ASSAY OF IRON: CPT | Performed by: INTERNAL MEDICINE

## 2021-01-01 PROCEDURE — 87070 CULTURE OTHR SPECIMN AEROBIC: CPT | Performed by: PHYSICIAN ASSISTANT

## 2021-01-01 PROCEDURE — 87341 HEP B SURFACE AG NEUTRLZJ IA: CPT | Performed by: EMERGENCY MEDICINE

## 2021-01-01 PROCEDURE — 84132 ASSAY OF SERUM POTASSIUM: CPT | Performed by: NURSE PRACTITIONER

## 2021-01-01 PROCEDURE — 87040 BLOOD CULTURE FOR BACTERIA: CPT | Performed by: PHYSICIAN ASSISTANT

## 2021-01-01 PROCEDURE — 25010000002 VANCOMYCIN 1 G RECONSTITUTED SOLUTION: Performed by: STUDENT IN AN ORGANIZED HEALTH CARE EDUCATION/TRAINING PROGRAM

## 2021-01-01 PROCEDURE — 83050 HGB METHEMOGLOBIN QUAN: CPT

## 2021-01-01 PROCEDURE — 86880 COOMBS TEST DIRECT: CPT

## 2021-01-01 PROCEDURE — 25010000002 CALCIUM GLUCONATE-NACL 1-0.675 GM/50ML-% SOLUTION: Performed by: PHYSICIAN ASSISTANT

## 2021-01-01 PROCEDURE — 86850 RBC ANTIBODY SCREEN: CPT | Performed by: PHYSICIAN ASSISTANT

## 2021-01-01 PROCEDURE — 25010000002 CEFTRIAXONE PER 250 MG: Performed by: SURGERY

## 2021-01-01 PROCEDURE — 80053 COMPREHEN METABOLIC PANEL: CPT | Performed by: PHYSICIAN ASSISTANT

## 2021-01-01 PROCEDURE — 84145 PROCALCITONIN (PCT): CPT | Performed by: NURSE PRACTITIONER

## 2021-01-01 PROCEDURE — 70498 CT ANGIOGRAPHY NECK: CPT | Performed by: RADIOLOGY

## 2021-01-01 PROCEDURE — 82375 ASSAY CARBOXYHB QUANT: CPT

## 2021-01-01 PROCEDURE — 87040 BLOOD CULTURE FOR BACTERIA: CPT | Performed by: INTERNAL MEDICINE

## 2021-01-01 PROCEDURE — 25010000002 CALCIUM GLUCONATE PER 10 ML: Performed by: PHYSICIAN ASSISTANT

## 2021-01-01 PROCEDURE — 99284 EMERGENCY DEPT VISIT MOD MDM: CPT

## 2021-01-01 PROCEDURE — 82728 ASSAY OF FERRITIN: CPT | Performed by: NURSE PRACTITIONER

## 2021-01-01 PROCEDURE — 71275 CT ANGIOGRAPHY CHEST: CPT

## 2021-01-01 PROCEDURE — 86900 BLOOD TYPING SEROLOGIC ABO: CPT | Performed by: INTERNAL MEDICINE

## 2021-01-01 PROCEDURE — 97530 THERAPEUTIC ACTIVITIES: CPT

## 2021-01-01 PROCEDURE — 72125 CT NECK SPINE W/O DYE: CPT

## 2021-01-01 PROCEDURE — 86870 RBC ANTIBODY IDENTIFICATION: CPT

## 2021-01-01 PROCEDURE — 85610 PROTHROMBIN TIME: CPT | Performed by: PHYSICIAN ASSISTANT

## 2021-01-01 PROCEDURE — 83970 ASSAY OF PARATHORMONE: CPT | Performed by: NURSE PRACTITIONER

## 2021-01-01 PROCEDURE — 85025 COMPLETE CBC W/AUTO DIFF WBC: CPT | Performed by: PHYSICIAN ASSISTANT

## 2021-01-01 PROCEDURE — 25010000002 CEFTRIAXONE PER 250 MG: Performed by: INTERNAL MEDICINE

## 2021-01-01 PROCEDURE — 25010000002 ONDANSETRON PER 1 MG: Performed by: INTERNAL MEDICINE

## 2021-01-01 PROCEDURE — 25010000002 FENTANYL CITRATE (PF) 100 MCG/2ML SOLUTION: Performed by: NURSE ANESTHETIST, CERTIFIED REGISTERED

## 2021-01-01 PROCEDURE — 70450 CT HEAD/BRAIN W/O DYE: CPT | Performed by: RADIOLOGY

## 2021-01-01 PROCEDURE — 82550 ASSAY OF CK (CPK): CPT | Performed by: NURSE PRACTITIONER

## 2021-01-01 PROCEDURE — 87636 SARSCOV2 & INF A&B AMP PRB: CPT | Performed by: PHYSICIAN ASSISTANT

## 2021-01-01 PROCEDURE — 80074 ACUTE HEPATITIS PANEL: CPT | Performed by: INTERNAL MEDICINE

## 2021-01-01 PROCEDURE — 85384 FIBRINOGEN ACTIVITY: CPT | Performed by: INTERNAL MEDICINE

## 2021-01-01 PROCEDURE — 80202 ASSAY OF VANCOMYCIN: CPT | Performed by: STUDENT IN AN ORGANIZED HEALTH CARE EDUCATION/TRAINING PROGRAM

## 2021-01-01 PROCEDURE — 05HN33Z INSERTION OF INFUSION DEVICE INTO LEFT INTERNAL JUGULAR VEIN, PERCUTANEOUS APPROACH: ICD-10-PCS | Performed by: SURGERY

## 2021-01-01 PROCEDURE — 86140 C-REACTIVE PROTEIN: CPT | Performed by: INTERNAL MEDICINE

## 2021-01-01 PROCEDURE — 0JH63XZ INSERTION OF TUNNELED VASCULAR ACCESS DEVICE INTO CHEST SUBCUTANEOUS TISSUE AND FASCIA, PERCUTANEOUS APPROACH: ICD-10-PCS | Performed by: SURGERY

## 2021-01-01 PROCEDURE — 86905 BLOOD TYPING RBC ANTIGENS: CPT

## 2021-01-01 PROCEDURE — 63710000001 INSULIN REGULAR HUMAN PER 5 UNITS: Performed by: FAMILY MEDICINE

## 2021-01-01 PROCEDURE — 99239 HOSP IP/OBS DSCHRG MGMT >30: CPT | Performed by: INTERNAL MEDICINE

## 2021-01-01 PROCEDURE — 0 IOPAMIDOL PER 1 ML: Performed by: EMERGENCY MEDICINE

## 2021-01-01 PROCEDURE — 86901 BLOOD TYPING SEROLOGIC RH(D): CPT

## 2021-01-01 PROCEDURE — 25010000003 HEPARIN LOCK FLUSH PER 10 UNITS: Performed by: SURGERY

## 2021-01-01 PROCEDURE — 87186 SC STD MICRODIL/AGAR DIL: CPT | Performed by: INTERNAL MEDICINE

## 2021-01-01 PROCEDURE — 86140 C-REACTIVE PROTEIN: CPT | Performed by: FAMILY MEDICINE

## 2021-01-01 PROCEDURE — 25010000002 EPOETIN ALFA-EPBX 3000 UNIT/ML SOLUTION: Performed by: INTERNAL MEDICINE

## 2021-01-01 PROCEDURE — 82330 ASSAY OF CALCIUM: CPT | Performed by: PHYSICIAN ASSISTANT

## 2021-01-01 PROCEDURE — 25010000002 EPOETIN ALFA-EPBX 20000 UNIT/ML SOLUTION: Performed by: INTERNAL MEDICINE

## 2021-01-01 PROCEDURE — 84100 ASSAY OF PHOSPHORUS: CPT | Performed by: PHYSICIAN ASSISTANT

## 2021-01-01 PROCEDURE — 63710000001 INSULIN REGULAR HUMAN PER 5 UNITS: Performed by: PHYSICIAN ASSISTANT

## 2021-01-01 PROCEDURE — 84132 ASSAY OF SERUM POTASSIUM: CPT | Performed by: INTERNAL MEDICINE

## 2021-01-01 PROCEDURE — 99238 HOSP IP/OBS DSCHRG MGMT 30/<: CPT | Performed by: INTERNAL MEDICINE

## 2021-01-01 PROCEDURE — 80202 ASSAY OF VANCOMYCIN: CPT | Performed by: INTERNAL MEDICINE

## 2021-01-01 PROCEDURE — 70498 CT ANGIOGRAPHY NECK: CPT

## 2021-01-01 PROCEDURE — 25010000002 CEFEPIME PER 500 MG: Performed by: NURSE PRACTITIONER

## 2021-01-01 PROCEDURE — 86901 BLOOD TYPING SEROLOGIC RH(D): CPT | Performed by: PHYSICIAN ASSISTANT

## 2021-01-01 PROCEDURE — 87205 SMEAR GRAM STAIN: CPT | Performed by: PHYSICIAN ASSISTANT

## 2021-01-01 PROCEDURE — 87636 SARSCOV2 & INF A&B AMP PRB: CPT | Performed by: FAMILY MEDICINE

## 2021-01-01 PROCEDURE — 85027 COMPLETE CBC AUTOMATED: CPT | Performed by: STUDENT IN AN ORGANIZED HEALTH CARE EDUCATION/TRAINING PROGRAM

## 2021-01-01 PROCEDURE — 99223 1ST HOSP IP/OBS HIGH 75: CPT | Performed by: INTERNAL MEDICINE

## 2021-01-01 PROCEDURE — 82607 VITAMIN B-12: CPT | Performed by: INTERNAL MEDICINE

## 2021-01-01 PROCEDURE — 85025 COMPLETE CBC W/AUTO DIFF WBC: CPT | Performed by: HOSPITALIST

## 2021-01-01 PROCEDURE — 25010000002 HYDRALAZINE PER 20 MG: Performed by: INTERNAL MEDICINE

## 2021-01-01 PROCEDURE — 71275 CT ANGIOGRAPHY CHEST: CPT | Performed by: RADIOLOGY

## 2021-01-01 PROCEDURE — 81001 URINALYSIS AUTO W/SCOPE: CPT | Performed by: PHYSICIAN ASSISTANT

## 2021-01-01 PROCEDURE — 86901 BLOOD TYPING SEROLOGIC RH(D): CPT | Performed by: FAMILY MEDICINE

## 2021-01-01 PROCEDURE — 82330 ASSAY OF CALCIUM: CPT | Performed by: NURSE PRACTITIONER

## 2021-01-01 PROCEDURE — 99233 SBSQ HOSP IP/OBS HIGH 50: CPT | Performed by: STUDENT IN AN ORGANIZED HEALTH CARE EDUCATION/TRAINING PROGRAM

## 2021-01-01 PROCEDURE — 93010 ELECTROCARDIOGRAM REPORT: CPT | Performed by: SPECIALIST

## 2021-01-01 PROCEDURE — 85384 FIBRINOGEN ACTIVITY: CPT | Performed by: NURSE PRACTITIONER

## 2021-01-01 PROCEDURE — 25010000003 MAGNESIUM SULFATE 4 GM/100ML SOLUTION: Performed by: INTERNAL MEDICINE

## 2021-01-01 PROCEDURE — 83615 LACTATE (LD) (LDH) ENZYME: CPT | Performed by: INTERNAL MEDICINE

## 2021-01-01 PROCEDURE — 85730 THROMBOPLASTIN TIME PARTIAL: CPT | Performed by: PHYSICIAN ASSISTANT

## 2021-01-01 PROCEDURE — 84484 ASSAY OF TROPONIN QUANT: CPT | Performed by: PHYSICIAN ASSISTANT

## 2021-01-01 PROCEDURE — 72125 CT NECK SPINE W/O DYE: CPT | Performed by: RADIOLOGY

## 2021-01-01 PROCEDURE — 63710000001 DEXAMETHASONE PER 0.25 MG: Performed by: NURSE PRACTITIONER

## 2021-01-01 PROCEDURE — 85018 HEMOGLOBIN: CPT | Performed by: INTERNAL MEDICINE

## 2021-01-01 PROCEDURE — 71045 X-RAY EXAM CHEST 1 VIEW: CPT

## 2021-01-01 PROCEDURE — 25010000002 VANCOMYCIN 5 G RECONSTITUTED SOLUTION: Performed by: INTERNAL MEDICINE

## 2021-01-01 PROCEDURE — 82746 ASSAY OF FOLIC ACID SERUM: CPT | Performed by: INTERNAL MEDICINE

## 2021-01-01 PROCEDURE — 86900 BLOOD TYPING SEROLOGIC ABO: CPT | Performed by: FAMILY MEDICINE

## 2021-01-01 PROCEDURE — 85014 HEMATOCRIT: CPT | Performed by: PHYSICIAN ASSISTANT

## 2021-01-01 PROCEDURE — 82306 VITAMIN D 25 HYDROXY: CPT | Performed by: PHYSICIAN ASSISTANT

## 2021-01-01 PROCEDURE — 87150 DNA/RNA AMPLIFIED PROBE: CPT | Performed by: PHYSICIAN ASSISTANT

## 2021-01-01 PROCEDURE — 84484 ASSAY OF TROPONIN QUANT: CPT | Performed by: HOSPITALIST

## 2021-01-01 PROCEDURE — 85007 BL SMEAR W/DIFF WBC COUNT: CPT | Performed by: NURSE PRACTITIONER

## 2021-01-01 PROCEDURE — 87040 BLOOD CULTURE FOR BACTERIA: CPT | Performed by: FAMILY MEDICINE

## 2021-01-01 PROCEDURE — 87341 HEP B SURFACE AG NEUTRLZJ IA: CPT | Performed by: STUDENT IN AN ORGANIZED HEALTH CARE EDUCATION/TRAINING PROGRAM

## 2021-01-01 PROCEDURE — 25010000002 PROCHLORPERAZINE 10 MG/2ML SOLUTION: Performed by: PHYSICIAN ASSISTANT

## 2021-01-01 PROCEDURE — 36415 COLL VENOUS BLD VENIPUNCTURE: CPT

## 2021-01-01 PROCEDURE — 36589 REMOVAL TUNNELED CV CATH: CPT | Performed by: SURGERY

## 2021-01-01 PROCEDURE — 84132 ASSAY OF SERUM POTASSIUM: CPT | Performed by: FAMILY MEDICINE

## 2021-01-01 PROCEDURE — 86870 RBC ANTIBODY IDENTIFICATION: CPT | Performed by: INTERNAL MEDICINE

## 2021-01-01 PROCEDURE — 83615 LACTATE (LD) (LDH) ENZYME: CPT | Performed by: PHYSICIAN ASSISTANT

## 2021-01-01 PROCEDURE — 85610 PROTHROMBIN TIME: CPT | Performed by: INTERNAL MEDICINE

## 2021-01-01 PROCEDURE — 84100 ASSAY OF PHOSPHORUS: CPT | Performed by: STUDENT IN AN ORGANIZED HEALTH CARE EDUCATION/TRAINING PROGRAM

## 2021-01-01 PROCEDURE — P9612 CATHETERIZE FOR URINE SPEC: HCPCS

## 2021-01-01 PROCEDURE — 87077 CULTURE AEROBIC IDENTIFY: CPT | Performed by: INTERNAL MEDICINE

## 2021-01-01 PROCEDURE — 85730 THROMBOPLASTIN TIME PARTIAL: CPT | Performed by: FAMILY MEDICINE

## 2021-01-01 PROCEDURE — 87147 CULTURE TYPE IMMUNOLOGIC: CPT | Performed by: PHYSICIAN ASSISTANT

## 2021-01-01 PROCEDURE — 25010000002 HEPARIN (PORCINE) PER 1000 UNITS: Performed by: SURGERY

## 2021-01-01 PROCEDURE — 83735 ASSAY OF MAGNESIUM: CPT | Performed by: STUDENT IN AN ORGANIZED HEALTH CARE EDUCATION/TRAINING PROGRAM

## 2021-01-01 PROCEDURE — C1750 CATH, HEMODIALYSIS,LONG-TERM: HCPCS | Performed by: SURGERY

## 2021-01-01 PROCEDURE — 25010000002 LORAZEPAM PER 2 MG: Performed by: FAMILY MEDICINE

## 2021-01-01 PROCEDURE — 83615 LACTATE (LD) (LDH) ENZYME: CPT | Performed by: NURSE PRACTITIONER

## 2021-01-01 PROCEDURE — 70450 CT HEAD/BRAIN W/O DYE: CPT

## 2021-01-01 PROCEDURE — 85610 PROTHROMBIN TIME: CPT | Performed by: FAMILY MEDICINE

## 2021-01-01 PROCEDURE — 84484 ASSAY OF TROPONIN QUANT: CPT | Performed by: NURSE PRACTITIONER

## 2021-01-01 PROCEDURE — 0 IOPAMIDOL PER 1 ML: Performed by: INTERNAL MEDICINE

## 2021-01-01 PROCEDURE — 82805 BLOOD GASES W/O2 SATURATION: CPT

## 2021-01-01 PROCEDURE — 93306 TTE W/DOPPLER COMPLETE: CPT | Performed by: INTERNAL MEDICINE

## 2021-01-01 PROCEDURE — 99214 OFFICE O/P EST MOD 30 MIN: CPT | Performed by: GENERAL PRACTICE

## 2021-01-01 PROCEDURE — 84484 ASSAY OF TROPONIN QUANT: CPT | Performed by: FAMILY MEDICINE

## 2021-01-01 PROCEDURE — 25010000002 MORPHINE PER 10 MG: Performed by: SURGERY

## 2021-01-01 PROCEDURE — 99223 1ST HOSP IP/OBS HIGH 75: CPT | Performed by: HOSPITALIST

## 2021-01-01 PROCEDURE — 63710000001 PROMETHAZINE PER 25 MG: Performed by: INTERNAL MEDICINE

## 2021-01-01 PROCEDURE — 85025 COMPLETE CBC W/AUTO DIFF WBC: CPT | Performed by: FAMILY MEDICINE

## 2021-01-01 PROCEDURE — 83605 ASSAY OF LACTIC ACID: CPT | Performed by: FAMILY MEDICINE

## 2021-01-01 PROCEDURE — 86900 BLOOD TYPING SEROLOGIC ABO: CPT | Performed by: PHYSICIAN ASSISTANT

## 2021-01-01 PROCEDURE — 99223 1ST HOSP IP/OBS HIGH 75: CPT | Performed by: NURSE PRACTITIONER

## 2021-01-01 PROCEDURE — 99442 PR PHYS/QHP TELEPHONE EVALUATION 11-20 MIN: CPT | Performed by: GENERAL PRACTICE

## 2021-01-01 PROCEDURE — 84100 ASSAY OF PHOSPHORUS: CPT | Performed by: NURSE PRACTITIONER

## 2021-01-01 PROCEDURE — 25010000002 VANCOMYCIN: Performed by: INTERNAL MEDICINE

## 2021-01-01 PROCEDURE — 25010000002 METHYLPREDNISOLONE PER 125 MG: Performed by: PHYSICIAN ASSISTANT

## 2021-01-01 PROCEDURE — 83735 ASSAY OF MAGNESIUM: CPT | Performed by: FAMILY MEDICINE

## 2021-01-01 PROCEDURE — 82728 ASSAY OF FERRITIN: CPT | Performed by: INTERNAL MEDICINE

## 2021-01-01 PROCEDURE — 97162 PT EVAL MOD COMPLEX 30 MIN: CPT

## 2021-01-01 PROCEDURE — 82306 VITAMIN D 25 HYDROXY: CPT | Performed by: NURSE PRACTITIONER

## 2021-01-01 PROCEDURE — 99239 HOSP IP/OBS DSCHRG MGMT >30: CPT | Performed by: STUDENT IN AN ORGANIZED HEALTH CARE EDUCATION/TRAINING PROGRAM

## 2021-01-01 PROCEDURE — 74176 CT ABD & PELVIS W/O CONTRAST: CPT

## 2021-01-01 PROCEDURE — 36558 INSERT TUNNELED CV CATH: CPT | Performed by: SURGERY

## 2021-01-01 PROCEDURE — 05PY03Z REMOVAL OF INFUSION DEVICE FROM UPPER VEIN, OPEN APPROACH: ICD-10-PCS | Performed by: SURGERY

## 2021-01-01 PROCEDURE — 25010000002 GENTAMICIN PER 80 MG: Performed by: INTERNAL MEDICINE

## 2021-01-01 PROCEDURE — 76000 FLUOROSCOPY <1 HR PHYS/QHP: CPT | Performed by: RADIOLOGY

## 2021-01-01 PROCEDURE — 81001 URINALYSIS AUTO W/SCOPE: CPT | Performed by: INTERNAL MEDICINE

## 2021-01-01 PROCEDURE — 85379 FIBRIN DEGRADATION QUANT: CPT | Performed by: PHYSICIAN ASSISTANT

## 2021-01-01 PROCEDURE — 86850 RBC ANTIBODY SCREEN: CPT | Performed by: FAMILY MEDICINE

## 2021-01-01 PROCEDURE — 76937 US GUIDE VASCULAR ACCESS: CPT | Performed by: SURGERY

## 2021-01-01 PROCEDURE — 83880 ASSAY OF NATRIURETIC PEPTIDE: CPT | Performed by: PHYSICIAN ASSISTANT

## 2021-01-01 PROCEDURE — 93308 TTE F-UP OR LMTD: CPT

## 2021-01-01 PROCEDURE — B5141ZA FLUOROSCOPY OF LEFT JUGULAR VEINS USING LOW OSMOLAR CONTRAST, GUIDANCE: ICD-10-PCS | Performed by: SURGERY

## 2021-01-01 PROCEDURE — 83690 ASSAY OF LIPASE: CPT | Performed by: PHYSICIAN ASSISTANT

## 2021-01-01 PROCEDURE — 82728 ASSAY OF FERRITIN: CPT | Performed by: PHYSICIAN ASSISTANT

## 2021-01-01 PROCEDURE — 76000 FLUOROSCOPY <1 HR PHYS/QHP: CPT

## 2021-01-01 PROCEDURE — 80053 COMPREHEN METABOLIC PANEL: CPT | Performed by: FAMILY MEDICINE

## 2021-01-01 PROCEDURE — 99223 1ST HOSP IP/OBS HIGH 75: CPT | Performed by: PHYSICIAN ASSISTANT

## 2021-01-01 PROCEDURE — 97166 OT EVAL MOD COMPLEX 45 MIN: CPT

## 2021-01-01 PROCEDURE — 93970 EXTREMITY STUDY: CPT | Performed by: RADIOLOGY

## 2021-01-01 PROCEDURE — 93306 TTE W/DOPPLER COMPLETE: CPT

## 2021-01-01 PROCEDURE — 25010000002 MORPHINE PER 10 MG: Performed by: FAMILY MEDICINE

## 2021-01-01 PROCEDURE — 80061 LIPID PANEL: CPT | Performed by: PHYSICIAN ASSISTANT

## 2021-01-01 RX ORDER — CLONIDINE HYDROCHLORIDE 0.1 MG/1
0.2 TABLET ORAL ONCE
Status: COMPLETED | OUTPATIENT
Start: 2021-01-01 | End: 2021-01-01

## 2021-01-01 RX ORDER — AMLODIPINE BESYLATE 5 MG/1
5 TABLET ORAL NIGHTLY
Status: DISCONTINUED | OUTPATIENT
Start: 2021-01-01 | End: 2021-01-01 | Stop reason: HOSPADM

## 2021-01-01 RX ORDER — LORAZEPAM 2 MG/ML
0.5 INJECTION INTRAMUSCULAR ONCE
Status: COMPLETED | OUTPATIENT
Start: 2021-01-01 | End: 2021-01-01

## 2021-01-01 RX ORDER — HEPARIN SODIUM 1000 [USP'U]/ML
1000 INJECTION, SOLUTION INTRAVENOUS; SUBCUTANEOUS ONCE
Status: COMPLETED | OUTPATIENT
Start: 2021-01-01 | End: 2021-01-01

## 2021-01-01 RX ORDER — SODIUM CHLORIDE 0.9 % (FLUSH) 0.9 %
10 SYRINGE (ML) INJECTION AS NEEDED
Status: DISCONTINUED | OUTPATIENT
Start: 2021-01-01 | End: 2021-01-01 | Stop reason: HOSPADM

## 2021-01-01 RX ORDER — AMLODIPINE BESYLATE 5 MG/1
5 TABLET ORAL NIGHTLY
COMMUNITY
End: 2021-01-01 | Stop reason: HOSPADM

## 2021-01-01 RX ORDER — HEPARIN SODIUM (PORCINE) LOCK FLUSH IV SOLN 100 UNIT/ML 100 UNIT/ML
SOLUTION INTRAVENOUS AS NEEDED
Status: DISCONTINUED | OUTPATIENT
Start: 2021-01-01 | End: 2021-01-01 | Stop reason: HOSPADM

## 2021-01-01 RX ORDER — AMLODIPINE BESYLATE 5 MG/1
5 TABLET ORAL DAILY
Status: DISCONTINUED | OUTPATIENT
Start: 2021-01-01 | End: 2021-01-01 | Stop reason: HOSPADM

## 2021-01-01 RX ORDER — LACTULOSE 10 G/15ML
10 SOLUTION ORAL ONCE
Status: COMPLETED | OUTPATIENT
Start: 2021-01-01 | End: 2021-01-01

## 2021-01-01 RX ORDER — CALCIUM ACETATE 667 MG/1
667 CAPSULE ORAL 3 TIMES DAILY
COMMUNITY
End: 2021-01-01 | Stop reason: HOSPADM

## 2021-01-01 RX ORDER — HYDRALAZINE HYDROCHLORIDE 10 MG/1
10 TABLET, FILM COATED ORAL 3 TIMES DAILY
COMMUNITY
End: 2021-01-01 | Stop reason: HOSPADM

## 2021-01-01 RX ORDER — CALCIUM ACETATE 667 MG/1
CAPSULE ORAL
Qty: 90 CAPSULE | Refills: 2 | Status: SHIPPED | OUTPATIENT
Start: 2021-01-01 | End: 2021-01-01

## 2021-01-01 RX ORDER — MORPHINE SULFATE 2 MG/ML
2 INJECTION, SOLUTION INTRAMUSCULAR; INTRAVENOUS EVERY 4 HOURS PRN
Status: DISCONTINUED | OUTPATIENT
Start: 2021-01-01 | End: 2021-01-01

## 2021-01-01 RX ORDER — OXYCODONE HYDROCHLORIDE 5 MG/1
5 TABLET ORAL ONCE
Status: DISCONTINUED | OUTPATIENT
Start: 2021-01-01 | End: 2021-01-01

## 2021-01-01 RX ORDER — HYDROCODONE BITARTRATE AND ACETAMINOPHEN 5; 325 MG/1; MG/1
2 TABLET ORAL EVERY 6 HOURS PRN
Status: DISCONTINUED | OUTPATIENT
Start: 2021-01-01 | End: 2021-01-01 | Stop reason: HOSPADM

## 2021-01-01 RX ORDER — CALCIUM ACETATE 667 MG/1
1334 CAPSULE ORAL 3 TIMES DAILY
Qty: 180 CAPSULE | Refills: 2 | Status: SHIPPED | OUTPATIENT
Start: 2021-01-01

## 2021-01-01 RX ORDER — MAGNESIUM SULFATE HEPTAHYDRATE 40 MG/ML
4 INJECTION, SOLUTION INTRAVENOUS ONCE
Status: COMPLETED | OUTPATIENT
Start: 2021-01-01 | End: 2021-01-01

## 2021-01-01 RX ORDER — ACETAMINOPHEN 325 MG/1
650 TABLET ORAL EVERY 6 HOURS PRN
Status: DISCONTINUED | OUTPATIENT
Start: 2021-01-01 | End: 2021-01-01 | Stop reason: HOSPADM

## 2021-01-01 RX ORDER — ONDANSETRON 2 MG/ML
4 INJECTION INTRAMUSCULAR; INTRAVENOUS ONCE
Status: COMPLETED | OUTPATIENT
Start: 2021-01-01 | End: 2021-01-01

## 2021-01-01 RX ORDER — HEPARIN SODIUM 5000 [USP'U]/ML
INJECTION, SOLUTION INTRAVENOUS; SUBCUTANEOUS CONTINUOUS PRN
Status: DISCONTINUED | OUTPATIENT
Start: 2021-01-01 | End: 2021-01-01 | Stop reason: HOSPADM

## 2021-01-01 RX ORDER — LIDOCAINE 50 MG/G
1 PATCH TOPICAL
Status: DISCONTINUED | OUTPATIENT
Start: 2021-01-01 | End: 2021-01-01 | Stop reason: HOSPADM

## 2021-01-01 RX ORDER — CARVEDILOL 6.25 MG/1
12.5 TABLET ORAL 2 TIMES DAILY WITH MEALS
Status: DISCONTINUED | OUTPATIENT
Start: 2021-01-01 | End: 2021-01-01

## 2021-01-01 RX ORDER — NICOTINE POLACRILEX 4 MG
15 LOZENGE BUCCAL
Start: 2021-01-01 | End: 2021-01-01

## 2021-01-01 RX ORDER — METHOCARBAMOL 500 MG/1
500 TABLET, FILM COATED ORAL 3 TIMES DAILY PRN
Status: DISCONTINUED | OUTPATIENT
Start: 2021-01-01 | End: 2021-01-01 | Stop reason: HOSPADM

## 2021-01-01 RX ORDER — HEPARIN SODIUM 5000 [USP'U]/ML
5000 INJECTION, SOLUTION INTRAVENOUS; SUBCUTANEOUS EVERY 12 HOURS SCHEDULED
Status: DISCONTINUED | OUTPATIENT
Start: 2021-01-01 | End: 2021-01-01 | Stop reason: HOSPADM

## 2021-01-01 RX ORDER — DIPHENHYDRAMINE HYDROCHLORIDE 50 MG/ML
25 INJECTION INTRAMUSCULAR; INTRAVENOUS ONCE
Status: COMPLETED | OUTPATIENT
Start: 2021-01-01 | End: 2021-01-01

## 2021-01-01 RX ORDER — MAGNESIUM SULFATE HEPTAHYDRATE 40 MG/ML
2 INJECTION, SOLUTION INTRAVENOUS AS NEEDED
Status: DISCONTINUED | OUTPATIENT
Start: 2021-01-01 | End: 2021-01-01 | Stop reason: HOSPADM

## 2021-01-01 RX ORDER — AMLODIPINE BESYLATE 5 MG/1
5 TABLET ORAL NIGHTLY
Status: CANCELLED | OUTPATIENT
Start: 2021-01-01

## 2021-01-01 RX ORDER — CARVEDILOL 12.5 MG/1
12.5 TABLET ORAL 2 TIMES DAILY WITH MEALS
Qty: 60 TABLET | Refills: 2 | Status: SHIPPED | OUTPATIENT
Start: 2021-01-01

## 2021-01-01 RX ORDER — ISOSORBIDE MONONITRATE 30 MG/1
30 TABLET, EXTENDED RELEASE ORAL DAILY
COMMUNITY
End: 2021-01-01 | Stop reason: HOSPADM

## 2021-01-01 RX ORDER — HYDRALAZINE HYDROCHLORIDE 10 MG/1
10 TABLET, FILM COATED ORAL 3 TIMES DAILY
Status: CANCELLED | OUTPATIENT
Start: 2021-01-01

## 2021-01-01 RX ORDER — ATORVASTATIN CALCIUM 40 MG/1
40 TABLET, FILM COATED ORAL NIGHTLY
Status: DISCONTINUED | OUTPATIENT
Start: 2021-01-01 | End: 2021-01-01 | Stop reason: HOSPADM

## 2021-01-01 RX ORDER — SODIUM CHLORIDE 0.9 % (FLUSH) 0.9 %
3 SYRINGE (ML) INJECTION EVERY 12 HOURS SCHEDULED
Status: DISCONTINUED | OUTPATIENT
Start: 2021-01-01 | End: 2021-01-01 | Stop reason: HOSPADM

## 2021-01-01 RX ORDER — LACTULOSE 10 G/15ML
20 SOLUTION ORAL ONCE
Status: COMPLETED | OUTPATIENT
Start: 2021-01-01 | End: 2021-01-01

## 2021-01-01 RX ORDER — PANTOPRAZOLE SODIUM 40 MG/1
40 TABLET, DELAYED RELEASE ORAL DAILY
Status: DISCONTINUED | OUTPATIENT
Start: 2021-01-01 | End: 2021-01-01 | Stop reason: HOSPADM

## 2021-01-01 RX ORDER — NITROGLYCERIN 0.4 MG/1
0.4 TABLET SUBLINGUAL
Status: DISCONTINUED | OUTPATIENT
Start: 2021-01-01 | End: 2021-01-01 | Stop reason: HOSPADM

## 2021-01-01 RX ORDER — ONDANSETRON 2 MG/ML
4 INJECTION INTRAMUSCULAR; INTRAVENOUS AS NEEDED
Status: DISCONTINUED | OUTPATIENT
Start: 2021-01-01 | End: 2021-01-01 | Stop reason: HOSPADM

## 2021-01-01 RX ORDER — DEXTROSE MONOHYDRATE 25 G/50ML
25 INJECTION, SOLUTION INTRAVENOUS ONCE
Status: COMPLETED | OUTPATIENT
Start: 2021-01-01 | End: 2021-01-01

## 2021-01-01 RX ORDER — NICOTINE 21 MG/24HR
1 PATCH, TRANSDERMAL 24 HOURS TRANSDERMAL
Status: DISCONTINUED | OUTPATIENT
Start: 2021-01-01 | End: 2021-01-01 | Stop reason: HOSPADM

## 2021-01-01 RX ORDER — PANTOPRAZOLE SODIUM 40 MG/1
40 TABLET, DELAYED RELEASE ORAL DAILY
COMMUNITY
End: 2021-01-01 | Stop reason: SDUPTHER

## 2021-01-01 RX ORDER — CARVEDILOL 6.25 MG/1
12.5 TABLET ORAL 2 TIMES DAILY WITH MEALS
Status: DISCONTINUED | OUTPATIENT
Start: 2021-01-01 | End: 2021-01-01 | Stop reason: HOSPADM

## 2021-01-01 RX ORDER — SODIUM POLYSTYRENE SULFONATE 4.1 MEQ/G
15 POWDER, FOR SUSPENSION ORAL; RECTAL ONCE
Status: COMPLETED | OUTPATIENT
Start: 2021-01-01 | End: 2021-01-01

## 2021-01-01 RX ORDER — ALBUMIN (HUMAN) 12.5 G/50ML
25 SOLUTION INTRAVENOUS AS NEEDED
Status: ACTIVE | OUTPATIENT
Start: 2021-01-01 | End: 2021-01-01

## 2021-01-01 RX ORDER — SODIUM CHLORIDE 0.9 % (FLUSH) 0.9 %
10 SYRINGE (ML) INJECTION EVERY 12 HOURS SCHEDULED
Status: DISCONTINUED | OUTPATIENT
Start: 2021-01-01 | End: 2021-01-01 | Stop reason: HOSPADM

## 2021-01-01 RX ORDER — MAGNESIUM SULFATE HEPTAHYDRATE 40 MG/ML
4 INJECTION, SOLUTION INTRAVENOUS AS NEEDED
Status: DISCONTINUED | OUTPATIENT
Start: 2021-01-01 | End: 2021-01-01 | Stop reason: HOSPADM

## 2021-01-01 RX ORDER — DIPHENHYDRAMINE HCL 25 MG
25 CAPSULE ORAL ONCE
Status: COMPLETED | OUTPATIENT
Start: 2021-01-01 | End: 2021-01-01

## 2021-01-01 RX ORDER — CARVEDILOL 6.25 MG/1
12.5 TABLET ORAL 2 TIMES DAILY WITH MEALS
Status: CANCELLED | OUTPATIENT
Start: 2021-01-01

## 2021-01-01 RX ORDER — CALCIUM ACETATE 667 MG/1
667 CAPSULE ORAL 3 TIMES DAILY
Status: DISCONTINUED | OUTPATIENT
Start: 2021-01-01 | End: 2021-01-01 | Stop reason: HOSPADM

## 2021-01-01 RX ORDER — LIDOCAINE 50 MG/G
1 PATCH TOPICAL EVERY 24 HOURS
Status: CANCELLED | OUTPATIENT
Start: 2021-01-01

## 2021-01-01 RX ORDER — AMLODIPINE BESYLATE 5 MG/1
5 TABLET ORAL DAILY
COMMUNITY
End: 2021-01-01

## 2021-01-01 RX ORDER — SODIUM CHLORIDE, SODIUM LACTATE, POTASSIUM CHLORIDE, CALCIUM CHLORIDE 600; 310; 30; 20 MG/100ML; MG/100ML; MG/100ML; MG/100ML
100 INJECTION, SOLUTION INTRAVENOUS ONCE AS NEEDED
Status: DISCONTINUED | OUTPATIENT
Start: 2021-01-01 | End: 2021-01-01 | Stop reason: HOSPADM

## 2021-01-01 RX ORDER — PANTOPRAZOLE SODIUM 40 MG/1
40 TABLET, DELAYED RELEASE ORAL DAILY
COMMUNITY
End: 2021-01-01 | Stop reason: HOSPADM

## 2021-01-01 RX ORDER — METOPROLOL TARTRATE 5 MG/5ML
5 INJECTION INTRAVENOUS EVERY 6 HOURS PRN
Qty: 15 ML
Start: 2021-01-01 | End: 2021-01-01

## 2021-01-01 RX ORDER — ALBUTEROL SULFATE 2.5 MG/3ML
2.5 SOLUTION RESPIRATORY (INHALATION) ONCE
Status: COMPLETED | OUTPATIENT
Start: 2021-01-01 | End: 2021-01-01

## 2021-01-01 RX ORDER — MORPHINE SULFATE 2 MG/ML
2 INJECTION, SOLUTION INTRAMUSCULAR; INTRAVENOUS ONCE
Status: COMPLETED | OUTPATIENT
Start: 2021-01-01 | End: 2021-01-01

## 2021-01-01 RX ORDER — LIDOCAINE 50 MG/G
1 PATCH TOPICAL EVERY 24 HOURS
Qty: 30 PATCH | Refills: 0 | Status: SHIPPED | OUTPATIENT
Start: 2021-01-01 | End: 2021-01-01 | Stop reason: HOSPADM

## 2021-01-01 RX ORDER — AMLODIPINE BESYLATE 5 MG/1
5 TABLET ORAL DAILY
Status: DISCONTINUED | OUTPATIENT
Start: 2021-01-01 | End: 2021-01-01

## 2021-01-01 RX ORDER — NICOTINE POLACRILEX 4 MG
15 LOZENGE BUCCAL
Status: DISCONTINUED | OUTPATIENT
Start: 2021-01-01 | End: 2021-01-01 | Stop reason: HOSPADM

## 2021-01-01 RX ORDER — LEVOFLOXACIN 500 MG/1
500 TABLET, FILM COATED ORAL SEE ADMIN INSTRUCTIONS
COMMUNITY
End: 2021-01-01 | Stop reason: SDUPTHER

## 2021-01-01 RX ORDER — CARVEDILOL 12.5 MG/1
12.5 TABLET ORAL 2 TIMES DAILY WITH MEALS
COMMUNITY
End: 2021-01-01 | Stop reason: SDUPTHER

## 2021-01-01 RX ORDER — CALCIUM GLUCONATE 20 MG/ML
1 INJECTION, SOLUTION INTRAVENOUS ONCE
Status: COMPLETED | OUTPATIENT
Start: 2021-01-01 | End: 2021-01-01

## 2021-01-01 RX ORDER — HYDRALAZINE HYDROCHLORIDE 10 MG/1
10 TABLET, FILM COATED ORAL EVERY 8 HOURS SCHEDULED
Status: DISCONTINUED | OUTPATIENT
Start: 2021-01-01 | End: 2021-01-01 | Stop reason: HOSPADM

## 2021-01-01 RX ORDER — DEXAMETHASONE SODIUM PHOSPHATE 4 MG/ML
6 INJECTION, SOLUTION INTRA-ARTICULAR; INTRALESIONAL; INTRAMUSCULAR; INTRAVENOUS; SOFT TISSUE
Status: DISCONTINUED | OUTPATIENT
Start: 2021-01-01 | End: 2021-01-01

## 2021-01-01 RX ORDER — HEPARIN SODIUM 5000 [USP'U]/ML
5000 INJECTION, SOLUTION INTRAVENOUS; SUBCUTANEOUS EVERY 8 HOURS SCHEDULED
Status: DISCONTINUED | OUTPATIENT
Start: 2021-01-01 | End: 2021-01-01 | Stop reason: HOSPADM

## 2021-01-01 RX ORDER — ACETAMINOPHEN 160 MG/5ML
650 SOLUTION ORAL ONCE
Status: COMPLETED | OUTPATIENT
Start: 2021-01-01 | End: 2021-01-01

## 2021-01-01 RX ORDER — ACETAMINOPHEN 325 MG/1
650 TABLET ORAL ONCE
Status: COMPLETED | OUTPATIENT
Start: 2021-01-01 | End: 2021-01-01

## 2021-01-01 RX ORDER — CALCIUM ACETATE 667 MG/1
1334 CAPSULE ORAL 3 TIMES DAILY
COMMUNITY
End: 2021-01-01 | Stop reason: SDUPTHER

## 2021-01-01 RX ORDER — HYDRALAZINE HYDROCHLORIDE 10 MG/1
10 TABLET, FILM COATED ORAL 3 TIMES DAILY
Status: DISCONTINUED | OUTPATIENT
Start: 2021-01-01 | End: 2021-01-01

## 2021-01-01 RX ORDER — ASPIRIN 81 MG/1
324 TABLET, CHEWABLE ORAL ONCE
Status: COMPLETED | OUTPATIENT
Start: 2021-01-01 | End: 2021-01-01

## 2021-01-01 RX ORDER — AMLODIPINE BESYLATE 5 MG/1
5 TABLET ORAL NIGHTLY
Status: DISCONTINUED | OUTPATIENT
Start: 2021-01-01 | End: 2021-01-01

## 2021-01-01 RX ORDER — ISOSORBIDE MONONITRATE 30 MG/1
30 TABLET, EXTENDED RELEASE ORAL EVERY MORNING
Status: DISCONTINUED | OUTPATIENT
Start: 2021-01-01 | End: 2021-01-01 | Stop reason: HOSPADM

## 2021-01-01 RX ORDER — IPRATROPIUM BROMIDE AND ALBUTEROL SULFATE 2.5; .5 MG/3ML; MG/3ML
3 SOLUTION RESPIRATORY (INHALATION) ONCE AS NEEDED
Status: DISCONTINUED | OUTPATIENT
Start: 2021-01-01 | End: 2021-01-01 | Stop reason: HOSPADM

## 2021-01-01 RX ORDER — PANTOPRAZOLE SODIUM 40 MG/1
40 TABLET, DELAYED RELEASE ORAL DAILY
Status: CANCELLED | OUTPATIENT
Start: 2021-01-01

## 2021-01-01 RX ORDER — PANTOPRAZOLE SODIUM 40 MG/1
40 TABLET, DELAYED RELEASE ORAL DAILY
Qty: 30 TABLET | Refills: 2 | Status: SHIPPED | OUTPATIENT
Start: 2021-01-01

## 2021-01-01 RX ORDER — SIMETHICONE 80 MG
80 TABLET,CHEWABLE ORAL
COMMUNITY
End: 2021-01-01

## 2021-01-01 RX ORDER — DEXAMETHASONE SODIUM PHOSPHATE 4 MG/ML
4 INJECTION, SOLUTION INTRA-ARTICULAR; INTRALESIONAL; INTRAMUSCULAR; INTRAVENOUS; SOFT TISSUE EVERY 6 HOURS
Start: 2021-01-01 | End: 2021-01-01

## 2021-01-01 RX ORDER — METHOCARBAMOL 500 MG/1
500 TABLET, FILM COATED ORAL 3 TIMES DAILY PRN
Status: CANCELLED | OUTPATIENT
Start: 2021-01-01

## 2021-01-01 RX ORDER — ISOSORBIDE MONONITRATE 30 MG/1
30 TABLET, EXTENDED RELEASE ORAL DAILY
Status: DISCONTINUED | OUTPATIENT
Start: 2021-01-01 | End: 2021-01-01 | Stop reason: HOSPADM

## 2021-01-01 RX ORDER — HYDROCODONE BITARTRATE AND ACETAMINOPHEN 5; 325 MG/1; MG/1
1 TABLET ORAL EVERY 8 HOURS PRN
Status: DISCONTINUED | OUTPATIENT
Start: 2021-01-01 | End: 2021-01-01 | Stop reason: HOSPADM

## 2021-01-01 RX ORDER — CEFDINIR 300 MG/1
300 CAPSULE ORAL 2 TIMES DAILY
Qty: 10 CAPSULE | Refills: 0 | Status: SHIPPED | OUTPATIENT
Start: 2021-01-01 | End: 2021-01-01

## 2021-01-01 RX ORDER — HYDROCODONE BITARTRATE AND ACETAMINOPHEN 5; 325 MG/1; MG/1
2 TABLET ORAL EVERY 6 HOURS PRN
Start: 2021-01-01 | End: 2021-01-01

## 2021-01-01 RX ORDER — PANTOPRAZOLE SODIUM 40 MG/1
40 TABLET, DELAYED RELEASE ORAL
Status: DISCONTINUED | OUTPATIENT
Start: 2021-01-01 | End: 2021-01-01 | Stop reason: HOSPADM

## 2021-01-01 RX ORDER — PROMETHAZINE HYDROCHLORIDE 12.5 MG/1
6.25 TABLET ORAL EVERY 6 HOURS PRN
Status: DISCONTINUED | OUTPATIENT
Start: 2021-01-01 | End: 2021-01-01 | Stop reason: HOSPADM

## 2021-01-01 RX ORDER — MIDAZOLAM HYDROCHLORIDE 1 MG/ML
1 INJECTION INTRAMUSCULAR; INTRAVENOUS
Status: DISCONTINUED | OUTPATIENT
Start: 2021-01-01 | End: 2021-01-01 | Stop reason: HOSPADM

## 2021-01-01 RX ORDER — ISOSORBIDE MONONITRATE 30 MG/1
30 TABLET, EXTENDED RELEASE ORAL DAILY
Status: CANCELLED | OUTPATIENT
Start: 2021-01-01

## 2021-01-01 RX ORDER — METOPROLOL TARTRATE 5 MG/5ML
5 INJECTION INTRAVENOUS EVERY 6 HOURS PRN
Status: DISCONTINUED | OUTPATIENT
Start: 2021-01-01 | End: 2021-01-01 | Stop reason: HOSPADM

## 2021-01-01 RX ORDER — DROPERIDOL 2.5 MG/ML
0.62 INJECTION, SOLUTION INTRAMUSCULAR; INTRAVENOUS ONCE AS NEEDED
Status: DISCONTINUED | OUTPATIENT
Start: 2021-01-01 | End: 2021-01-01 | Stop reason: HOSPADM

## 2021-01-01 RX ORDER — FENTANYL CITRATE 50 UG/ML
INJECTION, SOLUTION INTRAMUSCULAR; INTRAVENOUS AS NEEDED
Status: DISCONTINUED | OUTPATIENT
Start: 2021-01-01 | End: 2021-01-01 | Stop reason: SURG

## 2021-01-01 RX ORDER — ALBUMIN (HUMAN) 12.5 G/50ML
25 SOLUTION INTRAVENOUS
Status: COMPLETED | OUTPATIENT
Start: 2021-01-01 | End: 2021-01-01

## 2021-01-01 RX ORDER — DEXTROSE MONOHYDRATE 25 G/50ML
25 INJECTION, SOLUTION INTRAVENOUS
Status: DISCONTINUED | OUTPATIENT
Start: 2021-01-01 | End: 2021-01-01 | Stop reason: HOSPADM

## 2021-01-01 RX ORDER — CALCIUM ACETATE 667 MG/1
667 CAPSULE ORAL
Status: CANCELLED | OUTPATIENT
Start: 2021-01-01

## 2021-01-01 RX ORDER — DEXAMETHASONE 4 MG/1
6 TABLET ORAL
Status: DISCONTINUED | OUTPATIENT
Start: 2021-01-01 | End: 2021-01-01

## 2021-01-01 RX ORDER — METHOCARBAMOL 500 MG/1
500 TABLET, FILM COATED ORAL 3 TIMES DAILY PRN
COMMUNITY
End: 2021-01-01

## 2021-01-01 RX ORDER — LIDOCAINE 50 MG/G
1 PATCH TOPICAL EVERY 24 HOURS
COMMUNITY
End: 2021-01-01 | Stop reason: SDUPTHER

## 2021-01-01 RX ORDER — AMOXICILLIN 250 MG
2 CAPSULE ORAL 2 TIMES DAILY
Status: DISCONTINUED | OUTPATIENT
Start: 2021-01-01 | End: 2021-01-01 | Stop reason: HOSPADM

## 2021-01-01 RX ORDER — ALBUMIN (HUMAN) 12.5 G/50ML
25 SOLUTION INTRAVENOUS
Status: DISCONTINUED | OUTPATIENT
Start: 2021-01-01 | End: 2021-01-01 | Stop reason: HOSPADM

## 2021-01-01 RX ORDER — DEXTROSE MONOHYDRATE 25 G/50ML
INJECTION, SOLUTION INTRAVENOUS
Status: COMPLETED
Start: 2021-01-01 | End: 2021-01-01

## 2021-01-01 RX ORDER — HYDROCODONE BITARTRATE AND ACETAMINOPHEN 5; 325 MG/1; MG/1
1 TABLET ORAL EVERY 6 HOURS PRN
Status: DISCONTINUED | OUTPATIENT
Start: 2021-01-01 | End: 2021-01-01

## 2021-01-01 RX ORDER — ASPIRIN 81 MG/1
324 TABLET, CHEWABLE ORAL ONCE
Status: DISCONTINUED | OUTPATIENT
Start: 2021-01-01 | End: 2021-01-01

## 2021-01-01 RX ORDER — CALCIUM ACETATE 667 MG/1
667 CAPSULE ORAL
Status: DISCONTINUED | OUTPATIENT
Start: 2021-01-01 | End: 2021-01-01 | Stop reason: HOSPADM

## 2021-01-01 RX ORDER — SODIUM CHLORIDE, SODIUM LACTATE, POTASSIUM CHLORIDE, CALCIUM CHLORIDE 600; 310; 30; 20 MG/100ML; MG/100ML; MG/100ML; MG/100ML
125 INJECTION, SOLUTION INTRAVENOUS ONCE
Status: DISCONTINUED | OUTPATIENT
Start: 2021-01-01 | End: 2021-01-01 | Stop reason: HOSPADM

## 2021-01-01 RX ORDER — HYDRALAZINE HYDROCHLORIDE 10 MG/1
10 TABLET, FILM COATED ORAL 3 TIMES DAILY
Status: DISCONTINUED | OUTPATIENT
Start: 2021-01-01 | End: 2021-01-01 | Stop reason: HOSPADM

## 2021-01-01 RX ORDER — CALCIUM GLUCONATE 20 MG/ML
1 INJECTION, SOLUTION INTRAVENOUS ONCE
Status: DISCONTINUED | OUTPATIENT
Start: 2021-01-01 | End: 2021-01-01

## 2021-01-01 RX ORDER — HEPARIN SODIUM 10000 [USP'U]/100ML
12 INJECTION, SOLUTION INTRAVENOUS
Status: DISCONTINUED | OUTPATIENT
Start: 2021-01-01 | End: 2021-01-01

## 2021-01-01 RX ORDER — DEXTROSE MONOHYDRATE 25 G/50ML
50 INJECTION, SOLUTION INTRAVENOUS ONCE
Status: DISCONTINUED | OUTPATIENT
Start: 2021-01-01 | End: 2021-01-01

## 2021-01-01 RX ORDER — ALBUMIN (HUMAN) 12.5 G/50ML
12.5 SOLUTION INTRAVENOUS AS NEEDED
Status: DISCONTINUED | OUTPATIENT
Start: 2021-01-01 | End: 2021-01-01 | Stop reason: HOSPADM

## 2021-01-01 RX ORDER — IBUPROFEN 400 MG/1
400 TABLET ORAL ONCE
Status: COMPLETED | OUTPATIENT
Start: 2021-01-01 | End: 2021-01-01

## 2021-01-01 RX ORDER — SODIUM POLYSTYRENE SULFONATE 4.1 MEQ/G
30 POWDER, FOR SUSPENSION ORAL; RECTAL ONCE
Status: COMPLETED | OUTPATIENT
Start: 2021-01-01 | End: 2021-01-01

## 2021-01-01 RX ORDER — CARVEDILOL 6.25 MG/1
6.25 TABLET ORAL 2 TIMES DAILY WITH MEALS
Status: DISCONTINUED | OUTPATIENT
Start: 2021-01-01 | End: 2021-01-01 | Stop reason: HOSPADM

## 2021-01-01 RX ORDER — BUPRENORPHINE HYDROCHLORIDE AND NALOXONE HYDROCHLORIDE DIHYDRATE 8; 2 MG/1; MG/1
2 TABLET SUBLINGUAL DAILY
COMMUNITY
End: 2021-01-01

## 2021-01-01 RX ORDER — CALCIUM ACETATE 667 MG/1
667 CAPSULE ORAL
COMMUNITY
End: 2021-01-01

## 2021-01-01 RX ORDER — HYDROCODONE BITARTRATE AND ACETAMINOPHEN 5; 325 MG/1; MG/1
1 TABLET ORAL ONCE AS NEEDED
Status: COMPLETED | OUTPATIENT
Start: 2021-01-01 | End: 2021-01-01

## 2021-01-01 RX ORDER — MIDAZOLAM HYDROCHLORIDE 1 MG/ML
2 INJECTION INTRAMUSCULAR; INTRAVENOUS
Status: DISCONTINUED | OUTPATIENT
Start: 2021-01-01 | End: 2021-01-01 | Stop reason: HOSPADM

## 2021-01-01 RX ORDER — HYDRALAZINE HYDROCHLORIDE 20 MG/ML
10 INJECTION INTRAMUSCULAR; INTRAVENOUS EVERY 6 HOURS PRN
Status: DISCONTINUED | OUTPATIENT
Start: 2021-01-01 | End: 2021-01-01 | Stop reason: HOSPADM

## 2021-01-01 RX ORDER — LIDOCAINE HYDROCHLORIDE AND EPINEPHRINE 10; 10 MG/ML; UG/ML
20 INJECTION, SOLUTION INFILTRATION; PERINEURAL ONCE
Status: COMPLETED | OUTPATIENT
Start: 2021-01-01 | End: 2021-01-01

## 2021-01-01 RX ORDER — LIDOCAINE 50 MG/G
1 PATCH TOPICAL EVERY 24 HOURS
Qty: 30 EACH | Refills: 2 | Status: SHIPPED | OUTPATIENT
Start: 2021-01-01

## 2021-01-01 RX ORDER — SODIUM CHLORIDE 0.9 % (FLUSH) 0.9 %
3-10 SYRINGE (ML) INJECTION AS NEEDED
Status: DISCONTINUED | OUTPATIENT
Start: 2021-01-01 | End: 2021-01-01 | Stop reason: HOSPADM

## 2021-01-01 RX ORDER — BUPRENORPHINE HYDROCHLORIDE AND NALOXONE HYDROCHLORIDE DIHYDRATE 8; 2 MG/1; MG/1
1 TABLET SUBLINGUAL DAILY
Status: DISCONTINUED | OUTPATIENT
Start: 2021-01-01 | End: 2021-01-01

## 2021-01-01 RX ORDER — ACETAMINOPHEN 500 MG
1000 TABLET ORAL EVERY 8 HOURS PRN
Status: DISCONTINUED | OUTPATIENT
Start: 2021-01-01 | End: 2021-01-01 | Stop reason: HOSPADM

## 2021-01-01 RX ORDER — HEPARIN SODIUM 5000 [USP'U]/ML
60 INJECTION, SOLUTION INTRAVENOUS; SUBCUTANEOUS ONCE
Status: COMPLETED | OUTPATIENT
Start: 2021-01-01 | End: 2021-01-01

## 2021-01-01 RX ORDER — PROMETHAZINE HYDROCHLORIDE 12.5 MG/1
6.25 SUPPOSITORY RECTAL EVERY 6 HOURS PRN
Status: DISCONTINUED | OUTPATIENT
Start: 2021-01-01 | End: 2021-01-01 | Stop reason: HOSPADM

## 2021-01-01 RX ORDER — AMLODIPINE BESYLATE 5 MG/1
5 TABLET ORAL DAILY
COMMUNITY
End: 2021-01-01 | Stop reason: SDUPTHER

## 2021-01-01 RX ORDER — LACTULOSE 10 G/15ML
30 SOLUTION ORAL ONCE
Status: DISCONTINUED | OUTPATIENT
Start: 2021-01-01 | End: 2021-01-01 | Stop reason: HOSPADM

## 2021-01-01 RX ORDER — CARVEDILOL 12.5 MG/1
12.5 TABLET ORAL 2 TIMES DAILY WITH MEALS
COMMUNITY
End: 2021-01-01 | Stop reason: HOSPADM

## 2021-01-01 RX ORDER — DEXAMETHASONE SODIUM PHOSPHATE 4 MG/ML
4 INJECTION, SOLUTION INTRA-ARTICULAR; INTRALESIONAL; INTRAMUSCULAR; INTRAVENOUS; SOFT TISSUE EVERY 6 HOURS
Status: DISCONTINUED | OUTPATIENT
Start: 2021-01-01 | End: 2021-01-01 | Stop reason: HOSPADM

## 2021-01-01 RX ORDER — L.ACID,PARA/B.BIFIDUM/S.THERM 8B CELL
1 CAPSULE ORAL DAILY
Start: 2021-01-01 | End: 2021-01-01

## 2021-01-01 RX ORDER — PANTOPRAZOLE SODIUM 40 MG/1
40 TABLET, DELAYED RELEASE ORAL DAILY
Status: DISCONTINUED | OUTPATIENT
Start: 2021-01-01 | End: 2021-01-01

## 2021-01-01 RX ORDER — HYDROCODONE BITARTRATE AND ACETAMINOPHEN 5; 325 MG/1; MG/1
1 TABLET ORAL ONCE
Status: COMPLETED | OUTPATIENT
Start: 2021-01-01 | End: 2021-01-01

## 2021-01-01 RX ORDER — FENTANYL CITRATE 50 UG/ML
50 INJECTION, SOLUTION INTRAMUSCULAR; INTRAVENOUS
Status: DISCONTINUED | OUTPATIENT
Start: 2021-01-01 | End: 2021-01-01 | Stop reason: HOSPADM

## 2021-01-01 RX ORDER — PROCHLORPERAZINE EDISYLATE 5 MG/ML
5 INJECTION INTRAMUSCULAR; INTRAVENOUS EVERY 6 HOURS PRN
Status: DISCONTINUED | OUTPATIENT
Start: 2021-01-01 | End: 2021-01-01 | Stop reason: HOSPADM

## 2021-01-01 RX ORDER — METHOCARBAMOL 500 MG/1
500 TABLET, FILM COATED ORAL 3 TIMES DAILY PRN
Qty: 60 TABLET | Refills: 0 | Status: SHIPPED | OUTPATIENT
Start: 2021-01-01 | End: 2021-01-01 | Stop reason: HOSPADM

## 2021-01-01 RX ORDER — CYCLOBENZAPRINE HCL 10 MG
5 TABLET ORAL 3 TIMES DAILY PRN
Status: DISCONTINUED | OUTPATIENT
Start: 2021-01-01 | End: 2021-01-01 | Stop reason: HOSPADM

## 2021-01-01 RX ORDER — HEPARIN SODIUM 5000 [USP'U]/ML
30 INJECTION, SOLUTION INTRAVENOUS; SUBCUTANEOUS AS NEEDED
Status: DISCONTINUED | OUTPATIENT
Start: 2021-01-01 | End: 2021-01-01

## 2021-01-01 RX ORDER — ASPIRIN 81 MG/1
81 TABLET ORAL DAILY
Status: DISCONTINUED | OUTPATIENT
Start: 2021-01-01 | End: 2021-01-01 | Stop reason: HOSPADM

## 2021-01-01 RX ORDER — BUPIVACAINE HYDROCHLORIDE AND EPINEPHRINE 5; 5 MG/ML; UG/ML
INJECTION, SOLUTION EPIDURAL; INTRACAUDAL; PERINEURAL AS NEEDED
Status: DISCONTINUED | OUTPATIENT
Start: 2021-01-01 | End: 2021-01-01 | Stop reason: HOSPADM

## 2021-01-01 RX ORDER — HEPARIN SODIUM 5000 [USP'U]/ML
60 INJECTION, SOLUTION INTRAVENOUS; SUBCUTANEOUS AS NEEDED
Status: DISCONTINUED | OUTPATIENT
Start: 2021-01-01 | End: 2021-01-01

## 2021-01-01 RX ORDER — MORPHINE SULFATE 2 MG/ML
2 INJECTION, SOLUTION INTRAMUSCULAR; INTRAVENOUS
Status: DISCONTINUED | OUTPATIENT
Start: 2021-01-01 | End: 2021-01-01

## 2021-01-01 RX ORDER — LIDOCAINE 50 MG/G
1 PATCH TOPICAL EVERY 24 HOURS
COMMUNITY
End: 2021-01-01

## 2021-01-01 RX ORDER — L.ACID,PARA/B.BIFIDUM/S.THERM 8B CELL
1 CAPSULE ORAL DAILY
Status: DISCONTINUED | OUTPATIENT
Start: 2021-01-01 | End: 2021-01-01 | Stop reason: HOSPADM

## 2021-01-01 RX ORDER — SODIUM POLYSTYRENE SULFONATE 4.1 MEQ/G
30 POWDER, FOR SUSPENSION ORAL; RECTAL ONCE
Status: DISCONTINUED | OUTPATIENT
Start: 2021-01-01 | End: 2021-01-01

## 2021-01-01 RX ORDER — CYCLOBENZAPRINE HCL 10 MG
10 TABLET ORAL ONCE
Status: COMPLETED | OUTPATIENT
Start: 2021-01-01 | End: 2021-01-01

## 2021-01-01 RX ORDER — PANTOPRAZOLE SODIUM 40 MG/1
40 TABLET, DELAYED RELEASE ORAL DAILY
Qty: 30 TABLET | Refills: 5 | Status: SHIPPED | OUTPATIENT
Start: 2021-01-01 | End: 2021-01-01

## 2021-01-01 RX ORDER — SODIUM CHLORIDE 9 MG/ML
INJECTION, SOLUTION INTRAVENOUS CONTINUOUS PRN
Status: DISCONTINUED | OUTPATIENT
Start: 2021-01-01 | End: 2021-01-01 | Stop reason: SURG

## 2021-01-01 RX ORDER — PROCHLORPERAZINE EDISYLATE 5 MG/ML
10 INJECTION INTRAMUSCULAR; INTRAVENOUS EVERY 4 HOURS PRN
Status: DISCONTINUED | OUTPATIENT
Start: 2021-01-01 | End: 2021-01-01 | Stop reason: HOSPADM

## 2021-01-01 RX ORDER — SODIUM POLYSTYRENE SULFONATE 15 G/60ML
30 SUSPENSION ORAL; RECTAL ONCE
Status: DISCONTINUED | OUTPATIENT
Start: 2021-01-01 | End: 2021-01-01

## 2021-01-01 RX ORDER — TRAMADOL HYDROCHLORIDE 50 MG/1
50 TABLET ORAL ONCE
Status: COMPLETED | OUTPATIENT
Start: 2021-01-01 | End: 2021-01-01

## 2021-01-01 RX ORDER — LIDOCAINE 50 MG/G
1 PATCH TOPICAL EVERY 24 HOURS
Status: DISCONTINUED | OUTPATIENT
Start: 2021-01-01 | End: 2021-01-01 | Stop reason: SDUPTHER

## 2021-01-01 RX ORDER — LIDOCAINE 50 MG/G
1 PATCH TOPICAL
Start: 2021-01-01 | End: 2021-01-01

## 2021-01-01 RX ORDER — ACETAMINOPHEN 650 MG/1
650 SUPPOSITORY RECTAL ONCE
Status: COMPLETED | OUTPATIENT
Start: 2021-01-01 | End: 2021-01-01

## 2021-01-01 RX ORDER — METHYLPREDNISOLONE SODIUM SUCCINATE 125 MG/2ML
125 INJECTION, POWDER, LYOPHILIZED, FOR SOLUTION INTRAMUSCULAR; INTRAVENOUS ONCE
Status: COMPLETED | OUTPATIENT
Start: 2021-01-01 | End: 2021-01-01

## 2021-01-01 RX ORDER — CARVEDILOL 12.5 MG/1
12.5 TABLET ORAL 2 TIMES DAILY WITH MEALS
COMMUNITY
End: 2021-01-01

## 2021-01-01 RX ORDER — ALBUMIN (HUMAN) 12.5 G/50ML
12.5 SOLUTION INTRAVENOUS AS NEEDED
Status: DISPENSED | OUTPATIENT
Start: 2021-01-01 | End: 2021-01-01

## 2021-01-01 RX ORDER — AMLODIPINE BESYLATE 5 MG/1
5 TABLET ORAL DAILY
Qty: 30 TABLET | Refills: 2 | Status: SHIPPED | OUTPATIENT
Start: 2021-01-01

## 2021-01-01 RX ORDER — PROMETHAZINE HYDROCHLORIDE 25 MG/1
25 TABLET ORAL EVERY 4 HOURS PRN
Status: DISCONTINUED | OUTPATIENT
Start: 2021-01-01 | End: 2021-01-01

## 2021-01-01 RX ORDER — OXYCODONE HYDROCHLORIDE AND ACETAMINOPHEN 5; 325 MG/1; MG/1
1 TABLET ORAL ONCE AS NEEDED
Status: DISCONTINUED | OUTPATIENT
Start: 2021-01-01 | End: 2021-01-01 | Stop reason: HOSPADM

## 2021-01-01 RX ORDER — LIDOCAINE 50 MG/G
1 PATCH TOPICAL
Status: DISCONTINUED | OUTPATIENT
Start: 2021-01-01 | End: 2021-01-01

## 2021-01-01 RX ORDER — DEXTROSE MONOHYDRATE 25 G/50ML
25 INJECTION, SOLUTION INTRAVENOUS
Start: 2021-01-01 | End: 2021-01-01

## 2021-01-01 RX ORDER — MAGNESIUM SULFATE HEPTAHYDRATE 40 MG/ML
2 INJECTION, SOLUTION INTRAVENOUS
Status: COMPLETED | OUTPATIENT
Start: 2021-01-01 | End: 2021-01-01

## 2021-01-01 RX ORDER — LEVOFLOXACIN 500 MG/1
500 TABLET, FILM COATED ORAL EVERY OTHER DAY
Qty: 1 TABLET | Refills: 0
Start: 2021-01-01 | End: 2021-01-01

## 2021-01-01 RX ORDER — ONDANSETRON 2 MG/ML
4 INJECTION INTRAMUSCULAR; INTRAVENOUS EVERY 6 HOURS PRN
Status: DISCONTINUED | OUTPATIENT
Start: 2021-01-01 | End: 2021-01-01 | Stop reason: HOSPADM

## 2021-01-01 RX ADMIN — DOCUSATE SODIUM 50 MG AND SENNOSIDES 8.6 MG 2 TABLET: 8.6; 5 TABLET, FILM COATED ORAL at 08:27

## 2021-01-01 RX ADMIN — MORPHINE SULFATE 4 MG: 4 INJECTION, SOLUTION INTRAMUSCULAR; INTRAVENOUS at 11:02

## 2021-01-01 RX ADMIN — APIXABAN 5 MG: 5 TABLET, FILM COATED ORAL at 20:30

## 2021-01-01 RX ADMIN — CEFTRIAXONE 2 G: 2 INJECTION, POWDER, FOR SOLUTION INTRAMUSCULAR; INTRAVENOUS at 11:12

## 2021-01-01 RX ADMIN — MORPHINE SULFATE 4 MG: 4 INJECTION, SOLUTION INTRAMUSCULAR; INTRAVENOUS at 13:59

## 2021-01-01 RX ADMIN — HEPARIN SODIUM 19 UNITS/KG/HR: 10000 INJECTION, SOLUTION INTRAVENOUS at 04:01

## 2021-01-01 RX ADMIN — ALBUTEROL SULFATE 2.5 MG: 2.5 SOLUTION RESPIRATORY (INHALATION) at 22:41

## 2021-01-01 RX ADMIN — HEPARIN SODIUM 19 UNITS/KG/HR: 10000 INJECTION, SOLUTION INTRAVENOUS at 16:46

## 2021-01-01 RX ADMIN — HEPARIN SODIUM 4100 UNITS: 5000 INJECTION INTRAVENOUS; SUBCUTANEOUS at 21:59

## 2021-01-01 RX ADMIN — PROMETHAZINE HYDROCHLORIDE 6.25 MG: 25 TABLET ORAL at 10:21

## 2021-01-01 RX ADMIN — HEPARIN SODIUM 5000 UNITS: 5000 INJECTION INTRAVENOUS; SUBCUTANEOUS at 17:53

## 2021-01-01 RX ADMIN — HYDRALAZINE HYDROCHLORIDE 10 MG: 10 TABLET, FILM COATED ORAL at 13:48

## 2021-01-01 RX ADMIN — SODIUM CHLORIDE, PRESERVATIVE FREE 10 ML: 5 INJECTION INTRAVENOUS at 20:21

## 2021-01-01 RX ADMIN — CARVEDILOL 12.5 MG: 6.25 TABLET, FILM COATED ORAL at 08:48

## 2021-01-01 RX ADMIN — DEXAMETHASONE 6 MG: 4 TABLET ORAL at 08:38

## 2021-01-01 RX ADMIN — CEFTRIAXONE 2 G: 2 INJECTION, POWDER, FOR SOLUTION INTRAMUSCULAR; INTRAVENOUS at 12:07

## 2021-01-01 RX ADMIN — PANTOPRAZOLE SODIUM 40 MG: 40 TABLET, DELAYED RELEASE ORAL at 04:21

## 2021-01-01 RX ADMIN — MUPIROCIN: 20 OINTMENT TOPICAL at 21:44

## 2021-01-01 RX ADMIN — NICOTINE 1 PATCH: 14 PATCH, EXTENDED RELEASE TRANSDERMAL at 08:19

## 2021-01-01 RX ADMIN — CARVEDILOL 12.5 MG: 6.25 TABLET, FILM COATED ORAL at 17:43

## 2021-01-01 RX ADMIN — PANTOPRAZOLE SODIUM 40 MG: 40 TABLET, DELAYED RELEASE ORAL at 21:42

## 2021-01-01 RX ADMIN — CALCIUM ACETATE 667 MG: 667 CAPSULE ORAL at 12:04

## 2021-01-01 RX ADMIN — CALCIUM ACETATE 667 MG: 667 CAPSULE ORAL at 16:46

## 2021-01-01 RX ADMIN — ONDANSETRON 4 MG: 2 INJECTION INTRAMUSCULAR; INTRAVENOUS at 21:36

## 2021-01-01 RX ADMIN — SODIUM CHLORIDE, PRESERVATIVE FREE 10 ML: 5 INJECTION INTRAVENOUS at 08:06

## 2021-01-01 RX ADMIN — HEPARIN SODIUM 5000 UNITS: 5000 INJECTION INTRAVENOUS; SUBCUTANEOUS at 14:56

## 2021-01-01 RX ADMIN — HYDROCODONE BITARTRATE AND ACETAMINOPHEN 2 TABLET: 5; 325 TABLET ORAL at 11:05

## 2021-01-01 RX ADMIN — LIDOCAINE 1 PATCH: 50 PATCH CUTANEOUS at 08:18

## 2021-01-01 RX ADMIN — ATORVASTATIN CALCIUM 40 MG: 40 TABLET, FILM COATED ORAL at 20:21

## 2021-01-01 RX ADMIN — HEPARIN SODIUM 20 UNITS/KG/HR: 10000 INJECTION, SOLUTION INTRAVENOUS at 23:16

## 2021-01-01 RX ADMIN — DOCUSATE SODIUM 50 MG AND SENNOSIDES 8.6 MG 2 TABLET: 8.6; 5 TABLET, FILM COATED ORAL at 08:48

## 2021-01-01 RX ADMIN — MORPHINE SULFATE 2 MG: 2 INJECTION, SOLUTION INTRAMUSCULAR; INTRAVENOUS at 11:36

## 2021-01-01 RX ADMIN — HEPARIN SODIUM 5000 UNITS: 5000 INJECTION INTRAVENOUS; SUBCUTANEOUS at 06:47

## 2021-01-01 RX ADMIN — SODIUM CHLORIDE, PRESERVATIVE FREE 10 ML: 5 INJECTION INTRAVENOUS at 10:50

## 2021-01-01 RX ADMIN — ENOXAPARIN SODIUM 30 MG: 30 INJECTION SUBCUTANEOUS at 20:43

## 2021-01-01 RX ADMIN — Medication 1 CAPSULE: at 11:04

## 2021-01-01 RX ADMIN — Medication 1 CAPSULE: at 08:06

## 2021-01-01 RX ADMIN — LIDOCAINE 1 PATCH: 50 PATCH CUTANEOUS at 23:59

## 2021-01-01 RX ADMIN — SODIUM CHLORIDE, PRESERVATIVE FREE 10 ML: 5 INJECTION INTRAVENOUS at 08:46

## 2021-01-01 RX ADMIN — ACETAMINOPHEN 650 MG: 325 TABLET ORAL at 22:56

## 2021-01-01 RX ADMIN — HYDROCODONE BITARTRATE AND ACETAMINOPHEN 2 TABLET: 5; 325 TABLET ORAL at 08:57

## 2021-01-01 RX ADMIN — ACETAMINOPHEN 650 MG: 325 TABLET ORAL at 03:49

## 2021-01-01 RX ADMIN — CYCLOBENZAPRINE HYDROCHLORIDE 5 MG: 10 TABLET, FILM COATED ORAL at 16:02

## 2021-01-01 RX ADMIN — CARVEDILOL 12.5 MG: 6.25 TABLET, FILM COATED ORAL at 18:08

## 2021-01-01 RX ADMIN — CALCIUM ACETATE 667 MG: 667 CAPSULE ORAL at 17:08

## 2021-01-01 RX ADMIN — CALCIUM ACETATE 667 MG: 667 CAPSULE ORAL at 11:06

## 2021-01-01 RX ADMIN — MORPHINE SULFATE 2 MG: 2 INJECTION, SOLUTION INTRAMUSCULAR; INTRAVENOUS at 03:35

## 2021-01-01 RX ADMIN — CARVEDILOL 12.5 MG: 6.25 TABLET, FILM COATED ORAL at 17:04

## 2021-01-01 RX ADMIN — DEXAMETHASONE SODIUM PHOSPHATE 6 MG: 4 INJECTION, SOLUTION INTRAMUSCULAR; INTRAVENOUS at 09:00

## 2021-01-01 RX ADMIN — SODIUM CHLORIDE, PRESERVATIVE FREE 10 ML: 5 INJECTION INTRAVENOUS at 00:02

## 2021-01-01 RX ADMIN — NICOTINE 1 PATCH: 14 PATCH, EXTENDED RELEASE TRANSDERMAL at 08:06

## 2021-01-01 RX ADMIN — ONDANSETRON 4 MG: 2 INJECTION INTRAMUSCULAR; INTRAVENOUS at 19:26

## 2021-01-01 RX ADMIN — HYDROCODONE BITARTRATE AND ACETAMINOPHEN 1 TABLET: 5; 325 TABLET ORAL at 19:15

## 2021-01-01 RX ADMIN — PANTOPRAZOLE SODIUM 40 MG: 40 TABLET, DELAYED RELEASE ORAL at 11:01

## 2021-01-01 RX ADMIN — MORPHINE SULFATE 4 MG: 4 INJECTION, SOLUTION INTRAMUSCULAR; INTRAVENOUS at 20:57

## 2021-01-01 RX ADMIN — MAGNESIUM SULFATE HEPTAHYDRATE 2 G: 40 INJECTION, SOLUTION INTRAVENOUS at 16:38

## 2021-01-01 RX ADMIN — LIDOCAINE 1 PATCH: 50 PATCH CUTANEOUS at 17:52

## 2021-01-01 RX ADMIN — CALCIUM ACETATE 667 MG: 667 CAPSULE ORAL at 11:03

## 2021-01-01 RX ADMIN — HEPARIN SODIUM 5000 UNITS: 5000 INJECTION INTRAVENOUS; SUBCUTANEOUS at 05:08

## 2021-01-01 RX ADMIN — HEPARIN SODIUM 5000 UNITS: 5000 INJECTION INTRAVENOUS; SUBCUTANEOUS at 21:24

## 2021-01-01 RX ADMIN — LACTULOSE 20 G: 10 SOLUTION ORAL at 19:59

## 2021-01-01 RX ADMIN — ACETAMINOPHEN 1000 MG: 500 TABLET ORAL at 14:04

## 2021-01-01 RX ADMIN — CALCIUM ACETATE 667 MG: 667 CAPSULE ORAL at 12:02

## 2021-01-01 RX ADMIN — ASPIRIN 81 MG: 81 TABLET, COATED ORAL at 17:53

## 2021-01-01 RX ADMIN — SODIUM CHLORIDE, PRESERVATIVE FREE 10 ML: 5 INJECTION INTRAVENOUS at 09:34

## 2021-01-01 RX ADMIN — MORPHINE SULFATE 4 MG: 4 INJECTION, SOLUTION INTRAMUSCULAR; INTRAVENOUS at 23:48

## 2021-01-01 RX ADMIN — HYDRALAZINE HYDROCHLORIDE 10 MG: 10 TABLET, FILM COATED ORAL at 04:21

## 2021-01-01 RX ADMIN — Medication 1 CAPSULE: at 09:17

## 2021-01-01 RX ADMIN — SODIUM CHLORIDE 2 G: 900 INJECTION INTRAVENOUS at 20:43

## 2021-01-01 RX ADMIN — AMLODIPINE BESYLATE 5 MG: 5 TABLET ORAL at 22:14

## 2021-01-01 RX ADMIN — CALCIUM ACETATE 667 MG: 667 CAPSULE ORAL at 11:59

## 2021-01-01 RX ADMIN — SODIUM CHLORIDE, PRESERVATIVE FREE 10 ML: 5 INJECTION INTRAVENOUS at 21:58

## 2021-01-01 RX ADMIN — ASPIRIN 81 MG: 81 TABLET, COATED ORAL at 08:41

## 2021-01-01 RX ADMIN — IRON SUCROSE 200 MG: 20 INJECTION, SOLUTION INTRAVENOUS at 16:09

## 2021-01-01 RX ADMIN — CALCIUM ACETATE 667 MG: 667 CAPSULE ORAL at 09:15

## 2021-01-01 RX ADMIN — CALCIUM GLUCONATE 1 G: 20 INJECTION, SOLUTION INTRAVENOUS at 13:50

## 2021-01-01 RX ADMIN — CALCIUM ACETATE 667 MG: 667 CAPSULE ORAL at 21:42

## 2021-01-01 RX ADMIN — ISOSORBIDE MONONITRATE 30 MG: 30 TABLET, EXTENDED RELEASE ORAL at 08:17

## 2021-01-01 RX ADMIN — MORPHINE SULFATE 2 MG: 2 INJECTION, SOLUTION INTRAMUSCULAR; INTRAVENOUS at 18:17

## 2021-01-01 RX ADMIN — CEFTRIAXONE 2 G: 2 INJECTION, POWDER, FOR SOLUTION INTRAMUSCULAR; INTRAVENOUS at 13:04

## 2021-01-01 RX ADMIN — SODIUM CHLORIDE, PRESERVATIVE FREE 10 ML: 5 INJECTION INTRAVENOUS at 08:20

## 2021-01-01 RX ADMIN — CEFTRIAXONE 2 G: 2 INJECTION, POWDER, FOR SOLUTION INTRAMUSCULAR; INTRAVENOUS at 12:39

## 2021-01-01 RX ADMIN — CALCIUM ACETATE 667 MG: 667 CAPSULE ORAL at 07:38

## 2021-01-01 RX ADMIN — CALCIUM GLUCONATE 2 G: 98 INJECTION, SOLUTION INTRAVENOUS at 15:19

## 2021-01-01 RX ADMIN — DOCUSATE SODIUM 50 MG AND SENNOSIDES 8.6 MG 2 TABLET: 8.6; 5 TABLET, FILM COATED ORAL at 20:09

## 2021-01-01 RX ADMIN — IRON SUCROSE 200 MG: 20 INJECTION, SOLUTION INTRAVENOUS at 15:07

## 2021-01-01 RX ADMIN — CALCIUM ACETATE 667 MG: 667 CAPSULE ORAL at 18:26

## 2021-01-01 RX ADMIN — IRON SUCROSE 200 MG: 20 INJECTION, SOLUTION INTRAVENOUS at 14:00

## 2021-01-01 RX ADMIN — DIPHENHYDRAMINE HYDROCHLORIDE 25 MG: 50 INJECTION, SOLUTION INTRAMUSCULAR; INTRAVENOUS at 15:09

## 2021-01-01 RX ADMIN — CLONIDINE HYDROCHLORIDE 0.2 MG: 0.1 TABLET ORAL at 15:41

## 2021-01-01 RX ADMIN — DEXTROSE MONOHYDRATE 25 G: 25 INJECTION, SOLUTION INTRAVENOUS at 23:32

## 2021-01-01 RX ADMIN — HYDROCODONE BITARTRATE AND ACETAMINOPHEN 1 TABLET: 5; 325 TABLET ORAL at 05:01

## 2021-01-01 RX ADMIN — MORPHINE SULFATE 2 MG: 2 INJECTION, SOLUTION INTRAMUSCULAR; INTRAVENOUS at 20:25

## 2021-01-01 RX ADMIN — CARVEDILOL 12.5 MG: 6.25 TABLET, FILM COATED ORAL at 19:14

## 2021-01-01 RX ADMIN — SODIUM CHLORIDE 1000 ML: 9 INJECTION, SOLUTION INTRAVENOUS at 13:30

## 2021-01-01 RX ADMIN — APIXABAN 5 MG: 5 TABLET, FILM COATED ORAL at 19:48

## 2021-01-01 RX ADMIN — METHYLPREDNISOLONE SODIUM SUCCINATE 125 MG: 125 INJECTION, POWDER, FOR SOLUTION INTRAMUSCULAR; INTRAVENOUS at 15:09

## 2021-01-01 RX ADMIN — CARVEDILOL 12.5 MG: 6.25 TABLET, FILM COATED ORAL at 07:23

## 2021-01-01 RX ADMIN — PANTOPRAZOLE SODIUM 40 MG: 40 TABLET, DELAYED RELEASE ORAL at 08:48

## 2021-01-01 RX ADMIN — CALCIUM ACETATE 667 MG: 667 CAPSULE ORAL at 12:07

## 2021-01-01 RX ADMIN — HYDRALAZINE HYDROCHLORIDE 10 MG: 10 TABLET, FILM COATED ORAL at 14:04

## 2021-01-01 RX ADMIN — ATORVASTATIN CALCIUM 40 MG: 40 TABLET, FILM COATED ORAL at 20:25

## 2021-01-01 RX ADMIN — IRON SUCROSE 200 MG: 20 INJECTION, SOLUTION INTRAVENOUS at 16:06

## 2021-01-01 RX ADMIN — ACETAMINOPHEN 650 MG: 325 TABLET ORAL at 10:41

## 2021-01-01 RX ADMIN — AMLODIPINE BESYLATE 5 MG: 5 TABLET ORAL at 20:21

## 2021-01-01 RX ADMIN — HYDROCODONE BITARTRATE AND ACETAMINOPHEN 1 TABLET: 5; 325 TABLET ORAL at 06:24

## 2021-01-01 RX ADMIN — CYCLOBENZAPRINE HYDROCHLORIDE 5 MG: 10 TABLET, FILM COATED ORAL at 01:04

## 2021-01-01 RX ADMIN — HEPARIN SODIUM 5000 UNITS: 5000 INJECTION INTRAVENOUS; SUBCUTANEOUS at 15:03

## 2021-01-01 RX ADMIN — ISOSORBIDE MONONITRATE 30 MG: 30 TABLET, EXTENDED RELEASE ORAL at 05:01

## 2021-01-01 RX ADMIN — SODIUM CHLORIDE: 9 INJECTION, SOLUTION INTRAVENOUS at 09:19

## 2021-01-01 RX ADMIN — CARVEDILOL 12.5 MG: 6.25 TABLET, FILM COATED ORAL at 11:02

## 2021-01-01 RX ADMIN — CALCIUM GLUCONATE 1 G: 20 INJECTION, SOLUTION INTRAVENOUS at 20:04

## 2021-01-01 RX ADMIN — DOCUSATE SODIUM 50 MG AND SENNOSIDES 8.6 MG 2 TABLET: 8.6; 5 TABLET, FILM COATED ORAL at 21:25

## 2021-01-01 RX ADMIN — HYDROCODONE BITARTRATE AND ACETAMINOPHEN 1 TABLET: 5; 325 TABLET ORAL at 08:06

## 2021-01-01 RX ADMIN — CALCIUM ACETATE 667 MG: 667 CAPSULE ORAL at 17:23

## 2021-01-01 RX ADMIN — ALBUMIN HUMAN 25 G: 0.25 SOLUTION INTRAVENOUS at 22:00

## 2021-01-01 RX ADMIN — EPOETIN ALFA-EPBX 6000 UNITS: 20000 INJECTION, SOLUTION INTRAVENOUS; SUBCUTANEOUS at 22:19

## 2021-01-01 RX ADMIN — LIDOCAINE 1 PATCH: 50 PATCH CUTANEOUS at 11:07

## 2021-01-01 RX ADMIN — ACETAMINOPHEN 650 MG: 325 TABLET ORAL at 11:12

## 2021-01-01 RX ADMIN — APIXABAN 5 MG: 5 TABLET, FILM COATED ORAL at 20:34

## 2021-01-01 RX ADMIN — NICOTINE 1 PATCH: 14 PATCH, EXTENDED RELEASE TRANSDERMAL at 08:42

## 2021-01-01 RX ADMIN — PANTOPRAZOLE SODIUM 40 MG: 40 TABLET, DELAYED RELEASE ORAL at 09:17

## 2021-01-01 RX ADMIN — SODIUM CHLORIDE 500 MG: 9 INJECTION, SOLUTION INTRAVENOUS at 23:40

## 2021-01-01 RX ADMIN — PANTOPRAZOLE SODIUM 40 MG: 40 TABLET, DELAYED RELEASE ORAL at 09:15

## 2021-01-01 RX ADMIN — EPOETIN ALFA-EPBX 10000 UNITS: 10000 INJECTION, SOLUTION INTRAVENOUS; SUBCUTANEOUS at 17:24

## 2021-01-01 RX ADMIN — CARVEDILOL 12.5 MG: 6.25 TABLET, FILM COATED ORAL at 11:03

## 2021-01-01 RX ADMIN — CALCIUM GLUCONATE 1 G: 20 INJECTION, SOLUTION INTRAVENOUS at 22:28

## 2021-01-01 RX ADMIN — CALCIUM ACETATE 667 MG: 667 CAPSULE ORAL at 08:06

## 2021-01-01 RX ADMIN — CALCIUM ACETATE 667 MG: 667 CAPSULE ORAL at 11:07

## 2021-01-01 RX ADMIN — CARVEDILOL 12.5 MG: 6.25 TABLET, FILM COATED ORAL at 17:02

## 2021-01-01 RX ADMIN — SODIUM CHLORIDE, PRESERVATIVE FREE 10 ML: 5 INJECTION INTRAVENOUS at 19:59

## 2021-01-01 RX ADMIN — EPOETIN ALFA-EPBX 10000 UNITS: 10000 INJECTION, SOLUTION INTRAVENOUS; SUBCUTANEOUS at 18:12

## 2021-01-01 RX ADMIN — MORPHINE SULFATE 2 MG: 2 INJECTION, SOLUTION INTRAMUSCULAR; INTRAVENOUS at 18:20

## 2021-01-01 RX ADMIN — CALCIUM ACETATE 667 MG: 667 CAPSULE ORAL at 12:42

## 2021-01-01 RX ADMIN — ACETAMINOPHEN 650 MG: 325 TABLET ORAL at 01:28

## 2021-01-01 RX ADMIN — AMLODIPINE BESYLATE 5 MG: 5 TABLET ORAL at 20:25

## 2021-01-01 RX ADMIN — MORPHINE SULFATE 2 MG: 2 INJECTION, SOLUTION INTRAMUSCULAR; INTRAVENOUS at 04:03

## 2021-01-01 RX ADMIN — HYDROCODONE BITARTRATE AND ACETAMINOPHEN 1 TABLET: 5; 325 TABLET ORAL at 16:31

## 2021-01-01 RX ADMIN — MAGNESIUM SULFATE 4 G: 4 INJECTION INTRAVENOUS at 08:45

## 2021-01-01 RX ADMIN — AMLODIPINE BESYLATE 5 MG: 5 TABLET ORAL at 08:48

## 2021-01-01 RX ADMIN — MORPHINE SULFATE 4 MG: 4 INJECTION, SOLUTION INTRAMUSCULAR; INTRAVENOUS at 14:59

## 2021-01-01 RX ADMIN — PANTOPRAZOLE SODIUM 40 MG: 40 TABLET, DELAYED RELEASE ORAL at 08:57

## 2021-01-01 RX ADMIN — CARVEDILOL 12.5 MG: 6.25 TABLET, FILM COATED ORAL at 08:45

## 2021-01-01 RX ADMIN — NICOTINE 1 PATCH: 14 PATCH, EXTENDED RELEASE TRANSDERMAL at 08:48

## 2021-01-01 RX ADMIN — CEFTRIAXONE 2 G: 2 INJECTION, POWDER, FOR SOLUTION INTRAMUSCULAR; INTRAVENOUS at 11:37

## 2021-01-01 RX ADMIN — LIDOCAINE 1 PATCH: 50 PATCH CUTANEOUS at 11:02

## 2021-01-01 RX ADMIN — CALCIUM ACETATE 667 MG: 667 CAPSULE ORAL at 08:15

## 2021-01-01 RX ADMIN — CALCIUM ACETATE 667 MG: 667 CAPSULE ORAL at 13:57

## 2021-01-01 RX ADMIN — CALCIUM ACETATE 667 MG: 667 CAPSULE ORAL at 12:40

## 2021-01-01 RX ADMIN — SODIUM CHLORIDE 500 MG: 9 INJECTION, SOLUTION INTRAVENOUS at 12:38

## 2021-01-01 RX ADMIN — LIDOCAINE 1 PATCH: 50 PATCH CUTANEOUS at 07:38

## 2021-01-01 RX ADMIN — EPOETIN ALFA-EPBX 6000 UNITS: 20000 INJECTION, SOLUTION INTRAVENOUS; SUBCUTANEOUS at 21:24

## 2021-01-01 RX ADMIN — SODIUM CHLORIDE 1000 ML: 9 INJECTION, SOLUTION INTRAVENOUS at 10:24

## 2021-01-01 RX ADMIN — LACTULOSE 20 G: 10 SOLUTION ORAL at 16:53

## 2021-01-01 RX ADMIN — CARVEDILOL 12.5 MG: 6.25 TABLET, FILM COATED ORAL at 08:38

## 2021-01-01 RX ADMIN — APIXABAN 5 MG: 5 TABLET, FILM COATED ORAL at 08:41

## 2021-01-01 RX ADMIN — ACETAMINOPHEN 1000 MG: 500 TABLET ORAL at 13:22

## 2021-01-01 RX ADMIN — HEPARIN SODIUM 4100 UNITS: 5000 INJECTION INTRAVENOUS; SUBCUTANEOUS at 06:17

## 2021-01-01 RX ADMIN — ISOSORBIDE MONONITRATE 30 MG: 30 TABLET, EXTENDED RELEASE ORAL at 19:56

## 2021-01-01 RX ADMIN — HYDROCODONE BITARTRATE AND ACETAMINOPHEN 1 TABLET: 5; 325 TABLET ORAL at 12:42

## 2021-01-01 RX ADMIN — MORPHINE SULFATE 2 MG: 2 INJECTION, SOLUTION INTRAMUSCULAR; INTRAVENOUS at 08:27

## 2021-01-01 RX ADMIN — METRONIDAZOLE 500 MG: 500 INJECTION, SOLUTION INTRAVENOUS at 23:14

## 2021-01-01 RX ADMIN — ALBUTEROL SULFATE 2.5 MG: 2.5 SOLUTION RESPIRATORY (INHALATION) at 16:28

## 2021-01-01 RX ADMIN — LIDOCAINE 1 PATCH: 50 PATCH CUTANEOUS at 17:33

## 2021-01-01 RX ADMIN — CALCIUM ACETATE 667 MG: 667 CAPSULE ORAL at 11:38

## 2021-01-01 RX ADMIN — CARVEDILOL 12.5 MG: 6.25 TABLET, FILM COATED ORAL at 22:50

## 2021-01-01 RX ADMIN — HYDROCODONE BITARTRATE AND ACETAMINOPHEN 2 TABLET: 5; 325 TABLET ORAL at 21:36

## 2021-01-01 RX ADMIN — ACETAMINOPHEN 650 MG: 325 TABLET ORAL at 18:47

## 2021-01-01 RX ADMIN — HYDROCODONE BITARTRATE AND ACETAMINOPHEN 1 TABLET: 5; 325 TABLET ORAL at 08:45

## 2021-01-01 RX ADMIN — CARVEDILOL 12.5 MG: 6.25 TABLET, FILM COATED ORAL at 18:12

## 2021-01-01 RX ADMIN — EPOETIN ALFA-EPBX 10000 UNITS: 10000 INJECTION, SOLUTION INTRAVENOUS; SUBCUTANEOUS at 19:17

## 2021-01-01 RX ADMIN — CALCIUM ACETATE 667 MG: 667 CAPSULE ORAL at 16:57

## 2021-01-01 RX ADMIN — HEPARIN SODIUM 16 UNITS/KG/HR: 10000 INJECTION, SOLUTION INTRAVENOUS at 01:41

## 2021-01-01 RX ADMIN — SODIUM CHLORIDE 1000 ML: 9 INJECTION, SOLUTION INTRAVENOUS at 20:38

## 2021-01-01 RX ADMIN — HUMAN INSULIN 10 UNITS: 100 INJECTION, SOLUTION SUBCUTANEOUS at 20:01

## 2021-01-01 RX ADMIN — CARVEDILOL 6.25 MG: 6.25 TABLET, FILM COATED ORAL at 06:17

## 2021-01-01 RX ADMIN — METRONIDAZOLE 500 MG: 500 INJECTION, SOLUTION INTRAVENOUS at 06:13

## 2021-01-01 RX ADMIN — LIDOCAINE 1 PATCH: 50 PATCH CUTANEOUS at 08:19

## 2021-01-01 RX ADMIN — MORPHINE SULFATE 4 MG: 4 INJECTION, SOLUTION INTRAMUSCULAR; INTRAVENOUS at 12:04

## 2021-01-01 RX ADMIN — METRONIDAZOLE 500 MG: 500 INJECTION, SOLUTION INTRAVENOUS at 15:08

## 2021-01-01 RX ADMIN — CARVEDILOL 12.5 MG: 6.25 TABLET, FILM COATED ORAL at 08:41

## 2021-01-01 RX ADMIN — HYDRALAZINE HYDROCHLORIDE 10 MG: 10 TABLET, FILM COATED ORAL at 21:00

## 2021-01-01 RX ADMIN — SODIUM CHLORIDE 500 ML: 9 INJECTION, SOLUTION INTRAVENOUS at 21:35

## 2021-01-01 RX ADMIN — MORPHINE SULFATE 2 MG: 2 INJECTION, SOLUTION INTRAMUSCULAR; INTRAVENOUS at 12:16

## 2021-01-01 RX ADMIN — ONDANSETRON 4 MG: 2 INJECTION INTRAMUSCULAR; INTRAVENOUS at 13:01

## 2021-01-01 RX ADMIN — PANTOPRAZOLE SODIUM 40 MG: 40 TABLET, DELAYED RELEASE ORAL at 08:05

## 2021-01-01 RX ADMIN — CALCIUM ACETATE 667 MG: 667 CAPSULE ORAL at 17:43

## 2021-01-01 RX ADMIN — SODIUM CHLORIDE 1000 ML: 9 INJECTION, SOLUTION INTRAVENOUS at 00:55

## 2021-01-01 RX ADMIN — MORPHINE SULFATE 2 MG: 2 INJECTION, SOLUTION INTRAMUSCULAR; INTRAVENOUS at 17:31

## 2021-01-01 RX ADMIN — MORPHINE SULFATE 2 MG: 2 INJECTION, SOLUTION INTRAMUSCULAR; INTRAVENOUS at 23:33

## 2021-01-01 RX ADMIN — HYDROCODONE BITARTRATE AND ACETAMINOPHEN 1 TABLET: 5; 325 TABLET ORAL at 09:21

## 2021-01-01 RX ADMIN — HEPARIN SODIUM 19 UNITS/KG/HR: 10000 INJECTION, SOLUTION INTRAVENOUS at 16:05

## 2021-01-01 RX ADMIN — SODIUM CHLORIDE 1000 ML: 9 INJECTION, SOLUTION INTRAVENOUS at 08:41

## 2021-01-01 RX ADMIN — MORPHINE SULFATE 4 MG: 4 INJECTION, SOLUTION INTRAMUSCULAR; INTRAVENOUS at 18:18

## 2021-01-01 RX ADMIN — ACETAMINOPHEN 1000 MG: 500 TABLET ORAL at 21:05

## 2021-01-01 RX ADMIN — CALCIUM ACETATE 667 MG: 667 CAPSULE ORAL at 18:10

## 2021-01-01 RX ADMIN — APIXABAN 5 MG: 5 TABLET, FILM COATED ORAL at 21:51

## 2021-01-01 RX ADMIN — PANTOPRAZOLE SODIUM 40 MG: 40 TABLET, DELAYED RELEASE ORAL at 08:27

## 2021-01-01 RX ADMIN — PANTOPRAZOLE SODIUM 40 MG: 40 TABLET, DELAYED RELEASE ORAL at 08:45

## 2021-01-01 RX ADMIN — MORPHINE SULFATE 2 MG: 2 INJECTION, SOLUTION INTRAMUSCULAR; INTRAVENOUS at 02:20

## 2021-01-01 RX ADMIN — CALCIUM ACETATE 667 MG: 667 CAPSULE ORAL at 11:05

## 2021-01-01 RX ADMIN — SODIUM CHLORIDE, PRESERVATIVE FREE 10 ML: 5 INJECTION INTRAVENOUS at 01:07

## 2021-01-01 RX ADMIN — ASPIRIN 81 MG: 81 TABLET, COATED ORAL at 08:06

## 2021-01-01 RX ADMIN — DEXTROSE MONOHYDRATE 25 G: 500 INJECTION PARENTERAL at 13:53

## 2021-01-01 RX ADMIN — SODIUM POLYSTYRENE SULFONATE 30 G: 1 POWDER ORAL; RECTAL at 19:58

## 2021-01-01 RX ADMIN — APIXABAN 5 MG: 5 TABLET, FILM COATED ORAL at 11:23

## 2021-01-01 RX ADMIN — HYDROCODONE BITARTRATE AND ACETAMINOPHEN 2 TABLET: 5; 325 TABLET ORAL at 17:03

## 2021-01-01 RX ADMIN — DEXTROSE MONOHYDRATE 25 G: 25 INJECTION, SOLUTION INTRAVENOUS at 22:27

## 2021-01-01 RX ADMIN — CEFTRIAXONE 2 G: 2 INJECTION, POWDER, FOR SOLUTION INTRAMUSCULAR; INTRAVENOUS at 12:00

## 2021-01-01 RX ADMIN — SODIUM CHLORIDE, PRESERVATIVE FREE 10 ML: 5 INJECTION INTRAVENOUS at 20:04

## 2021-01-01 RX ADMIN — VANCOMYCIN HYDROCHLORIDE 1000 MG: 1 INJECTION, POWDER, LYOPHILIZED, FOR SOLUTION INTRAVENOUS at 13:38

## 2021-01-01 RX ADMIN — CARVEDILOL 6.25 MG: 6.25 TABLET, FILM COATED ORAL at 11:55

## 2021-01-01 RX ADMIN — HEPARIN SODIUM 5000 UNITS: 5000 INJECTION INTRAVENOUS; SUBCUTANEOUS at 08:19

## 2021-01-01 RX ADMIN — SODIUM CHLORIDE, PRESERVATIVE FREE 3 ML: 5 INJECTION INTRAVENOUS at 20:56

## 2021-01-01 RX ADMIN — SODIUM CHLORIDE, PRESERVATIVE FREE 10 ML: 5 INJECTION INTRAVENOUS at 23:00

## 2021-01-01 RX ADMIN — SODIUM CHLORIDE 1000 ML: 9 INJECTION, SOLUTION INTRAVENOUS at 09:52

## 2021-01-01 RX ADMIN — CALCIUM ACETATE 667 MG: 667 CAPSULE ORAL at 08:45

## 2021-01-01 RX ADMIN — Medication 1 CAPSULE: at 18:26

## 2021-01-01 RX ADMIN — SODIUM CHLORIDE, PRESERVATIVE FREE 10 ML: 5 INJECTION INTRAVENOUS at 21:06

## 2021-01-01 RX ADMIN — CEFEPIME 2 G: 2 INJECTION, POWDER, FOR SOLUTION INTRAVENOUS at 00:01

## 2021-01-01 RX ADMIN — BUPRENORPHINE HYDROCHLORIDE AND NALOXONE HYDROCHLORIDE DIHYDRATE 1 TABLET: 8; 2 TABLET SUBLINGUAL at 08:27

## 2021-01-01 RX ADMIN — MORPHINE SULFATE 2 MG: 2 INJECTION, SOLUTION INTRAMUSCULAR; INTRAVENOUS at 17:21

## 2021-01-01 RX ADMIN — ATORVASTATIN CALCIUM 40 MG: 40 TABLET, FILM COATED ORAL at 20:30

## 2021-01-01 RX ADMIN — ONDANSETRON 4 MG: 2 INJECTION INTRAMUSCULAR; INTRAVENOUS at 16:25

## 2021-01-01 RX ADMIN — CALCIUM GLUCONATE 1 G: 20 INJECTION, SOLUTION INTRAVENOUS at 16:54

## 2021-01-01 RX ADMIN — HYDROCODONE BITARTRATE AND ACETAMINOPHEN 1 TABLET: 5; 325 TABLET ORAL at 16:39

## 2021-01-01 RX ADMIN — MEROPENEM 1 G: 1 INJECTION, POWDER, FOR SOLUTION INTRAVENOUS at 13:23

## 2021-01-01 RX ADMIN — CALCIUM ACETATE 667 MG: 667 CAPSULE ORAL at 18:12

## 2021-01-01 RX ADMIN — CALCIUM ACETATE 667 MG: 667 CAPSULE ORAL at 07:44

## 2021-01-01 RX ADMIN — CALCIUM ACETATE 667 MG: 667 CAPSULE ORAL at 18:56

## 2021-01-01 RX ADMIN — SODIUM POLYSTYRENE SULFONATE 15 G: 1 POWDER ORAL; RECTAL at 16:53

## 2021-01-01 RX ADMIN — DOCUSATE SODIUM 50 MG AND SENNOSIDES 8.6 MG 2 TABLET: 8.6; 5 TABLET, FILM COATED ORAL at 08:45

## 2021-01-01 RX ADMIN — ACETAMINOPHEN 650 MG: 325 TABLET ORAL at 09:49

## 2021-01-01 RX ADMIN — PROPOFOL 140 MCG/KG/MIN: 10 INJECTION, EMULSION INTRAVENOUS at 09:21

## 2021-01-01 RX ADMIN — Medication 1 CAPSULE: at 17:03

## 2021-01-01 RX ADMIN — CALCIUM ACETATE 667 MG: 667 CAPSULE ORAL at 17:03

## 2021-01-01 RX ADMIN — HUMAN INSULIN 10 UNITS: 100 INJECTION, SOLUTION SUBCUTANEOUS at 16:58

## 2021-01-01 RX ADMIN — HYDROCODONE BITARTRATE AND ACETAMINOPHEN 1 TABLET: 5; 325 TABLET ORAL at 04:30

## 2021-01-01 RX ADMIN — VANCOMYCIN HYDROCHLORIDE 1000 MG: 1 INJECTION, POWDER, LYOPHILIZED, FOR SOLUTION INTRAVENOUS at 17:21

## 2021-01-01 RX ADMIN — DEXTROSE MONOHYDRATE 25 G: 500 INJECTION PARENTERAL at 16:20

## 2021-01-01 RX ADMIN — HYDRALAZINE HYDROCHLORIDE 10 MG: 10 TABLET, FILM COATED ORAL at 08:38

## 2021-01-01 RX ADMIN — CARVEDILOL 12.5 MG: 6.25 TABLET, FILM COATED ORAL at 08:47

## 2021-01-01 RX ADMIN — SODIUM CHLORIDE, PRESERVATIVE FREE 3 ML: 5 INJECTION INTRAVENOUS at 08:58

## 2021-01-01 RX ADMIN — DIPHENHYDRAMINE HYDROCHLORIDE 25 MG: 25 CAPSULE ORAL at 03:49

## 2021-01-01 RX ADMIN — IOPAMIDOL 80 ML: 755 INJECTION, SOLUTION INTRAVENOUS at 13:00

## 2021-01-01 RX ADMIN — HYDROCODONE BITARTRATE AND ACETAMINOPHEN 1 TABLET: 5; 325 TABLET ORAL at 23:56

## 2021-01-01 RX ADMIN — CALCIUM ACETATE 667 MG: 667 CAPSULE ORAL at 08:47

## 2021-01-01 RX ADMIN — ONDANSETRON 4 MG: 2 INJECTION INTRAMUSCULAR; INTRAVENOUS at 12:01

## 2021-01-01 RX ADMIN — ASPIRIN 81 MG: 81 TABLET, COATED ORAL at 08:47

## 2021-01-01 RX ADMIN — HYDROCODONE BITARTRATE AND ACETAMINOPHEN 2 TABLET: 5; 325 TABLET ORAL at 22:19

## 2021-01-01 RX ADMIN — PROMETHAZINE HYDROCHLORIDE 6.25 MG: 25 TABLET ORAL at 21:25

## 2021-01-01 RX ADMIN — ACETAMINOPHEN 650 MG: 325 TABLET ORAL at 06:14

## 2021-01-01 RX ADMIN — CARVEDILOL 12.5 MG: 6.25 TABLET, FILM COATED ORAL at 18:10

## 2021-01-01 RX ADMIN — SODIUM CHLORIDE, PRESERVATIVE FREE 10 ML: 5 INJECTION INTRAVENOUS at 00:01

## 2021-01-01 RX ADMIN — CARVEDILOL 6.25 MG: 6.25 TABLET, FILM COATED ORAL at 17:24

## 2021-01-01 RX ADMIN — APIXABAN 5 MG: 5 TABLET, FILM COATED ORAL at 11:04

## 2021-01-01 RX ADMIN — CALCIUM ACETATE 667 MG: 667 CAPSULE ORAL at 17:06

## 2021-01-01 RX ADMIN — EPOETIN ALFA-EPBX 6000 UNITS: 3000 INJECTION, SOLUTION INTRAVENOUS; SUBCUTANEOUS at 18:16

## 2021-01-01 RX ADMIN — CALCIUM ACETATE 667 MG: 667 CAPSULE ORAL at 09:00

## 2021-01-01 RX ADMIN — SODIUM CHLORIDE, PRESERVATIVE FREE 10 ML: 5 INJECTION INTRAVENOUS at 08:39

## 2021-01-01 RX ADMIN — IBUPROFEN 400 MG: 400 TABLET, FILM COATED ORAL at 11:55

## 2021-01-01 RX ADMIN — MORPHINE SULFATE 2 MG: 2 INJECTION, SOLUTION INTRAMUSCULAR; INTRAVENOUS at 04:37

## 2021-01-01 RX ADMIN — CALCIUM ACETATE 667 MG: 667 CAPSULE ORAL at 08:41

## 2021-01-01 RX ADMIN — ATORVASTATIN CALCIUM 40 MG: 40 TABLET, FILM COATED ORAL at 21:24

## 2021-01-01 RX ADMIN — HEPARIN SODIUM 4100 UNITS: 5000 INJECTION INTRAVENOUS; SUBCUTANEOUS at 15:03

## 2021-01-01 RX ADMIN — ACETAMINOPHEN 650 MG: 325 TABLET ORAL at 09:05

## 2021-01-01 RX ADMIN — HEPARIN SODIUM 2000 UNITS: 5000 INJECTION INTRAVENOUS; SUBCUTANEOUS at 00:44

## 2021-01-01 RX ADMIN — CARVEDILOL 12.5 MG: 6.25 TABLET, FILM COATED ORAL at 18:17

## 2021-01-01 RX ADMIN — PANTOPRAZOLE SODIUM 40 MG: 40 TABLET, DELAYED RELEASE ORAL at 08:14

## 2021-01-01 RX ADMIN — DEXTROSE MONOHYDRATE 25 G: 500 INJECTION PARENTERAL at 20:02

## 2021-01-01 RX ADMIN — CARVEDILOL 12.5 MG: 6.25 TABLET, FILM COATED ORAL at 07:44

## 2021-01-01 RX ADMIN — CYCLOBENZAPRINE HYDROCHLORIDE 5 MG: 10 TABLET, FILM COATED ORAL at 17:08

## 2021-01-01 RX ADMIN — HYDROCODONE BITARTRATE AND ACETAMINOPHEN 2 TABLET: 5; 325 TABLET ORAL at 18:26

## 2021-01-01 RX ADMIN — METRONIDAZOLE 500 MG: 500 INJECTION, SOLUTION INTRAVENOUS at 21:44

## 2021-01-01 RX ADMIN — TRAMADOL HYDROCHLORIDE 50 MG: 50 TABLET, FILM COATED ORAL at 22:25

## 2021-01-01 RX ADMIN — CARVEDILOL 12.5 MG: 6.25 TABLET, FILM COATED ORAL at 19:56

## 2021-01-01 RX ADMIN — MORPHINE SULFATE 4 MG: 4 INJECTION, SOLUTION INTRAMUSCULAR; INTRAVENOUS at 13:01

## 2021-01-01 RX ADMIN — CALCIUM ACETATE 667 MG: 667 CAPSULE ORAL at 11:23

## 2021-01-01 RX ADMIN — CARVEDILOL 12.5 MG: 6.25 TABLET, FILM COATED ORAL at 18:28

## 2021-01-01 RX ADMIN — CARVEDILOL 12.5 MG: 6.25 TABLET, FILM COATED ORAL at 09:00

## 2021-01-01 RX ADMIN — CARVEDILOL 12.5 MG: 6.25 TABLET, FILM COATED ORAL at 08:06

## 2021-01-01 RX ADMIN — HYDROCODONE BITARTRATE AND ACETAMINOPHEN 2 TABLET: 5; 325 TABLET ORAL at 12:28

## 2021-01-01 RX ADMIN — LIDOCAINE 1 PATCH: 50 PATCH CUTANEOUS at 09:14

## 2021-01-01 RX ADMIN — CALCIUM ACETATE 667 MG: 667 CAPSULE ORAL at 12:38

## 2021-01-01 RX ADMIN — IOPAMIDOL 100 ML: 755 INJECTION, SOLUTION INTRAVENOUS at 17:06

## 2021-01-01 RX ADMIN — MEROPENEM 500 MG: 500 INJECTION, POWDER, FOR SOLUTION INTRAVENOUS at 13:38

## 2021-01-01 RX ADMIN — SODIUM CHLORIDE, PRESERVATIVE FREE 3 ML: 5 INJECTION INTRAVENOUS at 09:34

## 2021-01-01 RX ADMIN — SODIUM CHLORIDE 1000 ML: 9 INJECTION, SOLUTION INTRAVENOUS at 08:40

## 2021-01-01 RX ADMIN — MORPHINE SULFATE 2 MG: 2 INJECTION, SOLUTION INTRAMUSCULAR; INTRAVENOUS at 19:05

## 2021-01-01 RX ADMIN — MORPHINE SULFATE 2 MG: 2 INJECTION, SOLUTION INTRAMUSCULAR; INTRAVENOUS at 23:52

## 2021-01-01 RX ADMIN — ATORVASTATIN CALCIUM 40 MG: 40 TABLET, FILM COATED ORAL at 19:59

## 2021-01-01 RX ADMIN — CEFTRIAXONE 2 G: 2 INJECTION, POWDER, FOR SOLUTION INTRAMUSCULAR; INTRAVENOUS at 10:10

## 2021-01-01 RX ADMIN — ISOSORBIDE MONONITRATE 30 MG: 30 TABLET, EXTENDED RELEASE ORAL at 08:45

## 2021-01-01 RX ADMIN — HUMAN INSULIN 10 UNITS: 100 INJECTION, SOLUTION SUBCUTANEOUS at 22:27

## 2021-01-01 RX ADMIN — SODIUM CHLORIDE, PRESERVATIVE FREE 3 ML: 5 INJECTION INTRAVENOUS at 08:05

## 2021-01-01 RX ADMIN — EPOETIN ALFA-EPBX 6000 UNITS: 3000 INJECTION, SOLUTION INTRAVENOUS; SUBCUTANEOUS at 18:13

## 2021-01-01 RX ADMIN — HYDROCODONE BITARTRATE AND ACETAMINOPHEN 2 TABLET: 5; 325 TABLET ORAL at 00:58

## 2021-01-01 RX ADMIN — ISOSORBIDE MONONITRATE 30 MG: 30 TABLET, EXTENDED RELEASE ORAL at 06:07

## 2021-01-01 RX ADMIN — MAGNESIUM SULFATE 4 G: 4 INJECTION INTRAVENOUS at 03:18

## 2021-01-01 RX ADMIN — SODIUM CHLORIDE 500 MG: 9 INJECTION, SOLUTION INTRAVENOUS at 12:41

## 2021-01-01 RX ADMIN — HEPARIN SODIUM 2000 UNITS: 5000 INJECTION INTRAVENOUS; SUBCUTANEOUS at 17:00

## 2021-01-01 RX ADMIN — ACETAMINOPHEN 650 MG: 325 TABLET ORAL at 10:08

## 2021-01-01 RX ADMIN — CALCIUM ACETATE 667 MG: 667 CAPSULE ORAL at 18:28

## 2021-01-01 RX ADMIN — AMLODIPINE BESYLATE 5 MG: 5 TABLET ORAL at 08:45

## 2021-01-01 RX ADMIN — HUMAN INSULIN 10 UNITS: 100 INJECTION, SOLUTION SUBCUTANEOUS at 14:01

## 2021-01-01 RX ADMIN — SODIUM CHLORIDE 1000 ML: 9 INJECTION, SOLUTION INTRAVENOUS at 13:45

## 2021-01-01 RX ADMIN — CARVEDILOL 12.5 MG: 6.25 TABLET, FILM COATED ORAL at 17:23

## 2021-01-01 RX ADMIN — ASPIRIN 324 MG: 81 TABLET, CHEWABLE ORAL at 18:26

## 2021-01-01 RX ADMIN — SODIUM CHLORIDE 1000 ML: 9 INJECTION, SOLUTION INTRAVENOUS at 23:11

## 2021-01-01 RX ADMIN — ACETAMINOPHEN 650 MG: 325 TABLET ORAL at 22:54

## 2021-01-01 RX ADMIN — CARVEDILOL 6.25 MG: 6.25 TABLET, FILM COATED ORAL at 08:45

## 2021-01-01 RX ADMIN — SODIUM CHLORIDE, PRESERVATIVE FREE 10 ML: 5 INJECTION INTRAVENOUS at 08:17

## 2021-01-01 RX ADMIN — PANTOPRAZOLE SODIUM 40 MG: 40 TABLET, DELAYED RELEASE ORAL at 05:30

## 2021-01-01 RX ADMIN — MORPHINE SULFATE 2 MG: 2 INJECTION, SOLUTION INTRAMUSCULAR; INTRAVENOUS at 21:36

## 2021-01-01 RX ADMIN — ACETAMINOPHEN 1000 MG: 500 TABLET ORAL at 05:34

## 2021-01-01 RX ADMIN — LIDOCAINE HYDROCHLORIDE AND EPINEPHRINE 20 ML: 10; 10 INJECTION, SOLUTION INFILTRATION; PERINEURAL at 16:18

## 2021-01-01 RX ADMIN — MUPIROCIN: 20 OINTMENT TOPICAL at 08:45

## 2021-01-01 RX ADMIN — CALCIUM ACETATE 667 MG: 667 CAPSULE ORAL at 20:59

## 2021-01-01 RX ADMIN — HYDROCODONE BITARTRATE AND ACETAMINOPHEN 1 TABLET: 5; 325 TABLET ORAL at 03:18

## 2021-01-01 RX ADMIN — SODIUM CHLORIDE, PRESERVATIVE FREE 10 ML: 5 INJECTION INTRAVENOUS at 21:00

## 2021-01-01 RX ADMIN — PANTOPRAZOLE SODIUM 40 MG: 40 TABLET, DELAYED RELEASE ORAL at 08:38

## 2021-01-01 RX ADMIN — ENOXAPARIN SODIUM 30 MG: 30 INJECTION SUBCUTANEOUS at 20:55

## 2021-01-01 RX ADMIN — CYCLOBENZAPRINE HYDROCHLORIDE 10 MG: 10 TABLET, FILM COATED ORAL at 01:35

## 2021-01-01 RX ADMIN — CARVEDILOL 12.5 MG: 6.25 TABLET, FILM COATED ORAL at 21:41

## 2021-01-01 RX ADMIN — Medication 1 CAPSULE: at 09:58

## 2021-01-01 RX ADMIN — SODIUM CHLORIDE 1000 ML: 9 INJECTION, SOLUTION INTRAVENOUS at 08:02

## 2021-01-01 RX ADMIN — CALCIUM ACETATE 667 MG: 667 CAPSULE ORAL at 17:04

## 2021-01-01 RX ADMIN — PANTOPRAZOLE SODIUM 40 MG: 40 TABLET, DELAYED RELEASE ORAL at 12:40

## 2021-01-01 RX ADMIN — ACETAMINOPHEN 1000 MG: 500 TABLET ORAL at 00:08

## 2021-01-01 RX ADMIN — PANTOPRAZOLE SODIUM 40 MG: 40 TABLET, DELAYED RELEASE ORAL at 19:56

## 2021-01-01 RX ADMIN — MORPHINE SULFATE 2 MG: 2 INJECTION, SOLUTION INTRAMUSCULAR; INTRAVENOUS at 14:15

## 2021-01-01 RX ADMIN — HYDROCODONE BITARTRATE AND ACETAMINOPHEN 1 TABLET: 5; 325 TABLET ORAL at 11:59

## 2021-01-01 RX ADMIN — CARVEDILOL 12.5 MG: 6.25 TABLET, FILM COATED ORAL at 21:23

## 2021-01-01 RX ADMIN — HEPARIN SODIUM 5000 UNITS: 5000 INJECTION INTRAVENOUS; SUBCUTANEOUS at 22:13

## 2021-01-01 RX ADMIN — APIXABAN 5 MG: 5 TABLET, FILM COATED ORAL at 12:41

## 2021-01-01 RX ADMIN — CLONIDINE HYDROCHLORIDE 0.2 MG: 0.1 TABLET ORAL at 21:33

## 2021-01-01 RX ADMIN — NICOTINE 1 PATCH: 7 PATCH, EXTENDED RELEASE TRANSDERMAL at 08:14

## 2021-01-01 RX ADMIN — HEPARIN SODIUM 12 UNITS/KG/HR: 10000 INJECTION, SOLUTION INTRAVENOUS at 21:58

## 2021-01-01 RX ADMIN — MORPHINE SULFATE 2 MG: 2 INJECTION, SOLUTION INTRAMUSCULAR; INTRAVENOUS at 22:06

## 2021-01-01 RX ADMIN — CALCIUM ACETATE 667 MG: 667 CAPSULE ORAL at 13:00

## 2021-01-01 RX ADMIN — SODIUM CHLORIDE 1000 ML: 9 INJECTION, SOLUTION INTRAVENOUS at 15:22

## 2021-01-01 RX ADMIN — AMLODIPINE BESYLATE 5 MG: 5 TABLET ORAL at 00:00

## 2021-01-01 RX ADMIN — GENTAMICIN SULFATE 190 MG: 40 INJECTION, SOLUTION INTRAMUSCULAR; INTRAVENOUS at 11:56

## 2021-01-01 RX ADMIN — SODIUM CHLORIDE, PRESERVATIVE FREE 10 ML: 5 INJECTION INTRAVENOUS at 21:51

## 2021-01-01 RX ADMIN — SODIUM CHLORIDE, PRESERVATIVE FREE 3 ML: 5 INJECTION INTRAVENOUS at 20:43

## 2021-01-01 RX ADMIN — DOCUSATE SODIUM 50 MG AND SENNOSIDES 8.6 MG 2 TABLET: 8.6; 5 TABLET, FILM COATED ORAL at 21:23

## 2021-01-01 RX ADMIN — CEFTRIAXONE 2 G: 2 INJECTION, POWDER, FOR SOLUTION INTRAMUSCULAR; INTRAVENOUS at 11:23

## 2021-01-01 RX ADMIN — HYDRALAZINE HYDROCHLORIDE 10 MG: 10 TABLET, FILM COATED ORAL at 22:19

## 2021-01-01 RX ADMIN — CALCIUM ACETATE 667 MG: 667 CAPSULE ORAL at 18:17

## 2021-01-01 RX ADMIN — HEPARIN SODIUM 2000 UNITS: 5000 INJECTION INTRAVENOUS; SUBCUTANEOUS at 04:29

## 2021-01-01 RX ADMIN — HYDROCODONE BITARTRATE AND ACETAMINOPHEN 1 TABLET: 5; 325 TABLET ORAL at 16:06

## 2021-01-01 RX ADMIN — IRON SUCROSE 200 MG: 20 INJECTION, SOLUTION INTRAVENOUS at 15:02

## 2021-01-01 RX ADMIN — LIDOCAINE 1 PATCH: 50 PATCH CUTANEOUS at 12:42

## 2021-01-01 RX ADMIN — CEFAZOLIN 2 G: 1 INJECTION, POWDER, FOR SOLUTION INTRAMUSCULAR; INTRAVENOUS; PARENTERAL at 09:30

## 2021-01-01 RX ADMIN — ISOSORBIDE MONONITRATE 30 MG: 30 TABLET, EXTENDED RELEASE ORAL at 11:55

## 2021-01-01 RX ADMIN — HYDRALAZINE HYDROCHLORIDE 10 MG: 10 TABLET, FILM COATED ORAL at 00:00

## 2021-01-01 RX ADMIN — SODIUM CHLORIDE, PRESERVATIVE FREE 3 ML: 5 INJECTION INTRAVENOUS at 08:19

## 2021-01-01 RX ADMIN — LIDOCAINE 1 PATCH: 50 PATCH CUTANEOUS at 08:58

## 2021-01-01 RX ADMIN — ISOSORBIDE MONONITRATE 30 MG: 30 TABLET, EXTENDED RELEASE ORAL at 06:14

## 2021-01-01 RX ADMIN — NICOTINE 1 PATCH: 14 PATCH, EXTENDED RELEASE TRANSDERMAL at 08:37

## 2021-01-01 RX ADMIN — METHOCARBAMOL 500 MG: 500 TABLET, FILM COATED ORAL at 19:39

## 2021-01-01 RX ADMIN — HUMAN INSULIN 10 UNITS: 100 INJECTION, SOLUTION SUBCUTANEOUS at 23:33

## 2021-01-01 RX ADMIN — SODIUM CHLORIDE, PRESERVATIVE FREE 3 ML: 5 INJECTION INTRAVENOUS at 08:46

## 2021-01-01 RX ADMIN — MORPHINE SULFATE 2 MG: 2 INJECTION, SOLUTION INTRAMUSCULAR; INTRAVENOUS at 21:24

## 2021-01-01 RX ADMIN — CALCIUM ACETATE 667 MG: 667 CAPSULE ORAL at 09:57

## 2021-01-01 RX ADMIN — SODIUM CHLORIDE 1000 ML: 9 INJECTION, SOLUTION INTRAVENOUS at 19:48

## 2021-01-01 RX ADMIN — ASPIRIN 81 MG: 81 TABLET, COATED ORAL at 09:57

## 2021-01-01 RX ADMIN — CALCIUM ACETATE 667 MG: 667 CAPSULE ORAL at 08:37

## 2021-01-01 RX ADMIN — DEXTROSE MONOHYDRATE 25 G: 500 INJECTION PARENTERAL at 16:54

## 2021-01-01 RX ADMIN — CARVEDILOL 12.5 MG: 6.25 TABLET, FILM COATED ORAL at 08:57

## 2021-01-01 RX ADMIN — ATORVASTATIN CALCIUM 40 MG: 40 TABLET, FILM COATED ORAL at 21:51

## 2021-01-01 RX ADMIN — CARVEDILOL 12.5 MG: 6.25 TABLET, FILM COATED ORAL at 18:27

## 2021-01-01 RX ADMIN — MORPHINE SULFATE 4 MG: 4 INJECTION, SOLUTION INTRAMUSCULAR; INTRAVENOUS at 19:26

## 2021-01-01 RX ADMIN — MORPHINE SULFATE 2 MG: 2 INJECTION, SOLUTION INTRAMUSCULAR; INTRAVENOUS at 08:07

## 2021-01-01 RX ADMIN — HYDRALAZINE HYDROCHLORIDE 10 MG: 10 TABLET, FILM COATED ORAL at 12:41

## 2021-01-01 RX ADMIN — ATORVASTATIN CALCIUM 40 MG: 40 TABLET, FILM COATED ORAL at 19:48

## 2021-01-01 RX ADMIN — MORPHINE SULFATE 2 MG: 2 INJECTION, SOLUTION INTRAMUSCULAR; INTRAVENOUS at 15:04

## 2021-01-01 RX ADMIN — APIXABAN 5 MG: 5 TABLET, FILM COATED ORAL at 09:58

## 2021-01-01 RX ADMIN — HEPARIN SODIUM 18 UNITS/KG/HR: 10000 INJECTION, SOLUTION INTRAVENOUS at 13:06

## 2021-01-01 RX ADMIN — HYDRALAZINE HYDROCHLORIDE 10 MG: 20 INJECTION INTRAMUSCULAR; INTRAVENOUS at 01:07

## 2021-01-01 RX ADMIN — MORPHINE SULFATE 2 MG: 2 INJECTION, SOLUTION INTRAMUSCULAR; INTRAVENOUS at 01:32

## 2021-01-01 RX ADMIN — TRAMADOL HYDROCHLORIDE 50 MG: 50 TABLET, FILM COATED ORAL at 00:48

## 2021-01-01 RX ADMIN — MAGNESIUM SULFATE HEPTAHYDRATE 2 G: 40 INJECTION, SOLUTION INTRAVENOUS at 18:12

## 2021-01-01 RX ADMIN — MORPHINE SULFATE 4 MG: 4 INJECTION, SOLUTION INTRAMUSCULAR; INTRAVENOUS at 08:19

## 2021-01-01 RX ADMIN — SODIUM CHLORIDE, PRESERVATIVE FREE 3 ML: 5 INJECTION INTRAVENOUS at 09:15

## 2021-01-01 RX ADMIN — SODIUM CHLORIDE 1000 ML: 9 INJECTION, SOLUTION INTRAVENOUS at 16:38

## 2021-01-01 RX ADMIN — ENOXAPARIN SODIUM 30 MG: 30 INJECTION SUBCUTANEOUS at 21:35

## 2021-01-01 RX ADMIN — ASPIRIN 81 MG: 81 TABLET, COATED ORAL at 12:42

## 2021-01-01 RX ADMIN — Medication 1 CAPSULE: at 11:01

## 2021-01-01 RX ADMIN — HYDRALAZINE HYDROCHLORIDE 10 MG: 10 TABLET, FILM COATED ORAL at 05:30

## 2021-01-01 RX ADMIN — EPOETIN ALFA-EPBX 6000 UNITS: 3000 INJECTION, SOLUTION INTRAVENOUS; SUBCUTANEOUS at 17:03

## 2021-01-01 RX ADMIN — DEXAMETHASONE SODIUM PHOSPHATE 6 MG: 4 INJECTION, SOLUTION INTRAMUSCULAR; INTRAVENOUS at 08:40

## 2021-01-01 RX ADMIN — LACTULOSE 10 G: 10 SOLUTION ORAL at 15:16

## 2021-01-01 RX ADMIN — HUMAN INSULIN 4 UNITS: 100 INJECTION, SOLUTION SUBCUTANEOUS at 16:22

## 2021-01-01 RX ADMIN — SODIUM CHLORIDE 2 G: 900 INJECTION INTRAVENOUS at 08:04

## 2021-01-01 RX ADMIN — ATORVASTATIN CALCIUM 40 MG: 40 TABLET, FILM COATED ORAL at 20:14

## 2021-01-01 RX ADMIN — NICOTINE 1 PATCH: 14 PATCH, EXTENDED RELEASE TRANSDERMAL at 09:17

## 2021-01-01 RX ADMIN — CALCIUM ACETATE 667 MG: 667 CAPSULE ORAL at 11:26

## 2021-01-01 RX ADMIN — SODIUM POLYSTYRENE SULFONATE 15 G: 1 POWDER ORAL; RECTAL at 15:14

## 2021-01-01 RX ADMIN — NICOTINE 1 PATCH: 14 PATCH, EXTENDED RELEASE TRANSDERMAL at 12:42

## 2021-01-01 RX ADMIN — ACETAMINOPHEN 650 MG: 325 TABLET ORAL at 20:34

## 2021-01-01 RX ADMIN — SODIUM CHLORIDE, PRESERVATIVE FREE 10 ML: 5 INJECTION INTRAVENOUS at 20:26

## 2021-01-01 RX ADMIN — CALCIUM ACETATE 667 MG: 667 CAPSULE ORAL at 18:25

## 2021-01-01 RX ADMIN — HYDROCODONE BITARTRATE AND ACETAMINOPHEN 1 TABLET: 5; 325 TABLET ORAL at 03:55

## 2021-01-01 RX ADMIN — MORPHINE SULFATE 4 MG: 4 INJECTION, SOLUTION INTRAMUSCULAR; INTRAVENOUS at 06:13

## 2021-01-01 RX ADMIN — LIDOCAINE 1 PATCH: 50 PATCH CUTANEOUS at 08:45

## 2021-01-01 RX ADMIN — ACETAMINOPHEN 650 MG: 325 TABLET, COATED ORAL at 16:29

## 2021-01-01 RX ADMIN — MORPHINE SULFATE 2 MG: 2 INJECTION, SOLUTION INTRAMUSCULAR; INTRAVENOUS at 07:44

## 2021-01-01 RX ADMIN — ASPIRIN 81 MG: 81 TABLET, COATED ORAL at 11:04

## 2021-01-01 RX ADMIN — SODIUM CHLORIDE, PRESERVATIVE FREE 3 ML: 5 INJECTION INTRAVENOUS at 20:57

## 2021-01-01 RX ADMIN — HYDROCODONE BITARTRATE AND ACETAMINOPHEN 1 TABLET: 5; 325 TABLET ORAL at 12:43

## 2021-01-01 RX ADMIN — MORPHINE SULFATE 2 MG: 2 INJECTION, SOLUTION INTRAMUSCULAR; INTRAVENOUS at 03:11

## 2021-01-01 RX ADMIN — HEPARIN SODIUM 1000 UNITS: 1000 INJECTION, SOLUTION INTRAVENOUS; SUBCUTANEOUS at 21:15

## 2021-01-01 RX ADMIN — ACETAMINOPHEN 650 MG: 325 TABLET ORAL at 05:18

## 2021-01-01 RX ADMIN — SODIUM CHLORIDE, PRESERVATIVE FREE 10 ML: 5 INJECTION INTRAVENOUS at 20:10

## 2021-01-01 RX ADMIN — DOCUSATE SODIUM 50 MG AND SENNOSIDES 8.6 MG 2 TABLET: 8.6; 5 TABLET, FILM COATED ORAL at 21:44

## 2021-01-01 RX ADMIN — ALBUMIN HUMAN 12.5 G: 0.25 SOLUTION INTRAVENOUS at 15:00

## 2021-01-01 RX ADMIN — METRONIDAZOLE 500 MG: 500 INJECTION, SOLUTION INTRAVENOUS at 05:05

## 2021-01-01 RX ADMIN — CALCIUM ACETATE 667 MG: 667 CAPSULE ORAL at 08:57

## 2021-01-01 RX ADMIN — CARVEDILOL 12.5 MG: 6.25 TABLET, FILM COATED ORAL at 17:06

## 2021-01-01 RX ADMIN — MORPHINE SULFATE 4 MG: 4 INJECTION, SOLUTION INTRAMUSCULAR; INTRAVENOUS at 20:43

## 2021-01-01 RX ADMIN — Medication 1 CAPSULE: at 08:57

## 2021-01-01 RX ADMIN — MORPHINE SULFATE 2 MG: 2 INJECTION, SOLUTION INTRAMUSCULAR; INTRAVENOUS at 11:54

## 2021-01-01 RX ADMIN — SODIUM CHLORIDE 1000 ML: 9 INJECTION, SOLUTION INTRAVENOUS at 20:21

## 2021-01-01 RX ADMIN — MORPHINE SULFATE 4 MG: 4 INJECTION, SOLUTION INTRAMUSCULAR; INTRAVENOUS at 08:18

## 2021-01-01 RX ADMIN — ALBUMIN HUMAN 25 G: 0.25 SOLUTION INTRAVENOUS at 21:00

## 2021-01-01 RX ADMIN — ACETAMINOPHEN 650 MG: 325 TABLET ORAL at 23:49

## 2021-01-01 RX ADMIN — HYDROCODONE BITARTRATE AND ACETAMINOPHEN 1 TABLET: 5; 325 TABLET ORAL at 20:30

## 2021-01-01 RX ADMIN — FENTANYL CITRATE 100 MCG: 50 INJECTION INTRAMUSCULAR; INTRAVENOUS at 09:26

## 2021-01-01 RX ADMIN — Medication 1 CAPSULE: at 12:41

## 2021-01-01 RX ADMIN — ACETAMINOPHEN 650 MG: 325 TABLET ORAL at 17:31

## 2021-01-01 RX ADMIN — MORPHINE SULFATE 4 MG: 4 INJECTION, SOLUTION INTRAMUSCULAR; INTRAVENOUS at 02:29

## 2021-01-01 RX ADMIN — HYDROCODONE BITARTRATE AND ACETAMINOPHEN 2 TABLET: 5; 325 TABLET ORAL at 15:07

## 2021-01-01 RX ADMIN — ALBUTEROL SULFATE 2.5 MG: 2.5 SOLUTION RESPIRATORY (INHALATION) at 13:41

## 2021-01-01 RX ADMIN — CALCIUM ACETATE 667 MG: 667 CAPSULE ORAL at 00:02

## 2021-01-01 RX ADMIN — HYDROCODONE BITARTRATE AND ACETAMINOPHEN 1 TABLET: 5; 325 TABLET ORAL at 23:40

## 2021-01-01 RX ADMIN — LORAZEPAM 0.5 MG: 2 INJECTION INTRAMUSCULAR; INTRAVENOUS at 23:35

## 2021-01-01 RX ADMIN — NICOTINE 1 PATCH: 14 PATCH, EXTENDED RELEASE TRANSDERMAL at 07:41

## 2021-01-01 RX ADMIN — Medication 1 CAPSULE: at 08:41

## 2021-01-01 RX ADMIN — CARVEDILOL 6.25 MG: 6.25 TABLET, FILM COATED ORAL at 17:08

## 2021-01-01 RX ADMIN — CALCIUM ACETATE 667 MG: 667 CAPSULE ORAL at 19:14

## 2021-01-01 RX ADMIN — Medication 1 CAPSULE: at 08:47

## 2021-01-01 RX ADMIN — MAGNESIUM SULFATE 4 G: 4 INJECTION INTRAVENOUS at 21:26

## 2021-01-01 RX ADMIN — ISOSORBIDE MONONITRATE 30 MG: 30 TABLET, EXTENDED RELEASE ORAL at 04:30

## 2021-01-01 RX ADMIN — NICOTINE 1 PATCH: 14 PATCH, EXTENDED RELEASE TRANSDERMAL at 08:07

## 2021-01-01 RX ADMIN — LIDOCAINE 1 PATCH: 50 PATCH CUTANEOUS at 08:41

## 2021-01-01 RX ADMIN — PROMETHAZINE HYDROCHLORIDE 6.25 MG: 12.5 SUPPOSITORY RECTAL at 12:35

## 2021-01-01 RX ADMIN — LIDOCAINE 1 PATCH: 50 PATCH CUTANEOUS at 08:03

## 2021-01-01 RX ADMIN — CALCIUM ACETATE 667 MG: 667 CAPSULE ORAL at 18:08

## 2021-01-01 RX ADMIN — HUMAN INSULIN 5 UNITS: 100 INJECTION, SOLUTION SUBCUTANEOUS at 15:17

## 2021-01-01 RX ADMIN — ATORVASTATIN CALCIUM 40 MG: 40 TABLET, FILM COATED ORAL at 20:34

## 2021-01-01 RX ADMIN — MORPHINE SULFATE 2 MG: 2 INJECTION, SOLUTION INTRAMUSCULAR; INTRAVENOUS at 11:29

## 2021-01-01 RX ADMIN — NICOTINE 1 PATCH: 14 PATCH, EXTENDED RELEASE TRANSDERMAL at 08:32

## 2021-01-01 RX ADMIN — MEROPENEM 500 MG: 500 INJECTION, POWDER, FOR SOLUTION INTRAVENOUS at 13:59

## 2021-01-01 RX ADMIN — ASPIRIN 81 MG: 81 TABLET, COATED ORAL at 08:38

## 2021-01-01 RX ADMIN — CEFEPIME 2 G: 2 INJECTION, POWDER, FOR SOLUTION INTRAVENOUS at 23:01

## 2021-01-01 RX ADMIN — HYDRALAZINE HYDROCHLORIDE 10 MG: 10 TABLET, FILM COATED ORAL at 13:22

## 2021-01-01 RX ADMIN — PROCHLORPERAZINE EDISYLATE 5 MG: 5 INJECTION INTRAMUSCULAR; INTRAVENOUS at 04:53

## 2021-01-01 RX ADMIN — SODIUM CHLORIDE, PRESERVATIVE FREE 3 ML: 5 INJECTION INTRAVENOUS at 21:42

## 2021-01-01 RX ADMIN — MAGNESIUM SULFATE HEPTAHYDRATE 2 G: 40 INJECTION, SOLUTION INTRAVENOUS at 13:48

## 2021-01-01 RX ADMIN — MORPHINE SULFATE 4 MG: 4 INJECTION, SOLUTION INTRAMUSCULAR; INTRAVENOUS at 18:29

## 2021-01-01 RX ADMIN — SODIUM CHLORIDE, PRESERVATIVE FREE 10 ML: 5 INJECTION INTRAVENOUS at 12:43

## 2021-01-01 RX ADMIN — SODIUM CHLORIDE, PRESERVATIVE FREE 3 ML: 5 INJECTION INTRAVENOUS at 21:35

## 2021-01-01 RX ADMIN — HEPARIN SODIUM 19 UNITS/KG/HR: 10000 INJECTION, SOLUTION INTRAVENOUS at 05:49

## 2021-01-01 RX ADMIN — MUPIROCIN: 20 OINTMENT TOPICAL at 08:49

## 2021-01-01 RX ADMIN — SODIUM CHLORIDE 2 G: 900 INJECTION INTRAVENOUS at 21:44

## 2021-01-01 RX ADMIN — MORPHINE SULFATE 2 MG: 2 INJECTION, SOLUTION INTRAMUSCULAR; INTRAVENOUS at 08:41

## 2021-01-01 RX ADMIN — PANTOPRAZOLE SODIUM 40 MG: 40 TABLET, DELAYED RELEASE ORAL at 05:39

## 2021-01-01 RX ADMIN — SODIUM CHLORIDE 1000 ML: 9 INJECTION, SOLUTION INTRAVENOUS at 07:24

## 2021-01-01 RX ADMIN — ENOXAPARIN SODIUM 30 MG: 30 INJECTION SUBCUTANEOUS at 21:44

## 2021-01-01 RX ADMIN — ISOSORBIDE MONONITRATE 30 MG: 30 TABLET, EXTENDED RELEASE ORAL at 12:41

## 2021-01-01 RX ADMIN — HYDRALAZINE HYDROCHLORIDE 10 MG: 10 TABLET, FILM COATED ORAL at 05:39

## 2021-01-01 RX ADMIN — PROCHLORPERAZINE EDISYLATE 5 MG: 5 INJECTION INTRAMUSCULAR; INTRAVENOUS at 21:51

## 2021-01-01 RX ADMIN — VANCOMYCIN HYDROCHLORIDE 1500 MG: 10 INJECTION, POWDER, LYOPHILIZED, FOR SOLUTION INTRAVENOUS at 20:55

## 2021-01-01 RX ADMIN — VANCOMYCIN HYDROCHLORIDE 1250 MG: 5 INJECTION, POWDER, LYOPHILIZED, FOR SOLUTION INTRAVENOUS at 00:42

## 2021-01-01 RX ADMIN — MORPHINE SULFATE 4 MG: 4 INJECTION, SOLUTION INTRAMUSCULAR; INTRAVENOUS at 02:59

## 2021-01-01 RX ADMIN — HEPARIN SODIUM 14 UNITS/KG/HR: 10000 INJECTION, SOLUTION INTRAVENOUS at 12:07

## 2021-01-01 RX ADMIN — CYCLOBENZAPRINE HYDROCHLORIDE 5 MG: 10 TABLET, FILM COATED ORAL at 07:38

## 2021-01-01 RX ADMIN — ASPIRIN 81 MG: 81 TABLET, COATED ORAL at 09:14

## 2021-01-01 NOTE — PLAN OF CARE
Goal Outcome Evaluation:        Outcome Summary: Pt resting in bed without any c/o at this time.  Pt had dialysis this am and 2 liters removed.  Pt has been sleeping most of the day.  Call light within reach.  Will continue to monitor.

## 2021-01-01 NOTE — SIGNIFICANT NOTE
[facility] Medicine Services  SIGNIFICANT EVENT NOTE        EVENT:    Contacted per RN with critical lab, hemoglobin 6.7. Improved from 6.0 yesterday. Patient underwent hemodialysis yesterday and 1 unit PRBCs. Nephrology on board.     OBJECTIVE DATA:  Vitals:    01/01/21 0019   BP: 151/88   Pulse: 102   Resp: 18   Temp:    SpO2:          ACTION TAKEN/MEDICATION CHANGES:    · Transfuse 1 unit PRBCs   · Pre-treat with benadryl and tylenol  · Iron studies pending  · Nephrology on board  · Further care pending clinical course      Teena Ray PA-C  Hospitalist Service -- [facility]   Pager: [phone]    01/01/21  01:21 EST    Attending Physician: Adithya Calabrese,*

## 2021-01-01 NOTE — PROGRESS NOTES
Pharmacy was consulted to review for medications that can cause urinary retention. Both inpatient and home medications were reviewed. Noted the following may cause urinary retention:    Zofran - PRN order inpatient - not been given at this point  Tramadol - patient received 1 time order on 1/1    Thank you,   Cora Guillaume, PharmD

## 2021-01-01 NOTE — PAYOR COMM NOTE
"[facility]   MARCELL NUNO  PHONE  [phone]  FAX  [phone]  NPI:  [id number]    REQUEST FOR INPATIENT AUTH  ADMISSION  NOTIFICATION        Mario Nair (45 y.o. Male)     Date of Birth Social Security Number Address Home Phone MRN    1975  [address]  [facility] [id number] [phone] [id number]    Congregation Marital Status          None        Admission Date Admission Type Admitting Provider Attending Provider Department, Room/Bed    12/31/20 Emergency Adithya Calabrese DO Mullins, Thomas Anthony, DO [facility] OBSERVATION UNIT, 249/2S    Discharge Date Discharge Disposition Discharge Destination                       Attending Provider: Adithya Calabrese DO    Allergies: Penicillins    Isolation: None   Infection: Hepatitis B (06/05/19), COVID (rule out) (12/31/20)   Code Status: CPR    Ht: 188 cm (74\")   Wt: 67.5 kg (148 lb 12.8 oz)    Admission Cmt: None   Principal Problem: ESRD (end stage renal disease) (CMS/HCC) [N18.6]                 Active Insurance as of 12/31/2020     Primary Coverage     Payor Plan Insurance Group Employer/Plan Group    [employer] [place] [employer] [place]ICA[place]      Payor Plan Address Payor Plan Phone Number Payor Plan Fax Number Effective Dates    [address] [address] [phone]  5/5/2019 - None Entered    [facility] [id number]       Subscriber Name Subscriber Birth Date Member ID       MARIO NAIR 1975 [id number]                 Emergency Contacts      (Rel.) Home Phone Work Phone Mobile Phone    SHANI SILVA (Relative) [phone] -- --    Constance Nair (Daughter) -- -- [phone]               History & Physical      Dorcas Conti PA at 12/31/20 1518     Attestation signed by Adithya Calabrese DO at 12/31/20 1725    Patient seen and examined independently of CHRIS London.  I agree with history of presenting illness, review of systems, physical exam, assessment and plan.  " Patient appears to be a highly noncompliant patient who is end-stage renal failure needing hemodialysis.  I anticipate patient will likely receive hemodialysis and leave AGAINST MEDICAL ADVICE as he has done in previous admissions.                       [facility] HOSPITALIST HISTORY AND PHYSICAL    Patient Identification:  Name:  Mario Nair  Age:  45 y.o.  Sex:  male  :  1975  MRN:  [id number]   Visit Number:  [id number]  Admit Date: 2020   Primary Care Physician:  Danny Rebolledo MD     2020  8:07 AM    Subjective     Chief complaint:   Chief Complaint   Patient presents with   • Rib Pain   • Shortness of Breath       History of presenting illness:   Mario Nair is a 45 y.o. male with past medical history significant for ESRD for which he is noncompliant with hemodialysis, history of hepatitis C and B, COPD, tobacco abuse, and a listed history of IV drug abuse, however patient denies this at this time. He presented to the emergency department of [facility] on 2020 with complaints of right-sided rib pain and shortness of breath.  He reports that it has been about a month since his last hemodialysis session as he does not have arranged outpatient chair.  On review of records, he has had multiple admissions to our facility for noncompliance with hemodialysis.  After the patient receives hemodialysis, he generally leaves AGAINST MEDICAL ADVICE.  The patient reports that he cannot get an outpatient hemodialysis chair due to his history of hepatitis.    He denies any complaints of cough, fever, or chills.  No pedal edema or calf pain.  No history of DVT or PE.  Denies any history of MI or known coronary artery disease.    Upon arrival to the ED, vitals were /72, respirations 20, heart rate 98, SPO2 96% on room air, afebrile.  ABG shows pH of 7.146, PCO2 19.1, PO2 of 109.0, HCO3 6.6, with arterial O2 saturation 97.3% on room air.  Troponins are  elevated 0.161 and 0.157 consecutively, mildly up from his previous baseline.  Amylase 144 with a lipase of 188.  CRP 3.68.  Lactic acid 0.8.  Glucose 169.  Sodium 129.  proBNP 10,254.  Magnesium low at 1.3.  D-dimer 7.33.  WBC count is within normal limits.  Hemoglobin 6.5, hematocrit 20.7, platelets 105.  UA shows appearance concerning for UTI.  Chest x-ray shows a right side effusion and right basilar consolidation.  CT abdomen/pelvis shows bilateral hydronephrosis and hydroureter to the level of a thickened urinary bladder wall which is unchanged compared to previous exam.  Retroperitoneal adenopathy is also present and unchanged.  Right side effusion and moderate to large stool burden.  CT of the chest without contrast shows a right-sided pleural effusion and changes of COPD.    In the ED, he was started on IV vancomycin and aztreonam.  He was given IV pain medication initially with morphine 2 mg x 1 and then Dilaudid 0.5 mg x 1.  He also received Zofran 4 mg IV x1. Patient has been admitted to the observation unit of [facility] for further evaluation and therapy.   ---------------------------------------------------------------------------------------------------------------------   Review of Systems   Constitutional: Negative for chills and fever.   HENT: Negative for congestion and sinus pain.    Respiratory: Positive for shortness of breath. Negative for cough and wheezing.    Cardiovascular: Negative for chest pain and leg swelling.   Gastrointestinal: Positive for constipation. Negative for anal bleeding, blood in stool, nausea and vomiting.   Genitourinary: Negative for difficulty urinating, dysuria and hematuria.   Musculoskeletal: Negative for gait problem and joint swelling.        Right side rib pain   Skin: Negative for color change, pallor, rash and wound.   Neurological: Negative for syncope and weakness.   Hematological:        Denies any recent bleeding problems    Psychiatric/Behavioral: Negative for agitation, behavioral problems and confusion.      ---------------------------------------------------------------------------------------------------------------------   Past Medical History:   Diagnosis Date   • COPD (chronic obstructive pulmonary disease) (CMS/[facility])    • Endocarditis    • ESRD (end stage renal disease) (CMS/[facility])    • Infectious viral hepatitis    • IV drug abuse (CMS/[facility])    • Kidney failure    • Renal insufficiency    • Unable to read or write      Past Surgical History:   Procedure Laterality Date   • CENTRAL VENOUS CATHETER TUNNELED INSERTION DOUBLE LUMEN Right     Chest for hemodialysis     Family History   Problem Relation Age of Onset   • Alcohol abuse Mother    • Hypertension Mother    • Hypertension Other    • Kidney disease Maternal Grandmother      Social History     Socioeconomic History   • Marital status:      Spouse name: Not on file   • Number of children: Not on file   • Years of education: Not on file   • Highest education level: Not on file   Tobacco Use   • Smoking status: Current Every Day Smoker     Packs/day: 0.50     Years: 20.00     Pack years: 10.00     Types: Cigarettes   • Smokeless tobacco: Never Used   Substance and Sexual Activity   • Alcohol use: Not Currently     Frequency: Never     Comment: Quit 18 years ago   • Drug use: Yes     Types: Methamphetamines, IV     Comment: last used on Friday 5/18/2019   • Sexual activity: Defer     ---------------------------------------------------------------------------------------------------------------------   Allergies:  Penicillins  ---------------------------------------------------------------------------------------------------------------------   Prior to Admission Medications     Prescriptions Last Dose Informant Patient Reported? Taking?    amLODIPine (NORVASC) 5 MG tablet   No Yes    Take 1 tablet by mouth Every Night.    calcium acetate (PHOS BINDER,) 667 MG capsule  capsule   No Yes    Take 1 capsule by mouth 3 (Three) Times a Day With Meals.    carvedilol (COREG) 12.5 MG tablet   No Yes    Take 1 tablet by mouth 2 (Two) Times a Day With Meals.    hydrALAZINE (APRESOLINE) 10 MG tablet   No Yes    Take 1 tablet by mouth 3 (Three) Times a Day.    isosorbide mononitrate (IMDUR) 30 MG 24 hr tablet   No Yes    Take 1 tablet by mouth Every Morning.    pantoprazole (PROTONIX) 40 MG EC tablet   No Yes    Take 1 tablet by mouth Daily for 30 days.        ---------------------------------------------------------------------------------------------------------------------  Objective   Hospital Scheduled Meds:  aztreonam, 500 mg, Intravenous, Q12H  heparin (porcine), 5,000 Units, Subcutaneous, Q12H  sodium chloride, 10 mL, Intravenous, Q12H         ---------------------------------------------------------------------------------------------------------------------   Vital Signs:  Temp:  [97.5 °F (36.4 °C)-98 °F (36.7 °C)] 97.5 °F (36.4 °C)  Heart Rate:  [86-98] 95  Resp:  [20] 20  BP: (123-153)/(69-91) 144/80  Mean Arterial Pressure (Non-Invasive) for the past 24 hrs (Last 3 readings):   Noninvasive MAP (mmHg)   12/31/20 1354 101   12/31/20 1318 102   12/31/20 1303 102     SpO2 Percentage    12/31/20 1303 12/31/20 1318 12/31/20 1354   SpO2: 99% 97% 97%     SpO2:  [96 %-100 %] 97 %  on   ;   Device (Oxygen Therapy): room air    Body mass index is 19.1 kg/m².  Wt Readings from Last 3 Encounters:   12/31/20 67.5 kg (148 lb 12.8 oz)   12/08/20 78.5 kg (173 lb)   11/15/20 74.8 kg (165 lb)     ---------------------------------------------------------------------------------------------------------------------   Physical Exam:  Constitutional:   Chronically ill-appearing  male.  He is asleep on my arrival, when I woke him, he began complaining of right side rib pain. No acute distress.   HENT:  Head: Normocephalic and atraumatic.  Mouth:  Moist mucous membranes.    Eyes:  Conjunctivae and  EOM are normal. No scleral icterus. No erythema or drainage.   Neck:  Neck supple.  No JVD present.    Cardiovascular:  Normal rate, regular rhythm and normal heart sounds with no murmur.  Pulmonary/Chest:  No respiratory distress, no wheezes.  Decreased breath sounds and crackles noted at the right base.  He has a hemodialysis catheter in the right chest.  Prominent vessels noted in the right clavicular area extending up to the neck, felt to be tubing from the catheter.  The catheter site itself has no surrounding  erythema or edema.  There is some crusting and scabs noted.   Abdominal:  Soft.  Bowel sounds are present x4.  No distension and no tenderness.   Musculoskeletal:  No edema, no tenderness, and no deformity.  No red or swollen joints anywhere.    Neurological:  Alert and oriented to person, place, and time.  No cranial nerve deficit.  No tongue deviation.  No facial droop.  No slurred speech.   Skin:  Skin is warm and dry.  No rash noted.  No pallor.  He does appear to have some yellowing of the skin.  Psychiatric:  Normal mood and affect.  Behavior is normal.  Judgment and thought content normal.   Peripheral vascular:  No edema and 2+ pedal pulses bilaterally.   Genitourinary: No Fry catheter in place.  ---------------------------------------------------------------------------------------------------------------------  EKG:          I have personally reviewed the EKG.  ---------------------------------------------------------------------------------------------------------------------   Results from last 7 days   Lab Units 12/31/20  1141 12/31/20 0904   TROPONIN T ng/mL 0.157* 0.161*     Results from last 7 days   Lab Units 12/31/20 0904   PROBNP pg/mL 10,254.0*       Results from last 7 days   Lab Units 12/31/20  0901   PH, ARTERIAL pH units 7.146*   PO2 ART mm Hg 109.0*   PCO2, ARTERIAL mm Hg 19.1*   HCO3 ART mmol/L 6.6*     Results from last 7 days   Lab Units 12/31/20  0910 12/31/20  0904   CRP  mg/dL  --  3.68*   LACTATE mmol/L 0.8  --    WBC 10*3/mm3  --  5.87   HEMOGLOBIN g/dL  --  6.5*   HEMATOCRIT %  --  20.7*   MCV fL  --  88.8   MCHC g/dL  --  31.4*   PLATELETS 10*3/mm3  --  105*   INR   --  1.57*     Results from last 7 days   Lab Units 12/31/20  0904   SODIUM mmol/L 129*   POTASSIUM mmol/L 5.1   MAGNESIUM mg/dL 1.3*   CHLORIDE mmol/L 98   CO2 mmol/L 6.8*   BUN mg/dL 227*   CREATININE mg/dL 13.79*   EGFR IF NONAFRICN AM mL/min/1.73 4*   CALCIUM mg/dL 6.4*   GLUCOSE mg/dL 169*   ALBUMIN g/dL 3.39*   BILIRUBIN mg/dL 0.4   ALK PHOS U/L 81   AST (SGOT) U/L 13   ALT (SGPT) U/L 21   Estimated Creatinine Clearance: 6.5 mL/min (A) (by C-G formula based on SCr of 13.79 mg/dL (H)).  No results found for: AMMONIA    No results found for: HGBA1C, POCGLU     Lab Results   Component Value Date    HGBA1C 5.50 05/07/2019     Lab Results   Component Value Date    TSH 2.380 11/14/2020    FREET4 0.85 (L) 11/14/2020     No results found for: BLOODCX  No results found for: URINECX  No results found for: WOUNDCX  No results found for: STOOLCX  No results found for: RESPCX    Pain Management Panel     Pain Management Panel Latest Ref Rng & Units 11/15/2020 10/16/2020    AMPHETAMINES SCREEN, URINE Negative Negative Negative    BARBITURATES SCREEN Negative Negative Negative    BENZODIAZEPINE SCREEN, URINE Negative Negative Negative    BUPRENORPHINEUR Negative Negative Negative    COCAINE SCREEN, URINE Negative Negative Negative    METHADONE SCREEN, URINE Negative Negative Negative        I have personally reviewed the above laboratory results.   ---------------------------------------------------------------------------------------------------------------------  Imaging Results (Last 7 Days)     Procedure Component Value Units Date/Time    NM Lung Scan Perfusion Particulate [[id number]] Collected: 12/31/20 1126     Updated: 12/31/20 1129    Narrative:      EXAMINATION: NM LUNG SCAN PERFUSION PARTICULATE-      CLINICAL  INDICATION: right sided chest pain, elevated d-dimer        COMPARISON: None available     PROCEDURE: 4.3 mCi technetium MAA was administered. Perfusion images  were then acquired.     FINDINGS: There is a homogeneous distribution of the radiotracer.     No segmental or subsegmental perfusion defects are seen.       Impression:      Based on the perfusion exam, no evidence of a pulmonary embolus         This report was finalized on 12/31/2020 11:27 AM by Dr. Wesly Reynolds MD.       CT Chest Without Contrast [[id number]] Collected: 12/31/20 1021     Updated: 12/31/20 1049    Narrative:      EXAM: CT CHEST WO CONTRAST-      CLINICAL INDICATION:right sided pleuritic pain      COMPARISON: 09/04/2020     TECHNIQUE: Multiple axial CT images were obtained from lung apex through  upper abdomen without the administration of IV contrast. Reformatted  images in the coronal and/or sagittal plane(s) were generated from the  axial data set to facilitate diagnostic accuracy and/or surgical  planning.     Radiation dose reduction techniques were utilized per ALARA protocol.  Automated exposure control was initiated through either or [employer] [employer] or  [employer] software packages by  protocol.    DOSE (DLP mGy-cm):        FINDINGS:     LUNGS: COPD.     HEART: Trace pericardial effusion     MEDIASTINUM: No masses. No enlarged lymph nodes.  No fluid collections.     PLEURA: Small right pleural effusion     VASCULATURE: No evidence of aneurysm. No evidence of pulmonary embolism.     BONES: No acute bony abnormality.     VISUALIZED UPPER ABDOMEN:Please see CT scan report for abdomen and  pelvis     Other: None.       Impression:         1. Right pleural effusion  2. COPD           This report was finalized on 12/31/2020 10:22 AM by Dr. Wesly Reynolds MD.       CT Abdomen Pelvis Without Contrast [[id number]] Collected: 12/31/20 1022     Updated: 12/31/20 1048    Narrative:      EXAM: CT ABDOMEN PELVIS WO CONTRAST-         TECHNIQUE:  Multiple axial CT images were obtained from lung bases  through pubic symphysis WITHOUT administration of IV contrast.  Reformatted images in the coronal and/or sagittal plane(s) were  generated from the axial data set to facilitate diagnostic accuracy  and/or surgical planning.  Oral Contrast:NONE.     Radiation dose reduction techniques were utilized per ALARA protocol.  Automated exposure control was initiated through either or [employer] or  [employer] software packages by  protocol.    DOSE:     Clinical information right flank pain      Comparison 09/04/2020     FINDINGS:     Lower thorax: Right pleural effusion     Abdomen:     Liver: Homogeneous. No focal hepatic mass or ductal dilatation.     Gallbladder: Contracted. Not well delineated     Pancreas: Unremarkable. No mass or ductal dilatation.     Spleen: Homogeneous. No splenomegaly.     Adrenals: No mass.     Kidneys/ureters: Bilateral hydronephrosis and hydroureter again noted.  No distally obstructing stones are seen. Urinary bladder wall however is  thickened.     GI tract: Non-dilated. No definite wall thickening..     MESENTERY: Retroperitoneal adenopathy with index node just to the left  of the aorta on image 45 of the axial series. Short axis measuring 1.77  cm. Previously measured 1.8 cm. Other lymph nodes again noted and  similar in size as well.      Vasculature: No evidence of aneurysm.     Abdominal wall: No focal hernia or mass.        Pelvis:     Bladder: Thickening of the urinary bladder wall     Reproductive: Unremarkable as visualized     Bones: No acute bony abnormality.       Impression:         1. Bilateral hydronephrosis and hydroureter to the level of a thickened  urinary bladder wall. Appearance unchanged from the previous exam     2.Retroperitoneal adenopathy also unchanged     3. Right effusion  4. Moderate to large stool burden              This report was finalized on 12/31/2020 10:25 AM by Dr. Wesly Reynolds MD.        XR Chest 1 View [[id number]] Collected: 12/31/20 0931     Updated: 12/31/20 0933    Narrative:      XR CHEST 1 VW-     CLINICAL INDICATION: right sided pleuritic pain        COMPARISON: 10/16/2020      TECHNIQUE: Single frontal view of the chest.     FINDINGS:     Right effusion and right basilar consolidation  The cardiac silhouette is normal. The pulmonary vasculature is  unremarkable.  There is no evidence of an acute osseous abnormality.   There are no suspicious-appearing parenchymal soft tissue nodules.          Impression:      Right effusion and right basilar consolidation     This report was finalized on 12/31/2020 9:31 AM by Dr. Wesly Reynolds MD.           I have personally reviewed the above radiology results.   ---------------------------------------------------------------------------------------------------------------------  Assessment & Plan      · ESRD, noncompliant with hemodialysis (reports no HD sessions in 1 month)  · Elevated anion gap metabolic acidosis with secondary respiratory alkalosis  -CT of the abdomen/pelvis shows bilateral hydronephrosis and hydroureter with thickened urinary bladder wall, unchanged compared to prior CT  -Nephrology consulted from the ED, appreciate their input  -Discussed with the patient on the importance of compliance as well as risks of noncompliance, including and leading up to death, he expressed understanding  -Consult  to see if we can arrange an outpatient hemodialysis chair  -He does not follow routinely with nephrology as an outpatient, but reports he was to have an appointment today which he missed because he was here  -BMP in a.m.  -Check phosphorus level    · Acute cystitis with hematuria  -Continue IV aztreonam  -Follow-up on urine culture and sensitivity  -Previous urine cultures have been positive for Serratia marcescens in the past    · Acute on chronic normocytic anemia  · Thrombocytopenia  -Likely secondary to ESRD and chronic liver  disease  -1 unit of PRBCs was ordered in the ED, however, patient has multiple antibodies and the transfusion has not been completed as of yet, transfuse when the blood is available  -Check iron studies  -Repeat CBC in a.m.    · Elevated troponin  -Denies any chest pains or discomfort  -Likely related to ESRD  -Chronically elevated, but does appear to be slightly elevated from baseline  -EKG without acute changes concerning for ischemia or infarct  -Follow-up on serial troponins and EKGs  -Will hold off on aspirin secondary to severe anemia  -Continue home beta-blocker once confirmed  -Check fasting lipid panel in a.m.  -May consider transthoracic echocardiogram    · Elevated D-dimer  -Denies any known history of DVT or PE  -Perfusion portion of VQ scan shows no evidence of PE  -Obtain venous dopplers of the bilateral lower extremities    · Acute hypomagnesemia  -We will give 1 g of IV magnesium sulfate for now and repeat magnesium level in a.m.    · Acute hyponatremia  -Corrected sodium for hyperglycemia is 130  -Hemodialysis pending  -Repeat BMP in a.m.    · Hyperglycemia  -No known history of diabetes mellitus  -Check hemoglobin A1c level  -Check twice daily fingerstick blood glucose levels  -We will consider adding sliding scale insulin if continued hyperglycemia noted    · Elevated amylase and lipase  -Pancreas is unremarkable on CT scan  -No evidence of pancreatitis on exam  -Monitor    · Hypocalcemia  -Corrected calcium for hypoalbuminemia is 6.9  -Check ionized calcium and vitamin D levels     · Retroperitoneal adenopathy  -Stable compared to previous CT scan  -Outpatient monitoring recommended    · Constipation  -Add MiraLAX and Senokot    · Reported history of being hepatitis B and C+  -Recommend outpatient referral to GI or hepatitis clinic    · Tobacco abuse  -Strongly recommend smoking cessation  -NicoDerm patch daily    · DVT Prophylaxis  -Subcutaneous heparin    The patient is considered to be a high  "risk patient due to: ESRD, noncompliant with hemodialysis, metabolic acidosis    Code Status: FULL CODE    I have discussed the patient's assessment and plan with the patient, RNZhanna, and admitting physician, Dr. Calabrese.     CHRIS Frost  12/31/20  15:18 EST  ---------------------------------------------------------------------------------------------------------------------       Electronically signed by Adithya Calabrese DO at 12/31/20 1725          Emergency Department Notes      Brigitte Gould RN at 12/31/20 0810        Pt brought to room 10 at this time     Brigitte Gould RN  12/31/20 0825      Electronically signed by Brigitte Gould RN at 12/31/20 0825     Caroline Pacheco, RN at 12/31/20 0810        Pt states it hurts to take \"a deep breath\"     Caroline Pacheco RN  12/31/20 0827      Electronically signed by Caroline Pacheco RN at 12/31/20 0827     Felisa Shipman PA at 12/31/20 0836     Attestation signed by Ajay Simmons MD at 12/31/20 1437          For this patient encounter, I reviewed the NP or PA documentation, treatment plan, and medical decision making. Ajay Simmons MD 12/31/2020 14:37 EST                  Subjective   45-year-old male who presents to the ED today for right sided chest pain.  He states he has had this pain for about 2 days.  He states that feels like he pulled a muscle in his right chest.  He states it hurts worse when he moves or takes a deep breath.  He denies any known injury.  He denies any cough or fever.  He states he has been slightly short of breath and has had some mild congestion.  He denies any nausea, vomiting or diarrhea.  He states his appetite has been normal.  The patient is an end-stage renal disease patient and is supposed to be receiving dialysis.  He states he has not had dialysis in about 1 month.  He states he does not go to a dialysis clinic at this time.  He reports that no one will accept " him as a patient because he has hepatitis B and C.  He does have a dialysis catheter in his right chest.  He states it has been there for about 6 months and he believes it was placed at [facility].  He states the last time he got dialysis was here at this hospital which was on November 15.  He then signed out AMA right after receiving dialysis.  He also did this in October.      History provided by:  Patient  Chest Pain  Pain location:  R chest  Pain quality: aching and sharp    Pain radiates to:  Does not radiate  Pain severity:  Moderate  Onset quality:  Gradual  Duration:  2 days  Timing:  Constant  Progression:  Waxing and waning  Chronicity:  New  Context: breathing    Relieved by:  Nothing  Worsened by:  Deep breathing and movement  Associated symptoms: shortness of breath    Associated symptoms: no abdominal pain, no altered mental status, no anorexia, no anxiety, no back pain, no cough, no diaphoresis, no dizziness, no dysphagia, no fatigue, no fever, no headache, no heartburn, no lower extremity edema, no nausea, no near-syncope, no orthopnea, no palpitations, no syncope, no vomiting and no weakness    Risk factors: male sex and smoking        Review of Systems   Constitutional: Negative.  Negative for diaphoresis, fatigue and fever.   HENT: Positive for congestion. Negative for trouble swallowing.    Eyes: Negative.    Respiratory: Positive for shortness of breath. Negative for cough, chest tightness and wheezing.    Cardiovascular: Positive for chest pain. Negative for palpitations, orthopnea, leg swelling, syncope and near-syncope.   Gastrointestinal: Negative for abdominal pain, anorexia, diarrhea, heartburn, nausea and vomiting.   Genitourinary: Negative.    Musculoskeletal: Negative for back pain.   Skin: Negative.    Neurological: Negative for dizziness, weakness and headaches.   Psychiatric/Behavioral: Negative.    All other systems reviewed and are negative.      Past Medical History:    Diagnosis Date   • COPD (chronic obstructive pulmonary disease) (CMS/[facility])    • Endocarditis    • ESRD (end stage renal disease) (CMS/[facility])    • Infectious viral hepatitis    • IV drug abuse (CMS/[facility])    • Kidney failure    • Renal insufficiency    • Unable to read or write        Allergies   Allergen Reactions   • Penicillins Shortness Of Breath, Itching and Rash       Past Surgical History:   Procedure Laterality Date   • CENTRAL VENOUS CATHETER TUNNELED INSERTION DOUBLE LUMEN Right     Chest for hemodialysis       Family History   Problem Relation Age of Onset   • Alcohol abuse Mother    • Hypertension Mother    • Hypertension Other    • Kidney disease Maternal Grandmother        Social History     Socioeconomic History   • Marital status:      Spouse name: Not on file   • Number of children: Not on file   • Years of education: Not on file   • Highest education level: Not on file   Tobacco Use   • Smoking status: Current Every Day Smoker     Packs/day: 0.50     Years: 20.00     Pack years: 10.00     Types: Cigarettes   • Smokeless tobacco: Never Used   Substance and Sexual Activity   • Alcohol use: Not Currently     Frequency: Never     Comment: Quit 18 years ago   • Drug use: Yes     Types: Methamphetamines, IV     Comment: last used on Friday 5/18/2019   • Sexual activity: Defer           Objective   Physical Exam  Vitals signs and nursing note reviewed.   Constitutional:       General: He is not in acute distress.     Appearance: He is well-developed. He is ill-appearing (chronically). He is not toxic-appearing.   HENT:      Head: Normocephalic and atraumatic.      Mouth/Throat:      Mouth: Mucous membranes are moist.      Pharynx: Oropharynx is clear.   Eyes:      Extraocular Movements: Extraocular movements intact.      Pupils: Pupils are equal, round, and reactive to light.   Neck:      Musculoskeletal: Normal range of motion and neck supple.      Vascular: No JVD.      Trachea: No tracheal  deviation.   Cardiovascular:      Rate and Rhythm: Normal rate and regular rhythm.      Pulses: Normal pulses.      Heart sounds: Normal heart sounds.   Pulmonary:      Effort: Pulmonary effort is normal. No accessory muscle usage or respiratory distress.      Breath sounds: Examination of the right-lower field reveals decreased breath sounds. Decreased breath sounds present. No wheezing or rhonchi.   Chest:      Chest wall: No tenderness.      Comments: Dialysis catheter seen in right chest wall - dressing appears very dirty and unkempt  Abdominal:      General: Bowel sounds are normal.      Palpations: Abdomen is soft.      Tenderness: There is no abdominal tenderness. There is no guarding or rebound.   Musculoskeletal: Normal range of motion.      Right lower leg: No edema.      Left lower leg: No edema.   Lymphadenopathy:      Cervical: No cervical adenopathy.   Skin:     General: Skin is warm and dry.      Capillary Refill: Capillary refill takes less than 2 seconds.   Neurological:      General: No focal deficit present.      Mental Status: He is alert and oriented to person, place, and time.   Psychiatric:         Mood and Affect: Mood normal.         Procedures          ED Course  ED Course as of Dec 31 1333   Thu Dec 31, 2020   0940 FINDINGS:     Right effusion and right basilar consolidation  The cardiac silhouette is normal. The pulmonary vasculature is  unremarkable.  There is no evidence of an acute osseous abnormality.   There are no suspicious-appearing parenchymal soft tissue nodules.        IMPRESSION:  Right effusion and right basilar consolidation   XR Chest 1 View [AH]   0945 COVID19: Not Detected [AH]   1045 Blood bank advised it will be several hours before his blood is available as he has antibodies.    [AH]   1059 FINDINGS:     Lower thorax: Right pleural effusion     Abdomen:     Liver: Homogeneous. No focal hepatic mass or ductal dilatation.     Gallbladder: Contracted. Not well  delineated     Pancreas: Unremarkable. No mass or ductal dilatation.     Spleen: Homogeneous. No splenomegaly.     Adrenals: No mass.     Kidneys/ureters: Bilateral hydronephrosis and hydroureter again noted.  No distally obstructing stones are seen. Urinary bladder wall however is  thickened.     GI tract: Non-dilated. No definite wall thickening..     MESENTERY: Retroperitoneal adenopathy with index node just to the left  of the aorta on image 45 of the axial series. Short axis measuring 1.77  cm. Previously measured 1.8 cm. Other lymph nodes again noted and  similar in size as well.      Vasculature: No evidence of aneurysm.     Abdominal wall: No focal hernia or mass.        Pelvis:     Bladder: Thickening of the urinary bladder wall     Reproductive: Unremarkable as visualized     Bones: No acute bony abnormality.     IMPRESSION:     1. Bilateral hydronephrosis and hydroureter to the level of a thickened  urinary bladder wall. Appearance unchanged from the previous exam     2.Retroperitoneal adenopathy also unchanged     3. Right effusion  4. Moderate to large stool burden   CT Abdomen Pelvis Without Contrast [AH]   1059 FINDINGS:     LUNGS: COPD.     HEART: Trace pericardial effusion     MEDIASTINUM: No masses. No enlarged lymph nodes.  No fluid collections.     PLEURA: Small right pleural effusion     VASCULATURE: No evidence of aneurysm. No evidence of pulmonary embolism.     BONES: No acute bony abnormality.     VISUALIZED UPPER ABDOMEN:Please see CT scan report for abdomen and  pelvis     Other: None.     IMPRESSION:     1. Right pleural effusion  2. COPD   CT Chest Without Contrast [AH]   1133 FINDINGS: There is a homogeneous distribution of the radiotracer.     No segmental or subsegmental perfusion defects are seen.     IMPRESSION:  Based on the perfusion exam, no evidence of a pulmonary embolus    NM Lung Scan Perfusion Particulate [AH]   1134 Will discuss with hospitalist service    [AH]   1151 I  discussed with Dr. Calabrese who will admit.    [AH]   1159 Left message to have Dr. Corado call the ED regarding this patient.    [AH]   1200 I saw this patient with Felisa.  I agree with her plan of care.    [CM]   1243 I spoke with Dr. Corado and made him aware of this patient and the need for dialysis.    [AH]      ED Course User Index  [AH] Felisa Shipman, PA  [CM] Ajay Simmons MD                                           MDM  Number of Diagnoses or Management Options  Bacterial UTI:   Elevated troponin:   ESRD (end stage renal disease) (CMS/[facility]):   Metabolic acidosis:      Amount and/or Complexity of Data Reviewed  Clinical lab tests: reviewed  Tests in the radiology section of CPT®: reviewed  Tests in the medicine section of CPT®: reviewed  Decide to obtain previous medical records or to obtain history from someone other than the patient: yes  Discuss the patient with other providers: yes    Patient Progress  Patient progress: stable      Final diagnoses:   ESRD (end stage renal disease) (CMS/[facility])   Metabolic acidosis   Elevated troponin   Bacterial UTI            Felisa Shipman PA  12/31/20 1243       Felisa Shipman PA  12/31/20 1333      Electronically signed by Ajay Simmons MD at 12/31/20 1437     Wilson Luna at 12/31/20 1034        Called for diet tray.     Wilson Luna  12/31/20 1034      Electronically signed by Wilson Luna at 12/31/20 1034     Brigitte Gould RN at 12/31/20 1221        Updated pts daughter, reji, [phone]     Brigitte Gould, RN  12/31/20 1222      Electronically signed by Brigitte Gould, RN at 12/31/20 1222     Brigitte Gould, RN at 12/31/20 1328        Dressing to dialysis catheter changed at this time, area cleaned with hibiclens     Brigitte Gould, RN  12/31/20 1329      Electronically signed by Brigitte Gould, RN at 12/31/20 1329         Current Facility-Administered Medications   Medication Dose Route  Frequency Provider Last Rate Last Admin   • acetaminophen (TYLENOL) tablet 650 mg  650 mg Oral Q6H PRN Teena Ray PA   650 mg at 12/31/20 2144   • albumin human 25 % IV SOLN 25 g  25 g Intravenous PRN Taniya Corado MD       • amLODIPine (NORVASC) tablet 5 mg  5 mg Oral Nightly Adithya Calabrese DO       • aztreonam (AZACTAM) 500 mg in sodium chloride 0.9 % 100 mL IVPB  500 mg Intravenous Q12H Adithya Calabrese DO   Stopped at 01/01/21 0350   • calcium acetate (PHOS BINDER)) capsule 667 mg  667 mg Oral TID With Meals Adithya Calabrese DO       • carvedilol (COREG) tablet 12.5 mg  12.5 mg Oral BID With Meals Adithya Calabrese DO       • heparin (porcine) 5000 UNIT/ML injection 5,000 Units  5,000 Units Subcutaneous Q12H Adithya Calabrese DO       • hydrALAZINE (APRESOLINE) tablet 10 mg  10 mg Oral Q8H Adithya Calabrese DO       • isosorbide mononitrate (IMDUR) 24 hr tablet 30 mg  30 mg Oral QAM Adithya Calabrese DO       • nicotine (NICODERM CQ) 7 MG/24HR patch 1 patch  1 patch Transdermal Q24H Dorcas Conti PA       • pantoprazole (PROTONIX) EC tablet 40 mg  40 mg Oral Daily Adithya Calabrese DO       • polyethylene glycol (MIRALAX) packet 17 g  17 g Oral Daily Dorcas Conti PA       • sennosides-docusate (PERICOLACE) 8.6-50 MG per tablet 2 tablet  2 tablet Oral Nightly Dorcas Conti PA       • sodium chloride 0.9 % flush 10 mL  10 mL Intravenous PRN Felisa Shipman PA       • sodium chloride 0.9 % flush 10 mL  10 mL Intravenous Q12H Adithya Calabrese DO   10 mL at 12/31/20 2146   • sodium chloride 0.9 % flush 10 mL  10 mL Intravenous PRN Adithya Calabrese DO         Orders (last 24 hrs)      Start     Ordered    01/01/21 2100  amLODIPine (NORVASC) tablet 5 mg  Nightly      01/01/21 0740    01/01/21 1100  Hemoglobin & Hematocrit, Blood  Once      01/01/21 0731    01/01/21 1000  carvedilol (COREG) tablet 12.5 mg   2 Times Daily With Meals      01/01/21 0740    01/01/21 1000  isosorbide mononitrate (IMDUR) 24 hr tablet 30 mg  Every Morning      01/01/21 0740    01/01/21 1000  pantoprazole (PROTONIX) EC tablet 40 mg  Daily      01/01/21 0740    01/01/21 0900  calcium acetate (PHOS BINDER)) capsule 667 mg  3 Times Daily With Meals      01/01/21 0740    01/01/21 0900  hydrALAZINE (APRESOLINE) tablet 10 mg  Every 8 Hours Scheduled      01/01/21 0740    01/01/21 0806  Inpatient Admission  Once      01/01/21 0805    01/01/21 0627  POC Glucose Once  Once      01/01/21 0620    01/01/21 0600  CBC (No Diff)  Morning Draw,   Status:  Canceled      12/31/20 1222    01/01/21 0600  Basic Metabolic Panel  Morning Draw      12/31/20 1222    01/01/21 0600  Iron Profile  Morning Draw      12/31/20 1433    01/01/21 0600  Ferritin  Morning Draw      12/31/20 1433    01/01/21 0600  Lipid Panel  Morning Draw      12/31/20 1544    01/01/21 0600  Magnesium  Morning Draw      12/31/20 1547    01/01/21 0600  Calcium, Ionized  Morning Draw      12/31/20 1553    01/01/21 0600  Vitamin D 25 Hydroxy  Morning Draw      12/31/20 1553    01/01/21 0600  CBC & Differential  Morning Draw      12/31/20 1558    01/01/21 0600  CBC Auto Differential  PROCEDURE ONCE      01/01/21 0002    01/01/21 0600  Magnesium  Morning Draw      01/01/21 0445    01/01/21 0500  ECG 12 Lead  Tomorrow AM      12/31/20 1543    01/01/21 0215  diphenhydrAMINE (BENADRYL) capsule 25 mg  Once      01/01/21 0119    01/01/21 0215  diphenhydrAMINE (BENADRYL) injection 25 mg  Once      01/01/21 0119    01/01/21 0215  acetaminophen (TYLENOL) tablet 650 mg  Once      01/01/21 0119    01/01/21 0215  acetaminophen (TYLENOL) 160 MG/5ML solution 650 mg  Once      01/01/21 0119    01/01/21 0215  acetaminophen (TYLENOL) suppository 650 mg  Once      01/01/21 0119    01/01/21 0120  Prepare RBC, 1 Units  Blood - Once      01/01/21 0119    01/01/21 0119  Transfuse RBC Infuse Each Unit Over: 3.5H   Transfusion      01/01/21 0119    01/01/21 0119  Verify Informed Consent for Blood Product Administration  Once      01/01/21 0119    01/01/21 0030  traMADol (ULTRAM) tablet 50 mg  Once      12/31/20 2341    01/01/21 0000  Case Management  Consult  Once     Provider:  (Not yet assigned)    12/31/20 1524    01/01/21 0000  US Venous Doppler Lower Extremity Bilateral (duplex)  1 Time Imaging      12/31/20 1546    01/01/21 0000  Hemoglobin & Hematocrit, Blood  Timed,   Status:  Canceled      12/31/20 2233    12/31/20 2131  acetaminophen (TYLENOL) tablet 650 mg  Every 6 Hours PRN      12/31/20 2131    12/31/20 2101  POC Glucose Once  Once      12/31/20 2054 12/31/20 2100  sennosides-docusate (PERICOLACE) 8.6-50 MG per tablet 2 tablet  Nightly      12/31/20 1554    12/31/20 2100  heparin (porcine) injection 2,000 Units  Once in Dialysis      12/31/20 2010 12/31/20 1925  Hepatitis B Surface Antigen  Once      12/31/20 1924    12/31/20 1830  sodium chloride 0.9 % bolus 1,000 mL  Once in Dialysis      12/31/20 1742    12/31/20 1800  aztreonam (AZACTAM) 500 mg in sodium chloride 0.9 % 100 mL IVPB  Every 12 Hours      12/31/20 1215    12/31/20 1800  CBC (No Diff)  Once      12/31/20 1222    12/31/20 1800  POC Glucose Finger BID  2 Times Daily      12/31/20 1550    12/31/20 1743  Hepatitis Panel, Acute  Once      12/31/20 1742    12/31/20 1741  albumin human 25 % IV SOLN 25 g  As Needed      12/31/20 1742    12/31/20 1741  Hemodialysis Inpatient  Once      12/31/20 1742    12/31/20 1740  Troponin  Now Then Every 6 Hours      12/31/20 1542    12/31/20 1700  magnesium sulfate in D5W 1g/100mL (PREMIX)  Once      12/31/20 1547    12/31/20 1700  polyethylene glycol (MIRALAX) packet 17 g  Daily      12/31/20 1554    12/31/20 1645  nicotine (NICODERM CQ) 7 MG/24HR patch 1 patch  Every 24 Hours Scheduled      12/31/20 1553    12/31/20 1600  Vital Signs  Every 4 Hours      12/31/20 1353    12/31/20 1550  Hemoglobin  A1c  Once      12/31/20 1550    12/31/20 1548  Phosphorus  Once      12/31/20 1547    12/31/20 1445  sodium chloride 0.9 % flush 10 mL  Every 12 Hours Scheduled      12/31/20 1353    12/31/20 1445  heparin (porcine) 5000 UNIT/ML injection 5,000 Units  Every 12 Hours Scheduled      12/31/20 1353    12/31/20 1435  Urine Drug Screen - Urine, Clean Catch  Once      12/31/20 1434    12/31/20 1354  Intake & Output  Every Shift      12/31/20 1353    12/31/20 1354  Weigh Patient  Once      12/31/20 1353    12/31/20 1354  Insert Peripheral IV  Once      12/31/20 1353    12/31/20 1354  Saline Lock & Maintain IV Access  Continuous      12/31/20 1353    12/31/20 1354  Cardiac Monitoring  Continuous      12/31/20 1353    12/31/20 1354  Diet Regular; Renal  Diet Effective Now      12/31/20 1353    12/31/20 1354  Inpatient Nephrology Consult  Once     Specialty:  Nephrology  Provider:  Taniya Corado MD    12/31/20 1353    12/31/20 1354  Antibody Identification  Once      12/31/20 1353    12/31/20 1353  sodium chloride 0.9 % flush 10 mL  As Needed      12/31/20 1353    12/31/20 1243  Inpatient Nephrology Consult  STAT     Specialty:  Nephrology  Provider:  Taniya Corado MD    12/31/20 1243    12/31/20 1217  aztreonam (AZACTAM) 1 g/100 mL 0.9% NS IVPB (mbp)  Once,   Status:  Discontinued      12/31/20 1215    12/31/20 1207  Code Status and Medical Interventions:  Continuous      12/31/20 1208    12/31/20 1207  Initiate Observation Status  Once      12/31/20 1208    12/31/20 1157  HYDROmorphone (DILAUDID) injection 0.5 mg  Once      12/31/20 1155    12/31/20 1151  Hospitalist (on-call MD unless specified)  Once,   Status:  Canceled     Specialty:  Hospitalist  Provider:  Adithya Calabrese,     12/31/20 1150    12/31/20 1124  NM Lung Scan Perfusion Particulate  1 Time Imaging      12/31/20 0940    12/31/20 1115  technetium albumin aggregated (MAA) solution 1 dose  Once in Imaging      12/31/20 1122    12/31/20 1057  Urine  Culture - Urine, Urine, Clean Catch  Once      12/31/20 1056    12/31/20 1032  Urinalysis With Culture If Indicated - Urine, Clean Catch  Once,   Status:  Canceled      12/31/20 1031    12/31/20 1030  aztreonam (AZACTAM) 2 g/100 mL 0.9% NS (mbp)  Once      12/31/20 1022    12/31/20 1030  vancomycin 1500 mg/500 mL 0.9% NS IVPB ([facility])  Once      12/31/20 1022    12/31/20 1016  ondansetron (ZOFRAN) injection 4 mg  Once      12/31/20 1014    12/31/20 1016  morphine injection 2 mg  Once      12/31/20 1014    12/31/20 0941  CT Chest Without Contrast  1 Time Imaging      12/31/20 0940    12/31/20 0941  CT Abdomen Pelvis Without Contrast  1 Time Imaging      12/31/20 0940    12/31/20 0941  NM Lung Ventilation Perfusion  1 Time Imaging,   Status:  Canceled      12/31/20 0940    12/31/20 0940  Verify Informed Consent  Once      12/31/20 0939    12/31/20 0940  Prepare RBC, 1 Units  Blood - Once      12/31/20 0939    12/31/20 0939  Transfuse RBC Infuse Each Unit Over: 2H  Transfusion      12/31/20 0939    12/31/20 0932  Type & Screen  Once      12/31/20 0931    12/31/20 0913  Urinalysis, Microscopic Only - Urine, Clean Catch  Once      12/31/20 0912    12/31/20 0906  Blood Gas, Arterial With Co-Ox  Once      12/31/20 0901    12/31/20 0854  Undress and Gown  Once      12/31/20 0853    12/31/20 0853  Blood Culture - Blood, Arm, Right  Once      12/31/20 0853    12/31/20 0853  Blood Culture - Blood, Arm, Left  Once      12/31/20 0853    12/31/20 0853  Magnesium  Once      12/31/20 0853    12/31/20 0853  ECG 12 Lead  Once      12/31/20 0853    12/31/20 0853  XR Chest 1 View  1 Time Imaging      12/31/20 0853    12/31/20 0853  Monitor Blood Pressure  Per Hospital Policy      12/31/20 0853    12/31/20 0853  Cardiac Monitoring  Once,   Status:  Canceled      12/31/20 0853    12/31/20 0853  Pulse Oximetry, Continuous  Continuous      12/31/20 0853    12/31/20 0853  Insert peripheral IV  Once      12/31/20 0853    12/31/20 0853  sodium  chloride 0.9 % flush 10 mL  As Needed      12/31/20 0853    12/31/20 0851  D-dimer, Quantitative  STAT      12/31/20 0853    12/31/20 0851  C-reactive Protein  STAT      12/31/20 0853    12/31/20 0851  Lactic Acid, Plasma  STAT      12/31/20 0853    12/31/20 0851  Blood Gas, Arterial -With Co-Ox Panel: Yes  STAT      12/31/20 0853    12/31/20 0851  Ferritin  STAT      12/31/20 0853    12/31/20 0851  Protime-INR  Once      12/31/20 0853    12/31/20 0851  aPTT  Once      12/31/20 0853    12/31/20 0851  Lipase  Once      12/31/20 0853    12/31/20 0851  Amylase  STAT      12/31/20 0853    12/31/20 0851  Urinalysis With Microscopic If Indicated (No Culture) - Urine, Clean Catch  Once      12/31/20 0853    12/31/20 0851  Troponin  Now Then Every 2 Hours      12/31/20 0853    12/31/20 0851  BNP  Once      12/31/20 0853    12/31/20 0850  COVID-19 and FLU A/B PCR - Swab, Nasopharynx  Once      12/31/20 0853    12/31/20 0850  CBC & Differential  STAT      12/31/20 0853    12/31/20 0850  Comprehensive Metabolic Panel  STAT      12/31/20 0853    12/31/20 0850  Lactate Dehydrogenase  STAT      12/31/20 0853    12/31/20 0850  Procalcitonin  STAT      12/31/20 0853    12/31/20 0850  CBC Auto Differential  PROCEDURE ONCE      12/31/20 0853    Unscheduled  Up With Assistance  As Needed      12/31/20 1353                Physician Progress Notes (last 24 hours) (Notes from 12/31/20 0813 through 01/01/21 0813)    No notes of this type exist for this encounter.         Consult Notes (last 24 hours) (Notes from 12/31/20 0813 through 01/01/21 0813)    No notes of this type exist for this encounter.

## 2021-01-01 NOTE — PROGRESS NOTES
[facility] HOSPITALIST PROGRESS NOTE     Patient Identification:  Name:  Mario Nair  Age:  45 y.o.  Sex:  male  :  1975  MRN:  [id number]  Visit Number:  [id number]  Primary Care Provider:  Danny Rebolledo MD    Length of stay:  0    Chief complaint: General malaise    Subjective:    Patient reports he feels much better today, he denies any fevers, chills or shortness of breath.  Per patient, no new events overnight.  Patient is currently on hemodialysis during our interview.  Per dialysis nurse, patient is to be dialyzed for 3 hours today and will pull approximately 2 L of volume.  ----------------------------------------------------------------------------------------------------------------------  Current Hospital Meds:  amLODIPine, 5 mg, Oral, Nightly  aztreonam, 500 mg, Intravenous, Q12H  calcium acetate, 667 mg, Oral, TID With Meals  carvedilol, 12.5 mg, Oral, BID With Meals  heparin (porcine), 5,000 Units, Subcutaneous, Q12H  hydrALAZINE, 10 mg, Oral, Q8H  isosorbide mononitrate, 30 mg, Oral, QAM  nicotine, 1 patch, Transdermal, Q24H  pantoprazole, 40 mg, Oral, Daily  polyethylene glycol, 17 g, Oral, Daily  senna-docusate sodium, 2 tablet, Oral, Nightly  sodium chloride, 1,000 mL, Intravenous, Once in Dialysis  sodium chloride, 10 mL, Intravenous, Q12H         ----------------------------------------------------------------------------------------------------------------------  Vital Signs:  Temp:  [97.3 °F (36.3 °C)-98.5 °F (36.9 °C)] 98.1 °F (36.7 °C)  Heart Rate:  [] 90  Resp:  [18-20] 18  BP: (113-162)/(72-97) 141/90      20  0809 20  1354   Weight: 78 kg (172 lb) 67.5 kg (148 lb 12.8 oz)     Body mass index is 19.1 kg/m².    Intake/Output Summary (Last 24 hours) at 2021 1123  Last data filed at 2021 0705  Gross per 24 hour   Intake 1000 ml   Output 150 ml   Net 850 ml     Diet Regular;  Renal  ----------------------------------------------------------------------------------------------------------------------  Physical exam:  Constitutional: Chronically ill-appearing  male in no apparent distress.     HENT:  Head:  Normocephalic and atraumatic.  Mouth:  Moist mucous membranes.    Eyes:  Conjunctivae and EOM are normal.  Pupils are equal, round, and reactive to light.  No scleral icterus.    Neck:  Neck supple. No thyromegaly.  No JVD present.    Cardiovascular:  Regular rate and rhythm with no murmurs, rubs, clicks or gallops appreciated.  Pulmonary/Chest:  Clear to auscultation bilaterally with no crackles, wheezes or rhonchi appreciated.  Abdominal:  Soft. Nontender. Nondistended  Bowel sounds are normal in all four quadrants. No organomegally appreciated.   Musculoskeletal:  No edema, no tenderness, and no deformity.  No red or swollen joints anywhere.    Neurological:  Alert and oriented to person, place, and time. Cranial nerves II-XII intact with no focal deficits.  No facial droop.  No slurred speech.   Skin:  Warm and dry to palpation with no rashes or lesions appreciated.  Peripheral vascular:  2+ radial and pedal pulses in bilateral upper and lower extremities.  Psychiatric:  Alert and oriented x3, demonstrates appropriate judgement and insight.  ----------------------------------------------------------------------------------------------------------------------  Tele:    ----------------------------------------------------------------------------------------------------------------------  Results from last 7 days   Lab Units 12/31/20  1811 12/31/20  1713 12/31/20  1141   TROPONIN T ng/mL 0.151* 0.151* 0.157*     Results from last 7 days   Lab Units 01/01/21  1105 01/01/21  0049 12/31/20  1713 12/31/20  0910 12/31/20  0904   CRP mg/dL  --   --   --   --  3.68*   LACTATE mmol/L  --   --   --  0.8  --    WBC 10*3/mm3  --  4.45 4.79  --  5.87   HEMOGLOBIN g/dL 9.0* 6.7* 6.0*  --   6.5*   HEMATOCRIT % 26.7* 19.9* 18.7*  --  20.7*   MCV fL  --  86.1 88.6  --  88.8   MCHC g/dL  --  33.7 32.1  --  31.4*   PLATELETS 10*3/mm3  --  85* 84*  --  105*   INR   --   --   --   --  1.57*     Results from last 7 days   Lab Units 12/31/20  0901   PH, ARTERIAL pH units 7.146*   PO2 ART mm Hg 109.0*   PCO2, ARTERIAL mm Hg 19.1*   HCO3 ART mmol/L 6.6*     Results from last 7 days   Lab Units 01/01/21  0646 01/01/21  0049 12/31/20  0904   SODIUM mmol/L  --  131* 129*   POTASSIUM mmol/L  --  3.3* 5.1   MAGNESIUM mg/dL 1.5* 1.6 1.3*   CHLORIDE mmol/L  --  97* 98   CO2 mmol/L  --  17.8* 6.8*   BUN mg/dL  --  119* 227*   CREATININE mg/dL  --  8.16* 13.79*   EGFR IF NONAFRICN AM mL/min/1.73  --  7* 4*   CALCIUM mg/dL  --  6.9* 6.4*   GLUCOSE mg/dL  --  138* 169*   ALBUMIN g/dL  --   --  3.39*   BILIRUBIN mg/dL  --   --  0.4   ALK PHOS U/L  --   --  81   AST (SGOT) U/L  --   --  13   ALT (SGPT) U/L  --   --  21   Estimated Creatinine Clearance: 11 mL/min (A) (by C-G formula based on SCr of 8.16 mg/dL (H)).    No results found for: AMMONIA  Results from last 7 days   Lab Units 01/01/21  0049   CHOLESTEROL mg/dL 72   TRIGLYCERIDES mg/dL 61   HDL CHOL mg/dL 37*   LDL CHOL mg/dL 21     Blood Culture   Date Value Ref Range Status   12/31/2020 No growth at 24 hours  Preliminary   12/31/2020 No growth at 24 hours  Preliminary     No results found for: URINECX  No results found for: WOUNDCX  No results found for: STOOLCX    I have personally looked at the labs and they are summarized above.  ----------------------------------------------------------------------------------------------------------------------  Imaging Results (Last 24 Hours)     Procedure Component Value Units Date/Time    US Venous Doppler Lower Extremity Bilateral (duplex) [[id number]] Collected: 01/01/21 0748     Updated: 01/01/21 0750    Narrative:      US Veins LE Duplex BILAT    HISTORY:   Elevated d-dimer    TECHNIQUE:   Real-time ultrasound was  performed of both lower extremities utilizing spectral and color Doppler with compression and augmentation techniques.    COMPARISON:  None available.    FINDINGS:  Right Lower Extremity:  There is no deep venous thrombus seen in the right lower extremity, including the right common femoral veins, right femoral veins and right popliteal veins.  Normal compressibility and respiratory phasicity was visualized.  No calf vein thrombus.    Left Lower Extremity:  There is no deep venous thrombus seen in the left lower extremity, including the left common femoral veins, left femoral veins and left popliteal veins.   Normal compressibility and respiratory phasicity was visualized.  No calf vein thrombus.        Impression:      No deep venous thrombus seen in either lower extremity.    Signer Name: Roger Flanagan MD   Signed: 1/1/2021 7:48 AM   Workstation Name: [id number]    [facility] Lung Scan Perfusion Particulate [[id number]] Collected: 12/31/20 1126     Updated: 12/31/20 1129    Narrative:      EXAMINATION: NM LUNG SCAN PERFUSION PARTICULATE-      CLINICAL INDICATION: right sided chest pain, elevated d-dimer        COMPARISON: None available     PROCEDURE: 4.3 mCi technetium MAA was administered. Perfusion images  were then acquired.     FINDINGS: There is a homogeneous distribution of the radiotracer.     No segmental or subsegmental perfusion defects are seen.       Impression:      Based on the perfusion exam, no evidence of a pulmonary embolus         This report was finalized on 12/31/2020 11:27 AM by Dr. Wesly Reynolds MD.           ----------------------------------------------------------------------------------------------------------------------  Assessment and Plan:    1.  End-stage renal disease on hemodialysis -patient agreeable to stay until case management has arranged a seat for him at a local dialysis clinic.  Continue hemodialysis per nephrology recommendations.    2.   Acute cystitis -continue aztreonam, urine culture still pending.    3.  Acute on chronic normocytic anemia -likely progression of anemia of chronic disease.  Patient receiving second unit of PRBCs today.  Will target hemoglobin greater than 7.  Patient will likely need Epogen, will discuss with nephrology services.    4.  Hypomagnesemia -patient receiving hemodialysis today, will repeat after hemodialysis and correct as necessary    5.  Hyponatremia -will likely correct with hemodialysis    6.  Hypocalcemia    7.  Tobacco abuse            Adithya Calabrese,   01/01/21  11:23 EST

## 2021-01-01 NOTE — NURSING NOTE
Patient refused resendiz cath today and refused bladder scan for now.  Pt very tired and wanted to sleep.

## 2021-01-01 NOTE — PLAN OF CARE
Goal Outcome Evaluation:        Outcome Summary: Patient awake resting in bed. Patient receiving a second unit of PRBC at this time. Patient denies any complaints. Patient was dialyzed today with nothing removed, but was treated for 2.5 hours. Call light within reach. Will continue to monitor.

## 2021-01-01 NOTE — CONSULTS
Referring Provider: Dr. Calabrese  Reason for Consultation: End-stage renal disease    Chief complaint missing dialysis    Subjective .     History of present illness:  Mario Nair is a 45 y.o. male with past medical history significant for ESRD for which he is noncompliant with hemodialysis, history of hepatitis C and B, COPD, tobacco abuse, and a listed history of IV drug abuse, patient has history of noncompliance and has not been to any dialysis for many days now he is getting dialysis I saw the patient on dialysis feeling well      Review of Systems  All systems were reviewed and negative except for: Above    History  Past Medical History:   Diagnosis Date   • COPD (chronic obstructive pulmonary disease) (CMS/[facility])    • Endocarditis    • ESRD (end stage renal disease) (CMS/[facility])    • Infectious viral hepatitis    • IV drug abuse (CMS/[facility])    • Kidney failure    • Renal insufficiency    • Unable to read or write    ,   Past Surgical History:   Procedure Laterality Date   • CENTRAL VENOUS CATHETER TUNNELED INSERTION DOUBLE LUMEN Right     Chest for hemodialysis   ,   Family History   Problem Relation Age of Onset   • Alcohol abuse Mother    • Hypertension Mother    • Hypertension Other    • Kidney disease Maternal Grandmother    ,   Social History     Tobacco Use   • Smoking status: Current Every Day Smoker     Packs/day: 0.50     Years: 20.00     Pack years: 10.00     Types: Cigarettes   • Smokeless tobacco: Never Used   Substance Use Topics   • Alcohol use: Not Currently     Frequency: Never     Comment: Quit 18 years ago   • Drug use: Yes     Types: Methamphetamines, IV     Comment: last used on Friday 5/18/2019    and Allergies:  Penicillins    Objective     Lab Results (last 24 hours)     Procedure Component Value Units Date/Time    Magnesium [[id number]]  (Abnormal) Collected: 01/01/21 0646    Specimen: Blood Updated: 01/01/21 0755     Magnesium 1.5 mg/dL     POC Glucose Once [[id number]]  (Normal) Collected:  01/01/21 0620    Specimen: Blood Updated: 01/01/21 0626     Glucose 122 mg/dL     Vitamin D 25 Hydroxy [[id number]] Collected: 01/01/21 0049    Specimen: Blood Updated: 01/01/21 0518    Basic Metabolic Panel [[id number]]  (Abnormal) Collected: 01/01/21 0049    Specimen: Blood Updated: 01/01/21 0131     Glucose 138 mg/dL       mg/dL      Creatinine 8.16 mg/dL      Sodium 131 mmol/L      Potassium 3.3 mmol/L      Chloride 97 mmol/L      CO2 17.8 mmol/L      Calcium 6.9 mg/dL      eGFR   Amer --     Comment: <15 Indicative of kidney failure.        eGFR Non African Amer 7 mL/min/1.73      Comment: <15 Indicative of kidney failure.        BUN/Creatinine Ratio 14.6     Anion Gap 16.2 mmol/L     Narrative:      GFR Normal >60  Chronic Kidney Disease <60  Kidney Failure <15      Ferritin [[id number]]  (Abnormal) Collected: 01/01/21 0049    Specimen: Blood Updated: 01/01/21 0125     Ferritin 960.10 ng/mL     Narrative:      Results may be falsely decreased if patient taking Biotin.      Iron Profile [[id number]]  (Abnormal) Collected: 01/01/21 0049    Specimen: Blood Updated: 01/01/21 0119     Iron 67 mcg/dL      Iron Saturation 41 %      Transferrin 111 mg/dL      TIBC 165 mcg/dL     Lipid Panel [[id number]]  (Abnormal) Collected: 01/01/21 0049    Specimen: Blood Updated: 01/01/21 0119     Total Cholesterol 72 mg/dL      Triglycerides 61 mg/dL      HDL Cholesterol 37 mg/dL      LDL Cholesterol  21 mg/dL      VLDL Cholesterol 14 mg/dL      LDL/HDL Ratio 0.62    Narrative:      Cholesterol Reference Ranges  (U.S. Department of Health and Human Services ATP III Classifications)    Desirable          <200 mg/dL  Borderline High    200-239 mg/dL  High Risk          >240 mg/dL      Triglyceride Reference Ranges  (U.S. Department of Health and Human Services ATP III Classifications)    Normal           <150 mg/dL  Borderline High  150-199 mg/dL  High             200-499 mg/dL  Very High        >500 mg/dL    HDL Reference  Ranges  (U.S. Department of Health and Human Services ATP III Classifcations)    Low     <40 mg/dl (major risk factor for CHD)  High    >60 mg/dl ('negative' risk factor for CHD)        LDL Reference Ranges  (U.S. Department of Health and Human Services ATP III Classifcations)    Optimal          <100 mg/dL  Near Optimal     100-129 mg/dL  Borderline High  130-159 mg/dL  High             160-189 mg/dL  Very High        >189 mg/dL    Magnesium [[id number]]  (Normal) Collected: 01/01/21 0049    Specimen: Blood Updated: 01/01/21 0119     Magnesium 1.6 mg/dL     Calcium, Ionized [[id number]]  (Abnormal) Collected: 01/01/21 0049    Specimen: Blood Updated: 01/01/21 0107     Ionized Calcium 0.90 mmol/L     CBC & Differential [[id number]]  (Abnormal) Collected: 01/01/21 0049    Specimen: Blood Updated: 01/01/21 0102    Narrative:      The following orders were created for panel order CBC & Differential.  Procedure                               Abnormality         Status                     ---------                               -----------         ------                     CBC Auto Differential[[id number]]        Abnormal            Final result                 Please view results for these tests on the individual orders.    CBC Auto Differential [[id number]]  (Abnormal) Collected: 01/01/21 0049    Specimen: Blood Updated: 01/01/21 0102     WBC 4.45 10*3/mm3      RBC 2.31 10*6/mm3      Hemoglobin 6.7 g/dL      Hematocrit 19.9 %      MCV 86.1 fL      MCH 29.0 pg      MCHC 33.7 g/dL      RDW 14.6 %      RDW-SD 45.8 fl      MPV 10.3 fL      Platelets 85 10*3/mm3      Neutrophil % 60.0 %      Lymphocyte % 17.1 %      Monocyte % 17.8 %      Eosinophil % 2.9 %      Basophil % 0.2 %      Immature Grans % 2.0 %      Neutrophils, Absolute 2.67 10*3/mm3      Lymphocytes, Absolute 0.76 10*3/mm3      Monocytes, Absolute 0.79 10*3/mm3      Eosinophils, Absolute 0.13 10*3/mm3      Basophils, Absolute 0.01 10*3/mm3      Immature Grans,  "Absolute 0.09 10*3/mm3      nRBC 0.0 /100 WBC     POC Glucose Once [[id number]]  (Abnormal) Collected: 12/31/20 2054    Specimen: Blood Updated: 12/31/20 2100     Glucose 193 mg/dL     Hepatitis Panel, Acute [[id number]]  (Abnormal) Collected: 12/31/20 1811    Specimen: Blood Updated: 12/31/20 1924     Hepatitis B Surface Ag --     Hep A IgM Non-Reactive     Hep B C IgM Non-Reactive     Hepatitis C Ab Reactive    Narrative:      Results may be falsely decreased if patient taking Biotin.   Preliminary reactive result pending confirmation at [facility].     Hepatitis B Surface Antigen [[id number]] Collected: 12/31/20 1811    Specimen: Blood Updated: 12/31/20 1924    Procalcitonin [[id number]]  (Abnormal) Collected: 12/31/20 0910    Specimen: Blood from Arm, Right Updated: 12/31/20 1901     Procalcitonin 1.22 ng/mL     Narrative:      As a Marker for Sepsis (Non-Neonates):   1. <0.5 ng/mL represents a low risk of severe sepsis and/or septic shock.  1. >2 ng/mL represents a high risk of severe sepsis and/or septic shock.    As a Marker for Lower Respiratory Tract Infections that require antibiotic therapy:  PCT on Admission     Antibiotic Therapy             6-12 Hrs later  > 0.5                Strongly Recommended            >0.25 - <0.5         Recommended  0.1 - 0.25           Discouraged                   Remeasure/reassess PCT  <0.1                 Strongly Discouraged          Remeasure/reassess PCT      As 28 day mortality risk marker: \"Change in Procalcitonin Result\" (> 80 % or <=80 %) if Day 0 (or Day 1) and Day 4 values are available. Refer to http://[email or web address]-pct-calculator.com/   Change in PCT <=80 %   A decrease of PCT levels below or equal to 80 % defines a positive change in PCT test result representing a higher risk for 28-day all-cause mortality of patients diagnosed with severe sepsis or septic shock.  Change in PCT > 80 %   A decrease of PCT levels of more than 80 % defines a negative change in PCT result " representing a lower risk for 28-day all-cause mortality of patients diagnosed with severe sepsis or septic shock.                Results may be falsely decreased if patient taking Biotin.     Troponin [[id number]]  (Abnormal) Collected: 12/31/20 1811    Specimen: Blood Updated: 12/31/20 1857     Troponin T 0.151 ng/mL     Narrative:      Troponin T Reference Range:  <= 0.03 ng/mL-   Negative for AMI  >0.03 ng/mL-     Abnormal for myocardial necrosis.  Clinicians would have to utilize clinical acumen, EKG, Troponin and serial changes to determine if it is an Acute Myocardial Infarction or myocardial injury due to an underlying chronic condition.       Results may be falsely decreased if patient taking Biotin.      Troponin [[id number]]  (Abnormal) Collected: 12/31/20 1713    Specimen: Blood Updated: 12/31/20 1740     Troponin T 0.151 ng/mL     Narrative:      Troponin T Reference Range:  <= 0.03 ng/mL-   Negative for AMI  >0.03 ng/mL-     Abnormal for myocardial necrosis.  Clinicians would have to utilize clinical acumen, EKG, Troponin and serial changes to determine if it is an Acute Myocardial Infarction or myocardial injury due to an underlying chronic condition.       Results may be falsely decreased if patient taking Biotin.      CBC (No Diff) [[id number]]  (Abnormal) Collected: 12/31/20 1713    Specimen: Blood Updated: 12/31/20 1726     WBC 4.79 10*3/mm3      RBC 2.11 10*6/mm3      Hemoglobin 6.0 g/dL      Hematocrit 18.7 %      MCV 88.6 fL      MCH 28.4 pg      MCHC 32.1 g/dL      RDW 14.9 %      RDW-SD 48.0 fl      MPV 10.8 fL      Platelets 84 10*3/mm3     Hemoglobin A1c [[id number]]  (Normal) Collected: 12/31/20 0904    Specimen: Blood from Arm, Left Updated: 12/31/20 1623     Hemoglobin A1C 5.20 %     Narrative:      Hemoglobin A1C Ranges:    Increased Risk for Diabetes  5.7% to 6.4%  Diabetes                     >= 6.5%  Diabetic Goal                < 7.0%    Phosphorus [[id number]]  (Abnormal) Collected:  12/31/20 1141    Specimen: Blood Updated: 12/31/20 1607     Phosphorus 10.7 mg/dL     Urine Drug Screen - Urine, Clean Catch [[id number]]  (Normal) Collected: 12/31/20 0906    Specimen: Urine, Clean Catch Updated: 12/31/20 1534     Amphetamine Screen, Urine Negative     Barbiturates Screen, Urine Negative     Benzodiazepine Screen, Urine Negative     Cocaine Screen, Urine Negative     Methadone Screen, Urine Negative     Opiate Screen Negative     Phencyclidine (PCP), Urine Negative     THC, Screen, Urine Negative     6-ACETYL MORPHINE Negative     Buprenorphine, Screen, Urine Negative     Oxycodone Screen, Urine Negative    Narrative:      Negative Thresholds For Drugs Screened:                  Amphetamines              1000 ng/ml               Barbiturates               200 ng/ml               Benzodiazepines            200 ng/ml              Cocaine                    300 ng/ml              Methadone                  300 ng/ml              Opiates                    300 ng/ml               Phencyclidine               25 ng/ml               THC                         50 ng/ml              6-Acetyl Morphine           10 ng/ml              Buprenorphine                5 ng/ml              Oxycodone                  300 ng/ml    The reference range for all drugs tested is negative. This report includes final unconfirmed qualitative results to be used for medical treatment purposes only. Unconfirmed results must not be used for non-medical purposes such as employment or legal testing. Clinical consideration should be applied to any drug of abuse test, especially when unconfirmed quantitative results are used.        Troponin [[id number]]  (Abnormal) Collected: 12/31/20 1141    Specimen: Blood Updated: 12/31/20 1216     Troponin T 0.157 ng/mL     Narrative:      Troponin T Reference Range:  <= 0.03 ng/mL-   Negative for AMI  >0.03 ng/mL-     Abnormal for myocardial necrosis.  Clinicians would have to utilize clinical  acumen, EKG, Troponin and serial changes to determine if it is an Acute Myocardial Infarction or myocardial injury due to an underlying chronic condition.       Results may be falsely decreased if patient taking Biotin.      Urine Culture - Urine, Urine, Clean Catch [[id number]] Collected: 12/31/20 0906    Specimen: Urine, Clean Catch Updated: 12/31/20 1056    Blood Culture - Blood, Arm, Right [[id number]] Collected: 12/31/20 0910    Specimen: Blood from Arm, Right Updated: 12/31/20 0952    Blood Culture - Blood, Arm, Left [[id number]] Collected: 12/31/20 0910    Specimen: Blood from Arm, Left Updated: 12/31/20 0952    COVID-19 and FLU A/B PCR - Swab, Nasopharynx [[id number]]  (Normal) Collected: 12/31/20 0912    Specimen: Swab from Nasopharynx Updated: 12/31/20 0945     COVID19 Not Detected     Influenza A PCR Not Detected     Influenza B PCR Not Detected    Narrative:      Fact sheet for providers: https://www.fda.gov/media/344244/download    Fact sheet for patients: https://www.fda.gov/media/986769/download    Test performed by PCR.          Imaging Results (Last 24 Hours)     Procedure Component Value Units Date/Time    US Venous Doppler Lower Extremity Bilateral (duplex) [[id number]] Collected: 01/01/21 0748     Updated: 01/01/21 0750    Narrative:      US Veins LE Duplex BILAT    HISTORY:   Elevated d-dimer    TECHNIQUE:   Real-time ultrasound was performed of both lower extremities utilizing spectral and color Doppler with compression and augmentation techniques.    COMPARISON:  None available.    FINDINGS:  Right Lower Extremity:  There is no deep venous thrombus seen in the right lower extremity, including the right common femoral veins, right femoral veins and right popliteal veins.  Normal compressibility and respiratory phasicity was visualized.  No calf vein thrombus.    Left Lower Extremity:  There is no deep venous thrombus seen in the left lower extremity, including the left common femoral veins, left  femoral veins and left popliteal veins.   Normal compressibility and respiratory phasicity was visualized.  No calf vein thrombus.        Impression:      No deep venous thrombus seen in either lower extremity.    Signer Name: Roger Flanagan MD   Signed: 1/1/2021 7:48 AM   Workstation Name: [id number]    [facility] Lung Scan Perfusion Particulate [[id number]] Collected: 12/31/20 1126     Updated: 12/31/20 1129    Narrative:      EXAMINATION: NM LUNG SCAN PERFUSION PARTICULATE-      CLINICAL INDICATION: right sided chest pain, elevated d-dimer        COMPARISON: None available     PROCEDURE: 4.3 mCi technetium MAA was administered. Perfusion images  were then acquired.     FINDINGS: There is a homogeneous distribution of the radiotracer.     No segmental or subsegmental perfusion defects are seen.       Impression:      Based on the perfusion exam, no evidence of a pulmonary embolus         This report was finalized on 12/31/2020 11:27 AM by Dr. Wesly Reynolds MD.       CT Chest Without Contrast [[id number]] Collected: 12/31/20 1021     Updated: 12/31/20 1049    Narrative:      EXAM: CT CHEST WO CONTRAST-      CLINICAL INDICATION:right sided pleuritic pain      COMPARISON: 09/04/2020     TECHNIQUE: Multiple axial CT images were obtained from lung apex through  upper abdomen without the administration of IV contrast. Reformatted  images in the coronal and/or sagittal plane(s) were generated from the  axial data set to facilitate diagnostic accuracy and/or surgical  planning.     Radiation dose reduction techniques were utilized per ALARA protocol.  Automated exposure control was initiated through either or CareDose or  DoseRight software packages by  protocol.    DOSE (DLP mGy-cm):        FINDINGS:     LUNGS: COPD.     HEART: Trace pericardial effusion     MEDIASTINUM: No masses. No enlarged lymph nodes.  No fluid collections.     PLEURA: Small right pleural effusion      VASCULATURE: No evidence of aneurysm. No evidence of pulmonary embolism.     BONES: No acute bony abnormality.     VISUALIZED UPPER ABDOMEN:Please see CT scan report for abdomen and  pelvis     Other: None.       Impression:         1. Right pleural effusion  2. COPD           This report was finalized on 12/31/2020 10:22 AM by Dr. Wesly Reynolds MD.       CT Abdomen Pelvis Without Contrast [[id number]] Collected: 12/31/20 1022     Updated: 12/31/20 1048    Narrative:      EXAM: CT ABDOMEN PELVIS WO CONTRAST-         TECHNIQUE: Multiple axial CT images were obtained from lung bases  through pubic symphysis WITHOUT administration of IV contrast.  Reformatted images in the coronal and/or sagittal plane(s) were  generated from the axial data set to facilitate diagnostic accuracy  and/or surgical planning.  Oral Contrast:NONE.     Radiation dose reduction techniques were utilized per ALARA protocol.  Automated exposure control was initiated through either or Query Hunter or  DoseRight software packages by  protocol.    DOSE:     Clinical information right flank pain      Comparison 09/04/2020     FINDINGS:     Lower thorax: Right pleural effusion     Abdomen:     Liver: Homogeneous. No focal hepatic mass or ductal dilatation.     Gallbladder: Contracted. Not well delineated     Pancreas: Unremarkable. No mass or ductal dilatation.     Spleen: Homogeneous. No splenomegaly.     Adrenals: No mass.     Kidneys/ureters: Bilateral hydronephrosis and hydroureter again noted.  No distally obstructing stones are seen. Urinary bladder wall however is  thickened.     GI tract: Non-dilated. No definite wall thickening..     MESENTERY: Retroperitoneal adenopathy with index node just to the left  of the aorta on image 45 of the axial series. Short axis measuring 1.77  cm. Previously measured 1.8 cm. Other lymph nodes again noted and  similar in size as well.      Vasculature: No evidence of aneurysm.     Abdominal wall: No  focal hernia or mass.        Pelvis:     Bladder: Thickening of the urinary bladder wall     Reproductive: Unremarkable as visualized     Bones: No acute bony abnormality.       Impression:         1. Bilateral hydronephrosis and hydroureter to the level of a thickened  urinary bladder wall. Appearance unchanged from the previous exam     2.Retroperitoneal adenopathy also unchanged     3. Right effusion  4. Moderate to large stool burden              This report was finalized on 12/31/2020 10:25 AM by Dr. Wesly Reynolds MD.             Vital Signs   Temp:  [97.3 °F (36.3 °C)-98.5 °F (36.9 °C)] 97.6 °F (36.4 °C)  Heart Rate:  [] 92  Resp:  [18-20] 18  BP: (113-162)/(69-97) 113/75    Physical Exam:     General Appearance:    Alert, cooperative, in no acute distress, oriented times three   Head:    Normocephalic, without obvious abnormality   Eyes:            Conjunctivae and sclerae normal, no icterus, no pallor, pupils CCERLA   Ears:    Ears appear intact    Throat:   No oral lesions, no thrush, oral mucosa moist   Neck:   No adenopathy,no thyromegaly, no carotid bruit, no JVD   Back:    no tenderness to percussion, range of motion normal   Lungs:     Clear to auscultation,respirations regular, even and                  unlabored    Heart:    Regular rhythm and normal rate, normal S1 and S2, no            murmur, no gallop, no rub, no click   Chest Wall:    No abnormalities observed   Abdomen:     Normal bowel sounds, no masses, no organomegaly, soft        non-tender, non-distended, no guarding, no rebound                tenderness   Rectal:     Deferred   Extremities:   Moves all extremities well, no edema, no cyanosis, no             redness   Pulses:   Pulses palpable and equal bilaterally   Skin:   No petechiae, no nodules, bruising or rash   Lymph nodes:   No palpable adenopathy   Neurologic:   Cr Ns grossly intact, sensation N, moves all extremities.      Access: Right IJ catheter  Results Review:   I  reviewed the patient's new clinical results.  I personally viewed and interpreted the patient's EKG/Telemetry data      Assessment/Plan     Principal Problem:    ESRD (end stage renal disease) (CMS/[facility])  Active Problems:    Metabolic acidosis    Anemia due to chronic kidney disease    1-End-stage renal disease: With azotemia patient seen on dialysis will continue dialysis as needed basis    2-Hydronephrosis: We will place Fry catheter today and repeat labs in the morning    3-1nemia: Patient will get blood we will check iron and then decide about Epogen    4-Hypokalemia we will check after dialysis    5 Hypomagnesemia: Received IV magnesium will repeat in the morning    6.-Hepatitis C     7-Medical noncompliance: Patient counseled on the benefit of compliance and consequences of noncompliance he verbalized understanding    Plan of care discussed with pt, answered all questions, patient verbalised understanding and agreed.    KATHARINA Hylton MD  01/01/21  09:44 EST

## 2021-01-01 NOTE — NURSING NOTE
Pt refuses to be bladder scanned today.  Pt incontinent wears a brief and said he will urinate when he drinks more.

## 2021-01-02 NOTE — PLAN OF CARE
Goal Outcome Evaluation:  Plan of Care Reviewed With: patient  Progress: no change  Outcome Summary: Pt resting quietly in bed w/o any c/o.  Pt refused Coreg this am but did take the rest of his medication.  Pt stated he had a hard night last night because they kept taking his BP often.  VSS today.  Pt refuses bladder scan and resendiz cath.  Pt also refuses to let staff see brief but states he is urinating.  Pt said he has urinated in the restroom but staff has not seen pt out of bed.  Call light in reach.  Will continue to monitor.

## 2021-01-02 NOTE — PLAN OF CARE
Goal Outcome Evaluation:  Plan of Care Reviewed With: patient  Progress: no change  Outcome Summary: Pt has been resting on and off in bed. Pt has been slightly hypotensive throughout shift and BP meds held. Otherwise, VSS. Pt refuses bladder scans and states that he has urinated and no abdominal distension noted at this time. No complaints at this time. Will continue to monitor.

## 2021-01-02 NOTE — PROGRESS NOTES
Nephrology Progress Note      Subjective     Patient denies any nausea vomiting or diarrhea    Objective       Vital signs :     Temp:  [97.5 °F (36.4 °C)-98.9 °F (37.2 °C)] 97.5 °F (36.4 °C)  Heart Rate:  [] 94  Resp:  [18-20] 20  BP: ()/(52-78) 110/70    I/O last 3 completed shifts:  In: 1340 [P.O.:480; Blood:600; Other:160; IV Piggyback:100]  Out: 2150 [Urine:150; Other:2000]  I/O this shift:  In: 120 [P.O.:120]  Out: -     Physical Exam:    General Appearance : alert, pleasant, appears stated age, cooperative   Head  :  normocephalic, without obvious abnormality and atraumatic  Eyes  :  conjunctivae and sclerae normal, no icterus, no pallor and PERRLA  Neck  :  no adenopathy, suppple, no carotid bruit, no JVD   Lungs : clear to auscultation, respirations regular  Heart :  regular rhythm & normal rate, normal S1, S2 and no murmur, no rub  Abdomen : normal bowel sounds, no masses, no hepatomegaly, no splenomegaly, soft non-tender and no guarding  Extremities : moves extremities well, NO edema, no cyanosis and no redness  Skin :  no bleeding, bruising or rash  Neurologic :   orientated to person, place, time and situation, Grossly no focal deficits  Acess : Right IJ catheter      Laboratory Data :       WBC WBC   Date Value Ref Range Status   01/02/2021 6.27 3.40 - 10.80 10*3/mm3 Final   01/01/2021 4.45 3.40 - 10.80 10*3/mm3 Final   12/31/2020 4.79 3.40 - 10.80 10*3/mm3 Final   12/31/2020 5.87 3.40 - 10.80 10*3/mm3 Final      HGB Hemoglobin   Date Value Ref Range Status   01/02/2021 7.9 (L) 13.0 - 17.7 g/dL Final   01/01/2021 9.0 (L) 13.0 - 17.7 g/dL Final   01/01/2021 6.7 (C) 13.0 - 17.7 g/dL Final   12/31/2020 6.0 (C) 13.0 - 17.7 g/dL Final   12/31/2020 6.5 (C) 13.0 - 17.7 g/dL Final      HCT Hematocrit   Date Value Ref Range Status   01/02/2021 25.2 (L) 37.5 - 51.0 % Final   01/01/2021 26.7 (L) 37.5 - 51.0 % Final   01/01/2021 19.9 (C) 37.5 - 51.0 % Final   12/31/2020 18.7 (C) 37.5 - 51.0 % Final    12/31/2020 20.7 (C) 37.5 - 51.0 % Final      Platlets No results found for: LABPLAT   MCV MCV   Date Value Ref Range Status   01/02/2021 90.3 79.0 - 97.0 fL Final   01/01/2021 86.1 79.0 - 97.0 fL Final   12/31/2020 88.6 79.0 - 97.0 fL Final   12/31/2020 88.8 79.0 - 97.0 fL Final          Sodium Sodium   Date Value Ref Range Status   01/02/2021 131 (L) 136 - 145 mmol/L Final   01/01/2021 131 (L) 136 - 145 mmol/L Final   12/31/2020 129 (L) 136 - 145 mmol/L Final      Potassium Potassium   Date Value Ref Range Status   01/02/2021 3.8 3.5 - 5.2 mmol/L Final   01/01/2021 3.3 (L) 3.5 - 5.2 mmol/L Final   12/31/2020 5.1 3.5 - 5.2 mmol/L Final      Chloride Chloride   Date Value Ref Range Status   01/02/2021 96 (L) 98 - 107 mmol/L Final   01/01/2021 97 (L) 98 - 107 mmol/L Final   12/31/2020 98 98 - 107 mmol/L Final      CO2 CO2   Date Value Ref Range Status   01/02/2021 22.3 22.0 - 29.0 mmol/L Final   01/01/2021 17.8 (L) 22.0 - 29.0 mmol/L Final   12/31/2020 6.8 (L) 22.0 - 29.0 mmol/L Final      BUN BUN   Date Value Ref Range Status   01/02/2021 60 (H) 6 - 20 mg/dL Final   01/01/2021 119 (H) 6 - 20 mg/dL Final   12/31/2020 227 (H) 6 - 20 mg/dL Final      Creatinine Creatinine   Date Value Ref Range Status   01/02/2021 5.67 (H) 0.76 - 1.27 mg/dL Final   01/01/2021 8.16 (H) 0.76 - 1.27 mg/dL Final   12/31/2020 13.79 (H) 0.76 - 1.27 mg/dL Final      Calcium Calcium   Date Value Ref Range Status   01/02/2021 6.8 (L) 8.6 - 10.5 mg/dL Final   01/01/2021 6.9 (L) 8.6 - 10.5 mg/dL Final   12/31/2020 6.4 (L) 8.6 - 10.5 mg/dL Final      PO4 No results found for: CAPO4   Albumin Albumin   Date Value Ref Range Status   01/02/2021 2.91 (L) 3.50 - 5.20 g/dL Final   12/31/2020 3.39 (L) 3.50 - 5.20 g/dL Final      Magnesium Magnesium   Date Value Ref Range Status   01/02/2021 1.5 (L) 1.6 - 2.6 mg/dL Final   01/01/2021 1.5 (L) 1.6 - 2.6 mg/dL Final   01/01/2021 1.6 1.6 - 2.6 mg/dL Final   12/31/2020 1.3 (L) 1.6 - 2.6 mg/dL Final           PTH               No results found for: PTH      Medications :     amLODIPine, 5 mg, Oral, Nightly  aztreonam, 500 mg, Intravenous, Q12H  calcium acetate, 667 mg, Oral, TID With Meals  carvedilol, 12.5 mg, Oral, BID With Meals  heparin (porcine), 5,000 Units, Subcutaneous, Q12H  hydrALAZINE, 10 mg, Oral, Q8H  isosorbide mononitrate, 30 mg, Oral, QAM  nicotine, 1 patch, Transdermal, Q24H  pantoprazole, 40 mg, Oral, Daily  polyethylene glycol, 17 g, Oral, Daily  senna-docusate sodium, 2 tablet, Oral, Nightly  sodium chloride, 10 mL, Intravenous, Q12H      Pharmacy Consult,           Assessment/Plan       1-End-stage renal disease:  Patient had dialysis yesterday and is feeling much better we will plan dialysis on Monday     2-Hydronephrosis:  Will repeat ultrasound after Fry catheter     3-Anemia: Patient will get blood we will check iron and then decide about Epogen     4-Hypokalemia : Better     5 Hypomagnesemia: Received IV magnesium will repeat in the morning     6.-Hepatitis C      7-Medical noncompliance: Patient counseled on the benefit of compliance and consequences of noncompliance he verbalized understanding     Plan of care discussed with pt, answered all questions, patient verbalised understanding and agreed.  KATHARINA Hylton MD  01/02/21  09:05 EST

## 2021-01-02 NOTE — PROGRESS NOTES
[facility] HOSPITALIST PROGRESS NOTE     Patient Identification:  Name:  Mario Nair  Age:  45 y.o.  Sex:  male  :  1975  MRN:  [id number]  Visit Number:  [id number]  Primary Care Provider:  Danny Rebolledo MD    Length of stay:  1    Chief complaint: None    Subjective:    Patient states he feels very well today, he has no complaints whatsoever.  Patient is eager to return home as he has 2 children at home aged 16 and 18 that he wants to get home to.  However, patient states he is also wanting placed in a dialysis clinic and he is willing to stay as long as he can to make this happen.  I assured him case management has been consulted and is working diligently on procuring a dialysis chair for him.  ----------------------------------------------------------------------------------------------------------------------  Current [facility] Meds:  amLODIPine, 5 mg, Oral, Nightly  aztreonam, 500 mg, Intravenous, Q12H  calcium acetate, 667 mg, Oral, TID With Meals  carvedilol, 12.5 mg, Oral, BID With Meals  heparin (porcine), 5,000 Units, Subcutaneous, Q12H  hydrALAZINE, 10 mg, Oral, Q8H  isosorbide mononitrate, 30 mg, Oral, QAM  nicotine, 1 patch, Transdermal, Q24H  pantoprazole, 40 mg, Oral, Daily  polyethylene glycol, 17 g, Oral, Daily  senna-docusate sodium, 2 tablet, Oral, Nightly  sodium chloride, 10 mL, Intravenous, Q12H      Pharmacy Consult,       ----------------------------------------------------------------------------------------------------------------------  Vital Signs:  Temp:  [97.5 °F (36.4 °C)-98.9 °F (37.2 °C)] 98.4 °F (36.9 °C)  Heart Rate:  [] 95  Resp:  [18-20] 20  BP: ()/(52-78) 101/61      20  0809 20  1354   Weight: 78 kg (172 lb) 67.5 kg (148 lb 12.8 oz)     Body mass index is 19.1 kg/m².    Intake/Output Summary (Last 24 hours) at 2021 1211  Last data filed at 2021 0703  Gross per 24 hour   Intake 340 ml   Output --   Net 340 ml      Diet Regular; Renal  ----------------------------------------------------------------------------------------------------------------------  Physical exam:  Constitutional: Chronically ill-appearing  male in no apparent distress.     HENT:  Head:  Normocephalic and atraumatic.  Mouth:  Moist mucous membranes.    Eyes:  Conjunctivae and EOM are normal.  Pupils are equal, round, and reactive to light.  No scleral icterus.    Neck:  Neck supple. No thyromegaly.  No JVD present.    Cardiovascular:  Regular rate and rhythm with no murmurs, rubs, clicks or gallops appreciated.  Pulmonary/Chest:  Clear to auscultation bilaterally with no crackles, wheezes or rhonchi appreciated.  Abdominal:  Soft. Nontender. Nondistended  Bowel sounds are normal in all four quadrants. No organomegally appreciated.   Musculoskeletal:  No edema, no tenderness, and no deformity.  No red or swollen joints anywhere.    Neurological:  Alert and oriented to person, place, and time. Cranial nerves II-XII intact with no focal deficits.  No facial droop.  No slurred speech.   Skin:  Warm and dry to palpation with no rashes or lesions appreciated.  Peripheral vascular:  2+ radial and pedal pulses in bilateral upper and lower extremities.  Psychiatric:  Alert and oriented x3, demonstrates appropriate judgement and insight.  ----------------------------------------------------------------------------------------------------------------------  Tele:    ----------------------------------------------------------------------------------------------------------------------  Results from last 7 days   Lab Units 12/31/20  1811 12/31/20  1713 12/31/20  1141   TROPONIN T ng/mL 0.151* 0.151* 0.157*     Results from last 7 days   Lab Units 01/02/21  0149 01/01/21  1105 01/01/21  0049 12/31/20  1713 12/31/20  0910 12/31/20  0904   CRP mg/dL  --   --   --   --   --  3.68*   LACTATE mmol/L  --   --   --   --  0.8  --    WBC 10*3/mm3 6.27  --  4.45 4.79   --  5.87   HEMOGLOBIN g/dL 7.9* 9.0* 6.7* 6.0*  --  6.5*   HEMATOCRIT % 25.2* 26.7* 19.9* 18.7*  --  20.7*   MCV fL 90.3  --  86.1 88.6  --  88.8   MCHC g/dL 31.3*  --  33.7 32.1  --  31.4*   PLATELETS 10*3/mm3 94*  --  85* 84*  --  105*   INR   --   --   --   --   --  1.57*     Results from last 7 days   Lab Units 12/31/20  0901   PH, ARTERIAL pH units 7.146*   PO2 ART mm Hg 109.0*   PCO2, ARTERIAL mm Hg 19.1*   HCO3 ART mmol/L 6.6*     Results from last 7 days   Lab Units 01/02/21  0149 01/01/21  0646 01/01/21  0049 12/31/20  0904   SODIUM mmol/L 131*  --  131* 129*   POTASSIUM mmol/L 3.8  --  3.3* 5.1   MAGNESIUM mg/dL 1.5* 1.5* 1.6 1.3*   CHLORIDE mmol/L 96*  --  97* 98   CO2 mmol/L 22.3  --  17.8* 6.8*   BUN mg/dL 60*  --  119* 227*   CREATININE mg/dL 5.67*  --  8.16* 13.79*   EGFR IF NONAFRICN AM mL/min/1.73 11*  --  7* 4*   CALCIUM mg/dL 6.8*  --  6.9* 6.4*   GLUCOSE mg/dL 115*  --  138* 169*   ALBUMIN g/dL 2.91*  --   --  3.39*   BILIRUBIN mg/dL 0.4  --   --  0.4   ALK PHOS U/L 75  --   --  81   AST (SGOT) U/L 12  --   --  13   ALT (SGPT) U/L 17  --   --  21   Estimated Creatinine Clearance: 15.9 mL/min (A) (by C-G formula based on SCr of 5.67 mg/dL (H)).    No results found for: AMMONIA  Results from last 7 days   Lab Units 01/01/21  0049   CHOLESTEROL mg/dL 72   TRIGLYCERIDES mg/dL 61   HDL CHOL mg/dL 37*   LDL CHOL mg/dL 21     Blood Culture   Date Value Ref Range Status   12/31/2020 No growth at 24 hours  Preliminary   12/31/2020 No growth at 24 hours  Preliminary     No results found for: URINECX  No results found for: WOUNDCX  No results found for: STOOLCX    I have personally looked at the labs and they are summarized above.  ----------------------------------------------------------------------------------------------------------------------  Imaging Results (Last 24 Hours)     ** No results found for the last 24 hours. **         ----------------------------------------------------------------------------------------------------------------------  Assessment and Plan:    1.  End-stage renal disease on hemodialysis -patient agreeable to stay until case management has arranged a seat for him at a local dialysis clinic.  Continue hemodialysis per nephrology recommendations.    2.  Acute cystitis -continue aztreonam day #3, urine culture demonstrates greater than 100,000 CFU of Serratia marcescens    3.  Acute on chronic normocytic anemia -likely progression of anemia of chronic disease.  Patient has received 2 units PRBCs during this hospital stay, hemoglobin now stable at 7.9.    4.  Hypomagnesemia -replete    5.  Hyponatremia -will likely correct with hemodialysis    6.  Hypocalcemia    7.  Tobacco abuse            Adithya Calabrese,   01/02/21  12:11 EST

## 2021-01-03 NOTE — NURSING NOTE
Pt has left the floor AMA at this time. AMA forms have been signed and MD is aware. No signs of acute distress are noted. Pt states family is waiting at the ER door.

## 2021-01-03 NOTE — DISCHARGE SUMMARY
"    [facility] HOSPITALIST MEDICINE DISCHARGE SUMMARY    Patient Identification:  Name:  Mario Nair  Age:  45 y.o.  Sex:  male  :  1975  MRN:  [id number]  Visit Number:  [id number]    Date of Admission: 2020  Date of Discharge:  1/3/2021     PCP: Danny Rebolledo MD    DISCHARGE DIAGNOSIS   1. ESRD on HD  2. Acute Cystitis  3. Acute on Chronic Normocytic Anemia  4. Hypomagnessemia  5. Hyponatremia  6. Hypocalcemia  7. Tobacco Use  8. Medical Noncompliance      CONSULTS  1. Dr. Hylton, Nephrology      PROCEDURES PERFORMED   None      HOSPITAL COURSE  Mr. Nair is a 45 y.o. male who presented to [facility] ED on 2020 with a chief complaint of \"I need dialysis\".  Patient has a past medical history remarkable for end-stage renal disease who is supposed to be on hemodialysis but is quite noncompliant.  Patient also has history of hepatitis C, hepatitis B, COPD, tobacco abuse and a history of IV drug abuse.  It should be noted patient has been hospitalized on multiple occasions at our facility with no need for hemodialysis.  Patient will receive hemodialysis, and generally leaves AGAINST MEDICAL ADVICE before he can be given a permanent chair at a local dialysis clinic.  This hospitalization was no different.  Patient initially presented to the emergency department where a thorough physical exam, vital signs and lab work was obtained.  Initial vital signs found patient's blood pressure 139/72, respirations 20, heart rate 98 and O2 saturation 96% on room air.  ABG demonstrated pH of 7.14, PCO2 19, PO2 109 and bicarb 6.6.  CMP demonstrated elevated serum creatinine of 13.7 with sodium of 129.  Troponins were minimally elevated at 0.16.  Patient was chest pain-free.  Patient also had urine analysis which was concerning for acute cystitis.  As such, patient was admitted for further treatment and evaluation.    Nephrology services were consulted who did thoroughly evaluate " "the patient.  Recommendation was made for patient to start hemodialysis which he did within 12 hours of admission.  Case management was consulted to attempt finding a chair for patient and a local dialysis clinic.  In regards to patient's acute cystitis, he was started on aztreonam as he does have a listed allergy to penicillin.  Urine culture did eventually demonstrate greater than 100,000 CFU of Serratia marcescens.  Unfortunately, as patient has done and previous hospitalizations he did leave AGAINST MEDICAL ADVICE on the night of 1/2/2021.  Patient was cautioned that this action will likely result in continued deterioration of his overall health and he will likely die young man secondary to noncompliance.  Patient expressed his understanding but stated he had to \"get home to do things\".  Patient signed AMA papers and then left.    VITAL SIGNS:      12/31/20  0809 12/31/20  1354   Weight: 78 kg (172 lb) 67.5 kg (148 lb 12.8 oz)     Body mass index is 19.1 kg/m².        DISCHARGE DISPOSITION   CAMILO Calabrese DO  01/03/21  07:09 EST    Please note that this discharge summary required more than 30 minutes to complete.    Please send a copy of this dictation to the following providers:  Danny Rebolledo MD  "

## 2021-01-03 NOTE — PAYOR COMM NOTE
"[facility]   MARCELL NUNO  PHONE  [phone]  FAX  [phone]  NPI:  [id number]    PATIENT D/C 1/2/2020      Mario Nair (45 y.o. Male)     Date of Birth Social Security Number Address Home Phone MRN    1975  [address] [place] [id number] [phone] [id number]    Hinduism Marital Status          None        Admission Date Admission Type Admitting Provider Attending Provider Department, Room/Bed    12/31/20 Emergency Adithya Calabrese DO  [facility] OBSERVATION UNIT, 249/2S    Discharge Date Discharge Disposition Discharge Destination        1/2/2021 Left Against Medical Advice              Attending Provider: (none)   Allergies: Penicillins    Isolation: None   Infection: Hepatitis B (06/05/19), COVID (rule out) (12/31/20)   Code Status: Prior    Ht: 188 cm (74\")   Wt: 67.5 kg (148 lb 12.8 oz)    Admission Cmt: None   Principal Problem: ESRD (end stage renal disease) (CMS/HCC) [N18.6]                 Active Insurance as of 12/31/2020     Primary Coverage     Payor Plan Insurance Group Employer/Plan Group    [employer] [place] [employer] [place]ICA[place]      Payor Plan Address Payor Plan Phone Number Payor Plan Fax Number Effective Dates    [address] [address] [phone]  5/5/2019 - None Entered    [facility] [id number]       Subscriber Name Subscriber Birth Date Member ID       MARIO NAIR 1975 [id number]                 Emergency Contacts      (Rel.) Home Phone Work Phone Mobile Phone    SHANI SILVA (Relative) [phone] -- --    KoreyConstance (Daughter) -- -- [phone]              "

## 2021-01-03 NOTE — NURSING NOTE
Pt has stated he will be signing out AMA after his last bag of magnesium.  Informed pt of the risk of leaving AMA but pt states he needed to be home and was leaving.  Dr. Calabrese informed of pt going to leave.

## 2021-01-13 NOTE — TELEPHONE ENCOUNTER
Anyway you could write this up for the patient since Dr. Rebolledo will be out of the office until Monday?

## 2021-01-13 NOTE — TELEPHONE ENCOUNTER
DELETE AFTER REVIEWING: Telephone encounter to be sent to the clinical pool.    Caller: Constance Nair    Relationship: Emergency Contact    Best call back number: [phone]    What form or medical record are you requesting: PA FOR DEPENDS    Who is requesting this form or medical record from you:  [facility]    How would you like to receive the form or medical records (pick-up, mail, fax)  PHONE [phone]    If fax, what is the fax number: [phone]    Timeframe paperwork needed: ASAP  PAPERS NEED TO RETURNED BY 01/18/21

## 2021-01-25 PROBLEM — U07.1 COVID-19: Status: ACTIVE | Noted: 2021-01-01

## 2021-01-27 PROBLEM — N39.0 SEPSIS DUE TO URINARY TRACT INFECTION (HCC): Status: ACTIVE | Noted: 2021-01-01

## 2021-01-27 PROBLEM — A41.9 SEPSIS DUE TO URINARY TRACT INFECTION (HCC): Status: ACTIVE | Noted: 2021-01-01

## 2021-01-28 PROBLEM — R78.81 GRAM-NEGATIVE BACTEREMIA: Status: ACTIVE | Noted: 2021-01-01

## 2021-01-29 NOTE — ANESTHESIA PREPROCEDURE EVALUATION
Anesthesia Evaluation     no history of anesthetic complications:  NPO Solid Status: > 8 hours  NPO Liquid Status: > 8 hours           Airway   Mallampati: II  TM distance: >3 FB  Neck ROM: full  No difficulty expected  Dental - normal exam   (+) poor dentition    Pulmonary - normal exam   (+) COPD moderate,   Cardiovascular - normal exam        Neuro/Psych  GI/Hepatic/Renal/Endo    (+)   hepatitis B and C, liver disease, renal disease ESRD,     Musculoskeletal     Abdominal  - normal exam    Bowel sounds: normal.   Substance History   (+) drug use     OB/GYN          Other                        Anesthesia Plan    ASA 3     general and MAC     intravenous induction     Anesthetic plan, all risks, benefits, and alternatives have been provided, discussed and informed consent has been obtained with: patient.

## 2021-01-29 NOTE — ANESTHESIA POSTPROCEDURE EVALUATION
Patient: Mario Nair    Procedure Summary     Date: 01/29/21 Room / Location: [facility] OR 02 /  [facility] OR    Anesthesia Start: 0916 Anesthesia Stop: 1000    Procedure: HEMODIALYSIS CATHETER INSERTION (N/A ) Diagnosis:       Gram-negative bacteremia      (Gram-negative bacteremia [R78.81])    Surgeon: Khoa Carl MD Provider: Rafael Zavala MD    Anesthesia Type: general, MAC ASA Status: 3          Anesthesia Type: general, MAC    Vitals  Vitals Value Taken Time   /70 01/29/21 1016   Temp 97.5 °F (36.4 °C) 01/29/21 1001   Pulse 92 01/29/21 1016   Resp 18 01/29/21 1016   SpO2 100 % 01/29/21 1016           Post Anesthesia Care and Evaluation    Patient location during evaluation: PHASE II  Patient participation: complete - patient participated  Level of consciousness: awake and alert  Pain score: 1  Pain management: adequate  Airway patency: patent  Anesthetic complications: No anesthetic complications  PONV Status: controlled  Cardiovascular status: acceptable  Respiratory status: acceptable  Hydration status: acceptable

## 2021-02-01 NOTE — PROGRESS NOTES
PROGRESS NOTE         Patient Identification:  Name:  Mario Nair  Age:  45 y.o.  Sex:  male  :  1975  MRN:  [id number]  Visit Number:  [id number]  Primary Care Provider:  Danny Rebolledo MD         LOS: 7 days       ----------------------------------------------------------------------------------------------------------------------  Subjective       Chief Complaints:    Neck Pain and Weakness - Generalized        Interval History:      Discussed case with primary RN.  Patient remains on room air in no apparent distress.  Afebrile, no diarrhea.  CRP is improved at 6.95.  WBC remains normal.  Blood cultures on 2021 finalized as no growth.    Review of Systems:    Constitutional: no fever, chills and night sweats. No appetite change or unexpected weight change. No fatigue.  Eyes: no eye drainage, itching or redness.  HEENT: no mouth sores, dysphagia or nose bleed.  Respiratory: no for shortness of breath, cough or production of sputum.  Cardiovascular: no chest pain, no palpitations, no orthopnea.  Gastrointestinal: no nausea, vomiting or diarrhea. No abdominal pain, hematemesis or rectal bleeding.  Genitourinary: no dysuria or polyuria.  Hematologic/lymphatic: no lymph node abnormalities, no easy bruising or easy bleeding.  Musculoskeletal: Neck pain.  Generalized myalgia.  Skin: No rash and no itching.  Neurological: no loss of consciousness, no seizure, no headache.  Behavioral/Psych: no depression or suicidal ideation.  Endocrine: no hot flashes.  Immunologic: negative.       ----------------------------------------------------------------------------------------------------------------------      Objective       [facility]:  alteplase (CATHFLO) INTRACATHETER injection, 2 mg, Intracatheter, Once  alteplase (CATHFLO) INTRACATHETER injection, 2 mg, Intracatheter, Once  apixaban, 5 mg, Oral, Q12H  aspirin, 81 mg, Oral, Daily  atorvastatin, 40 mg, Oral,  Nightly  calcium acetate, 667 mg, Oral, TID With Meals  carvedilol, 12.5 mg, Oral, BID With Meals  cefTRIAXone, 2 g, Intravenous, Q24H  epoetin mc/mc-epbx, 6,000 Units, Subcutaneous, Once per day on Mon Wed Fri  isosorbide mononitrate, 30 mg, Oral, QAM  lactobacillus acidophilus, 1 capsule, Oral, Daily  lidocaine, 1 patch, Transdermal, Q24H  nicotine, 1 patch, Transdermal, Q24H         ----------------------------------------------------------------------------------------------------------------------    Vital Signs:  Temp:  [97.1 °F (36.2 °C)-98.2 °F (36.8 °C)] 98.2 °F (36.8 °C)  Heart Rate:  [72-91] 80  Resp:  [18-22] 22  BP: (102-117)/(64-77) 108/77  Mean Arterial Pressure (Non-Invasive) for the past 24 hrs (Last 3 readings):   Noninvasive MAP (mmHg)   02/01/21 0218 89   01/31/21 1846 86   01/31/21 1500 80     SpO2 Percentage    02/01/21 0700 02/01/21 0900 02/01/21 1028   SpO2: 96% 96% 96%     SpO2:  [94 %-98 %] 96 %  on   ;   Device (Oxygen Therapy): room air    Body mass index is 18.87 kg/m².  Wt Readings from Last 3 Encounters:   02/01/21 66.7 kg (147 lb)   12/31/20 67.5 kg (148 lb 12.8 oz)   12/08/20 78.5 kg (173 lb)        Intake/Output Summary (Last 24 hours) at 2/1/2021 1216  Last data filed at 2/1/2021 0900  Gross per 24 hour   Intake 1980 ml   Output --   Net 1980 ml     Diet Regular; Low Potassium, Renal  ----------------------------------------------------------------------------------------------------------------------      Physical Exam:    Deferred due to COVID-19 isolation.  ----------------------------------------------------------------------------------------------------------------------  Results from last 7 days   Lab Units 02/01/21  0513 01/31/21  0551 01/30/21  0420  01/26/21  0343 01/25/21  1941 01/25/21  1738   CK TOTAL U/L 13* 19* 21   < > 92  --   --    TROPONIN T ng/mL  --   --   --   --  0.189* 0.160* 0.153*    < > = values in this interval not displayed.           Results from  last 7 days   Lab Units 01/25/21  1515   PH, ARTERIAL pH units 7.257*   PO2 ART mm Hg 98.2   PCO2, ARTERIAL mm Hg 21.7*   HCO3 ART mmol/L 9.7*     Results from last 7 days   Lab Units 02/01/21 0513 01/31/21  0551 01/30/21  0420  01/25/21  1941 01/25/21  1315   CRP mg/dL 6.95* 10.42* 4.65*   < >  --   --    LACTATE mmol/L  --   --   --   --   --  0.7   WBC 10*3/mm3 6.81 9.10 7.79   < > 7.83  --    HEMOGLOBIN g/dL 6.9* 7.9* 7.5*   < > 7.2*  --    HEMATOCRIT % 22.7* 26.1* 24.6*   < > 23.2*  --    MCV fL 90.1 90.6 91.1   < > 88.9  --    MCHC g/dL 30.4* 30.3* 30.5*   < > 31.0*  --    PLATELETS 10*3/mm3 88* 81* 82*   < > 116*  --    INR   --   --   --   --  1.53*  --     < > = values in this interval not displayed.     Results from last 7 days   Lab Units 02/01/21 0513 01/31/21 0551 01/30/21 0420   SODIUM mmol/L 125* 129* 133*   POTASSIUM mmol/L 5.1 4.5 3.5   MAGNESIUM mg/dL 2.5 2.6 1.7   CHLORIDE mmol/L 86* 89* 94*   CO2 mmol/L 26.0 24.2 26.1   BUN mg/dL 129* 95* 63*   CREATININE mg/dL 6.61* 5.59* 4.40*   EGFR IF NONAFRICN AM mL/min/1.73 9* 11* 15*   CALCIUM mg/dL 8.6 8.1* 7.9*   GLUCOSE mg/dL 122* 122* 140*   ALBUMIN g/dL 2.61* 2.75* 2.69*   BILIRUBIN mg/dL 0.3 0.3 0.2   ALK PHOS U/L 195* 211* 174*   AST (SGOT) U/L 21 30 48*   ALT (SGPT) U/L 8 13 32   Estimated Creatinine Clearance: 13.5 mL/min (A) (by C-G formula based on SCr of 6.61 mg/dL (H)).  No results found for: AMMONIA    Glucose   Date/Time Value Ref Range Status   02/01/2021 1013 156 (H) 70 - 130 mg/dL Final   02/01/2021 0721 112 70 - 130 mg/dL Final   01/31/2021 1851 189 (H) 70 - 130 mg/dL Final   01/31/2021 0702 163 (H) 70 - 130 mg/dL Final   01/30/2021 2013 147 (H) 70 - 130 mg/dL Final   01/30/2021 1207 120 70 - 130 mg/dL Final   01/30/2021 0654 139 (H) 70 - 130 mg/dL Final   01/29/2021 1951 223 (H) 70 - 130 mg/dL Final     Lab Results   Component Value Date    HGBA1C 5.20 12/31/2020     Lab Results   Component Value Date    TSH 2.380 11/14/2020     FREET4 0.85 (L) 11/14/2020       Blood Culture   Date Value Ref Range Status   01/25/2021 Serratia marcescens (C)  Final   01/25/2021 Serratia marcescens (C)  Final     Comment:     Refer to previous blood culture collected on 1/25/2021 at 1315 for CHANI's.      Urine Culture   Date Value Ref Range Status   01/25/2021 >100,000 CFU/mL Serratia marcescens (A)  Final     No results found for: WOUNDCX  No results found for: STOOLCX  No results found for: RESPCX  Pain Management Panel     Pain Management Panel Latest Ref Rng & Units 1/25/2021 12/31/2020    AMPHETAMINES SCREEN, URINE Negative Negative Negative    BARBITURATES SCREEN Negative Negative Negative    BENZODIAZEPINE SCREEN, URINE Negative Negative Negative    BUPRENORPHINEUR Negative Negative Negative    COCAINE SCREEN, URINE Negative Negative Negative    METHADONE SCREEN, URINE Negative Negative Negative            ----------------------------------------------------------------------------------------------------------------------  Imaging Results (Last 24 Hours)     ** No results found for the last 24 hours. **          ----------------------------------------------------------------------------------------------------------------------    Assessment/Plan       Assessment/Plan     ASSESSMENT:    1.  Sepsis  2.  Pyelonephritis  3.  Serratia bacteremia  4.  COVID-19    PLAN:    Discussed case with primary RN.  Patient remains on room air in no apparent distress.  Afebrile, no diarrhea.  CRP is improved at 6.95.  WBC remains normal.  Blood cultures on 1/26/2021 finalized as no growth.    Urine culture finalized as highly susceptible Serratia greater than 100,000 colonies.  Blood culture from 1/25/2021 2 out of 2 sets finalized as pan susceptible Serratia.     COVID-19 PCR positive.  Hepatitis C reactive.  CT of the C-spine reports degenerative changes and mild stenosis.  X-ray of the chest unremarkable.  CT of the chest reports resolution of right pleural  effusion with minimal reexpansion edema of the right lower lobe versus nonspecific pneumonitis including COVID-19 associated pneumonia, emphysema, stable severe and chronic appearing hydronephrosis and retroperitoneal lymphadenopathy.  CT angiogram of the neck reports emphysema, no abnormal fluid collections or hematomas in the right neck soft tissues or surrounding hemodialysis catheter.    As patient is asymptomatic from COVID-19 standpoint recommend to hold remdesivir and Decadron for now.    Recommend to continue on Rocephin 2 g IV every 24 hours.  Patient can also be transitioned to ceftazidime 2 g after hemodialysis for easier administration to continue through 2/9/2021.  We will continue to follow closely and adjust antibiotic therapy as needed.    Code Status:   Code Status and Medical Interventions:   Ordered at: 01/25/21 1707     Code Status:    CPR     Medical Interventions (Level of Support Prior to Arrest):    CHRIS Vogel  02/01/21  12:16 EST

## 2021-02-01 NOTE — PROGRESS NOTES
Nephrology Progress Note      Subjective   No chest pain or shortness of breath    Objective       Vital signs :     Temp:  [97.1 °F (36.2 °C)-98.2 °F (36.8 °C)] 98.2 °F (36.8 °C)  Heart Rate:  [72-92] 88  Resp:  [18-20] 20  BP: ()/(64-73) 102/64      Intake/Output Summary (Last 24 hours) at 2/1/2021 0810  Last data filed at 2/1/2021 0300  Gross per 24 hour   Intake 1760 ml   Output --   Net 1760 ml       Physical Exam:  General: not in acute distress  Heart: Tele reveals sinus rhythm.   Lungs: Respirations appear to be regular, even and unlabored with no signs of respiratory distress. No audible wheezing.    Abdomen: no distension  Extremities:trace  Skin: No visible bleeding, bruising, or rash.  Neurologic: no apparent focal deficit.         Laboratory Data :     Albumin Albumin   Date Value Ref Range Status   02/01/2021 2.61 (L) 3.50 - 5.20 g/dL Final   01/31/2021 2.75 (L) 3.50 - 5.20 g/dL Final   01/30/2021 2.69 (L) 3.50 - 5.20 g/dL Final      Magnesium Magnesium   Date Value Ref Range Status   02/01/2021 2.5 1.6 - 2.6 mg/dL Final   01/31/2021 2.6 1.6 - 2.6 mg/dL Final   01/30/2021 1.7 1.6 - 2.6 mg/dL Final          PTH                 No results found for: PTH    CBC and coagulation:  Results from last 7 days   Lab Units 02/01/21  0513 01/31/21  0551 01/30/21  0420 01/29/21  0417 01/28/21  0330 01/27/21  0014  01/26/21  0343  01/25/21  1941 01/25/21  1315   PROCALCITONIN ng/mL  --   --   --   --   --   --   --   --   --  25.39*  --    LACTATE mmol/L  --   --   --   --   --   --   --   --   --   --  0.7   CRP mg/dL 6.95* 10.42* 4.65* 9.90* 11.71* 15.95*  --  15.81*  --   --   --    WBC 10*3/mm3 6.81 9.10 7.79 8.20 6.98 4.84   < >  --   --  7.83  --    HEMOGLOBIN g/dL 6.9* 7.9* 7.5* 7.6* 7.9* 7.0*   < >  --   --  7.2*  --    HEMATOCRIT % 22.7* 26.1* 24.6* 24.3* 24.9* 21.3*   < >  --   --  23.2*  --    MCV fL 90.1 90.6 91.1 87.7 86.8 84.9   < >  --   --  88.9  --    MCHC g/dL 30.4* 30.3* 30.5* 31.3* 31.7  32.9   < >  --   --  31.0*  --    PLATELETS 10*3/mm3 88* 81* 82* 83* 75* 74*   < >  --   --  116*  --    INR   --   --   --   --   --   --   --   --   --  1.53*  --    D DIMER QUANT MCGFEU/mL 2.75* 3.40* 2.96* 3.22* 4.52* 8.54*  --  19.03*   < >  --   --     < > = values in this interval not displayed.     Acid/base balance:  Results from last 7 days   Lab Units 01/25/21  1515   PH, ARTERIAL pH units 7.257*   PO2 ART mm Hg 98.2   PCO2, ARTERIAL mm Hg 21.7*   HCO3 ART mmol/L 9.7*     Renal and electrolytes:  Results from last 7 days   Lab Units 02/01/21  0513 01/31/21  0551 01/30/21  0420 01/29/21  0417 01/28/21  0330  01/26/21  0343   SODIUM mmol/L 125* 129* 133* 129* 124*   < > 124*   POTASSIUM mmol/L 5.1 4.5 3.5 3.9 3.9   < > 3.8   MAGNESIUM mg/dL 2.5 2.6 1.7 2.0 1.8   < > 1.6   CHLORIDE mmol/L 86* 89* 94* 89* 87*   < > 85*   CO2 mmol/L 26.0 24.2 26.1 21.8* 24.9   < > 17.3*   BUN mg/dL 129* 95* 63* 79* 44*   < > 108*   CREATININE mg/dL 6.61* 5.59* 4.40* 6.10* 4.42*   < > 10.02*   EGFR IF NONAFRICN AM mL/min/1.73 9* 11* 15* 10* 15*   < > 6*   CALCIUM mg/dL 8.6 8.1* 7.9* 7.7* 7.7*   < > 6.8*   IONIZED CALCIUM mmol/L  --   --   --   --   --   --  0.82*   PHOSPHORUS mg/dL 2.0* 2.4* 2.5 5.3* 3.7   < > 5.7*    < > = values in this interval not displayed.     Estimated Creatinine Clearance: 13.5 mL/min (A) (by C-G formula based on SCr of 6.61 mg/dL (H)).    Liver and pancreatic function:  Results from last 7 days   Lab Units 02/01/21  0513 01/31/21  0551 01/30/21  0420   ALBUMIN g/dL 2.61* 2.75* 2.69*   BILIRUBIN mg/dL 0.3 0.3 0.2   ALK PHOS U/L 195* 211* 174*   AST (SGOT) U/L 21 30 48*   ALT (SGPT) U/L 8 13 32         Cardiac:  Results from last 7 days   Lab Units 01/25/21  1138   PROBNP pg/mL 11,870.0*     Liver and pancreatic function:  Results from last 7 days   Lab Units 02/01/21  0513 01/31/21  0551 01/30/21  0420   ALBUMIN g/dL 2.61* 2.75* 2.69*   BILIRUBIN mg/dL 0.3 0.3 0.2   ALK PHOS U/L 195* 211* 174*   AST  (SGOT) U/L 21 30 48*   ALT (SGPT) U/L 8 13 32       Medications :     alteplase (CATHFLO) INTRACATHETER injection, 2 mg, Intracatheter, Once  alteplase (CATHFLO) INTRACATHETER injection, 2 mg, Intracatheter, Once  apixaban, 5 mg, Oral, Q12H  aspirin, 81 mg, Oral, Daily  atorvastatin, 40 mg, Oral, Nightly  calcium acetate, 667 mg, Oral, TID With Meals  carvedilol, 12.5 mg, Oral, BID With Meals  cefTRIAXone, 2 g, Intravenous, Q24H  epoetin mc/mc-epbx, 6,000 Units, Subcutaneous, Once per day on Mon Wed Fri  isosorbide mononitrate, 30 mg, Oral, QAM  lactobacillus acidophilus, 1 capsule, Oral, Daily  lidocaine, 1 patch, Transdermal, Q24H  nicotine, 1 patch, Transdermal, Q24H             Assessment/Plan     1. ESKD, non compliant to dialysis  2. Sever hyperkalemia  3. Sever metabolic acidosis  4. Hyponatremia  5. Sever sepsis with COVID-19  6. Bacteremia with concern of catheter related infection  7. Sever anemia     Dialysis today and then MWF, mild hyponatremia likely hypervolemic, should be better with UF.   Aneima, Hb below goal, increase EPO to 10,000IU 3/wk    F/u Cultures on 1/26 that are NGTD  continue on EPO        Taniya Corado MD  02/01/21  08:10 EST

## 2021-02-01 NOTE — DISCHARGE PLACEMENT REQUEST
"NairMario (45 y.o. Male)     Date of Birth Social Security Number Address Home Phone MRN    1975  [address] [place] [id number] [phone] [id number]    Buddhism Marital Status          None        Admission Date Admission Type Admitting Provider Attending Provider Department, Room/Bed    1/25/21 Emergency Opal Ocampo MD Parks, Andrew, MD [address], 3321/1P    Discharge Date Discharge Disposition Discharge Destination                       Attending Provider: Obi Arvizu MD    Allergies: Penicillins    Isolation: Enh Drop/Con   Infection: Hepatitis B (06/05/19), COVID (confirmed) (01/25/21)   Code Status: CPR    Ht: 188 cm (74\")   Wt: 66.7 kg (147 lb)    Admission Cmt: None   Principal Problem: Sepsis due to urinary tract infection (CMS/HCC) [A41.9,N39.0]                 Active Insurance as of 1/25/2021     Primary Coverage     Payor Plan Insurance Group Employer/Plan Group    [facility] MEDICAID      Payor Plan Address Payor Plan Phone Number Payor Plan Fax Number Effective Dates    [address] [address] [phone]  5/5/2019 - None Entered    [facility] [id number]       Subscriber Name Subscriber Birth Date Member ID       NAIR,MARIO SOTO 1975 [id number]                 Emergency Contacts      (Rel.) Home Phone Work Phone Mobile Phone    SHANI SIVLA (Relative) [phone] -- --    Constance Nair (Daughter) -- -- [phone]            Emergency Contact Information     Name Relation Home Work Mobile    SHANI SILVA Relative [phone]      Constance Nair Daughter   [phone]          Insurance Information                [facility] MEDICAID Phone: [phone]    Subscriber: Mario Nair Subscriber#: [id number]    Group#:  Precert#:              History & Physical      Sena Schwartz APRN at 01/25/21 1614     Attestation signed by Opal Ocampo MD at 01/25/21 1021 (Updated)    I have " seen the patient and discussed the assessment and plan with CONY Shetty.  The patient is having severe muscle spasms; he is able to move his neck but states that it is very painful.  I am unable to tell of his neck feels stiff at this time as he is reluctant to move his neck but can move it on his own.  I have ordered a CT a of his neck with and without contrast; the patient does use IV drugs and he is at risk for having an abscess.  However, the patient also has a tunneled hemodialysis catheter on his right neck and with such severe pain I want to rule out dissection or other vascular anomalies that would be causing the pain.  We have requested records from [facility] as this hospital has placed the last tunneled catheter.  Patient will be getting antibiotics and Decadron, which theoretically should cover for bacterial meningitis.  However, I feel that the possibility of meningitis is low and thus since he needs anticoagulation for the elevated D-dimer and COVID-19 I think that trying to perform a lumbar puncture at this time is premature and would delay his need for hemodialysis.  There is a possibility that the COVID-19 in addition to the lack of hemodialysis could be causing muscle spasms in his neck.  We will reassess his neck pain after he receives the hemodialysis.  For now, I will give him as needed pain medication for the neck pain as he does appear to be in severe pain.  I did discuss with nephrology about hemodialysis tonight so that we can obtain the CT scan with PE protocol; remdesivir will be held at this time until the patient is more stable from a nephrology standpoint.  We will repeat his blood work in the morning.  I discussed the patient's anticoagulation with [facility] pharmacy and he has suggested that full anticoagulation in this end-stage renal disease patient would best be served with a heparin drip and he did recommend a heparin bolus.                    [facility]  Medicine Services  History & Physical          Patient Identification:  Name:  Mario Nair  Age:  45 y.o.  Sex:  male  :  1975  MRN:  [id number]   Admit Date: 2021   Visit Number:  [id number]  Primary Care Physician:  Danny Rebolledo MD    I have seen the patient in conjunction with NICKY Ball and I agree with the following statements:     Subjective     Chief complaint: neck pain    History of presenting illness:    Mr. Nair is a 45 year old female patient who presented to [facility] ED on 21.He states he developed neck pain a few days ago. He denies any injury, he thinks maybe he slept wrong. He does report pain down both sides of the tops of his arms into his shoulders. He denies any drug use, he hasn't used IV drugs in several months. He states it feels like a crick in his neck. He denies any fever. His last HD session was around 1 month ago. He does not have a Dialysis chair. He denies tampering with his right temporary HD access. He does still make some urine and denies any urinary symptoms. He denies any abdominal pain. His daughter did have covid but this was a few weeks ago. He denies any shortness of breath or cough. He denies any chills, body aches or sore throat.     It is of note he was admitted to our facility from 2020 through 1/3/2021 for end-stage renal disease on HD, acute cystitis, he did leave AMA during this visit.  He did also leave AMA from a visit from 2020 through 11/15/2020, and also AMA again from a stay 10/16/2020 through 10/16/2020, and again during the hospital stay he left AMA in 2019.    His treatment in the ED included calcium gluconate 2 g IV x1, 5 units of regular insulin subcu, 10 mg of lactulose p.o., 15 g of Kayexalate p.o. and hydrocodone 5 mg p.o. x1.His v/s in the ED were temperature 99.7, heart rate 95-1 24, respirations 16-20, blood pressure 124/72.    His past medical history is significant for COPD, endocarditis,  end-stage renal disease on HD, hepatitis C, hepatitis B, IV drug abuse, medical noncompliance. He denies any seizures, no hx of strokes, no blood clots, no history of cardiac stenting. He does smoke around 1/2 ppd for many years, he denies any alcohol or drug use.       ---------------------------------------------------------------------------------------------------------------------   Review of Systems   Constitutional: Negative for chills, diaphoresis, fatigue and fever.   HENT: Negative for congestion.    Respiratory: Negative for cough, shortness of breath and wheezing.    Cardiovascular: Negative for chest pain.   Gastrointestinal: Negative for abdominal distention, diarrhea, nausea and vomiting.   Genitourinary: Negative for difficulty urinating.   Musculoskeletal: Positive for neck pain.   Skin: Negative for color change and pallor.   Allergic/Immunologic: Negative for environmental allergies.   Neurological: Negative for dizziness, weakness and light-headedness.   Hematological: Negative for adenopathy.   Psychiatric/Behavioral: Negative for agitation, behavioral problems and confusion.      ---------------------------------------------------------------------------------------------------------------------   Past Medical History:   Diagnosis Date   • COPD (chronic obstructive pulmonary disease) (CMS/HCC)    • Endocarditis    • ESRD (end stage renal disease) (CMS/[facility])    • Hepatitis C    • Infectious viral hepatitis    • IV drug abuse (CMS/[facility])    • Unable to read or write      Past Surgical History:   Procedure Laterality Date   • CENTRAL VENOUS CATHETER TUNNELED INSERTION DOUBLE LUMEN Right     Chest for hemodialysis     Family History   Problem Relation Age of Onset   • Alcohol abuse Mother    • Hypertension Mother    • Hypertension Other    • Kidney disease Maternal Grandmother      Social History     Socioeconomic History   • Marital status:      Spouse name: Not on file   • Number of children:  Not on file   • Years of education: Not on file   • Highest education level: Not on file   Tobacco Use   • Smoking status: Current Every Day Smoker     Packs/day: 0.50     Years: 20.00     Pack years: 10.00     Types: Cigarettes   • Smokeless tobacco: Never Used   Substance and Sexual Activity   • Alcohol use: Not Currently     Frequency: Never     Comment: Quit 18 years ago   • Drug use: Yes     Types: Methamphetamines, IV     Comment: last used on Friday 5/18/2019   • Sexual activity: Defer     ---------------------------------------------------------------------------------------------------------------------   Allergies:  Penicillins  ---------------------------------------------------------------------------------------------------------------------     Medications below are reported home medications pulling from within the system; at this time, these medications have not been reconciled unless otherwise specified and are in the verification process for further verifcation as current home medications.    Prior to Admission Medications     Prescriptions Last Dose Informant Patient Reported? Taking?    amLODIPine (NORVASC) 5 MG tablet  Pharmacy No No    Take 1 tablet by mouth Every Night.    calcium acetate (PHOS BINDER,) 667 MG capsule capsule  Pharmacy No No    Take 1 capsule by mouth 3 (Three) Times a Day With Meals.    carvedilol (COREG) 12.5 MG tablet  Pharmacy No No    Take 1 tablet by mouth 2 (Two) Times a Day With Meals.    hydrALAZINE (APRESOLINE) 10 MG tablet  Pharmacy No No    Take 1 tablet by mouth 3 (Three) Times a Day.    isosorbide mononitrate (IMDUR) 30 MG 24 hr tablet  Pharmacy No No    Take 1 tablet by mouth Every Morning.        Hospital Scheduled Meds:  albumin human, 25 g, Intravenous, Once in Dialysis  sodium chloride, 1,000 mL, Intravenous, Once in Dialysis         ---------------------------------------------------------------------------------------------------------------------    Objective       Vital Signs:  Temp:  [98 °F (36.7 °C)-99.7 °F (37.6 °C)] 98 °F (36.7 °C)  Heart Rate:  [] 89  Resp:  [16-20] 16  BP: (124-145)/(72-97) 144/97      01/25/21  1113   Weight: 68 kg (150 lb)     Body mass index is 19.26 kg/m².  ---------------------------------------------------------------------------------------------------------------------       Physical Exam    Physical Exam:  Constitutional: chronically ill appearing male  HENT:  Head: Normocephalic and atraumatic.  Mouth:  dry mucous membranes.    Eyes:  Pupils are equal, round, and reactive to light.  No scleral icterus.  Neck:  Neck supple  Cardiovascular:  Normal rate, regular rhythm.  with no murmur.  Pulmonary/Chest:  No respiratory distress, no wheezes, no crackles, with normal breath sounds and good air movement.  Abdominal:  Soft.  Bowel sounds are present.  No distension and no tenderness.   Musculoskeletal:  No edema, no tenderness, and no deformity. Very dry skin   Neurological:  Alert and oriented to person, place, and time.  No tongue deviation.  No facial droop.  No slurred speech.   Skin:  Skin is warm and dry.  No rash noted.  No pallor.   Psychiatric:  Normal mood and affect.  Behavior is normal.  Judgment and thought content normal.   Peripheral vascular:  No edema and strong pulses on all 4 extremities.  ---------------------------------------------------------------------------------------------------------------------  EKG:          ---------------------------------------------------------------------------------------------------------------------   Results from last 7 days   Lab Units 01/25/21  1315 01/25/21  1138   CRP mg/dL  --  13.21*   LACTATE mmol/L 0.7  --    WBC 10*3/mm3  --  8.59   HEMOGLOBIN g/dL  --  7.1*   HEMATOCRIT %  --  22.3*   MCV fL  --  88.5   MCHC g/dL  --  31.8   PLATELETS 10*3/mm3  --  121*     Results from last 7 days   Lab Units 01/25/21  1515   PH, ARTERIAL pH units 7.257*   PO2 ART mm Hg 98.2    PCO2, ARTERIAL mm Hg 21.7*   HCO3 ART mmol/L 9.7*     Results from last 7 days   Lab Units 01/25/21  1138   SODIUM mmol/L 125*   POTASSIUM mmol/L 6.1*   CHLORIDE mmol/L 92*   CO2 mmol/L 9.9*   BUN mg/dL 185*   CREATININE mg/dL 15.50*   EGFR IF NONAFRICN AM mL/min/1.73 3*   CALCIUM mg/dL 5.8*   GLUCOSE mg/dL 136*   ALBUMIN g/dL 3.20*   BILIRUBIN mg/dL 0.4   ALK PHOS U/L 136*   AST (SGOT) U/L 10   ALT (SGPT) U/L 9   Estimated Creatinine Clearance: 5.8 mL/min (A) (by C-G formula based on SCr of 15.5 mg/dL (H)).  No results found for: AMMONIA  Results from last 7 days   Lab Units 01/25/21  1138   TROPONIN T ng/mL 0.161*     Results from last 7 days   Lab Units 01/25/21  1138   PROBNP pg/mL 11,870.0*     Lab Results   Component Value Date    HGBA1C 5.20 12/31/2020     Lab Results   Component Value Date    TSH 2.380 11/14/2020    FREET4 0.85 (L) 11/14/2020     No results found for: PREGTESTUR, PREGSERUM, HCG, HCGQUANT  Pain Management Panel     Pain Management Panel Latest Ref Rng & Units 1/25/2021 12/31/2020    AMPHETAMINES SCREEN, URINE Negative Negative Negative    BARBITURATES SCREEN Negative Negative Negative    BENZODIAZEPINE SCREEN, URINE Negative Negative Negative    BUPRENORPHINEUR Negative Negative Negative    COCAINE SCREEN, URINE Negative Negative Negative    METHADONE SCREEN, URINE Negative Negative Negative        No results found for: BLOODCX  No results found for: URINECX  No results found for: WOUNDCX  No results found for: STOOLCX      ---------------------------------------------------------------------------------------------------------------------  Imaging Results (Last 7 Days)     Procedure Component Value Units Date/Time    CT Chest Pulmonary Embolism [[id number]] Collected: 01/25/21 1738     Updated: 01/25/21 1743    Narrative:      EXAM:    CT Angiography Chest With Intravenous Contrast     EXAM DATE:    1/25/2021 4:51 PM     CLINICAL HISTORY:    PE suspected, low/intermediate prob, positive  D-dimer; U07.1-COVID-19;  E87.2-Acidosis; N18.5-Chronic kidney disease, stage 5; S16.1XXA-Strain  of muscle, fascia and tendon at neck level, initial encounter;  N30.01-Acute cystitis with hematuria     TECHNIQUE:    Axial computed tomographic angiography images of the chest with  intravenous contrast.  This CT exam was performed using one or more of  the following dose reduction techniques:  automated exposure control,  adjustment of the mA and/or kV according to patient size, and/or use of  iterative reconstruction technique.    MIP reconstructed images were created and reviewed.     COMPARISON:    12/31/2020     FINDINGS:    Pulmonary arteries:  Unremarkable.  No pulmonary embolism.    Aorta:  No acute findings.  No thoracic aortic aneurysm.    Lungs:  Centrilobular emphysema is stable.  Minimal areas of  groundglass airspace disease in the right lower lobe likely reflect a  mild degree of reexpansion edema given previous atelectasis and less  location. Atypical pneumonia not excluded.  No mass.    Pleural space:  Interval resolution of right pleural effusion.  No  pneumothorax.    Heart:  Unremarkable.  No cardiomegaly.  No significant pericardial  effusion.  No evidence of RV dysfunction.    Bones/joints:  No acute fracture.  No dislocation.    Soft tissues:  Unremarkable.    Lymph nodes:  Retroperitoneal lymphadenopathy is again noted.    Kidneys and ureters:  Severe and chronic appearing bilateral  hydronephrosis is stable from the previous exam.       Impression:      1.  Resolution of right pleural effusion with either minimal reexpansion  edema of the right lower lobe versus nonspecific pneumonitis including  COVID associated pneumonia.  2.  No PE identified.  3.  Emphysema is stable.  4.  Stable severe and chronic appearing hydronephrosis and  retroperitoneal lymphadenopathy.     This report was finalized on 1/25/2021 5:41 PM by Dr. Maxi Lowry MD.       XR Chest 1 View [[id number]] Collected: 01/25/21  1623     Updated: 01/25/21 1626    Narrative:      EXAM:    XR Chest, 1 View     EXAM DATE:    1/25/2021 3:36 PM     CLINICAL HISTORY:    COVID-19; U07.1-COVID-19; E87.2-Acidosis; N18.5-Chronic kidney  disease, stage 5; S16.1XXA-Strain of muscle, fascia and tendon at neck  level, initial encounter; N30.01-Acute cystitis with hematuria     TECHNIQUE:    Frontal view of the chest.     COMPARISON:    12/31/2020     FINDINGS:    Lungs:  Unremarkable.  No consolidation.    Pleural space:  Unremarkable.  No pneumothorax.    Heart:  Heart size within normal limits.    Mediastinum:  Unremarkable.    Bones/joints:  Unremarkable.    Soft tissues:  Edema has essentially resolved since the prior exam.    Tubes, lines and devices:  Right tunneled dialysis catheter  positioning is stable.       Impression:        No acute cardiopulmonary findings identified.     This report was finalized on 1/25/2021 4:23 PM by Dr. Maxi Lowry MD.       CT Cervical Spine Without Contrast [[id number]] Collected: 01/25/21 1503     Updated: 01/25/21 1508    Narrative:      EXAM:    CT Cervical Spine Without Intravenous Contrast     EXAM DATE:    1/25/2021 2:21 PM     CLINICAL HISTORY:    Acute neck pain     TECHNIQUE:    Axial computed tomography images of the cervical spine without  intravenous contrast.  Sagittal and coronal reformatted images were  created and reviewed.  This CT exam was performed using one or more of  the following dose reduction techniques:  automated exposure control,  adjustment of the mA and/or kV according to patient size, and/or use of  iterative reconstruction technique.     COMPARISON:    No relevant prior studies available.     FINDINGS:    Artifacts:  Motion artifact is noted throughout portions of the exam  causing mild limitation.    Vertebrae:  Unremarkable.  No acute fracture.    Discs/spinal canal/neural foramina:  Degenerative disc disease C4/5  with posterior disc osteophyte complex with mild central canal  stenosis.   Disc osteophyte complex C5/6 with mild central canal and neural  foraminal stenosis.  Posterior disc protrusion at C3/4 eccentric to  right with mild central canal stenosis.    Soft tissues:  Unremarkable.    Lung apices:  Emphysema of the partially imaged upper lungs.       Impression:      1. Degenerative changes and superimposed disc protrusion as detailed  above involving C3/4 through C5/6 levels with mild stenosis.  2.  No acute fracture or traumatic malalignment.  3.  Other nonacute findings detailed above. Exam somewhat limited due to  patient motion artifact.     This report was finalized on 1/25/2021 3:06 PM by Dr. Maxi Lowry MD.             ---------------------------------------------------------------------------------------------------------------------  Assessment / Plan       Assessment and Plan:    Sepsis 2/2 COVID-19 (CRP 13.21,   -Enhanced contact and airborne isolation   -COVID-19 positive on 1/25/21  -COVID-19 progression labs ordered  -Nephrology wants to hold off on remdesivir for now until he has some renal improvement   -Decardron 6 mg Daily for 10 days   -CT of the chest with PE Protocol pending, no pneumonia noted on chest xray, no hypoxia on blood gas on room air.    Sepsis 2/2 UTI (CRP 13.21, HR >90)  -2 sets of blood cultures collected  -urine culture collected in the ED  -Vancomycin and cefepime with pharmacy to dose ordered (also has hx of UTI and neck pain)   -de-escalate as appropriate  -repeat labs in the am     ESRD on HD with hyperkalemia   -potassium 6.1 on admission  -creatinine 15.50  -  -Potassium was treated in the ED with insulin   -Repeat potassium ordered for 1900  -his last session of HD was during his last hospitalization in late December, he does not have a chair at any clinic   -Nephrology consulted planning for HD tonight or in the AM    Metabolic Acidosis  -related to renal function in the setting of missed dialysis sessions  -nephrology  consulted for HD     Elevated Troponin   -Trop 0.161  -is chronically elevated, likely related to renal disease  -monitor on telemetry and trend   -he denies any chest pain   -echocardiogram ordered    Hyponatremia  -Sodium 125    Hypercalcemia  -calcium 5.8  -calcium gluconate was given in the ED  -PTH, Vitamin D, ionzed calcium, and Phosphorous ordered for the am     Elevated D-Dimer  -D-Dimer on admission is 14.53  -Dr. Ocampo spoke with Dr. Rivera (nephrology) will do CT with PE Protocol tonight and plan for HD tonight or early tomororw am.  -Bilateral lower extremity dopplers ordered    COPD  -not felt to be in exacerbation   -on room air  -incentive spirometer     Hx of Hep B and Hep C  -LFTs are currently WNL    Hx of IV drug abuse and Endocarditis in the past  -denies any drug use in the last few months  -UDS is negative on admission     Neck pain  -CT C-spine without contrast showed Degenerative disc disease C4/5  with posterior disc osteophyte complex with mild central canal stenosis.   Disc osteophyte complex C5/6 with mild central canal and neural  foraminal stenosis.  Posterior disc protrusion at C3/4 eccentric to  right with mild central canal stenosis.  -he does have tenderness on palpation, if continued neck pain would consider MRI to rule out any discitis/osteo with hx of drug use    Hx of Medical non-compliance  -leaves AMA at all recent hospitalizations     Anemia of chronic disease  -H/H 7.1/22.3  -PLT count 121  -both are around his most recent baseline, monitor closely     Discussed DVT prophylaxis with Dr. Ocampo, she wants him to be on full dose anticoagulation due to high risk for thrombus, pharmacy consulted to dose.     Activity: up with assistance   Precautions: falls   Tubes/Lines/Drains: Right chest wall Temporary HD catheter  DVT prophylaxis: full dose with pharmacy to dose     Code status: Full     Patient is high risk for the following reasons: sepsis, hyperkalemia    Sena CABRERA  NICKY Schwartz  01/25/21  18:15 EST  ---------------------------------------------------------------------------------------------------------------------       Electronically signed by Opal Ocampo MD at 01/25/21 6043       Vital Signs (last day)     Date/Time   Temp   Temp src   Pulse   Resp   BP   Patient Position   SpO2    02/01/21 0900   --   --   --   --   --   --   96    02/01/21 0700   98.2 (36.8)   --   88   20   102/64   Lying   96    02/01/21 0501   --   --   87   --   --   --   --    02/01/21 0218   97.1 (36.2)   Oral   90   18   117/73   Lying   94    01/31/21 2116   --   --   --   --   --   --   95    01/31/21 1846   97.9 (36.6)   Oral   72   18   115/66   Lying   95    01/31/21 1500   97.6 (36.4)   Oral   91   18   103/66   Lying   98    01/31/21 1216   --   Oral   92   18   93/65   Lying   97    01/31/21 0657   97.5 (36.4)   Oral   93   18   113/82   Lying   99    01/31/21 0228   97.3 (36.3)   Oral   85   18   105/63   --   97                Current Facility-Administered Medications   Medication Dose Route Frequency Provider Last Rate Last Admin   • acetaminophen (TYLENOL) tablet 650 mg  650 mg Oral Q6H PRN Teena Ray PA   650 mg at 02/01/21 1008   • alteplase (CATHFLO/ACTIVASE) INTRACATHETER injection 2 mg  2 mg Intracatheter Once Khoa Carl MD       • alteplase (CATHFLO/ACTIVASE) INTRACATHETER injection 2 mg  2 mg Intracatheter Once MiremaKhoa parada MD       • apixaban (ELIQUIS) tablet 5 mg  5 mg Oral Q12H Opal Ocampo MD   5 mg at 02/01/21 0958   • aspirin EC tablet 81 mg  81 mg Oral Daily Khoa Carl MD   81 mg at 02/01/21 0957   • atorvastatin (LIPITOR) tablet 40 mg  40 mg Oral Nightly Khoa Carl MD   40 mg at 01/31/21 2030   • calcium acetate (PHOS BINDER)) capsule 667 mg  667 mg Oral TID With Meals Khoa Carl MD   667 mg at 02/01/21 0957   • carvedilol (COREG) tablet 12.5 mg  12.5 mg Oral BID With Meals Khoa Carl MD   Stopped at  01/31/21 1657   • cefTRIAXone (ROCEPHIN) 2 g/100 mL 0.9% NS IVPB (MBP)  2 g Intravenous Q24H Khoa Carl MD   2 g at 02/01/21 1010   • dextrose (D50W) 25 g/ 50mL Intravenous Solution 25 g  25 g Intravenous Q15 Min PRN Khoa Carl MD       • dextrose (GLUTOSE) oral gel 15 g  15 g Oral Q15 Min PRN Khoa Carl MD       • epoetin mc-epbx (RETACRIT) injection 6,000 Units  6,000 Units Subcutaneous Once per day on Mon Wed Fri Khoa Carl MD   6,000 Units at 01/27/21 1813   • glucagon (human recombinant) (GLUCAGEN DIAGNOSTIC) injection 1 mg  1 mg Subcutaneous Q15 Min PRN Khoa Carl MD       • HYDROcodone-acetaminophen (NORCO) 5-325 MG per tablet 1 tablet  1 tablet Oral Q8H PRN Opal Ocampo MD   1 tablet at 02/01/21 0501   • isosorbide mononitrate (IMDUR) 24 hr tablet 30 mg  30 mg Oral QAM Khoa Carl MD   30 mg at 02/01/21 0501   • lactobacillus acidophilus (RISAQUAD) capsule 1 capsule  1 capsule Oral Daily Khoa Carl MD   1 capsule at 02/01/21 0958   • lidocaine (LIDODERM) 5 % 1 patch  1 patch Transdermal Q24H Teena Ray PA   1 patch at 01/31/21 7443   • Magnesium Sulfate 2 gram Bolus, followed by 8 gram infusion (total Mg dose 10 grams)- Mg less than or equal to 1mg/dL  2 g Intravenous PRN Khoa Carl MD        Or   • Magnesium Sulfate 2 gram / 50mL Infusion (GIVE X 3 BAGS TO EQUAL 6GM TOTAL DOSE) - Mg 1.1 - 1.5 mg/dl  2 g Intravenous PRN Khoa Carl MD        Or   • Magnesium Sulfate 4 gram infusion- Mg 1.6-1.9 mg/dL  4 g Intravenous PRN Khoa Carl MD       • nicotine (NICODERM CQ) 14 MG/24HR patch 1 patch  1 patch Transdermal Q24H Khoa Carl MD   1 patch at 01/31/21 0806   • prochlorperazine (COMPAZINE) injection 10 mg  10 mg Intravenous Q4H PRN Khoa Carl MD       • sodium chloride 0.9 % flush 10 mL  10 mL Intravenous PRN Khoa Carl MD       • sodium chloride 0.9 % flush 10 mL  10 mL Intravenous PRN  Khoa Carl MD   10 mL at 01/31/21 0806     Lab Results (most recent)     Procedure Component Value Units Date/Time    POC Glucose Once [[id number]]  (Normal) Collected: 02/01/21 0721    Specimen: Blood Updated: 02/01/21 0739     Glucose 112 mg/dL     Ferritin [[id number]]  (Abnormal) Collected: 02/01/21 0513    Specimen: Blood Updated: 02/01/21 0655     Ferritin 1,447.00 ng/mL     Narrative:      Results may be falsely decreased if patient taking Biotin.      Comprehensive Metabolic Panel [[id number]]  (Abnormal) Collected: 02/01/21 0513    Specimen: Blood Updated: 02/01/21 0648     Glucose 122 mg/dL       mg/dL      Creatinine 6.61 mg/dL      Sodium 125 mmol/L      Potassium 5.1 mmol/L      Chloride 86 mmol/L      CO2 26.0 mmol/L      Calcium 8.6 mg/dL      Total Protein 6.8 g/dL      Albumin 2.61 g/dL      ALT (SGPT) 8 U/L      AST (SGOT) 21 U/L      Alkaline Phosphatase 195 U/L      Total Bilirubin 0.3 mg/dL      eGFR Non African Amer 9 mL/min/1.73      Comment: <15 Indicative of kidney failure.        eGFR   Amer --     Comment: <15 Indicative of kidney failure.        Globulin 4.2 gm/dL      A/G Ratio 0.6 g/dL      BUN/Creatinine Ratio 19.5     Anion Gap 13.0 mmol/L     Narrative:      GFR Normal >60  Chronic Kidney Disease <60  Kidney Failure <15      CK [[id number]]  (Abnormal) Collected: 02/01/21 0513    Specimen: Blood Updated: 02/01/21 0637     Creatine Kinase 13 U/L     C-reactive Protein [[id number]]  (Abnormal) Collected: 02/01/21 0513    Specimen: Blood Updated: 02/01/21 0637     C-Reactive Protein 6.95 mg/dL     Lactate Dehydrogenase [[id number]]  (Normal) Collected: 02/01/21 0513    Specimen: Blood Updated: 02/01/21 0637      U/L     Magnesium [[id number]]  (Normal) Collected: 02/01/21 0513    Specimen: Blood Updated: 02/01/21 0637     Magnesium 2.5 mg/dL     Phosphorus [[id number]]  (Abnormal) Collected: 02/01/21 0513    Specimen: Blood Updated: 02/01/21 0637     Phosphorus 2.0  mg/dL     Fibrinogen [[id number]]  (Normal) Collected: 02/01/21 0513    Specimen: Blood Updated: 02/01/21 0635     Fibrinogen 524 mg/dL     D-dimer, Quantitative [[id number]]  (Abnormal) Collected: 02/01/21 0513    Specimen: Blood Updated: 02/01/21 0635     D-Dimer, Quantitative 2.75 MCGFEU/mL     Narrative:      d-Dimer assay result is to be used in conjunction with a non-high clinical pretest probability (PTP) assessment tool to exclude PE and aid in diagnosis of VTE with cutoff of 0.5 MCGFEU/mL.    CBC & Differential [[id number]]  (Abnormal) Collected: 02/01/21 0513    Specimen: Blood Updated: 02/01/21 0631    Narrative:      The following orders were created for panel order CBC & Differential.  Procedure                               Abnormality         Status                     ---------                               -----------         ------                     CBC Auto Differential[[id number]]        Abnormal            Final result                 Please view results for these tests on the individual orders.    CBC Auto Differential [[id number]]  (Abnormal) Collected: 02/01/21 0513    Specimen: Blood Updated: 02/01/21 0631     WBC 6.81 10*3/mm3      RBC 2.52 10*6/mm3      Hemoglobin 6.9 g/dL      Hematocrit 22.7 %      MCV 90.1 fL      MCH 27.4 pg      MCHC 30.4 g/dL      RDW 14.3 %      RDW-SD 46.8 fl      MPV 11.0 fL      Platelets 88 10*3/mm3      Neutrophil % 67.1 %      Lymphocyte % 16.0 %      Monocyte % 14.1 %      Eosinophil % 1.2 %      Basophil % 0.3 %      Immature Grans % 1.3 %      Neutrophils, Absolute 4.57 10*3/mm3      Lymphocytes, Absolute 1.09 10*3/mm3      Monocytes, Absolute 0.96 10*3/mm3      Eosinophils, Absolute 0.08 10*3/mm3      Basophils, Absolute 0.02 10*3/mm3      Immature Grans, Absolute 0.09 10*3/mm3      nRBC 0.0 /100 WBC     Blood Culture - Blood, Arm, Left [[id number]] Collected: 01/30/21 0420    Specimen: Blood from Arm, Left Updated: 02/01/21 0235     Blood Culture No growth at  2 days    Blood Culture - Blood, Hand, Right [[id number]] Collected: 01/26/21 2118    Specimen: Blood from Hand, Right Updated: 01/31/21 2146     Blood Culture No growth at 5 days    Blood Culture - Blood, Hand, Left [[id number]] Collected: 01/26/21 2137    Specimen: Blood from Hand, Left Updated: 01/31/21 2146     Blood Culture No growth at 5 days    POC Glucose Once [[id number]]  (Abnormal) Collected: 01/31/21 1851    Specimen: Blood Updated: 01/31/21 1857     Glucose 189 mg/dL     aPTT [[id number]]  (Abnormal) Collected: 01/31/21 1205    Specimen: Blood Updated: 01/31/21 1248     PTT 53.0 seconds      Comment: Note new Reference Range       Narrative:      PTT Heparin Therapeutic Range:  59 - 95 seconds      Comprehensive Metabolic Panel [[id number]]  (Abnormal) Collected: 01/31/21 0551    Specimen: Blood Updated: 01/31/21 0708     Glucose 122 mg/dL      BUN 95 mg/dL      Creatinine 5.59 mg/dL      Sodium 129 mmol/L      Potassium 4.5 mmol/L      Chloride 89 mmol/L      CO2 24.2 mmol/L      Calcium 8.1 mg/dL      Total Protein 7.2 g/dL      Albumin 2.75 g/dL      ALT (SGPT) 13 U/L      AST (SGOT) 30 U/L      Alkaline Phosphatase 211 U/L      Total Bilirubin 0.3 mg/dL      eGFR Non African Amer 11 mL/min/1.73      Comment: <15 Indicative of kidney failure.        eGFR   Amer --     Comment: <15 Indicative of kidney failure.        Globulin 4.5 gm/dL      A/G Ratio 0.6 g/dL      BUN/Creatinine Ratio 17.0     Anion Gap 15.8 mmol/L     Narrative:      GFR Normal >60  Chronic Kidney Disease <60  Kidney Failure <15      Ferritin [[id number]]  (Abnormal) Collected: 01/31/21 0551    Specimen: Blood Updated: 01/31/21 0644     Ferritin 1,786.00 ng/mL     Narrative:      Results may be falsely decreased if patient taking Biotin.      Magnesium [[id number]]  (Normal) Collected: 01/31/21 0551    Specimen: Blood Updated: 01/31/21 0642     Magnesium 2.6 mg/dL     CK [[id number]]  (Abnormal) Collected: 01/31/21 0551    Specimen:  Blood Updated: 01/31/21 0640     Creatine Kinase 19 U/L     C-reactive Protein [[id number]]  (Abnormal) Collected: 01/31/21 0551    Specimen: Blood Updated: 01/31/21 0640     C-Reactive Protein 10.42 mg/dL     Phosphorus [[id number]]  (Abnormal) Collected: 01/31/21 0551    Specimen: Blood Updated: 01/31/21 0640     Phosphorus 2.4 mg/dL     D-dimer, Quantitative [[id number]]  (Abnormal) Collected: 01/31/21 0551    Specimen: Blood Updated: 01/31/21 0608     D-Dimer, Quantitative 3.40 MCGFEU/mL     Narrative:      d-Dimer assay result is to be used in conjunction with a non-high clinical pretest probability (PTP) assessment tool to exclude PE and aid in diagnosis of VTE with cutoff of 0.5 MCGFEU/mL.    aPTT [[id number]]  (Abnormal) Collected: 01/31/21 0551    Specimen: Blood Updated: 01/31/21 0608     PTT 36.2 seconds      Comment: Note new Reference Range       Narrative:      PTT Heparin Therapeutic Range:  59 - 95 seconds      CBC & Differential [[id number]]  (Abnormal) Collected: 01/31/21 0551    Specimen: Blood Updated: 01/31/21 0554    Narrative:      The following orders were created for panel order CBC & Differential.  Procedure                               Abnormality         Status                     ---------                               -----------         ------                     CBC Auto Differential[[id number]]        Abnormal            Final result                 Please view results for these tests on the individual orders.    CBC Auto Differential [[id number]]  (Abnormal) Collected: 01/31/21 0551    Specimen: Blood Updated: 01/31/21 0554     WBC 9.10 10*3/mm3      RBC 2.88 10*6/mm3      Hemoglobin 7.9 g/dL      Hematocrit 26.1 %      MCV 90.6 fL      MCH 27.4 pg      MCHC 30.3 g/dL      RDW 14.6 %      RDW-SD 48.3 fl      MPV 12.0 fL      Platelets 81 10*3/mm3      Neutrophil % 69.5 %      Lymphocyte % 14.8 %      Monocyte % 13.0 %      Eosinophil % 1.4 %      Basophil % 0.2 %      Immature Grans % 1.1  %      Neutrophils, Absolute 6.32 10*3/mm3      Lymphocytes, Absolute 1.35 10*3/mm3      Monocytes, Absolute 1.18 10*3/mm3      Eosinophils, Absolute 0.13 10*3/mm3      Basophils, Absolute 0.02 10*3/mm3      Immature Grans, Absolute 0.10 10*3/mm3      nRBC 0.0 /100 WBC     Fibrinogen [[id number]]  (Normal) Collected: 01/30/21 0420    Specimen: Blood Updated: 01/30/21 0545     Fibrinogen 410 mg/dL     Lactate Dehydrogenase [[id number]]  (Abnormal) Collected: 01/30/21 0420    Specimen: Blood Updated: 01/30/21 0505      U/L     Hepatitis B Surface Antigen [[id number]] Collected: 01/25/21 1138    Specimen: Blood Updated: 01/27/21 1310     Hepatitis B Surface Ag Confirm. indicated    Narrative:      Performed at:  [address] [place] [address]  [id number]  : Paolo Staley PhD, Phone:  [phone]    HBsAg Confirmation [[id number]]  (Abnormal) Collected: 01/25/21 1138    Specimen: Blood Updated: 01/27/21 1310     Hepatitis B Surface Ag Confirm Positive     Comment: Result confirmed by neutralization.       Narrative:      Performed at:  [address] [place] [address]  [id number]  : Paolo Staley PhD, Phone:  [phone]    Urine Culture - Urine, Urine, Clean Catch [[id number]]  (Abnormal)  (Susceptibility) Collected: 01/25/21 1505    Specimen: Urine, Clean Catch Updated: 01/27/21 0949     Urine Culture >100,000 CFU/mL Serratia marcescens    Susceptibility      Serratia marcescens     CHANI     Cefazolin Resistant     Cefepime Susceptible     Ceftazidime Susceptible     Ceftriaxone Susceptible     Gentamicin Susceptible     Levofloxacin Susceptible     Nitrofurantoin Resistant     Tetracycline Resistant     Trimethoprim + Sulfamethoxazole Susceptible                Susceptibility Comments     Serratia marcescens    Please call the lab if pip/evelyn is requested for Serratia spp. Will have to be set up by disk diffusion.               Hemoglobin & Hematocrit,  "Blood [[id number]]  (Abnormal) Collected: 01/26/21 2007    Specimen: Blood Updated: 01/26/21 2040     Hemoglobin 7.1 g/dL      Comment: Post Transfusion Specimen         Hematocrit 21.3 %     Procalcitonin [[id number]]  (Abnormal) Collected: 01/25/21 1941    Specimen: Blood Updated: 01/26/21 1232     Procalcitonin 25.39 ng/mL     Narrative:      As a Marker for Sepsis (Non-Neonates):   1. <0.5 ng/mL represents a low risk of severe sepsis and/or septic shock.  1. >2 ng/mL represents a high risk of severe sepsis and/or septic shock.    As a Marker for Lower Respiratory Tract Infections that require antibiotic therapy:  PCT on Admission     Antibiotic Therapy             6-12 Hrs later  > 0.5                Strongly Recommended            >0.25 - <0.5         Recommended  0.1 - 0.25           Discouraged                   Remeasure/reassess PCT  <0.1                 Strongly Discouraged          Remeasure/reassess PCT      As 28 day mortality risk marker: \"Change in Procalcitonin Result\" (> 80 % or <=80 %) if Day 0 (or Day 1) and Day 4 values are available. Refer to http://www.[employer]-pct-calculator.com/   Change in PCT <=80 %   A decrease of PCT levels below or equal to 80 % defines a positive change in PCT test result representing a higher risk for 28-day all-cause mortality of patients diagnosed with severe sepsis or septic shock.  Change in PCT > 80 %   A decrease of PCT levels of more than 80 % defines a negative change in PCT result representing a lower risk for 28-day all-cause mortality of patients diagnosed with severe sepsis or septic shock.                Results may be falsely decreased if patient taking Biotin.     Vitamin D 25 Hydroxy [[id number]]  (Abnormal) Collected: 01/25/21 1941    Specimen: Blood Updated: 01/26/21 1227     25 Hydroxy, Vitamin D 11.9 ng/ml     Narrative:      Reference Range for Total Vitamin D 25(OH)     Deficiency <20.0 ng/mL   Insufficiency 21-29 ng/mL   Sufficiency  " ng/mL  Toxicity >100 ng/ml    Results may be falsely increased if patient taking Biotin.      Manual Differential [[id number]]  (Abnormal) Collected: 01/26/21 0530    Specimen: Blood Updated: 01/26/21 0628     Neutrophil % 84.0 %      Lymphocyte % 4.0 %      Monocyte % 8.0 %      Eosinophil % 1.0 %      Bands %  3.0 %      Neutrophils Absolute 4.65 10*3/mm3      Lymphocytes Absolute 0.21 10*3/mm3      Monocytes Absolute 0.43 10*3/mm3      Eosinophils Absolute 0.05 10*3/mm3      Basophilic Stippling Slight/1+     Dacrocytes Slight/1+     Hypochromia Slight/1+     Ovalocytes Slight/1+     Toxic Granulation Slight/1+     Platelet Morphology Normal    Scan Slide [[id number]] Collected: 01/26/21 0530    Specimen: Blood Updated: 01/26/21 0628     Scan Slide --     Comment: See Manual Differential Results       Troponin [[id number]]  (Abnormal) Collected: 01/26/21 0343    Specimen: Blood Updated: 01/26/21 0432     Troponin T 0.189 ng/mL     Narrative:      Troponin T Reference Range:  <= 0.03 ng/mL-   Negative for AMI  >0.03 ng/mL-     Abnormal for myocardial necrosis.  Clinicians would have to utilize clinical acumen, EKG, Troponin and serial changes to determine if it is an Acute Myocardial Infarction or myocardial injury due to an underlying chronic condition.       Results may be falsely decreased if patient taking Biotin.      Vancomycin, Random [[id number]]  (Normal) Collected: 01/26/21 0343    Specimen: Blood Updated: 01/26/21 0426     Vancomycin Random 25.60 mcg/mL     Narrative:      Therapeutic Ranges for Vancomycin    Vancomycin Random   5.0-40.0 mcg/mL  Vancomycin Trough   5.0-20.0 mcg.mL  Vancomycin Peak     20.0-40.0 mcg/mL    Calcium, Ionized [[id number]]  (Abnormal) Collected: 01/26/21 0343    Specimen: Blood Updated: 01/26/21 0413     Ionized Calcium 0.82 mmol/L     Blood Culture ID, PCR - Blood, Arm, Right [[id number]]  (Abnormal) Collected: 01/25/21 1315    Specimen: Blood from Arm, Right Updated: 01/26/21  0356     BCID, PCR Serratia marcescens. Identification by BCID PCR.    Scan Slide [[id number]] Collected: 01/25/21 1941    Specimen: Blood Updated: 01/25/21 2024     Scan Slide --     Comment: See Manual Differential Results       Manual Differential [[id number]]  (Abnormal) Collected: 01/25/21 1941    Specimen: Blood Updated: 01/25/21 2024     Neutrophil % 75.0 %      Lymphocyte % 9.0 %      Monocyte % 5.0 %      Bands %  10.0 %      Metamyelocyte % 1.0 %      Neutrophils Absolute 6.66 10*3/mm3      Lymphocytes Absolute 0.70 10*3/mm3      Monocytes Absolute 0.39 10*3/mm3      Dacrocytes Slight/1+     Hypochromia Slight/1+     Platelet Estimate Decreased    Potassium [[id number]]  (Abnormal) Collected: 01/25/21 1941    Specimen: Blood Updated: 01/25/21 2010     Potassium 5.3 mmol/L     Troponin [[id number]]  (Abnormal) Collected: 01/25/21 1941    Specimen: Blood Updated: 01/25/21 2010     Troponin T 0.160 ng/mL     Narrative:      Troponin T Reference Range:  <= 0.03 ng/mL-   Negative for AMI  >0.03 ng/mL-     Abnormal for myocardial necrosis.  Clinicians would have to utilize clinical acumen, EKG, Troponin and serial changes to determine if it is an Acute Myocardial Infarction or myocardial injury due to an underlying chronic condition.       Results may be falsely decreased if patient taking Biotin.      PTH, Intact [[id number]]  (Abnormal) Collected: 01/25/21 1941    Specimen: Blood Updated: 01/25/21 2009     PTH, Intact 1,274.0 pg/mL     Narrative:      Please note the potential for biotin to falsely depress the PTH result when high levels of biotin surpassing the daily recommended dose are administered.    Results may be falsely decreased if patient taking Biotin.      Protime-INR [[id number]]  (Abnormal) Collected: 01/25/21 1941    Specimen: Blood Updated: 01/25/21 1956     Protime 18.2 Seconds      Comment: Note new Reference Range        INR 1.53    Narrative:      Suggested INR therapeutic range for stable oral  anticoagulant therapy:    Low Intensity therapy:   1.5-2.0  Moderate Intensity therapy:   2.0-3.0  High Intensity therapy:   2.5-4.0    Urine Drug Screen - Urine, Clean Catch [[id number]]  (Normal) Collected: 01/25/21 1505    Specimen: Urine, Clean Catch Updated: 01/25/21 1535     Amphetamine Screen, Urine Negative     Barbiturates Screen, Urine Negative     Benzodiazepine Screen, Urine Negative     Cocaine Screen, Urine Negative     Methadone Screen, Urine Negative     Opiate Screen Negative     Phencyclidine (PCP), Urine Negative     THC, Screen, Urine Negative     6-ACETYL MORPHINE Negative     Buprenorphine, Screen, Urine Negative     Oxycodone Screen, Urine Negative    Narrative:      Negative Thresholds For Drugs Screened:                  Amphetamines              1000 ng/ml               Barbiturates               200 ng/ml               Benzodiazepines            200 ng/ml              Cocaine                    300 ng/ml              Methadone                  300 ng/ml              Opiates                    300 ng/ml               Phencyclidine               25 ng/ml               THC                         50 ng/ml              6-Acetyl Morphine           10 ng/ml              Buprenorphine                5 ng/ml              Oxycodone                  300 ng/ml    The reference range for all drugs tested is negative. This report includes final unconfirmed qualitative results to be used for medical treatment purposes only. Unconfirmed results must not be used for non-medical purposes such as employment or legal testing. Clinical consideration should be applied to any drug of abuse test, especially when unconfirmed quantitative results are used.        Urinalysis, Microscopic Only - Urine, Clean Catch [[id number]]  (Abnormal) Collected: 01/25/21 1505    Specimen: Urine, Clean Catch Updated: 01/25/21 1532     RBC, UA Too Numerous to Count /HPF      WBC, UA Too Numerous to Count /HPF      Bacteria, UA 3+  /HPF      Squamous Epithelial Cells, UA 0-2 /HPF      Hyaline Casts, UA None Seen /LPF      WBC Clumps, UA Moderate/2+ /HPF      Methodology Manual Light Microscopy    Urinalysis With Microscopic If Indicated (No Culture) - Urine, Clean Catch [[id number]]  (Abnormal) Collected: 01/25/21 1505    Specimen: Urine, Clean Catch Updated: 01/25/21 1522     Color, UA Yellow     Appearance, UA Turbid     pH, UA 6.0     Specific Gravity, UA 1.011     Glucose, UA Negative     Ketones, UA Negative     Bilirubin, UA Negative     Blood, UA Large (3+)     Protein,  mg/dL (2+)     Leuk Esterase, UA Large (3+)     Nitrite, UA Positive     Urobilinogen, UA 0.2 E.U./dL    BNP [[id number]]  (Abnormal) Collected: 01/25/21 1138    Specimen: Blood Updated: 01/25/21 1522     proBNP 11,870.0 pg/mL     Narrative:      Among patients with dyspnea, NT-proBNP is highly sensitive for the detection of acute congestive heart failure. In addition NT-proBNP of <300 pg/ml effectively rules out acute congestive heart failure with 99% negative predictive value.    Results may be falsely decreased if patient taking Biotin.      Blood Gas, Arterial With Co-Ox [[id number]]  (Abnormal) Collected: 01/25/21 1515    Specimen: Arterial Blood Updated: 01/25/21 1516     Site Left Radial     Alfa's Test Positive     pH, Arterial 7.257 pH units      Comment: 84 Value below reference range        pCO2, Arterial 21.7 mm Hg      Comment: 84 Value below reference range        pO2, Arterial 98.2 mm Hg      HCO3, Arterial 9.7 mmol/L      Comment: 84 Value below reference range        Base Excess, Arterial -15.9 mmol/L      O2 Saturation, Arterial 96.9 %      Hemoglobin, Blood Gas 7.6 g/dL      Comment: 84 Value below reference range        Hematocrit, Blood Gas 23.2 %      Comment: 84 Value below reference range        Oxyhemoglobin 94.9 %      Methemoglobin 1.20 %      Carboxyhemoglobin 0.9 %      A-a Gradiant 18.5 mmHg      CO2 Content 10.3 mmol/L      Temperature  0.0 C      Barometric Pressure for Blood Gas 719 mmHg      Modality Room Air     FIO2 21 %      Ventilator Mode NA     Note --     Collected by [id number]     Comment: Meter: [id number]     :  [id number]        pH, Temp Corrected --     pCO2, Temperature Corrected --     pO2, Temperature Corrected --    Hepatitis Panel, Acute [[id number]]  (Abnormal) Collected: 01/25/21 1138    Specimen: Blood Updated: 01/25/21 1431     Hepatitis B Surface Ag --     Hep A IgM Non-Reactive     Hep B C IgM Non-Reactive     Hepatitis C Ab Reactive    Narrative:      Results may be falsely decreased if patient taking Biotin.   Preliminary reactive result pending confirmation at [facility].     COVID-19 and FLU A/B PCR - Swab, Nasopharynx [[id number]]  (Abnormal) Collected: 01/25/21 1314    Specimen: Swab from Nasopharynx Updated: 01/25/21 1413     COVID19 Detected     Influenza A PCR Not Detected     Influenza B PCR Not Detected    Narrative:      Fact sheet for providers: https://www.fda.gov/media/371525/download    Fact sheet for patients: https://www.fda.gov/media/468072/download    Test performed by PCR.  Influenza A and Influenza B negative results should be considered presumptive in samples that have a positive SARS-CoV-2 result.    Competitive inhibition studies showed that SARS-CoV-2 virus, when present at concentrations above 3.6E+04 copies/mL, can inhibit the detection and amplification of influenza A and influenza B virus RNA if present at or below 1.8E+02 copies/mL or 4.9E+02 copies/mL, respectively, and may lead to false negative influenza virus results. If co-infection with influenza A or influenza B virus is suspected in samples with a positive SARS-CoV-2 result, the sample should be re-tested with another FDA cleared, approved, or authorized influenza test, if influenza virus detection would change clinical management.    Lactic Acid, Plasma [[id number]]  (Normal) Collected: 01/25/21 1315    Specimen: Blood from Arm,  Right Updated: 01/25/21 1405     Lactate 0.7 mmol/L     Ethanol [[id number]] Collected: 01/25/21 1138    Specimen: Blood Updated: 01/25/21 1259     Ethanol <10 mg/dL      Ethanol % <0.010 %     Narrative:      >/= 80.0 legally intoxicated    [place] [facility] [[id number]] Collected: 01/25/21 1138    Specimen: Blood Updated: 01/25/21 1245    Narrative:      The following orders were created for panel order [place] [facility].  Procedure                               Abnormality         Status                     ---------                               -----------         ------                     Light Blue Top[[id number]]                                   Final result               Green Top (Gel)[[id number]]                                  Final result               Lavender Top[[id number]]                                     Final result               Gold Top - SST[[id number]]                                   Final result                 Please view results for these tests on the individual orders.    Light Blue Top [[id number]] Collected: 01/25/21 1138    Specimen: Blood Updated: 01/25/21 1245     Extra Tube hold for add-on     Comment: Auto resulted       Green Top (Gel) [[id number]] Collected: 01/25/21 1138    Specimen: Blood Updated: 01/25/21 1245     Extra Tube Hold for add-ons.     Comment: Auto resulted.       Lavender Top [[id number]] Collected: 01/25/21 1138    Specimen: Blood Updated: 01/25/21 1245     Extra Tube hold for add-on     Comment: Auto resulted       Gold Top - SST [[id number]] Collected: 01/25/21 1138    Specimen: Blood Updated: 01/25/21 1245     Extra Tube Hold for add-ons.     Comment: Auto resulted.             Imaging Results (Most Recent)     Procedure Component Value Units Date/Time    FL Surgery Fluoro [[id number]] Collected: 01/29/21 1023     Updated: 01/29/21 1038    Narrative:      EXAMINATION: FL SURGERY FLUORO-      CLINICAL INDICATION:     Dialysis cath; R78.81-Bacteremia;  E87.2-Acidosis;  N18.5-Chronic kidney disease, stage 5; S16.1XXA-Strain  of muscle, fascia and tendon at neck level, initial encounter;  N30.01-Acute cystitis with hematuria; U07.1-COVID-19; J12.82-Pneumonia  due to Coronavirus disease 2019     TECHNIQUE:  FL SURGERY FLUORO-      FLUOROSCOPY TIME: 19.3 seconds     FINDINGS:   Fluoroscopy images from dialysis catheter placement.        Impression:      As above.     This report was finalized on 1/29/2021 10:23 AM by Dr. Maxi Lowry MD.       XR Chest AP [[id number]] Collected: 01/29/21 1023     Updated: 01/29/21 1036    Narrative:      EXAM:    XR Chest, 1 View     EXAM DATE:    1/29/2021 10:04 AM     CLINICAL HISTORY:    Post dialysis cath; R78.81-Bacteremia; E87.2-Acidosis; N18.5-Chronic  kidney disease, stage 5; S16.1XXA-Strain of muscle, fascia and tendon at  neck level, initial encounter; N30.01-Acute cystitis with hematuria;  U07.1-COVID-19; J12.82-Pneumonia due to Coronavirus disease 2019     TECHNIQUE:    Frontal view of the chest.     COMPARISON:    No relevant prior studies available.     FINDINGS:    Lungs:  Unremarkable.  No consolidation.    Pleural space:  Unremarkable.  No pneumothorax.    Heart:  Unremarkable.  No cardiomegaly.    Mediastinum:  Unremarkable.    Bones/joints:  Unremarkable.    Tubes, lines and devices:  Left dialysis catheter placement with tip  at the cavoatrial junction.       Impression:        Dialysis catheter placement left side. No pneumothorax.     This report was finalized on 1/29/2021 10:23 AM by Dr. Maxi Lowry MD.       US Venous Doppler Lower Extremity Bilateral (duplex) [[id number]] Collected: 01/27/21 1335     Updated: 01/27/21 1337    Narrative:      US VENOUS DOPPLER LOWER EXTREMITY BILATERAL (DUPLEX)-     CLINICAL INDICATION: elevated D-Dimer; E87.2-Acidosis; N18.5-Chronic  kidney disease, stage 5; S16.1XXA-Strain of muscle, fascia and tendon at  neck level, initial encounter; N30.01-Acute cystitis with  hematuria;  U07.1-COVID-19; J12.82-Pneumonia due to Coronavirus disease 2019        COMPARISON: 01/01/2021      TECHNIQUE: Color Doppler imaging was used with compression and  augmentation to evaluate the lower extremity deep venous system.     FINDINGS:   There is patent spontaneous flow from the common femoral vein through  the posterior tibial veins.  There was no internal clot or area of noncompressibility.  Normal augmentation was elicited where applicable.       Impression:      No DVT in the lower extremities on today's exam.      This report was finalized on 1/27/2021 1:35 PM by Dr. Wesly Reynolds MD.       CT Angiogram Neck [[id number]] Collected: 01/26/21 1215     Updated: 01/26/21 1222    Narrative:      EXAM:    CT Angiography Neck With Intravenous Contrast     EXAM DATE:    1/26/2021 11:36 AM     CLINICAL HISTORY:    neck pain in the area of the tunneled hemodialysis; E87.2-Acidosis;  N18.5-Chronic kidney disease, stage 5; S16.1XXA-Strain of muscle, fascia  and tendon at neck level, initial encounter; N30.01-Acute cystitis with  hematuria; U07.1-COVID-19; J12.82-Pneumonia due to Coronavirus disease  2019     TECHNIQUE:    Axial computed tomographic angiography images of the neck with  intravenous contrast.  This CT exam was performed using one or more of  the following dose reduction techniques:  automated exposure control,  adjustment of the mA and/or kV according to patient size, and/or use of  iterative reconstruction technique.    MIP reconstructed images were created and reviewed.     COMPARISON:    C-spine CT 01/25/2021     FINDINGS:      VASCULATURE:    Right common carotid artery:  Unremarkable.  No significant stenosis.   No dissection or occlusion.    Right internal carotid artery:  Minimal calcified plaque is noted of  the right carotid bulb.  Extracranial segment is patent with no  significant stenosis.  No dissection or occlusion.    Right external carotid artery:  Unremarkable.  No  occlusion.    Right vertebral artery:  Unremarkable.  No significant stenosis.  No  dissection or occlusion.       Left common carotid artery:  Unremarkable.  No significant stenosis.   No dissection or occlusion.    Left internal carotid artery:  Unremarkable.  Extracranial segment is  patent with no significant stenosis.  No dissection or occlusion.    Left external carotid artery:  Unremarkable.  No occlusion.    Left vertebral artery:  Unremarkable.  No significant stenosis.  No  dissection or occlusion.    Other vasculature:  Three-vessel arch configuration is noted.   Expected small amounts of air seen in the venous system from venous  injection.      NECK:    Bones/joints:  No acute fracture.  No dislocation.    Soft tissues:  There is a mild degree of nonspecific soft tissue edema  of the bilateral neck soft tissues.  No mass.    Lymph nodes:  No cervical lymphadenopathy by CT size criteria.    Lung apices:  Emphysema of the upper lobes again noted.    Tubes, lines and devices:  The right tunneled hemodialysis catheter is  noted with no abnormalities evident on CT.  No abnormal fluid  collections or extensive hematoma identified surrounding the dialysis  catheter.    Other findings:  No enhancing fluid collection or abscess identified.      CAROTID STENOSIS REFERENCE USING NASCET CRITERIA:    % ICA stenosis = (1 - narrowest ICA diameter/diameter of distal  cervical ICA) x 100.    Mild - <50% stenosis.    Moderate - 50-69% stenosis.    Severe - 70-94% stenosis.    Near occlusion - 95-99% stenosis.    Occluded - 100% stenosis.       Impression:      1.  Trace plaque right carotid system.  2.  No levels of significant stenosis or occlusion. No dissection.  3.  Emphysema upper lungs.  4.  No abnormal fluid collections or hematomas identified in the right  neck soft tissues or surrounding the hemodialysis catheter. This area is  somewhat limited due to concentration of contrast.     This report was finalized on  1/26/2021 12:20 PM by Dr. Maxi Lowry MD.       CT Chest Pulmonary Embolism [[id number]] Collected: 01/25/21 1738     Updated: 01/25/21 1743    Narrative:      EXAM:    CT Angiography Chest With Intravenous Contrast     EXAM DATE:    1/25/2021 4:51 PM     CLINICAL HISTORY:    PE suspected, low/intermediate prob, positive D-dimer; U07.1-COVID-19;  E87.2-Acidosis; N18.5-Chronic kidney disease, stage 5; S16.1XXA-Strain  of muscle, fascia and tendon at neck level, initial encounter;  N30.01-Acute cystitis with hematuria     TECHNIQUE:    Axial computed tomographic angiography images of the chest with  intravenous contrast.  This CT exam was performed using one or more of  the following dose reduction techniques:  automated exposure control,  adjustment of the mA and/or kV according to patient size, and/or use of  iterative reconstruction technique.    MIP reconstructed images were created and reviewed.     COMPARISON:    12/31/2020     FINDINGS:    Pulmonary arteries:  Unremarkable.  No pulmonary embolism.    Aorta:  No acute findings.  No thoracic aortic aneurysm.    Lungs:  Centrilobular emphysema is stable.  Minimal areas of  groundglass airspace disease in the right lower lobe likely reflect a  mild degree of reexpansion edema given previous atelectasis and less  location. Atypical pneumonia not excluded.  No mass.    Pleural space:  Interval resolution of right pleural effusion.  No  pneumothorax.    Heart:  Unremarkable.  No cardiomegaly.  No significant pericardial  effusion.  No evidence of RV dysfunction.    Bones/joints:  No acute fracture.  No dislocation.    Soft tissues:  Unremarkable.    Lymph nodes:  Retroperitoneal lymphadenopathy is again noted.    Kidneys and ureters:  Severe and chronic appearing bilateral  hydronephrosis is stable from the previous exam.       Impression:      1.  Resolution of right pleural effusion with either minimal reexpansion  edema of the right lower lobe versus nonspecific  pneumonitis including  COVID associated pneumonia.  2.  No PE identified.  3.  Emphysema is stable.  4.  Stable severe and chronic appearing hydronephrosis and  retroperitoneal lymphadenopathy.     This report was finalized on 1/25/2021 5:41 PM by Dr. Maxi Lowry MD.       XR Chest 1 View [[id number]] Collected: 01/25/21 1623     Updated: 01/25/21 1626    Narrative:      EXAM:    XR Chest, 1 View     EXAM DATE:    1/25/2021 3:36 PM     CLINICAL HISTORY:    COVID-19; U07.1-COVID-19; E87.2-Acidosis; N18.5-Chronic kidney  disease, stage 5; S16.1XXA-Strain of muscle, fascia and tendon at neck  level, initial encounter; N30.01-Acute cystitis with hematuria     TECHNIQUE:    Frontal view of the chest.     COMPARISON:    12/31/2020     FINDINGS:    Lungs:  Unremarkable.  No consolidation.    Pleural space:  Unremarkable.  No pneumothorax.    Heart:  Heart size within normal limits.    Mediastinum:  Unremarkable.    Bones/joints:  Unremarkable.    Soft tissues:  Edema has essentially resolved since the prior exam.    Tubes, lines and devices:  Right tunneled dialysis catheter  positioning is stable.       Impression:        No acute cardiopulmonary findings identified.     This report was finalized on 1/25/2021 4:23 PM by Dr. Maxi Lowry MD.       CT Cervical Spine Without Contrast [[id number]] Collected: 01/25/21 1503     Updated: 01/25/21 1508    Narrative:      EXAM:    CT Cervical Spine Without Intravenous Contrast     EXAM DATE:    1/25/2021 2:21 PM     CLINICAL HISTORY:    Acute neck pain     TECHNIQUE:    Axial computed tomography images of the cervical spine without  intravenous contrast.  Sagittal and coronal reformatted images were  created and reviewed.  This CT exam was performed using one or more of  the following dose reduction techniques:  automated exposure control,  adjustment of the mA and/or kV according to patient size, and/or use of  iterative reconstruction technique.     COMPARISON:    No relevant  prior studies available.     FINDINGS:    Artifacts:  Motion artifact is noted throughout portions of the exam  causing mild limitation.    Vertebrae:  Unremarkable.  No acute fracture.    Discs/spinal canal/neural foramina:  Degenerative disc disease C4/5  with posterior disc osteophyte complex with mild central canal stenosis.   Disc osteophyte complex C5/6 with mild central canal and neural  foraminal stenosis.  Posterior disc protrusion at C3/4 eccentric to  right with mild central canal stenosis.    Soft tissues:  Unremarkable.    Lung apices:  Emphysema of the partially imaged upper lungs.       Impression:      1. Degenerative changes and superimposed disc protrusion as detailed  above involving C3/4 through C5/6 levels with mild stenosis.  2.  No acute fracture or traumatic malalignment.  3.  Other nonacute findings detailed above. Exam somewhat limited due to  patient motion artifact.     This report was finalized on 2021 3:06 PM by Dr. Maxi Lowry MD.              Physician Progress Notes (most recent note)      Tiffanie Lynn MD at 21 1017                     PROGRESS NOTE         Patient Identification:  Name:  Mario Nair  Age:  45 y.o.  Sex:  male  :  1975  MRN:  [id number]  Visit Number:  [id number]  Primary Care Provider:  Danny Rebolledo MD         LOS: 6 days       ----------------------------------------------------------------------------------------------------------------------  Subjective       Chief Complaints:    Neck Pain and Weakness - Generalized        Interval History:      Discussed case with primary RN.  Patient remains on room air in no apparent distress.  Complains of arthralgias and myalgias, but otherwise has no complaints.  CRP has worsened at 10.42.  WBC remains normal.    Review of Systems:    Constitutional: no fever, chills and night sweats. No appetite change or unexpected weight change. No fatigue.  Eyes: no eye drainage, itching or  redness.  HEENT: no mouth sores, dysphagia or nose bleed.  Respiratory: no for shortness of breath, cough or production of sputum.  Cardiovascular: no chest pain, no palpitations, no orthopnea.  Gastrointestinal: no nausea, vomiting or diarrhea. No abdominal pain, hematemesis or rectal bleeding.  Genitourinary: no dysuria or polyuria.  Hematologic/lymphatic: no lymph node abnormalities, no easy bruising or easy bleeding.  Musculoskeletal: Neck pain.  Generalized myalgia.  Skin: No rash and no itching.  Neurological: no loss of consciousness, no seizure, no headache.  Behavioral/Psych: no depression or suicidal ideation.  Endocrine: no hot flashes.  Immunologic: negative.       ----------------------------------------------------------------------------------------------------------------------      Objective       [facility] Meds:  alteplase (CATHFLO) INTRACATHETER injection, 2 mg, Intracatheter, Once  alteplase (CATHFLO) INTRACATHETER injection, 2 mg, Intracatheter, Once  apixaban, 5 mg, Oral, Q12H  aspirin, 81 mg, Oral, Daily  atorvastatin, 40 mg, Oral, Nightly  calcium acetate, 667 mg, Oral, TID With Meals  carvedilol, 12.5 mg, Oral, BID With Meals  cefTRIAXone, 2 g, Intravenous, Q24H  epoetin mc/mc-epbx, 6,000 Units, Subcutaneous, Once per day on Mon Wed Fri  isosorbide mononitrate, 30 mg, Oral, QAM  lactobacillus acidophilus, 1 capsule, Oral, Daily  nicotine, 1 patch, Transdermal, Q24H         ----------------------------------------------------------------------------------------------------------------------    Vital Signs:  Temp:  [97.2 °F (36.2 °C)-98.7 °F (37.1 °C)] 97.5 °F (36.4 °C)  Heart Rate:  [82-93] 93  Resp:  [16-18] 18  BP: ()/(45-82) 113/82  Mean Arterial Pressure (Non-Invasive) for the past 24 hrs (Last 3 readings):   Noninvasive MAP (mmHg)   01/31/21 0657 90   01/31/21 0228 76   01/30/21 2238 61     SpO2 Percentage    01/30/21 1833 01/31/21 0228 01/31/21 0657   SpO2: 99% 97% 99%      SpO2:  [95 %-99 %] 99 %  on   ;   Device (Oxygen Therapy): room air    Body mass index is 17.5 kg/m².  Wt Readings from Last 3 Encounters:   01/31/21 61.8 kg (136 lb 4.8 oz)   12/31/20 67.5 kg (148 lb 12.8 oz)   12/08/20 78.5 kg (173 lb)        Intake/Output Summary (Last 24 hours) at 1/31/2021 1017  Last data filed at 1/31/2021 0407  Gross per 24 hour   Intake 1570.38 ml   Output --   Net 1570.38 ml     Diet Regular; Low Potassium, Renal  ----------------------------------------------------------------------------------------------------------------------      Physical Exam:    Deferred due to COVID-19 isolation.  ----------------------------------------------------------------------------------------------------------------------  Results from last 7 days   Lab Units 01/31/21  0551 01/30/21  0420 01/29/21  0417  01/26/21  0343 01/25/21  1941 01/25/21  1738   CK TOTAL U/L 19* 21 16*   < > 92  --   --    TROPONIN T ng/mL  --   --   --   --  0.189* 0.160* 0.153*    < > = values in this interval not displayed.     Results from last 7 days   Lab Units 01/25/21  1138   PROBNP pg/mL 11,870.0*       Results from last 7 days   Lab Units 01/25/21  1515   PH, ARTERIAL pH units 7.257*   PO2 ART mm Hg 98.2   PCO2, ARTERIAL mm Hg 21.7*   HCO3 ART mmol/L 9.7*     Results from last 7 days   Lab Units 01/31/21  0551 01/30/21 0420 01/29/21 0417  01/25/21  1941 01/25/21  1315   CRP mg/dL 10.42* 4.65* 9.90*   < >  --   --    LACTATE mmol/L  --   --   --   --   --  0.7   WBC 10*3/mm3 9.10 7.79 8.20   < > 7.83  --    HEMOGLOBIN g/dL 7.9* 7.5* 7.6*   < > 7.2*  --    HEMATOCRIT % 26.1* 24.6* 24.3*   < > 23.2*  --    MCV fL 90.6 91.1 87.7   < > 88.9  --    MCHC g/dL 30.3* 30.5* 31.3*   < > 31.0*  --    PLATELETS 10*3/mm3 81* 82* 83*   < > 116*  --    INR   --   --   --   --  1.53*  --     < > = values in this interval not displayed.     Results from last 7 days   Lab Units 01/31/21 0551 01/30/21 0420 01/29/21 0417   SODIUM mmol/L  129* 133* 129*   POTASSIUM mmol/L 4.5 3.5 3.9   MAGNESIUM mg/dL 2.6 1.7 2.0   CHLORIDE mmol/L 89* 94* 89*   CO2 mmol/L 24.2 26.1 21.8*   BUN mg/dL 95* 63* 79*   CREATININE mg/dL 5.59* 4.40* 6.10*   EGFR IF NONAFRICN AM mL/min/1.73 11* 15* 10*   CALCIUM mg/dL 8.1* 7.9* 7.7*   GLUCOSE mg/dL 122* 140* 161*   ALBUMIN g/dL 2.75* 2.69* 2.73*   BILIRUBIN mg/dL 0.3 0.2 0.2   ALK PHOS U/L 211* 174* 165*   AST (SGOT) U/L 30 48* 18   ALT (SGPT) U/L 13 32 9   Estimated Creatinine Clearance: 14.7 mL/min (A) (by C-G formula based on SCr of 5.59 mg/dL (H)).  No results found for: AMMONIA    Glucose   Date/Time Value Ref Range Status   01/31/2021 0702 163 (H) 70 - 130 mg/dL Final   01/30/2021 2013 147 (H) 70 - 130 mg/dL Final   01/30/2021 1207 120 70 - 130 mg/dL Final   01/30/2021 0654 139 (H) 70 - 130 mg/dL Final   01/29/2021 1951 223 (H) 70 - 130 mg/dL Final   01/29/2021 1746 208 (H) 70 - 130 mg/dL Final   01/29/2021 1403 221 (H) 70 - 130 mg/dL Final   01/29/2021 0547 133 (H) 70 - 130 mg/dL Final     Lab Results   Component Value Date    HGBA1C 5.20 12/31/2020     Lab Results   Component Value Date    TSH 2.380 11/14/2020    FREET4 0.85 (L) 11/14/2020       Blood Culture   Date Value Ref Range Status   01/25/2021 Serratia marcescens (C)  Final   01/25/2021 Serratia marcescens (C)  Final     Comment:     Refer to previous blood culture collected on 1/25/2021 at 1315 for CHANI's.      Urine Culture   Date Value Ref Range Status   01/25/2021 >100,000 CFU/mL Serratia marcescens (A)  Final     No results found for: WOUNDCX  No results found for: STOOLCX  No results found for: RESPCX  Pain Management Panel     Pain Management Panel Latest Ref Rng & Units 1/25/2021 12/31/2020    AMPHETAMINES SCREEN, URINE Negative Negative Negative    BARBITURATES SCREEN Negative Negative Negative    BENZODIAZEPINE SCREEN, URINE Negative Negative Negative    BUPRENORPHINEUR Negative Negative Negative    COCAINE SCREEN, URINE Negative Negative Negative     METHADONE SCREEN, URINE Negative Negative Negative            ----------------------------------------------------------------------------------------------------------------------  Imaging Results (Last 24 Hours)     ** No results found for the last 24 hours. **          ----------------------------------------------------------------------------------------------------------------------    Assessment/Plan       Assessment/Plan     ASSESSMENT:    1.  Sepsis  2.  Pyelonephritis  3.  Serratia bacteremia  4.  COVID-19    PLAN:    Discussed case with primary RN.  Patient remains on room air in no apparent distress.  Complains of arthralgias and myalgias, but otherwise has no complaints.  CRP has worsened at 10.42.  WBC remains normal. Blood cultures on 1/26/2021 are so far showing no growth.  Blood cultures on 1/30/2021 are so far showing no growth.    Urine culture finalized as highly susceptible Serratia greater than 100,000 colonies.  Blood culture from 1/25/2021 2 out of 2 sets finalized as pan susceptible Serratia.     COVID-19 PCR positive.  Hepatitis C reactive.  CT of the C-spine reports degenerative changes and mild stenosis.  X-ray of the chest unremarkable.  CT of the chest reports resolution of right pleural effusion with minimal reexpansion edema of the right lower lobe versus nonspecific pneumonitis including COVID-19 associated pneumonia, emphysema, stable severe and chronic appearing hydronephrosis and retroperitoneal lymphadenopathy.  CT angiogram of the neck reports emphysema, no abnormal fluid collections or hematomas in the right neck soft tissues or surrounding hemodialysis catheter.    As patient is asymptomatic from COVID-19 standpoint recommend to hold remdesivir and Decadron for now.    Recommend to continue on Rocephin 2 g IV every 24 hours.  Patient can also be transitioned to ceftazidime 2 g after hemodialysis for easier administration to continue for 2 weeks from first negative blood  culture.  We have also ordered repeat urinalysis with culture today, as CRP is rising.  We will continue to follow closely and adjust antibiotic therapy as needed.    Code Status:   Code Status and Medical Interventions:   Ordered at: 21 1701     Code Status:    CPR     Medical Interventions (Level of Support Prior to Arrest):    CHRIS Vogel  21  10:17 EST    Electronically signed by Tiffanie Lynn MD at 21 0715          Consult Notes (most recent note)      Tiffanie Lynn MD at 21 1416      Consult Orders    1. Inpatient Infectious Diseases Consult [[id number]] ordered by Ewelina Bynum PharmD at 21 0419                       INFECTIOUS DISEASE CONSULTATION REPORT        Patient Identification:  Name:  Mario Nair  Age:  45 y.o.  Sex:  male  :  1975  MRN:  [id number]   Visit Number:  [id number]  Primary Care Physician:  Danny Rebolledo MD       LOS: 1 day        Subjective       Subjective     History of present illness:      Thank you Dr. Ocampo for allowing us to participate in the care of your patient.  As you well know, Mr. Mario Nair is a 45 y.o. male with past medical history significant for COPD, endocarditis, end-stage renal disease on hemodialysis, hepatitis C, hepatitis B, IV drug abuse, medical noncompliance, who presented to [facility] Department on 2021 for neck pain.  WBC normal.  CRP 15.81.  Procalcitonin 25.39.  Lactic acid normal.  Urinalysis positive with culture preliminary reporting greater than 100,000 colonies of gram-negative bacilli.  Blood cultures from 2021 2 out of 2 sets positive for Serratia.  COVID-19 PCR positive.  Hepatitis C reactive.  CT of the C-spine reports degenerative changes and mild stenosis.  X-ray of the chest unremarkable.  CT of the chest reports resolution of right pleural effusion with minimal reexpansion edema of the right lower lobe versus  nonspecific pneumonitis including COVID-19 associated pneumonia, emphysema, stable severe and chronic appearing hydronephrosis and retroperitoneal lymphadenopathy.  CT angiogram of the neck reports emphysema, no abnormal fluid collections or hematomas in the right neck soft tissues or surrounding hemodialysis catheter.      Infectious Disease consultation was requested for antimicrobial management.      ---------------------------------------------------------------------------------------------------------------------     Review Of Systems:    Constitutional: no fever, chills and night sweats. No appetite change or unexpected weight change. No fatigue.  Eyes: no eye drainage, itching or redness.  HEENT: no mouth sores, dysphagia or nose bleed.  Respiratory: no for shortness of breath, cough or production of sputum.  Cardiovascular: no chest pain, no palpitations, no orthopnea.  Gastrointestinal: no nausea, vomiting or diarrhea. No abdominal pain, hematemesis or rectal bleeding.  Genitourinary: no dysuria or polyuria.  Hematologic/lymphatic: no lymph node abnormalities, no easy bruising or easy bleeding.  Musculoskeletal: Neck pain.  Skin: No rash and no itching.  Neurological: no loss of consciousness, no seizure, no headache.  Behavioral/Psych: no depression or suicidal ideation.  Endocrine: no hot flashes.  Immunologic: negative.    ---------------------------------------------------------------------------------------------------------------------     Past Medical History    Past Medical History:   Diagnosis Date   • COPD (chronic obstructive pulmonary disease) (CMS/[facility])    • Endocarditis    • ESRD (end stage renal disease) (CMS/HCC)    • Hepatitis C    • Infectious viral hepatitis    • IV drug abuse (CMS/HCC)    • Unable to read or write        Past Surgical History    Past Surgical History:   Procedure Laterality Date   • CENTRAL VENOUS CATHETER TUNNELED INSERTION DOUBLE LUMEN Right     Chest for hemodialysis        Family History    Family History   Problem Relation Age of Onset   • Alcohol abuse Mother    • Hypertension Mother    • Hypertension Other    • Kidney disease Maternal Grandmother        Social History    Social History     Tobacco Use   • Smoking status: Current Every Day Smoker     Packs/day: 0.50     Years: 20.00     Pack years: 10.00     Types: Cigarettes   • Smokeless tobacco: Never Used   Substance Use Topics   • Alcohol use: Not Currently     Frequency: Never     Comment: Quit 18 years ago   • Drug use: Yes     Types: Methamphetamines, IV     Comment: last used on Friday 5/18/2019       Allergies    Penicillins  ---------------------------------------------------------------------------------------------------------------------     Home Medications:    Prior to Admission Medications     Prescriptions Last Dose Informant Patient Reported? Taking?    amLODIPine (NORVASC) 5 MG tablet 1/24/2021 Pharmacy No Yes    Take 1 tablet by mouth Every Night.    calcium acetate (PHOS BINDER,) 667 MG capsule capsule 1/24/2021 Pharmacy No Yes    Take 1 capsule by mouth 3 (Three) Times a Day With Meals.    carvedilol (COREG) 12.5 MG tablet 1/24/2021 Pharmacy No Yes    Take 1 tablet by mouth 2 (Two) Times a Day With Meals.    hydrALAZINE (APRESOLINE) 10 MG tablet 1/24/2021 Pharmacy No Yes    Take 1 tablet by mouth 3 (Three) Times a Day.    isosorbide mononitrate (IMDUR) 30 MG 24 hr tablet 1/24/2021 Pharmacy No Yes    Take 1 tablet by mouth Every Morning.    pantoprazole (PROTONIX) 40 MG EC tablet 1/24/2021 Pharmacy Yes Yes    Take 40 mg by mouth Daily.        ---------------------------------------------------------------------------------------------------------------------    Objective       Objective     Hospital Scheduled Meds:  alteplase (CATHFLO) INTRACATHETER injection, 2 mg, Intracatheter, Once  alteplase (CATHFLO) INTRACATHETER injection, 2 mg, Intracatheter, Once  amLODIPine, 5 mg, Oral, Nightly  aspirin, 324  mg, Oral, Once  aspirin, 81 mg, Oral, Daily  atorvastatin, 40 mg, Oral, Nightly  calcium acetate, 667 mg, Oral, TID With Meals  carvedilol, 12.5 mg, Oral, BID With Meals  cefepime, 2 g, Intravenous, Q24H  dexamethasone, 6 mg, Oral, Daily With Breakfast    Or  dexamethasone, 6 mg, Intravenous, Daily With Breakfast  hydrALAZINE, 10 mg, Oral, TID  isosorbide mononitrate, 30 mg, Oral, QAM  lactobacillus acidophilus, 1 capsule, Oral, Daily  nicotine, 1 patch, Transdermal, Q24H  pantoprazole, 40 mg, Oral, Daily  sodium chloride, 1,000 mL, Intravenous, Once in Dialysis      heparin (porcine), 12 Units/kg/hr, Last Rate: 14 Units/kg/hr (01/26/21 1207)      ---------------------------------------------------------------------------------------------------------------------   Vital Signs:  Temp:  [97.2 °F (36.2 °C)-98 °F (36.7 °C)] 97.6 °F (36.4 °C)  Heart Rate:  [] 84  Resp:  [14-22] 16  BP: ()/(52-98) 113/67  Mean Arterial Pressure (Non-Invasive) for the past 24 hrs (Last 3 readings):   Noninvasive MAP (mmHg)   01/26/21 1300 77   01/26/21 1217 63   01/26/21 1102 85     SpO2 Percentage    01/26/21 1102 01/26/21 1217 01/26/21 1300   SpO2: 97% 97% 98%     SpO2:  [95 %-100 %] 98 %  on   ;   Device (Oxygen Therapy): room air    Body mass index is 17.83 kg/m².  Wt Readings from Last 3 Encounters:   01/26/21 63 kg (138 lb 14.4 oz)   12/31/20 67.5 kg (148 lb 12.8 oz)   12/08/20 78.5 kg (173 lb)     ---------------------------------------------------------------------------------------------------------------------     Physical Exam:    Deferred due to COVID-19 isolation.  ---------------------------------------------------------------------------------------------------------------------    Results from last 7 days   Lab Units 01/26/21  0343 01/25/21  1941 01/25/21  1738   CK TOTAL U/L 92  --   --    TROPONIN T ng/mL 0.189* 0.160* 0.153*     Results from last 7 days   Lab Units 01/25/21  1138   PROBNP pg/mL 11,870.0*        Results from last 7 days   Lab Units 01/25/21  1515   PH, ARTERIAL pH units 7.257*   PO2 ART mm Hg 98.2   PCO2, ARTERIAL mm Hg 21.7*   HCO3 ART mmol/L 9.7*     Results from last 7 days   Lab Units 01/26/21  0530 01/26/21  0343 01/25/21  1941 01/25/21  1315 01/25/21  1138   CRP mg/dL  --  15.81*  --   --  13.21*   LACTATE mmol/L  --   --   --  0.7  --    WBC 10*3/mm3 5.35  --  7.83  --  8.59   HEMOGLOBIN g/dL 5.6*  --  7.2*  --  7.1*   HEMATOCRIT % 16.9*  --  23.2*  --  22.3*   MCV fL 84.5  --  88.9  --  88.5   MCHC g/dL 33.1  --  31.0*  --  31.8   PLATELETS 10*3/mm3 56*  --  116*  --  121*   INR   --   --  1.53*  --   --      Results from last 7 days   Lab Units 01/26/21  0343 01/25/21  1941 01/25/21  1138   SODIUM mmol/L 124*  --  125*   POTASSIUM mmol/L 3.8 5.3* 6.1*   MAGNESIUM mg/dL 1.6 1.6  --    CHLORIDE mmol/L 85*  --  92*   CO2 mmol/L 17.3*  --  9.9*   BUN mg/dL 108*  --  185*   CREATININE mg/dL 10.02*  --  15.50*   EGFR IF NONAFRICN AM mL/min/1.73 6*  --  3*   CALCIUM mg/dL 6.8*  --  5.8*   GLUCOSE mg/dL 139*  --  136*   ALBUMIN g/dL 3.22*  --  3.20*   BILIRUBIN mg/dL 0.4  --  0.4   ALK PHOS U/L 99  --  136*   AST (SGOT) U/L 10  --  10   ALT (SGPT) U/L 8  --  9   Estimated Creatinine Clearance: 8.4 mL/min (A) (by C-G formula based on SCr of 10.02 mg/dL (H)).  No results found for: AMMONIA    Glucose   Date/Time Value Ref Range Status   01/26/2021 1205 210 (H) 70 - 130 mg/dL Final   01/26/2021 0604 154 (H) 70 - 130 mg/dL Final   01/26/2021 0052 107 70 - 130 mg/dL Final     Lab Results   Component Value Date    HGBA1C 5.20 12/31/2020     Lab Results   Component Value Date    TSH 2.380 11/14/2020    FREET4 0.85 (L) 11/14/2020       Blood Culture   Date Value Ref Range Status   01/25/2021 Gram Negative Bacilli (C)  Preliminary   01/25/2021 Gram Negative Bacilli (C)  Preliminary     Urine Culture   Date Value Ref Range Status   01/25/2021 >100,000 CFU/mL Gram Negative Bacilli (A)  Preliminary     No results  found for: WOUNDCX  No results found for: STOOLCX  No results found for: RESPCX  Pain Management Panel     Pain Management Panel Latest Ref Rng & Units 1/25/2021 12/31/2020    AMPHETAMINES SCREEN, URINE Negative Negative Negative    BARBITURATES SCREEN Negative Negative Negative    BENZODIAZEPINE SCREEN, URINE Negative Negative Negative    BUPRENORPHINEUR Negative Negative Negative    COCAINE SCREEN, URINE Negative Negative Negative    METHADONE SCREEN, URINE Negative Negative Negative        I have personally reviewed the above laboratory results.   ---------------------------------------------------------------------------------------------------------------------  Imaging Results (Last 7 Days)     Procedure Component Value Units Date/Time    CT Angiogram Neck [[id number]] Collected: 01/26/21 1215     Updated: 01/26/21 1222    Narrative:      EXAM:    CT Angiography Neck With Intravenous Contrast     EXAM DATE:    1/26/2021 11:36 AM     CLINICAL HISTORY:    neck pain in the area of the tunneled hemodialysis; E87.2-Acidosis;  N18.5-Chronic kidney disease, stage 5; S16.1XXA-Strain of muscle, fascia  and tendon at neck level, initial encounter; N30.01-Acute cystitis with  hematuria; U07.1-COVID-19; J12.82-Pneumonia due to Coronavirus disease  2019     TECHNIQUE:    Axial computed tomographic angiography images of the neck with  intravenous contrast.  This CT exam was performed using one or more of  the following dose reduction techniques:  automated exposure control,  adjustment of the mA and/or kV according to patient size, and/or use of  iterative reconstruction technique.    MIP reconstructed images were created and reviewed.     COMPARISON:    C-spine CT 01/25/2021     FINDINGS:      VASCULATURE:    Right common carotid artery:  Unremarkable.  No significant stenosis.   No dissection or occlusion.    Right internal carotid artery:  Minimal calcified plaque is noted of  the right carotid bulb.  Extracranial segment  is patent with no  significant stenosis.  No dissection or occlusion.    Right external carotid artery:  Unremarkable.  No occlusion.    Right vertebral artery:  Unremarkable.  No significant stenosis.  No  dissection or occlusion.       Left common carotid artery:  Unremarkable.  No significant stenosis.   No dissection or occlusion.    Left internal carotid artery:  Unremarkable.  Extracranial segment is  patent with no significant stenosis.  No dissection or occlusion.    Left external carotid artery:  Unremarkable.  No occlusion.    Left vertebral artery:  Unremarkable.  No significant stenosis.  No  dissection or occlusion.    Other vasculature:  Three-vessel arch configuration is noted.   Expected small amounts of air seen in the venous system from venous  injection.      NECK:    Bones/joints:  No acute fracture.  No dislocation.    Soft tissues:  There is a mild degree of nonspecific soft tissue edema  of the bilateral neck soft tissues.  No mass.    Lymph nodes:  No cervical lymphadenopathy by CT size criteria.    Lung apices:  Emphysema of the upper lobes again noted.    Tubes, lines and devices:  The right tunneled hemodialysis catheter is  noted with no abnormalities evident on CT.  No abnormal fluid  collections or extensive hematoma identified surrounding the dialysis  catheter.    Other findings:  No enhancing fluid collection or abscess identified.      CAROTID STENOSIS REFERENCE USING NASCET CRITERIA:    % ICA stenosis = (1 - narrowest ICA diameter/diameter of distal  cervical ICA) x 100.    Mild - <50% stenosis.    Moderate - 50-69% stenosis.    Severe - 70-94% stenosis.    Near occlusion - 95-99% stenosis.    Occluded - 100% stenosis.       Impression:      1.  Trace plaque right carotid system.  2.  No levels of significant stenosis or occlusion. No dissection.  3.  Emphysema upper lungs.  4.  No abnormal fluid collections or hematomas identified in the right  neck soft tissues or surrounding the  hemodialysis catheter. This area is  somewhat limited due to concentration of contrast.     This report was finalized on 1/26/2021 12:20 PM by Dr. Maxi Lowry MD.       CT Chest Pulmonary Embolism [[id number]] Collected: 01/25/21 1738     Updated: 01/25/21 1743    Narrative:      EXAM:    CT Angiography Chest With Intravenous Contrast     EXAM DATE:    1/25/2021 4:51 PM     CLINICAL HISTORY:    PE suspected, low/intermediate prob, positive D-dimer; U07.1-COVID-19;  E87.2-Acidosis; N18.5-Chronic kidney disease, stage 5; S16.1XXA-Strain  of muscle, fascia and tendon at neck level, initial encounter;  N30.01-Acute cystitis with hematuria     TECHNIQUE:    Axial computed tomographic angiography images of the chest with  intravenous contrast.  This CT exam was performed using one or more of  the following dose reduction techniques:  automated exposure control,  adjustment of the mA and/or kV according to patient size, and/or use of  iterative reconstruction technique.    MIP reconstructed images were created and reviewed.     COMPARISON:    12/31/2020     FINDINGS:    Pulmonary arteries:  Unremarkable.  No pulmonary embolism.    Aorta:  No acute findings.  No thoracic aortic aneurysm.    Lungs:  Centrilobular emphysema is stable.  Minimal areas of  groundglass airspace disease in the right lower lobe likely reflect a  mild degree of reexpansion edema given previous atelectasis and less  location. Atypical pneumonia not excluded.  No mass.    Pleural space:  Interval resolution of right pleural effusion.  No  pneumothorax.    Heart:  Unremarkable.  No cardiomegaly.  No significant pericardial  effusion.  No evidence of RV dysfunction.    Bones/joints:  No acute fracture.  No dislocation.    Soft tissues:  Unremarkable.    Lymph nodes:  Retroperitoneal lymphadenopathy is again noted.    Kidneys and ureters:  Severe and chronic appearing bilateral  hydronephrosis is stable from the previous exam.       Impression:       1.  Resolution of right pleural effusion with either minimal reexpansion  edema of the right lower lobe versus nonspecific pneumonitis including  COVID associated pneumonia.  2.  No PE identified.  3.  Emphysema is stable.  4.  Stable severe and chronic appearing hydronephrosis and  retroperitoneal lymphadenopathy.     This report was finalized on 1/25/2021 5:41 PM by Dr. Maxi Lowry MD.       XR Chest 1 View [[id number]] Collected: 01/25/21 1623     Updated: 01/25/21 1626    Narrative:      EXAM:    XR Chest, 1 View     EXAM DATE:    1/25/2021 3:36 PM     CLINICAL HISTORY:    COVID-19; U07.1-COVID-19; E87.2-Acidosis; N18.5-Chronic kidney  disease, stage 5; S16.1XXA-Strain of muscle, fascia and tendon at neck  level, initial encounter; N30.01-Acute cystitis with hematuria     TECHNIQUE:    Frontal view of the chest.     COMPARISON:    12/31/2020     FINDINGS:    Lungs:  Unremarkable.  No consolidation.    Pleural space:  Unremarkable.  No pneumothorax.    Heart:  Heart size within normal limits.    Mediastinum:  Unremarkable.    Bones/joints:  Unremarkable.    Soft tissues:  Edema has essentially resolved since the prior exam.    Tubes, lines and devices:  Right tunneled dialysis catheter  positioning is stable.       Impression:        No acute cardiopulmonary findings identified.     This report was finalized on 1/25/2021 4:23 PM by Dr. Maxi Lowry MD.       CT Cervical Spine Without Contrast [[id number]] Collected: 01/25/21 1503     Updated: 01/25/21 1508    Narrative:      EXAM:    CT Cervical Spine Without Intravenous Contrast     EXAM DATE:    1/25/2021 2:21 PM     CLINICAL HISTORY:    Acute neck pain     TECHNIQUE:    Axial computed tomography images of the cervical spine without  intravenous contrast.  Sagittal and coronal reformatted images were  created and reviewed.  This CT exam was performed using one or more of  the following dose reduction techniques:  automated exposure control,  adjustment of  the mA and/or kV according to patient size, and/or use of  iterative reconstruction technique.     COMPARISON:    No relevant prior studies available.     FINDINGS:    Artifacts:  Motion artifact is noted throughout portions of the exam  causing mild limitation.    Vertebrae:  Unremarkable.  No acute fracture.    Discs/spinal canal/neural foramina:  Degenerative disc disease C4/5  with posterior disc osteophyte complex with mild central canal stenosis.   Disc osteophyte complex C5/6 with mild central canal and neural  foraminal stenosis.  Posterior disc protrusion at C3/4 eccentric to  right with mild central canal stenosis.    Soft tissues:  Unremarkable.    Lung apices:  Emphysema of the partially imaged upper lungs.       Impression:      1. Degenerative changes and superimposed disc protrusion as detailed  above involving C3/4 through C5/6 levels with mild stenosis.  2.  No acute fracture or traumatic malalignment.  3.  Other nonacute findings detailed above. Exam somewhat limited due to  patient motion artifact.     This report was finalized on 1/25/2021 3:06 PM by Dr. Maxi Lowry MD.           I have personally reviewed the above radiology results.   ---------------------------------------------------------------------------------------------------------------------      Assessment & Plan        Assessment/Plan       ASSESSMENT:    1.  Sepsis  2.  Pyelonephritis  3.  Serratia bacteremia  4.  COVID-19    PLAN:    Patient presents with neck pain.  WBC normal.  CRP 15.81.  Procalcitonin 25.39.  Lactic acid normal.  Urinalysis positive with culture preliminary reporting greater than 100,000 colonies of gram-negative bacilli.  Blood cultures from 1/25/2021 2 out of 2 sets positive for Serratia.  COVID-19 PCR positive.  Hepatitis C reactive.  CT of the C-spine reports degenerative changes and mild stenosis.  X-ray of the chest unremarkable.  CT of the chest reports resolution of right pleural effusion with  minimal reexpansion edema of the right lower lobe versus nonspecific pneumonitis including COVID-19 associated pneumonia, emphysema, stable severe and chronic appearing hydronephrosis and retroperitoneal lymphadenopathy.  CT angiogram of the neck reports emphysema, no abnormal fluid collections or hematomas in the right neck soft tissues or surrounding hemodialysis catheter.    As patient is symptomatic from COVID-19 standpoint recommend to hold remdesivir and Decadron for now.    For now recommend to continue cefepime, pending finalization of culture results, with hopes to de-escalate to Rocephin soon.  Gentamicin 3 mg/kg IV x1 was ordered.  Vancomycin was discontinued for now.  We will continue to follow closely and adjust antibiotic therapy as needed.    Again, thank you Dr. Ocampo for allowing us to participate in the care of your patient and please feel free to call for any questions you may have.        Code Status:   Code Status and Medical Interventions:   Ordered at: 01/25/21 1707     Code Status:    CPR     Medical Interventions (Level of Support Prior to Arrest):    Full           NICKY Mendoza  01/26/21  14:16 EST    Electronically signed by Tiffanie Lynn MD at 01/27/21 [id number]

## 2021-02-01 NOTE — PLAN OF CARE
Goal Outcome Evaluation:  Plan of Care Reviewed With: patient  Progress: no change  Outcome Summary: Pt continues to complain of intermittent pain in neck with prn meds administered. No s/s of distress noted. Continue with plan of care.

## 2021-02-01 NOTE — PLAN OF CARE
Goal Outcome Evaluation:   Pt resting in bed with eyes closed. Aroused to sound and voice. Pt has tolerated all interventions well. Pt has had complaints of left arm/shoulder pain. Administered PRN Tylenol, as the pain medication was not yet due. Once pain medication was due, it was too close to time for dialysis as this medication or any other medication would've been washed out. Will administer PRN pain medication once dialysis is over. No acute distress noted. Respirations even and unlabored. Will continue to follow the plan of care.

## 2021-02-01 NOTE — PROGRESS NOTES
[facility] HOSPITALIST PROGRESS NOTE     Patient Identification:  Name:  Mario Nair  Age:  45 y.o.  Sex:  male  :  1975  MRN:  [id number]  Visit Number:  [id number]  ROOM: [address] [place] [address]     Primary Care Provider:  Danny Rebolledo MD    Length of stay in inpatient status:  7    Subjective     Chief Compliant:    Chief Complaint   Patient presents with   • Neck Pain   • Weakness - Generalized     History of Presenting Illness:    Patient stable today, no acute events overnight, no new complaints, still having neck pain but improved from admission, CT  w/o significant pathology, on high dose ceftriaxone per ID for bacteremia and UTI and ID following, covid + but still asymptomatic and holding on treatment, awaiting possible [facility] for continued Abx when can come out of isolation, patient denies any fevers or chills, should have dialysis today, Hgb lower but likely will receive transfusion w/ HD so will hold.    Objective     Current Hospital Meds:alteplase (CATHFLO) INTRACATHETER injection, 2 mg, Intracatheter, Once  alteplase (CATHFLO) INTRACATHETER injection, 2 mg, Intracatheter, Once  apixaban, 5 mg, Oral, Q12H  aspirin, 81 mg, Oral, Daily  atorvastatin, 40 mg, Oral, Nightly  calcium acetate, 667 mg, Oral, TID With Meals  carvedilol, 12.5 mg, Oral, BID With Meals  cefTRIAXone, 2 g, Intravenous, Q24H  epoetin mc/mc-epbx, 6,000 Units, Subcutaneous, Once per day on   isosorbide mononitrate, 30 mg, Oral, QAM  lactobacillus acidophilus, 1 capsule, Oral, Daily  lidocaine, 1 patch, Transdermal, Q24H  nicotine, 1 patch, Transdermal, Q24H         Current Antimicrobial Therapy:  Anti-Infectives (From admission, onward)    Ordered     Dose/Rate Route Frequency Start Stop    21 1013  cefTRIAXone (ROCEPHIN) 2 g/100 mL 0.9% NS IVPB (MBP)     Malissa Norton APRN reviewed the order on 21 1257.   Ordering Provider: Khoa Carl MD    2 g  over 30 Minutes Intravenous  Every 24 Hours 01/27/21 1100 02/03/21 1059    01/26/21 1046  gentamicin (GARAMYCIN) 190 mg in sodium chloride 0.9 % 100 mL IVPB     Note to Pharmacy: Separate Administration Time With Ampicillin and Other Penicillins (Ampicillin / Penicillins deactivate gentamicin when infused together).   Ordering Provider: Tiffanie Lynn MD    3 mg/kg × 63 kg  over 30 Minutes Intravenous Once 01/26/21 1200 01/26/21 1226    01/25/21 1926  cefepime (MAXIPIME) 2 g/100 mL 0.9% NS (mbp)     Ordering Provider: Sena Schwartz APRN    2 g  200 mL/hr over 30 Minutes Intravenous Once 01/25/21 2100 01/26/21 0031    01/25/21 1927  vancomycin 1250 mg/250 mL 0.9% NS IVPB ([facility])     Ordering Provider: Opal Ocampo MD    20 mg/kg × 68 kg  over 90 Minutes Intravenous Once 01/25/21 2100 01/26/21 0212        Current Diuretic Therapy:  Diuretics (From admission, onward)    None        ----------------------------------------------------------------------------------------------------------------------  Vital Signs:  Temp:  [97.1 °F (36.2 °C)-98.2 °F (36.8 °C)] 98.2 °F (36.8 °C)  Heart Rate:  [72-91] 80  Resp:  [18-22] 22  BP: (102-117)/(64-77) 108/77  SpO2:  [94 %-98 %] 96 %  on   ;   Device (Oxygen Therapy): room air  Body mass index is 18.87 kg/m².    Wt Readings from Last 3 Encounters:   02/01/21 66.7 kg (147 lb)   12/31/20 67.5 kg (148 lb 12.8 oz)   12/08/20 78.5 kg (173 lb)     Intake & Output (last 3 days)       01/29 0701 - 01/30 0700 01/30 0701 - 01/31 0700 01/31 0701 - 02/01 0700 02/01 0701 - 02/02 0700    P.O. 480 1800 1760 1320    I.V. (mL/kg) 125 (2.1) 130.4 (2.1)      Total Intake(mL/kg) 605 (9.9) 1930.4 (31.2) 1760 (26.4) 1320 (19.8)    Urine (mL/kg/hr)        Other 2000       Total Output 2000       Net -1395 +1930.4 +1760 +1320            Urine Unmeasured Occurrence 1 x 6 x 3 x     Stool Unmeasured Occurrence  3 x 2 x         Diet Regular; Low Potassium,  Renal  ----------------------------------------------------------------------------------------------------------------------  Physical exam:  This physical exam has been personally performed remotely in the unit aided by real-time audio/visual communication tools. RN present at bedside during this exam and assisted during exam. The use of a video visit has been reviewed with the patient and verbal informed consent has been obtained.     Physical Exam:  General: Patient appears awake, alert, and in no acute distress.  Head: Normocephalic, atraumatic  Eyes: EOMI. Conjunctivae and sclerae normal.  Ears: Ears appear intact with no abnormalities noted.   Neck: Trachea midline. No obvious JVD.  Heart: Tele reveals regular rate and rhythm  Lungs: Respirations appear to be regular, even and unlabored with no signs of respiratory distress. No audible wheezing. On room air  Abdomen: No obvious abdominal distension.  MS: Muscle tone appears normal. No gross deformities.  Extremities: No clubbing, cyanosis or edema noted.  Skin: No visible bleeding, bruising, or rash.  Neurologic: Alert and oriented x3. No gross focal deficits.   ----------------------------------------------------------------------------------------------------------------------  Results from last 7 days   Lab Units 02/01/21  0513 01/31/21  0551 01/30/21  0420  01/25/21  1941   CRP mg/dL 6.95* 10.42* 4.65*   < >  --    WBC 10*3/mm3 6.81 9.10 7.79   < > 7.83   HEMOGLOBIN g/dL 6.9* 7.9* 7.5*   < > 7.2*   HEMATOCRIT % 22.7* 26.1* 24.6*   < > 23.2*   MCV fL 90.1 90.6 91.1   < > 88.9   MCHC g/dL 30.4* 30.3* 30.5*   < > 31.0*   PLATELETS 10*3/mm3 88* 81* 82*   < > 116*   INR   --   --   --   --  1.53*    < > = values in this interval not displayed.     Results from last 7 days   Lab Units 01/25/21  1515   PH, ARTERIAL pH units 7.257*   PO2 ART mm Hg 98.2   PCO2, ARTERIAL mm Hg 21.7*   HCO3 ART mmol/L 9.7*     Results from last 7 days   Lab Units 02/01/21  0556  01/31/21  0551 01/30/21  0420  01/26/21  0343   SODIUM mmol/L 125* 129* 133*   < > 124*   POTASSIUM mmol/L 5.1 4.5 3.5   < > 3.8   MAGNESIUM mg/dL 2.5 2.6 1.7   < > 1.6   CHLORIDE mmol/L 86* 89* 94*   < > 85*   CO2 mmol/L 26.0 24.2 26.1   < > 17.3*   BUN mg/dL 129* 95* 63*   < > 108*   CREATININE mg/dL 6.61* 5.59* 4.40*   < > 10.02*   EGFR IF NONAFRICN AM mL/min/1.73 9* 11* 15*   < > 6*   CALCIUM mg/dL 8.6 8.1* 7.9*   < > 6.8*   IONIZED CALCIUM mmol/L  --   --   --   --  0.82*   PHOSPHORUS mg/dL 2.0* 2.4* 2.5   < > 5.7*   GLUCOSE mg/dL 122* 122* 140*   < > 139*   ALBUMIN g/dL 2.61* 2.75* 2.69*   < > 3.22*   BILIRUBIN mg/dL 0.3 0.3 0.2   < > 0.4   ALK PHOS U/L 195* 211* 174*   < > 99   AST (SGOT) U/L 21 30 48*   < > 10   ALT (SGPT) U/L 8 13 32   < > 8    < > = values in this interval not displayed.   Estimated Creatinine Clearance: 13.5 mL/min (A) (by C-G formula based on SCr of 6.61 mg/dL (H)).  No results found for: AMMONIA  Results from last 7 days   Lab Units 02/01/21  0513 01/31/21  0551 01/30/21  0420  01/26/21  0343 01/25/21  1941 01/25/21  1738   CK TOTAL U/L 13* 19* 21   < > 92  --   --    TROPONIN T ng/mL  --   --   --   --  0.189* 0.160* 0.153*    < > = values in this interval not displayed.             Glucose   Date/Time Value Ref Range Status   02/01/2021 1013 156 (H) 70 - 130 mg/dL Final   02/01/2021 0721 112 70 - 130 mg/dL Final   01/31/2021 1851 189 (H) 70 - 130 mg/dL Final   01/31/2021 0702 163 (H) 70 - 130 mg/dL Final   01/30/2021 2013 147 (H) 70 - 130 mg/dL Final   01/30/2021 1207 120 70 - 130 mg/dL Final   01/30/2021 0654 139 (H) 70 - 130 mg/dL Final   01/29/2021 1951 223 (H) 70 - 130 mg/dL Final     Lab Results   Component Value Date    TSH 2.380 11/14/2020    FREET4 0.85 (L) 11/14/2020     No results found for: PREGTESTUR, PREGSERUM, HCG, HCGQUANT  Pain Management Panel     Pain Management Panel Latest Ref Rng & Units 1/25/2021 12/31/2020    AMPHETAMINES SCREEN, URINE Negative Negative Negative     BARBITURATES SCREEN Negative Negative Negative    BENZODIAZEPINE SCREEN, URINE Negative Negative Negative    BUPRENORPHINEUR Negative Negative Negative    COCAINE SCREEN, URINE Negative Negative Negative    METHADONE SCREEN, URINE Negative Negative Negative        Brief Urine Lab Results  (Last result in the past 365 days)      Color   Clarity   Blood   Leuk Est   Nitrite   Protein   CREAT   Urine HCG        01/25/21 1505 Yellow Turbid Large (3+) Large (3+) Positive 100 mg/dL (2+)             Blood Culture   Date Value Ref Range Status   01/30/2021 No growth at 2 days  Preliminary   01/29/2021 No growth at 2 days  Preliminary   01/26/2021 No growth at 5 days  Final   01/26/2021 No growth at 5 days  Final     Urine Culture   Date Value Ref Range Status   01/25/2021 >100,000 CFU/mL Serratia marcescens (A)  Final     No results found for: WOUNDCX  No results found for: STOOLCX  No results found for: RESPCX  No results found for: AFBCX  Results from last 7 days   Lab Units 02/01/21  0513 01/31/21  0551 01/30/21  0420 01/29/21  0417 01/28/21  0330 01/27/21  0014 01/26/21  0343 01/25/21  1941   PROCALCITONIN ng/mL  --   --   --   --   --   --   --  25.39*   CRP mg/dL 6.95* 10.42* 4.65* 9.90* 11.71* 15.95* 15.81*  --        I have personally looked at the labs and they are summarized above.  ----------------------------------------------------------------------------------------------------------------------  Detailed radiology reports for the last 24 hours:    Imaging Results (Last 24 Hours)     ** No results found for the last 24 hours. 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  - Admission labs showed NML WBC count, CRP 13,  lactate 0.7, covid +, UA w/ culture showing infection and Bcx's growing Serratia sp., Repeat Bcx's NGTD  - CTPE showed no PE, possible covid PNA  - Gen Surgery consulted and removed R TDC and replaced RIJ on admission, on 1/29 L TDC placed  - ID consulted; Following  - Continue high-dose Ceftriaxone  - On Eliquis now for D-dimer > 20 and underlying covid diagnosis  - Continue APAP PRN for fevers  - Monitoring on tele, no current 02 needs  - SW assisting w/ possible [facility] but awaiting when he can come out of isolation     #ESRD on HD c/b Medical Non-adherence and Hyperkalemia, also anemia requiring transfusions  - Reportedly hadn't had dialysis > 1 month on admission, Cr 15, K 6.1  - CT showed stable severe chronic hydronephrosis and retroperitoneal lymphadenopathy  - Consult Nephrology; Following for HD, HyperK resolved, have placed patient on Epo  - Trend Hgb, transfuse as indicated  - SW working on HD chair    #HTN/HLD  - Echo 1/27 w/ LVEF 61-65%, NML LV function, diastolic dysfunction, no effusion, no vegetations noted.  - Continue home ASA 81, statin  - Continue home Coreg, Imdur     #Emphysema w/o COPD exacerbation  - Noted on CT, duonebs PRN    #Hx HBV/HCV    #Medical Non-adherence  - Patient's frequent leaving AMA and non-adherence w/ home medical therapy will likely drastically affect both long and short term morbidity and mortality.     F: Oral  E: Monitor & Replace PRN  N: Renal  Ppx: On Eliquis  Code: Full Code     Dispo: Pending clinical improvement     *This patient is considered high risk due to sepsis, acute UTI, covid infection, hyperkalemia, medical non-adherence.    VTE Prophylaxis:   Mechanical Order History:     None      Pharmalogical Order History:      Ordered     Dose Route Frequency Stop    01/31/21 0830  apixaban (ELIQUIS) tablet 5 mg      5 mg PO Every 12 Hours Scheduled 03/02/21 0859    01/29/21 0928  heparin (porcine) 5000 UNIT/ML injection  Status:  Discontinued      -- -- Continuous PRN  01/29/21 1000    01/25/21 2054  heparin (porcine) injection 1,000 Units      1,000 Units IK Once 01/25/21 2115 01/25/21 1920  heparin (porcine) 5000 UNIT/ML injection 4,100 Units      60 Units/kg IV Once 01/25/21 2159 01/25/21 1920  heparin 44053 units/250 mL (100 units/mL) in 0.45 % NaCl infusion  8.16 mL/hr,   Status:  Discontinued      12 Units/kg/hr IV Titrated 01/31/21 0830    01/25/21 1920  heparin (porcine) 5000 UNIT/ML injection 4,100 Units  Status:  Discontinued      60 Units/kg IV As Needed 01/31/21 0830    01/25/21 1920  heparin (porcine) 5000 UNIT/ML injection 2,000 Units  Status:  Discontinued      30 Units/kg IV As Needed 01/31/21 0830              Obi Arvizu MD  [facility]  02/01/21  13:40 EST

## 2021-02-01 NOTE — PHARMACY PATIENT ASSISTANCE
Pharmacy evaluated patient co-pay for new medication, Eliquis. According to the patients insurance the patient would have a $0 co-pay. No issues identified at this time.      Thank you,    Marjorie Noble. Karen, PharmD  02/01/21  09:41 EST

## 2021-02-01 NOTE — PROGRESS NOTES
Discharge Planning Assessment   Reza     Patient Name: Mario Nair  MRN: [id number]  Today's Date: 2/1/2021    Admit Date: 1/25/2021    Discharge Needs Assessment    No documentation.       Discharge Plan     Row Name 02/01/21 1023       Plan    Plan Pt was admitted on 01/25/21. Pt was transferred from PCU to [address] [place] [address] over the weekend. SS spoke with [facility] per Tiny who states pt will need to be out of Cov-id isolation, post 20 days after first Cov-id test before she can review pt as a possibility for [facility]. SS noted that pt does not have an outpt HD slot. SS spoke with pt and he is agreeable to SS sending referral to [facility] to review. SS will follow.        Felisa Alejo

## 2021-02-02 NOTE — PROGRESS NOTES
PROGRESS NOTE         Patient Identification:  Name:  Mario Nair  Age:  45 y.o.  Sex:  male  :  1975  MRN:  [id number]  Visit Number:  [id number]  Primary Care Provider:  Danny Rebolledo MD         LOS: 8 days       ----------------------------------------------------------------------------------------------------------------------  Subjective       Chief Complaints:    Neck Pain and Weakness - Generalized        Interval History:      Discussed case with primary RN.  Patient remains on room air in no apparent distress.  Complains of left arm and shoulder pain without known injury, chest pain, or shortness of breath.  Nurse reports that there is no erythema or edema.  Afebrile, no diarrhea.  CRP is improving at 5.54.  WBC remains normal.    Review of Systems:    Constitutional: no fever, chills and night sweats. No appetite change or unexpected weight change. No fatigue.  Eyes: no eye drainage, itching or redness.  HEENT: no mouth sores, dysphagia or nose bleed.  Respiratory: no for shortness of breath, cough or production of sputum.  Cardiovascular: no chest pain, no palpitations, no orthopnea.  Gastrointestinal: no nausea, vomiting or diarrhea. No abdominal pain, hematemesis or rectal bleeding.  Genitourinary: no dysuria or polyuria.  Hematologic/lymphatic: no lymph node abnormalities, no easy bruising or easy bleeding.  Musculoskeletal: Neck pain.  Generalized myalgia.  Skin: No rash and no itching.  Neurological: no loss of consciousness, no seizure, no headache.  Behavioral/Psych: no depression or suicidal ideation.  Endocrine: no hot flashes.  Immunologic: negative.       ----------------------------------------------------------------------------------------------------------------------      Objective       [facility] [place] Meds:  alteplase (CATHFLO) INTRACATHETER injection, 2 mg, Intracatheter, Once  alteplase (CATHFLO) INTRACATHETER injection, 2 mg, Intracatheter,  Once  apixaban, 5 mg, Oral, Q12H  aspirin, 81 mg, Oral, Daily  atorvastatin, 40 mg, Oral, Nightly  calcium acetate, 667 mg, Oral, TID With Meals  carvedilol, 12.5 mg, Oral, BID With Meals  cefTRIAXone, 2 g, Intravenous, Q24H  cefTRIAXone, 2 g, Intravenous, Q24H  epoetin mc/mc-epbx, 10,000 Units, Subcutaneous, Once per day on Mon Wed Fri  isosorbide mononitrate, 30 mg, Oral, QAM  lactobacillus acidophilus, 1 capsule, Oral, Daily  lidocaine, 1 patch, Transdermal, Q24H  nicotine, 1 patch, Transdermal, Q24H         ----------------------------------------------------------------------------------------------------------------------    Vital Signs:  Temp:  [97.2 °F (36.2 °C)-98.2 °F (36.8 °C)] 98.1 °F (36.7 °C)  Heart Rate:  [] 111  Resp:  [18-22] 20  BP: (102-130)/(61-80) 121/74  Mean Arterial Pressure (Non-Invasive) for the past 24 hrs (Last 3 readings):   Noninvasive MAP (mmHg)   02/02/21 1020 88   02/02/21 0652 89   02/02/21 0247 90     SpO2 Percentage    02/02/21 0543 02/02/21 0652 02/02/21 1020   SpO2: 97% 97% 96%     SpO2:  [96 %-99 %] 96 %  on   ;   Device (Oxygen Therapy): room air    Body mass index is 18.9 kg/m².  Wt Readings from Last 3 Encounters:   02/02/21 66.8 kg (147 lb 3.2 oz)   12/31/20 67.5 kg (148 lb 12.8 oz)   12/08/20 78.5 kg (173 lb)        Intake/Output Summary (Last 24 hours) at 2/2/2021 1141  Last data filed at 2/2/2021 0905  Gross per 24 hour   Intake 1430 ml   Output 2100 ml   Net -670 ml     Diet Regular; Low Potassium, Renal, Daily Fluid Restriction; 1000 mL Fluid Per Day  ----------------------------------------------------------------------------------------------------------------------      Physical Exam:    Deferred due to COVID-19 isolation.  ----------------------------------------------------------------------------------------------------------------------  Results from last 7 days   Lab Units 02/02/21  0358 02/01/21  0513 01/31/21  0551   CK TOTAL U/L 11* 13* 19*                Results from last 7 days   Lab Units 02/02/21  0358 02/01/21  0513 01/31/21  0551   CRP mg/dL 5.54* 6.95* 10.42*   WBC 10*3/mm3 6.10 6.81 9.10   HEMOGLOBIN g/dL 7.1* 6.9* 7.9*   HEMATOCRIT % 23.8* 22.7* 26.1*   MCV fL 91.9 90.1 90.6   MCHC g/dL 29.8* 30.4* 30.3*   PLATELETS 10*3/mm3 100* 88* 81*     Results from last 7 days   Lab Units 02/02/21  0358 02/01/21  0513 01/31/21  0551   SODIUM mmol/L 125* 125* 129*   POTASSIUM mmol/L 4.8 5.1 4.5   MAGNESIUM mg/dL 2.1 2.5 2.6   CHLORIDE mmol/L 89* 86* 89*   CO2 mmol/L 28.0 26.0 24.2   BUN mg/dL 75* 129* 95*   CREATININE mg/dL 3.91* 6.61* 5.59*   EGFR IF NONAFRICN AM mL/min/1.73 17* 9* 11*   CALCIUM mg/dL 8.5* 8.6 8.1*   GLUCOSE mg/dL 132* 122* 122*   ALBUMIN g/dL 2.63* 2.61* 2.75*   BILIRUBIN mg/dL 0.3 0.3 0.3   ALK PHOS U/L 246* 195* 211*   AST (SGOT) U/L 30 21 30   ALT (SGPT) U/L 12 8 13   Estimated Creatinine Clearance: 22.8 mL/min (A) (by C-G formula based on SCr of 3.91 mg/dL (H)).  No results found for: AMMONIA    Glucose   Date/Time Value Ref Range Status   02/02/2021 1022 149 (H) 70 - 130 mg/dL Final   02/02/2021 0633 97 70 - 130 mg/dL Final   02/01/2021 1916 144 (H) 70 - 130 mg/dL Final   02/01/2021 1625 125 70 - 130 mg/dL Final   02/01/2021 1013 156 (H) 70 - 130 mg/dL Final   02/01/2021 0721 112 70 - 130 mg/dL Final   01/31/2021 1851 189 (H) 70 - 130 mg/dL Final   01/31/2021 0702 163 (H) 70 - 130 mg/dL Final     Lab Results   Component Value Date    HGBA1C 5.20 12/31/2020     Lab Results   Component Value Date    TSH 2.380 11/14/2020    FREET4 0.85 (L) 11/14/2020       Blood Culture   Date Value Ref Range Status   01/25/2021 Serratia marcescens (C)  Final   01/25/2021 Serratia marcescens (C)  Final     Comment:     Refer to previous blood culture collected on 1/25/2021 at 1315 for CHANI's.      Urine Culture   Date Value Ref Range Status   01/25/2021 >100,000 CFU/mL Serratia marcescens (A)  Final     No results found for: WOUNDCX  No results found for:  STOOLCX  No results found for: RESPCX  Pain Management Panel     Pain Management Panel Latest Ref Rng & Units 1/25/2021 12/31/2020    AMPHETAMINES SCREEN, URINE Negative Negative Negative    BARBITURATES SCREEN Negative Negative Negative    BENZODIAZEPINE SCREEN, URINE Negative Negative Negative    BUPRENORPHINEUR Negative Negative Negative    COCAINE SCREEN, URINE Negative Negative Negative    METHADONE SCREEN, URINE Negative Negative Negative            ----------------------------------------------------------------------------------------------------------------------  Imaging Results (Last 24 Hours)     ** No results found for the last 24 hours. **          ----------------------------------------------------------------------------------------------------------------------    Assessment/Plan       Assessment/Plan     ASSESSMENT:    1.  Sepsis  2.  Pyelonephritis  3.  Serratia bacteremia  4.  COVID-19    PLAN:    Discussed case with primary RN.  Patient remains on room air in no apparent distress.  Complains of left arm and shoulder pain without known injury, chest pain, or shortness of breath.  Nurse reports that there is no erythema or edema.  Afebrile, no diarrhea.  CRP is improving at 5.54.  WBC remains normal.    Urine culture finalized as highly susceptible Serratia greater than 100,000 colonies.  Blood culture from 1/25/2021 2 out of 2 sets finalized as pan susceptible Serratia. Blood cultures on 1/26/2021 finalized as no growth.     COVID-19 PCR positive.  Hepatitis C reactive.  CT of the C-spine reports degenerative changes and mild stenosis.  X-ray of the chest unremarkable.  CT of the chest reports resolution of right pleural effusion with minimal reexpansion edema of the right lower lobe versus nonspecific pneumonitis including COVID-19 associated pneumonia, emphysema, stable severe and chronic appearing hydronephrosis and retroperitoneal lymphadenopathy.  CT angiogram of the neck reports emphysema,  no abnormal fluid collections or hematomas in the right neck soft tissues or surrounding hemodialysis catheter.    As patient is asymptomatic from COVID-19 standpoint recommend to hold remdesivir and Decadron for now.    Recommend to continue on Rocephin 2 g IV every 24 hours.  Patient can also be transitioned to ceftazidime 2 g after hemodialysis for easier administration to continue through 2/9/2021.  We will continue to follow closely and adjust antibiotic therapy as needed.    Code Status:   Code Status and Medical Interventions:   Ordered at: 01/25/21 1707     Code Status:    CPR     Medical Interventions (Level of Support Prior to Arrest):    Full     Scribed for Tiffanie Lynn MD by CHRIS Mcbride. 2/2/2021  11:56 EST  CHRIS Mcbride  02/02/21  11:41 EST     Physician Attestation:    The documentation recorded by the scribe accurately reflects the service I personally performed and the decisions made by me.    Tiffanie Lynn MD  Infectious Diseases  02/02/21  13:03 EST

## 2021-02-02 NOTE — PROGRESS NOTES
Nephrology Progress Note      Subjective   No chest pain or shortness of breath, continue to have intermittent shoulder and neck pain    Objective       Vital signs :     Temp:  [97.2 °F (36.2 °C)-98.2 °F (36.8 °C)] 98.2 °F (36.8 °C)  Heart Rate:  [] 98  Resp:  [18-22] 20  BP: (102-130)/(61-80) 117/74      Intake/Output Summary (Last 24 hours) at 2/2/2021 0749  Last data filed at 2/2/2021 0247  Gross per 24 hour   Intake 1910 ml   Output 2000 ml   Net -90 ml       Physical Exam:  General: not in acute distress  Heart: Tele reveals sinus rhythm.   Lungs: Respirations appear to be regular, even and unlabored with no signs of respiratory distress. No audible wheezing.    Abdomen: no distension  Extremities:trace  Skin: No visible bleeding, bruising, or rash.  Neurologic: no apparent focal deficit.         Laboratory Data :     Albumin Albumin   Date Value Ref Range Status   02/02/2021 2.63 (L) 3.50 - 5.20 g/dL Final   02/01/2021 2.61 (L) 3.50 - 5.20 g/dL Final   01/31/2021 2.75 (L) 3.50 - 5.20 g/dL Final      Magnesium Magnesium   Date Value Ref Range Status   02/02/2021 2.1 1.6 - 2.6 mg/dL Final   02/01/2021 2.5 1.6 - 2.6 mg/dL Final   01/31/2021 2.6 1.6 - 2.6 mg/dL Final          PTH                 No results found for: PTH    CBC and coagulation:  Results from last 7 days   Lab Units 02/02/21  0358 02/01/21  0513 01/31/21  0551 01/30/21  0420 01/29/21  0417 01/28/21  0330 01/27/21  0014   CRP mg/dL 5.54* 6.95* 10.42* 4.65* 9.90* 11.71* 15.95*   WBC 10*3/mm3 6.10 6.81 9.10 7.79 8.20 6.98 4.84   HEMOGLOBIN g/dL 7.1* 6.9* 7.9* 7.5* 7.6* 7.9* 7.0*   HEMATOCRIT % 23.8* 22.7* 26.1* 24.6* 24.3* 24.9* 21.3*   MCV fL 91.9 90.1 90.6 91.1 87.7 86.8 84.9   MCHC g/dL 29.8* 30.4* 30.3* 30.5* 31.3* 31.7 32.9   PLATELETS 10*3/mm3 100* 88* 81* 82* 83* 75* 74*   D DIMER QUANT MCGFEU/mL 2.53* 2.75* 3.40* 2.96* 3.22* 4.52* 8.54*     Acid/base balance:      Renal and electrolytes:  Results from last 7 days   Lab Units  02/02/21  0358 02/01/21  0513 01/31/21  0551 01/30/21  0420 01/29/21  0417   SODIUM mmol/L 125* 125* 129* 133* 129*   POTASSIUM mmol/L 4.8 5.1 4.5 3.5 3.9   MAGNESIUM mg/dL 2.1 2.5 2.6 1.7 2.0   CHLORIDE mmol/L 89* 86* 89* 94* 89*   CO2 mmol/L 28.0 26.0 24.2 26.1 21.8*   BUN mg/dL 75* 129* 95* 63* 79*   CREATININE mg/dL 3.91* 6.61* 5.59* 4.40* 6.10*   EGFR IF NONAFRICN AM mL/min/1.73 17* 9* 11* 15* 10*   CALCIUM mg/dL 8.5* 8.6 8.1* 7.9* 7.7*   PHOSPHORUS mg/dL 2.7 2.0* 2.4* 2.5 5.3*     Estimated Creatinine Clearance: 22.8 mL/min (A) (by C-G formula based on SCr of 3.91 mg/dL (H)).    Liver and pancreatic function:  Results from last 7 days   Lab Units 02/02/21  0358 02/01/21  0513 01/31/21  0551   ALBUMIN g/dL 2.63* 2.61* 2.75*   BILIRUBIN mg/dL 0.3 0.3 0.3   ALK PHOS U/L 246* 195* 211*   AST (SGOT) U/L 30 21 30   ALT (SGPT) U/L 12 8 13         Cardiac:      Liver and pancreatic function:  Results from last 7 days   Lab Units 02/02/21  0358 02/01/21  0513 01/31/21  0551   ALBUMIN g/dL 2.63* 2.61* 2.75*   BILIRUBIN mg/dL 0.3 0.3 0.3   ALK PHOS U/L 246* 195* 211*   AST (SGOT) U/L 30 21 30   ALT (SGPT) U/L 12 8 13       Medications :     alteplase (CATHFLO) INTRACATHETER injection, 2 mg, Intracatheter, Once  alteplase (CATHFLO) INTRACATHETER injection, 2 mg, Intracatheter, Once  apixaban, 5 mg, Oral, Q12H  aspirin, 81 mg, Oral, Daily  atorvastatin, 40 mg, Oral, Nightly  calcium acetate, 667 mg, Oral, TID With Meals  carvedilol, 12.5 mg, Oral, BID With Meals  cefTRIAXone, 2 g, Intravenous, Q24H  epoetin mc/mc-epbx, 10,000 Units, Subcutaneous, Once per day on Mon Wed Fri  isosorbide mononitrate, 30 mg, Oral, QAM  lactobacillus acidophilus, 1 capsule, Oral, Daily  lidocaine, 1 patch, Transdermal, Q24H  nicotine, 1 patch, Transdermal, Q24H             Assessment/Plan     1. ESKD, non compliant to dialysis  2. Sever hyperkalemia  3. Sever metabolic acidosis  4. Hyponatremia  5. Sever sepsis with COVID-19  6. Bacteremia  with concern of catheter related infection  7. Sever anemia     Uneventful dialysis yesterday, MWF  mild hyponatremia likely hypervolemic, fluid restriction  Aneima, Hb below goal, increase EPO to 10,000IU 3/wk, iron stores are adequate    Cultures are negative on 1/26        Taniya Corado MD  02/02/21  07:49 EST

## 2021-02-02 NOTE — PROGRESS NOTES
[facility] HOSPITALIST PROGRESS NOTE     Patient Identification:  Name:  Mario Nair  Age:  45 y.o.  Sex:  male  :  1975  MRN:  [id number]  Visit Number:  [id number]  ROOM: [address] [place] [address]     Primary Care Provider:  Danny Rebolledo MD    Length of stay in inpatient status:  8    Subjective     Chief Compliant:    Chief Complaint   Patient presents with   • Neck Pain   • Weakness - Generalized     History of Presenting Illness:    Patient stable today, no acute events overnight, no new complaints, tolerated dialysis yesterday, CRP down today at 5.5, ID following and rec'd Abx through , still awaiting to come out of isolation but has [facility] consult pending, still having some neck pain but unchanged overall, he remains on room air, denies any fevers or chills.  Objective     Current Hospital Meds:alteplase (CATHFLO) INTRACATHETER injection, 2 mg, Intracatheter, Once  alteplase (CATHFLO) INTRACATHETER injection, 2 mg, Intracatheter, Once  apixaban, 5 mg, Oral, Q12H  aspirin, 81 mg, Oral, Daily  atorvastatin, 40 mg, Oral, Nightly  calcium acetate, 667 mg, Oral, TID With Meals  carvedilol, 12.5 mg, Oral, BID With Meals  cefTRIAXone, 2 g, Intravenous, Q24H  epoetin mc/mc-epbx, 10,000 Units, Subcutaneous, Once per day on   isosorbide mononitrate, 30 mg, Oral, QAM  lactobacillus acidophilus, 1 capsule, Oral, Daily  lidocaine, 1 patch, Transdermal, Q24H  nicotine, 1 patch, Transdermal, Q24H         Current Antimicrobial Therapy:  Anti-Infectives (From admission, onward)    Ordered     Dose/Rate Route Frequency Start Stop    21 1013  cefTRIAXone (ROCEPHIN) 2 g/100 mL 0.9% NS IVPB (MBP)     Malissa Norton APRN let the order  on 21 1257.   Ordering Provider: Khoa Carl MD    2 g  over 30 Minutes Intravenous Every 24 Hours 21 1100 21 1059    21 1046  gentamicin (GARAMYCIN) 190 mg in sodium chloride 0.9 % 100 mL IVPB     Note to Pharmacy:  Separate Administration Time With Ampicillin and Other Penicillins (Ampicillin / Penicillins deactivate gentamicin when infused together).   Ordering Provider: Tiffanie Lynn MD    3 mg/kg × 63 kg  over 30 Minutes Intravenous Once 01/26/21 1200 01/26/21 1226    01/25/21 1926  cefepime (MAXIPIME) 2 g/100 mL 0.9% NS (mbp)     Ordering Provider: Sena Schwartz APRN    2 g  200 mL/hr over 30 Minutes Intravenous Once 01/25/21 2100 01/26/21 0031    01/25/21 1927  vancomycin 1250 mg/250 mL 0.9% NS IVPB ([facility])     Ordering Provider: Opal Ocampo MD    20 mg/kg × 68 kg  over 90 Minutes Intravenous Once 01/25/21 2100 01/26/21 0212        Current Diuretic Therapy:  Diuretics (From admission, onward)    None        ----------------------------------------------------------------------------------------------------------------------  Vital Signs:  Temp:  [97.2 °F (36.2 °C)-98.2 °F (36.8 °C)] 98.1 °F (36.7 °C)  Heart Rate:  [] 111  Resp:  [18-22] 20  BP: (102-130)/(61-80) 121/74  SpO2:  [96 %-99 %] 96 %  on   ;   Device (Oxygen Therapy): room air  Body mass index is 18.9 kg/m².    Wt Readings from Last 3 Encounters:   02/02/21 66.8 kg (147 lb 3.2 oz)   12/31/20 67.5 kg (148 lb 12.8 oz)   12/08/20 78.5 kg (173 lb)     Intake & Output (last 3 days)       01/30 0701 - 01/31 0700 01/31 0701 - 02/01 0700 02/01 0701 - 02/02 0700 02/02 0701 - 02/03 0700    P.O. 1800 1760 1800 240    I.V. (mL/kg) 130.4 (2.1)  110 (1.6)     Total Intake(mL/kg) 1930.4 (31.2) 1760 (26.4) 1910 (28.6) 240 (3.6)    Urine (mL/kg/hr)   0 (0) 100 (0.4)    Other   2000     Total Output   2000 100    Net +1930.4 +1760 -90 +140            Urine Unmeasured Occurrence 6 x 3 x 2 x     Stool Unmeasured Occurrence 3 x 2 x          Diet Regular; Low Potassium, Renal, Daily Fluid Restriction; 1000 mL Fluid Per Day  ----------------------------------------------------------------------------------------------------------------------  Physical  exam:  This physical exam has been personally performed remotely in the unit aided by real-time audio/visual communication tools. RN present at bedside during this exam and assisted during exam. The use of a video visit has been reviewed with the patient and verbal informed consent has been obtained.      Physical Exam:  General: Patient appears awake, alert, and in no acute distress.  Head: Normocephalic, atraumatic  Eyes: EOMI. Conjunctivae and sclerae normal.  Ears: Ears appear intact with no abnormalities noted.   Neck: Trachea midline. No obvious JVD.  Heart: Tele reveals regular rate and rhythm  Lungs: Respirations appear to be regular, even and unlabored with no signs of respiratory distress. No audible wheezing. On room air  Abdomen: No obvious abdominal distension.  MS: Muscle tone appears normal. No gross deformities.  Extremities: No clubbing, cyanosis or edema noted.  Skin: No visible bleeding, bruising, or rash.  Neurologic: Alert and oriented x3. No gross focal deficits.     *Exam unchanged today 2/2  ----------------------------------------------------------------------------------------------------------------------  Results from last 7 days   Lab Units 02/02/21 0358 02/01/21 0513 01/31/21  0551   CRP mg/dL 5.54* 6.95* 10.42*   WBC 10*3/mm3 6.10 6.81 9.10   HEMOGLOBIN g/dL 7.1* 6.9* 7.9*   HEMATOCRIT % 23.8* 22.7* 26.1*   MCV fL 91.9 90.1 90.6   MCHC g/dL 29.8* 30.4* 30.3*   PLATELETS 10*3/mm3 100* 88* 81*         Results from last 7 days   Lab Units 02/02/21 0358 02/01/21 0513 01/31/21  0551   SODIUM mmol/L 125* 125* 129*   POTASSIUM mmol/L 4.8 5.1 4.5   MAGNESIUM mg/dL 2.1 2.5 2.6   CHLORIDE mmol/L 89* 86* 89*   CO2 mmol/L 28.0 26.0 24.2   BUN mg/dL 75* 129* 95*   CREATININE mg/dL 3.91* 6.61* 5.59*   EGFR IF NONAFRICN AM mL/min/1.73 17* 9* 11*   CALCIUM mg/dL 8.5* 8.6 8.1*   PHOSPHORUS mg/dL 2.7 2.0* 2.4*   GLUCOSE mg/dL 132* 122* 122*   ALBUMIN g/dL 2.63* 2.61* 2.75*   BILIRUBIN mg/dL 0.3 0.3  0.3   ALK PHOS U/L 246* 195* 211*   AST (SGOT) U/L 30 21 30   ALT (SGPT) U/L 12 8 13   Estimated Creatinine Clearance: 22.8 mL/min (A) (by C-G formula based on SCr of 3.91 mg/dL (H)).  No results found for: AMMONIA  Results from last 7 days   Lab Units 02/02/21  0358 02/01/21  0513 01/31/21  0551   CK TOTAL U/L 11* 13* 19*             Glucose   Date/Time Value Ref Range Status   02/02/2021 1022 149 (H) 70 - 130 mg/dL Final   02/02/2021 0633 97 70 - 130 mg/dL Final   02/01/2021 1916 144 (H) 70 - 130 mg/dL Final   02/01/2021 1625 125 70 - 130 mg/dL Final   02/01/2021 1013 156 (H) 70 - 130 mg/dL Final   02/01/2021 0721 112 70 - 130 mg/dL Final   01/31/2021 1851 189 (H) 70 - 130 mg/dL Final   01/31/2021 0702 163 (H) 70 - 130 mg/dL Final     Lab Results   Component Value Date    TSH 2.380 11/14/2020    FREET4 0.85 (L) 11/14/2020     No results found for: PREGTESTUR, PREGSERUM, HCG, HCGQUANT  Pain Management Panel     Pain Management Panel Latest Ref Rng & Units 1/25/2021 12/31/2020    AMPHETAMINES SCREEN, URINE Negative Negative Negative    BARBITURATES SCREEN Negative Negative Negative    BENZODIAZEPINE SCREEN, URINE Negative Negative Negative    BUPRENORPHINEUR Negative Negative Negative    COCAINE SCREEN, URINE Negative Negative Negative    METHADONE SCREEN, URINE Negative Negative Negative        Brief Urine Lab Results  (Last result in the past 365 days)      Color   Clarity   Blood   Leuk Est   Nitrite   Protein   CREAT   Urine HCG        01/25/21 1505 Yellow Turbid Large (3+) Large (3+) Positive 100 mg/dL (2+)             Blood Culture   Date Value Ref Range Status   01/30/2021 No growth at 3 days  Preliminary   01/29/2021 No growth at 3 days  Preliminary   01/26/2021 No growth at 5 days  Final   01/26/2021 No growth at 5 days  Final     No results found for: URINECX  No results found for: WOUNDCX  No results found for: STOOLCX  No results found for: RESPCX  No results found for: AFBCX  Results from last 7 days    Lab Units 02/02/21  0358 02/01/21  0513 01/31/21  0551 01/30/21  0420 01/29/21  0417 01/28/21  0330 01/27/21  0014   CRP mg/dL 5.54* 6.95* 10.42* 4.65* 9.90* 11.71* 15.95*     I have personally looked at the labs and they are summarized above.  ----------------------------------------------------------------------------------------------------------------------  Detailed radiology reports for the last 24 hours:    Imaging Results (Last 24 Hours)     ** No results found for the last 24 hours. 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  - Admission labs showed NML WBC count, CRP 13, lactate 0.7, covid +, UA w/ culture showing infection and Bcx's growing Serratia sp., Repeat Bcx's NGTD  - CTPE showed no PE, possible covid PNA  - Gen Surgery consulted and removed R TDC and replaced RIJ on admission, on 1/29 L TDC placed  - ID consulted; Following  - Continue high-dose Ceftriaxone through 2/9 per ID  - On Eliquis now for D-dimer > 20 and underlying covid diagnosis  - Continue APAP PRN for fevers  - Monitoring on tele, no current 02 needs  - SW assisting w/ possible [facility] but awaiting when he can come out of isolation     #ESRD on HD c/b Medical Non-adherence and Hyperkalemia, also anemia requiring transfusions  - Reportedly hadn't had dialysis > 1 month on admission, Cr 15, K 6.1  - CT showed stable severe chronic hydronephrosis and retroperitoneal lymphadenopathy  - Consult Nephrology; Following for HD, HyperK resolved, have placed patient on Epo  - Trend Hgb, transfuse as indicated  - SW working on HD chair    #HTN/HLD  - Echo 1/27 w/ LVEF 61-65%, NML LV function, diastolic dysfunction, no effusion, no vegetations noted.  -  Continue home ASA 81, statin  - Continue home Coreg, Imdur     #Emphysema w/o COPD exacerbation  - Noted on CT, duonebs PRN    #Hx HBV/HCV  - Supportive Care    #Medical Non-adherence  - Patient's frequent leaving AMA and non-adherence w/ home medical therapy will likely drastically affect both long and short term morbidity and mortality.     F: Oral  E: Monitor & Replace PRN  N: Renal  Ppx: On Eliquis  Code: Full Code     Dispo: Pending clinical improvement, [facility] when can come out of isolation     *This patient is considered high risk due to sepsis, acute UTI, covid infection, hyperkalemia, medical non-adherence.    VTE Prophylaxis:   Mechanical Order History:     None      Pharmalogical Order History:      Ordered     Dose Route Frequency Stop    01/31/21 0830  apixaban (ELIQUIS) tablet 5 mg      5 mg PO Every 12 Hours Scheduled 03/02/21 0859    01/29/21 0928  heparin (porcine) 5000 UNIT/ML injection  Status:  Discontinued      -- -- Continuous PRN 01/29/21 1000    01/25/21 2054  heparin (porcine) injection 1,000 Units      1,000 Units IK Once 01/25/21 2115 01/25/21 1920  heparin (porcine) 5000 UNIT/ML injection 4,100 Units      60 Units/kg IV Once 01/25/21 2159 01/25/21 1920  heparin 15576 units/250 mL (100 units/mL) in 0.45 % NaCl infusion  8.16 mL/hr,   Status:  Discontinued      12 Units/kg/hr IV Titrated 01/31/21 0830    01/25/21 1920  heparin (porcine) 5000 UNIT/ML injection 4,100 Units  Status:  Discontinued      60 Units/kg IV As Needed 01/31/21 0830    01/25/21 1920  heparin (porcine) 5000 UNIT/ML injection 2,000 Units  Status:  Discontinued      30 Units/kg IV As Needed 01/31/21 0830              Obi Arvizu MD  [facility]  02/02/21  11:12 EST

## 2021-02-02 NOTE — PLAN OF CARE
Goal Outcome Evaluation:   Patient resting well during shift. Patient complaining of neck and left shoulder pain intermittently. PRN medications given as ordered. No distress noted will continue to monitor.

## 2021-02-02 NOTE — PLAN OF CARE
Goal Outcome Evaluation:     Progress: no change   Pt resting in bed with eyes closed, appears asleep. Pt has complained of shoulder/arm pain throughout the shift. Administered PRN pain medication throughout the shift. Pt has called out multiple times in regards to pain medication. Administered as frequently as possible. Even though the pt complains that the pain is hard to control. Pt has rested well despite the constant complaint of pain. No acute distress noted. Will continue to follow the plan of care.

## 2021-02-02 NOTE — PROGRESS NOTES
Antimicrobial Length of Therapy:    Day 7 of 7 Rocephin    Thank you,    Gaby Reyes, PharmD  Pharmacy Resident  2/2/2021  07:59 EST

## 2021-02-03 NOTE — PROGRESS NOTES
PROGRESS NOTE         Patient Identification:  Name:  Mario Nair  Age:  45 y.o.  Sex:  male  :  1975  MRN:  [id number]  Visit Number:  [id number]  Primary Care Provider:  Danny Rebolledo MD         LOS: 9 days       ----------------------------------------------------------------------------------------------------------------------  Subjective       Chief Complaints:    Neck Pain and Weakness - Generalized        Interval History:      Discussed case with primary RN.  Patient remains on room air in no apparent distress.  Complains of a headache today, but otherwise is feeling well.  Afebrile, no diarrhea.  CRP is stable at 5.44.  WBC remains normal.    Review of Systems:    Constitutional: no fever, chills and night sweats. No appetite change or unexpected weight change. No fatigue.  Eyes: no eye drainage, itching or redness.  HEENT: no mouth sores, dysphagia or nose bleed.  Respiratory: no for shortness of breath, cough or production of sputum.  Cardiovascular: no chest pain, no palpitations, no orthopnea.  Gastrointestinal: no nausea, vomiting or diarrhea. No abdominal pain, hematemesis or rectal bleeding.  Genitourinary: no dysuria or polyuria.  Hematologic/lymphatic: no lymph node abnormalities, no easy bruising or easy bleeding.  Musculoskeletal: Generalized myalgia.  Skin: No rash and no itching.  Neurological: no loss of consciousness, no seizure.  Headache.  Behavioral/Psych: no depression or suicidal ideation.  Endocrine: no hot flashes.  Immunologic: negative.       ----------------------------------------------------------------------------------------------------------------------      Objective       [facility]:  alteplase (CATHFLO) INTRACATHETER injection, 2 mg, Intracatheter, Once  alteplase (CATHFLO) INTRACATHETER injection, 2 mg, Intracatheter, Once  apixaban, 5 mg, Oral, Q12H  aspirin, 81 mg, Oral, Daily  atorvastatin, 40 mg, Oral, Nightly  calcium  acetate, 667 mg, Oral, TID With Meals  carvedilol, 12.5 mg, Oral, BID With Meals  cefTRIAXone, 2 g, Intravenous, Q24H  epoetin mc/mc-epbx, 10,000 Units, Subcutaneous, Once per day on Mon Wed Fri  isosorbide mononitrate, 30 mg, Oral, QAM  lactobacillus acidophilus, 1 capsule, Oral, Daily  lidocaine, 1 patch, Transdermal, Q24H  nicotine, 1 patch, Transdermal, Q24H  sodium chloride, 1,000 mL, Intravenous, Once in Dialysis         ----------------------------------------------------------------------------------------------------------------------    Vital Signs:  Temp:  [98.2 °F (36.8 °C)-99.7 °F (37.6 °C)] 99.7 °F (37.6 °C)  Heart Rate:  [] 102  Resp:  [18-20] 20  BP: ()/(68-80) 126/79  Mean Arterial Pressure (Non-Invasive) for the past 24 hrs (Last 3 readings):   Noninvasive MAP (mmHg)   02/03/21 0246 95   02/02/21 1853 110     SpO2 Percentage    02/02/21 1853 02/02/21 2132 02/03/21 0246   SpO2: 97% 97% 95%     SpO2:  [95 %-97 %] 95 %  on   ;   Device (Oxygen Therapy): room air    Body mass index is 19.89 kg/m².  Wt Readings from Last 3 Encounters:   02/03/21 70.3 kg (154 lb 14.4 oz)   12/31/20 67.5 kg (148 lb 12.8 oz)   12/08/20 78.5 kg (173 lb)        Intake/Output Summary (Last 24 hours) at 2/3/2021 1210  Last data filed at 2/3/2021 1100  Gross per 24 hour   Intake 1538.33 ml   Output 50 ml   Net 1488.33 ml     Diet Regular; Low Potassium, Renal, Daily Fluid Restriction; 1000 mL Fluid Per Day  ----------------------------------------------------------------------------------------------------------------------      Physical Exam:    Deferred due to COVID-19 isolation.  ----------------------------------------------------------------------------------------------------------------------  Results from last 7 days   Lab Units 02/03/21 0159 02/02/21 0358 02/01/21 0513   CK TOTAL U/L 14* 11* 13*               Results from last 7 days   Lab Units 02/03/21 0159 02/02/21 0358 02/01/21 0513   CRP  mg/dL 5.44* 5.54* 6.95*   WBC 10*3/mm3 6.16 6.10 6.81   HEMOGLOBIN g/dL 7.4* 7.1* 6.9*   HEMATOCRIT % 24.4* 23.8* 22.7*   MCV fL 92.8 91.9 90.1   MCHC g/dL 30.3* 29.8* 30.4*   PLATELETS 10*3/mm3 109* 100* 88*     Results from last 7 days   Lab Units 02/03/21  0159 02/02/21  0358 02/01/21  0513   SODIUM mmol/L 127* 125* 125*   POTASSIUM mmol/L 5.4* 4.8 5.1   MAGNESIUM mg/dL 2.1 2.1 2.5   CHLORIDE mmol/L 89* 89* 86*   CO2 mmol/L 25.1 28.0 26.0   BUN mg/dL 117* 75* 129*   CREATININE mg/dL 5.73* 3.91* 6.61*   EGFR IF NONAFRICN AM mL/min/1.73 11* 17* 9*   CALCIUM mg/dL 8.5* 8.5* 8.6   GLUCOSE mg/dL 146* 132* 122*   ALBUMIN g/dL 2.60* 2.63* 2.61*   BILIRUBIN mg/dL 0.2 0.3 0.3   ALK PHOS U/L 231* 246* 195*   AST (SGOT) U/L 25 30 21   ALT (SGPT) U/L 12 12 8   Estimated Creatinine Clearance: 16.4 mL/min (A) (by C-G formula based on SCr of 5.73 mg/dL (H)).  No results found for: AMMONIA    Glucose   Date/Time Value Ref Range Status   02/02/2021 1856 116 70 - 130 mg/dL Final   02/02/2021 1741 146 (H) 70 - 130 mg/dL Final   02/02/2021 1022 149 (H) 70 - 130 mg/dL Final   02/02/2021 0633 97 70 - 130 mg/dL Final   02/01/2021 1916 144 (H) 70 - 130 mg/dL Final   02/01/2021 1625 125 70 - 130 mg/dL Final   02/01/2021 1013 156 (H) 70 - 130 mg/dL Final   02/01/2021 0721 112 70 - 130 mg/dL Final     Lab Results   Component Value Date    HGBA1C 5.20 12/31/2020     Lab Results   Component Value Date    TSH 2.380 11/14/2020    FREET4 0.85 (L) 11/14/2020       Blood Culture   Date Value Ref Range Status   01/25/2021 Serratia marcescens (C)  Final   01/25/2021 Serratia marcescens (C)  Final     Comment:     Refer to previous blood culture collected on 1/25/2021 at 1315 for CHANI's.      Urine Culture   Date Value Ref Range Status   01/25/2021 >100,000 CFU/mL Serratia marcescens (A)  Final     No results found for: WOUNDCX  No results found for: STOOLCX  No results found for: RESPCX  Pain Management Panel     Pain Management Panel Latest Ref Rng  & Units 1/25/2021 12/31/2020    AMPHETAMINES SCREEN, URINE Negative Negative Negative    BARBITURATES SCREEN Negative Negative Negative    BENZODIAZEPINE SCREEN, URINE Negative Negative Negative    BUPRENORPHINEUR Negative Negative Negative    COCAINE SCREEN, URINE Negative Negative Negative    METHADONE SCREEN, URINE Negative Negative Negative            ----------------------------------------------------------------------------------------------------------------------  Imaging Results (Last 24 Hours)     ** No results found for the last 24 hours. **          ----------------------------------------------------------------------------------------------------------------------    Assessment/Plan       Assessment/Plan     ASSESSMENT:    1.  Sepsis  2.  Pyelonephritis  3.  Serratia bacteremia  4.  COVID-19    PLAN:    Discussed case with primary RN.  Patient remains on room air in no apparent distress.  Complains of a headache today, but otherwise is feeling well.  Afebrile, no diarrhea.  CRP is stable at 5.44.  WBC remains normal.    Urine culture finalized as highly susceptible Serratia greater than 100,000 colonies.  Blood culture from 1/25/2021 2 out of 2 sets finalized as pan susceptible Serratia. Blood cultures on 1/26/2021 finalized as no growth.     COVID-19 PCR positive.  Hepatitis C reactive.  CT of the C-spine reports degenerative changes and mild stenosis.  X-ray of the chest unremarkable.  CT of the chest reports resolution of right pleural effusion with minimal reexpansion edema of the right lower lobe versus nonspecific pneumonitis including COVID-19 associated pneumonia, emphysema, stable severe and chronic appearing hydronephrosis and retroperitoneal lymphadenopathy.  CT angiogram of the neck reports emphysema, no abnormal fluid collections or hematomas in the right neck soft tissues or surrounding hemodialysis catheter.    As patient is asymptomatic from COVID-19 standpoint recommend to hold  remdesivir and Decadron for now.    Recommend to continue on Rocephin 2 g IV every 24 hours.  Patient can also be transitioned to ceftazidime 2 g after hemodialysis for easier administration to continue through 2/9/2021.  As the patient is stable from an ID standpoint, we will sign off now.  Please call back for any questions.    Code Status:   Code Status and Medical Interventions:   Ordered at: 01/25/21 1707     Code Status:    CPR     Medical Interventions (Level of Support Prior to Arrest):    Full     CHRIS Mcbride  02/03/21  12:10 EST

## 2021-02-03 NOTE — PROGRESS NOTES
Discharge Planning Assessment   Reza     Patient Name: Mario Nair  MRN: [id number]  Today's Date: 2/3/2021    Admit Date: 1/25/2021    Discharge Needs Assessment    No documentation.       Discharge Plan     Row Name 02/03/21 1554       Plan    Plan Pt was admitted on 01/25/21. SS spoke with pt on this date about discharge planning.  Pt plans on going to [facility] at discharge,  but cannot go to [facility] until 20 days post first positive test which will be on 2/14/21. SS followed up with [facility] per Hailey [phone] who states pt's referral is being reviewed. SS will continue to follow.          Felisa Alejo

## 2021-02-03 NOTE — PROGRESS NOTES
[facility] HOSPITALIST PROGRESS NOTE     Patient Identification:  Name:  Mario Nair  Age:  45 y.o.  Sex:  male  :  1975  MRN:  [id number]  Visit Number:  [id number]  ROOM: [address] [place] [address]     Primary Care Provider:  Danny Rebolledo MD    Length of stay in inpatient status:  9    Subjective     Chief Compliant:    Chief Complaint   Patient presents with   • Neck Pain   • Weakness - Generalized     History of Presenting Illness:    Patient stable today, no acute events overnight, no new complaints, remains stable on room air, labs improving, CRP down to 5.44, on ABx per ID, cannot go to [facility] until 20 days post first positive test which will be on , SW assisting w/ placement, K 5.4 today, nephrology following and will receive dialysis today, reviewed labs and free T4 was low in 2020 and re-ordered Ft4 level today, he denies any fevers or chills.   Objective     Current Hospital Meds:alteplase (CATHFLO) INTRACATHETER injection, 2 mg, Intracatheter, Once  alteplase (CATHFLO) INTRACATHETER injection, 2 mg, Intracatheter, Once  apixaban, 5 mg, Oral, Q12H  aspirin, 81 mg, Oral, Daily  atorvastatin, 40 mg, Oral, Nightly  calcium acetate, 667 mg, Oral, TID With Meals  carvedilol, 12.5 mg, Oral, BID With Meals  cefTRIAXone, 2 g, Intravenous, Q24H  epoetin mc/mc-epbx, 10,000 Units, Subcutaneous, Once per day on   isosorbide mononitrate, 30 mg, Oral, QAM  lactobacillus acidophilus, 1 capsule, Oral, Daily  lidocaine, 1 patch, Transdermal, Q24H  nicotine, 1 patch, Transdermal, Q24H         Current Antimicrobial Therapy:  Anti-Infectives (From admission, onward)    Ordered     Dose/Rate Route Frequency Start Stop    21 1114  cefTRIAXone (ROCEPHIN) 2 g/100 mL 0.9% NS IVPB (MBP)     Gaby Reyes, PharmD reviewed the order on 21 1156.   Ordering Provider: Obi Arvizu MD    2 g  over 30 Minutes Intravenous Every 24 Hours 21 1200 21 2359    21 1013   cefTRIAXone (ROCEPHIN) 2 g/100 mL 0.9% NS IVPB (MBP)     Ordering Provider: Khoa Carl MD    2 g  over 30 Minutes Intravenous Every 24 Hours 01/27/21 1100 02/02/21 1142    01/26/21 1046  gentamicin (GARAMYCIN) 190 mg in sodium chloride 0.9 % 100 mL IVPB     Note to Pharmacy: Separate Administration Time With Ampicillin and Other Penicillins (Ampicillin / Penicillins deactivate gentamicin when infused together).   Ordering Provider: Tiffanie Lynn MD    3 mg/kg × 63 kg  over 30 Minutes Intravenous Once 01/26/21 1200 01/26/21 1226    01/25/21 1926  cefepime (MAXIPIME) 2 g/100 mL 0.9% NS (mbp)     Ordering Provider: Sena Schwartz APRN    2 g  200 mL/hr over 30 Minutes Intravenous Once 01/25/21 2100 01/26/21 0031    01/25/21 1927  vancomycin 1250 mg/250 mL 0.9% NS IVPB ([facility])     Ordering Provider: Opal Ocampo MD    20 mg/kg × 68 kg  over 90 Minutes Intravenous Once 01/25/21 2100 01/26/21 0212        Current Diuretic Therapy:  Diuretics (From admission, onward)    None        ----------------------------------------------------------------------------------------------------------------------  Vital Signs:  Temp:  [98.2 °F (36.8 °C)-99.7 °F (37.6 °C)] 98.6 °F (37 °C)  Heart Rate:  [] 99  Resp:  [18-20] 18  BP: ()/(63-80) 111/63  SpO2:  [95 %-97 %] 95 %  on   ;   Device (Oxygen Therapy): room air  Body mass index is 19.89 kg/m².    Wt Readings from Last 3 Encounters:   02/03/21 70.3 kg (154 lb 14.4 oz)   12/31/20 67.5 kg (148 lb 12.8 oz)   12/08/20 78.5 kg (173 lb)     Intake & Output (last 3 days)       01/31 0701 - 02/01 0700 02/01 0701 - 02/02 0700 02/02 0701 - 02/03 0700 02/03 0701 - 02/04 0700    P.O. 1760 1800 1320 360    I.V. (mL/kg)  110 (1.6) 98.3 (1.4)     Total Intake(mL/kg) 1760 (26.4) 1910 (28.6) 1418.3 (20.2) 360 (5.1)    Urine (mL/kg/hr)  0 (0) 150 (0.1)     Other  2000      Total Output  2000 150     Net +1760 -90 +1268.3 +360            Urine Unmeasured Occurrence  3 x 2 x 2 x 1 x    Stool Unmeasured Occurrence 2 x           Diet Regular; Low Potassium, Renal, Daily Fluid Restriction; 1000 mL Fluid Per Day  ----------------------------------------------------------------------------------------------------------------------  Physical exam:  This physical exam has been personally performed remotely in the unit aided by real-time audio/visual communication tools. RN present at bedside during this exam and assisted during exam. The use of a video visit has been reviewed with the patient and verbal informed consent has been obtained.      Physical Exam:  General: Patient appears awake, alert, and in no acute distress.  Head: Normocephalic, atraumatic  Eyes: EOMI. Conjunctivae and sclerae normal.  Ears: Ears appear intact with no abnormalities noted.   Neck: Trachea midline. No obvious JVD.  Heart: Tele reveals regular rate and rhythm  Lungs: Respirations appear to be regular, even and unlabored with no signs of respiratory distress. No audible wheezing. On room air  Abdomen: No obvious abdominal distension.  MS: Muscle tone appears normal. No gross deformities.  Extremities: No clubbing, cyanosis or edema noted.  Skin: No visible bleeding, bruising, or rash.  Neurologic: Alert and oriented x3. No gross focal deficits.      *Exam unchanged today 2/3  ----------------------------------------------------------------------------------------------------------------------  Results from last 7 days   Lab Units 02/03/21  0159 02/02/21  0358 02/01/21  0513   CRP mg/dL 5.44* 5.54* 6.95*   WBC 10*3/mm3 6.16 6.10 6.81   HEMOGLOBIN g/dL 7.4* 7.1* 6.9*   HEMATOCRIT % 24.4* 23.8* 22.7*   MCV fL 92.8 91.9 90.1   MCHC g/dL 30.3* 29.8* 30.4*   PLATELETS 10*3/mm3 109* 100* 88*         Results from last 7 days   Lab Units 02/03/21  0159 02/02/21 0358 02/01/21  0513   SODIUM mmol/L 127* 125* 125*   POTASSIUM mmol/L 5.4* 4.8 5.1   MAGNESIUM mg/dL 2.1 2.1 2.5   CHLORIDE mmol/L 89* 89* 86*   CO2 mmol/L  25.1 28.0 26.0   BUN mg/dL 117* 75* 129*   CREATININE mg/dL 5.73* 3.91* 6.61*   EGFR IF NONAFRICN AM mL/min/1.73 11* 17* 9*   CALCIUM mg/dL 8.5* 8.5* 8.6   PHOSPHORUS mg/dL 4.0 2.7 2.0*   GLUCOSE mg/dL 146* 132* 122*   ALBUMIN g/dL 2.60* 2.63* 2.61*   BILIRUBIN mg/dL 0.2 0.3 0.3   ALK PHOS U/L 231* 246* 195*   AST (SGOT) U/L 25 30 21   ALT (SGPT) U/L 12 12 8   Estimated Creatinine Clearance: 16.4 mL/min (A) (by C-G formula based on SCr of 5.73 mg/dL (H)).  No results found for: AMMONIA  Results from last 7 days   Lab Units 02/03/21  0159 02/02/21  0358 02/01/21  0513   CK TOTAL U/L 14* 11* 13*             Glucose   Date/Time Value Ref Range Status   02/03/2021 1249 129 70 - 130 mg/dL Final   02/02/2021 1856 116 70 - 130 mg/dL Final   02/02/2021 1741 146 (H) 70 - 130 mg/dL Final   02/02/2021 1022 149 (H) 70 - 130 mg/dL Final   02/02/2021 0633 97 70 - 130 mg/dL Final   02/01/2021 1916 144 (H) 70 - 130 mg/dL Final   02/01/2021 1625 125 70 - 130 mg/dL Final   02/01/2021 1013 156 (H) 70 - 130 mg/dL Final     Lab Results   Component Value Date    TSH 2.380 11/14/2020    FREET4 0.85 (L) 11/14/2020     No results found for: PREGTESTUR, PREGSERUM, HCG, HCGQUANT  Pain Management Panel     Pain Management Panel Latest Ref Rng & Units 1/25/2021 12/31/2020    AMPHETAMINES SCREEN, URINE Negative Negative Negative    BARBITURATES SCREEN Negative Negative Negative    BENZODIAZEPINE SCREEN, URINE Negative Negative Negative    BUPRENORPHINEUR Negative Negative Negative    COCAINE SCREEN, URINE Negative Negative Negative    METHADONE SCREEN, URINE Negative Negative Negative        Brief Urine Lab Results  (Last result in the past 365 days)      Color   Clarity   Blood   Leuk Est   Nitrite   Protein   CREAT   Urine HCG        01/25/21 1505 Yellow Turbid Large (3+) Large (3+) Positive 100 mg/dL (2+)             Blood Culture   Date Value Ref Range Status   01/30/2021 No growth at 4 days  Preliminary   01/29/2021 No growth at 4 days   Preliminary     No results found for: URINECX  No results found for: WOUNDCX  No results found for: STOOLCX  No results found for: RESPCX  No results found for: AFBCX  Results from last 7 days   Lab Units 02/03/21  0159 02/02/21  0358 02/01/21  0513 01/31/21  0551 01/30/21  0420 01/29/21  0417 01/28/21  0330   CRP mg/dL 5.44* 5.54* 6.95* 10.42* 4.65* 9.90* 11.71*     I have personally looked at the labs and they are summarized above.  ----------------------------------------------------------------------------------------------------------------------  Detailed radiology reports for the last 24 hours:    Imaging Results (Last 24 Hours)     ** No results found for the last 24 hours. 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  - Admission labs showed NML WBC count, CRP 13, lactate 0.7, covid +, UA w/ culture showing infection and Bcx's growing Serratia sp., Repeat Bcx's NGTD  - CTPE showed no PE, possible covid PNA  - Gen Surgery consulted and removed R TDC and replaced RIJ on admission, on 1/29 L TDC placed  - ID consulted; Following  - Continue high-dose Ceftriaxone  - On Eliquis now for D-dimer > 20 and underlying covid diagnosis  - Continue APAP PRN for fevers  - Monitoring on tele, no current 02 needs  - SW assisting w/ possible C[facility] but awaiting when he can come out of isolation on 2/14     #ESRD on HD c/b Medical Non-adherence and Hyperkalemia, also anemia requiring transfusions  - Reportedly hadn't had dialysis > 1 month on admission, Cr 15, K 6.1  - CT showed stable severe chronic hydronephrosis and retroperitoneal lymphadenopathy  - Consult Nephrology; Following for HD, HyperK resolved, have placed patient on Epo  - Trend  Hgb, transfuse as indicated  - SW working on HD chair    #HTN/HLD  - Echo 1/27 w/ LVEF 61-65%, NML LV function, diastolic dysfunction, no effusion, no vegetations noted.  - Continue home ASA 81, statin  - Continue home Coreg, Imdur     #Emphysema w/o COPD exacerbation  - Noted on CT, duonebs PRN    #Hx HBV/HCV  - Supportive Care    #Medical Non-adherence  - Patient's frequent leaving AMA and non-adherence w/ home medical therapy will likely drastically affect both long and short term morbidity and mortality.     F: Oral  E: Monitor & Replace PRN  N: Renal  Ppx: On Eliquis  Code: Full Code     Dispo: Pending clinical improvement, CCH when can come out of isolation     *This patient is considered high risk due to sepsis, acute UTI, covid infection, hyperkalemia, medical non-adherence.    VTE Prophylaxis:   Mechanical Order History:     None      Pharmalogical Order History:      Ordered     Dose Route Frequency Stop    01/31/21 0830  apixaban (ELIQUIS) tablet 5 mg      5 mg PO Every 12 Hours Scheduled 03/02/21 0859    01/29/21 0928  heparin (porcine) 5000 UNIT/ML injection  Status:  Discontinued      -- -- Continuous PRN 01/29/21 1000    01/25/21 2054  heparin (porcine) injection 1,000 Units      1,000 Units IK Once 01/25/21 2115 01/25/21 1920  heparin (porcine) 5000 UNIT/ML injection 4,100 Units      60 Units/kg IV Once 01/25/21 2159 01/25/21 1920  heparin 02449 units/250 mL (100 units/mL) in 0.45 % NaCl infusion  8.16 mL/hr,   Status:  Discontinued      12 Units/kg/hr IV Titrated 01/31/21 0830    01/25/21 1920  heparin (porcine) 5000 UNIT/ML injection 4,100 Units  Status:  Discontinued      60 Units/kg IV As Needed 01/31/21 0830    01/25/21 1920  heparin (porcine) 5000 UNIT/ML injection 2,000 Units  Status:  Discontinued      30 Units/kg IV As Needed 01/31/21 0830              Obi Arvizu MD  [facility]  02/03/21  14:19 EST

## 2021-02-03 NOTE — PROGRESS NOTES
Nephrology Progress Note      Subjective   Status que, no change with persistent mild neck pain    Objective       Vital signs :     Temp:  [98.1 °F (36.7 °C)-99.3 °F (37.4 °C)] 99.3 °F (37.4 °C)  Heart Rate:  [] 95  Resp:  [18-20] 20  BP: ()/(68-80) 131/80      Intake/Output Summary (Last 24 hours) at 2/3/2021 0720  Last data filed at 2/3/2021 0246  Gross per 24 hour   Intake 1418.33 ml   Output 150 ml   Net 1268.33 ml       Physical Exam:  General: not in acute distress  Heart: Tele reveals sinus rhythm.   Lungs: Respirations appear to be regular, even and unlabored with no signs of respiratory distress. No audible wheezing.    Abdomen: no distension  Extremities:trace  Skin: No visible bleeding, bruising, or rash.  Neurologic: no apparent focal deficit.         Laboratory Data :     Albumin Albumin   Date Value Ref Range Status   02/03/2021 2.60 (L) 3.50 - 5.20 g/dL Final   02/02/2021 2.63 (L) 3.50 - 5.20 g/dL Final   02/01/2021 2.61 (L) 3.50 - 5.20 g/dL Final      Magnesium Magnesium   Date Value Ref Range Status   02/03/2021 2.1 1.6 - 2.6 mg/dL Final   02/02/2021 2.1 1.6 - 2.6 mg/dL Final   02/01/2021 2.5 1.6 - 2.6 mg/dL Final          PTH                 No results found for: PTH    CBC and coagulation:  Results from last 7 days   Lab Units 02/03/21  0159 02/02/21  0358 02/01/21  0513 01/31/21  0551 01/30/21  0420 01/29/21  0417 01/28/21  0330   CRP mg/dL 5.44* 5.54* 6.95* 10.42* 4.65* 9.90* 11.71*   WBC 10*3/mm3 6.16 6.10 6.81 9.10 7.79 8.20 6.98   HEMOGLOBIN g/dL 7.4* 7.1* 6.9* 7.9* 7.5* 7.6* 7.9*   HEMATOCRIT % 24.4* 23.8* 22.7* 26.1* 24.6* 24.3* 24.9*   MCV fL 92.8 91.9 90.1 90.6 91.1 87.7 86.8   MCHC g/dL 30.3* 29.8* 30.4* 30.3* 30.5* 31.3* 31.7   PLATELETS 10*3/mm3 109* 100* 88* 81* 82* 83* 75*   D DIMER QUANT MCGFEU/mL 2.77* 2.53* 2.75* 3.40* 2.96* 3.22* 4.52*     Acid/base balance:      Renal and electrolytes:  Results from last 7 days   Lab Units 02/03/21  0159 02/02/21  0358  02/01/21  0513 01/31/21  0551 01/30/21  0420   SODIUM mmol/L 127* 125* 125* 129* 133*   POTASSIUM mmol/L 5.4* 4.8 5.1 4.5 3.5   MAGNESIUM mg/dL 2.1 2.1 2.5 2.6 1.7   CHLORIDE mmol/L 89* 89* 86* 89* 94*   CO2 mmol/L 25.1 28.0 26.0 24.2 26.1   BUN mg/dL 117* 75* 129* 95* 63*   CREATININE mg/dL 5.73* 3.91* 6.61* 5.59* 4.40*   EGFR IF NONAFRICN AM mL/min/1.73 11* 17* 9* 11* 15*   CALCIUM mg/dL 8.5* 8.5* 8.6 8.1* 7.9*   PHOSPHORUS mg/dL 4.0 2.7 2.0* 2.4* 2.5     Estimated Creatinine Clearance: 16.4 mL/min (A) (by C-G formula based on SCr of 5.73 mg/dL (H)).    Liver and pancreatic function:  Results from last 7 days   Lab Units 02/03/21  0159 02/02/21  0358 02/01/21  0513   ALBUMIN g/dL 2.60* 2.63* 2.61*   BILIRUBIN mg/dL 0.2 0.3 0.3   ALK PHOS U/L 231* 246* 195*   AST (SGOT) U/L 25 30 21   ALT (SGPT) U/L 12 12 8         Cardiac:      Liver and pancreatic function:  Results from last 7 days   Lab Units 02/03/21  0159 02/02/21  0358 02/01/21  0513   ALBUMIN g/dL 2.60* 2.63* 2.61*   BILIRUBIN mg/dL 0.2 0.3 0.3   ALK PHOS U/L 231* 246* 195*   AST (SGOT) U/L 25 30 21   ALT (SGPT) U/L 12 12 8       Medications :     alteplase (CATHFLO) INTRACATHETER injection, 2 mg, Intracatheter, Once  alteplase (CATHFLO) INTRACATHETER injection, 2 mg, Intracatheter, Once  apixaban, 5 mg, Oral, Q12H  aspirin, 81 mg, Oral, Daily  atorvastatin, 40 mg, Oral, Nightly  calcium acetate, 667 mg, Oral, TID With Meals  carvedilol, 12.5 mg, Oral, BID With Meals  cefTRIAXone, 2 g, Intravenous, Q24H  epoetin mc/mc-epbx, 10,000 Units, Subcutaneous, Once per day on Mon Wed Fri  isosorbide mononitrate, 30 mg, Oral, QAM  lactobacillus acidophilus, 1 capsule, Oral, Daily  lidocaine, 1 patch, Transdermal, Q24H  nicotine, 1 patch, Transdermal, Q24H             Assessment/Plan     1. ESKD, non compliant to dialysis  2. Sever hyperkalemia  3. Sever metabolic acidosis  4. Hyponatremia  5. Sever sepsis with COVID-19  6. Bacteremia with concern of catheter  related infection  7. Sever anemia     Dialysis today, MWF  mild hyponatremia likely hypervolemic, fluid restriction  Aneima, Hb below goal, continue EPO to 10,000IU 3/wk, iron stores are adequate    Cultures are negative on 1/26        Taniya Corado MD  02/03/21  07:20 EST

## 2021-02-03 NOTE — PLAN OF CARE
Pt continues to complain of pain in his neck and shoulder. PRN pain medication administered as requested. No other s/s of distress noted att.

## 2021-02-03 NOTE — PLAN OF CARE
Goal Outcome Evaluation:  Patient had dialysis this shift. Patient has been resting. Patient complains of neck and shoulder pain this shift, prn pain medication given. No s/s of acute distress noted. Will continue to monitor and provide care.

## 2021-02-04 NOTE — PROGRESS NOTES
Discharge Planning Assessment   Reza     Patient Name: Mario Nair  MRN: [id number]  Today's Date: 2/4/2021    Admit Date: 1/25/2021    Discharge Needs Assessment    No documentation.       Discharge Plan     Row Name 02/04/21 1118       Plan    Plan  Pt was discussed in [facility] rounds on this date. Per Physician [facility] consult has been discontinued, due to IV antibiotics ending on 02/09/21. SS spoke with Reza FARMER and faxed a referral to fax [phone] for them to review. SS spoke with [facility] per Hailey who states she does not think their nephrologist will accept pt. SS will follow.        Felisa Alejo

## 2021-02-04 NOTE — PLAN OF CARE
Goal Outcome Evaluation:  Plan of Care Reviewed With: patient     Outcome Summary: Pt has been resting between care this shift. So far, pt has not complained of pain. Pt refused evening vitals. Pt continually calls out for snacks. Educated pt on fluid restriction. No s/s of acute distress noted. Will cont to monitor and follow plan of care. Pt states he is leaving tomorrow morning.

## 2021-02-04 NOTE — PAYOR COMM NOTE
"[facility]  NPI: [id number]    Utilization Review   Contact:Vanessa Savi MSN, APRN, NP-C  Phone: [phone]  Fax: [phone]    [facility]/ Attn: tamera  Continue Stay Update  REF# [id number]      Mario Nair (45 y.o. Male)     Date of Birth Social Security Number Address Home Phone MRN    1975  [address] [address] [phone] [id number]    Church Marital Status          None        Admission Date Admission Type Admitting Provider Attending Provider Department, Room/Bed    1/25/21 Emergency Opal Ocampo MD Parks, Andrew, MD [address], 3321/1P    Discharge Date Discharge Disposition Discharge Destination                       Attending Provider: Obi Arvizu MD    Allergies: Penicillins    Isolation: Enh Drop/Con   Infection: Hepatitis B (06/05/19), COVID (confirmed) (01/25/21)   Code Status: CPR    Ht: 188 cm (74\")   Wt: 70 kg (154 lb 4.8 oz)    Admission Cmt: None   Principal Problem: Sepsis due to urinary tract infection (CMS/HCC) [A41.9,N39.0]                 Active Insurance as of 1/25/2021     Primary Coverage     Payor Plan Insurance Group Employer/Plan Group    [facility] MEDICAID      Payor Plan Address Payor Plan Phone Number Payor Plan Fax Number Effective Dates    [address] [address] [phone]  5/5/2019 - None Entered    [facility] [id number]       Subscriber Name Subscriber Birth Date Member ID       MARIO NAIR 1975 [id number]                 Emergency Contacts      (Rel.) Home Phone Work Phone Mobile Phone    SHANI SILVA (Relative) [phone] -- --    KoreyConstance (Daughter) -- -- [phone]          Obi Arvizu MD   Physician   Medicine   Progress Notes   Signed   Date of Service:  02/04/21 0902   Creation Time:  02/04/21 0902            Signed        Expand All Collapse All     Show:Clear all  [x]Manual[x]Template[x]Copied    Added by:  [x]Obi Arvizu MD    []Mercedes " for details       [facility] HOSPITALIST PROGRESS NOTE     Patient Identification:  Name:  Mario Nair  Age:  45 y.o.  Sex:  male  :  1975  MRN:  [id number]  Visit Number:  [id number]  ROOM: [address] [place] [address]      Primary Care Provider:  Danny Rebolledo MD     Length of stay in inpatient status:  10     Subjective      Chief Compliant:        Chief Complaint   Patient presents with   • Neck Pain   • Weakness - Generalized      History of Presenting Illness:    Patient stable today, overnight Hgb low and received 1U PRBC's, no evidence of bleeding, no new complaints, continued on Abx, will complete , can't come out of isolation for [facility] until , have ordered PT/OT evals to assess placement needs, still working on outpatient HD chair as well, he denies any fevers or chills. He mentioned wanting to go home and we discussed he is receiving blood transfusions, IV Abx, and he doesn't have a dialysis chair set up yet but he is considering leaving [place].  He will let us know what he decides after his transfusion.   Objective      Current Hospital Meds:alteplase (CATHFLO) INTRACATHETER injection, 2 mg, Intracatheter, Once  alteplase (CATHFLO) INTRACATHETER injection, 2 mg, Intracatheter, Once  apixaban, 5 mg, Oral, Q12H  aspirin, 81 mg, Oral, Daily  atorvastatin, 40 mg, Oral, Nightly  calcium acetate, 667 mg, Oral, TID With Meals  carvedilol, 12.5 mg, Oral, BID With Meals  cefTRIAXone, 2 g, Intravenous, Q24H  epoetin mc/mc-epbx, 10,000 Units, Subcutaneous, Once per day on   isosorbide mononitrate, 30 mg, Oral, QAM  lactobacillus acidophilus, 1 capsule, Oral, Daily  lidocaine, 1 patch, Transdermal, Q24H  nicotine, 1 patch, Transdermal, Q24H        Current Antimicrobial Therapy:              Anti-Infectives (From admission, onward)     Ordered     Dose/Rate Route Frequency Start Stop     21 1114   cefTRIAXone (ROCEPHIN) 2 g/100 mL 0.9% NS IVPB (MBP)     Gaby Reyes, PharmD  reviewed the order on 02/03/21 1156.   Ordering Provider: Obi Arvizu MD    2 g  over 30 Minutes Intravenous Every 24 Hours 02/02/21 1200 02/09/21 2359     01/27/21 1013   cefTRIAXone (ROCEPHIN) 2 g/100 mL 0.9% NS IVPB (MBP)     Ordering Provider: Khoa Carl MD    2 g  over 30 Minutes Intravenous Every 24 Hours 01/27/21 1100 02/02/21 1142     01/26/21 1046   gentamicin (GARAMYCIN) 190 mg in sodium chloride 0.9 % 100 mL IVPB     Note to Pharmacy: Separate Administration Time With Ampicillin and Other Penicillins (Ampicillin / Penicillins deactivate gentamicin when infused together).   Ordering Provider: Tiffanie Lynn MD    3 mg/kg × 63 kg  over 30 Minutes Intravenous Once 01/26/21 1200 01/26/21 1226     01/25/21 1926   cefepime (MAXIPIME) 2 g/100 mL 0.9% NS (mbp)     Ordering Provider: Sena Schwartz APRN    2 g  200 mL/hr over 30 Minutes Intravenous Once 01/25/21 2100 01/26/21 0031     01/25/21 1927   vancomycin 1250 mg/250 mL 0.9% NS IVPB ([facility])     Ordering Provider: Opal Ocampo MD    20 mg/kg × 68 kg  over 90 Minutes Intravenous Once 01/25/21 2100 01/26/21 0212          Current Diuretic Therapy:      Diuretics (From admission, onward)     None          ----------------------------------------------------------------------------------------------------------------------  Vital Signs:  Temp:  [97.3 °F (36.3 °C)-99.9 °F (37.7 °C)] 97.3 °F (36.3 °C)  Heart Rate:  [] 98  Resp:  [16-18] 18  BP: (109-126)/(60-84) 109/69  SpO2:  [97 %-99 %] 98 %  on   ;   Device (Oxygen Therapy): room air  Body mass index is 19.81 kg/m².         Wt Readings from Last 3 Encounters:   02/04/21 70 kg (154 lb 4.8 oz)   12/31/20 67.5 kg (148 lb 12.8 oz)   12/08/20 78.5 kg (173 lb)               Intake & Output (last 3 days)        02/01 0701 - 02/02 0700 02/02 0701 - 02/03 0700 02/03 0701 - 02/04 0700 02/04 0701 - 02/05 0700     P.O. 1800 1320 1440       I.V. (mL/kg) 110 (1.6) 98.3 (1.4) 170 (2.4)        Total Intake(mL/kg) 1910 (28.6) 1418.3 (20.2) 1610 (23)       Urine (mL/kg/hr) 0 (0) 150 (0.1)         Other 2000           Total Output 2000 150         Net -90 +1268.3 +1610                     Urine Unmeasured Occurrence 2 x 2 x 4 x            Diet Regular; Low Potassium, Renal, Daily Fluid Restriction; 1000 mL Fluid Per Day  ----------------------------------------------------------------------------------------------------------------------  Physical exam:  This physical exam has been personally performed remotely in the unit aided by real-time audio/visual communication tools. RN present at bedside during this exam and assisted during exam. The use of a video visit has been reviewed with the patient and verbal informed consent has been obtained.      Physical Exam:  General: Patient appears awake, alert, and in no acute distress.  Head: Normocephalic, atraumatic  Eyes: EOMI. Conjunctivae and sclerae normal.  Ears: Ears appear intact with no abnormalities noted.   Neck: Trachea midline. No obvious JVD.  Heart: Tele reveals regular rate and rhythm  Lungs: Respirations appear to be regular, even and unlabored with no signs of respiratory distress. No audible wheezing. On room air  Abdomen: No obvious abdominal distension.  MS: Muscle tone appears normal. No gross deformities.  Extremities: No clubbing, cyanosis or edema noted.  Skin: No visible bleeding, bruising, or rash.  Neurologic: Alert and oriented x3. No gross focal deficits.      *Exam unchanged today 2/4  ----------------------------------------------------------------------------------------------------------------------           Results from last 7 days   Lab Units 02/04/21  0417 02/04/21  0248 02/04/21  0117 02/03/21  0159 02/02/21  0358   CRP mg/dL  --   --  4.69* 5.44* 5.54*   WBC 10*3/mm3  --  5.77  --  6.16 6.10   HEMOGLOBIN g/dL 6.9* 6.7*  --  7.4* 7.1*   HEMATOCRIT %  --  21.6*  --  24.4* 23.8*   MCV fL  --  92.7  --  92.8 91.9   MCHC g/dL  --   31.0*  --  30.3* 29.8*   PLATELETS 10*3/mm3  --  103*  --  109* 100*                 Results from last 7 days   Lab Units 02/04/21  0117 02/03/21  0159 02/02/21  0358   SODIUM mmol/L 130* 127* 125*   POTASSIUM mmol/L 4.9 5.4* 4.8   MAGNESIUM mg/dL 1.9 2.1 2.1   CHLORIDE mmol/L 92* 89* 89*   CO2 mmol/L 28.0 25.1 28.0   BUN mg/dL 73* 117* 75*   CREATININE mg/dL 3.63* 5.73* 3.91*   EGFR IF NONAFRICN AM mL/min/1.73 18* 11* 17*   CALCIUM mg/dL 8.4* 8.5* 8.5*   PHOSPHORUS mg/dL 3.9 4.0 2.7   GLUCOSE mg/dL 134* 146* 132*   ALBUMIN g/dL 2.61* 2.60* 2.63*   BILIRUBIN mg/dL 0.2 0.2 0.3   ALK PHOS U/L 229* 231* 246*   AST (SGOT) U/L 33 25 30   ALT (SGPT) U/L 18 12 12   Estimated Creatinine Clearance: 25.7 mL/min (A) (by C-G formula based on SCr of 3.63 mg/dL (H)).  No results found for: AMMONIA         Results from last 7 days   Lab Units 02/04/21  0117 02/03/21  0159 02/02/21  0358   CK TOTAL U/L 13* 14* 11*                    Glucose   Date/Time Value Ref Range Status   02/04/2021 0756 108 70 - 130 mg/dL Final   02/03/2021 1249 129 70 - 130 mg/dL Final   02/02/2021 1856 116 70 - 130 mg/dL Final   02/02/2021 1741 146 (H) 70 - 130 mg/dL Final   02/02/2021 1022 149 (H) 70 - 130 mg/dL Final   02/02/2021 0633 97 70 - 130 mg/dL Final   02/01/2021 1916 144 (H) 70 - 130 mg/dL Final   02/01/2021 1625 125 70 - 130 mg/dL Final            Lab Results   Component Value Date     TSH 2.380 11/14/2020     FREET4 0.73 (L) 02/03/2021      No results found for: PREGTESTUR, PREGSERUM, HCG, HCGQUANT          Pain Management Panel         Pain Management Panel Latest Ref Rng & Units 1/25/2021 12/31/2020     AMPHETAMINES SCREEN, URINE Negative Negative Negative     BARBITURATES SCREEN Negative Negative Negative     BENZODIAZEPINE SCREEN, URINE Negative Negative Negative     BUPRENORPHINEUR Negative Negative Negative     COCAINE SCREEN, URINE Negative Negative Negative     METHADONE SCREEN, URINE Negative Negative Negative                                   Brief Urine Lab Results  (Last result in the past 365 days)       Color   Clarity   Blood   Leuk Est   Nitrite   Protein   CREAT   Urine HCG         01/25/21 1505 Yellow Turbid Large (3+) Large (3+) Positive 100 mg/dL (2+)                          Blood Culture   Date Value Ref Range Status   01/30/2021 No growth at 5 days   Final   01/29/2021 No growth at 5 days   Final      No results found for: URINECX  No results found for: WOUNDCX  No results found for: STOOLCX  No results found for: RESPCX  No results found for: AFBCX             Results from last 7 days   Lab Units 02/04/21  0117 02/03/21  0159 02/02/21  0358 02/01/21  0513 01/31/21  0551 01/30/21  0420 01/29/21  0417   CRP mg/dL 4.69* 5.44* 5.54* 6.95* 10.42* 4.65* 9.90*      I have personally looked at the labs and they are summarized above.  ----------------------------------------------------------------------------------------------------------------------  Detailed radiology reports for the last 24 hours:         Imaging Results (Last 24 Hours)      ** No results found for the last 24 hours. **          Assessment & Plan    #Sepsis 2/2 Acute Pyelonephritis/Serratia Bacteremia  #Asymptomatic Covid 19 Infection  #Hx IVDU w/ Hx Endocarditis in past  - Patient presents w/ neck pain, encephalitis/meningitis ruled out, found to have bacteremia 2/2 UTI and suspect complicated by frequently leaving AMA, previously covid + on 1/25.  - Admission labs showed NML WBC count, CRP 13, lactate 0.7, covid +, UA w/ culture showing infection and Bcx's growing Serratia sp., Repeat Bcx's NGTD  - CTPE showed no PE, possible covid PNA  - Gen Surgery consulted and removed R TDC and replaced RIJ on admission, on 1/29 L TDC placed  - ID consulted; Following  - Continue high-dose Ceftriaxone through 2/9  - On Eliquis now for D-dimer > 20 and underlying covid diagnosis  - Continue APAP PRN for fevers  - Monitoring on tele, no current 02 needs  - SW assisting, will finish  Abx on 2/9 before he could go to [facility], have ordered PT/OT to evaluate need for placement, also he may elect to leave AMA today.     #ESRD on HD c/b Medical Non-adherence and Hyperkalemia, also anemia requiring transfusions  - Reportedly hadn't had dialysis > 1 month on admission, Cr 15, K 6.1  - CT showed stable severe chronic hydronephrosis and retroperitoneal lymphadenopathy  - Consult Nephrology; Following for HD, HyperK resolved, have placed patient on Epo  - Trend Hgb, transfuse as indicated  - SW working on HD chair     #HTN/HLD  - Echo 1/27 w/ LVEF 61-65%, NML LV function, diastolic dysfunction, no effusion, no vegetations noted.  - Continue home ASA 81, statin  - Continue home Coreg, Imdur      #Emphysema w/o COPD exacerbation  - Noted on CT, duonebs PRN     #Hx HBV/HCV  - Supportive Care     #Medical Non-adherence  - Patient's frequent leaving AMA and non-adherence w/ home medical therapy will likely drastically affect both long and short term morbidity and mortality.      F: Oral  E: Monitor & Replace PRN  N: Renal  Ppx: On Eliquis  Code: Full Code     Dispo: Pending clinical improvement and completion of Abx, PT/OT evals pending, unlikely to go to [facility] at this point, PT/OT evals pending for placement recs, still no outpatient plan for HD chair yet either, possibly leaving AMA later today.     *This patient is considered high risk due to sepsis, acute UTI, covid infection, hyperkalemia, medical non-adherence.     VTE Prophylaxis:       Mechanical Order History:      None               Pharmalogical Order History:                   Ordered     Dose Route Frequency Stop      01/31/21 0830   apixaban (ELIQUIS) tablet 5 mg      5 mg PO Every 12 Hours Scheduled 03/02/21 0859      01/29/21 0928   heparin (porcine) 5000 UNIT/ML injection  Status:  Discontinued      -- -- Continuous PRN 01/29/21 1000      01/25/21 2054   heparin (porcine) injection 1,000 Units      1,000 Units IK Once 01/25/21 2115 01/25/21  1920   heparin (porcine) 5000 UNIT/ML injection 4,100 Units      60 Units/kg IV Once 01/25/21 2159      01/25/21 1920   heparin 80307 units/250 mL (100 units/mL) in 0.45 % NaCl infusion  8.16 mL/hr,   Status:  Discontinued      12 Units/kg/hr IV Titrated 01/31/21 0830      01/25/21 1920   heparin (porcine) 5000 UNIT/ML injection 4,100 Units  Status:  Discontinued      60 Units/kg IV As Needed 01/31/21 0830      01/25/21 1920   heparin (porcine) 5000 UNIT/ML injection 2,000 Units  Status:  Discontinued      30 Units/kg IV As Needed 01/31/21 0830                  Obi Arvizu MD  [facility] Hospitalist  02/04/21  09:02 Felisa Tyson      Case Management   Progress Notes   Signed   Date of Service:  02/04/21 1127   Creation Time:  02/04/21 1127            Signed            Show:Clear all  [x]Manual[x]Template[]Copied    Added by:  [x]Felisa Alejo    []Mercedes for details  Discharge Planning Assessment   Reza     Patient Name: Mario Nair                    MRN: [id number]  Today's Date: 2/4/2021                       Admit Date: 1/25/2021     Discharge Needs Assessment    No documentation.              Discharge Plan      Row Name 02/04/21 1118           Plan     Plan  Pt was discussed in [facility] rounds on this date. Per Physician [facility] consult has been discontinued, due to IV antibiotics ending on 02/09/21. SS spoke with Reza FARMER and faxed a referral to fax [phone] for them to review. SS spoke with [facility] per Hailey who states she does not think their nephrologist will accept pt. SS will follow.          Felisa Alejo

## 2021-02-04 NOTE — PROGRESS NOTES
Nephrology Progress Note      Subjective   No chest pain or shortness of breath    Objective       Vital signs :     Temp:  [97.9 °F (36.6 °C)-99.9 °F (37.7 °C)] 99.7 °F (37.6 °C)  Heart Rate:  [] 97  Resp:  [16-20] 18  BP: (111-126)/(60-84) 118/77      Intake/Output Summary (Last 24 hours) at 2/4/2021 0725  Last data filed at 2/4/2021 0600  Gross per 24 hour   Intake 1609.98 ml   Output --   Net 1609.98 ml       Physical Exam:  General: not in acute distress  Heart: Tele reveals sinus rhythm.   Lungs: Respirations appear to be regular, even and unlabored with no signs of respiratory distress. No audible wheezing.    Abdomen: no distension  Extremities:trace  Skin: No visible bleeding, bruising, or rash.  Neurologic: no apparent focal deficit.         Laboratory Data :     Albumin Albumin   Date Value Ref Range Status   02/04/2021 2.61 (L) 3.50 - 5.20 g/dL Final   02/03/2021 2.60 (L) 3.50 - 5.20 g/dL Final   02/02/2021 2.63 (L) 3.50 - 5.20 g/dL Final      Magnesium Magnesium   Date Value Ref Range Status   02/04/2021 1.9 1.6 - 2.6 mg/dL Final   02/03/2021 2.1 1.6 - 2.6 mg/dL Final   02/02/2021 2.1 1.6 - 2.6 mg/dL Final          PTH                 No results found for: PTH    CBC and coagulation:  Results from last 7 days   Lab Units 02/04/21  0417 02/04/21  0248 02/04/21  0117 02/03/21  0159 02/02/21  0358 02/01/21  0513 01/31/21  0551 01/30/21  0420 01/29/21  0417   CRP mg/dL  --   --  4.69* 5.44* 5.54* 6.95* 10.42* 4.65* 9.90*   WBC 10*3/mm3  --  5.77  --  6.16 6.10 6.81 9.10 7.79 8.20   HEMOGLOBIN g/dL 6.9* 6.7*  --  7.4* 7.1* 6.9* 7.9* 7.5* 7.6*   HEMATOCRIT %  --  21.6*  --  24.4* 23.8* 22.7* 26.1* 24.6* 24.3*   MCV fL  --  92.7  --  92.8 91.9 90.1 90.6 91.1 87.7   MCHC g/dL  --  31.0*  --  30.3* 29.8* 30.4* 30.3* 30.5* 31.3*   PLATELETS 10*3/mm3  --  103*  --  109* 100* 88* 81* 82* 83*   D DIMER QUANT MCGFEU/mL  --   --  2.49* 2.77* 2.53* 2.75* 3.40* 2.96* 3.22*     Acid/base balance:      Renal and  electrolytes:  Results from last 7 days   Lab Units 02/04/21  0117 02/03/21  0159 02/02/21  0358 02/01/21  0513 01/31/21  0551   SODIUM mmol/L 130* 127* 125* 125* 129*   POTASSIUM mmol/L 4.9 5.4* 4.8 5.1 4.5   MAGNESIUM mg/dL 1.9 2.1 2.1 2.5 2.6   CHLORIDE mmol/L 92* 89* 89* 86* 89*   CO2 mmol/L 28.0 25.1 28.0 26.0 24.2   BUN mg/dL 73* 117* 75* 129* 95*   CREATININE mg/dL 3.63* 5.73* 3.91* 6.61* 5.59*   EGFR IF NONAFRICN AM mL/min/1.73 18* 11* 17* 9* 11*   CALCIUM mg/dL 8.4* 8.5* 8.5* 8.6 8.1*   PHOSPHORUS mg/dL 3.9 4.0 2.7 2.0* 2.4*     Estimated Creatinine Clearance: 25.7 mL/min (A) (by C-G formula based on SCr of 3.63 mg/dL (H)).    Liver and pancreatic function:  Results from last 7 days   Lab Units 02/04/21 0117 02/03/21  0159 02/02/21  0358   ALBUMIN g/dL 2.61* 2.60* 2.63*   BILIRUBIN mg/dL 0.2 0.2 0.3   ALK PHOS U/L 229* 231* 246*   AST (SGOT) U/L 33 25 30   ALT (SGPT) U/L 18 12 12         Cardiac:      Liver and pancreatic function:  Results from last 7 days   Lab Units 02/04/21  0117 02/03/21  0159 02/02/21  0358   ALBUMIN g/dL 2.61* 2.60* 2.63*   BILIRUBIN mg/dL 0.2 0.2 0.3   ALK PHOS U/L 229* 231* 246*   AST (SGOT) U/L 33 25 30   ALT (SGPT) U/L 18 12 12       Medications :     alteplase (CATHFLO) INTRACATHETER injection, 2 mg, Intracatheter, Once  alteplase (CATHFLO) INTRACATHETER injection, 2 mg, Intracatheter, Once  apixaban, 5 mg, Oral, Q12H  aspirin, 81 mg, Oral, Daily  atorvastatin, 40 mg, Oral, Nightly  calcium acetate, 667 mg, Oral, TID With Meals  carvedilol, 12.5 mg, Oral, BID With Meals  cefTRIAXone, 2 g, Intravenous, Q24H  epoetin mc/mc-epbx, 10,000 Units, Subcutaneous, Once per day on Mon Wed Fri  isosorbide mononitrate, 30 mg, Oral, QAM  lactobacillus acidophilus, 1 capsule, Oral, Daily  lidocaine, 1 patch, Transdermal, Q24H  nicotine, 1 patch, Transdermal, Q24H             Assessment/Plan     1. ESKD, non compliant to dialysis  2. Sever hyperkalemia  3. Sever metabolic acidosis  4.  Hyponatremia  5. Sever sepsis with COVID-19  6. Bacteremia with concern of catheter related infection  7. Sever anemia     Pt had uneventful dialysis  Yesterday, continue on MWF  mild hyponatremia likely hypervolemic, fluid restriction  Aneima, Hb below goal, continue EPO to 10,000IU 3/wk, iron stores are adequate    Cultures are negative on 1/26        Taniya Corado MD  02/04/21  07:25 EST

## 2021-02-04 NOTE — DISCHARGE PLACEMENT REQUEST
"NairMario (45 y.o. Male)     Date of Birth Social Security Number Address Home Phone MRN    1975  [address] [place] [id number] [phone] [id number]    Sabianism Marital Status          None        Admission Date Admission Type Admitting Provider Attending Provider Department, Room/Bed    1/25/21 Emergency Opal Ocampo MD Parks, Andrew, MD [address], 3321/1P    Discharge Date Discharge Disposition Discharge Destination                       Attending Provider: Obi Arvizu MD    Allergies: Penicillins    Isolation: Enh Drop/Con   Infection: Hepatitis B (06/05/19), COVID (confirmed) (01/25/21)   Code Status: CPR    Ht: 188 cm (74\")   Wt: 70 kg (154 lb 4.8 oz)    Admission Cmt: None   Principal Problem: Sepsis due to urinary tract infection (CMS/HCC) [A41.9,N39.0]                 Active Insurance as of 1/25/2021     Primary Coverage     Payor Plan Insurance Group Employer/Plan Group    [facility] MEDICAID      Payor Plan Address Payor Plan Phone Number Payor Plan Fax Number Effective Dates    [address] [address] [phone]  5/5/2019 - None Entered    [facility] [id number]       Subscriber Name Subscriber Birth Date Member ID       ANKUSH NAIRVIJAYA SOTO 1975 [id number]                 Emergency Contacts      (Rel.) Home Phone Work Phone Mobile Phone    SHANI SILVA (Relative) [phone] -- --    Constance Nair (Daughter) -- -- [phone]            Emergency Contact Information     Name Relation Home Work Mobile    SHANI SILVA Relative [phone]      Constance Nair Daughter   [phone]          Insurance Information                [facility] MEDICAID Phone: [phone]    Subscriber: Mario Nair Subscriber#: [id number]    Group#:  Precert#:              History & Physical      Sena Schwartz APRN at 01/25/21 1614     Attestation signed by Opal Ocampo MD at 01/25/21 8039 (Updated)    I " have seen the patient and discussed the assessment and plan with CONY Shetty.  The patient is having severe muscle spasms; he is able to move his neck but states that it is very painful.  I am unable to tell of his neck feels stiff at this time as he is reluctant to move his neck but can move it on his own.  I have ordered a CT a of his neck with and without contrast; the patient does use IV drugs and he is at risk for having an abscess.  However, the patient also has a tunneled hemodialysis catheter on his right neck and with such severe pain I want to rule out dissection or other vascular anomalies that would be causing the pain.  We have requested records from [facility] as this hospital has placed the last tunneled catheter.  Patient will be getting antibiotics and Decadron, which theoretically should cover for bacterial meningitis.  However, I feel that the possibility of meningitis is low and thus since he needs anticoagulation for the elevated D-dimer and COVID-19 I think that trying to perform a lumbar puncture at this time is premature and would delay his need for hemodialysis.  There is a possibility that the COVID-19 in addition to the lack of hemodialysis could be causing muscle spasms in his neck.  We will reassess his neck pain after he receives the hemodialysis.  For now, I will give him as needed pain medication for the neck pain as he does appear to be in severe pain.  I did discuss with nephrology about hemodialysis tonight so that we can obtain the CT scan with PE protocol; remdesivir will be held at this time until the patient is more stable from a nephrology standpoint.  We will repeat his blood work in the morning.  I discussed the patient's anticoagulation with [facility] pharmacy and he has suggested that full anticoagulation in this end-stage renal disease patient would best be served with a heparin drip and he did recommend a heparin bolus.                    [facility]  Hospital Medicine Services  History & Physical          Patient Identification:  Name:  Mario Nair  Age:  45 y.o.  Sex:  male  :  1975  MRN:  [id number]   Admit Date: 2021   Visit Number:  [id number]  Primary Care Physician:  Danny Rebolledo MD    I have seen the patient in conjunction with NICKY Ball and I agree with the following statements:     Subjective     Chief complaint: neck pain    History of presenting illness:    Mr. Nair is a 45 year old female patient who presented to [facility] ED on 21.He states he developed neck pain a few days ago. He denies any injury, he thinks maybe he slept wrong. He does report pain down both sides of the tops of his arms into his shoulders. He denies any drug use, he hasn't used IV drugs in several months. He states it feels like a crick in his neck. He denies any fever. His last HD session was around 1 month ago. He does not have a Dialysis chair. He denies tampering with his right temporary HD access. He does still make some urine and denies any urinary symptoms. He denies any abdominal pain. His daughter did have covid but this was a few weeks ago. He denies any shortness of breath or cough. He denies any chills, body aches or sore throat.     It is of note he was admitted to our facility from 2020 through 1/3/2021 for end-stage renal disease on HD, acute cystitis, he did leave AMA during this visit.  He did also leave AMA from a visit from 2020 through 11/15/2020, and also AMA again from a stay 10/16/2020 through 10/16/2020, and again during the hospital stay he left AMA in 2019.    His treatment in the ED included calcium gluconate 2 g IV x1, 5 units of regular insulin subcu, 10 mg of lactulose p.o., 15 g of Kayexalate p.o. and hydrocodone 5 mg p.o. x1.His v/s in the ED were temperature 99.7, heart rate 95-1 24, respirations 16-20, blood pressure 124/72.    His past medical history is significant for COPD,  endocarditis, end-stage renal disease on HD, hepatitis C, hepatitis B, IV drug abuse, medical noncompliance. He denies any seizures, no hx of strokes, no blood clots, no history of cardiac stenting. He does smoke around 1/2 ppd for many years, he denies any alcohol or drug use.       ---------------------------------------------------------------------------------------------------------------------   Review of Systems   Constitutional: Negative for chills, diaphoresis, fatigue and fever.   HENT: Negative for congestion.    Respiratory: Negative for cough, shortness of breath and wheezing.    Cardiovascular: Negative for chest pain.   Gastrointestinal: Negative for abdominal distention, diarrhea, nausea and vomiting.   Genitourinary: Negative for difficulty urinating.   Musculoskeletal: Positive for neck pain.   Skin: Negative for color change and pallor.   Allergic/Immunologic: Negative for environmental allergies.   Neurological: Negative for dizziness, weakness and light-headedness.   Hematological: Negative for adenopathy.   Psychiatric/Behavioral: Negative for agitation, behavioral problems and confusion.      ---------------------------------------------------------------------------------------------------------------------   Past Medical History:   Diagnosis Date   • COPD (chronic obstructive pulmonary disease) (CMS/[facility])    • Endocarditis    • ESRD (end stage renal disease) (CMS/[facility])    • Hepatitis C    • Infectious viral hepatitis    • IV drug abuse (CMS/[facility])    • Unable to read or write      Past Surgical History:   Procedure Laterality Date   • CENTRAL VENOUS CATHETER TUNNELED INSERTION DOUBLE LUMEN Right     Chest for hemodialysis     Family History   Problem Relation Age of Onset   • Alcohol abuse Mother    • Hypertension Mother    • Hypertension Other    • Kidney disease Maternal Grandmother      Social History     Socioeconomic History   • Marital status:      Spouse name: Not on file   •  Number of children: Not on file   • Years of education: Not on file   • Highest education level: Not on file   Tobacco Use   • Smoking status: Current Every Day Smoker     Packs/day: 0.50     Years: 20.00     Pack years: 10.00     Types: Cigarettes   • Smokeless tobacco: Never Used   Substance and Sexual Activity   • Alcohol use: Not Currently     Frequency: Never     Comment: Quit 18 years ago   • Drug use: Yes     Types: Methamphetamines, IV     Comment: last used on Friday 5/18/2019   • Sexual activity: Defer     ---------------------------------------------------------------------------------------------------------------------   Allergies:  Penicillins  ---------------------------------------------------------------------------------------------------------------------     Medications below are reported home medications pulling from within the system; at this time, these medications have not been reconciled unless otherwise specified and are in the verification process for further verifcation as current home medications.    Prior to Admission Medications     Prescriptions Last Dose Informant Patient Reported? Taking?    amLODIPine (NORVASC) 5 MG tablet  Pharmacy No No    Take 1 tablet by mouth Every Night.    calcium acetate (PHOS BINDER,) 667 MG capsule capsule  Pharmacy No No    Take 1 capsule by mouth 3 (Three) Times a Day With Meals.    carvedilol (COREG) 12.5 MG tablet  Pharmacy No No    Take 1 tablet by mouth 2 (Two) Times a Day With Meals.    hydrALAZINE (APRESOLINE) 10 MG tablet  Pharmacy No No    Take 1 tablet by mouth 3 (Three) Times a Day.    isosorbide mononitrate (IMDUR) 30 MG 24 hr tablet  Pharmacy No No    Take 1 tablet by mouth Every Morning.        Hospital Scheduled Meds:  albumin human, 25 g, Intravenous, Once in Dialysis  sodium chloride, 1,000 mL, Intravenous, Once in Dialysis          ---------------------------------------------------------------------------------------------------------------------   Objective       Vital Signs:  Temp:  [98 °F (36.7 °C)-99.7 °F (37.6 °C)] 98 °F (36.7 °C)  Heart Rate:  [] 89  Resp:  [16-20] 16  BP: (124-145)/(72-97) 144/97      01/25/21  1113   Weight: 68 kg (150 lb)     Body mass index is 19.26 kg/m².  ---------------------------------------------------------------------------------------------------------------------       Physical Exam    Physical Exam:  Constitutional: chronically ill appearing male  HENT:  Head: Normocephalic and atraumatic.  Mouth:  dry mucous membranes.    Eyes:  Pupils are equal, round, and reactive to light.  No scleral icterus.  Neck:  Neck supple  Cardiovascular:  Normal rate, regular rhythm.  with no murmur.  Pulmonary/Chest:  No respiratory distress, no wheezes, no crackles, with normal breath sounds and good air movement.  Abdominal:  Soft.  Bowel sounds are present.  No distension and no tenderness.   Musculoskeletal:  No edema, no tenderness, and no deformity. Very dry skin   Neurological:  Alert and oriented to person, place, and time.  No tongue deviation.  No facial droop.  No slurred speech.   Skin:  Skin is warm and dry.  No rash noted.  No pallor.   Psychiatric:  Normal mood and affect.  Behavior is normal.  Judgment and thought content normal.   Peripheral vascular:  No edema and strong pulses on all 4 extremities.  ---------------------------------------------------------------------------------------------------------------------  EKG:          ---------------------------------------------------------------------------------------------------------------------   Results from last 7 days   Lab Units 01/25/21  1315 01/25/21  1138   CRP mg/dL  --  13.21*   LACTATE mmol/L 0.7  --    WBC 10*3/mm3  --  8.59   HEMOGLOBIN g/dL  --  7.1*   HEMATOCRIT %  --  22.3*   MCV fL  --  88.5   MCHC g/dL  --  31.8   PLATELETS  10*3/mm3  --  121*     Results from last 7 days   Lab Units 01/25/21  1515   PH, ARTERIAL pH units 7.257*   PO2 ART mm Hg 98.2   PCO2, ARTERIAL mm Hg 21.7*   HCO3 ART mmol/L 9.7*     Results from last 7 days   Lab Units 01/25/21  1138   SODIUM mmol/L 125*   POTASSIUM mmol/L 6.1*   CHLORIDE mmol/L 92*   CO2 mmol/L 9.9*   BUN mg/dL 185*   CREATININE mg/dL 15.50*   EGFR IF NONAFRICN AM mL/min/1.73 3*   CALCIUM mg/dL 5.8*   GLUCOSE mg/dL 136*   ALBUMIN g/dL 3.20*   BILIRUBIN mg/dL 0.4   ALK PHOS U/L 136*   AST (SGOT) U/L 10   ALT (SGPT) U/L 9   Estimated Creatinine Clearance: 5.8 mL/min (A) (by C-G formula based on SCr of 15.5 mg/dL (H)).  No results found for: AMMONIA  Results from last 7 days   Lab Units 01/25/21  1138   TROPONIN T ng/mL 0.161*     Results from last 7 days   Lab Units 01/25/21  1138   PROBNP pg/mL 11,870.0*     Lab Results   Component Value Date    HGBA1C 5.20 12/31/2020     Lab Results   Component Value Date    TSH 2.380 11/14/2020    FREET4 0.85 (L) 11/14/2020     No results found for: PREGTESTUR, PREGSERUM, HCG, HCGQUANT  Pain Management Panel     Pain Management Panel Latest Ref Rng & Units 1/25/2021 12/31/2020    AMPHETAMINES SCREEN, URINE Negative Negative Negative    BARBITURATES SCREEN Negative Negative Negative    BENZODIAZEPINE SCREEN, URINE Negative Negative Negative    BUPRENORPHINEUR Negative Negative Negative    COCAINE SCREEN, URINE Negative Negative Negative    METHADONE SCREEN, URINE Negative Negative Negative        No results found for: BLOODCX  No results found for: URINECX  No results found for: WOUNDCX  No results found for: STOOLCX      ---------------------------------------------------------------------------------------------------------------------  Imaging Results (Last 7 Days)     Procedure Component Value Units Date/Time    CT Chest Pulmonary Embolism [[id number]] Collected: 01/25/21 1738     Updated: 01/25/21 1743    Narrative:      EXAM:    CT Angiography Chest With  Intravenous Contrast     EXAM DATE:    1/25/2021 4:51 PM     CLINICAL HISTORY:    PE suspected, low/intermediate prob, positive D-dimer; U07.1-COVID-19;  E87.2-Acidosis; N18.5-Chronic kidney disease, stage 5; S16.1XXA-Strain  of muscle, fascia and tendon at neck level, initial encounter;  N30.01-Acute cystitis with hematuria     TECHNIQUE:    Axial computed tomographic angiography images of the chest with  intravenous contrast.  This CT exam was performed using one or more of  the following dose reduction techniques:  automated exposure control,  adjustment of the mA and/or kV according to patient size, and/or use of  iterative reconstruction technique.    MIP reconstructed images were created and reviewed.     COMPARISON:    12/31/2020     FINDINGS:    Pulmonary arteries:  Unremarkable.  No pulmonary embolism.    Aorta:  No acute findings.  No thoracic aortic aneurysm.    Lungs:  Centrilobular emphysema is stable.  Minimal areas of  groundglass airspace disease in the right lower lobe likely reflect a  mild degree of reexpansion edema given previous atelectasis and less  location. Atypical pneumonia not excluded.  No mass.    Pleural space:  Interval resolution of right pleural effusion.  No  pneumothorax.    Heart:  Unremarkable.  No cardiomegaly.  No significant pericardial  effusion.  No evidence of RV dysfunction.    Bones/joints:  No acute fracture.  No dislocation.    Soft tissues:  Unremarkable.    Lymph nodes:  Retroperitoneal lymphadenopathy is again noted.    Kidneys and ureters:  Severe and chronic appearing bilateral  hydronephrosis is stable from the previous exam.       Impression:      1.  Resolution of right pleural effusion with either minimal reexpansion  edema of the right lower lobe versus nonspecific pneumonitis including  COVID associated pneumonia.  2.  No PE identified.  3.  Emphysema is stable.  4.  Stable severe and chronic appearing hydronephrosis and  retroperitoneal lymphadenopathy.      This report was finalized on 1/25/2021 5:41 PM by Dr. Maxi Lowry MD.       XR Chest 1 View [[id number]] Collected: 01/25/21 1623     Updated: 01/25/21 1626    Narrative:      EXAM:    XR Chest, 1 View     EXAM DATE:    1/25/2021 3:36 PM     CLINICAL HISTORY:    COVID-19; U07.1-COVID-19; E87.2-Acidosis; N18.5-Chronic kidney  disease, stage 5; S16.1XXA-Strain of muscle, fascia and tendon at neck  level, initial encounter; N30.01-Acute cystitis with hematuria     TECHNIQUE:    Frontal view of the chest.     COMPARISON:    12/31/2020     FINDINGS:    Lungs:  Unremarkable.  No consolidation.    Pleural space:  Unremarkable.  No pneumothorax.    Heart:  Heart size within normal limits.    Mediastinum:  Unremarkable.    Bones/joints:  Unremarkable.    Soft tissues:  Edema has essentially resolved since the prior exam.    Tubes, lines and devices:  Right tunneled dialysis catheter  positioning is stable.       Impression:        No acute cardiopulmonary findings identified.     This report was finalized on 1/25/2021 4:23 PM by Dr. Maxi Lowry MD.       CT Cervical Spine Without Contrast [[id number]] Collected: 01/25/21 1503     Updated: 01/25/21 1508    Narrative:      EXAM:    CT Cervical Spine Without Intravenous Contrast     EXAM DATE:    1/25/2021 2:21 PM     CLINICAL HISTORY:    Acute neck pain     TECHNIQUE:    Axial computed tomography images of the cervical spine without  intravenous contrast.  Sagittal and coronal reformatted images were  created and reviewed.  This CT exam was performed using one or more of  the following dose reduction techniques:  automated exposure control,  adjustment of the mA and/or kV according to patient size, and/or use of  iterative reconstruction technique.     COMPARISON:    No relevant prior studies available.     FINDINGS:    Artifacts:  Motion artifact is noted throughout portions of the exam  causing mild limitation.    Vertebrae:  Unremarkable.  No acute fracture.     Discs/spinal canal/neural foramina:  Degenerative disc disease C4/5  with posterior disc osteophyte complex with mild central canal stenosis.   Disc osteophyte complex C5/6 with mild central canal and neural  foraminal stenosis.  Posterior disc protrusion at C3/4 eccentric to  right with mild central canal stenosis.    Soft tissues:  Unremarkable.    Lung apices:  Emphysema of the partially imaged upper lungs.       Impression:      1. Degenerative changes and superimposed disc protrusion as detailed  above involving C3/4 through C5/6 levels with mild stenosis.  2.  No acute fracture or traumatic malalignment.  3.  Other nonacute findings detailed above. Exam somewhat limited due to  patient motion artifact.     This report was finalized on 1/25/2021 3:06 PM by Dr. Maxi Lowry MD.             ---------------------------------------------------------------------------------------------------------------------  Assessment / Plan       Assessment and Plan:    Sepsis 2/2 COVID-19 (CRP 13.21,   -Enhanced contact and airborne isolation   -COVID-19 positive on 1/25/21  -COVID-19 progression labs ordered  -Nephrology wants to hold off on remdesivir for now until he has some renal improvement   -Decardron 6 mg Daily for 10 days   -CT of the chest with PE Protocol pending, no pneumonia noted on chest xray, no hypoxia on blood gas on room air.    Sepsis 2/2 UTI (CRP 13.21, HR >90)  -2 sets of blood cultures collected  -urine culture collected in the ED  -Vancomycin and cefepime with pharmacy to dose ordered (also has hx of UTI and neck pain)   -de-escalate as appropriate  -repeat labs in the am     ESRD on HD with hyperkalemia   -potassium 6.1 on admission  -creatinine 15.50  -  -Potassium was treated in the ED with insulin   -Repeat potassium ordered for 1900  -his last session of HD was during his last hospitalization in late December, he does not have a chair at any clinic   -Nephrology consulted planning for HD  tonight or in the AM    Metabolic Acidosis  -related to renal function in the setting of missed dialysis sessions  -nephrology consulted for HD     Elevated Troponin   -Trop 0.161  -is chronically elevated, likely related to renal disease  -monitor on telemetry and trend   -he denies any chest pain   -echocardiogram ordered    Hyponatremia  -Sodium 125    Hypercalcemia  -calcium 5.8  -calcium gluconate was given in the ED  -PTH, Vitamin D, ionzed calcium, and Phosphorous ordered for the am     Elevated D-Dimer  -D-Dimer on admission is 14.53  -Dr. Ocampo spoke with Dr. Rivera (nephrology) will do CT with PE Protocol tonight and plan for HD tonight or early tomororw am.  -Bilateral lower extremity dopplers ordered    COPD  -not felt to be in exacerbation   -on room air  -incentive spirometer     Hx of Hep B and Hep C  -LFTs are currently WNL    Hx of IV drug abuse and Endocarditis in the past  -denies any drug use in the last few months  -UDS is negative on admission     Neck pain  -CT C-spine without contrast showed Degenerative disc disease C4/5  with posterior disc osteophyte complex with mild central canal stenosis.   Disc osteophyte complex C5/6 with mild central canal and neural  foraminal stenosis.  Posterior disc protrusion at C3/4 eccentric to  right with mild central canal stenosis.  -he does have tenderness on palpation, if continued neck pain would consider MRI to rule out any discitis/osteo with hx of drug use    Hx of Medical non-compliance  -leaves AMA at all recent hospitalizations     Anemia of chronic disease  -H/H 7.1/22.3  -PLT count 121  -both are around his most recent baseline, monitor closely     Discussed DVT prophylaxis with Dr. Ocampo, she wants him to be on full dose anticoagulation due to high risk for thrombus, pharmacy consulted to dose.     Activity: up with assistance   Precautions: falls   Tubes/Lines/Drains: Right chest wall Temporary HD catheter  DVT prophylaxis: full dose with  pharmacy to dose     Code status: Full     Patient is high risk for the following reasons: sepsis, hyperkalemia    Sena DEBORAH Schwartz, APRN  01/25/21  18:15 EST  ---------------------------------------------------------------------------------------------------------------------       Electronically signed by Opal Ocampo MD at 01/25/21 3499       Vital Signs (last day)     Date/Time   Temp   Temp src   Pulse   Resp   BP   Patient Position   SpO2    02/04/21 1042   97.6 (36.4)   Axillary   94   18   115/68   Lying   98    02/04/21 0851   97.3 (36.3)   Oral   98   18   109/69   --   98    02/04/21 0815   97.9 (36.6)   Oral   95   18   122/77   --   99    02/04/21 0805   --   --   --   --   126/74   Lying   --    02/04/21 0609   99.7 (37.6)   Oral   97   18   118/77   Lying   99    02/04/21 0247   99.9 (37.7)   Oral   97   18   125/84   Lying   97    02/03/21 2112   --   --   --   --   --   --   98    02/03/21 1734   97.9 (36.6)   Axillary   100   16   116/70   Lying   99    02/03/21 1723   --   --   100   --   116/70   --   --    02/03/21 1434   99.3 (37.4)   Axillary   98   18   113/60   Lying   98    02/03/21 1231   --   --   99   --   111/63   --   --    02/03/21 1218   98.6 (37)   Axillary   98   18   114/73   Lying   --    02/03/21 0837   99.7 (37.6)   Temporal   102   20   126/79   Lying   --    02/03/21 0246   99.3 (37.4)   Axillary   95   20   131/80   Lying   95                Current Facility-Administered Medications   Medication Dose Route Frequency Provider Last Rate Last Admin   • acetaminophen (TYLENOL) tablet 650 mg  650 mg Oral Q6H PRN Teena Ray PA   650 mg at 02/04/21 0614   • albumin human 25 % IV SOLN 12.5 g  12.5 g Intravenous PRN Taniya Corado MD       • alteplase (CATHFLO/ACTIVASE) INTRACATHETER injection 2 mg  2 mg Intracatheter Once Khoa Carl MD       • alteplase (CATHFLO/ACTIVASE) INTRACATHETER injection 2 mg  2 mg Intracatheter Once Khoa Carl MD       •  apixaban (ELIQUIS) tablet 5 mg  5 mg Oral Q12H Opal Ocampo MD   5 mg at 02/04/21 0841   • aspirin EC tablet 81 mg  81 mg Oral Daily Khoa Carl MD   81 mg at 02/04/21 0841   • atorvastatin (LIPITOR) tablet 40 mg  40 mg Oral Nightly Khoa Carl MD   40 mg at 02/03/21 1948   • calcium acetate (PHOS BINDER)) capsule 667 mg  667 mg Oral TID With Meals Khoa Carl MD   667 mg at 02/04/21 0841   • carvedilol (COREG) tablet 12.5 mg  12.5 mg Oral BID With Meals Khoa Carl MD   12.5 mg at 02/04/21 0841   • cefTRIAXone (ROCEPHIN) 2 g/100 mL 0.9% NS IVPB (MBP)  2 g Intravenous Q24H Obi Arvizu MD   2 g at 02/03/21 1239   • dextrose (D50W) 25 g/ 50mL Intravenous Solution 25 g  25 g Intravenous Q15 Min PRN Khoa Carl MD       • dextrose (GLUTOSE) oral gel 15 g  15 g Oral Q15 Min PRN Khoa Carl MD       • epoetin mc-epbx (RETACRIT) injection 10,000 Units  10,000 Units Subcutaneous Once per day on Mon Wed Fri Taniya Corado MD   10,000 Units at 02/03/21 1724   • glucagon (human recombinant) (GLUCAGEN DIAGNOSTIC) injection 1 mg  1 mg Subcutaneous Q15 Min PRN Khoa Carl MD       • HYDROcodone-acetaminophen (NORCO) 5-325 MG per tablet 1 tablet  1 tablet Oral Q8H PRN Opal Ocampo MD   1 tablet at 02/04/21 0318   • isosorbide mononitrate (IMDUR) 24 hr tablet 30 mg  30 mg Oral QAM Khoa Carl MD   30 mg at 02/04/21 0614   • lactobacillus acidophilus (RISAQUAD) capsule 1 capsule  1 capsule Oral Daily Khoa Carl MD   1 capsule at 02/04/21 0841   • lidocaine (LIDODERM) 5 % 1 patch  1 patch Transdermal Q24H O'Kuma, Teena E, PA   1 patch at 02/04/21 0841   • Magnesium Sulfate 2 gram Bolus, followed by 8 gram infusion (total Mg dose 10 grams)- Mg less than or equal to 1mg/dL  2 g Intravenous PRN Khoa Carl MD        Or   • Magnesium Sulfate 2 gram / 50mL Infusion (GIVE X 3 BAGS TO EQUAL 6GM TOTAL DOSE) - Mg 1.1 - 1.5 mg/dl  2 g Intravenous PRN  Khoa Carl MD        Or   • Magnesium Sulfate 4 gram infusion- Mg 1.6-1.9 mg/dL  4 g Intravenous PRN Khoa Carl MD       • nicotine (NICODERM CQ) 14 MG/24HR patch 1 patch  1 patch Transdermal Q24H Khoa Carl MD   1 patch at 02/04/21 0842   • prochlorperazine (COMPAZINE) injection 10 mg  10 mg Intravenous Q4H PRN Khoa Carl MD       • sodium chloride 0.9 % flush 10 mL  10 mL Intravenous PRN Khoa Carl MD   10 mL at 02/03/21 1243   • sodium chloride 0.9 % flush 10 mL  10 mL Intravenous PRN Khoa Carl MD   10 mL at 02/01/21 2151     Lab Results (most recent)     Procedure Component Value Units Date/Time    POC Glucose Once [[id number]]  (Abnormal) Collected: 02/04/21 1044    Specimen: Blood Updated: 02/04/21 1109     Glucose 133 mg/dL     POC Glucose Once [[id number]]  (Normal) Collected: 02/04/21 0756    Specimen: Blood Updated: 02/04/21 0809     Glucose 108 mg/dL     Blood Culture - Blood, Arm, Left [[id number]] Collected: 01/30/21 0420    Specimen: Blood from Arm, Left Updated: 02/04/21 0445     Blood Culture No growth at 5 days    Hemoglobin [[id number]]  (Abnormal) Collected: 02/04/21 0417    Specimen: Blood Updated: 02/04/21 0427     Hemoglobin 6.9 g/dL     CBC & Differential [[id number]]  (Abnormal) Collected: 02/04/21 0248    Specimen: Blood Updated: 02/04/21 0341    Narrative:      The following orders were created for panel order CBC & Differential.  Procedure                               Abnormality         Status                     ---------                               -----------         ------                     CBC Auto Differential[[id number]]        Abnormal            Final result                 Please view results for these tests on the individual orders.    CBC Auto Differential [[id number]]  (Abnormal) Collected: 02/04/21 0248    Specimen: Blood Updated: 02/04/21 0341     WBC 5.77 10*3/mm3      RBC 2.33 10*6/mm3      Hemoglobin 6.7 g/dL      Hematocrit  21.6 %      MCV 92.7 fL      MCH 28.8 pg      MCHC 31.0 g/dL      RDW 15.0 %      RDW-SD 48.5 fl      MPV 10.9 fL      Platelets 103 10*3/mm3      Neutrophil % 62.5 %      Lymphocyte % 16.8 %      Monocyte % 18.0 %      Eosinophil % 1.2 %      Basophil % 0.3 %      Immature Grans % 1.2 %      Neutrophils, Absolute 3.60 10*3/mm3      Lymphocytes, Absolute 0.97 10*3/mm3      Monocytes, Absolute 1.04 10*3/mm3      Eosinophils, Absolute 0.07 10*3/mm3      Basophils, Absolute 0.02 10*3/mm3      Immature Grans, Absolute 0.07 10*3/mm3      nRBC 0.0 /100 WBC     D-dimer, Quantitative [[id number]]  (Abnormal) Collected: 02/04/21 0117    Specimen: Blood Updated: 02/04/21 0221     D-Dimer, Quantitative 2.49 MCGFEU/mL     Narrative:      d-Dimer assay result is to be used in conjunction with a non-high clinical pretest probability (PTP) assessment tool to exclude PE and aid in diagnosis of VTE with cutoff of 0.5 MCGFEU/mL.    Ferritin [[id number]]  (Abnormal) Collected: 02/04/21 0117    Specimen: Blood Updated: 02/04/21 0212     Ferritin 1,503.00 ng/mL     Narrative:      Results may be falsely decreased if patient taking Biotin.      Comprehensive Metabolic Panel [[id number]]  (Abnormal) Collected: 02/04/21 0117    Specimen: Blood Updated: 02/04/21 0210     Glucose 134 mg/dL      BUN 73 mg/dL      Creatinine 3.63 mg/dL      Sodium 130 mmol/L      Potassium 4.9 mmol/L      Chloride 92 mmol/L      CO2 28.0 mmol/L      Calcium 8.4 mg/dL      Total Protein 7.1 g/dL      Albumin 2.61 g/dL      ALT (SGPT) 18 U/L      AST (SGOT) 33 U/L      Alkaline Phosphatase 229 U/L      Total Bilirubin 0.2 mg/dL      eGFR Non African Amer 18 mL/min/1.73      Globulin 4.5 gm/dL      A/G Ratio 0.6 g/dL      BUN/Creatinine Ratio 20.1     Anion Gap 10.0 mmol/L     Narrative:      GFR Normal >60  Chronic Kidney Disease <60  Kidney Failure <15      CK [[id number]]  (Abnormal) Collected: 02/04/21 0117    Specimen: Blood Updated: 02/04/21 0210     Creatine  Kinase 13 U/L     C-reactive Protein [[id number]]  (Abnormal) Collected: 02/04/21 0117    Specimen: Blood Updated: 02/04/21 0210     C-Reactive Protein 4.69 mg/dL     Magnesium [[id number]]  (Normal) Collected: 02/04/21 0117    Specimen: Blood Updated: 02/04/21 0210     Magnesium 1.9 mg/dL     Phosphorus [[id number]]  (Normal) Collected: 02/04/21 0117    Specimen: Blood Updated: 02/04/21 0210     Phosphorus 3.9 mg/dL     Blood Culture - Blood, Arm, Left [[id number]] Collected: 01/29/21 2004    Specimen: Blood from Arm, Left Updated: 02/03/21 2015     Blood Culture No growth at 5 days    T4, Free [[id number]]  (Abnormal) Collected: 02/03/21 0159    Specimen: Blood Updated: 02/03/21 1444     Free T4 0.73 ng/dL     Narrative:      Results may be falsely increased if patient taking Biotin.      Comprehensive Metabolic Panel [[id number]]  (Abnormal) Collected: 02/03/21 0159    Specimen: Blood Updated: 02/03/21 0314     Glucose 146 mg/dL       mg/dL      Creatinine 5.73 mg/dL      Sodium 127 mmol/L      Potassium 5.4 mmol/L      Chloride 89 mmol/L      CO2 25.1 mmol/L      Calcium 8.5 mg/dL      Total Protein 7.0 g/dL      Albumin 2.60 g/dL      ALT (SGPT) 12 U/L      AST (SGOT) 25 U/L      Alkaline Phosphatase 231 U/L      Total Bilirubin 0.2 mg/dL      eGFR Non African Amer 11 mL/min/1.73      Comment: <15 Indicative of kidney failure.        eGFR   Amer --     Comment: <15 Indicative of kidney failure.        Globulin 4.4 gm/dL      A/G Ratio 0.6 g/dL      BUN/Creatinine Ratio 20.4     Anion Gap 12.9 mmol/L     Narrative:      GFR Normal >60  Chronic Kidney Disease <60  Kidney Failure <15      Ferritin [[id number]]  (Abnormal) Collected: 02/03/21 0159    Specimen: Blood Updated: 02/03/21 0308     Ferritin 1,458.00 ng/mL     Narrative:      Results may be falsely decreased if patient taking Biotin.      Phosphorus [[id number]]  (Normal) Collected: 02/03/21 0159    Specimen: Blood Updated: 02/03/21 0308      Phosphorus 4.0 mg/dL     CK [[id number]]  (Abnormal) Collected: 02/03/21 0159    Specimen: Blood Updated: 02/03/21 0307     Creatine Kinase 14 U/L     C-reactive Protein [[id number]]  (Abnormal) Collected: 02/03/21 0159    Specimen: Blood Updated: 02/03/21 0307     C-Reactive Protein 5.44 mg/dL     Lactate Dehydrogenase [[id number]]  (Normal) Collected: 02/03/21 0159    Specimen: Blood Updated: 02/03/21 0307      U/L     Magnesium [[id number]]  (Normal) Collected: 02/03/21 0159    Specimen: Blood Updated: 02/03/21 0307     Magnesium 2.1 mg/dL     D-dimer, Quantitative [[id number]]  (Abnormal) Collected: 02/03/21 0159    Specimen: Blood Updated: 02/03/21 0255     D-Dimer, Quantitative 2.77 MCGFEU/mL     Narrative:      d-Dimer assay result is to be used in conjunction with a non-high clinical pretest probability (PTP) assessment tool to exclude PE and aid in diagnosis of VTE with cutoff of 0.5 MCGFEU/mL.    Fibrinogen [[id number]]  (Abnormal) Collected: 02/03/21 0159    Specimen: Blood Updated: 02/03/21 0254     Fibrinogen 576 mg/dL     CBC & Differential [[id number]]  (Abnormal) Collected: 02/03/21 0159    Specimen: Blood Updated: 02/03/21 0239    Narrative:      The following orders were created for panel order CBC & Differential.  Procedure                               Abnormality         Status                     ---------                               -----------         ------                     CBC Auto Differential[[id number]]        Abnormal            Final result                 Please view results for these tests on the individual orders.    CBC Auto Differential [[id number]]  (Abnormal) Collected: 02/03/21 0159    Specimen: Blood Updated: 02/03/21 0239     WBC 6.16 10*3/mm3      RBC 2.63 10*6/mm3      Hemoglobin 7.4 g/dL      Hematocrit 24.4 %      MCV 92.8 fL      MCH 28.1 pg      MCHC 30.3 g/dL      RDW 14.7 %      RDW-SD 48.5 fl      MPV 11.3 fL      Platelets 109 10*3/mm3      Neutrophil % 67.3 %       Lymphocyte % 15.6 %      Monocyte % 13.8 %      Eosinophil % 1.5 %      Basophil % 0.5 %      Immature Grans % 1.3 %      Neutrophils, Absolute 4.15 10*3/mm3      Lymphocytes, Absolute 0.96 10*3/mm3      Monocytes, Absolute 0.85 10*3/mm3      Eosinophils, Absolute 0.09 10*3/mm3      Basophils, Absolute 0.03 10*3/mm3      Immature Grans, Absolute 0.08 10*3/mm3      nRBC 0.0 /100 WBC     Lactate Dehydrogenase [[id number]]  (Normal) Collected: 02/01/21 0513    Specimen: Blood Updated: 02/01/21 0637      U/L     Fibrinogen [[id number]]  (Normal) Collected: 02/01/21 0513    Specimen: Blood Updated: 02/01/21 0635     Fibrinogen 524 mg/dL     aPTT [[id number]]  (Abnormal) Collected: 01/31/21 1205    Specimen: Blood Updated: 01/31/21 1248     PTT 53.0 seconds      Comment: Note new Reference Range       Narrative:      PTT Heparin Therapeutic Range:  59 - 95 seconds      aPTT [[id number]]  (Abnormal) Collected: 01/31/21 0551    Specimen: Blood Updated: 01/31/21 0608     PTT 36.2 seconds      Comment: Note new Reference Range       Narrative:      PTT Heparin Therapeutic Range:  59 - 95 seconds      Hepatitis B Surface Antigen [[id number]] Collected: 01/25/21 1138    Specimen: Blood Updated: 01/27/21 1310     Hepatitis B Surface Ag Confirm. indicated    Narrative:      Performed at:  [address] [place] [address]  [id number]  : Paolo Staley PhD, Phone:  [phone]    HBsAg Confirmation [[id number]]  (Abnormal) Collected: 01/25/21 1138    Specimen: Blood Updated: 01/27/21 1310     Hepatitis B Surface Ag Confirm Positive     Comment: Result confirmed by neutralization.       Narrative:      Performed at:  [address] [place] [address]  [id number]  : Paolo Staley PhD, Phone:  [phone]    Urine Culture - Urine, Urine, Clean Catch [[id number]]  (Abnormal)  (Susceptibility) Collected: 01/25/21 1505    Specimen: Urine, Clean Catch Updated: 01/27/21 0974  "    Urine Culture >100,000 CFU/mL Serratia marcescens    Susceptibility      Serratia marcescens     CHANI     Cefazolin Resistant     Cefepime Susceptible     Ceftazidime Susceptible     Ceftriaxone Susceptible     Gentamicin Susceptible     Levofloxacin Susceptible     Nitrofurantoin Resistant     Tetracycline Resistant     Trimethoprim + Sulfamethoxazole Susceptible                Susceptibility Comments     Serratia marcescens    Please call the lab if pip/evelyn is requested for Serratia spp. Will have to be set up by disk diffusion.               Hemoglobin & Hematocrit, Blood [[id number]]  (Abnormal) Collected: 01/26/21 2007    Specimen: Blood Updated: 01/26/21 2040     Hemoglobin 7.1 g/dL      Comment: Post Transfusion Specimen         Hematocrit 21.3 %     Procalcitonin [[id number]]  (Abnormal) Collected: 01/25/21 1941    Specimen: Blood Updated: 01/26/21 1232     Procalcitonin 25.39 ng/mL     Narrative:      As a Marker for Sepsis (Non-Neonates):   1. <0.5 ng/mL represents a low risk of severe sepsis and/or septic shock.  1. >2 ng/mL represents a high risk of severe sepsis and/or septic shock.    As a Marker for Lower Respiratory Tract Infections that require antibiotic therapy:  PCT on Admission     Antibiotic Therapy             6-12 Hrs later  > 0.5                Strongly Recommended            >0.25 - <0.5         Recommended  0.1 - 0.25           Discouraged                   Remeasure/reassess PCT  <0.1                 Strongly Discouraged          Remeasure/reassess PCT      As 28 day mortality risk marker: \"Change in Procalcitonin Result\" (> 80 % or <=80 %) if Day 0 (or Day 1) and Day 4 values are available. Refer to http://www.[facility]-pct-calculator.com/   Change in PCT <=80 %   A decrease of PCT levels below or equal to 80 % defines a positive change in PCT test result representing a higher risk for 28-day all-cause mortality of patients diagnosed with severe sepsis or septic shock.  Change in PCT > 80 " %   A decrease of PCT levels of more than 80 % defines a negative change in PCT result representing a lower risk for 28-day all-cause mortality of patients diagnosed with severe sepsis or septic shock.                Results may be falsely decreased if patient taking Biotin.     Vitamin D 25 Hydroxy [[id number]]  (Abnormal) Collected: 01/25/21 1941    Specimen: Blood Updated: 01/26/21 1227     25 Hydroxy, Vitamin D 11.9 ng/ml     Narrative:      Reference Range for Total Vitamin D 25(OH)     Deficiency <20.0 ng/mL   Insufficiency 21-29 ng/mL   Sufficiency  ng/mL  Toxicity >100 ng/ml    Results may be falsely increased if patient taking Biotin.      Manual Differential [[id number]]  (Abnormal) Collected: 01/26/21 0530    Specimen: Blood Updated: 01/26/21 0628     Neutrophil % 84.0 %      Lymphocyte % 4.0 %      Monocyte % 8.0 %      Eosinophil % 1.0 %      Bands %  3.0 %      Neutrophils Absolute 4.65 10*3/mm3      Lymphocytes Absolute 0.21 10*3/mm3      Monocytes Absolute 0.43 10*3/mm3      Eosinophils Absolute 0.05 10*3/mm3      Basophilic Stippling Slight/1+     Dacrocytes Slight/1+     Hypochromia Slight/1+     Ovalocytes Slight/1+     Toxic Granulation Slight/1+     Platelet Morphology Normal    Scan Slide [[id number]] Collected: 01/26/21 0530    Specimen: Blood Updated: 01/26/21 0628     Scan Slide --     Comment: See Manual Differential Results       Troponin [[id number]]  (Abnormal) Collected: 01/26/21 0343    Specimen: Blood Updated: 01/26/21 0432     Troponin T 0.189 ng/mL     Narrative:      Troponin T Reference Range:  <= 0.03 ng/mL-   Negative for AMI  >0.03 ng/mL-     Abnormal for myocardial necrosis.  Clinicians would have to utilize clinical acumen, EKG, Troponin and serial changes to determine if it is an Acute Myocardial Infarction or myocardial injury due to an underlying chronic condition.       Results may be falsely decreased if patient taking Biotin.      Vancomycin, Random [[id number]]   (Normal) Collected: 01/26/21 0343    Specimen: Blood Updated: 01/26/21 0426     Vancomycin Random 25.60 mcg/mL     Narrative:      Therapeutic Ranges for Vancomycin    Vancomycin Random   5.0-40.0 mcg/mL  Vancomycin Trough   5.0-20.0 mcg.mL  Vancomycin Peak     20.0-40.0 mcg/mL    Calcium, Ionized [[id number]]  (Abnormal) Collected: 01/26/21 0343    Specimen: Blood Updated: 01/26/21 0413     Ionized Calcium 0.82 mmol/L     Blood Culture ID, PCR - Blood, Arm, Right [[id number]]  (Abnormal) Collected: 01/25/21 1315    Specimen: Blood from Arm, Right Updated: 01/26/21 0356     BCID, PCR Serratia marcescens. Identification by BCID PCR.    Scan Slide [[id number]] Collected: 01/25/21 1941    Specimen: Blood Updated: 01/25/21 2024     Scan Slide --     Comment: See Manual Differential Results       Manual Differential [[id number]]  (Abnormal) Collected: 01/25/21 1941    Specimen: Blood Updated: 01/25/21 2024     Neutrophil % 75.0 %      Lymphocyte % 9.0 %      Monocyte % 5.0 %      Bands %  10.0 %      Metamyelocyte % 1.0 %      Neutrophils Absolute 6.66 10*3/mm3      Lymphocytes Absolute 0.70 10*3/mm3      Monocytes Absolute 0.39 10*3/mm3      Dacrocytes Slight/1+     Hypochromia Slight/1+     Platelet Estimate Decreased    Potassium [[id number]]  (Abnormal) Collected: 01/25/21 1941    Specimen: Blood Updated: 01/25/21 2010     Potassium 5.3 mmol/L     Troponin [[id number]]  (Abnormal) Collected: 01/25/21 1941    Specimen: Blood Updated: 01/25/21 2010     Troponin T 0.160 ng/mL     Narrative:      Troponin T Reference Range:  <= 0.03 ng/mL-   Negative for AMI  >0.03 ng/mL-     Abnormal for myocardial necrosis.  Clinicians would have to utilize clinical acumen, EKG, Troponin and serial changes to determine if it is an Acute Myocardial Infarction or myocardial injury due to an underlying chronic condition.       Results may be falsely decreased if patient taking Biotin.      PTH, Intact [[id number]]  (Abnormal) Collected: 01/25/21  1941    Specimen: Blood Updated: 01/25/21 2009     PTH, Intact 1,274.0 pg/mL     Narrative:      Please note the potential for biotin to falsely depress the PTH result when high levels of biotin surpassing the daily recommended dose are administered.    Results may be falsely decreased if patient taking Biotin.      Protime-INR [[id number]]  (Abnormal) Collected: 01/25/21 1941    Specimen: Blood Updated: 01/25/21 1956     Protime 18.2 Seconds      Comment: Note new Reference Range        INR 1.53    Narrative:      Suggested INR therapeutic range for stable oral anticoagulant therapy:    Low Intensity therapy:   1.5-2.0  Moderate Intensity therapy:   2.0-3.0  High Intensity therapy:   2.5-4.0    Urine Drug Screen - Urine, Clean Catch [[id number]]  (Normal) Collected: 01/25/21 1505    Specimen: Urine, Clean Catch Updated: 01/25/21 1535     Amphetamine Screen, Urine Negative     Barbiturates Screen, Urine Negative     Benzodiazepine Screen, Urine Negative     Cocaine Screen, Urine Negative     Methadone Screen, Urine Negative     Opiate Screen Negative     Phencyclidine (PCP), Urine Negative     THC, Screen, Urine Negative     6-ACETYL MORPHINE Negative     Buprenorphine, Screen, Urine Negative     Oxycodone Screen, Urine Negative    Narrative:      Negative Thresholds For Drugs Screened:                  Amphetamines              1000 ng/ml               Barbiturates               200 ng/ml               Benzodiazepines            200 ng/ml              Cocaine                    300 ng/ml              Methadone                  300 ng/ml              Opiates                    300 ng/ml               Phencyclidine               25 ng/ml               THC                         50 ng/ml              6-Acetyl Morphine           10 ng/ml              Buprenorphine                5 ng/ml              Oxycodone                  300 ng/ml    The reference range for all drugs tested is negative. This report includes final  unconfirmed qualitative results to be used for medical treatment purposes only. Unconfirmed results must not be used for non-medical purposes such as employment or legal testing. Clinical consideration should be applied to any drug of abuse test, especially when unconfirmed quantitative results are used.        Urinalysis, Microscopic Only - Urine, Clean Catch [[id number]]  (Abnormal) Collected: 01/25/21 1505    Specimen: Urine, Clean Catch Updated: 01/25/21 1532     RBC, UA Too Numerous to Count /HPF      WBC, UA Too Numerous to Count /HPF      Bacteria, UA 3+ /HPF      Squamous Epithelial Cells, UA 0-2 /HPF      Hyaline Casts, UA None Seen /LPF      WBC Clumps, UA Moderate/2+ /HPF      Methodology Manual Light Microscopy    Urinalysis With Microscopic If Indicated (No Culture) - Urine, Clean Catch [[id number]]  (Abnormal) Collected: 01/25/21 1505    Specimen: Urine, Clean Catch Updated: 01/25/21 1522     Color, UA Yellow     Appearance, UA Turbid     pH, UA 6.0     Specific Gravity, UA 1.011     Glucose, UA Negative     Ketones, UA Negative     Bilirubin, UA Negative     Blood, UA Large (3+)     Protein,  mg/dL (2+)     Leuk Esterase, UA Large (3+)     Nitrite, UA Positive     Urobilinogen, UA 0.2 E.U./dL    BNP [[id number]]  (Abnormal) Collected: 01/25/21 1138    Specimen: Blood Updated: 01/25/21 1522     proBNP 11,870.0 pg/mL     Narrative:      Among patients with dyspnea, NT-proBNP is highly sensitive for the detection of acute congestive heart failure. In addition NT-proBNP of <300 pg/ml effectively rules out acute congestive heart failure with 99% negative predictive value.    Results may be falsely decreased if patient taking Biotin.      Blood Gas, Arterial With Co-Ox [[id number]]  (Abnormal) Collected: 01/25/21 1515    Specimen: Arterial Blood Updated: 01/25/21 1516     Site Left Radial     Alfa's Test Positive     pH, Arterial 7.257 pH units      Comment: 84 Value below reference range        pCO2,  Arterial 21.7 mm Hg      Comment: 84 Value below reference range        pO2, Arterial 98.2 mm Hg      HCO3, Arterial 9.7 mmol/L      Comment: 84 Value below reference range        Base Excess, Arterial -15.9 mmol/L      O2 Saturation, Arterial 96.9 %      Hemoglobin, Blood Gas 7.6 g/dL      Comment: 84 Value below reference range        Hematocrit, Blood Gas 23.2 %      Comment: 84 Value below reference range        Oxyhemoglobin 94.9 %      Methemoglobin 1.20 %      Carboxyhemoglobin 0.9 %      A-a Gradiant 18.5 mmHg      CO2 Content 10.3 mmol/L      Temperature 0.0 C      Barometric Pressure for Blood Gas 719 mmHg      Modality Room Air     FIO2 21 %      Ventilator Mode NA     Note --     Collected by [id number]     Comment: Meter: [id number]     :  [id number]        pH, Temp Corrected --     pCO2, Temperature Corrected --     pO2, Temperature Corrected --    Hepatitis Panel, Acute [[id number]]  (Abnormal) Collected: 01/25/21 1138    Specimen: Blood Updated: 01/25/21 1431     Hepatitis B Surface Ag --     Hep A IgM Non-Reactive     Hep B C IgM Non-Reactive     Hepatitis C Ab Reactive    Narrative:      Results may be falsely decreased if patient taking Biotin.   Preliminary reactive result pending confirmation at [facility].     COVID-19 and FLU A/B PCR - Swab, Nasopharynx [[id number]]  (Abnormal) Collected: 01/25/21 1314    Specimen: Swab from Nasopharynx Updated: 01/25/21 1413     COVID19 Detected     Influenza A PCR Not Detected     Influenza B PCR Not Detected    Narrative:      Fact sheet for providers: https://www.fda.gov/media/108674/download    Fact sheet for patients: https://www.fda.gov/media/490620/download    Test performed by PCR.  Influenza A and Influenza B negative results should be considered presumptive in samples that have a positive SARS-CoV-2 result.    Competitive inhibition studies showed that SARS-CoV-2 virus, when present at concentrations above 3.6E+04 copies/mL, can inhibit the  detection and amplification of influenza A and influenza B virus RNA if present at or below 1.8E+02 copies/mL or 4.9E+02 copies/mL, respectively, and may lead to false negative influenza virus results. If co-infection with influenza A or influenza B virus is suspected in samples with a positive SARS-CoV-2 result, the sample should be re-tested with another FDA cleared, approved, or authorized influenza test, if influenza virus detection would change clinical management.    Lactic Acid, Plasma [[id number]]  (Normal) Collected: 01/25/21 1315    Specimen: Blood from Arm, Right Updated: 01/25/21 1405     Lactate 0.7 mmol/L     Ethanol [[id number]] Collected: 01/25/21 1138    Specimen: Blood Updated: 01/25/21 1259     Ethanol <10 mg/dL      Ethanol % <0.010 %     Narrative:      >/= 80.0 legally intoxicated    [place] Draw [[id number]] Collected: 01/25/21 1138    Specimen: Blood Updated: 01/25/21 1245    Narrative:      The following orders were created for panel order [place] Draw.  Procedure                               Abnormality         Status                     ---------                               -----------         ------                     Light Blue Top[[id number]]                                   Final result               Green Top (Gel)[[id number]]                                  Final result               Lavender Top[[id number]]                                     Final result               Gold Top - SST[[id number]]                                   Final result                 Please view results for these tests on the individual orders.    Light Blue Top [[id number]] Collected: 01/25/21 1138    Specimen: Blood Updated: 01/25/21 1245     Extra Tube hold for add-on     Comment: Auto resulted       Green Top (Gel) [[id number]] Collected: 01/25/21 1138    Specimen: Blood Updated: 01/25/21 1245     Extra Tube Hold for add-ons.     Comment: Auto resulted.       Lavender Top [[id number]] Collected: 01/25/21 1138     Specimen: Blood Updated: 01/25/21 1245     Extra Tube hold for add-on     Comment: Auto resulted       Gold Top - SST [[id number]] Collected: 01/25/21 1138    Specimen: Blood Updated: 01/25/21 1245     Extra Tube Hold for add-ons.     Comment: Auto resulted.             Imaging Results (Most Recent)     Procedure Component Value Units Date/Time    FL Surgery Fluoro [[id number]] Collected: 01/29/21 1023     Updated: 01/29/21 1038    Narrative:      EXAMINATION: FL SURGERY FLUORO-      CLINICAL INDICATION:     Dialysis cath; R78.81-Bacteremia;  E87.2-Acidosis; N18.5-Chronic kidney disease, stage 5; S16.1XXA-Strain  of muscle, fascia and tendon at neck level, initial encounter;  N30.01-Acute cystitis with hematuria; U07.1-COVID-19; J12.82-Pneumonia  due to Coronavirus disease 2019     TECHNIQUE:  FL SURGERY FLUORO-      FLUOROSCOPY TIME: 19.3 seconds     FINDINGS:   Fluoroscopy images from dialysis catheter placement.        Impression:      As above.     This report was finalized on 1/29/2021 10:23 AM by Dr. Maxi Lowry MD.       XR Chest AP [[id number]] Collected: 01/29/21 1023     Updated: 01/29/21 1036    Narrative:      EXAM:    XR Chest, 1 View     EXAM DATE:    1/29/2021 10:04 AM     CLINICAL HISTORY:    Post dialysis cath; R78.81-Bacteremia; E87.2-Acidosis; N18.5-Chronic  kidney disease, stage 5; S16.1XXA-Strain of muscle, fascia and tendon at  neck level, initial encounter; N30.01-Acute cystitis with hematuria;  U07.1-COVID-19; J12.82-Pneumonia due to Coronavirus disease 2019     TECHNIQUE:    Frontal view of the chest.     COMPARISON:    No relevant prior studies available.     FINDINGS:    Lungs:  Unremarkable.  No consolidation.    Pleural space:  Unremarkable.  No pneumothorax.    Heart:  Unremarkable.  No cardiomegaly.    Mediastinum:  Unremarkable.    Bones/joints:  Unremarkable.    Tubes, lines and devices:  Left dialysis catheter placement with tip  at the cavoatrial junction.       Impression:         Dialysis catheter placement left side. No pneumothorax.     This report was finalized on 1/29/2021 10:23 AM by Dr. Maxi Lowry MD.       US Venous Doppler Lower Extremity Bilateral (duplex) [[id number]] Collected: 01/27/21 1335     Updated: 01/27/21 1337    Narrative:      US VENOUS DOPPLER LOWER EXTREMITY BILATERAL (DUPLEX)-     CLINICAL INDICATION: elevated D-Dimer; E87.2-Acidosis; N18.5-Chronic  kidney disease, stage 5; S16.1XXA-Strain of muscle, fascia and tendon at  neck level, initial encounter; N30.01-Acute cystitis with hematuria;  U07.1-COVID-19; J12.82-Pneumonia due to Coronavirus disease 2019        COMPARISON: 01/01/2021      TECHNIQUE: Color Doppler imaging was used with compression and  augmentation to evaluate the lower extremity deep venous system.     FINDINGS:   There is patent spontaneous flow from the common femoral vein through  the posterior tibial veins.  There was no internal clot or area of noncompressibility.  Normal augmentation was elicited where applicable.       Impression:      No DVT in the lower extremities on today's exam.      This report was finalized on 1/27/2021 1:35 PM by Dr. Wesly Reynolds MD.       CT Angiogram Neck [[id number]] Collected: 01/26/21 1215     Updated: 01/26/21 1222    Narrative:      EXAM:    CT Angiography Neck With Intravenous Contrast     EXAM DATE:    1/26/2021 11:36 AM     CLINICAL HISTORY:    neck pain in the area of the tunneled hemodialysis; E87.2-Acidosis;  N18.5-Chronic kidney disease, stage 5; S16.1XXA-Strain of muscle, fascia  and tendon at neck level, initial encounter; N30.01-Acute cystitis with  hematuria; U07.1-COVID-19; J12.82-Pneumonia due to Coronavirus disease  2019     TECHNIQUE:    Axial computed tomographic angiography images of the neck with  intravenous contrast.  This CT exam was performed using one or more of  the following dose reduction techniques:  automated exposure control,  adjustment of the mA and/or kV according to  patient size, and/or use of  iterative reconstruction technique.    MIP reconstructed images were created and reviewed.     COMPARISON:    C-spine CT 01/25/2021     FINDINGS:      VASCULATURE:    Right common carotid artery:  Unremarkable.  No significant stenosis.   No dissection or occlusion.    Right internal carotid artery:  Minimal calcified plaque is noted of  the right carotid bulb.  Extracranial segment is patent with no  significant stenosis.  No dissection or occlusion.    Right external carotid artery:  Unremarkable.  No occlusion.    Right vertebral artery:  Unremarkable.  No significant stenosis.  No  dissection or occlusion.       Left common carotid artery:  Unremarkable.  No significant stenosis.   No dissection or occlusion.    Left internal carotid artery:  Unremarkable.  Extracranial segment is  patent with no significant stenosis.  No dissection or occlusion.    Left external carotid artery:  Unremarkable.  No occlusion.    Left vertebral artery:  Unremarkable.  No significant stenosis.  No  dissection or occlusion.    Other vasculature:  Three-vessel arch configuration is noted.   Expected small amounts of air seen in the venous system from venous  injection.      NECK:    Bones/joints:  No acute fracture.  No dislocation.    Soft tissues:  There is a mild degree of nonspecific soft tissue edema  of the bilateral neck soft tissues.  No mass.    Lymph nodes:  No cervical lymphadenopathy by CT size criteria.    Lung apices:  Emphysema of the upper lobes again noted.    Tubes, lines and devices:  The right tunneled hemodialysis catheter is  noted with no abnormalities evident on CT.  No abnormal fluid  collections or extensive hematoma identified surrounding the dialysis  catheter.    Other findings:  No enhancing fluid collection or abscess identified.      CAROTID STENOSIS REFERENCE USING NASCET CRITERIA:    % ICA stenosis = (1 - narrowest ICA diameter/diameter of distal  cervical ICA) x 100.     Mild - <50% stenosis.    Moderate - 50-69% stenosis.    Severe - 70-94% stenosis.    Near occlusion - 95-99% stenosis.    Occluded - 100% stenosis.       Impression:      1.  Trace plaque right carotid system.  2.  No levels of significant stenosis or occlusion. No dissection.  3.  Emphysema upper lungs.  4.  No abnormal fluid collections or hematomas identified in the right  neck soft tissues or surrounding the hemodialysis catheter. This area is  somewhat limited due to concentration of contrast.     This report was finalized on 1/26/2021 12:20 PM by Dr. Maxi Lowry MD.       CT Chest Pulmonary Embolism [[id number]] Collected: 01/25/21 1738     Updated: 01/25/21 1743    Narrative:      EXAM:    CT Angiography Chest With Intravenous Contrast     EXAM DATE:    1/25/2021 4:51 PM     CLINICAL HISTORY:    PE suspected, low/intermediate prob, positive D-dimer; U07.1-COVID-19;  E87.2-Acidosis; N18.5-Chronic kidney disease, stage 5; S16.1XXA-Strain  of muscle, fascia and tendon at neck level, initial encounter;  N30.01-Acute cystitis with hematuria     TECHNIQUE:    Axial computed tomographic angiography images of the chest with  intravenous contrast.  This CT exam was performed using one or more of  the following dose reduction techniques:  automated exposure control,  adjustment of the mA and/or kV according to patient size, and/or use of  iterative reconstruction technique.    MIP reconstructed images were created and reviewed.     COMPARISON:    12/31/2020     FINDINGS:    Pulmonary arteries:  Unremarkable.  No pulmonary embolism.    Aorta:  No acute findings.  No thoracic aortic aneurysm.    Lungs:  Centrilobular emphysema is stable.  Minimal areas of  groundglass airspace disease in the right lower lobe likely reflect a  mild degree of reexpansion edema given previous atelectasis and less  location. Atypical pneumonia not excluded.  No mass.    Pleural space:  Interval resolution of right pleural effusion.   No  pneumothorax.    Heart:  Unremarkable.  No cardiomegaly.  No significant pericardial  effusion.  No evidence of RV dysfunction.    Bones/joints:  No acute fracture.  No dislocation.    Soft tissues:  Unremarkable.    Lymph nodes:  Retroperitoneal lymphadenopathy is again noted.    Kidneys and ureters:  Severe and chronic appearing bilateral  hydronephrosis is stable from the previous exam.       Impression:      1.  Resolution of right pleural effusion with either minimal reexpansion  edema of the right lower lobe versus nonspecific pneumonitis including  COVID associated pneumonia.  2.  No PE identified.  3.  Emphysema is stable.  4.  Stable severe and chronic appearing hydronephrosis and  retroperitoneal lymphadenopathy.     This report was finalized on 1/25/2021 5:41 PM by Dr. Maxi Lowry MD.       XR Chest 1 View [[id number]] Collected: 01/25/21 1623     Updated: 01/25/21 1626    Narrative:      EXAM:    XR Chest, 1 View     EXAM DATE:    1/25/2021 3:36 PM     CLINICAL HISTORY:    COVID-19; U07.1-COVID-19; E87.2-Acidosis; N18.5-Chronic kidney  disease, stage 5; S16.1XXA-Strain of muscle, fascia and tendon at neck  level, initial encounter; N30.01-Acute cystitis with hematuria     TECHNIQUE:    Frontal view of the chest.     COMPARISON:    12/31/2020     FINDINGS:    Lungs:  Unremarkable.  No consolidation.    Pleural space:  Unremarkable.  No pneumothorax.    Heart:  Heart size within normal limits.    Mediastinum:  Unremarkable.    Bones/joints:  Unremarkable.    Soft tissues:  Edema has essentially resolved since the prior exam.    Tubes, lines and devices:  Right tunneled dialysis catheter  positioning is stable.       Impression:        No acute cardiopulmonary findings identified.     This report was finalized on 1/25/2021 4:23 PM by Dr. Maxi Lowry MD.       CT Cervical Spine Without Contrast [[id number]] Collected: 01/25/21 1503     Updated: 01/25/21 1508    Narrative:      EXAM:    CT Cervical  Spine Without Intravenous Contrast     EXAM DATE:    1/25/2021 2:21 PM     CLINICAL HISTORY:    Acute neck pain     TECHNIQUE:    Axial computed tomography images of the cervical spine without  intravenous contrast.  Sagittal and coronal reformatted images were  created and reviewed.  This CT exam was performed using one or more of  the following dose reduction techniques:  automated exposure control,  adjustment of the mA and/or kV according to patient size, and/or use of  iterative reconstruction technique.     COMPARISON:    No relevant prior studies available.     FINDINGS:    Artifacts:  Motion artifact is noted throughout portions of the exam  causing mild limitation.    Vertebrae:  Unremarkable.  No acute fracture.    Discs/spinal canal/neural foramina:  Degenerative disc disease C4/5  with posterior disc osteophyte complex with mild central canal stenosis.   Disc osteophyte complex C5/6 with mild central canal and neural  foraminal stenosis.  Posterior disc protrusion at C3/4 eccentric to  right with mild central canal stenosis.    Soft tissues:  Unremarkable.    Lung apices:  Emphysema of the partially imaged upper lungs.       Impression:      1. Degenerative changes and superimposed disc protrusion as detailed  above involving C3/4 through C5/6 levels with mild stenosis.  2.  No acute fracture or traumatic malalignment.  3.  Other nonacute findings detailed above. Exam somewhat limited due to  patient motion artifact.     This report was finalized on 1/25/2021 3:06 PM by Dr. Maxi Lowry MD.              Operative/Procedure Notes (most recent note)      Khoa Carl MD at 01/29/21 0931          OPERATIVE NOTE    Patient Name:  Mario Nair  YOB: 1975  [id number]    Date of Surgery:  1/29/2021      PREOPERATIVE DIAGNOSIS: End-stage renal disease with need for dialysis access, gram-negative bacteremia      POSTOPERATIVE DIAGNOSIS: Same        PROCEDURE PERFORMED: Left internal  jugular tunneled dialysis catheter placement under fluoroscopic and ultrasound guidance       SURGEON: Khoa Carl MD       Circulator: Aziza Houser RN; Annika Licea RN  Scrub Person: Roro Clark  Assistant: Brien Guerrero        SPECIMENS: None        ANESTHESIA: General.  Local       FINDINGS:   1.  15 [place] by 27 cm Garcia catheter placed in the left internal jugular vein       INDICATIONS: The patient is a 45 y.o. male who presented with end-stage renal disease and gram-negative bacteremia.  I removed his prior right internal jugular tunneled dialysis catheter several days ago.  Risks and benefits of replacement of tunneled dialysis catheter was discussed with the patient.       DESCRIPTION OF PROCEDURE:      After obtaining informed consent, the patient was taken to the operating room and placed in supine  position. After appropriate DVT and antibiotic prophylaxis, general anesthesia was induced.  The neck and upper chest were prepped and draped in a sterile manner prepped amount was performed    Patient was placed in Trendelenburg position.  Ultrasound was used to identify the left internal jugular vein and carotid arteries, both appeared patent.  Local anesthetic was administered to the surrounding area.  Micropuncture needle was utilized to access the left internal jugular vein and wire was passed into the vein.  This took 1 attempt.  Needle was removed and 4 [place] sheath was passed over the wire into the left internal jugular vein.  Wire was removed and the Garcia catheter kit wire was then advanced into the left internal jugular vein.  Positioning was confirmed with fluoroscopy.  4 [place] sheath was removed and a 1.5 cm incision was made in the left neck at the site of the wire insertion site.  Approximately catheter length was measured and an estimated utilizing fluoroscopy.  An exit site was identified in the left upper chest.  The anticipated tract was anesthetized.  1 cm  incision was made at the estimated exit site.  Tunneler was used to connect the tissues between the 2 incisions and the soft tissue was dilated.  Catheter was passed through the subcutaneous tunnel into position with the cuff just overlying the clavicle.  The soft tissues at the wire insertion site were serially dilated and then combination dilator and sheath was advanced into place under fluoroscopic guidance.  Catheter was advanced into position under fluoroscopic guidance and the sheath was removed.  Catheter jermain back and flushed appropriately.  It was flushed with heparinized saline.  Skin incisions were closed with Monocryl.  Catheter was sutured in place with nylon sutures.  Sterile dressing placed  All sponge and needle counts were correct times two at the completion of the procedure. The patient recovered from anesthesiaand was transported to the PACU  in stable condition.      Khoa Carl MD  2021  10:00 EST          Electronically signed by Khoa Carl MD at 21 1009          Physician Progress Notes (most recent note)      Obi Arvizu MD at 21 0902              [facility]     Patient Identification:  Name:  Mario Nair  Age:  45 y.o.  Sex:  male  :  1975  MRN:  [id number]  Visit Number:  [id number]  ROOM: [address] [place] [address]     Primary Care Provider:  Danny Rebolledo MD    Length of stay in inpatient status:  10    Subjective     Chief Compliant:    Chief Complaint   Patient presents with   • Neck Pain   • Weakness - Generalized     History of Presenting Illness:    Patient stable today, overnight Hgb low and received 1U PRBC's, no evidence of bleeding, no new complaints, continued on Abx, will complete , can't come out of isolation for [facility] until , have ordered PT/OT evals to assess placement needs, still working on outpatient HD chair as well, he denies any fevers or chills. He mentioned wanting to go home and we  discussed he is receiving blood transfusions, IV Abx, and he doesn't have a dialysis chair set up yet but he is considering leaving [facility].  He will let us know what he decides after his transfusion.   Objective     Current Hospital Meds:alteplase (CATHFLO) INTRACATHETER injection, 2 mg, Intracatheter, Once  alteplase (CATHFLO) INTRACATHETER injection, 2 mg, Intracatheter, Once  apixaban, 5 mg, Oral, Q12H  aspirin, 81 mg, Oral, Daily  atorvastatin, 40 mg, Oral, Nightly  calcium acetate, 667 mg, Oral, TID With Meals  carvedilol, 12.5 mg, Oral, BID With Meals  cefTRIAXone, 2 g, Intravenous, Q24H  epoetin mc/mc-epbx, 10,000 Units, Subcutaneous, Once per day on Mon Wed Fri  isosorbide mononitrate, 30 mg, Oral, QAM  lactobacillus acidophilus, 1 capsule, Oral, Daily  lidocaine, 1 patch, Transdermal, Q24H  nicotine, 1 patch, Transdermal, Q24H         Current Antimicrobial Therapy:  Anti-Infectives (From admission, onward)    Ordered     Dose/Rate Route Frequency Start Stop    02/02/21 1114  cefTRIAXone (ROCEPHIN) 2 g/100 mL 0.9% NS IVPB (MBP)     Gaby Reyes, PharmD reviewed the order on 02/03/21 1156.   Ordering Provider: Obi Arvizu MD    2 g  over 30 Minutes Intravenous Every 24 Hours 02/02/21 1200 02/09/21 2359    01/27/21 1013  cefTRIAXone (ROCEPHIN) 2 g/100 mL 0.9% NS IVPB (MBP)     Ordering Provider: Khoa Carl MD    2 g  over 30 Minutes Intravenous Every 24 Hours 01/27/21 1100 02/02/21 1142    01/26/21 1046  gentamicin (GARAMYCIN) 190 mg in sodium chloride 0.9 % 100 mL IVPB     Note to Pharmacy: Separate Administration Time With Ampicillin and Other Penicillins (Ampicillin / Penicillins deactivate gentamicin when infused together).   Ordering Provider: Tiffanie Lynn MD    3 mg/kg × 63 kg  over 30 Minutes Intravenous Once 01/26/21 1200 01/26/21 1226    01/25/21 1926  cefepime (MAXIPIME) 2 g/100 mL 0.9% NS (mbp)     Ordering Provider: Sena Schwartz APRN    2 g  200 mL/hr over 30 Minutes  Intravenous Once 01/25/21 2100 01/26/21 0031    01/25/21 1927  vancomycin 1250 mg/250 mL 0.9% NS IVPB ([facility])     Ordering Provider: Opal Ocampo MD    20 mg/kg × 68 kg  over 90 Minutes Intravenous Once 01/25/21 2100 01/26/21 0212        Current Diuretic Therapy:  Diuretics (From admission, onward)    None        ----------------------------------------------------------------------------------------------------------------------  Vital Signs:  Temp:  [97.3 °F (36.3 °C)-99.9 °F (37.7 °C)] 97.3 °F (36.3 °C)  Heart Rate:  [] 98  Resp:  [16-18] 18  BP: (109-126)/(60-84) 109/69  SpO2:  [97 %-99 %] 98 %  on   ;   Device (Oxygen Therapy): room air  Body mass index is 19.81 kg/m².    Wt Readings from Last 3 Encounters:   02/04/21 70 kg (154 lb 4.8 oz)   12/31/20 67.5 kg (148 lb 12.8 oz)   12/08/20 78.5 kg (173 lb)     Intake & Output (last 3 days)       02/01 0701 - 02/02 0700 02/02 0701 - 02/03 0700 02/03 0701 - 02/04 0700 02/04 0701 - 02/05 0700    P.O. 1800 1320 1440     I.V. (mL/kg) 110 (1.6) 98.3 (1.4) 170 (2.4)     Total Intake(mL/kg) 1910 (28.6) 1418.3 (20.2) 1610 (23)     Urine (mL/kg/hr) 0 (0) 150 (0.1)      Other 2000       Total Output 2000 150      Net -90 +1268.3 +1610             Urine Unmeasured Occurrence 2 x 2 x 4 x         Diet Regular; Low Potassium, Renal, Daily Fluid Restriction; 1000 mL Fluid Per Day  ----------------------------------------------------------------------------------------------------------------------  Physical exam:  This physical exam has been personally performed remotely in the unit aided by real-time audio/visual communication tools. RN present at bedside during this exam and assisted during exam. The use of a video visit has been reviewed with the patient and verbal informed consent has been obtained.      Physical Exam:  General: Patient appears awake, alert, and in no acute distress.  Head: Normocephalic, atraumatic  Eyes: EOMI. Conjunctivae and sclerae  normal.  Ears: Ears appear intact with no abnormalities noted.   Neck: Trachea midline. No obvious JVD.  Heart: Tele reveals regular rate and rhythm  Lungs: Respirations appear to be regular, even and unlabored with no signs of respiratory distress. No audible wheezing. On room air  Abdomen: No obvious abdominal distension.  MS: Muscle tone appears normal. No gross deformities.  Extremities: No clubbing, cyanosis or edema noted.  Skin: No visible bleeding, bruising, or rash.  Neurologic: Alert and oriented x3. No gross focal deficits.     *Exam unchanged today 2/4  ----------------------------------------------------------------------------------------------------------------------  Results from last 7 days   Lab Units 02/04/21 0417 02/04/21  0248 02/04/21 0117 02/03/21 0159 02/02/21 0358   CRP mg/dL  --   --  4.69* 5.44* 5.54*   WBC 10*3/mm3  --  5.77  --  6.16 6.10   HEMOGLOBIN g/dL 6.9* 6.7*  --  7.4* 7.1*   HEMATOCRIT %  --  21.6*  --  24.4* 23.8*   MCV fL  --  92.7  --  92.8 91.9   MCHC g/dL  --  31.0*  --  30.3* 29.8*   PLATELETS 10*3/mm3  --  103*  --  109* 100*         Results from last 7 days   Lab Units 02/04/21 0117 02/03/21 0159 02/02/21 0358   SODIUM mmol/L 130* 127* 125*   POTASSIUM mmol/L 4.9 5.4* 4.8   MAGNESIUM mg/dL 1.9 2.1 2.1   CHLORIDE mmol/L 92* 89* 89*   CO2 mmol/L 28.0 25.1 28.0   BUN mg/dL 73* 117* 75*   CREATININE mg/dL 3.63* 5.73* 3.91*   EGFR IF NONAFRICN AM mL/min/1.73 18* 11* 17*   CALCIUM mg/dL 8.4* 8.5* 8.5*   PHOSPHORUS mg/dL 3.9 4.0 2.7   GLUCOSE mg/dL 134* 146* 132*   ALBUMIN g/dL 2.61* 2.60* 2.63*   BILIRUBIN mg/dL 0.2 0.2 0.3   ALK PHOS U/L 229* 231* 246*   AST (SGOT) U/L 33 25 30   ALT (SGPT) U/L 18 12 12   Estimated Creatinine Clearance: 25.7 mL/min (A) (by C-G formula based on SCr of 3.63 mg/dL (H)).  No results found for: AMMONIA  Results from last 7 days   Lab Units 02/04/21  0117 02/03/21  0159 02/02/21  0358   CK TOTAL U/L 13* 14* 11*             Glucose   Date/Time  Value Ref Range Status   02/04/2021 0756 108 70 - 130 mg/dL Final   02/03/2021 1249 129 70 - 130 mg/dL Final   02/02/2021 1856 116 70 - 130 mg/dL Final   02/02/2021 1741 146 (H) 70 - 130 mg/dL Final   02/02/2021 1022 149 (H) 70 - 130 mg/dL Final   02/02/2021 0633 97 70 - 130 mg/dL Final   02/01/2021 1916 144 (H) 70 - 130 mg/dL Final   02/01/2021 1625 125 70 - 130 mg/dL Final     Lab Results   Component Value Date    TSH 2.380 11/14/2020    FREET4 0.73 (L) 02/03/2021     No results found for: PREGTESTUR, PREGSERUM, HCG, HCGQUANT  Pain Management Panel     Pain Management Panel Latest Ref Rng & Units 1/25/2021 12/31/2020    AMPHETAMINES SCREEN, URINE Negative Negative Negative    BARBITURATES SCREEN Negative Negative Negative    BENZODIAZEPINE SCREEN, URINE Negative Negative Negative    BUPRENORPHINEUR Negative Negative Negative    COCAINE SCREEN, URINE Negative Negative Negative    METHADONE SCREEN, URINE Negative Negative Negative        Brief Urine Lab Results  (Last result in the past 365 days)      Color   Clarity   Blood   Leuk Est   Nitrite   Protein   CREAT   Urine HCG        01/25/21 1505 Yellow Turbid Large (3+) Large (3+) Positive 100 mg/dL (2+)             Blood Culture   Date Value Ref Range Status   01/30/2021 No growth at 5 days  Final   01/29/2021 No growth at 5 days  Final     No results found for: URINECX  No results found for: WOUNDCX  No results found for: STOOLCX  No results found for: RESPCX  No results found for: AFBCX  Results from last 7 days   Lab Units 02/04/21  0117 02/03/21  0159 02/02/21  0358 02/01/21  0513 01/31/21  0551 01/30/21  0420 01/29/21  0417   CRP mg/dL 4.69* 5.44* 5.54* 6.95* 10.42* 4.65* 9.90*     I have personally looked at the labs and they are summarized above.  ----------------------------------------------------------------------------------------------------------------------  Detailed radiology reports for the last 24 hours:    Imaging Results (Last 24 Hours)     ** No  results found for the last 24 hours. 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  - Admission labs showed NML WBC count, CRP 13, lactate 0.7, covid +, UA w/ culture showing infection and Bcx's growing Serratia sp., Repeat Bcx's NGTD  - CTPE showed no PE, possible covid PNA  - Gen Surgery consulted and removed R TDC and replaced RIJ on admission, on 1/29 L TDC placed  - ID consulted; Following  - Continue high-dose Ceftriaxone through 2/9  - On Eliquis now for D-dimer > 20 and underlying covid diagnosis  - Continue APAP PRN for fevers  - Monitoring on tele, no current 02 needs  - SW assisting, will finish Abx on 2/9 before he could go to [facility], have ordered PT/OT to evaluate need for placement, also he may elect to leave AMA today.     #ESRD on HD c/b Medical Non-adherence and Hyperkalemia, also anemia requiring transfusions  - Reportedly hadn't had dialysis > 1 month on admission, Cr 15, K 6.1  - CT showed stable severe chronic hydronephrosis and retroperitoneal lymphadenopathy  - Consult Nephrology; Following for HD, HyperK resolved, have placed patient on Epo  - Trend Hgb, transfuse as indicated  - SW working on HD chair    #HTN/HLD  - Echo 1/27 w/ LVEF 61-65%, NML LV function, diastolic dysfunction, no effusion, no vegetations noted.  - Continue home ASA 81, statin  - Continue home Coreg, Imdur     #Emphysema w/o COPD exacerbation  - Noted on CT, duonebs PRN    #Hx HBV/HCV  - Supportive Care    #Medical Non-adherence  - Patient's frequent leaving AMA and non-adherence w/ home medical therapy will likely drastically affect both long and short term morbidity and mortality.     F: Oral  E: Monitor & Replace PRN  N: Renal  Ppx: On  Eliquis  Code: Full Code     Dispo: Pending clinical improvement and completion of Abx, PT/OT evals pending, unlikely to go to [facility] at this point, PT/OT evals pending for placement recs, still no outpatient plan for HD chair yet either, possibly leaving AMA later today.     *This patient is considered high risk due to sepsis, acute UTI, covid infection, hyperkalemia, medical non-adherence.    VTE Prophylaxis:   Mechanical Order History:     None      Pharmalogical Order History:      Ordered     Dose Route Frequency Stop    21 0830  apixaban (ELIQUIS) tablet 5 mg      5 mg PO Every 12 Hours Scheduled 21 0859    21 0928  heparin (porcine) 5000 UNIT/ML injection  Status:  Discontinued      -- -- Continuous PRN 21 1000    21 205  heparin (porcine) injection 1,000 Units      1,000 Units IK Once 21 1920  heparin (porcine) 5000 UNIT/ML injection 4,100 Units      60 Units/kg IV Once 21 21521 1920  heparin 94784 units/250 mL (100 units/mL) in 0.45 % NaCl infusion  8.16 mL/hr,   Status:  Discontinued      12 Units/kg/hr IV Titrated 21 0830    21 1920  heparin (porcine) 5000 UNIT/ML injection 4,100 Units  Status:  Discontinued      60 Units/kg IV As Needed 21 0830    21 1920  heparin (porcine) 5000 UNIT/ML injection 2,000 Units  Status:  Discontinued      30 Units/kg IV As Needed 21 0830              Obi Arvizu MD  [facility]  21  09:02 EST    Electronically signed by Obi Arvizu MD at 21 0930          Consult Notes (most recent note)      Tiffanie Lynn MD at 21 1416      Consult Orders    1. Inpatient Infectious Diseases Consult [[id number]] ordered by Ewelina Bynum, Timoteo at 21 1129                       INFECTIOUS DISEASE CONSULTATION REPORT        Patient Identification:  Name:  Mario Nair  Age:  45 y.o.  Sex:  male  :  1975  MRN:  [id number]    Visit Number:  [id number]  Primary Care Physician:  Danny Rebolledo MD       LOS: 1 day        Subjective       Subjective     History of present illness:      Thank you Dr. Ocampo for allowing us to participate in the care of your patient.  As you well know, Mr. Mario Nair is a 45 y.o. male with past medical history significant for COPD, endocarditis, end-stage renal disease on hemodialysis, hepatitis C, hepatitis B, IV drug abuse, medical noncompliance, who presented to [facility] Emergency Department on 1/25/2021 for neck pain.  WBC normal.  CRP 15.81.  Procalcitonin 25.39.  Lactic acid normal.  Urinalysis positive with culture preliminary reporting greater than 100,000 colonies of gram-negative bacilli.  Blood cultures from 1/25/2021 2 out of 2 sets positive for Serratia.  COVID-19 PCR positive.  Hepatitis C reactive.  CT of the C-spine reports degenerative changes and mild stenosis.  X-ray of the chest unremarkable.  CT of the chest reports resolution of right pleural effusion with minimal reexpansion edema of the right lower lobe versus nonspecific pneumonitis including COVID-19 associated pneumonia, emphysema, stable severe and chronic appearing hydronephrosis and retroperitoneal lymphadenopathy.  CT angiogram of the neck reports emphysema, no abnormal fluid collections or hematomas in the right neck soft tissues or surrounding hemodialysis catheter.      Infectious Disease consultation was requested for antimicrobial management.      ---------------------------------------------------------------------------------------------------------------------     Review Of Systems:    Constitutional: no fever, chills and night sweats. No appetite change or unexpected weight change. No fatigue.  Eyes: no eye drainage, itching or redness.  HEENT: no mouth sores, dysphagia or nose bleed.  Respiratory: no for shortness of breath, cough or production of sputum.  Cardiovascular: no chest pain,  no palpitations, no orthopnea.  Gastrointestinal: no nausea, vomiting or diarrhea. No abdominal pain, hematemesis or rectal bleeding.  Genitourinary: no dysuria or polyuria.  Hematologic/lymphatic: no lymph node abnormalities, no easy bruising or easy bleeding.  Musculoskeletal: Neck pain.  Skin: No rash and no itching.  Neurological: no loss of consciousness, no seizure, no headache.  Behavioral/Psych: no depression or suicidal ideation.  Endocrine: no hot flashes.  Immunologic: negative.    ---------------------------------------------------------------------------------------------------------------------     Past Medical History    Past Medical History:   Diagnosis Date   • COPD (chronic obstructive pulmonary disease) (CMS/[facility])    • Endocarditis    • ESRD (end stage renal disease) (CMS/[facility])    • Hepatitis C    • Infectious viral hepatitis    • IV drug abuse (CMS/[facility])    • Unable to read or write        Past Surgical History    Past Surgical History:   Procedure Laterality Date   • CENTRAL VENOUS CATHETER TUNNELED INSERTION DOUBLE LUMEN Right     Chest for hemodialysis       Family History    Family History   Problem Relation Age of Onset   • Alcohol abuse Mother    • Hypertension Mother    • Hypertension Other    • Kidney disease Maternal Grandmother        Social History    Social History     Tobacco Use   • Smoking status: Current Every Day Smoker     Packs/day: 0.50     Years: 20.00     Pack years: 10.00     Types: Cigarettes   • Smokeless tobacco: Never Used   Substance Use Topics   • Alcohol use: Not Currently     Frequency: Never     Comment: Quit 18 years ago   • Drug use: Yes     Types: Methamphetamines, IV     Comment: last used on Friday 5/18/2019       Allergies    Penicillins  ---------------------------------------------------------------------------------------------------------------------     Home Medications:    Prior to Admission Medications     Prescriptions Last Dose Informant Patient  Reported? Taking?    amLODIPine (NORVASC) 5 MG tablet 1/24/2021 Pharmacy No Yes    Take 1 tablet by mouth Every Night.    calcium acetate (PHOS BINDER,) 667 MG capsule capsule 1/24/2021 Pharmacy No Yes    Take 1 capsule by mouth 3 (Three) Times a Day With Meals.    carvedilol (COREG) 12.5 MG tablet 1/24/2021 Pharmacy No Yes    Take 1 tablet by mouth 2 (Two) Times a Day With Meals.    hydrALAZINE (APRESOLINE) 10 MG tablet 1/24/2021 Pharmacy No Yes    Take 1 tablet by mouth 3 (Three) Times a Day.    isosorbide mononitrate (IMDUR) 30 MG 24 hr tablet 1/24/2021 Pharmacy No Yes    Take 1 tablet by mouth Every Morning.    pantoprazole (PROTONIX) 40 MG EC tablet 1/24/2021 Pharmacy Yes Yes    Take 40 mg by mouth Daily.        ---------------------------------------------------------------------------------------------------------------------    Objective       Objective     Hospital Scheduled Meds:  alteplase (CATHFLO) INTRACATHETER injection, 2 mg, Intracatheter, Once  alteplase (CATHFLO) INTRACATHETER injection, 2 mg, Intracatheter, Once  amLODIPine, 5 mg, Oral, Nightly  aspirin, 324 mg, Oral, Once  aspirin, 81 mg, Oral, Daily  atorvastatin, 40 mg, Oral, Nightly  calcium acetate, 667 mg, Oral, TID With Meals  carvedilol, 12.5 mg, Oral, BID With Meals  cefepime, 2 g, Intravenous, Q24H  dexamethasone, 6 mg, Oral, Daily With Breakfast    Or  dexamethasone, 6 mg, Intravenous, Daily With Breakfast  hydrALAZINE, 10 mg, Oral, TID  isosorbide mononitrate, 30 mg, Oral, QAM  lactobacillus acidophilus, 1 capsule, Oral, Daily  nicotine, 1 patch, Transdermal, Q24H  pantoprazole, 40 mg, Oral, Daily  sodium chloride, 1,000 mL, Intravenous, Once in Dialysis      heparin (porcine), 12 Units/kg/hr, Last Rate: 14 Units/kg/hr (01/26/21 1207)      ---------------------------------------------------------------------------------------------------------------------   Vital Signs:  Temp:  [97.2 °F (36.2 °C)-98 °F (36.7 °C)] 97.6 °F (36.4  °C)  Heart Rate:  [] 84  Resp:  [14-22] 16  BP: ()/(52-98) 113/67  Mean Arterial Pressure (Non-Invasive) for the past 24 hrs (Last 3 readings):   Noninvasive MAP (mmHg)   01/26/21 1300 77   01/26/21 1217 63   01/26/21 1102 85     SpO2 Percentage    01/26/21 1102 01/26/21 1217 01/26/21 1300   SpO2: 97% 97% 98%     SpO2:  [95 %-100 %] 98 %  on   ;   Device (Oxygen Therapy): room air    Body mass index is 17.83 kg/m².  Wt Readings from Last 3 Encounters:   01/26/21 63 kg (138 lb 14.4 oz)   12/31/20 67.5 kg (148 lb 12.8 oz)   12/08/20 78.5 kg (173 lb)     ---------------------------------------------------------------------------------------------------------------------     Physical Exam:    Deferred due to COVID-19 isolation.  ---------------------------------------------------------------------------------------------------------------------    Results from last 7 days   Lab Units 01/26/21  0343 01/25/21  1941 01/25/21  1738   CK TOTAL U/L 92  --   --    TROPONIN T ng/mL 0.189* 0.160* 0.153*     Results from last 7 days   Lab Units 01/25/21  1138   PROBNP pg/mL 11,870.0*       Results from last 7 days   Lab Units 01/25/21  1515   PH, ARTERIAL pH units 7.257*   PO2 ART mm Hg 98.2   PCO2, ARTERIAL mm Hg 21.7*   HCO3 ART mmol/L 9.7*     Results from last 7 days   Lab Units 01/26/21  0530 01/26/21  0343 01/25/21  1941 01/25/21  1315 01/25/21  1138   CRP mg/dL  --  15.81*  --   --  13.21*   LACTATE mmol/L  --   --   --  0.7  --    WBC 10*3/mm3 5.35  --  7.83  --  8.59   HEMOGLOBIN g/dL 5.6*  --  7.2*  --  7.1*   HEMATOCRIT % 16.9*  --  23.2*  --  22.3*   MCV fL 84.5  --  88.9  --  88.5   MCHC g/dL 33.1  --  31.0*  --  31.8   PLATELETS 10*3/mm3 56*  --  116*  --  121*   INR   --   --  1.53*  --   --      Results from last 7 days   Lab Units 01/26/21 0343 01/25/21 1941 01/25/21  1138   SODIUM mmol/L 124*  --  125*   POTASSIUM mmol/L 3.8 5.3* 6.1*   MAGNESIUM mg/dL 1.6 1.6  --    CHLORIDE mmol/L 85*  --  92*    CO2 mmol/L 17.3*  --  9.9*   BUN mg/dL 108*  --  185*   CREATININE mg/dL 10.02*  --  15.50*   EGFR IF NONAFRICN AM mL/min/1.73 6*  --  3*   CALCIUM mg/dL 6.8*  --  5.8*   GLUCOSE mg/dL 139*  --  136*   ALBUMIN g/dL 3.22*  --  3.20*   BILIRUBIN mg/dL 0.4  --  0.4   ALK PHOS U/L 99  --  136*   AST (SGOT) U/L 10  --  10   ALT (SGPT) U/L 8  --  9   Estimated Creatinine Clearance: 8.4 mL/min (A) (by C-G formula based on SCr of 10.02 mg/dL (H)).  No results found for: AMMONIA    Glucose   Date/Time Value Ref Range Status   01/26/2021 1205 210 (H) 70 - 130 mg/dL Final   01/26/2021 0604 154 (H) 70 - 130 mg/dL Final   01/26/2021 0052 107 70 - 130 mg/dL Final     Lab Results   Component Value Date    HGBA1C 5.20 12/31/2020     Lab Results   Component Value Date    TSH 2.380 11/14/2020    FREET4 0.85 (L) 11/14/2020       Blood Culture   Date Value Ref Range Status   01/25/2021 Gram Negative Bacilli (C)  Preliminary   01/25/2021 Gram Negative Bacilli (C)  Preliminary     Urine Culture   Date Value Ref Range Status   01/25/2021 >100,000 CFU/mL Gram Negative Bacilli (A)  Preliminary     No results found for: WOUNDCX  No results found for: STOOLCX  No results found for: RESPCX  Pain Management Panel     Pain Management Panel Latest Ref Rng & Units 1/25/2021 12/31/2020    AMPHETAMINES SCREEN, URINE Negative Negative Negative    BARBITURATES SCREEN Negative Negative Negative    BENZODIAZEPINE SCREEN, URINE Negative Negative Negative    BUPRENORPHINEUR Negative Negative Negative    COCAINE SCREEN, URINE Negative Negative Negative    METHADONE SCREEN, URINE Negative Negative Negative        I have personally reviewed the above laboratory results.   ---------------------------------------------------------------------------------------------------------------------  Imaging Results (Last 7 Days)     Procedure Component Value Units Date/Time    CT Angiogram Neck [[id number]] Collected: 01/26/21 1215     Updated: 01/26/21 1222     Narrative:      EXAM:    CT Angiography Neck With Intravenous Contrast     EXAM DATE:    1/26/2021 11:36 AM     CLINICAL HISTORY:    neck pain in the area of the tunneled hemodialysis; E87.2-Acidosis;  N18.5-Chronic kidney disease, stage 5; S16.1XXA-Strain of muscle, fascia  and tendon at neck level, initial encounter; N30.01-Acute cystitis with  hematuria; U07.1-COVID-19; J12.82-Pneumonia due to Coronavirus disease  2019     TECHNIQUE:    Axial computed tomographic angiography images of the neck with  intravenous contrast.  This CT exam was performed using one or more of  the following dose reduction techniques:  automated exposure control,  adjustment of the mA and/or kV according to patient size, and/or use of  iterative reconstruction technique.    MIP reconstructed images were created and reviewed.     COMPARISON:    C-spine CT 01/25/2021     FINDINGS:      VASCULATURE:    Right common carotid artery:  Unremarkable.  No significant stenosis.   No dissection or occlusion.    Right internal carotid artery:  Minimal calcified plaque is noted of  the right carotid bulb.  Extracranial segment is patent with no  significant stenosis.  No dissection or occlusion.    Right external carotid artery:  Unremarkable.  No occlusion.    Right vertebral artery:  Unremarkable.  No significant stenosis.  No  dissection or occlusion.       Left common carotid artery:  Unremarkable.  No significant stenosis.   No dissection or occlusion.    Left internal carotid artery:  Unremarkable.  Extracranial segment is  patent with no significant stenosis.  No dissection or occlusion.    Left external carotid artery:  Unremarkable.  No occlusion.    Left vertebral artery:  Unremarkable.  No significant stenosis.  No  dissection or occlusion.    Other vasculature:  Three-vessel arch configuration is noted.   Expected small amounts of air seen in the venous system from venous  injection.      NECK:    Bones/joints:  No acute fracture.  No  dislocation.    Soft tissues:  There is a mild degree of nonspecific soft tissue edema  of the bilateral neck soft tissues.  No mass.    Lymph nodes:  No cervical lymphadenopathy by CT size criteria.    Lung apices:  Emphysema of the upper lobes again noted.    Tubes, lines and devices:  The right tunneled hemodialysis catheter is  noted with no abnormalities evident on CT.  No abnormal fluid  collections or extensive hematoma identified surrounding the dialysis  catheter.    Other findings:  No enhancing fluid collection or abscess identified.      CAROTID STENOSIS REFERENCE USING NASCET CRITERIA:    % ICA stenosis = (1 - narrowest ICA diameter/diameter of distal  cervical ICA) x 100.    Mild - <50% stenosis.    Moderate - 50-69% stenosis.    Severe - 70-94% stenosis.    Near occlusion - 95-99% stenosis.    Occluded - 100% stenosis.       Impression:      1.  Trace plaque right carotid system.  2.  No levels of significant stenosis or occlusion. No dissection.  3.  Emphysema upper lungs.  4.  No abnormal fluid collections or hematomas identified in the right  neck soft tissues or surrounding the hemodialysis catheter. This area is  somewhat limited due to concentration of contrast.     This report was finalized on 1/26/2021 12:20 PM by Dr. Maxi Lowry MD.       CT Chest Pulmonary Embolism [[id number]] Collected: 01/25/21 1738     Updated: 01/25/21 1743    Narrative:      EXAM:    CT Angiography Chest With Intravenous Contrast     EXAM DATE:    1/25/2021 4:51 PM     CLINICAL HISTORY:    PE suspected, low/intermediate prob, positive D-dimer; U07.1-COVID-19;  E87.2-Acidosis; N18.5-Chronic kidney disease, stage 5; S16.1XXA-Strain  of muscle, fascia and tendon at neck level, initial encounter;  N30.01-Acute cystitis with hematuria     TECHNIQUE:    Axial computed tomographic angiography images of the chest with  intravenous contrast.  This CT exam was performed using one or more of  the following dose reduction  techniques:  automated exposure control,  adjustment of the mA and/or kV according to patient size, and/or use of  iterative reconstruction technique.    MIP reconstructed images were created and reviewed.     COMPARISON:    12/31/2020     FINDINGS:    Pulmonary arteries:  Unremarkable.  No pulmonary embolism.    Aorta:  No acute findings.  No thoracic aortic aneurysm.    Lungs:  Centrilobular emphysema is stable.  Minimal areas of  groundglass airspace disease in the right lower lobe likely reflect a  mild degree of reexpansion edema given previous atelectasis and less  location. Atypical pneumonia not excluded.  No mass.    Pleural space:  Interval resolution of right pleural effusion.  No  pneumothorax.    Heart:  Unremarkable.  No cardiomegaly.  No significant pericardial  effusion.  No evidence of RV dysfunction.    Bones/joints:  No acute fracture.  No dislocation.    Soft tissues:  Unremarkable.    Lymph nodes:  Retroperitoneal lymphadenopathy is again noted.    Kidneys and ureters:  Severe and chronic appearing bilateral  hydronephrosis is stable from the previous exam.       Impression:      1.  Resolution of right pleural effusion with either minimal reexpansion  edema of the right lower lobe versus nonspecific pneumonitis including  COVID associated pneumonia.  2.  No PE identified.  3.  Emphysema is stable.  4.  Stable severe and chronic appearing hydronephrosis and  retroperitoneal lymphadenopathy.     This report was finalized on 1/25/2021 5:41 PM by Dr. Maxi Lowry MD.       XR Chest 1 View [[id number]] Collected: 01/25/21 1623     Updated: 01/25/21 1626    Narrative:      EXAM:    XR Chest, 1 View     EXAM DATE:    1/25/2021 3:36 PM     CLINICAL HISTORY:    COVID-19; U07.1-COVID-19; E87.2-Acidosis; N18.5-Chronic kidney  disease, stage 5; S16.1XXA-Strain of muscle, fascia and tendon at neck  level, initial encounter; N30.01-Acute cystitis with hematuria     TECHNIQUE:    Frontal view of the chest.      COMPARISON:    12/31/2020     FINDINGS:    Lungs:  Unremarkable.  No consolidation.    Pleural space:  Unremarkable.  No pneumothorax.    Heart:  Heart size within normal limits.    Mediastinum:  Unremarkable.    Bones/joints:  Unremarkable.    Soft tissues:  Edema has essentially resolved since the prior exam.    Tubes, lines and devices:  Right tunneled dialysis catheter  positioning is stable.       Impression:        No acute cardiopulmonary findings identified.     This report was finalized on 1/25/2021 4:23 PM by Dr. Maxi Lowry MD.       CT Cervical Spine Without Contrast [[id number]] Collected: 01/25/21 1503     Updated: 01/25/21 1508    Narrative:      EXAM:    CT Cervical Spine Without Intravenous Contrast     EXAM DATE:    1/25/2021 2:21 PM     CLINICAL HISTORY:    Acute neck pain     TECHNIQUE:    Axial computed tomography images of the cervical spine without  intravenous contrast.  Sagittal and coronal reformatted images were  created and reviewed.  This CT exam was performed using one or more of  the following dose reduction techniques:  automated exposure control,  adjustment of the mA and/or kV according to patient size, and/or use of  iterative reconstruction technique.     COMPARISON:    No relevant prior studies available.     FINDINGS:    Artifacts:  Motion artifact is noted throughout portions of the exam  causing mild limitation.    Vertebrae:  Unremarkable.  No acute fracture.    Discs/spinal canal/neural foramina:  Degenerative disc disease C4/5  with posterior disc osteophyte complex with mild central canal stenosis.   Disc osteophyte complex C5/6 with mild central canal and neural  foraminal stenosis.  Posterior disc protrusion at C3/4 eccentric to  right with mild central canal stenosis.    Soft tissues:  Unremarkable.    Lung apices:  Emphysema of the partially imaged upper lungs.       Impression:      1. Degenerative changes and superimposed disc protrusion as detailed  above  involving C3/4 through C5/6 levels with mild stenosis.  2.  No acute fracture or traumatic malalignment.  3.  Other nonacute findings detailed above. Exam somewhat limited due to  patient motion artifact.     This report was finalized on 1/25/2021 3:06 PM by Dr. Maxi Lowry MD.           I have personally reviewed the above radiology results.   ---------------------------------------------------------------------------------------------------------------------      Assessment & Plan        Assessment/Plan       ASSESSMENT:    1.  Sepsis  2.  Pyelonephritis  3.  Serratia bacteremia  4.  COVID-19    PLAN:    Patient presents with neck pain.  WBC normal.  CRP 15.81.  Procalcitonin 25.39.  Lactic acid normal.  Urinalysis positive with culture preliminary reporting greater than 100,000 colonies of gram-negative bacilli.  Blood cultures from 1/25/2021 2 out of 2 sets positive for Serratia.  COVID-19 PCR positive.  Hepatitis C reactive.  CT of the C-spine reports degenerative changes and mild stenosis.  X-ray of the chest unremarkable.  CT of the chest reports resolution of right pleural effusion with minimal reexpansion edema of the right lower lobe versus nonspecific pneumonitis including COVID-19 associated pneumonia, emphysema, stable severe and chronic appearing hydronephrosis and retroperitoneal lymphadenopathy.  CT angiogram of the neck reports emphysema, no abnormal fluid collections or hematomas in the right neck soft tissues or surrounding hemodialysis catheter.    As patient is symptomatic from COVID-19 standpoint recommend to hold remdesivir and Decadron for now.    For now recommend to continue cefepime, pending finalization of culture results, with hopes to de-escalate to Rocephin soon.  Gentamicin 3 mg/kg IV x1 was ordered.  Vancomycin was discontinued for now.  We will continue to follow closely and adjust antibiotic therapy as needed.    Again, thank you Dr. Ocampo for allowing us to participate in the  care of your patient and please feel free to call for any questions you may have.        Code Status:   Code Status and Medical Interventions:   Ordered at: 01/25/21 1707     Code Status:    CPR     Medical Interventions (Level of Support Prior to Arrest):    Full           NICKY Mendoza  01/26/21  14:16 EST    Electronically signed by Tiffanie Lynn MD at 01/27/21 1048

## 2021-02-04 NOTE — PROGRESS NOTES
[facility] HOSPITALIST PROGRESS NOTE     Patient Identification:  Name:  Mario Nair  Age:  45 y.o.  Sex:  male  :  1975  MRN:  [id number]  Visit Number:  [id number]  ROOM: [address] [place] [address]     Primary Care Provider:  Danny Rebolledo MD    Length of stay in inpatient status:  10    Subjective     Chief Compliant:    Chief Complaint   Patient presents with   • Neck Pain   • Weakness - Generalized     History of Presenting Illness:    Patient stable today, overnight Hgb low and received 1U PRBC's, no evidence of bleeding, no new complaints, continued on Abx, will complete , can't come out of isolation for [facility] until , have ordered PT/OT evals to assess placement needs, still working on outpatient HD chair as well, he denies any fevers or chills. He mentioned wanting to go home and we discussed he is receiving blood transfusions, IV Abx, and he doesn't have a dialysis chair set up yet but he is considering leaving [place].  He will let us know what he decides after his transfusion.   Objective     Current Hospital Meds:alteplase (CATHFLO) INTRACATHETER injection, 2 mg, Intracatheter, Once  alteplase (CATHFLO) INTRACATHETER injection, 2 mg, Intracatheter, Once  apixaban, 5 mg, Oral, Q12H  aspirin, 81 mg, Oral, Daily  atorvastatin, 40 mg, Oral, Nightly  calcium acetate, 667 mg, Oral, TID With Meals  carvedilol, 12.5 mg, Oral, BID With Meals  cefTRIAXone, 2 g, Intravenous, Q24H  epoetin mc/mc-epbx, 10,000 Units, Subcutaneous, Once per day on   isosorbide mononitrate, 30 mg, Oral, QAM  lactobacillus acidophilus, 1 capsule, Oral, Daily  lidocaine, 1 patch, Transdermal, Q24H  nicotine, 1 patch, Transdermal, Q24H         Current Antimicrobial Therapy:  Anti-Infectives (From admission, onward)    Ordered     Dose/Rate Route Frequency Start Stop    21 1114  cefTRIAXone (ROCEPHIN) 2 g/100 mL 0.9% NS IVPB (MBP)     Gaby Reyes, PharmD reviewed the order on 21 1156.    Ordering Provider: Obi Arvizu MD    2 g  over 30 Minutes Intravenous Every 24 Hours 02/02/21 1200 02/09/21 2359    01/27/21 1013  cefTRIAXone (ROCEPHIN) 2 g/100 mL 0.9% NS IVPB (MBP)     Ordering Provider: Khoa Carl MD    2 g  over 30 Minutes Intravenous Every 24 Hours 01/27/21 1100 02/02/21 1142    01/26/21 1046  gentamicin (GARAMYCIN) 190 mg in sodium chloride 0.9 % 100 mL IVPB     Note to Pharmacy: Separate Administration Time With Ampicillin and Other Penicillins (Ampicillin / Penicillins deactivate gentamicin when infused together).   Ordering Provider: Tiffanie Lynn MD    3 mg/kg × 63 kg  over 30 Minutes Intravenous Once 01/26/21 1200 01/26/21 1226    01/25/21 1926  cefepime (MAXIPIME) 2 g/100 mL 0.9% NS (mbp)     Ordering Provider: Sena Schwartz APRN    2 g  200 mL/hr over 30 Minutes Intravenous Once 01/25/21 2100 01/26/21 0031    01/25/21 1927  vancomycin 1250 mg/250 mL 0.9% NS IVPB ([facility])     Ordering Provider: Opal Ocampo MD    20 mg/kg × 68 kg  over 90 Minutes Intravenous Once 01/25/21 2100 01/26/21 0212        Current Diuretic Therapy:  Diuretics (From admission, onward)    None        ----------------------------------------------------------------------------------------------------------------------  Vital Signs:  Temp:  [97.3 °F (36.3 °C)-99.9 °F (37.7 °C)] 97.3 °F (36.3 °C)  Heart Rate:  [] 98  Resp:  [16-18] 18  BP: (109-126)/(60-84) 109/69  SpO2:  [97 %-99 %] 98 %  on   ;   Device (Oxygen Therapy): room air  Body mass index is 19.81 kg/m².    Wt Readings from Last 3 Encounters:   02/04/21 70 kg (154 lb 4.8 oz)   12/31/20 67.5 kg (148 lb 12.8 oz)   12/08/20 78.5 kg (173 lb)     Intake & Output (last 3 days)       02/01 0701 - 02/02 0700 02/02 0701 - 02/03 0700 02/03 0701 - 02/04 0700 02/04 0701 - 02/05 0700    P.O. 1800 1320 1440     I.V. (mL/kg) 110 (1.6) 98.3 (1.4) 170 (2.4)     Total Intake(mL/kg) 1910 (28.6) 1418.3 (20.2) 1610 (23)     Urine (mL/kg/hr)  0 (0) 150 (0.1)      Other 2000       Total Output 2000 150      Net -90 +1268.3 +1610             Urine Unmeasured Occurrence 2 x 2 x 4 x         Diet Regular; Low Potassium, Renal, Daily Fluid Restriction; 1000 mL Fluid Per Day  ----------------------------------------------------------------------------------------------------------------------  Physical exam:  This physical exam has been personally performed remotely in the unit aided by real-time audio/visual communication tools. RN present at bedside during this exam and assisted during exam. The use of a video visit has been reviewed with the patient and verbal informed consent has been obtained.      Physical Exam:  General: Patient appears awake, alert, and in no acute distress.  Head: Normocephalic, atraumatic  Eyes: EOMI. Conjunctivae and sclerae normal.  Ears: Ears appear intact with no abnormalities noted.   Neck: Trachea midline. No obvious JVD.  Heart: Tele reveals regular rate and rhythm  Lungs: Respirations appear to be regular, even and unlabored with no signs of respiratory distress. No audible wheezing. On room air  Abdomen: No obvious abdominal distension.  MS: Muscle tone appears normal. No gross deformities.  Extremities: No clubbing, cyanosis or edema noted.  Skin: No visible bleeding, bruising, or rash.  Neurologic: Alert and oriented x3. No gross focal deficits.     *Exam unchanged today 2/4  ----------------------------------------------------------------------------------------------------------------------  Results from last 7 days   Lab Units 02/04/21  0417 02/04/21  0248 02/04/21  0117 02/03/21  0159 02/02/21  0358   CRP mg/dL  --   --  4.69* 5.44* 5.54*   WBC 10*3/mm3  --  5.77  --  6.16 6.10   HEMOGLOBIN g/dL 6.9* 6.7*  --  7.4* 7.1*   HEMATOCRIT %  --  21.6*  --  24.4* 23.8*   MCV fL  --  92.7  --  92.8 91.9   MCHC g/dL  --  31.0*  --  30.3* 29.8*   PLATELETS 10*3/mm3  --  103*  --  109* 100*         Results from last 7 days   Lab  Units 02/04/21  0117 02/03/21  0159 02/02/21  0358   SODIUM mmol/L 130* 127* 125*   POTASSIUM mmol/L 4.9 5.4* 4.8   MAGNESIUM mg/dL 1.9 2.1 2.1   CHLORIDE mmol/L 92* 89* 89*   CO2 mmol/L 28.0 25.1 28.0   BUN mg/dL 73* 117* 75*   CREATININE mg/dL 3.63* 5.73* 3.91*   EGFR IF NONAFRICN AM mL/min/1.73 18* 11* 17*   CALCIUM mg/dL 8.4* 8.5* 8.5*   PHOSPHORUS mg/dL 3.9 4.0 2.7   GLUCOSE mg/dL 134* 146* 132*   ALBUMIN g/dL 2.61* 2.60* 2.63*   BILIRUBIN mg/dL 0.2 0.2 0.3   ALK PHOS U/L 229* 231* 246*   AST (SGOT) U/L 33 25 30   ALT (SGPT) U/L 18 12 12   Estimated Creatinine Clearance: 25.7 mL/min (A) (by C-G formula based on SCr of 3.63 mg/dL (H)).  No results found for: AMMONIA  Results from last 7 days   Lab Units 02/04/21  0117 02/03/21  0159 02/02/21  0358   CK TOTAL U/L 13* 14* 11*             Glucose   Date/Time Value Ref Range Status   02/04/2021 0756 108 70 - 130 mg/dL Final   02/03/2021 1249 129 70 - 130 mg/dL Final   02/02/2021 1856 116 70 - 130 mg/dL Final   02/02/2021 1741 146 (H) 70 - 130 mg/dL Final   02/02/2021 1022 149 (H) 70 - 130 mg/dL Final   02/02/2021 0633 97 70 - 130 mg/dL Final   02/01/2021 1916 144 (H) 70 - 130 mg/dL Final   02/01/2021 1625 125 70 - 130 mg/dL Final     Lab Results   Component Value Date    TSH 2.380 11/14/2020    FREET4 0.73 (L) 02/03/2021     No results found for: PREGTESTUR, PREGSERUM, HCG, HCGQUANT  Pain Management Panel     Pain Management Panel Latest Ref Rng & Units 1/25/2021 12/31/2020    AMPHETAMINES SCREEN, URINE Negative Negative Negative    BARBITURATES SCREEN Negative Negative Negative    BENZODIAZEPINE SCREEN, URINE Negative Negative Negative    BUPRENORPHINEUR Negative Negative Negative    COCAINE SCREEN, URINE Negative Negative Negative    METHADONE SCREEN, URINE Negative Negative Negative        Brief Urine Lab Results  (Last result in the past 365 days)      Color   Clarity   Blood   Leuk Est   Nitrite   Protein   CREAT   Urine HCG        01/25/21 1505 Yellow Turbid  Large (3+) Large (3+) Positive 100 mg/dL (2+)             Blood Culture   Date Value Ref Range Status   01/30/2021 No growth at 5 days  Final   01/29/2021 No growth at 5 days  Final     No results found for: URINECX  No results found for: WOUNDCX  No results found for: STOOLCX  No results found for: RESPCX  No results found for: AFBCX  Results from last 7 days   Lab Units 02/04/21  0117 02/03/21  0159 02/02/21  0358 02/01/21  0513 01/31/21  0551 01/30/21  0420 01/29/21  0417   CRP mg/dL 4.69* 5.44* 5.54* 6.95* 10.42* 4.65* 9.90*     I have personally looked at the labs and they are summarized above.  ----------------------------------------------------------------------------------------------------------------------  Detailed radiology reports for the last 24 hours:    Imaging Results (Last 24 Hours)     ** No results found for the last 24 hours. 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  - Admission labs showed NML WBC count, CRP 13, lactate 0.7, covid +, UA w/ culture showing infection and Bcx's growing Serratia sp., Repeat Bcx's NGTD  - CTPE showed no PE, possible covid PNA  - Gen Surgery consulted and removed R TDC and replaced RIJ on admission, on 1/29 L TDC placed  - ID consulted; Following  - Continue high-dose Ceftriaxone through 2/9  - On Eliquis now for D-dimer > 20 and underlying covid diagnosis  - Continue APAP PRN for fevers  - Monitoring on tele, no current 02 needs  - SW assisting, will finish Abx on 2/9 before he could go to [facility], have ordered PT/OT to evaluate need for placement, also he may elect to leave AMA today.     #ESRD on HD c/b Medical Non-adherence and Hyperkalemia, also anemia requiring  transfusions  - Reportedly hadn't had dialysis > 1 month on admission, Cr 15, K 6.1  - CT showed stable severe chronic hydronephrosis and retroperitoneal lymphadenopathy  - Consult Nephrology; Following for HD, HyperK resolved, have placed patient on Epo  - Trend Hgb, transfuse as indicated  - SW working on HD chair    #HTN/HLD  - Echo 1/27 w/ LVEF 61-65%, NML LV function, diastolic dysfunction, no effusion, no vegetations noted.  - Continue home ASA 81, statin  - Continue home Coreg, Imdur     #Emphysema w/o COPD exacerbation  - Noted on CT, duonebs PRN    #Hx HBV/HCV  - Supportive Care    #Medical Non-adherence  - Patient's frequent leaving AMA and non-adherence w/ home medical therapy will likely drastically affect both long and short term morbidity and mortality.     F: Oral  E: Monitor & Replace PRN  N: Renal  Ppx: On Eliquis  Code: Full Code     Dispo: Pending clinical improvement and completion of Abx, PT/OT evals pending, unlikely to go to [facility] at this point, PT/OT evals pending for placement recs, still no outpatient plan for HD chair yet either, possibly leaving AMA later today.     *This patient is considered high risk due to sepsis, acute UTI, covid infection, hyperkalemia, medical non-adherence.    VTE Prophylaxis:   Mechanical Order History:     None      Pharmalogical Order History:      Ordered     Dose Route Frequency Stop    01/31/21 0830  apixaban (ELIQUIS) tablet 5 mg      5 mg PO Every 12 Hours Scheduled 03/02/21 0859    01/29/21 0928  heparin (porcine) 5000 UNIT/ML injection  Status:  Discontinued      -- -- Continuous PRN 01/29/21 1000    01/25/21 2054  heparin (porcine) injection 1,000 Units      1,000 Units IK Once 01/25/21 2115 01/25/21 1920  heparin (porcine) 5000 UNIT/ML injection 4,100 Units      60 Units/kg IV Once 01/25/21 2159 01/25/21 1920  heparin 86683 units/250 mL (100 units/mL) in 0.45 % NaCl infusion  8.16 mL/hr,   Status:  Discontinued      12 Units/kg/hr IV Titrated  01/31/21 0830 01/25/21 1920  heparin (porcine) 5000 UNIT/ML injection 4,100 Units  Status:  Discontinued      60 Units/kg IV As Needed 01/31/21 0830    01/25/21 1920  heparin (porcine) 5000 UNIT/ML injection 2,000 Units  Status:  Discontinued      30 Units/kg IV As Needed 01/31/21 0830              Obi Arvizu MD  [facility]  02/04/21  09:02 EST

## 2021-02-05 NOTE — PAYOR COMM NOTE
"CONTACT:  PACO TAVERAS MSN, APRN  UTILIZATION MANAGEMENT DEPT.  [facility]  [address] [facility], [address]  PHONE:  [phone]  FAX: [phone]    PT DISCHARGED TO HOME ON 21.    REFER TO AUTH # [id number]    Mario Nair (45 y.o. Male)     Date of Birth Social Security Number Address Home Phone MRN    1975  [address] [place] [address] [id number]    [employer] Mar[employer] Status          None        Admission Date Admission Type Admitting Provider Attending Provider Department, Room/Bed    21 Emergency Opal Ocampo MD  [facility], 3321/1P    Discharge Date Discharge Disposition Discharge Destination        2021 Left Against Medical Advice              Attending Provider: (none)   Allergies: Penicillins    Isolation: Enh Drop/Con   Infection: Hepatitis B (19), COVID (confirmed) (21)   Code Status: Prior    Ht: 188 cm (74\")   Wt: 70 kg (154 lb 4.8 oz)    Admission Cmt: None   Principal Problem: Sepsis due to urinary tract infection (CMS/HCC) [A41.9,N39.0]                 Active Insurance as of 2021     Primary Coverage     Payor Plan Insurance Group Employer/Plan Group    [employer] [place] [employer] [place]ICA[place]      Payor Plan Address Payor Plan Phone Number Payor Plan Fax Number Effective Dates    [address] [address] [phone]  2019 - None Entered    [facility] [id number]       Subscriber Name Subscriber Birth Date Member ID       MARIO NAIR 1975 [id number]                 Emergency Contacts      (Rel.) Home Phone Work Phone Mobile Phone    SHANI SILVA (Relative) [phone] -- --    Korey Constance (Daughter) -- -- [phone]               Discharge Summary      Obi Arvizu MD at 21 1307              [facility] HOSPITALISTS DISCHARGE SUMMARY    Patient Identification:  Name:  Mario Nair  Age:  45 y.o.  Sex:  male  :  1975  MRN:  [id number]  Visit " Number:  [id number]    Date of Admission: 1/25/2021  Date of Discharge:  2/4/2021     PCP: Danny Rebolledo MD    DISCHARGE DIAGNOSIS  Sepsis  Acute Pyelonephritis  Serratia Bacteremia  Asymptomatic Covid 19 Infection  Hx IVDU w/ Hx Endocarditis in past  ESRD on HD c/b Medical Non-adherence and Hyperkalemia  Anemia requiring transfusions  HTN  HLD  Emphysema w/o COPD exacerbation  Hx HBV/HCV  Medical Non-adherence    CONSULTS   Surgery  Infectious Disease    PROCEDURES PERFORMED  1/27/2021: Removal of right IJ tunneled hemodialysis catheter  1/29/2021: Placement of a left internal jugular tunneled hemodialysis catheter    HOSPITAL COURSE  Patient is a 45 y.o. male presented to [facility] complaining of needing dialysis, weakness.  Please see the admitting history and physical for further details.      #Sepsis 2/2 Acute Pyelonephritis/Serratia Bacteremia  #Asymptomatic Covid 19 Infection  #Hx IVDU w/ Hx Endocarditis in past  Patient presents with neck pain, encephalitis/meningitis ruled out, found to have bacteremia 2/2 UTI and suspect complicated by frequently leaving AMA, previously covid + on 1/25. Admission labs showed NML WBC count, CRP 13, lactate 0.7, covid +, UA w/ culture showing infection and Bcx's growing Serratia sp., Repeat Bcx's NGTD. CTPE showed no PE, possible covid PNA. Gen Surgery consulted and removed R TDC and replaced RIJ on admission, on 1/29 L TDC placed.  ID consulted and followed.  Patient was treated w/ high dose Ceftriaxone while inpatient.  He was also treated on Eliquis for DVT PPx.  Had been awaiting [facility] vs placement and needing an outpatient dialysis chair but patient elected to leave AMA today, we discussed how detrimental this could be for his overall health and patient endorsed understanding, reported he knew he wasn't going to get a dialysis chair and has signed himself out AMA following his blood transfusion.  I did Rx him Cefdinir to complete on 2/9 as per ID  recs.  We did alert his [facility] department regarding his choice to leave AMA for outpatient monitoring for continued isolation through 2/14.      #ESRD on HD c/b Medical Non-adherence and Hyperkalemia, also anemia requiring transfusions  Reportedly hadn't had dialysis > 1 month on admission, Cr 15, K 6.1. CT showed stable severe chronic hydronephrosis and retroperitoneal lymphadenopathy. Consulted Nephrology, followed for HD, HyperK resolved, started on epo.  Trended Hgb and did require transfusion this AM.  SW was assisting w/ HD chair but unsuccessful prior to his leaving AMA.    #HTN/HLD  Echo 1/27 w/ LVEF 61-65%, NML LV function, diastolic dysfunction, no effusion, no vegetations noted.  Continued ASA 81, statin, coreg, imdur while inpatient.  Unable to make recommendations at discharge as patient left AMA.    #Emphysema w/o COPD exacerbation  Noted on CT, treated duonebs PRN    #Hx HBV/HCV  Supportive Care    #Medical Non-adherence  Patient's frequent leaving AMA and non-adherence w/ home medical therapy will likely drastically affect both long and short term morbidity and mortality.     VITAL SIGNS:  Temp:  [97.3 °F (36.3 °C)-99.9 °F (37.7 °C)] 97.5 °F (36.4 °C)  Heart Rate:  [] 92  Resp:  [16-18] 16  BP: (109-126)/(60-84) 115/78  SpO2:  [97 %-99 %] 98 %  on   ;   Device (Oxygen Therapy): room air    Body mass index is 19.81 kg/m².  Wt Readings from Last 3 Encounters:   02/04/21 70 kg (154 lb 4.8 oz)   12/31/20 67.5 kg (148 lb 12.8 oz)   12/08/20 78.5 kg (173 lb)     PHYSICAL EXAM:  This physical exam has been personally performed remotely in the unit aided by real-time audio/visual communication tools. RN present at bedside during this exam and assisted during exam. The use of a video visit has been reviewed with the patient and verbal informed consent has been obtained.      Physical Exam:  General: Patient appears awake, alert, and in no acute distress.  Head: Normocephalic, atraumatic  Eyes:  EOMI. Conjunctivae and sclerae normal.  Ears: Ears appear intact with no abnormalities noted.   Neck: Trachea midline. No obvious JVD.  Heart: Tele reveals regular rate and rhythm  Lungs: Respirations appear to be regular, even and unlabored with no signs of respiratory distress. No audible wheezing. On room air  Abdomen: No obvious abdominal distension.  MS: Muscle tone appears normal. No gross deformities.  Extremities: No clubbing, cyanosis or edema noted.  Skin: No visible bleeding, bruising, or rash.  Neurologic: Alert and oriented x3. No gross focal deficits.     *Exam unchanged since this AM    DISCHARGE DISPOSITION   Guarded    DISCHARGE MEDICATIONS:     Discharge Medications      New Medications      Instructions Start Date   cefdinir 300 MG capsule  Commonly known as: OMNICEF   300 mg, Oral, 2 Times Daily         Stop These Medications    amLODIPine 5 MG tablet  Commonly known as: NORVASC     calcium acetate 667 MG capsule capsule  Commonly known as: PHOS BINDER)     carvedilol 12.5 MG tablet  Commonly known as: COREG     hydrALAZINE 10 MG tablet  Commonly known as: APRESOLINE     isosorbide mononitrate 30 MG 24 hr tablet  Commonly known as: IMDUR     pantoprazole 40 MG EC tablet  Commonly known as: PROTONIX            Follow-up Information     Danny Rebolledo MD .    Specialty: Family Medicine  Contact information:  [address] [place], [place] [address] [phone]                  TEST  RESULTS PENDING AT DISCHARGE  None     CODE STATUS  Code Status and Medical Interventions:   Ordered at: 01/25/21 4481     Code Status:    CPR     Medical Interventions (Level of Support Prior to Arrest):    Full     Obi Arvizu MD  [facility]  02/04/21  13:07 EST    Please note that this discharge summary required more than 30 minutes to complete.      Electronically signed by Obi Arvizu MD at 02/04/21 1084

## 2021-02-08 NOTE — TELEPHONE ENCOUNTER
Patient's Daughter is calling to see if paperwork from the patient's urology office had been filled out and sent back. She believes that it was sent to this office on 01/18/2021 for the third time. She states that the patient is in need of the diapers that they supply him. Constance is not on a verbal, but is wanting a call back.    [phone]

## 2021-02-11 PROBLEM — E87.5 HYPERKALEMIA: Status: ACTIVE | Noted: 2021-01-01

## 2021-02-12 NOTE — NURSING NOTE
Pt refused MRI,  Stated that he can't lie on his back.  Stated that he can't lie flat.  Explained to pt that MD was trying to find out why he is having pain, in order to help and treat patient.  Patient still refusing.  Transport at bedside, pt refused to go to MRI to even attempt MRI.  Dr. Centeno notified

## 2021-02-12 NOTE — PAYOR COMM NOTE
"  [facility]  NPI: [id number]    Utilization Review   Contact:Vanessa Hou MSN, APRN, NP-C  Phone: [phone]  Fax: [phone]    [employer]/Attn: tamera Quinn Auth Req  REF:[id number]  DX: E87.5  Mario Nair (45 y.o. Male)     Date of Birth Social Security Number Address Home Phone MRN    1975  [address] [place], [place] [address] [phone] [id number]    [facility] Mar[facility] Status          None        Admission Date Admission Type Admitting Provider Attending Provider Department, Room/Bed    21 Emergency Harriet Blackwell DO Grace, Aimee Russell, DO [facility], CC10/1C    Discharge Date Discharge Disposition Discharge Destination                       Attending Provider: Harriet Blackwell DO    Allergies: Penicillins    Isolation: Enh Drop/Con   Infection: Hepatitis B (19), COVID (confirmed) (21)   Code Status: CPR    Ht: 188 cm (74\")   Wt: 69.9 kg (154 lb)    Admission Cmt: None   Principal Problem: Hyperkalemia [E87.5]                 Active Insurance as of 2021     Primary Coverage     Payor Plan Insurance Group Employer/Plan Group    [employer] [place] [employer] [place]      Payor Plan Address Payor Plan Phone Number Payor Plan Fax Number Effective Dates    [address] [address] [phone]  2019 - None Entered    [place] [address]       Subscriber Name Subscriber Birth Date Member ID       MARIO NAIR 1975 [id number]                 Emergency Contacts      (Rel.) Home Phone Work Phone Mobile Phone    SHANI SILVA (Relative) [phone] -- --    Constance Nair (Daughter) -- -- [phone]          EKG shows widened qrs     History & Physical      Harriet Blackwell DO at 21 2235          Hospitalist History and Physical    Patient Identification:  Name: Mario Nair  Age/Sex: 45 y.o. male  :  1975  MRN: [id number]        Admit Date: 2021   Primary Care Physician: " Danny Rebolledo MD    Chief Complaint   Patient presents with   • Headache   • Neck Pain       History of Present Illness  Patient is a 45 y.o. male presents with the following: Left-sided neck and shoulder pain    The patient is a 45-year-old male with past medical history significant for end-stage renal disease, anemia requiring transfusion in the past, medical noncompliance, history of intravenous drug abuse, endocarditis and hepatitis C who presents to the emergency department complaining of a 1 day history of left neck and shoulder pain.    Patient states that he thinks he is experiencing left neck and shoulder pain from a muscle spasm.  He denies any recent injury or trauma.  Patient further denies any fevers, chills, nausea, vomiting, chest pains and/or shortness of air.  The patient states that he has not been dialyzed since he left AGAINST MEDICAL ADVICE on 2/4/2021.    During the above-mentioned admission from January 25 through February 4, the patient was treated for sepsis, acute pyelonephritis, Serratia bacteremia and asymptomatic Covid-19 infection.  Patient also was treated for hyperkalemia and underwent inpatient hemodialysis.  The patient has a left chest/internal jugular tunneled hemodialysis catheter.  Patient was also seen in our service in December 2020 where he was treated for acute cystitis, end-stage renal disease on hemodialysis and electrolyte abnormalities.  Patient was also seen in our service in November 2020 where he was treated for hyperkalemia secondary to noncompliance with dialysis and a Serratia UTI.  Patient was also on our service in October 2020 for end-stage renal disease in need of dialysis with hyperkalemia.  Patient has left AGAINST MEDICAL ADVICE during each of the above-mentioned admissions.    Work-up in the emergency department revealed a potassium of 8.6, sodium 129,  and a creatinine of 8.68.  CO2 was 11.1.  LFTs are within normal limits.  C-RP  minimally elevated at 4.83.  Lactate was 0.7.  Hemoglobin 8.2 and hematocrit 27.2.    CT scan of the cervical spine without contrast revealed arthritic change and disc space narrowing at C4-5 and C5-6.    The patient has been admitted to the critical care unit where he is currently undergoing hemodialysis.    Present during visit: KALA Storey and HD nurse    Past History:  Past Medical History:   Diagnosis Date   • COPD (chronic obstructive pulmonary disease) (CMS/HCC)    • COVID-19 1/25/2021   • Endocarditis    • ESRD (end stage renal disease) (CMS/[facility])    • Hepatitis C    • Infectious viral hepatitis    • IV drug abuse (CMS/HCC)    • Unable to read or write      Past Surgical History:   Procedure Laterality Date   • CENTRAL VENOUS CATHETER TUNNELED INSERTION DOUBLE LUMEN Right     Chest for hemodialysis   • INSERTION HEMODIALYSIS CATHETER N/A 1/29/2021    Procedure: HEMODIALYSIS CATHETER INSERTION;  Surgeon: Khoa Carl MD;  Location: [facility];  Service: General;  Laterality: N/A;     Family History   Problem Relation Age of Onset   • Alcohol abuse Mother    • Hypertension Mother    • Hypertension Other    • Kidney disease Maternal Grandmother      Social History     Tobacco Use   • Smoking status: Current Every Day Smoker     Packs/day: 0.50     Years: 20.00     Pack years: 10.00     Types: Cigarettes   • Smokeless tobacco: Never Used   Substance Use Topics   • Alcohol use: Not Currently     Frequency: Never     Comment: Quit 18 years ago   • Drug use: Yes     Types: Methamphetamines, IV          No medications prior to admission.     Allergies: Penicillins    Review of Systems:  Review of Systems   Constitutional: Negative for chills, diaphoresis and fever.   HENT: Negative for hearing loss, tinnitus and trouble swallowing.    Eyes: Negative for photophobia, discharge and visual disturbance.   Respiratory: Negative for cough, shortness of breath and wheezing.    Cardiovascular: Negative for chest pain,  palpitations and leg swelling.   Gastrointestinal: Negative for abdominal pain, constipation, diarrhea, nausea and vomiting.   Endocrine: Negative for polydipsia, polyphagia and polyuria.   Genitourinary: Negative for dysuria, frequency and hematuria.   Musculoskeletal: Positive for neck pain. Negative for gait problem, myalgias and neck stiffness.   Skin: Negative for rash and wound.   Neurological: Negative for dizziness, tremors, seizures, syncope, weakness and light-headedness.   Hematological: Does not bruise/bleed easily.   Psychiatric/Behavioral: Negative for confusion, hallucinations and suicidal ideas.      Vital Signs  Temp:  [98 °F (36.7 °C)] 98 °F (36.7 °C)  Heart Rate:  [90] 90  Resp:  [18] 18  BP: (144-176)/() 164/100  Body mass index is 17.97 kg/m².    Physical Exam:  Physical Exam  Constitutional:       General: He is not in acute distress.     Appearance: He is well-developed. He is ill-appearing (chronically; appears older than stated age).   HENT:      Head: Normocephalic and atraumatic.   Eyes:      Conjunctiva/sclera: Conjunctivae normal.      Pupils: Pupils are equal, round, and reactive to light.   Neck:      Musculoskeletal: Neck supple.      Trachea: No tracheal deviation.   Cardiovascular:      Rate and Rhythm: Normal rate and regular rhythm.      Pulses:           Posterior tibial pulses are 2+ on the right side and 2+ on the left side.      Heart sounds: No murmur. No friction rub. No gallop.    Pulmonary:      Effort: No respiratory distress.      Breath sounds: Normal breath sounds. No wheezing or rales.   Chest:      Comments: Left chest tunneled hemodialysis catheter.  Abdominal:      General: Bowel sounds are normal. There is no distension.      Palpations: Abdomen is soft.      Tenderness: There is no abdominal tenderness. There is no guarding.   Musculoskeletal: Normal range of motion.         General: No tenderness.      Right lower leg: No edema.      Left lower leg: No  edema.   Skin:     General: Skin is warm and dry.      Findings: No erythema or rash.   Neurological:      Mental Status: He is alert and oriented to person, place, and time.      Cranial Nerves: No cranial nerve deficit.         Results Review:    Results from last 7 days   Lab Units 02/11/21  1831   WBC 10*3/mm3 7.45   HEMOGLOBIN g/dL 8.2*   HEMATOCRIT % 27.2*   PLATELETS 10*3/mm3 175     Results from last 7 days   Lab Units 02/11/21  1831   SODIUM mmol/L 129*   POTASSIUM mmol/L 8.6*   CHLORIDE mmol/L 96*   CO2 mmol/L 11.1*   BUN mg/dL 196*   CREATININE mg/dL 8.68*   CALCIUM mg/dL 8.3*   GLUCOSE mg/dL 107*     Results from last 7 days   Lab Units 02/11/21  1831   BILIRUBIN mg/dL 0.4   ALK PHOS U/L 195*   AST (SGOT) U/L 21   ALT (SGPT) U/L 25     Results from last 7 days   Lab Units 02/11/21  1831   CRP mg/dL 4.83*             Results from last 7 days   Lab Units 02/11/21  1854   INR  1.16*       Imaging:    I have personally reviewed the EKG. Peaked T waves noted most pronounced in the lateral leads    Imaging Results (Most Recent)     Procedure Component Value Units Date/Time    CT Cervical Spine Without Contrast [[id number]] Collected: 02/11/21 1842     Updated: 02/11/21 1847    Narrative:      CT CERVICAL SPINE WO CONTRAST-     CLINICAL INDICATION: persistent pain x 2 weeks        COMPARISON: 01/25/2021      TECHNIQUE: Axial images of the cervical spine were acquired with out any  intravenous contrast. Reformatted images were then created in the  sagittal and coronal planes.     DOSE:      Radiation dose reduction techniques were utilized per ALARA protocol.  Automated exposure control was initiated through either or CareDose or  DoseRight software packages by  protocol.           FINDINGS:   The provided study demonstrates preservation of the vertebral body  heights in the sagittally reconstructed images.     There is no prevertebral soft tissue swelling.     Straightening of the cervical spine      Disc space narrowing at C4-5 and C5-6     I see no acute cervical spine fracture.       Impression:         1. Arthritic change     2. No acute bony abnormality     This report was finalized on 2/11/2021 6:45 PM by Dr. Wesly Reynolds MD.             Assessment/Plan       Hyperkalemia    -Severe, acute hyperkalemia secondary to non-compliance with hemodialysis  -End stage renal disease non-compliant with treatment  -Left neck pain, likely muscular; CT scan with disc space narrowing C4-5; C5-6  -Essential hypertension, uncontrolled  -Asymptomatic COVID-19 infection; isolation to be discontinued 2/14/21  -Hyponatremia, likely hypervolemic  -Hypoalbuminemia, 2.83  -Anion-gap metabolic acidosis    Chronic medical problems:  -History of recent Serratia bacteremia  -History of endocarditis  -History of urinary tract infection  -History of intravenous drug abuse  -Anemia, normocytic  -Hepatitis B/Hepatitis C  -Tobacco abuse  -Chronic obstructive pulmonary disease currently not in acute exacerbation    Patient has been admitted to the critical care unit and is undergoing hemodialysis.  Nephrology has been consulted.  A repeat BMP after dialysis will be ordered.  I have ordered a repeat EKG for the a.m.    Lidoderm patch and a one-time dose of Flexeril for neck pain that I think is likely muscular in nature.  Consider MRI imaging.    I have resumed carvedilol and low-dose hydralazine noted on patient's previous discharge summary as patient reported no chronic home medications.  Continue to monitor his blood pressure closely.    Patient currently not interested in tobacco cessation.  Nicotine patch will be ordered.    Continue to monitor patient closely on telemetry.    We will repeat his CBC in a.m.    DVT/GI prophylaxis: Subcutaneous heparin/pantoprazole    Estimated Length of Stay: > 2 MNs    Patient is considered to be high risk patient due to: Severe, life-threatening hyperkalemia, end-stage renal disease, hepatitis,  history of drug abuse, medical noncompliance    I discussed the patients findings and my recommendations with patient and nursing staff      Harriet Blackwell DO  02/11/21  22:35 EST    Electronically signed by Harriet Blackwell DO at 02/12/21 0056          Emergency Department Notes      Shari Gil, RN at 02/11/21 1815        Pt refused to take shirt off and wear gown.     Shari Gil, RN  02/11/21 1901      Electronically signed by Shari Gil RN at 02/11/21 1901     Rinku Gomez at 02/11/21 1937        I gave the patient a urinal and asked him for a urine specimen. Patient said it make take him a little bit but he will try.     Rinku Gomez  02/11/21 1937      Electronically signed by Rinku Gomez at 02/11/21 1937     Raissa Jose, RN at 02/11/21 1937        Paged Dr Blackwell for CHRIS Truong Tina M, RN  02/11/21 1937      Electronically signed by Raissa Jose, RN at 02/11/21 1937     Bharat Vega PA at 02/11/21 1947     Attestation signed by Lloyd Lorenzana MD at 02/11/21 2013          For this patient encounter, I reviewed the NP or PA documentation, treatment plan, and medical decision making. Lloyd Lorenzana MD 2/11/2021 20:13 EST                  Subjective   45-year-old white male presents secondary to persistent neck pain.  Patient was recently in the hospital for multiple problems.  Patient has chronic renal failure.  He had a recent dialysis catheter placed.  Patient is noncompliant with dialysis.  He states has not had dialysis since he got out of the hospital which was a week ago.  He states he has had persistent neck pain and hurts with movement.  He denies any numbness tingling or pain in his arm.  He denies any falls or injuries.  He denies any fever.  He recently had COVID-19.  He denies any shortness of breath.  No abdominal pain.  No other complaints this time.          Review of Systems    Constitutional: Positive for fatigue. Negative for fever.   HENT: Negative.    Respiratory: Negative.    Cardiovascular: Negative.  Negative for chest pain.   Gastrointestinal: Negative.  Negative for abdominal pain.   Endocrine: Negative.    Genitourinary: Negative.  Negative for dysuria.   Skin: Negative.    Neurological: Negative.    Psychiatric/Behavioral: Negative.    All other systems reviewed and are negative.      Past Medical History:   Diagnosis Date   • COPD (chronic obstructive pulmonary disease) (CMS/HCC)    • COVID-19 1/25/2021   • Endocarditis    • ESRD (end stage renal disease) (CMS/[facility])    • Hepatitis C    • Infectious viral hepatitis    • IV drug abuse (CMS/[facility])    • Unable to read or write        Allergies   Allergen Reactions   • Penicillins Shortness Of Breath, Itching and Rash       Past Surgical History:   Procedure Laterality Date   • CENTRAL VENOUS CATHETER TUNNELED INSERTION DOUBLE LUMEN Right     Chest for hemodialysis   • INSERTION HEMODIALYSIS CATHETER N/A 1/29/2021    Procedure: HEMODIALYSIS CATHETER INSERTION;  Surgeon: Khoa Carl MD;  Location: [facility];  Service: General;  Laterality: N/A;       Family History   Problem Relation Age of Onset   • Alcohol abuse Mother    • Hypertension Mother    • Hypertension Other    • Kidney disease Maternal Grandmother        Social History     Socioeconomic History   • Marital status:      Spouse name: Not on file   • Number of children: Not on file   • Years of education: Not on file   • Highest education level: Not on file   Tobacco Use   • Smoking status: Current Every Day Smoker     Packs/day: 0.50     Years: 20.00     Pack years: 10.00     Types: Cigarettes   • Smokeless tobacco: Never Used   Substance and Sexual Activity   • Alcohol use: Not Currently     Frequency: Never     Comment: Quit 18 years ago   • Drug use: Yes     Types: Methamphetamines, IV     Comment: last used on Friday 5/18/2019   • Sexual activity: Defer            Objective   Physical Exam  Vitals signs and nursing note reviewed.   Constitutional:       General: He is not in acute distress.     Appearance: He is well-developed. He is not diaphoretic.   HENT:      Head: Normocephalic and atraumatic.      Right Ear: External ear normal.      Left Ear: External ear normal.      Nose: Nose normal.   Eyes:      Conjunctiva/sclera: Conjunctivae normal.      Pupils: Pupils are equal, round, and reactive to light.   Neck:      Musculoskeletal: Normal range of motion and neck supple.      Vascular: No JVD.      Trachea: No tracheal deviation.   Cardiovascular:      Rate and Rhythm: Normal rate and regular rhythm.      Heart sounds: Normal heart sounds. No murmur.   Pulmonary:      Effort: Pulmonary effort is normal. No respiratory distress.      Breath sounds: Normal breath sounds. No wheezing.   Abdominal:      General: Bowel sounds are normal.      Palpations: Abdomen is soft.      Tenderness: There is no abdominal tenderness.   Musculoskeletal: Normal range of motion.         General: No deformity.      Comments: Patient does have focal tenderness in his left neck.  No redness rash or sign of superficial infection.   Skin:     General: Skin is warm and dry.      Coloration: Skin is not pale.      Findings: No erythema or rash.   Neurological:      Mental Status: He is alert and oriented to person, place, and time.      Cranial Nerves: No cranial nerve deficit.   Psychiatric:         Behavior: Behavior normal.         Thought Content: Thought content normal.         Procedures          ED Course  ED Course as of Feb 11 1949   Thu Feb 11, 2021 1940 Discussed with Dr. Hylton.  He wishes for the patient to have dialysis.  I paged the hospitalist.  In the interim he did recommend giving an amp of D50 along with insulin Kayexalate and calcium gluconate.    [JI]   1946 Accepted by Dr. Blackwell    [JI]      ED Course User Index  [JI] Bharat Vega PA                                            MDM  Number of Diagnoses or Management Options  Acute renal failure superimposed on chronic kidney disease, on chronic dialysis, unspecified acute renal failure type (CMS/HCC): established and worsening  Hyperkalemia: established and worsening  Neck pain: established and worsening     Amount and/or Complexity of Data Reviewed  Clinical lab tests: reviewed and ordered  Tests in the radiology section of CPT®: reviewed and ordered  Tests in the medicine section of CPT®: reviewed and ordered  Discuss the patient with other providers: yes        Final diagnoses:   Hyperkalemia   Neck pain   Acute renal failure superimposed on chronic kidney disease, on chronic dialysis, unspecified acute renal failure type (CMS/HCC)            Bharat Vega PA  02/11/21 1949      Electronically signed by Lloyd Lorenzana MD at 02/11/21 2013     Rinku Gomez at 02/11/21 2020        Patient unable to provide urine sample at this time.     Rinku Gomez  02/11/21 2023      Electronically signed by Rinku Gomez at 02/11/21 2023     China Edward, RN at 02/11/21 2120        Attempted to call report at this time, states nurse is in another room and will call back.        China Edward, RN  02/11/21 2159      Electronically signed by China Edward RN at 02/11/21 2159         Lab Results (last 24 hours)     Procedure Component Value Units Date/Time    CBC (No Diff) [[id number]]  (Abnormal) Collected: 02/12/21 0327    Specimen: Blood Updated: 02/12/21 0357     WBC 6.15 10*3/mm3      RBC 2.53 10*6/mm3      Hemoglobin 7.4 g/dL      Hematocrit 22.5 %      MCV 88.9 fL      MCH 29.2 pg      MCHC 32.9 g/dL      RDW 16.7 %      RDW-SD 54.1 fl      MPV 9.1 fL      Platelets 183 10*3/mm3     Basic Metabolic Panel [[id number]]  (Abnormal) Collected: 02/12/21 0327    Specimen: Blood Updated: 02/12/21 0341     Glucose 175 mg/dL      BUN 73 mg/dL      Creatinine 3.64 mg/dL      Sodium 128 mmol/L      Potassium 3.0 mmol/L       Comment: Slight hemolysis detected by analyzer. Results may be affected.        Chloride 89 mmol/L      CO2 27.6 mmol/L      Calcium 8.3 mg/dL      eGFR Non African Amer 18 mL/min/1.73      BUN/Creatinine Ratio 20.1     Anion Gap 11.4 mmol/L     Narrative:      GFR Normal >60  Chronic Kidney Disease <60  Kidney Failure <15      Urine Drug Screen - Urine, Clean Catch [[id number]]  (Normal) Collected: 02/12/21 0045    Specimen: Urine, Clean Catch Updated: 02/12/21 0130     Amphetamine Screen, Urine Negative     Barbiturates Screen, Urine Negative     Benzodiazepine Screen, Urine Negative     Cocaine Screen, Urine Negative     Methadone Screen, Urine Negative     Opiate Screen Negative     Phencyclidine (PCP), Urine Negative     THC, Screen, Urine Negative     6-ACETYL MORPHINE Negative     Buprenorphine, Screen, Urine Negative     Oxycodone Screen, Urine Negative    Narrative:      Negative Thresholds For Drugs Screened:                  Amphetamines              1000 ng/ml               Barbiturates               200 ng/ml               Benzodiazepines            200 ng/ml              Cocaine                    300 ng/ml              Methadone                  300 ng/ml              Opiates                    300 ng/ml               Phencyclidine               25 ng/ml               THC                         50 ng/ml              6-Acetyl Morphine           10 ng/ml              Buprenorphine                5 ng/ml              Oxycodone                  300 ng/ml    The reference range for all drugs tested is negative. This report includes final unconfirmed qualitative results to be used for medical treatment purposes only. Unconfirmed results must not be used for non-medical purposes such as employment or legal testing. Clinical consideration should be applied to any drug of abuse test, especially when unconfirmed quantitative results are used.        Urinalysis, Microscopic Only - Urine, Clean Catch  [[id number]]  (Abnormal) Collected: 02/12/21 0045    Specimen: Urine, Clean Catch Updated: 02/12/21 0120     RBC, UA 21-30 /HPF      WBC, UA 3-5 /HPF      Bacteria, UA None Seen /HPF      Squamous Epithelial Cells, UA 0-2 /HPF      Hyaline Casts, UA None Seen /LPF      Methodology Automated Microscopy    Urinalysis With Microscopic If Indicated (No Culture) - Urine, Clean Catch [[id number]]  (Abnormal) Collected: 02/12/21 0045    Specimen: Urine, Clean Catch Updated: 02/12/21 0118     Color, UA Yellow     Appearance, UA Clear     pH, UA 7.0     Specific Gravity, UA 1.012     Glucose,  mg/dL (Trace)     Ketones, UA Negative     Bilirubin, UA Negative     Blood, UA Moderate (2+)     Protein,  mg/dL (2+)     Leuk Esterase, UA Small (1+)     Nitrite, UA Negative     Urobilinogen, UA 0.2 E.U./dL    COVID-19 and FLU A/B PCR - Swab, Nasopharynx [[id number]]  (Abnormal) Collected: 02/11/21 2016    Specimen: Swab from Nasopharynx Updated: 02/11/21 2053     COVID19 Detected     Influenza A PCR Not Detected     Influenza B PCR Not Detected    Narrative:      Fact sheet for providers: https://www.fda.gov/media/124092/download    Fact sheet for patients: https://www.fda.gov/media/800470/download    Test performed by PCR.  Influenza A and Influenza B negative results should be considered presumptive in samples that have a positive SARS-CoV-2 result.    Competitive inhibition studies showed that SARS-CoV-2 virus, when present at concentrations above 3.6E+04 copies/mL, can inhibit the detection and amplification of influenza A and influenza B virus RNA if present at or below 1.8E+02 copies/mL or 4.9E+02 copies/mL, respectively, and may lead to false negative influenza virus results. If co-infection with influenza A or influenza B virus is suspected in samples with a positive SARS-CoV-2 result, the sample should be re-tested with another FDA cleared, approved, or authorized influenza test, if influenza virus detection would  change clinical management.    Lactic Acid, Plasma [[id number]]  (Normal) Collected: 02/11/21 2005    Specimen: Blood from Arm, Right Updated: 02/11/21 2039     Lactate 0.7 mmol/L     Blood Culture - Blood, Hand, Left [[id number]] Collected: 02/11/21 2014    Specimen: Blood from Hand, Left Updated: 02/11/21 2022    Blood Culture - Blood, Arm, Right [[id number]] Collected: 02/11/21 2005    Specimen: Blood from Arm, Right Updated: 02/11/21 2022    [place] Draw [[id number]] Collected: 02/11/21 1831    Specimen: Blood from Arm, Left Updated: 02/11/21 1946    Narrative:      The following orders were created for panel order [place] Draw.  Procedure                               Abnormality         Status                     ---------                               -----------         ------                     Light Blue Top[[id number]]                                   Final result               Green Top (Gel)[[id number]]                                  Final result               Lavender Top[[id number]]                                     Final result               Gold Top - SST[[id number]]                                   Final result                 Please view results for these tests on the individual orders.    Light Blue Top [[id number]] Collected: 02/11/21 1831    Specimen: Blood from Arm, Left Updated: 02/11/21 1946     Extra Tube hold for add-on     Comment: Auto resulted       Green Top (Gel) [[id number]] Collected: 02/11/21 1831    Specimen: Blood from Arm, Left Updated: 02/11/21 1946     Extra Tube Hold for add-ons.     Comment: Auto resulted.       Lavender Top [[id number]] Collected: 02/11/21 1831    Specimen: Blood from Arm, Left Updated: 02/11/21 1946     Extra Tube hold for add-on     Comment: Auto resulted       Gold Top - SST [[id number]] Collected: 02/11/21 1831    Specimen: Blood from Arm, Left Updated: 02/11/21 1946     Extra Tube Hold for add-ons.     Comment: Auto resulted.       Comprehensive Metabolic  Panel [[id number]]  (Abnormal) Collected: 02/11/21 1831    Specimen: Blood from Arm, Left Updated: 02/11/21 1914     Glucose 107 mg/dL       mg/dL      Creatinine 8.68 mg/dL      Sodium 129 mmol/L      Potassium 8.6 mmol/L      Comment: Slight hemolysis detected by analyzer. Results may be affected.        Chloride 96 mmol/L      CO2 11.1 mmol/L      Calcium 8.3 mg/dL      Total Protein 8.2 g/dL      Albumin 2.83 g/dL      ALT (SGPT) 25 U/L      AST (SGOT) 21 U/L      Alkaline Phosphatase 195 U/L      Total Bilirubin 0.4 mg/dL      eGFR Non African Amer 7 mL/min/1.73      Comment: <15 Indicative of kidney failure.        eGFR   Amer --     Comment: <15 Indicative of kidney failure.        Globulin 5.4 gm/dL      A/G Ratio 0.5 g/dL      BUN/Creatinine Ratio 22.6     Anion Gap 21.9 mmol/L     Narrative:      GFR Normal >60  Chronic Kidney Disease <60  Kidney Failure <15      aPTT [[id number]]  (Normal) Collected: 02/11/21 1854    Specimen: Blood from Arm, Right Updated: 02/11/21 1912     PTT 26.8 seconds      Comment: Note new Reference Range       Narrative:      PTT Heparin Therapeutic Range:  59 - 95 seconds      Protime-INR [[id number]]  (Abnormal) Collected: 02/11/21 1854    Specimen: Blood from Arm, Right Updated: 02/11/21 1912     Protime 14.6 Seconds      Comment: Note new Reference Range        INR 1.16    Narrative:      Suggested INR therapeutic range for stable oral anticoagulant therapy:    Low Intensity therapy:   1.5-2.0  Moderate Intensity therapy:   2.0-3.0  High Intensity therapy:   2.5-4.0    Sedimentation Rate [[id number]]  (Abnormal) Collected: 02/11/21 1831    Specimen: Blood from Arm, Left Updated: 02/11/21 1857     Sed Rate 99 mm/hr     C-reactive Protein [[id number]]  (Abnormal) Collected: 02/11/21 1831    Specimen: Blood from Arm, Left Updated: 02/11/21 1856     C-Reactive Protein 4.83 mg/dL     CBC & Differential [[id number]]  (Abnormal) Collected: 02/11/21 1831    Specimen: Blood  from Arm, Left Updated: 02/11/21 1850    Narrative:      The following orders were created for panel order CBC & Differential.  Procedure                               Abnormality         Status                     ---------                               -----------         ------                     CBC Auto Differential[[id number]]        Abnormal            Final result                 Please view results for these tests on the individual orders.    CBC Auto Differential [[id number]]  (Abnormal) Collected: 02/11/21 1831    Specimen: Blood from Arm, Left Updated: 02/11/21 1850     WBC 7.45 10*3/mm3      RBC 2.83 10*6/mm3      Hemoglobin 8.2 g/dL      Hematocrit 27.2 %      MCV 96.1 fL      MCH 29.0 pg      MCHC 30.1 g/dL      RDW 16.9 %      RDW-SD 59.8 fl      MPV 9.8 fL      Platelets 175 10*3/mm3      Neutrophil % 63.4 %      Lymphocyte % 22.1 %      Monocyte % 8.6 %      Eosinophil % 3.1 %      Basophil % 1.1 %      Immature Grans % 1.7 %      Neutrophils, Absolute 4.72 10*3/mm3      Lymphocytes, Absolute 1.65 10*3/mm3      Monocytes, Absolute 0.64 10*3/mm3      Eosinophils, Absolute 0.23 10*3/mm3      Basophils, Absolute 0.08 10*3/mm3      Immature Grans, Absolute 0.13 10*3/mm3      nRBC 0.0 /100 WBC                Physician Progress Notes (last 48 hours) (Notes from 02/10/21 0624 through 02/12/21 0624)      Progress Notes signed by Hailey Khan MD at 02/12/21 0157         Nurse Practitioner - Brief Progress Note  PERMANENT  02/11/2021 22:59    [facility] - CCU - [facility] - [place], [place] ([facility])    ROBBI CUEVAS    Date of Service 02/11/2021 22:59    HPI/Events of Note [facility] Provider Assessment Note    46 y/o male admitted to ICU with Hyperkalemia due to noncompliant with dialysis, high anion gap metabolic acidosis, hyponatremia    Hx of ESRD on dialysis, COVID-19, COPD, endocarditis, Hepatitis C, IV drug abuse, unable to read or write, infectious viral hepatitis    Pt presented  with c/o neck pain. Hx of chronic renal failure and recently had dialysis catheter placed. Pt is noncompliant with dialysis (last hemodialysis was ? a week ago). In the ER, noted to have potassium of 8.6, , creatinine 8.68. CT   cervical spine 2/11/2021: Arthritic change; no acute bony abnormality. Pt was transferred to ICU for emergent hemodialysis.     Current VS: HR 85, /110, RR 18, O2 sat 98% on room air, temp 97.7 F. No pain or SOB at this time.     Labs: Anion gap 21.9, sodium 129, potassium 8.6, chloride 96, bicarbonate 11, glucose 107, , creatinine 8.68, calcium 8.3, lactate 0.7, Hgb 8.2/Hct 27.2, WBC 7.45, platelets 175, INR 1.16, CRP 4.83    EKG: Normal sinus rhythm rate 96, nonspecific intraventricular block. Cannot rule out septal infarct, age undetermined. QTc 507    Assessment and Plan:    Hyperkalemia due to noncompliant with dialysis, high anion gap metabolic acidosis, hyponatremia  - Hemodialysis is in progress in the ICU at this time  - Nephrology is consulted  - Kayexalate, Calcium gluconate, D50, IV regular insulin, Lactulose ordered  - NS 1L IV bolus x1, Albumin ordered  - Renal dose medications. Avoid nephrotoxins. Moniotor BMP, UOP  - Anemia --no active bleeding. Monitor H&H  - EKG in the AM, UA with microscopic if indicated, urine drug screen ordered  - CBC, CMP, Mg and Phos in the AM ordered      __Y___   Video Assessment performed  __Y___   Most recent labs reviewed  __Y___   Vital Signs reviewed  __Y___   Best Practices addressed:                 VTE prophylaxis: Heparin subq                 SUP (when indicated): N/A                 Glycemic control:                      Please notify bedside physician when present or [facility] if glc > 180 X 2                 Sepsis guidelines: N/A                 Lung protective strategy: N/A                 Targeted Temperature Management: N/A    __Y___     Spoke with bedside RN  __Y___     Orders written      Contact [facility] for any needs if bedside physician is not present.  Note drafted by JERRI Quiñonez-BC      Interventions Major-Acid-Base disturbance - evaluation and management, Acute renal failure - evaluation and management, Electrolyte abnormality - evaluation and management  Intermediate-Best-practice therapies (e.g. VTE, beta blocker, etc.), Communication with other healthcare providers and/or family        Electronically Signed by: Denisse Mondragon (ANP,CCRN) on 02/11/2021 23:17    Annotated By: Hailey Khan (MD)    Date: 02/12/21 01:57  Critical [facility] hospital MD Addendum: Admission assessment note by JERRI Mondragon reviewed by me after brief chart review & video assessment performed. s/p HD for acute hyperkalemia with EKG changes (peaked T waves, prolonged ). F/U chemistry & EKG   post-dialysis.    Electronically signed by Hailey Khan MD at 02/12/21 0151      Scheduled Meds Sorted by Name  for Mario Nair as of 2/10/21 through 2/12/21    1 Day 3 Days 7 Days 10 Days <  Today >     Legend:                           Inactive     Active     Other Encounter     Linked                 Medications 02/10/21 02/11/21 02/12/21   albumin human 25 % IV SOLN 25 g   Dose: 25 g  Freq: Once in Dialysis Route: IV  Indications of Use: HEMODIALYSIS PROCEDURE  Start: 02/11/21 2330    Admin Instructions:   Maintain SBP Greater Than 90 During Dialysis. May repeat x 3 doses (4 doses total)      0315            calcium gluconate 1g/50ml 0.675% NaCl IV SOLN   Dose: 1 g  Freq: Once Route: IV  Last Dose: Stopped (02/11/21 2106)  Start: 02/11/21 1940 End: 02/11/21 2106 2004 2106           carvedilol (COREG) tablet 6.25 mg   Dose: 6.25 mg  Freq: 2 Times Daily With Meals Route: PO  Start: 02/12/21 0800    Admin Instructions:   Hold for sbp <110 and/or hr <60  Give with food.      0617 [C]   0800 [C]   1800        cloNIDine (CATAPRES) tablet 0.2 mg   Dose: 0.2 mg  Freq: Once Route: PO  Start: 02/11/21 2132 End: 02/11/21 2133    Admin  Instructions:   Caution: Look alike/sound alike drug alert. Please read the label.  Caution: Look alike/sound alike drug alert.     2133             cyclobenzaprine (FLEXERIL) tablet 10 mg   Dose: 10 mg  Freq: Once Route: PO  Start: 02/12/21 0100 End: 02/12/21 0135      0135            dextrose (D50W) 25 g/ 50mL Intravenous Solution 25 g   Dose: 25 g  Freq: Once Route: IV  Start: 02/11/21 1940 End: 02/11/21 2002 2002             heparin (porcine) 5000 UNIT/ML injection 5,000 Units   Dose: 5,000 Units  Freq: Every 12 Hours Scheduled Route: SC  Indications Comment: Prophylaxis of Venous Thromboembolism  Start: 02/11/21 2330      (0337) [C]   0900   2100        hydrALAZINE (APRESOLINE) tablet 10 mg   Dose: 10 mg  Freq: Every 8 Hours Scheduled Route: PO  Start: 02/12/21 0600    Admin Instructions:   Hold for sbp <110  Caution: Look alike/sound alike drug alert      0539   1400   2200        insulin regular (humuLIN R,novoLIN R) injection 10 Units   Dose: 10 Units  Freq: Once Route: IV  Start: 02/11/21 1940 End: 02/11/21 2001    Admin Instructions:    Caution: Look alike/sound alike drug alert     2001             lactulose (CHRONULAC) 10 GM/15ML solution 20 g   Dose: 20 g  Freq: Once Route: PO  Start: 02/11/21 1940 End: 02/11/21 1959    Admin Instructions:   May be mixed with fruit juice, water, or milk.     1959             lidocaine (LIDODERM) 5 % 1 patch   Dose: 1 patch  Freq: Every 24 Hours Scheduled Route: TD  Start: 02/12/21 0900    Admin Instructions:   Apply to skin on left neck/shoulder . Remove patch in 12 hours. Apply patch only once for up to 12 hours in 24 hour period.  If given for pain, use the following pain scale:  Mild Pain = Pain Score of 1-3, CPOT 1-2  Moderate Pain = Pain Score of 4-6, CPOT 3-4  Severe Pain = Pain Score of 7-10, CPOT 5-8      0900            morphine injection 4 mg   Dose: 4 mg  Freq: Once Route: IV  Start: 02/11/21 1918 End: 02/11/21 1926    Admin Instructions:   If given for  pain, use the following pain scale:  Mild Pain = Pain Score of 1-3, CPOT 1-2  Moderate Pain = Pain Score of 4-6, CPOT 3-4  Severe Pain = Pain Score of 7-10, CPOT 5-8     1926             nicotine (NICODERM CQ) 14 MG/24HR patch 1 patch   Dose: 1 patch  Freq: Every 24 Hours Scheduled Route: TD  Start: 02/12/21 0900    Admin Instructions:   Apply to clean, dry, nonhairy area of skin (typically upper arm or shoulder) Acutely Hazardous. Waste BOTH Residual Medication and/or Empty Package.      0900            ondansetron (ZOFRAN) injection 4 mg   Dose: 4 mg  Freq: Once Route: IV  Start: 02/11/21 1918 End: 02/11/21 1926 1926             pantoprazole (PROTONIX) EC tablet 40 mg   Dose: 40 mg  Freq: Every Early Morning Route: PO  Start: 02/12/21 0600    Admin Instructions:   Swallow whole; do not crush, split, or chew.      0539            sodium chloride 0.9 % bolus 1,000 mL   Dose: 1,000 mL  Freq: Once in Dialysis Route: IV  Last Dose: 1,000 mL (02/11/21 2311)  Start: 02/11/21 2330 End: 02/12/21 0211    Admin Instructions:   For dialysis. Not to exceed 1000 mL.     2311             sodium chloride 0.9 % flush 10 mL   Dose: 10 mL  Freq: Every 12 Hours Scheduled Route: IV  Start: 02/11/21 2330     2300          0900   2100          sodium polystyrene (KAYEXALATE) powder 30 g   Dose: 30 g  Freq: Once Route: PO  Start: 02/11/21 1940 End: 02/11/21 1958    Admin Instructions:   MIX WITH 60ML OF WATER TO MAKE A SLURRY PRIOR TO ADMINISTRATION     1958             Medications 02/10/21 02/11/21 02/12/21       Continuous Meds Sorted by Name  for Mario Nair as of 2/10/21 through 2/12/21   Legend:                           Inactive     Active     Other Encounter     Linked                 Medications 02/10/21 02/11/21 02/12/21       PRN Meds Sorted by Name  for Mario Nair as of 2/10/21 through 2/12/21   Legend:                           Inactive     Active     Other Encounter     Linked                 Medications  02/10/21 02/11/21 02/12/21   HYDROcodone-acetaminophen (NORCO) 5-325 MG per tablet 1 tablet   Dose: 1 tablet  Freq: Once As Needed Route: PO  PRN Reason: Moderate Pain   Start: 02/12/21 0619 End: 02/12/21 0624    Admin Instructions:   [SUSAN]    Do not exceed 4 grams of acetaminophen in a 24 hr period. Max dose of 2gm for AST/ALT greater than 120 units/L        If given for pain, use the following pain scale:   Mild Pain = Pain Score of 1-3, CPOT 1-2  Moderate Pain = Pain Score of 4-6, CPOT 3-4  Severe Pain = Pain Score of 7-10, CPOT 5-8      0624            sodium chloride 0.9 % flush 10 mL   Dose: 10 mL  Freq: As Needed Route: IV  PRN Reason: Line Care  Start: 02/11/21 2233         sodium chloride 0.9 % flush 10 mL   Dose: 10 mL  Freq: As Needed Route: IV  PRN Reason: Line Care  Start: 02/11/21 1811

## 2021-02-12 NOTE — ED NOTES
Attempted to call report at this time, states nurse is in another room and will call back.        China Edward, RN  02/11/21 1339

## 2021-02-12 NOTE — ED NOTES
I gave the patient a urinal and asked him for a urine specimen. Patient said it make take him a little bit but he will try.     Rinku Gomez  02/11/21 1937

## 2021-02-12 NOTE — PROGRESS NOTES
[facility] HOSPITALIST PROGRESS NOTE     Patient Identification:  Name:  Mario Nair  Age:  45 y.o.  Sex:  male  :  1975  MRN:  [id number]  Visit Number:  [id number]  ROOM: [address]     Primary Care Provider:  Danny Rebolledo MD    Length of stay in inpatient status:  1    Subjective     Chief Compliant:    Chief Complaint   Patient presents with   • Headache   • Neck Pain       History of Presenting Illness:    Patient continues to report neck pain that radiates to  His left shoulder. He reports it started 10 days ago and is a constant dull pain. He denies any trauma to his neck or falls. He reports that he did not get dialysis because no one can ever find him a dialysis clinic to go to.     ROS:  Otherwise 10 point ROS negative other than documented above in HPI.     Objective     Current Hospital Meds:albumin human, 25 g, Intravenous, Once in Dialysis  carvedilol, 6.25 mg, Oral, BID With Meals  heparin (porcine), 5,000 Units, Subcutaneous, Q12H  hydrALAZINE, 10 mg, Oral, Q8H  lidocaine, 1 patch, Transdermal, Q24H  nicotine, 1 patch, Transdermal, Q24H  pantoprazole, 40 mg, Oral, Q AM  sodium chloride, 10 mL, Intravenous, Q12H         Current Antimicrobial Therapy:  Anti-Infectives (From admission, onward)    None        Current Diuretic Therapy:  Diuretics (From admission, onward)    None        ----------------------------------------------------------------------------------------------------------------------  Vital Signs:  Temp:  [96.8 °F (36 °C)-99 °F (37.2 °C)] 99 °F (37.2 °C)  Heart Rate:  [] 123  Resp:  [9-18] 15  BP: (118-176)/() 136/87  SpO2:  [95 %-100 %] 96 %  on   ;   Device (Oxygen Therapy): room air  Body mass index is 19.77 kg/m².    Wt Readings from Last 3 Encounters:   21 69.9 kg (154 lb)   21 70 kg (154 lb 4.8 oz)   20 67.5 kg (148 lb 12.8 oz)     Intake & Output (last 3 days)        07 - 02/10 0700 02/10 07 -  0700  02/11 0701 - 02/12 0700 02/12 0701 - 02/13 0700    P.O.    948    Total Intake(mL/kg)    948 (13.6)    Urine (mL/kg/hr)   0     Other 2000     Stool   0     Total Output   2000     Net   -2000 +948            Urine Unmeasured Occurrence   2 x 2 x    Stool Unmeasured Occurrence   0 x 2 x        Diet Regular; Renal  ----------------------------------------------------------------------------------------------------------------------  Physical exam:  Constitutional:  Well-developed and well-nourished.  No respiratory distress.      HENT:  Head:  Normocephalic and atraumatic.  Mouth:  Moist mucous membranes.    Eyes:  Conjunctivae and EOM are normal. No scleral icterus.    Neck:  Neck supple.  No JVD present.    Cardiovascular:  Normal rate, regular rhythm and normal heart sounds with no murmur.  Pulmonary/Chest:  No respiratory distress, no wheezes, no crackles, with normal breath sounds and good air movement.  Abdominal:  Soft.  Bowel sounds are normal.  No distension and no tenderness.   Musculoskeletal:  Mild tenderness of cervical spine. Pain out of proportion to exam.    Neurological:  Alert and oriented to person, place, and time.  No cranial nerve deficit.  No tongue deviation.  No facial droop.  No slurred speech.   Skin:  Skin is warm and dry. No rash noted. No pallor.   Peripheral vascular:  Pulses in all 4 extremities with no clubbing, no cyanosis, no edema.  ----------------------------------------------------------------------------------------------------------------------  Tele:    ----------------------------------------------------------------------------------------------------------------------  Results from last 7 days   Lab Units 02/12/21  0327 02/11/21  2005 02/11/21  1854 02/11/21  1831   CRP mg/dL  --   --   --  4.83*   LACTATE mmol/L  --  0.7  --   --    WBC 10*3/mm3 6.15  --   --  7.45   HEMOGLOBIN g/dL 7.4*  --   --  8.2*   HEMATOCRIT % 22.5*  --   --  27.2*   MCV fL 88.9  --   --  96.1    MCHC g/dL 32.9  --   --  30.1*   PLATELETS 10*3/mm3 183  --   --  175   INR   --   --  1.16*  --          Results from last 7 days   Lab Units 02/12/21 0327 02/11/21  1831   SODIUM mmol/L 128* 129*   POTASSIUM mmol/L 3.0* 8.6*   CHLORIDE mmol/L 89* 96*   CO2 mmol/L 27.6 11.1*   BUN mg/dL 73* 196*   CREATININE mg/dL 3.64* 8.68*   EGFR IF NONAFRICN AM mL/min/1.73 18* 7*   CALCIUM mg/dL 8.3* 8.3*   GLUCOSE mg/dL 175* 107*   ALBUMIN g/dL  --  2.83*   BILIRUBIN mg/dL  --  0.4   ALK PHOS U/L  --  195*   AST (SGOT) U/L  --  21   ALT (SGPT) U/L  --  25   Estimated Creatinine Clearance: 25.6 mL/min (A) (by C-G formula based on SCr of 3.64 mg/dL (H)).  No results found for: AMMONIA              No results found for: HGBA1C, POCGLU  Lab Results   Component Value Date    TSH 2.380 11/14/2020    FREET4 0.73 (L) 02/03/2021     No results found for: PREGTESTUR, PREGSERUM, HCG, HCGQUANT  Pain Management Panel     Pain Management Panel Latest Ref Rng & Units 2/12/2021 1/25/2021    AMPHETAMINES SCREEN, URINE Negative Negative Negative    BARBITURATES SCREEN Negative Negative Negative    BENZODIAZEPINE SCREEN, URINE Negative Negative Negative    BUPRENORPHINEUR Negative Negative Negative    COCAINE SCREEN, URINE Negative Negative Negative    METHADONE SCREEN, URINE Negative Negative Negative        Brief Urine Lab Results  (Last result in the past 365 days)      Color   Clarity   Blood   Leuk Est   Nitrite   Protein   CREAT   Urine HCG        02/12/21 0045 Yellow Clear Moderate (2+) Small (1+) Negative 100 mg/dL (2+)             No results found for: BLOODCX  No results found for: URINECX  No results found for: WOUNDCX  No results found for: STOOLCX  No results found for: RESPCX  No results found for: AFBCX  Results from last 7 days   Lab Units 02/11/21 2005 02/11/21 1831   LACTATE mmol/L 0.7  --    SED RATE mm/hr  --  99*   CRP mg/dL  --  4.83*       I have personally looked at the labs and they are summarized  above.  ----------------------------------------------------------------------------------------------------------------------  Detailed radiology reports for the last 24 hours:    Imaging Results (Last 24 Hours)     Procedure Component Value Units Date/Time    CT Cervical Spine Without Contrast [[id number]] Collected: 02/11/21 1842     Updated: 02/11/21 1847    Narrative:      CT CERVICAL SPINE WO CONTRAST-     CLINICAL INDICATION: persistent pain x 2 weeks        COMPARISON: 01/25/2021      TECHNIQUE: Axial images of the cervical spine were acquired with out any  intravenous contrast. Reformatted images were then created in the  sagittal and coronal planes.     DOSE:      Radiation dose reduction techniques were utilized per ALARA protocol.  Automated exposure control was initiated through either or Forrst or  DoseRight software packages by  protocol.           FINDINGS:   The provided study demonstrates preservation of the vertebral body  heights in the sagittally reconstructed images.     There is no prevertebral soft tissue swelling.     Straightening of the cervical spine     Disc space narrowing at C4-5 and C5-6     I see no acute cervical spine fracture.       Impression:         1. Arthritic change     2. No acute bony abnormality     This report was finalized on 2/11/2021 6:45 PM by Dr. Wesly Reynolds MD.           Assessment & Plan    #Severe acute hyperkalemia   #ESRD in setting on noncompliance   #Chronic hyponatremia   #HAGMA  - Patient presented with potassium of 8.6 after being noncompliant at home since discharge on 2/4 when he left AMA  - Urgently taken for dialysis by nephrology. K: 3.0 this AM.   - Nephrology consulted, appreciate recs.     #Subacute neck pain   - Small amount of point tenderness over cervical spine.   - Patient reports UE weakness but not appreciated on exam.   - Will get MRI of cervical spine in setting of history of IVDU and endocarditis to rule out infectious  etiology.   - PRN lidoderm patch, tylenol, flexeril     #Sinus tachycardia   - Unclear etiology. Will continue to monitor.     #Hx of serratia bactermia  #Hx of endocartitis   #Hx of IVDU  - Blood cultures on admission NGTD. Afebrile.     #Hepatitis B/C  - LFTs wnl. Outpatinet f/u.     #COPD  - No wheezing on exam.     F: PO  E: Replace as needed   N: Renal     Code status: Full     Dispo: Awaiting clinical improvement and outpatient dialysis chair. Will transfer to [facility] given improvement.       VTE Prophylaxis:   Mechanical Order History:     None      Pharmalogical Order History:      Ordered     Dose Route Frequency Stop    02/11/21 8369  heparin (porcine) 5000 UNIT/ML injection 5,000 Units      5,000 Units SC Every 12 Hours Scheduled --                Raymundo Centeno MD  [facility] Hospital[facility]  02/12/21  14:59 EST

## 2021-02-12 NOTE — PLAN OF CARE
Problem: Adult Inpatient Plan of Care  Goal: Plan of Care Review  Outcome: Ongoing, Progressing  Flowsheets (Taken 2/12/2021 1304)  Progress: improving  Plan of Care Reviewed With: patient  Goal: Patient-Specific Goal (Individualized)  Outcome: Ongoing, Progressing  Goal: Absence of Hospital-Acquired Illness or Injury  Outcome: Ongoing, Progressing  Intervention: Identify and Manage Fall Risk  Recent Flowsheet Documentation  Taken 2/12/2021 1300 by Alex Morales RN  Safety Promotion/Fall Prevention: safety round/check completed  Taken 2/12/2021 1200 by Alex Morales RN  Safety Promotion/Fall Prevention: safety round/check completed  Taken 2/12/2021 1100 by Alex Morales RN  Safety Promotion/Fall Prevention: safety round/check completed  Taken 2/12/2021 1000 by Alex Morales RN  Safety Promotion/Fall Prevention: safety round/check completed  Taken 2/12/2021 0900 by Alex Morales RN  Safety Promotion/Fall Prevention: safety round/check completed  Taken 2/12/2021 0800 by Alex Morales RN  Safety Promotion/Fall Prevention: safety round/check completed  Taken 2/12/2021 0700 by Alex Morales RN  Safety Promotion/Fall Prevention: safety round/check completed  Intervention: Prevent Skin Injury  Recent Flowsheet Documentation  Taken 2/12/2021 1200 by Alex Morales RN  Body Position: position changed independently  Taken 2/12/2021 1000 by Alex Morales RN  Body Position: position changed independently  Taken 2/12/2021 0800 by Alex Morales RN  Body Position: position changed independently  Intervention: Prevent and Manage VTE (venous thromboembolism) Risk  Recent Flowsheet Documentation  Taken 2/12/2021 0800 by Alex Morales RN  VTE Prevention/Management: (heparin sq) other (see comments)  Intervention: Prevent Infection  Recent Flowsheet Documentation  Taken 2/12/2021 0800 by Alex Morales RN  Infection Prevention: environmental  surveillance performed  Goal: Optimal Comfort and Wellbeing  Outcome: Ongoing, Progressing  Intervention: Provide Person-Centered Care  Recent Flowsheet Documentation  Taken 2/12/2021 0800 by Alex Morales, RN  Trust Relationship/Rapport:   care explained   reassurance provided  Goal: Readiness for Transition of Care  Outcome: Ongoing, Progressing   Goal Outcome Evaluation:  Plan of Care Reviewed With: patient  Progress: improving

## 2021-02-12 NOTE — PROGRESS NOTES
Discharge Planning Assessment   [facility]     Patient Name: Mario Nair  MRN: [id number]  Today's Date: 2/12/2021    Admit Date: 2/11/2021    Discharge Needs Assessment     Row Name 02/12/21 1407       Living Environment    Lives With  child(tammy), adult    Name(s) of Who Lives With Patient  Constance Nair (Dtr) & Niko Nair (Son)    Current Living Arrangements  home/apartment/condo    Family Caregiver if Needed  child(tammy), adult    Quality of Family Relationships  helpful;involved;supportive       Discharge Needs Assessment    Equipment Currently Used at Home  cane, straight    Discharge Facility/Level of Care Needs  -- Outpt HD slot        Discharge Plan     Row Name 02/12/21 1412       Plan    Plan Pt was admitted on 02/11/21. SS receieved nursing consult for pt non-compliant with dialysis program. Pt lives with his adult children, Constance and Niko. Pt does not utilize home health services. Pt uses a straight cane for DME. Pt's PCP is Danny Simmons. Pt uses [facility]. Pt plans on returning home at discharge, with family transporting. SS will fax referral to outpt HD clinics for possible HD slot. SS faxed referrals to [facility] and [facility] to review. SS will follow.        Felisa Alejo

## 2021-02-12 NOTE — PLAN OF CARE
Problem: Adult Inpatient Plan of Care  Goal: Plan of Care Review  Outcome: Ongoing, Progressing  Goal: Patient-Specific Goal (Individualized)  Outcome: Ongoing, Progressing  Goal: Absence of Hospital-Acquired Illness or Injury  Outcome: Ongoing, Progressing  Intervention: Identify and Manage Fall Risk  Recent Flowsheet Documentation  Taken 2/12/2021 0300 by Jessica Guadarrama RN  Safety Promotion/Fall Prevention: safety round/check completed  Taken 2/12/2021 0200 by Jessica Guadarrama RN  Safety Promotion/Fall Prevention: safety round/check completed  Taken 2/12/2021 0100 by Jessica Guadarrama RN  Safety Promotion/Fall Prevention: safety round/check completed  Intervention: Prevent Skin Injury  Recent Flowsheet Documentation  Taken 2/12/2021 0200 by Jessica Guadarrama RN  Body Position: side-lying, left  Intervention: Prevent Infection  Recent Flowsheet Documentation  Taken 2/12/2021 0300 by Jessica Guadarrama RN  Infection Prevention:   single patient room provided   rest/sleep promoted   personal protective equipment utilized   hand hygiene promoted   equipment surfaces disinfected  Taken 2/12/2021 0100 by Jessica Guadarrama RN  Infection Prevention:   single patient room provided   rest/sleep promoted   hand hygiene promoted   equipment surfaces disinfected   personal protective equipment utilized  Goal: Optimal Comfort and Wellbeing  Outcome: Ongoing, Progressing  Goal: Readiness for Transition of Care  Outcome: Ongoing, Progressing   Goal Outcome Evaluation:

## 2021-02-12 NOTE — PROGRESS NOTES
Nurse Practitioner - Brief Progress Note  PERMANENT  02/11/2021 22:59    [facility] - [facility] - [place], [place] ([facility])    ROBBI CUEVAS    Date of Service 02/11/2021 22:59    HPI/Events of Note [facility] Provider Assessment Note    46 y/o male admitted to ICU with Hyperkalemia due to noncompliant with dialysis, high anion gap metabolic acidosis, hyponatremia    Hx of ESRD on dialysis, COVID-19, COPD, endocarditis, Hepatitis C, IV drug abuse, unable to read or write, infectious viral hepatitis    Pt presented with c/o neck pain. Hx of chronic renal failure and recently had dialysis catheter placed. Pt is noncompliant with dialysis (last hemodialysis was ? a week ago). In the ER, noted to have potassium of 8.6, , creatinine 8.68. CT   cervical spine 2/11/2021: Arthritic change; no acute bony abnormality. Pt was transferred to ICU for emergent hemodialysis.     Current VS: HR 85, /110, RR 18, O2 sat 98% on room air, temp 97.7 F. No pain or SOB at this time.     Labs: Anion gap 21.9, sodium 129, potassium 8.6, chloride 96, bicarbonate 11, glucose 107, , creatinine 8.68, calcium 8.3, lactate 0.7, Hgb 8.2/Hct 27.2, WBC 7.45, platelets 175, INR 1.16, CRP 4.83    EKG: Normal sinus rhythm rate 96, nonspecific intraventricular block. Cannot rule out septal infarct, age undetermined. QTc 507    Assessment and Plan:    Hyperkalemia due to noncompliant with dialysis, high anion gap metabolic acidosis, hyponatremia  - Hemodialysis is in progress in the ICU at this time  - Nephrology is consulted  - Kayexalate, Calcium gluconate, D50, IV regular insulin, Lactulose ordered  - NS 1L IV bolus x1, Albumin ordered  - Renal dose medications. Avoid nephrotoxins. Moniotor BMP, UOP  - Anemia --no active bleeding. Monitor H&H  - EKG in the AM, UA with microscopic if indicated, urine drug screen ordered  - CBC, CMP, Mg and Phos in the AM ordered      __Y___   Video Assessment  performed  __Y___   Most recent labs reviewed  __Y___   Vital Signs reviewed  __Y___   Best Practices addressed:                 VTE prophylaxis: Heparin subq                 SUP (when indicated): N/A                 Glycemic control:                      Please notify bedside physician when present or [facility] if glc > 180 X 2                 Sepsis guidelines: N/A                 Lung protective strategy: N/A                 Targeted Temperature Management: N/A    __Y___     Spoke with bedside RN  __Y___     Orders written      Contact [facility] for any needs if bedside physician is not present.  Note drafted by JERRI Quiñonez-BC      Interventions Major-Acid-Base disturbance - evaluation and management, Acute renal failure - evaluation and management, Electrolyte abnormality - evaluation and management  Intermediate-Best-practice therapies (e.g. VTE, beta blocker, etc.), Communication with other healthcare providers and/or family        Electronically Signed by: Denisse Mondragon (ANP,CCRN) on 02/11/2021 23:17    Annotated By: Hailey Kahn)    Date: 02/12/21 01:57  [facility] MD Addendum: Admission assessment note by JERRI Mondragon reviewed by me after brief chart review & video assessment performed. s/p HD for acute hyperkalemia with EKG changes (peaked T waves, prolonged ). F/U chemistry & EKG   post-dialysis.

## 2021-02-12 NOTE — ED PROVIDER NOTES
Subjective   45-year-old white male presents secondary to persistent neck pain.  Patient was recently in the hospital for multiple problems.  Patient has chronic renal failure.  He had a recent dialysis catheter placed.  Patient is noncompliant with dialysis.  He states has not had dialysis since he got out of the hospital which was a week ago.  He states he has had persistent neck pain and hurts with movement.  He denies any numbness tingling or pain in his arm.  He denies any falls or injuries.  He denies any fever.  He recently had COVID-19.  He denies any shortness of breath.  No abdominal pain.  No other complaints this time.          Review of Systems   Constitutional: Positive for fatigue. Negative for fever.   HENT: Negative.    Respiratory: Negative.    Cardiovascular: Negative.  Negative for chest pain.   Gastrointestinal: Negative.  Negative for abdominal pain.   Endocrine: Negative.    Genitourinary: Negative.  Negative for dysuria.   Skin: Negative.    Neurological: Negative.    Psychiatric/Behavioral: Negative.    All other systems reviewed and are negative.      Past Medical History:   Diagnosis Date   • COPD (chronic obstructive pulmonary disease) (CMS/[facility])    • COVID-19 1/25/2021   • Endocarditis    • ESRD (end stage renal disease) (CMS/[facility])    • Hepatitis C    • Infectious viral hepatitis    • IV drug abuse (CMS/[facility])    • Unable to read or write        Allergies   Allergen Reactions   • Penicillins Shortness Of Breath, Itching and Rash       Past Surgical History:   Procedure Laterality Date   • CENTRAL VENOUS CATHETER TUNNELED INSERTION DOUBLE LUMEN Right     Chest for hemodialysis   • INSERTION HEMODIALYSIS CATHETER N/A 1/29/2021    Procedure: HEMODIALYSIS CATHETER INSERTION;  Surgeon: Khoa Carl MD;  Location: [facility];  Service: General;  Laterality: N/A;       Family History   Problem Relation Age of Onset   • Alcohol abuse Mother    • Hypertension Mother    • Hypertension Other    •  Kidney disease Maternal Grandmother        Social History     Socioeconomic History   • Marital status:      Spouse name: Not on file   • Number of children: Not on file   • Years of education: Not on file   • Highest education level: Not on file   Tobacco Use   • Smoking status: Current Every Day Smoker     Packs/day: 0.50     Years: 20.00     Pack years: 10.00     Types: Cigarettes   • Smokeless tobacco: Never Used   Substance and Sexual Activity   • Alcohol use: Not Currently     Frequency: Never     Comment: Quit 18 years ago   • Drug use: Yes     Types: Methamphetamines, IV     Comment: last used on Friday 5/18/2019   • Sexual activity: Defer           Objective   Physical Exam  Vitals signs and nursing note reviewed.   Constitutional:       General: He is not in acute distress.     Appearance: He is well-developed. He is not diaphoretic.   HENT:      Head: Normocephalic and atraumatic.      Right Ear: External ear normal.      Left Ear: External ear normal.      Nose: Nose normal.   Eyes:      Conjunctiva/sclera: Conjunctivae normal.      Pupils: Pupils are equal, round, and reactive to light.   Neck:      Musculoskeletal: Normal range of motion and neck supple.      Vascular: No JVD.      Trachea: No tracheal deviation.   Cardiovascular:      Rate and Rhythm: Normal rate and regular rhythm.      Heart sounds: Normal heart sounds. No murmur.   Pulmonary:      Effort: Pulmonary effort is normal. No respiratory distress.      Breath sounds: Normal breath sounds. No wheezing.   Abdominal:      General: Bowel sounds are normal.      Palpations: Abdomen is soft.      Tenderness: There is no abdominal tenderness.   Musculoskeletal: Normal range of motion.         General: No deformity.      Comments: Patient does have focal tenderness in his left neck.  No redness rash or sign of superficial infection.   Skin:     General: Skin is warm and dry.      Coloration: Skin is not pale.      Findings: No erythema or  rash.   Neurological:      Mental Status: He is alert and oriented to person, place, and time.      Cranial Nerves: No cranial nerve deficit.   Psychiatric:         Behavior: Behavior normal.         Thought Content: Thought content normal.         Procedures           ED Course  ED Course as of Feb 11 1949   Thu Feb 11, 2021 1940 Discussed with Dr. Hylton.  He wishes for the patient to have dialysis.  I paged the hospitalist.  In the interim he did recommend giving an amp of D50 along with insulin Kayexalate and calcium gluconate.    [JI]   1946 Accepted by Dr. Blackwell    [JI]      ED Course User Index  [JI] Bharat Vega PA                                           MDM  Number of Diagnoses or Management Options  Acute renal failure superimposed on chronic kidney disease, on chronic dialysis, unspecified acute renal failure type (CMS/HCC): established and worsening  Hyperkalemia: established and worsening  Neck pain: established and worsening     Amount and/or Complexity of Data Reviewed  Clinical lab tests: reviewed and ordered  Tests in the radiology section of CPT®: reviewed and ordered  Tests in the medicine section of CPT®: reviewed and ordered  Discuss the patient with other providers: yes        Final diagnoses:   Hyperkalemia   Neck pain   Acute renal failure superimposed on chronic kidney disease, on chronic dialysis, unspecified acute renal failure type (CMS/HCC)            Bharat Vega PA  02/11/21 1949

## 2021-02-12 NOTE — DISCHARGE PLACEMENT REQUEST
"Mario Nair (45 y.o. Male)     Date of Birth Social Security Number Address Home Phone MRN    1975  [address] [place] [id number] [phone] [id number]    Mormonism Marital Status          None        Admission Date Admission Type Admitting Provider Attending Provider Department, Room/Bed    2/11/21 Emergency Harriet Blackwell DO Hacker, Bill J, MD [facility] CRITICAL CARE, CC10/1C    Discharge Date Discharge Disposition Discharge Destination                       Attending Provider: Raymundo Centeno MD    Allergies: Penicillins    Isolation: Enh Drop/Con   Infection: Hepatitis B (06/05/19), COVID (confirmed) (01/25/21)   Code Status: CPR    Ht: 188 cm (74\")   Wt: 69.9 kg (154 lb)    Admission Cmt: None   Principal Problem: Hyperkalemia [E87.5]                 Active Insurance as of 2/11/2021     Primary Coverage     Payor Plan Insurance Group Employer/Plan Group    [employer] [place] [employer] [place]ICA[place]      Payor Plan Address Payor Plan Phone Number Payor Plan Fax Number Effective Dates    [address] [address] [phone]  5/5/2019 - None Entered    [facility] [id number]       Subscriber Name Subscriber Birth Date Member ID       NAIR,MARIO SOTO 1975 [id number]                 Emergency Contacts      (Rel.) Home Phone Work Phone Mobile Phone    SHANI SILVA (Relative) [phone] -- --    Constance Nair (Daughter) -- -- [phone]            Emergency Contact Information     Name Relation Home Work Mobile    SHANI SILVA Relative [phone]      Constance Nair Daughter   [phone]          Insurance Information                [facility] MEDICAID Phone: [phone]    Subscriber: Mario Nair Subscriber#: [id number]    Group#:  Precert#:              History & Physical      Harriet Blackwell DO at 02/11/21 2235          Hospitalist History and Physical    Patient Identification:  Name: Mario Nair  Age/Sex: 45 y.o. " male  :  1975  MRN: [id number]        Admit Date: 2021   Primary Care Physician: Danny Rebolledo MD    Chief Complaint   Patient presents with   • Headache   • Neck Pain       History of Present Illness  Patient is a 45 y.o. male presents with the following: Left-sided neck and shoulder pain    The patient is a 45-year-old male with past medical history significant for end-stage renal disease, anemia requiring transfusion in the past, medical noncompliance, history of intravenous drug abuse, endocarditis and hepatitis C who presents to the emergency department complaining of a 1 day history of left neck and shoulder pain.    Patient states that he thinks he is experiencing left neck and shoulder pain from a muscle spasm.  He denies any recent injury or trauma.  Patient further denies any fevers, chills, nausea, vomiting, chest pains and/or shortness of air.  The patient states that he has not been dialyzed since he left AGAINST MEDICAL ADVICE on 2021.    During the above-mentioned admission from  through , the patient was treated for sepsis, acute pyelonephritis, Serratia bacteremia and asymptomatic Covid-19 infection.  Patient also was treated for hyperkalemia and underwent inpatient hemodialysis.  The patient has a left chest/internal jugular tunneled hemodialysis catheter.  Patient was also seen in our service in 2020 where he was treated for acute cystitis, end-stage renal disease on hemodialysis and electrolyte abnormalities.  Patient was also seen in our service in 2020 where he was treated for hyperkalemia secondary to noncompliance with dialysis and a Serratia UTI.  Patient was also on our service in 2020 for end-stage renal disease in need of dialysis with hyperkalemia.  Patient has left AGAINST MEDICAL ADVICE during each of the above-mentioned admissions.    Work-up in the emergency department revealed a potassium of 8.6, sodium 129,   and a creatinine of 8.68.  CO2 was 11.1.  LFTs are within normal limits.  C-RP minimally elevated at 4.83.  Lactate was 0.7.  Hemoglobin 8.2 and hematocrit 27.2.    CT scan of the cervical spine without contrast revealed arthritic change and disc space narrowing at C4-5 and C5-6.    The patient has been admitted to the critical care unit where he is currently undergoing hemodialysis.    Present during visit: KALA Storey and HD nurse    Past History:  Past Medical History:   Diagnosis Date   • COPD (chronic obstructive pulmonary disease) (CMS/HCC)    • COVID-19 1/25/2021   • Endocarditis    • ESRD (end stage renal disease) (CMS/[facility])    • Hepatitis C    • Infectious viral hepatitis    • IV drug abuse (CMS/[facility])    • Unable to read or write      Past Surgical History:   Procedure Laterality Date   • CENTRAL VENOUS CATHETER TUNNELED INSERTION DOUBLE LUMEN Right     Chest for hemodialysis   • INSERTION HEMODIALYSIS CATHETER N/A 1/29/2021    Procedure: HEMODIALYSIS CATHETER INSERTION;  Surgeon: Khoa Carl MD;  Location: [facility];  Service: General;  Laterality: N/A;     Family History   Problem Relation Age of Onset   • Alcohol abuse Mother    • Hypertension Mother    • Hypertension Other    • Kidney disease Maternal Grandmother      Social History     Tobacco Use   • Smoking status: Current Every Day Smoker     Packs/day: 0.50     Years: 20.00     Pack years: 10.00     Types: Cigarettes   • Smokeless tobacco: Never Used   Substance Use Topics   • Alcohol use: Not Currently     Frequency: Never     Comment: Quit 18 years ago   • Drug use: Yes     Types: Methamphetamines, IV          No medications prior to admission.     Allergies: Penicillins    Review of Systems:  Review of Systems   Constitutional: Negative for chills, diaphoresis and fever.   HENT: Negative for hearing loss, tinnitus and trouble swallowing.    Eyes: Negative for photophobia, discharge and visual disturbance.   Respiratory: Negative for  cough, shortness of breath and wheezing.    Cardiovascular: Negative for chest pain, palpitations and leg swelling.   Gastrointestinal: Negative for abdominal pain, constipation, diarrhea, nausea and vomiting.   Endocrine: Negative for polydipsia, polyphagia and polyuria.   Genitourinary: Negative for dysuria, frequency and hematuria.   Musculoskeletal: Positive for neck pain. Negative for gait problem, myalgias and neck stiffness.   Skin: Negative for rash and wound.   Neurological: Negative for dizziness, tremors, seizures, syncope, weakness and light-headedness.   Hematological: Does not bruise/bleed easily.   Psychiatric/Behavioral: Negative for confusion, hallucinations and suicidal ideas.      Vital Signs  Temp:  [98 °F (36.7 °C)] 98 °F (36.7 °C)  Heart Rate:  [90] 90  Resp:  [18] 18  BP: (144-176)/() 164/100  Body mass index is 17.97 kg/m².    Physical Exam:  Physical Exam  Constitutional:       General: He is not in acute distress.     Appearance: He is well-developed. He is ill-appearing (chronically; appears older than stated age).   HENT:      Head: Normocephalic and atraumatic.   Eyes:      Conjunctiva/sclera: Conjunctivae normal.      Pupils: Pupils are equal, round, and reactive to light.   Neck:      Musculoskeletal: Neck supple.      Trachea: No tracheal deviation.   Cardiovascular:      Rate and Rhythm: Normal rate and regular rhythm.      Pulses:           Posterior tibial pulses are 2+ on the right side and 2+ on the left side.      Heart sounds: No murmur. No friction rub. No gallop.    Pulmonary:      Effort: No respiratory distress.      Breath sounds: Normal breath sounds. No wheezing or rales.   Chest:      Comments: Left chest tunneled hemodialysis catheter.  Abdominal:      General: Bowel sounds are normal. There is no distension.      Palpations: Abdomen is soft.      Tenderness: There is no abdominal tenderness. There is no guarding.   Musculoskeletal: Normal range of motion.          General: No tenderness.      Right lower leg: No edema.      Left lower leg: No edema.   Skin:     General: Skin is warm and dry.      Findings: No erythema or rash.   Neurological:      Mental Status: He is alert and oriented to person, place, and time.      Cranial Nerves: No cranial nerve deficit.         Results Review:    Results from last 7 days   Lab Units 02/11/21  1831   WBC 10*3/mm3 7.45   HEMOGLOBIN g/dL 8.2*   HEMATOCRIT % 27.2*   PLATELETS 10*3/mm3 175     Results from last 7 days   Lab Units 02/11/21  1831   SODIUM mmol/L 129*   POTASSIUM mmol/L 8.6*   CHLORIDE mmol/L 96*   CO2 mmol/L 11.1*   BUN mg/dL 196*   CREATININE mg/dL 8.68*   CALCIUM mg/dL 8.3*   GLUCOSE mg/dL 107*     Results from last 7 days   Lab Units 02/11/21  1831   BILIRUBIN mg/dL 0.4   ALK PHOS U/L 195*   AST (SGOT) U/L 21   ALT (SGPT) U/L 25     Results from last 7 days   Lab Units 02/11/21  1831   CRP mg/dL 4.83*             Results from last 7 days   Lab Units 02/11/21  1854   INR  1.16*       Imaging:    I have personally reviewed the EKG. Peaked T waves noted most pronounced in the lateral leads    Imaging Results (Most Recent)     Procedure Component Value Units Date/Time    CT Cervical Spine Without Contrast [[id number]] Collected: 02/11/21 1842     Updated: 02/11/21 1847    Narrative:      CT CERVICAL SPINE WO CONTRAST-     CLINICAL INDICATION: persistent pain x 2 weeks        COMPARISON: 01/25/2021      TECHNIQUE: Axial images of the cervical spine were acquired with out any  intravenous contrast. Reformatted images were then created in the  sagittal and coronal planes.     DOSE:      Radiation dose reduction techniques were utilized per ALARA protocol.  Automated exposure control was initiated through either or [employer] or  [employer] software packages by  protocol.           FINDINGS:   The provided study demonstrates preservation of the vertebral body  heights in the sagittally reconstructed images.     There is  no prevertebral soft tissue swelling.     Straightening of the cervical spine     Disc space narrowing at C4-5 and C5-6     I see no acute cervical spine fracture.       Impression:         1. Arthritic change     2. No acute bony abnormality     This report was finalized on 2/11/2021 6:45 PM by Dr. Wesly Reynolds MD.             Assessment/Plan       Hyperkalemia    -Severe, acute hyperkalemia secondary to non-compliance with hemodialysis  -End stage renal disease non-compliant with treatment  -Left neck pain, likely muscular; CT scan with disc space narrowing C4-5; C5-6  -Essential hypertension, uncontrolled  -Asymptomatic COVID-19 infection; isolation to be discontinued 2/14/21  -Hyponatremia, likely hypervolemic  -Hypoalbuminemia, 2.83  -Anion-gap metabolic acidosis    Chronic medical problems:  -History of recent Serratia bacteremia  -History of endocarditis  -History of urinary tract infection  -History of intravenous drug abuse  -Anemia, normocytic  -Hepatitis B/Hepatitis C  -Tobacco abuse  -Chronic obstructive pulmonary disease currently not in acute exacerbation    Patient has been admitted to the critical care unit and is undergoing hemodialysis.  Nephrology has been consulted.  A repeat BMP after dialysis will be ordered.  I have ordered a repeat EKG for the a.m.    Lidoderm patch and a one-time dose of Flexeril for neck pain that I think is likely muscular in nature.  Consider MRI imaging.    I have resumed carvedilol and low-dose hydralazine noted on patient's previous discharge summary as patient reported no chronic home medications.  Continue to monitor his blood pressure closely.    Patient currently not interested in tobacco cessation.  Nicotine patch will be ordered.    Continue to monitor patient closely on telemetry.    We will repeat his CBC in a.m.    DVT/GI prophylaxis: Subcutaneous heparin/pantoprazole    Estimated Length of Stay: > 2 MNs    Patient is considered to be high risk patient due  to: Severe, life-threatening hyperkalemia, end-stage renal disease, hepatitis, history of drug abuse, medical noncompliance    I discussed the patients findings and my recommendations with patient and nursing staff      Harriet Blackwell DO  02/11/21  22:35 EST    Electronically signed by Harriet Blackwell DO at 02/12/21 0056       Vital Signs (last day)     Date/Time   Temp   Temp src   Pulse   Resp   BP   Patient Position   SpO2    02/12/21 1305   --   --   (!) 123   15   136/87   --   96    02/12/21 1235   99 (37.2)   Oral   120   --   126/83   --   96    02/12/21 1205   --   --   118   12   119/77   --   96    02/12/21 1135   --   --   (!) 121   --   130/89   --   98    02/12/21 1105   --   --   114   13   125/90   --   96    02/12/21 1035   --   --   112   --   121/84   --   96    02/12/21 1005   --   --   116   12   151/91   --   97    02/12/21 0935   --   --   109   --   138/95   --   97    02/12/21 0905   --   --   107   15   152/95   --   95    02/12/21 0835   --   --   109   --   142/91   --   96    02/12/21 0805   98 (36.7)   Oral   116   15   143/97   --   96    02/12/21 0730   --   --   104   12   137/93   --   97    02/12/21 0705   --   --   112   --   135/93   --   97    02/12/21 0635   --   --   114   --   133/94   --   97    02/12/21 0617   --   --   (!) 122   --   133/94   --   --    02/12/21 0605   --   --   117   12   137/90   --   98    02/12/21 0539   --   --   110   --   140/93   --   --    02/12/21 0535   --   --   105   --   140/93   --   97    02/12/21 0500   --   --   116   11   134/97   --   97    02/12/21 0430   --   --   (!) 122   15   124/99   --   97    02/12/21 0415   --   --   117   11   135/80   --   97    02/12/21 0400   98.5 (36.9)   --   117   15   119/80   --   96    02/12/21 0332   --   --   115   14   122/83   --   97    02/12/21 0315   --   --   115   12   118/75   --   97    02/12/21 0302   --   --   (!) 121   14   149/92   --   99    02/12/21 0248   97.9 (36.6)    Oral   110   15   133/79   --   98    02/12/21 0231   --   --   114   14   125/87   --   97    02/12/21 0220   96.8 (36)   Tympanic   114   --   137/83   Lying   97    02/12/21 0205   --   --   117   12   134/82   --   97    02/12/21 0150   --   --   116   --   135/93   --   98    02/12/21 0135   --   --   120   --   134/86   --   96    02/12/21 0120   --   --   116   --   (!) 150/102   --   96    02/12/21 0105   --   --   113   13   128/94   --   97    02/12/21 0050   --   --   112   --   135/91   --   97    02/12/21 0030   --   --   111   --   143/90   --   99    02/12/21 0020   --   --   112   10   (!) 135/107   --   98    02/12/21 0005   97.3 (36.3)   Oral   110   12   152/97   --   99    02/11/21 2350   --   --   103   --   147/92   --   100    02/11/21 2330   --   --   101   13   143/97   --   100    02/11/21 2315   --   --   93   --   (!) 149/105   --   99    02/11/21 2305   --   --   87   13   157/97   --   100    02/11/21 2245   97.7 (36.5)   Tympanic   85   18   (!) 163/110   Lying   --    02/11/21 2235   --   --   97   --   (!) 157/110   --   99    02/11/21 2225   --   --   98   --   (!) 162/104   --   99    02/11/21 2215   97.4 (36.3)   --   90   9   (!) 162/104   --   98    02/11/21 2148   --   --   --   --   164/100   --   98    02/11/21 2122   --   --   --   --   (!) 176/109   --   97    02/11/21 2017   --   --   --   --   144/90   --   100    02/11/21 2002   --   --   --   --   150/96   --   100    02/11/21 1805   98 (36.7)   Oral   90   18   156/91   Sitting   99              Lines, Drains & Airways    Active LDAs     Name:   Placement date:   Placement time:   Site:   Days:    Peripheral IV 02/11/21 1830 Left Forearm   02/11/21    1830    Forearm   less than 1    Peripheral IV 02/12/21 0800 Right Forearm   02/12/21    0800    Forearm   less than 1    Hemodialysis Cath Double   01/29/21    0937    Internal Jugular   14                  Current Facility-Administered Medications   Medication Dose  Route Frequency Provider Last Rate Last Admin   • acetaminophen (TYLENOL) tablet 1,000 mg  1,000 mg Oral Q8H PRN Raymundo Centeno MD   1,000 mg at 02/12/21 1322   • albumin human 25 % IV SOLN 25 g  25 g Intravenous Once in Dialysis Pastor Hylton MD       • carvedilol (COREG) tablet 6.25 mg  6.25 mg Oral BID With Meals Harriet Blackwell, DO   6.25 mg at 02/12/21 0617   • cyclobenzaprine (FLEXERIL) tablet 5 mg  5 mg Oral TID PRN Raymundo Centeno MD       • heparin (porcine) 5000 UNIT/ML injection 5,000 Units  5,000 Units Subcutaneous Q12H Hrariet Blackwell DO   5,000 Units at 02/12/21 0819   • hydrALAZINE (APRESOLINE) tablet 10 mg  10 mg Oral Q8H Harriet Blackwell, DO   10 mg at 02/12/21 1322   • lidocaine (LIDODERM) 5 % 1 patch  1 patch Transdermal Q24H Harriet Blackwell DO   1 patch at 02/12/21 0818   • nicotine (NICODERM CQ) 14 MG/24HR patch 1 patch  1 patch Transdermal Q24H Harriet Blackwell, DO   1 patch at 02/12/21 0819   • pantoprazole (PROTONIX) EC tablet 40 mg  40 mg Oral Q AM Harriet Blackwell DO   40 mg at 02/12/21 0539   • sodium chloride 0.9 % flush 10 mL  10 mL Intravenous PRN Harriet Blackwell, DO       • sodium chloride 0.9 % flush 10 mL  10 mL Intravenous Q12H Harriet Blackwell, DO   10 mL at 02/12/21 0820   • sodium chloride 0.9 % flush 10 mL  10 mL Intravenous PRN Harriet Blackwell, DO         Operative/Procedure Notes (most recent note)    No notes of this type exist for this encounter.            Physician Progress Notes (most recent note)      Progress Notes signed by Hailey Khan MD at 02/12/21 0157         Nurse Practitioner - Brief Progress Note  PERMANENT  02/11/2021 22:59    [facility] - [facility] - [place] ([facility])    CUEVAS, ROBBI E.    Date of Service 02/11/2021 22:59    HPI/Events of Note [facility] Provider Assessment Note    44 y/o male admitted to ICU with Hyperkalemia due to noncompliant with dialysis, high anion  gap metabolic acidosis, hyponatremia    Hx of ESRD on dialysis, COVID-19, COPD, endocarditis, Hepatitis C, IV drug abuse, unable to read or write, infectious viral hepatitis    Pt presented with c/o neck pain. Hx of chronic renal failure and recently had dialysis catheter placed. Pt is noncompliant with dialysis (last hemodialysis was ? a week ago). In the ER, noted to have potassium of 8.6, , creatinine 8.68. CT   cervical spine 2/11/2021: Arthritic change; no acute bony abnormality. Pt was transferred to ICU for emergent hemodialysis.     Current VS: HR 85, /110, RR 18, O2 sat 98% on room air, temp 97.7 F. No pain or SOB at this time.     Labs: Anion gap 21.9, sodium 129, potassium 8.6, chloride 96, bicarbonate 11, glucose 107, , creatinine 8.68, calcium 8.3, lactate 0.7, Hgb 8.2/Hct 27.2, WBC 7.45, platelets 175, INR 1.16, CRP 4.83    EKG: Normal sinus rhythm rate 96, nonspecific intraventricular block. Cannot rule out septal infarct, age undetermined. QTc 507    Assessment and Plan:    Hyperkalemia due to noncompliant with dialysis, high anion gap metabolic acidosis, hyponatremia  - Hemodialysis is in progress in the ICU at this time  - Nephrology is consulted  - Kayexalate, Calcium gluconate, D50, IV regular insulin, Lactulose ordered  - NS 1L IV bolus x1, Albumin ordered  - Renal dose medications. Avoid nephrotoxins. Moniotor BMP, UOP  - Anemia --no active bleeding. Monitor H&H  - EKG in the AM, UA with microscopic if indicated, urine drug screen ordered  - CBC, CMP, Mg and Phos in the AM ordered      __Y___   Video Assessment performed  __Y___   Most recent labs reviewed  __Y___   Vital Signs reviewed  __Y___   Best Practices addressed:                 VTE prophylaxis: Heparin subq                 SUP (when indicated): N/A                 Glycemic control:                      Please notify bedside physician when present or [facility] if glc > 180 X 2                 Sepsis guidelines:  N/A                 Lung protective strategy: N/A                 Targeted Temperature Management: N/A    __Y___     Spoke with bedside RN  __Y___     Orders written      Contact [facility] for any needs if bedside physician is not present.  Note drafted by JERRI Quiñonez-BC      Interventions Major-Acid-Base disturbance - evaluation and management, Acute renal failure - evaluation and management, Electrolyte abnormality - evaluation and management  Intermediate-Best-practice therapies (e.g. VTE, beta blocker, etc.), Communication with other healthcare providers and/or family        Electronically Signed by: Denisse Mondragon (ANP,CCRN) on 02/11/2021 23:17    Annotated By: Hailey Khan (MD)    Date: 02/12/21 01:57  [facility] MD Addendum: Admission assessment note by JERRI Mondragon reviewed by me after brief chart review & video assessment performed. s/p HD for acute hyperkalemia with EKG changes (peaked T waves, prolonged ). F/U chemistry & EKG   post-dialysis.    Electronically signed by Hailey Khan MD at 02/12/21 0155       Consult Notes (most recent note)    No notes of this type exist for this encounter.

## 2021-02-12 NOTE — PROGRESS NOTES
Nephrology Progress Note      Subjective Brief history    Pt is known to have ESKD, non compliant to dialysis and has no established dialysis chair, was admitted with sever hyperkalemia and was dialyzed last night emergently. He is in COVID-19 isolation  Patient has neck pain, otherwise no complaints, no chest pain or shortness of breath    Objective       Vital signs :     Temp:  [96.8 °F (36 °C)-98.5 °F (36.9 °C)] 98.5 °F (36.9 °C)  Heart Rate:  [] 109  Resp:  [9-18] 15  BP: (118-176)/() 138/95      Intake/Output Summary (Last 24 hours) at 2/12/2021 0975  Last data filed at 2/12/2021 0252  Gross per 24 hour   Intake --   Output 2000 ml   Net -2000 ml       Physical Exam:  General: not in acute distress  Heart: Tele reveals sinus rhythm.   Lungs: Respirations appear to be regular, even and unlabored with no signs of respiratory distress. No audible wheezing.    Abdomen: no distension  Extremities: 2+  Skin: No visible bleeding, bruising, or rash.  Neurologic: no apparent focal deficit.         Laboratory Data :     Albumin Albumin   Date Value Ref Range Status   02/11/2021 2.83 (L) 3.50 - 5.20 g/dL Final      Magnesium No results found for: MG       PTH               No results found for: PTH    CBC and coagulation:  Results from last 7 days   Lab Units 02/12/21 0327 02/11/21 2005 02/11/21  1854 02/11/21  1831   LACTATE mmol/L  --  0.7  --   --    SED RATE mm/hr  --   --   --  99*   CRP mg/dL  --   --   --  4.83*   WBC 10*3/mm3 6.15  --   --  7.45   HEMOGLOBIN g/dL 7.4*  --   --  8.2*   HEMATOCRIT % 22.5*  --   --  27.2*   MCV fL 88.9  --   --  96.1   MCHC g/dL 32.9  --   --  30.1*   PLATELETS 10*3/mm3 183  --   --  175   INR   --   --  1.16*  --      Acid/base balance:      Renal and electrolytes:  Results from last 7 days   Lab Units 02/12/21 0327 02/11/21  1831   SODIUM mmol/L 128* 129*   POTASSIUM mmol/L 3.0* 8.6*   CHLORIDE mmol/L 89* 96*   CO2 mmol/L 27.6 11.1*   BUN mg/dL 73* 196*   CREATININE  mg/dL 3.64* 8.68*   EGFR IF NONAFRICN AM mL/min/1.73 18* 7*   CALCIUM mg/dL 8.3* 8.3*     Estimated Creatinine Clearance: 25.6 mL/min (A) (by C-G formula based on SCr of 3.64 mg/dL (H)).    Liver and pancreatic function:  Results from last 7 days   Lab Units 02/11/21  1831   ALBUMIN g/dL 2.83*   BILIRUBIN mg/dL 0.4   ALK PHOS U/L 195*   AST (SGOT) U/L 21   ALT (SGPT) U/L 25         Cardiac:      Liver and pancreatic function:  Results from last 7 days   Lab Units 02/11/21  1831   ALBUMIN g/dL 2.83*   BILIRUBIN mg/dL 0.4   ALK PHOS U/L 195*   AST (SGOT) U/L 21   ALT (SGPT) U/L 25       Medications :     albumin human, 25 g, Intravenous, Once in Dialysis  carvedilol, 6.25 mg, Oral, BID With Meals  heparin (porcine), 5,000 Units, Subcutaneous, Q12H  hydrALAZINE, 10 mg, Oral, Q8H  lidocaine, 1 patch, Transdermal, Q24H  nicotine, 1 patch, Transdermal, Q24H  pantoprazole, 40 mg, Oral, Q AM  sodium chloride, 10 mL, Intravenous, Q12H             Assessment/Plan     1. ESKD, non compliant to dialysis  2. Sever life threatening hyperkalemia  3. Hyponatremia likely hypervolumic  4. Anemia likely 2/2 ESKD  5. HO IVDA    Pt was admitted with K >8, was dialyzed last night. He is very non compliant to dialysis and leaves AMA after few days of hospitalizations. He does not have established dialysis chair.   Will continue on dialysis TTS  Anemia, Hb below goal, will start on EPO      Taniya Corado MD  02/12/21  09:49 EST

## 2021-02-12 NOTE — H&P
Hospitalist History and Physical    Patient Identification:  Name: Mario Nair  Age/Sex: 45 y.o. male  :  1975  MRN: [id number]        Admit Date: 2021   Primary Care Physician: Danny Rebolledo MD    Chief Complaint   Patient presents with   • Headache   • Neck Pain       History of Present Illness  Patient is a 45 y.o. male presents with the following: Left-sided neck and shoulder pain    The patient is a 45-year-old male with past medical history significant for end-stage renal disease, anemia requiring transfusion in the past, medical noncompliance, history of intravenous drug abuse, endocarditis and hepatitis C who presents to the emergency department complaining of a 1 day history of left neck and shoulder pain.    Patient states that he thinks he is experiencing left neck and shoulder pain from a muscle spasm.  He denies any recent injury or trauma.  Patient further denies any fevers, chills, nausea, vomiting, chest pains and/or shortness of air.  The patient states that he has not been dialyzed since he left AGAINST MEDICAL ADVICE on 2021.    During the above-mentioned admission from  through , the patient was treated for sepsis, acute pyelonephritis, Serratia bacteremia and asymptomatic Covid-19 infection.  Patient also was treated for hyperkalemia and underwent inpatient hemodialysis.  The patient has a left chest/internal jugular tunneled hemodialysis catheter.  Patient was also seen in our service in 2020 where he was treated for acute cystitis, end-stage renal disease on hemodialysis and electrolyte abnormalities.  Patient was also seen in our service in 2020 where he was treated for hyperkalemia secondary to noncompliance with dialysis and a Serratia UTI.  Patient was also on our service in 2020 for end-stage renal disease in need of dialysis with hyperkalemia.  Patient has left AGAINST MEDICAL ADVICE during each of the  above-mentioned admissions.    Work-up in the emergency department revealed a potassium of 8.6, sodium 129,  and a creatinine of 8.68.  CO2 was 11.1.  LFTs are within normal limits.  C-RP minimally elevated at 4.83.  Lactate was 0.7.  Hemoglobin 8.2 and hematocrit 27.2.    CT scan of the cervical spine without contrast revealed arthritic change and disc space narrowing at C4-5 and C5-6.    The patient has been admitted to the critical care unit where he is currently undergoing hemodialysis.    Present during visit: KALA Storey and HD nurse    Past History:  Past Medical History:   Diagnosis Date   • COPD (chronic obstructive pulmonary disease) (CMS/HCC)    • COVID-19 1/25/2021   • Endocarditis    • ESRD (end stage renal disease) (CMS/[facility], located within [facility])    • Hepatitis C    • Infectious viral hepatitis    • IV drug abuse (CMS/[facility], located within [facility])    • Unable to read or write      Past Surgical History:   Procedure Laterality Date   • CENTRAL VENOUS CATHETER TUNNELED INSERTION DOUBLE LUMEN Right     Chest for hemodialysis   • INSERTION HEMODIALYSIS CATHETER N/A 1/29/2021    Procedure: HEMODIALYSIS CATHETER INSERTION;  Surgeon: Khoa Carl MD;  Location: [facility];  Service: General;  Laterality: N/A;     Family History   Problem Relation Age of Onset   • Alcohol abuse Mother    • Hypertension Mother    • Hypertension Other    • Kidney disease Maternal Grandmother      Social History     Tobacco Use   • Smoking status: Current Every Day Smoker     Packs/day: 0.50     Years: 20.00     Pack years: 10.00     Types: Cigarettes   • Smokeless tobacco: Never Used   Substance Use Topics   • Alcohol use: Not Currently     Frequency: Never     Comment: Quit 18 years ago   • Drug use: Yes     Types: Methamphetamines, IV          No medications prior to admission.     Allergies: Penicillins    Review of Systems:  Review of Systems   Constitutional: Negative for chills, diaphoresis and fever.   HENT: Negative for hearing loss, tinnitus and trouble  swallowing.    Eyes: Negative for photophobia, discharge and visual disturbance.   Respiratory: Negative for cough, shortness of breath and wheezing.    Cardiovascular: Negative for chest pain, palpitations and leg swelling.   Gastrointestinal: Negative for abdominal pain, constipation, diarrhea, nausea and vomiting.   Endocrine: Negative for polydipsia, polyphagia and polyuria.   Genitourinary: Negative for dysuria, frequency and hematuria.   Musculoskeletal: Positive for neck pain. Negative for gait problem, myalgias and neck stiffness.   Skin: Negative for rash and wound.   Neurological: Negative for dizziness, tremors, seizures, syncope, weakness and light-headedness.   Hematological: Does not bruise/bleed easily.   Psychiatric/Behavioral: Negative for confusion, hallucinations and suicidal ideas.      Vital Signs  Temp:  [98 °F (36.7 °C)] 98 °F (36.7 °C)  Heart Rate:  [90] 90  Resp:  [18] 18  BP: (144-176)/() 164/100  Body mass index is 17.97 kg/m².    Physical Exam:  Physical Exam  Constitutional:       General: He is not in acute distress.     Appearance: He is well-developed. He is ill-appearing (chronically; appears older than stated age).   HENT:      Head: Normocephalic and atraumatic.   Eyes:      Conjunctiva/sclera: Conjunctivae normal.      Pupils: Pupils are equal, round, and reactive to light.   Neck:      Musculoskeletal: Neck supple.      Trachea: No tracheal deviation.   Cardiovascular:      Rate and Rhythm: Normal rate and regular rhythm.      Pulses:           Posterior tibial pulses are 2+ on the right side and 2+ on the left side.      Heart sounds: No murmur. No friction rub. No gallop.    Pulmonary:      Effort: No respiratory distress.      Breath sounds: Normal breath sounds. No wheezing or rales.   Chest:      Comments: Left chest tunneled hemodialysis catheter.  Abdominal:      General: Bowel sounds are normal. There is no distension.      Palpations: Abdomen is soft.       Tenderness: There is no abdominal tenderness. There is no guarding.   Musculoskeletal: Normal range of motion.         General: No tenderness.      Right lower leg: No edema.      Left lower leg: No edema.   Skin:     General: Skin is warm and dry.      Findings: No erythema or rash.   Neurological:      Mental Status: He is alert and oriented to person, place, and time.      Cranial Nerves: No cranial nerve deficit.         Results Review:    Results from last 7 days   Lab Units 02/11/21  1831   WBC 10*3/mm3 7.45   HEMOGLOBIN g/dL 8.2*   HEMATOCRIT % 27.2*   PLATELETS 10*3/mm3 175     Results from last 7 days   Lab Units 02/11/21  1831   SODIUM mmol/L 129*   POTASSIUM mmol/L 8.6*   CHLORIDE mmol/L 96*   CO2 mmol/L 11.1*   BUN mg/dL 196*   CREATININE mg/dL 8.68*   CALCIUM mg/dL 8.3*   GLUCOSE mg/dL 107*     Results from last 7 days   Lab Units 02/11/21  1831   BILIRUBIN mg/dL 0.4   ALK PHOS U/L 195*   AST (SGOT) U/L 21   ALT (SGPT) U/L 25     Results from last 7 days   Lab Units 02/11/21  1831   CRP mg/dL 4.83*             Results from last 7 days   Lab Units 02/11/21  1854   INR  1.16*       Imaging:    I have personally reviewed the EKG. Peaked T waves noted most pronounced in the lateral leads    Imaging Results (Most Recent)     Procedure Component Value Units Date/Time    CT Cervical Spine Without Contrast [[id number]] Collected: 02/11/21 1842     Updated: 02/11/21 1847    Narrative:      CT CERVICAL SPINE WO CONTRAST-     CLINICAL INDICATION: persistent pain x 2 weeks        COMPARISON: 01/25/2021      TECHNIQUE: Axial images of the cervical spine were acquired with out any  intravenous contrast. Reformatted images were then created in the  sagittal and coronal planes.     DOSE:      Radiation dose reduction techniques were utilized per ALARA protocol.  Automated exposure control was initiated through either or [employer] or  DoseRight software packages by  protocol.           FINDINGS:   The provided  study demonstrates preservation of the vertebral body  heights in the sagittally reconstructed images.     There is no prevertebral soft tissue swelling.     Straightening of the cervical spine     Disc space narrowing at C4-5 and C5-6     I see no acute cervical spine fracture.       Impression:         1. Arthritic change     2. No acute bony abnormality     This report was finalized on 2/11/2021 6:45 PM by Dr. Wesly Reynolds MD.             Assessment/Plan       Hyperkalemia    -Severe, acute hyperkalemia secondary to non-compliance with hemodialysis  -End stage renal disease non-compliant with treatment  -Left neck pain, likely muscular; CT scan with disc space narrowing C4-5; C5-6  -Essential hypertension, uncontrolled  -Asymptomatic COVID-19 infection; isolation to be discontinued 2/14/21  -Hyponatremia, likely hypervolemic  -Hypoalbuminemia, 2.83  -Anion-gap metabolic acidosis    Chronic medical problems:  -History of recent Serratia bacteremia  -History of endocarditis  -History of urinary tract infection  -History of intravenous drug abuse  -Anemia, normocytic  -Hepatitis B/Hepatitis C  -Tobacco abuse  -Chronic obstructive pulmonary disease currently not in acute exacerbation    Patient has been admitted to the critical care unit and is undergoing hemodialysis.  Nephrology has been consulted.  A repeat BMP after dialysis will be ordered.  I have ordered a repeat EKG for the a.m.    Lidoderm patch and a one-time dose of Flexeril for neck pain that I think is likely muscular in nature.  Consider MRI imaging.    I have resumed carvedilol and low-dose hydralazine noted on patient's previous discharge summary as patient reported no chronic home medications.  Continue to monitor his blood pressure closely.    Patient currently not interested in tobacco cessation.  Nicotine patch will be ordered.    Continue to monitor patient closely on telemetry.    We will repeat his CBC in a.m.    DVT/GI prophylaxis:  Subcutaneous heparin/pantoprazole    Estimated Length of Stay: > 2 MNs    Patient is considered to be high risk patient due to: Severe, life-threatening hyperkalemia, end-stage renal disease, hepatitis, history of drug abuse, medical noncompliance    I discussed the patients findings and my recommendations with patient and nursing staff      Harriet Blackwell DO  02/11/21  22:35 EST

## 2021-02-13 NOTE — PLAN OF CARE
Goal Outcome Evaluation:   Patient doing well this shift. Complains of headache and neck pain but otherwise fine. PRN medication utilized for this. Received dialysis today and they were able to get off 1450 ml of fluid. Patient also had to receive a unit of blood and got this with his dialysis. Tolerated fine. Will continue to monitor and follow plan of care.

## 2021-02-13 NOTE — PROGRESS NOTES
Interval History:     Patient Complaints: Patient seen on dialysis at about 10:30 AM he is slightly hypertensive and complaining of a headache.  He did not receive his carvedilol this morning.  He denies any shortness of breath.        Vital Signs  Temp:  [97.4 °F (36.3 °C)-98.8 °F (37.1 °C)] 97.9 °F (36.6 °C)  Heart Rate:  [] 96  Resp:  [15-18] 18  BP: (120-167)/(76-96) 167/96    Physical Exam:    General:           No distress      HEENT:  No pallor               Neck:  No JVD       Lungs:    Rare crackle   Heart:   RRR,  no rub       Abdomen:   Normal bowel sounds, soft non-tender, non-distended, no guarding       Extremities:  no edema       Skin: No petechiae       Neurologic: Cranial nerves grossly intact,  moves all extremities.        Results Review:    I reviewed the patient's new clinical results.    Lab Results (last 24 hours)     Procedure Component Value Units Date/Time    Iron Profile [[id number]]  (Abnormal) Collected: 02/13/21 0759    Specimen: Blood Updated: 02/13/21 0846     Iron 49 mcg/dL      Iron Saturation 23 %      Transferrin 144 mg/dL      TIBC 215 mcg/dL     Vitamin B12 [[id number]] Collected: 02/13/21 0759    Specimen: Blood Updated: 02/13/21 0821    Folate [[id number]] Collected: 02/13/21 0759    Specimen: Blood Updated: 02/13/21 0821    Hemoglobin & Hematocrit, Blood [[id number]]  (Abnormal) Collected: 02/13/21 0601    Specimen: Blood Updated: 02/13/21 0647     Hemoglobin 6.5 g/dL      Hematocrit 21.3 %     Comprehensive Metabolic Panel [[id number]]  (Abnormal) Collected: 02/13/21 0509    Specimen: Blood Updated: 02/13/21 0601     Glucose 136 mg/dL       mg/dL      Creatinine 6.20 mg/dL      Sodium 129 mmol/L      Potassium 5.3 mmol/L      Chloride 93 mmol/L      CO2 22.9 mmol/L      Calcium 8.7 mg/dL      Total Protein 7.3 g/dL      Albumin 2.74 g/dL      ALT (SGPT) 19 U/L      AST (SGOT) 15 U/L      Alkaline Phosphatase 145 U/L      Total Bilirubin 0.3 mg/dL      eGFR Non   Amer 10 mL/min/1.73      Comment: <15 Indicative of kidney failure.        eGFR   Amer --     Comment: <15 Indicative of kidney failure.        Globulin 4.6 gm/dL      A/G Ratio 0.6 g/dL      BUN/Creatinine Ratio 19.2     Anion Gap 13.1 mmol/L     Narrative:      GFR Normal >60  Chronic Kidney Disease <60  Kidney Failure <15      CBC & Differential [[id number]]  (Abnormal) Collected: 02/13/21 0509    Specimen: Blood Updated: 02/13/21 0534    Narrative:      The following orders were created for panel order CBC & Differential.  Procedure                               Abnormality         Status                     ---------                               -----------         ------                     CBC Auto Differential[[id number]]        Abnormal            Final result                 Please view results for these tests on the individual orders.    CBC Auto Differential [[id number]]  (Abnormal) Collected: 02/13/21 0509    Specimen: Blood Updated: 02/13/21 0534     WBC 6.12 10*3/mm3      RBC 2.34 10*6/mm3      Hemoglobin 6.9 g/dL      Hematocrit 22.8 %      MCV 97.4 fL      MCH 29.5 pg      MCHC 30.3 g/dL      RDW 17.0 %      RDW-SD 59.7 fl      MPV 9.7 fL      Platelets 132 10*3/mm3      Neutrophil % 63.2 %      Lymphocyte % 21.1 %      Monocyte % 9.8 %      Eosinophil % 3.3 %      Basophil % 0.8 %      Immature Grans % 1.8 %      Neutrophils, Absolute 3.87 10*3/mm3      Lymphocytes, Absolute 1.29 10*3/mm3      Monocytes, Absolute 0.60 10*3/mm3      Eosinophils, Absolute 0.20 10*3/mm3      Basophils, Absolute 0.05 10*3/mm3      Immature Grans, Absolute 0.11 10*3/mm3      nRBC 0.0 /100 WBC     Blood Culture - Blood, Arm, Right [[id number]] Collected: 02/11/21 2005    Specimen: Blood from Arm, Right Updated: 02/12/21 2030     Blood Culture No growth at 24 hours    Blood Culture - Blood, Hand, Left [[id number]] Collected: 02/11/21 2014    Specimen: Blood from Hand, Left Updated: 02/12/21 2030     Blood  Culture No growth at 24 hours          Imaging Results (Last 24 Hours)     Procedure Component Value Units Date/Time    MRI Cervical Spine Without Contrast [[id number]] Resulted: 02/12/21 1522     Updated: 02/12/21 1541          Assessment and Plan:    1.  End-stage renal disease on dialysis  2.  Noncompliance with dialysis  3.  Hyperkalemia  4.  Covid    Continue dialysis with ultrafiltration    Federico Diallo MD  02/13/21  12:55 EST

## 2021-02-13 NOTE — PROGRESS NOTES
[facility] HOSPITALIST PROGRESS NOTE     Patient Identification:  Name:  Mario Nair  Age:  45 y.o.  Sex:  male  :  1975  MRN:  [id number]  Visit Number:  [id number]  ROOM: [address] [place] [address]     Primary Care Provider:  Danny Rebolledo MD    Length of stay in inpatient status:  2    Subjective     Chief Compliant:    Chief Complaint   Patient presents with   • Headache   • Neck Pain       History of Presenting Illness:    Patient declined MRI yesterday reporting he had a hard time laying flat for CT. Patient was agreeable after I discussed giving him dose of pain medication prior to study. He was also agreeable to stay as we try to find him an outpatient dialysis chair. Denied any bloody or melanotic BM. Denied any signs of bleeding.     ROS:  Otherwise 10 point ROS negative other than documented above in HPI.     Objective     Current Hospital Meds:albumin human, 25 g, Intravenous, Once in Dialysis  calcium acetate, 667 mg, Oral, TID  carvedilol, 6.25 mg, Oral, BID With Meals  epoetin mc/mc-epbx, 6,000 Units, Subcutaneous, Once per day on   heparin (porcine), 5,000 Units, Subcutaneous, Q12H  hydrALAZINE, 10 mg, Oral, Q8H  isosorbide mononitrate, 30 mg, Oral, Daily  lidocaine, 1 patch, Transdermal, Q24H  nicotine, 1 patch, Transdermal, Q24H  pantoprazole, 40 mg, Oral, Q AM  sodium chloride, 10 mL, Intravenous, Q12H         Current Antimicrobial Therapy:  Anti-Infectives (From admission, onward)    None        Current Diuretic Therapy:  Diuretics (From admission, onward)    None        ----------------------------------------------------------------------------------------------------------------------  Vital Signs:  Temp:  [97.4 °F (36.3 °C)-98.8 °F (37.1 °C)] 97.9 °F (36.6 °C)  Heart Rate:  [] 96  Resp:  [16-18] 18  BP: (120-167)/(77-96) 167/96  SpO2:  [94 %-97 %] 97 %  on   ;   Device (Oxygen Therapy): room air  Body mass index is 20.07 kg/m².    Wt Readings from Last  3 Encounters:   02/13/21 70.9 kg (156 lb 4.8 oz)   02/04/21 70 kg (154 lb 4.8 oz)   12/31/20 67.5 kg (148 lb 12.8 oz)     Intake & Output (last 3 days)       02/10 0701 - 02/11 0700 02/11 0701 - 02/12 0700 02/12 0701 - 02/13 0700 02/13 0701 - 02/14 0700    P.O.   2028 360    Blood    300    Total Intake(mL/kg)   2028 (28.6) 660 (9.3)    Urine (mL/kg/hr)  0 0 (0) 350 (0.7)    Other  4000 2000 1450    Stool  0 0     Total Output  4000 2000 1800    Net  -4000 +28 -1140            Urine Unmeasured Occurrence  2 x 4 x     Stool Unmeasured Occurrence  0 x 2 x         Diet Regular; Renal  ----------------------------------------------------------------------------------------------------------------------  Physical exam:  Constitutional:  Well-developed and well-nourished.  No respiratory distress.      HENT:  Head:  Normocephalic and atraumatic.  Mouth:  Moist mucous membranes.    Eyes:  Conjunctivae and EOM are normal. No scleral icterus.    Neck:  Neck supple.  No JVD present.    Cardiovascular:  Normal rate, regular rhythm and normal heart sounds with no murmur.  Pulmonary/Chest:  No respiratory distress, no wheezes, no crackles, with normal breath sounds and good air movement.  Abdominal:  Soft.  Bowel sounds are normal.  No distension and no tenderness.   Musculoskeletal:  No edema, no tenderness, and no deformity.  No red or swollen joints anywhere.  Mild tenderness over cervical spine.   Neurological:  Alert and oriented to person, place, and time.  No cranial nerve deficit.  No tongue deviation.  No facial droop.  No slurred speech.   Skin:  Skin is warm and dry. No rash noted. No pallor.   Peripheral vascular:  Pulses in all 4 extremities with no clubbing, no cyanosis, no edema.  ----------------------------------------------------------------------------------------------------------------------  Tele:     ----------------------------------------------------------------------------------------------------------------------  Results from last 7 days   Lab Units 02/13/21  0601 02/13/21 0509 02/12/21 0327 02/11/21 2005 02/11/21 1854 02/11/21 1831   CRP mg/dL  --   --   --   --   --  4.83*   LACTATE mmol/L  --   --   --  0.7  --   --    WBC 10*3/mm3  --  6.12 6.15  --   --  7.45   HEMOGLOBIN g/dL 6.5* 6.9* 7.4*  --   --  8.2*   HEMATOCRIT % 21.3* 22.8* 22.5*  --   --  27.2*   MCV fL  --  97.4* 88.9  --   --  96.1   MCHC g/dL  --  30.3* 32.9  --   --  30.1*   PLATELETS 10*3/mm3  --  132* 183  --   --  175   INR   --   --   --   --  1.16*  --          Results from last 7 days   Lab Units 02/13/21 0509 02/12/21 0327 02/11/21 1831   SODIUM mmol/L 129* 128* 129*   POTASSIUM mmol/L 5.3* 3.0* 8.6*   CHLORIDE mmol/L 93* 89* 96*   CO2 mmol/L 22.9 27.6 11.1*   BUN mg/dL 119* 73* 196*   CREATININE mg/dL 6.20* 3.64* 8.68*   EGFR IF NONAFRICN AM mL/min/1.73 10* 18* 7*   CALCIUM mg/dL 8.7 8.3* 8.3*   GLUCOSE mg/dL 136* 175* 107*   ALBUMIN g/dL 2.74*  --  2.83*   BILIRUBIN mg/dL 0.3  --  0.4   ALK PHOS U/L 145*  --  195*   AST (SGOT) U/L 15  --  21   ALT (SGPT) U/L 19  --  25   Estimated Creatinine Clearance: 15.2 mL/min (A) (by C-G formula based on SCr of 6.2 mg/dL (H)).  No results found for: AMMONIA              No results found for: HGBA1C, POCGLU  Lab Results   Component Value Date    TSH 2.380 11/14/2020    FREET4 0.73 (L) 02/03/2021     No results found for: PREGTESTUR, PREGSERUM, HCG, HCGQUANT  Pain Management Panel     Pain Management Panel Latest Ref Rng & Units 2/12/2021 1/25/2021    AMPHETAMINES SCREEN, URINE Negative Negative Negative    BARBITURATES SCREEN Negative Negative Negative    BENZODIAZEPINE SCREEN, URINE Negative Negative Negative    BUPRENORPHINEUR Negative Negative Negative    COCAINE SCREEN, URINE Negative Negative Negative    METHADONE SCREEN, URINE Negative Negative Negative        Brief Urine Lab  Results  (Last result in the past 365 days)      Color   Clarity   Blood   Leuk Est   Nitrite   Protein   CREAT   Urine HCG        02/12/21 0045 Yellow Clear Moderate (2+) Small (1+) Negative 100 mg/dL (2+)             Blood Culture   Date Value Ref Range Status   02/11/2021 No growth at 24 hours  Preliminary   02/11/2021 No growth at 24 hours  Preliminary     No results found for: URINECX  No results found for: WOUNDCX  No results found for: STOOLCX  No results found for: RESPCX  No results found for: AFBCX  Results from last 7 days   Lab Units 02/11/21 2005 02/11/21  1831   LACTATE mmol/L 0.7  --    SED RATE mm/hr  --  99*   CRP mg/dL  --  4.83*       I have personally looked at the labs and they are summarized above.  ----------------------------------------------------------------------------------------------------------------------  Detailed radiology reports for the last 24 hours:    Imaging Results (Last 24 Hours)     ** No results found for the last 24 hours. **        Assessment & Plan    #Severe acute hyperkalemia   #ESRD in setting on noncompliance   #Chronic hyponatremia   #HAGMA  - Patient presented with potassium of 8.6 after being noncompliant at home since discharge on 2/4 when he left [facility]  - Nephrology consulted, appreciate recs. Patient received dialysis again this AM.     #Subacute neck pain   - Small amount of point tenderness over cervical spine.   - Patient reports UE weakness but not appreciated on exam.   - Will get MRI of cervical spine in setting of history of IVDU and endocarditis to rule out infectious etiology. Patient initially declined but will try to lay for the test with a dose of pian medication.  - PRN lidoderm patch, tylenol, flexeril     #Chronic macrocytic anemia  - Hemoglobin baseline appears to be around 7. Hemoglobin dropped this AM to 6.5. No signs of bleeding. Will get iron studies, B12, and folate.   - Will transfuse 1 unit of pRBC.     #Sinus tachycardia   - Unclear  etiology. Will continue to monitor.     #Hx of serratia bactermia  #Hx of endocartitis   #Hx of IVDU  - Blood cultures on admission NGTD. Afebrile.     #Hepatitis B/C  - LFTs wnl. Outpatinet f/u.     #COPD  - No wheezing on exam.     F: PO  E: Replace as needed   N: Renal     Code status: Full     Dispo: Awaiting clinical improvement and outpatient dialysis chair.       VTE Prophylaxis:   Mechanical Order History:     None      Pharmalogical Order History:      Ordered     Dose Route Frequency Stop    02/11/21 7350  heparin (porcine) 5000 UNIT/ML injection 5,000 Units      5,000 Units SC Every 12 Hours Scheduled --                Raymundo Centeno MD  [facility]  02/13/21  14:24 EST

## 2021-02-13 NOTE — NURSING NOTE
1 unit of Blood was administered when patient  Was receiving dialysis. Blood verified with Manuel [facility] Dialysis RN and administered through the dialysis machine by Manuel. Patient tolerated well. VSS

## 2021-02-13 NOTE — PLAN OF CARE
Goal Outcome Evaluation:     Progress: improving  Outcome Summary: Patient has no complaints. No acute distress noted. Will continue to monitor and follow plan of care.

## 2021-02-13 NOTE — NURSING NOTE
Patient states he is still not able to go down for MRI today as it hurts too much to lay flat. Dr. Centeno aware

## 2021-02-14 NOTE — DISCHARGE SUMMARY
"    [facility] HOSPITALISTS DISCHARGE SUMMARY    Patient Identification:  Name:  Mario Nair  Age:  45 y.o.  Sex:  male  :  1975  MRN:  [id number]  Visit Number:  [id number]    Date of Admission: 2021  Date of Discharge:  2021    PCP: Danny Rebolledo MD    DISCHARGE DIAGNOSIS  #Severe acute hyperkalemia   #ESRD in setting on noncompliance   #Chronic hyponatremia   #HAGMA  #Subacute neck pain   #Chronic macrocytic anemia  #Sinus tachycardia   #Hx of serratia bactermia  #Hx of endocartitis   #Hx of IVDU  #Hepatitis B/C  #COPD      CONSULTS   Nephrology     PROCEDURES PERFORMED  iHD ,      HOSPITAL COURSE  Patient is a 45 y.o. male presented on  to [facility] complaining of left sided neck and shoulder pain.  Please see the admitting history and physical for further details.       is our 44 yo M with hx end-stage renal disease, anemia requiring transfusion in the past, medical noncompliance, history of intravenous drug abuse, endocarditis and hepatitis C who presented with left sided neck and shoulder pian who was found to potassium of 8.6. Etiology of hyperkalemia is noncompliance to dialysis. Patient had not had dialysis since leaving [place] on . Patient was urgently taken for dialysis on admission and had dialysis again on  with resolution of hyperkalemia. Nephrology consulted. Patient has chronic AOCD and AOCKD. His hemoglobin trended down while inpatient to below 7, baseline is around 7, he was given 1 unit of pRBC on  and hemoglobin stable on . No signs of bleeding.     Patient with hx of endocarditis and had some cervical spine tenderness. I recommended MRI of C-spine but patient continued to decline even when I offered him PRN medications to get exam. Blood culture NGTD.     Patient abruptly left [facility] on . He told nursing staff he \"had things to do\". They expressed that he could die if he left and also raised the concern " that he did not have a dialysis chair. Patient reported he would just continue   I had lengthy conversation with patient on 2/13 on why he needed a dialysis chair prior to discharge. I explained to him how life threatening having a potassium of 8.6 is.  I also explained to him why I wanted to get a MRI of his cervical spine.     VITAL SIGNS:  Temp:  [97.8 °F (36.6 °C)-98.6 °F (37 °C)] 98.1 °F (36.7 °C)  Heart Rate:  [] 99  Resp:  [16-18] 18  BP: (140-167)/(76-96) 146/96  SpO2:  [95 %-97 %] 95 %  on   ;   Device (Oxygen Therapy): room air    Body mass index is 19.85 kg/m².  Wt Readings from Last 3 Encounters:   02/14/21 70.1 kg (154 lb 9.6 oz)   02/04/21 70 kg (154 lb 4.8 oz)   12/31/20 67.5 kg (148 lb 12.8 oz)       PHYSICAL EXAM:  Patient left AMA before I was able to examine him.     DISCHARGE DISPOSITION   Stable    DISCHARGE MEDICATIONS:     Discharge Medications      ASK your doctor about these medications      Instructions Start Date   amLODIPine 5 MG tablet  Commonly known as: NORVASC   5 mg, Oral, Nightly      calcium acetate 667 MG capsule capsule  Commonly known as: PHOS BINDER)   667 mg, Oral, 3 Times Daily      carvedilol 12.5 MG tablet  Commonly known as: COREG   12.5 mg, Oral, 2 Times Daily With Meals      hydrALAZINE 10 MG tablet  Commonly known as: APRESOLINE   10 mg, Oral, 3 Times Daily      isosorbide mononitrate 30 MG 24 hr tablet  Commonly known as: IMDUR   30 mg, Oral, Daily      pantoprazole 40 MG EC tablet  Commonly known as: PROTONIX   40 mg, Oral, Daily                    TEST  RESULTS PENDING AT DISCHARGE  Pending Labs     Order Current Status    Blood Culture - Blood, Arm, Right Preliminary result    Blood Culture - Blood, Hand, Left Preliminary result           CODE STATUS  Code Status and Medical Interventions:   Ordered at: 02/11/21 2010     Code Status:    CPR     Medical Interventions (Level of Support Prior to Arrest):    Full       Raymundo Centeno MD  [facility]  Hospitalist  02/14/21  10:51 EST    Please note that this discharge summary required more than 30 minutes to complete.

## 2021-02-14 NOTE — PLAN OF CARE
Goal Outcome Evaluation:  Pt has been resting well tonight. Pt had complained of some pain and muscle ache. Prn muscle relaxer and tylenol administered. Pt otherwise has had minimal complaints. Will continue to provide care.

## 2021-02-14 NOTE — NURSING NOTE
"RN in room to administer morning medicine and assess patient.  Patient states that he is leaving today, states that \"we cannot do anything for him here that he cant do at home\".  He also states \"I just have to go honey, I have things at home to\".  Patient educated on the risks and benefits of leaving the hospital AMA, up to and including death.  Patient educated that if need be he can return to ER.  Since patient is COVID, MD consulted on how long to continue quarantine, MD states that he was going to be able to discontinue isolation today.    "

## 2021-02-15 PROBLEM — N39.42 URINARY INCONTINENCE WITHOUT SENSORY AWARENESS: Status: ACTIVE | Noted: 2021-01-01

## 2021-02-15 PROBLEM — A41.9 SEPSIS DUE TO URINARY TRACT INFECTION (HCC): Status: RESOLVED | Noted: 2021-01-01 | Resolved: 2021-01-01

## 2021-02-15 PROBLEM — U07.1 COVID-19: Status: RESOLVED | Noted: 2021-01-01 | Resolved: 2021-01-01

## 2021-02-15 PROBLEM — I65.21: Status: ACTIVE | Noted: 2021-01-01

## 2021-02-15 PROBLEM — M54.2 ACUTE NECK PAIN: Status: ACTIVE | Noted: 2021-01-01

## 2021-02-15 PROBLEM — N39.0 SEPSIS DUE TO URINARY TRACT INFECTION (HCC): Status: RESOLVED | Noted: 2021-01-01 | Resolved: 2021-01-01

## 2021-02-15 PROBLEM — R78.81 GRAM-NEGATIVE BACTEREMIA: Status: RESOLVED | Noted: 2021-01-01 | Resolved: 2021-01-01

## 2021-02-15 NOTE — PAYOR COMM NOTE
"CONTACT:  PACO TAVERAS MSN, APRN  UTILIZATION MANAGEMENT DEPT.  [facility]  [address] [facility], [address]  PHONE:  [phone]  FAX: [phone]    PATIENT LEFT AMA ON 21.    REFER TO AUTH # [id number]    Mario Nair (45 y.o. Male)     Date of Birth Social Security Number Address Home Phone MRN    1975  [address] [facility] [address] [phone] [id number]    Gnostic[employer] Marital Status          None        Admission Date Admission Type Admitting Provider Attending Provider Department, Room/Bed    21 Emergency Harriet Blackwell DO  [facility], 3321/    Discharge Date Discharge Disposition Discharge Destination        2021 Left Against Medical Advice              Attending Provider: (none)   Allergies: Penicillins    Isolation: Enh Drop/Con   Infection: Hepatitis B (19), COVID (confirmed) (21)   Code Status: Prior    Ht: 188 cm (74\")   Wt: 70.1 kg (154 lb 9.6 oz)    Admission Cmt: None   Principal Problem: Hyperkalemia [E87.5]                 Active Insurance as of 2021     Primary Coverage     Payor Plan Insurance Group Employer/Plan Group    [employer] [place] [employer] [place]ICA[place]      Payor Plan Address Payor Plan Phone Number Payor Plan Fax Number Effective Dates    [address] [address] [phone]  2019 - None Entered    [place] [address]       Subscriber Name Subscriber Birth Date Member ID       MARIO NAIR 1975 [id number]                 Emergency Contacts      (Rel.) Home Phone Work Phone Mobile Phone    SHANI SILVA (Relative) [phone] -- --    Constance Nair (Daughter) -- -- [phone]               Discharge Summary      Raymundo Centeno MD at 21 0934              [facility] HOSPITALISTS DISCHARGE SUMMARY    Patient Identification:  Name:  Mario Nair  Age:  45 y.o.  Sex:  male  :  1975  MRN:  [id number]  Visit Number:  [id number]    Date of Admission: " "2/11/2021  Date of Discharge:  2/14/2021    PCP: Danny Rebolledo MD    DISCHARGE DIAGNOSIS  #Severe acute hyperkalemia   #ESRD in setting on noncompliance   #Chronic hyponatremia   #HAGMA  #Subacute neck pain   #Chronic macrocytic anemia  #Sinus tachycardia   #Hx of serratia bactermia  #Hx of endocartitis   #Hx of IVDU  #Hepatitis B/C  #COPD      CONSULTS   Nephrology     PROCEDURES PERFORMED  iHD 2/12, 2/13     HOSPITAL COURSE  Patient is a 45 y.o. male presented on 2/11 to [facility] complaining of left sided neck and shoulder pain.  Please see the admitting history and physical for further details.       is our 44 yo M with hx end-stage renal disease, anemia requiring transfusion in the past, medical noncompliance, history of intravenous drug abuse, endocarditis and hepatitis C who presented with left sided neck and shoulder pian who was found to potassium of 8.6. Etiology of hyperkalemia is noncompliance to dialysis. Patient had not had dialysis since leaving [place] on 2/4. Patient was urgently taken for dialysis on admission and had dialysis again on 2/13 with resolution of hyperkalemia. Nephrology consulted. Patient has chronic AOCD and AOCKD. His hemoglobin trended down while inpatient to below 7, baseline is around 7, he was given 1 unit of pRBC on 2/13 and hemoglobin stable on 2/14. No signs of bleeding.     Patient with hx of endocarditis and had some cervical spine tenderness. I recommended MRI of C-spine but patient continued to decline even when I offered him PRN medications to get exam. Blood culture NGTD.     Patient abruptly left [place] on 2/14. He told nursing staff he \"had things to do\". They expressed that he could die if he left and also raised the concern that he did not have a dialysis chair. Patient reported he would just continue   I had lengthy conversation with patient on 2/13 on why he needed a dialysis chair prior to discharge. I explained to him how life threatening " having a potassium of 8.6 is.  I also explained to him why I wanted to get a MRI of his cervical spine.     VITAL SIGNS:  Temp:  [97.8 °F (36.6 °C)-98.6 °F (37 °C)] 98.1 °F (36.7 °C)  Heart Rate:  [] 99  Resp:  [16-18] 18  BP: (140-167)/(76-96) 146/96  SpO2:  [95 %-97 %] 95 %  on   ;   Device (Oxygen Therapy): room air    Body mass index is 19.85 kg/m².  Wt Readings from Last 3 Encounters:   02/14/21 70.1 kg (154 lb 9.6 oz)   02/04/21 70 kg (154 lb 4.8 oz)   12/31/20 67.5 kg (148 lb 12.8 oz)       PHYSICAL EXAM:  Patient left AMA before I was able to examine him.     DISCHARGE DISPOSITION   Stable    DISCHARGE MEDICATIONS:     Discharge Medications      ASK your doctor about these medications      Instructions Start Date   amLODIPine 5 MG tablet  Commonly known as: NORVASC   5 mg, Oral, Nightly      calcium acetate 667 MG capsule capsule  Commonly known as: PHOS BINDER)   667 mg, Oral, 3 Times Daily      carvedilol 12.5 MG tablet  Commonly known as: COREG   12.5 mg, Oral, 2 Times Daily With Meals      hydrALAZINE 10 MG tablet  Commonly known as: APRESOLINE   10 mg, Oral, 3 Times Daily      isosorbide mononitrate 30 MG 24 hr tablet  Commonly known as: IMDUR   30 mg, Oral, Daily      pantoprazole 40 MG EC tablet  Commonly known as: PROTONIX   40 mg, Oral, Daily                    TEST  RESULTS PENDING AT DISCHARGE  Pending Labs     Order Current Status    Blood Culture - Blood, Arm, Right Preliminary result    Blood Culture - Blood, Hand, Left Preliminary result           CODE STATUS  Code Status and Medical Interventions:   Ordered at: 02/11/21 2010     Code Status:    CPR     Medical Interventions (Level of Support Prior to Arrest):    Full       Raymundo Centeno MD  [facility]  02/14/21  10:51 EST    Please note that this discharge summary required more than 30 minutes to complete.      Electronically signed by Raymundo Centeno MD at 02/14/21 7990

## 2021-02-15 NOTE — PROGRESS NOTES
Case Management Discharge Note      Final Note: Patient left AMA on 2/14/21.  Patient did not get O/P HD chair reserved before leaving AMA.         Selected Continued Care - Discharged on 2/14/2021 Admission date: 2/11/2021 - Discharge disposition: Left Against Medical Advice    Destination    No services have been selected for the patient.              Durable Medical Equipment    No services have been selected for the patient.              Dialysis/Infusion    No services have been selected for the patient.              Home Medical Care    No services have been selected for the patient.              Therapy    No services have been selected for the patient.              Community Resources    No services have been selected for the patient.                       Final Discharge Disposition Code: 07 - left AMA

## 2021-02-15 NOTE — PROGRESS NOTES
Gabby Nair is a 45 y.o. male.     This visit has been rescheduled as a phone visit to comply with patient safety concerns in accordance with CDC recommendations. Total time of discussion was 13 minutes.    You have chosen to receive care through a telephone visit. Do you consent to use a telephone visit for your medical care today? Yes    History of Present Illness     ESRF  He was followed by Dr. Diallo in the past but was dismissed from his practice and has been unable to obtain outpatient dialysis locally since.  He has had repeated admissions for inpatient dialysis.  He was discharged AMA from [facility] yesterday following the most recent.  CT of the chest, abdomen and pelvis performed on 9/4/2020 was reported as showing emphysematous changes, splenomegaly, and atrophic pancreas, bilateral renal atrophy and hydronephrosis with nonspecific lobulation, dilation of both ureters to the level of the bladder, hyperdense thickening of the bladder wall, perihepatic and perisplenic ascites, and degenerative changes of the lumbar spine.    Neck Pain  He gives a 5 to 6 day history of left lower posterior neck pain.  He denies any strain or trauma and there has been no change in his activities.  The pain is described as a sharp ache worse with prolonged posture.  The pain intermittently radiates to the posterior shoulder.  The pain has not radiated elsewhere and has been unassociated with any other symptoms.  He has longstanding urinary incontinence but does not feel this has changed.  He denies any fecal incontinence and has had no changes in his strength or sensation.  Noncontrast CT of the cervical spine performed on 2/11/2021 were reported as showing to space narrowing between C4-5 and C5-6.  CTA of the neck performed on 1/26/2021 was reported as minimal plaque within the right carotid bulb, emphysematous changes of the upper lungs, and mild nonspecific soft tissue edema of the neck bilaterally.    Positive  Hepatitis B and C Serology  Records are unavailable but he is apparently followed by Dr. Cerda.  He was apparently scheduled to undergo a reassessment while in hospital last week and has yet to arrange another    The following portions of the patient's history were reviewed and updated as appropriate: allergies, current medications, past medical history, past social history and problem list.    Review of Systems   Constitutional: Positive for fatigue. Negative for appetite change, chills, fever and unexpected weight change.   HENT: Negative for congestion, ear pain, rhinorrhea, sneezing, sore throat and voice change.    Eyes: Negative for visual disturbance.   Respiratory: Positive for shortness of breath. Negative for cough and wheezing.    Cardiovascular: Negative for chest pain, palpitations and leg swelling.   Gastrointestinal: Negative for abdominal pain, blood in stool, constipation, diarrhea, nausea and vomiting.   Genitourinary: Negative for dysuria and hematuria.   Musculoskeletal: Positive for arthralgias, back pain and neck pain. Negative for joint swelling and myalgias.   Skin: Negative for rash.   Neurological: Negative for tremors, weakness, numbness and headaches.   Psychiatric/Behavioral: Positive for sleep disturbance. Negative for dysphoric mood and suicidal ideas. The patient is nervous/anxious.      Objective   Physical Exam  Constitutional:       Comments: Alert and oriented.  Bright and in fair spirits.  No apparent distress or shortness of breath.     Pulmonary:      Comments: No cough or audible wheezing  Psychiatric:         Attention and Perception: Attention normal.         Mood and Affect: Mood normal.         Speech: Speech normal.         Thought Content: Thought content normal.       Assessment/Plan   Problems Addressed this Visit        Cardiac and Vasculature    Mild atherosclerosis of carotid artery, right  Minimal plaque right carotid bulb  Advised of the importance of risk factor  modification       Gastrointestinal Abdominal     Hepatitis B surface antigen positive  Follow up with GI     Hepatitis C antibody test positive   As above.       Genitourinary and Reproductive     Bladder wall thickening  Follow up with urology    ESRD (end stage renal disease) (CMS/HCC)  We will try again to try to set him up for consistent outpatient dialysis    Urinary incontinence without sensory awareness  Follow up with urology       Health Encounters    Healthcare maintenance  Encouraged to obtain a COVID-19 immunization when available.  We will discuss hepatitis A immunization along with a Pneumovax 23 and updated Tdap at his return       Musculoskeletal and Injuries    Acute neck pain  Advised regarding rest, gentle exercise, ice and heat.  Trial of methocarbamol and topical lidocaine  Encouraged to report if any worse, any new symptoms, or if not resolving over the next several weeks    Relevant Medications    lidocaine (LIDODERM) 5 %    methocarbamol (Robaxin) 500 MG tablet       Pulmonary and Pneumonias    COPD mixed type (CMS/HCC)   COPD is stable.  Reminded of the importance of smoking cessation       Symptoms and Signs    Retroperitoneal lymphadenopathy       Tobacco    Current smoker      Diagnoses       Codes Comments    Hepatitis C antibody test positive    -  Primary ICD-10-CM: R76.8  ICD-9-CM: 795.79     Hepatitis B surface antigen positive     ICD-10-CM: R76.8  ICD-9-CM: 795.79     ESRD (end stage renal disease) (CMS/HCC)     ICD-10-CM: N18.6  ICD-9-CM: 585.6     Bladder wall thickening     ICD-10-CM: N32.89  ICD-9-CM: 596.89     Healthcare maintenance     ICD-10-CM: Z00.00  ICD-9-CM: V70.0     COPD mixed type (CMS/HCC)     ICD-10-CM: J44.9  ICD-9-CM: 496     Retroperitoneal lymphadenopathy     ICD-10-CM: R59.0  ICD-9-CM: 785.6     Current smoker     ICD-10-CM: F17.200  ICD-9-CM: 305.1     Acute neck pain     ICD-10-CM: M54.2  ICD-9-CM: 723.1     Urinary incontinence without sensory awareness      ICD-10-CM: N39.42  ICD-9-CM: 788.34     Mild atherosclerosis of carotid artery, right     ICD-10-CM: I65.21  ICD-9-CM: 433.10

## 2021-02-25 PROBLEM — N18.5 ACUTE RENAL FAILURE WITH ACUTE TUBULAR NECROSIS SUPERIMPOSED ON STAGE 5 CHRONIC KIDNEY DISEASE, NOT ON CHRONIC DIALYSIS (HCC): Status: ACTIVE | Noted: 2021-01-01

## 2021-02-25 PROBLEM — N17.0 ACUTE RENAL FAILURE WITH ACUTE TUBULAR NECROSIS SUPERIMPOSED ON STAGE 5 CHRONIC KIDNEY DISEASE, NOT ON CHRONIC DIALYSIS (HCC): Status: ACTIVE | Noted: 2021-01-01

## 2021-03-01 NOTE — PROGRESS NOTES
Case Management Discharge Note      Final Note: Patient left AMA 2/27/21.         Selected Continued Care - Discharged on 2/27/2021 Admission date: 2/25/2021 - Discharge disposition: Left Against Medical Advice    Destination    No services have been selected for the patient.              Durable Medical Equipment    No services have been selected for the patient.              Dialysis/Infusion    No services have been selected for the patient.              Home Medical Care    No services have been selected for the patient.              Therapy    No services have been selected for the patient.              Community Resources    No services have been selected for the patient.                       Final Discharge Disposition Code: 07 - left AMA

## 2021-03-07 PROBLEM — E87.5 HYPERKALEMIA: Status: RESOLVED | Noted: 2021-01-01 | Resolved: 2021-01-01

## 2021-03-07 PROBLEM — Z00.00 HEALTHCARE MAINTENANCE: Status: RESOLVED | Noted: 2020-01-01 | Resolved: 2021-01-01

## 2021-03-07 PROBLEM — N18.5 ACUTE RENAL FAILURE WITH ACUTE TUBULAR NECROSIS SUPERIMPOSED ON STAGE 5 CHRONIC KIDNEY DISEASE, NOT ON CHRONIC DIALYSIS (HCC): Status: RESOLVED | Noted: 2021-01-01 | Resolved: 2021-01-01

## 2021-03-07 PROBLEM — E87.20 METABOLIC ACIDOSIS: Status: RESOLVED | Noted: 2019-05-13 | Resolved: 2021-01-01

## 2021-03-07 PROBLEM — N17.0 ACUTE RENAL FAILURE WITH ACUTE TUBULAR NECROSIS SUPERIMPOSED ON STAGE 5 CHRONIC KIDNEY DISEASE, NOT ON CHRONIC DIALYSIS (HCC): Status: RESOLVED | Noted: 2021-01-01 | Resolved: 2021-01-01

## 2021-03-07 PROBLEM — E87.5 HYPERKALEMIA: Status: ACTIVE | Noted: 2021-01-01

## 2021-03-07 PROBLEM — M54.2 ACUTE NECK PAIN: Status: RESOLVED | Noted: 2021-01-01 | Resolved: 2021-01-01

## 2021-03-08 NOTE — PROGRESS NOTES
Discharge Planning Assessment   Reza     Patient Name: Mario Nair  MRN: [id number]  Today's Date: 3/8/2021    Admit Date: 3/7/2021    Discharge Needs Assessment     Row Name 03/08/21 4009       Living Environment    Lives With  child(tammy), adult;child(tammy), dependent    Name(s) of Who Lives With Patient  Constance (18) Niko (16)    Current Living Arrangements  home/apartment/condo    Primary Care Provided by  self    Provides Primary Care For  no one, unable/limited ability to care for self    Family Caregiver if Needed  none    Able to Return to Prior Arrangements  yes       Resource/Environmental Concerns    Resource/Environmental Concerns  none    Transportation Concerns  car, none       Transition Planning    Patient/Family Anticipates Transition to  home with family    Transportation Anticipated  family or friend will provide       Discharge Needs Assessment    Current Outpatient/Agency/Support Group  outpatient hemodialysis    Equipment Currently Used at Home  none        Discharge Plan     Row Name 03/08/21 1689       Plan    Plan  SS spoke with Pt. Pt lives with teenage children, Constance and Niko. Pt does not utilize any home health or DME. Pt's PCP is Dr. Simmons. Pt utilizes [facility]. Pt does not have a POA or living will. Pt will return home at discharge and family will transport. Pt is agreeable to dialysis chair and does not have a preference.    Patient/Family in Agreement with Plan  yes        Continued Care and Services - Admitted Since 3/7/2021    Coordination has not been started for this encounter.         Demographic Summary     Row Name 03/08/21 1357       General Information    Admission Type  inpatient    Referral Source  physician SS received consult for dialysis patient.              Janice Renner

## 2021-03-08 NOTE — PROGRESS NOTES
Hyperkalemia  End-stage renal disease noncompliant with dialysis  Medical nonadherence  Metabolic acidosis  Lactic acidemia  Metabolic encephalopathy secondary to uremia  Systemic inflammatory response syndrome  Troponin elevation  Hypoglycemia  Hypertension  Chronic hepatitis C  History of IV drug abuse  History of endocarditis    Patient admitted this morning with profound hyperkalemia at 8.1 as well as significant elevations in both creatinine and BUN with noted encephalopathy likely uremic in nature.  Patient also with a wide-complex tachycardia at presentation likely sign of impending sine wave formation due to patient's hyperkalemia.  Patient with expected systemic inflammatory response syndrome present at presentation.  White count mildly elevated at 12.9, chest x-ray without any significant acute abnormalities.  Admitted to the hospital this morning by overnight physician received dialysis and already improvement in both potassium and other electrolytes as well as overall chemistry panel evaluation.  Complains of pain all over but denies any chest pain.  Patient still with tachycardia but improved from admission, will restart patient's home Coreg 12.5 twice daily however will hold on other antihypertensives at this time given patient's normotension at this time.  Patient provided with lidocaine patch and Robaxin as at home.  Transthoracic echocardiogram obtained earlier this morning currently pending official read.

## 2021-03-08 NOTE — PLAN OF CARE
Goal Outcome Evaluation:  Plan of Care Reviewed With: patient  Progress: no change  Outcome Summary: VSS and afebrile. Pt continues on RA and tolerating well. Currently being dialysised. No complaints at this time. Call light within reach.

## 2021-03-08 NOTE — ED NOTES
Spoke to pt's daughter at this time, Constance, will call with any updates. [phone]     Kyleigh Vidal, RN  03/07/21 5764

## 2021-03-08 NOTE — H&P
"Hospitalist History and Physical        Patient Identification  Name: Mario Nair  Age/Sex: 45 y.o. male  :  1975        MRN: [id number]  Visit Number: [id number]  Admit Date: 3/7/2021   PCP: Danny Rebolledo MD          Chief complaint \"no energy\", needs dialysis    History of Present Illness:  Patient is a 45 y.o. male with history of ESRD, anemia of chronic disease, endocarditis, Hep B and C, HTN, IV drug abuse, and medical noncompliance, who presents with complaints of worsening fatigue and generalized weakness of several days duration. He was last hospitalized from - for severe hyperkalemia secondary to noncompliance, as he never stays in the hospital long enough for case management to arrange a seat for him in a local dialysis clinic. Once again he left AMA on the  after having 2 round of inpatient hemodialysis. He states he has not had dialysis since that time. He was critically hyperkalemic upon arrival to the ED this evening with K+ of 8.1. Creatinine was 11.65 with . BNP and troponin were both elevated, with troponin within same range it has been chronically on previous admissions. He denies any chest pain or dyspnea. ABG showed metabolic acidosis with bicarb of 9 and pH 7.273. CRP was 13, WBC was 12, lactate 2.1. Chest XR showed no acute cardiopulmonary findings. Patient has been admitted for emergent hemodialysis per nephrology recommendations. Very little information can be obtained from the patient at this time as he is lethargic and frequently falls asleep during conversation. He is also disoriented to time and president.     Review of Systems  Review of Systems   Unable to perform ROS: Mental status change       History  Past Medical History:   Diagnosis Date   • Anemia due to chronic kidney disease    • COPD (chronic obstructive pulmonary disease) (CMS/[facility])    • COVID-19 2021   • Current smoker    • Endocarditis    • ESRD (end stage renal disease) " (CMS/HCC)    • Hepatitis B surface antigen positive 12/9/2020   • Hepatitis C    • History of transfusion    • Hypertension    • IV drug abuse (CMS/HCC)    • Medically noncompliant    • Mild atherosclerosis of carotid artery, right 2/15/2021   • Retroperitoneal lymphadenopathy 12/8/2020   • Unable to read or write      Past Surgical History:   Procedure Laterality Date   • CENTRAL VENOUS CATHETER TUNNELED INSERTION DOUBLE LUMEN Right     Chest for hemodialysis   • INSERTION HEMODIALYSIS CATHETER N/A 1/29/2021    Procedure: HEMODIALYSIS CATHETER INSERTION;  Surgeon: Khoa Carl MD;  [facility]: [facility];  Service: General;  Laterality: N/A;     Family History   Problem Relation Age of Onset   • Alcohol abuse Mother    • Hypertension Mother    • Hypertension Other    • Kidney disease Maternal Grandmother      Social History     Tobacco Use   • Smoking status: Current Every Day Smoker     Packs/day: 0.50     Years: 20.00     Pack years: 10.00     Types: Cigarettes   • Smokeless tobacco: Never Used   Substance Use Topics   • Alcohol use: Not Currently     Comment: Quit 18 years ago   • Drug use: Not Currently     Types: Methamphetamines, IV     Comment: last used on Friday 5/18/2019     Medications Prior to Admission   Medication Sig Dispense Refill Last Dose   • amLODIPine (NORVASC) 5 MG tablet Take 5 mg by mouth Every Night.   3/6/2021 at Unknown time   • calcium acetate (PHOS BINDER,) 667 MG capsule capsule Take 667 mg by mouth 3 (Three) Times a Day With Meals.   3/6/2021 at Unknown time   • carvedilol (COREG) 12.5 MG tablet Take 12.5 mg by mouth 2 (Two) Times a Day With Meals.   3/6/2021 at Unknown time   • hydrALAZINE (APRESOLINE) 10 MG tablet Take 10 mg by mouth 3 (Three) Times a Day.   3/6/2021 at Unknown time   • isosorbide mononitrate (IMDUR) 30 MG 24 hr tablet Take 30 mg by mouth Daily.   3/6/2021 at Unknown time   • methocarbamol (ROBAXIN) 500 MG tablet Take 500 mg by mouth 3 (Three) Times a Day As  Needed for Muscle Spasms.   Unknown at Unknown time     Allergies:  Penicillins    Objective     Vital Signs  Temp:  [98.5 °F (36.9 °C)] 98.5 °F (36.9 °C)  Heart Rate:  [] 129  Resp:  [20-28] 28  BP: (180-190)/(102-113) 187/113  Body mass index is 20.54 kg/m².    Physical Exam:  Physical Exam  Constitutional:       Appearance: He is ill-appearing and diaphoretic.      Comments: Lethargic, frequently falls asleep during interview   HENT:      Head: Normocephalic and atraumatic.      Right Ear: External ear normal.      Left Ear: External ear normal.      Mouth/Throat:      Mouth: Mucous membranes are dry.      Pharynx: Oropharynx is clear.   Eyes:      Extraocular Movements: Extraocular movements intact.      Conjunctiva/sclera: Conjunctivae normal.      Pupils: Pupils are equal, round, and reactive to light.   Cardiovascular:      Pulses: Normal pulses.      Heart sounds: Friction rub present.      Comments: Tachycardic, regular rhythm  Pulmonary:      Effort: Pulmonary effort is normal.      Breath sounds: Normal breath sounds. No wheezing or rales.   Abdominal:      General: Abdomen is flat. Bowel sounds are normal. There is distension (mild).      Palpations: Abdomen is soft.      Tenderness: There is no abdominal tenderness.   Musculoskeletal:         General: No swelling. Normal range of motion.      Cervical back: Normal range of motion and neck supple. No rigidity or tenderness.      Right lower leg: No edema.      Left lower leg: No edema.   Lymphadenopathy:      Cervical: No cervical adenopathy.   Skin:     General: Skin is warm.      Coloration: Skin is pale. Skin is not jaundiced.      Comments: Scattered scabs on both arms and to lesser extent both legs. HD catheter left upper chest wall.    Neurological:      Comments: Lethargic, falls asleep frequently during interview, oriented to self and place but not time or president. No focal deficits appreciated.    Psychiatric:      Comments: Unable to  assess due to lethargy           Results Review:       Lab Results:  Results from last 7 days   Lab Units 03/07/21 2120   WBC 10*3/mm3 12.90*   HEMOGLOBIN g/dL 8.6*   PLATELETS 10*3/mm3 170     Results from last 7 days   Lab Units 03/07/21 2120   CRP mg/dL 13.61*     Results from last 7 days   Lab Units 03/07/21 2120   SODIUM mmol/L 133*   POTASSIUM mmol/L 8.1*   CHLORIDE mmol/L 93*   CO2 mmol/L 9.5*   BUN mg/dL 163*   CREATININE mg/dL 11.65*   CALCIUM mg/dL 6.0*   GLUCOSE mg/dL 153*     Results from last 7 days   Lab Units 03/07/21 2120   MAGNESIUM mg/dL 2.3     No results found for: HGBA1C  Results from last 7 days   Lab Units 03/07/21 2120   BILIRUBIN mg/dL 0.5   ALK PHOS U/L 127*   AST (SGOT) U/L 17   ALT (SGPT) U/L 19     Results from last 7 days   Lab Units 03/07/21  2324 03/07/21 2120   TROPONIN T ng/mL 0.130* 0.124*         Results from last 7 days   Lab Units 03/07/21 2120   INR  1.32*     Results from last 7 days   Lab Units 03/07/21 2132   PH, ARTERIAL pH units 7.273*   PO2 ART mm Hg 106.0   PCO2, ARTERIAL mm Hg 19.8*   HCO3 ART mmol/L 9.1*       I have reviewed the patient's laboratory results.    Imaging:  Imaging Results (Last 72 Hours)     Procedure Component Value Units Date/Time    XR Chest 1 View [[id number]] Collected: 03/07/21 2203     Updated: 03/07/21 2205    Narrative:      CR Chest 1 Vw    INDICATION:   Sepsis     COMPARISON:    1/29/2020    FINDINGS:  Single portable AP view(s) of the chest.  Left IJ permacath terminates at the level of the cavoatrial junction and right atrium. Normal cardiomediastinal contours. No confluent infiltrate the lungs. No pleural effusion. No pneumothorax.      Impression:      No acute cardiopulmonary findings.    Signer Name: DARYL CALLEJAS MD   Signed: 3/7/2021 10:03 PM   Workstation Name: [email or web address]    [facility]          I have personally reviewed the patient's radiologic imaging.        EKG: Wide complex tachycardia, , QTc  599. Nonspecific intraventricular block present on prior EKG's.     I have personally reviewed the patient's EKG.        Assessment/Plan     - Critical hyperkalemia: secondary to noncompliance, has still not established a seat at local dialysis clinic, always leaves AMA before /case management can help arrange. Received medical treatment for hyperkalemia in the ED, including D5/insulin, calcium gluconate. Now undergoing emergent HD at time of my assessment. Appreciate nephrology assistance. Repeat CMP after dialysis has been completed.   - Uremia with encephalopathy and friction rub: secondary to noncompliance as well. Anticipate will improve with dialysis. Given history of endocarditis, will evaluate further with ECHO in the morning.   - SIRS, with tachycardia, leukocytosis, CRP elevation and lactic acidosis: suspect most likely reactive from metabolic derangements secondary to untreated ESRD, but given history of endocarditis will evaluate further with ECHO in the morning. Lactate already normalized on reflex draw. Continue to trend WBC, CRP. Urinalysis still pending.   - Troponin elevation: chronic and fairly flat trend thus far. No chest pain. EKG shows wide complex tachycardia secondary to severe hyperkalemia. Will repeat EKG in the morning after dialysis has been completed to re-evaluate. Repeat troponin later this morning.  - Hypoglycemia: likely iatrogenic from D5/insulin given in the ED for hyperkalemia. On hypoglycemia protocol now.   - Hypertension: anticipate will improve with HD. IV metoprolol ordered PRN.   - History IV drug abuse: UDS pending    DVT Prophylaxis: subcutaneous lovenox    Estimated Length of Stay >2 midnights    I discussed the patient's findings, assessment and plan with the patient and nursing staff in the PCU.    * patient is high risk due to critical hyperkalemia, uremia, ESRD with medical noncompliance with hemodialysis, acute encephalopathy, cardiac rhythm changes  secondary to hyperkalemia    Charlie Conti MD  03/08/21  01:42 EST

## 2021-03-08 NOTE — PLAN OF CARE
Goal Outcome Evaluation:  Plan of Care Reviewed With: patient  Progress: improving  Outcome Summary: Patient vital signs currently stable and he remains on room air and tolerating it well. He is currently still running sinus tachycardia with his HR ranging from 110 to the high 120's. He continues to complain of significant back and bilateral shoulder pain. MD is aware of pain and HR. No other significant changes noted. Continuously monitoring.

## 2021-03-08 NOTE — ED NOTES
Contacted the dialysis nurse, Lilia, at this time. Informed her that Dr. Diallo would like this patient to be dialyzed tonight.     Giovanni Muse RN  03/07/21 0818

## 2021-03-08 NOTE — PAYOR COMM NOTE
"[facility]  MARCELL NUNO  PHONE  [phone]  FAX  [phone]  NPI:  [id number]    REQUEST FOR INPATIENT AUTH        Mario Nair (45 y.o. Male)     Date of Birth Social Security Number Address Home Phone MRN    1975  [address]  [facility] [address] [phone] [id number]    Alevism Marital Status          None        Admission Date Admission Type Admitting Provider Attending Provider Department, Room/Bed    3/7/21 Emergency Opal Ocampo MD Cagle, William, DO [facility] PROGRESS CARE, P206/1P    Discharge Date Discharge Disposition Discharge Destination                       Attending Provider: Mark Tyson DO    Allergies: Penicillins    Isolation: None   Infection: Hepatitis B (19), COVID (History) (21)   Code Status: CPR    Ht: 188 cm (74\")   Wt: 72.6 kg (160 lb)    Admission Cmt: None   Principal Problem: Hyperkalemia [E87.5]                 Active Insurance as of 3/7/2021     Primary Coverage     Payor Plan Insurance Group Employer/Plan Group    [employer] [place] [employer] [place]ICA[place]      Payor Plan Address Payor Plan Phone Number Payor Plan Fax Number Effective Dates    [address] [address] [phone]  2019 - None Entered    [facility] [id number]       Subscriber Name Subscriber Birth Date Member ID       MARIO NAIR 1975 [id number]                 Emergency Contacts      (Rel.) Home Phone Work Phone Mobile Phone    SHANI SILVA (Relative) [phone] -- --    Constance Nair (Daughter) -- -- [phone]               History & Physical      Charlie Conti MD at 21 0142          Hospitalist History and Physical        Patient Identification  Name: Mario Nair  Age/Sex: 45 y.o. male  :  1975        MRN: [id number]  Visit Number: [id number]  Admit Date: 3/7/2021   PCP: Danny Rebolledo MD          Chief complaint \"no energy\", needs dialysis    History of Present Illness:  Patient is " a 45 y.o. male with history of ESRD, anemia of chronic disease, endocarditis, Hep B and C, HTN, IV drug abuse, and medical noncompliance, who presents with complaints of worsening fatigue and generalized weakness of several days duration. He was last hospitalized from 2/25-2/27 for severe hyperkalemia secondary to noncompliance, as he never stays in the hospital long enough for case management to arrange a seat for him in a local dialysis clinic. Once again he left AMA on the 27th after having 2 round of inpatient hemodialysis. He states he has not had dialysis since that time. He was critically hyperkalemic upon arrival to the ED this evening with K+ of 8.1. Creatinine was 11.65 with . BNP and troponin were both elevated, with troponin within same range it has been chronically on previous admissions. He denies any chest pain or dyspnea. ABG showed metabolic acidosis with bicarb of 9 and pH 7.273. CRP was 13, WBC was 12, lactate 2.1. Chest XR showed no acute cardiopulmonary findings. Patient has been admitted for emergent hemodialysis per nephrology recommendations. Very little information can be obtained from the patient at this time as he is lethargic and frequently falls asleep during conversation. He is also disoriented to time and president.     Review of Systems  Review of Systems   Unable to perform ROS: Mental status change       History  Past Medical History:   Diagnosis Date   • Anemia due to chronic kidney disease    • COPD (chronic obstructive pulmonary disease) (CMS/HCC)    • COVID-19 1/25/2021   • Current smoker    • Endocarditis    • ESRD (end stage renal disease) (CMS/[facility])    • Hepatitis B surface antigen positive 12/9/2020   • Hepatitis C    • History of transfusion    • Hypertension    • IV drug abuse (CMS/HCC)    • Medically noncompliant    • Mild atherosclerosis of carotid artery, right 2/15/2021   • Retroperitoneal lymphadenopathy 12/8/2020   • Unable to read or write      Past Surgical  History:   Procedure Laterality Date   • CENTRAL VENOUS CATHETER TUNNELED INSERTION DOUBLE LUMEN Right     Chest for hemodialysis   • INSERTION HEMODIALYSIS CATHETER N/A 1/29/2021    Procedure: HEMODIALYSIS CATHETER INSERTION;  Surgeon: Khoa Carl MD;  [facility];  Service: General;  Laterality: N/A;     Family History   Problem Relation Age of Onset   • Alcohol abuse Mother    • Hypertension Mother    • Hypertension Other    • Kidney disease Maternal Grandmother      Social History     Tobacco Use   • Smoking status: Current Every Day Smoker     Packs/day: 0.50     Years: 20.00     Pack years: 10.00     Types: Cigarettes   • Smokeless tobacco: Never Used   Substance Use Topics   • Alcohol use: Not Currently     Comment: Quit 18 years ago   • Drug use: Not Currently     Types: Methamphetamines, IV     Comment: last used on Friday 5/18/2019     Medications Prior to Admission   Medication Sig Dispense Refill Last Dose   • amLODIPine (NORVASC) 5 MG tablet Take 5 mg by mouth Every Night.   3/6/2021 at Unknown time   • calcium acetate (PHOS BINDER,) 667 MG capsule capsule Take 667 mg by mouth 3 (Three) Times a Day With Meals.   3/6/2021 at Unknown time   • carvedilol (COREG) 12.5 MG tablet Take 12.5 mg by mouth 2 (Two) Times a Day With Meals.   3/6/2021 at Unknown time   • hydrALAZINE (APRESOLINE) 10 MG tablet Take 10 mg by mouth 3 (Three) Times a Day.   3/6/2021 at Unknown time   • isosorbide mononitrate (IMDUR) 30 MG 24 hr tablet Take 30 mg by mouth Daily.   3/6/2021 at Unknown time   • methocarbamol (ROBAXIN) 500 MG tablet Take 500 mg by mouth 3 (Three) Times a Day As Needed for Muscle Spasms.   Unknown at Unknown time     Allergies:  Penicillins    Objective     Vital Signs  Temp:  [98.5 °F (36.9 °C)] 98.5 °F (36.9 °C)  Heart Rate:  [] 129  Resp:  [20-28] 28  BP: (180-190)/(102-113) 187/113  Body mass index is 20.54 kg/m².    Physical Exam:  Physical Exam  Constitutional:       Appearance: He  is ill-appearing and diaphoretic.      Comments: Lethargic, frequently falls asleep during interview   HENT:      Head: Normocephalic and atraumatic.      Right Ear: External ear normal.      Left Ear: External ear normal.      Mouth/Throat:      Mouth: Mucous membranes are dry.      Pharynx: Oropharynx is clear.   Eyes:      Extraocular Movements: Extraocular movements intact.      Conjunctiva/sclera: Conjunctivae normal.      Pupils: Pupils are equal, round, and reactive to light.   Cardiovascular:      Pulses: Normal pulses.      Heart sounds: Friction rub present.      Comments: Tachycardic, regular rhythm  Pulmonary:      Effort: Pulmonary effort is normal.      Breath sounds: Normal breath sounds. No wheezing or rales.   Abdominal:      General: Abdomen is flat. Bowel sounds are normal. There is distension (mild).      Palpations: Abdomen is soft.      Tenderness: There is no abdominal tenderness.   Musculoskeletal:         General: No swelling. Normal range of motion.      Cervical back: Normal range of motion and neck supple. No rigidity or tenderness.      Right lower leg: No edema.      Left lower leg: No edema.   Lymphadenopathy:      Cervical: No cervical adenopathy.   Skin:     General: Skin is warm.      Coloration: Skin is pale. Skin is not jaundiced.      Comments: Scattered scabs on both arms and to lesser extent both legs. HD catheter left upper chest wall.    Neurological:      Comments: Lethargic, falls asleep frequently during interview, oriented to self and place but not time or president. No focal deficits appreciated.    Psychiatric:      Comments: Unable to assess due to lethargy           Results Review:       Lab Results:  Results from last 7 days   Lab Units 03/07/21 2120   WBC 10*3/mm3 12.90*   HEMOGLOBIN g/dL 8.6*   PLATELETS 10*3/mm3 170     Results from last 7 days   Lab Units 03/07/21 2120   CRP mg/dL 13.61*     Results from last 7 days   Lab Units 03/07/21 2120   SODIUM mmol/L  133*   POTASSIUM mmol/L 8.1*   CHLORIDE mmol/L 93*   CO2 mmol/L 9.5*   BUN mg/dL 163*   CREATININE mg/dL 11.65*   CALCIUM mg/dL 6.0*   GLUCOSE mg/dL 153*     Results from last 7 days   Lab Units 03/07/21  2120   MAGNESIUM mg/dL 2.3     No results found for: HGBA1C  Results from last 7 days   Lab Units 03/07/21 2120   BILIRUBIN mg/dL 0.5   ALK PHOS U/L 127*   AST (SGOT) U/L 17   ALT (SGPT) U/L 19     Results from last 7 days   Lab Units 03/07/21  2324 03/07/21 2120   TROPONIN T ng/mL 0.130* 0.124*         Results from last 7 days   Lab Units 03/07/21 2120   INR  1.32*     Results from last 7 days   Lab Units 03/07/21 2132   PH, ARTERIAL pH units 7.273*   PO2 ART mm Hg 106.0   PCO2, ARTERIAL mm Hg 19.8*   HCO3 ART mmol/L 9.1*       I have reviewed the patient's laboratory results.    Imaging:  Imaging Results (Last 72 Hours)     Procedure Component Value Units Date/Time    XR Chest 1 View [[id number]] Collected: 03/07/21 2203     Updated: 03/07/21 2205    Narrative:      CR Chest 1 Vw    INDICATION:   Sepsis     COMPARISON:    1/29/2020    FINDINGS:  Single portable AP view(s) of the chest.  Left IJ permacath terminates at the level of the cavoatrial junction and right atrium. Normal cardiomediastinal contours. No confluent infiltrate the lungs. No pleural effusion. No pneumothorax.      Impression:      No acute cardiopulmonary findings.    Signer Name: DARYL CALLEJAS MD   Signed: 3/7/2021 10:03 PM   Workstation [facility]: [facility]    [facility]          I have personally reviewed the patient's radiologic imaging.        EKG: Wide complex tachycardia, , QTc 599. Nonspecific intraventricular block present on prior EKG's.     I have personally reviewed the patient's EKG.        Assessment/Plan     - Critical hyperkalemia: secondary to noncompliance, has still not established a seat at local dialysis clinic, always leaves AMA before /case management can help arrange. Received  medical treatment for hyperkalemia in the ED, including D5/insulin, calcium gluconate. Now undergoing emergent HD at time of my assessment. Appreciate nephrology assistance. Repeat CMP after dialysis has been completed.   - Uremia with encephalopathy and friction rub: secondary to noncompliance as well. Anticipate will improve with dialysis. Given history of endocarditis, will evaluate further with ECHO in the morning.   - SIRS, with tachycardia, leukocytosis, CRP elevation and lactic acidosis: suspect most likely reactive from metabolic derangements secondary to untreated ESRD, but given history of endocarditis will evaluate further with ECHO in the morning. Lactate already normalized on reflex draw. Continue to trend WBC, CRP. Urinalysis still pending.   - Troponin elevation: chronic and fairly flat trend thus far. No chest pain. EKG shows wide complex tachycardia secondary to severe hyperkalemia. Will repeat EKG in the morning after dialysis has been completed to re-evaluate. Repeat troponin later this morning.  - Hypoglycemia: likely iatrogenic from D5/insulin given in the ED for hyperkalemia. On hypoglycemia protocol now.   - Hypertension: anticipate will improve with HD. IV metoprolol ordered PRN.   - History IV drug abuse: UDS pending    DVT Prophylaxis: subcutaneous lovenox    Estimated Length of Stay >2 midnights    I discussed the patient's findings, assessment and plan with the patient and nursing staff in the PCU.    * patient is high risk due to critical hyperkalemia, uremia, ESRD with medical noncompliance with hemodialysis, acute encephalopathy, cardiac rhythm changes secondary to hyperkalemia    Charlie Conti MD  03/08/21  01:42 EST      Electronically signed by Charlie Conti MD at 03/08/21 0211          Emergency Department Notes      Rashawn Licea at 03/07/21 2215        ekg performed by tech @ [email or web address]. Given to Rashawn Weathers  03/07/21 2217      Electronically  signed by Rashawn Licea at 03/07/21 2217     Symes, Heather at 03/07/21 2226        Paged Dr. Diallo for Dr. Luna.      Symes, Heather  03/07/21 2227      Electronically signed by Symes, Heather at 03/07/21 2227     Kyleigh Vidal, RN at 03/07/21 2231        Spoke to pt's daughter at this time, Constance, will call with any updates. [phone]     Kyleigh Vidal, RN  03/07/21 2233      Electronically signed by Kyleigh Vidal, RN at 03/07/21 2233     Giovanni Muse, RN at 03/07/21 2233        Contacted the dialysis nurse, Lilia, at this time. Informed her that Dr. Diallo would like this patient to be dialyzed tonight.     Giovanni Muse, RN  03/07/21 2249      Electronically signed by Giovanni Muse, RN at 03/07/21 2249     Symes, Heather at 03/07/21 2233        Paged Dr. Ocampo for Dr. Luna.      Symes, Heather  03/07/21 2233      Electronically signed by Symes, Heather at 03/07/21 2233     Symes, Heather at 03/07/21 2247        Dr. Luna is on the line with Dr Ocampo at this time.      Symes, Heather  03/07/21 2247      Electronically signed by Symes, Heather at 03/07/21 2247     Marla Luna DO at 03/07/21 2306          Subjective   45-year-old male presents the emergency department with past medical history of end-stage renal disease, hepatitis B hepatitis C, history of IV drug abuse and an extensive history of being noncompliant with any of his medical treatments who presents the ER complaining of muscle spasm and weakness in his extremities patient was last dialyzed on 227 patient just says he cannot ever get to clinic to be dialyzed today his upper extremities were so stiff that he became concerned.  Patient also says he still does Suboxone IV once a month.      History provided by:  Patient  Illness  Location:  Noncompliant with medical treatment and severe comorbidities presents with weakness and most likely is uremic from not having dialysis  Severity:  Severe  Onset  quality:  Gradual  Timing:  Constant  Progression:  Unchanged  Chronicity:  Recurrent  Context:  The HPI  Relieved by:  Nothing  Worsened by:  Nothing  Ineffective treatments:  None tried  Associated symptoms: fatigue and myalgias    Associated symptoms: no abdominal pain, no chest pain and no fever        Review of Systems   Constitutional: Positive for fatigue. Negative for fever.   HENT: Negative.    Respiratory: Negative.    Cardiovascular: Negative.  Negative for chest pain.   Gastrointestinal: Negative.  Negative for abdominal pain.   Endocrine: Negative.    Genitourinary: Negative.  Negative for dysuria.   Musculoskeletal: Positive for myalgias.   Skin: Negative.    Neurological: Negative.    Psychiatric/Behavioral: Negative.    All other systems reviewed and are negative.      Past Medical History:   Diagnosis Date   • Anemia due to chronic kidney disease    • COPD (chronic obstructive pulmonary disease) (CMS/[facility])    • COVID-19 1/25/2021   • Current smoker    • Endocarditis    • ESRD (end stage renal disease) (CMS/[facility])    • Hepatitis B surface antigen positive 12/9/2020   • Hepatitis C    • History of transfusion    • Hypertension    • IV drug abuse (CMS/[facility])    • Medically noncompliant    • Mild atherosclerosis of carotid artery, right 2/15/2021   • Retroperitoneal lymphadenopathy 12/8/2020   • Unable to read or write        Allergies   Allergen Reactions   • Penicillins Shortness Of Breath, Itching and Rash       Past Surgical History:   Procedure Laterality Date   • CENTRAL VENOUS CATHETER TUNNELED INSERTION DOUBLE LUMEN Right     Chest for hemodialysis   • INSERTION HEMODIALYSIS CATHETER N/A 1/29/2021    Procedure: HEMODIALYSIS CATHETER INSERTION;  Surgeon: Khoa Carl MD;  Location: [facility];  Service: General;  Laterality: N/A;       Family History   Problem Relation Age of Onset   • Alcohol abuse Mother    • Hypertension Mother    • Hypertension Other    • Kidney disease Maternal Grandmother         Social History     Socioeconomic History   • Marital status:      Spouse name: Not on file   • Number of children: Not on file   • Years of education: Not on file   • Highest education level: Not on file   Tobacco Use   • Smoking status: Current Every Day Smoker     Packs/day: 0.50     Years: 20.00     Pack years: 10.00     Types: Cigarettes   • Smokeless tobacco: Never Used   Substance and Sexual Activity   • Alcohol use: Not Currently     Comment: Quit 18 years ago   • Drug use: Not Currently     Types: Methamphetamines, IV     Comment: last used on Friday 5/18/2019   • Sexual activity: Defer           Objective   Physical Exam  Vitals and nursing note reviewed.   Constitutional:       General: He is in acute distress.      Appearance: Normal appearance. He is ill-appearing and diaphoretic.   HENT:      Head: Normocephalic.      Right Ear: Tympanic membrane normal.      Nose: Nose normal.      Mouth/Throat:      Mouth: Mucous membranes are moist.   Eyes:      Extraocular Movements: Extraocular movements intact.      Pupils: Pupils are equal, round, and reactive to light.   Cardiovascular:      Rate and Rhythm: Regular rhythm. Tachycardia present.      Pulses: Normal pulses.   Pulmonary:      Effort: Pulmonary effort is normal.   Abdominal:      General: Abdomen is flat.   Musculoskeletal:         General: Normal range of motion.      Cervical back: Normal range of motion.   Skin:     General: Skin is warm.      Capillary Refill: Capillary refill takes less than 2 seconds.   Neurological:      Mental Status: He is alert and oriented to person, place, and time.   Psychiatric:         Mood and Affect: Mood normal.         Behavior: Behavior normal.         Thought Content: Thought content normal.         Judgment: Judgment normal.         Procedures          ED Course  ED Course as of Mar 08 0419   Sun Mar 07, 2021   2215 EKG interpretation time is 2215 wide-complex tachycardia 101 bpm QRS duration is  132 QT is 422 QTC is 99 there is a prolonged QTC however there is also changes that are consistent with hyperkalemia.    []   2240 Discussed the case with Dr. Diallo agrees with treatment ; advises at 30-1 hour after initial treatment of insulin and D50 may repeat but call dialysis to get pt dialyzed tonight before going to floor    []      ED Course User Index  [] Marla Luna DO                                           MDM  Number of Diagnoses or Management Options  History of noncompliance with medical treatment: new and requires workup  Hyperkalemia: new and requires workup     Amount and/or Complexity of Data Reviewed  Clinical lab tests: ordered and reviewed  Tests in the radiology section of CPT®: ordered and reviewed  Tests in the medicine section of CPT®: reviewed and ordered  Discuss the patient with other providers: yes  Independent visualization of images, tracings, or specimens: yes    Risk of Complications, Morbidity, and/or Mortality  Presenting problems: high  Diagnostic procedures: high  Management options: high    Patient Progress  Patient progress: (guarded)      Final diagnoses:   Hyperkalemia   History of noncompliance with medical treatment            Marla Luna DO  03/08/21 0419      Electronically signed by Marla Luna DO at 03/08/21 0419         Current Facility-Administered Medications   Medication Dose Route Frequency Provider Last Rate Last Admin   • dextrose (D50W) 25 g/ 50mL Intravenous Solution 25 g  25 g Intravenous Q15 Min PRN Charlie Conti MD       • dextrose (GLUTOSE) oral gel 15 g  15 g Oral Q15 Min PRN Charlie Conti MD       • enoxaparin (LOVENOX) syringe 30 mg  30 mg Subcutaneous Nightly Opal Ocampo MD       • glucagon (human recombinant) (GLUCAGEN DIAGNOSTIC) injection 1 mg  1 mg Subcutaneous Q15 Min PRN Charlie Conti MD       • metoprolol tartrate (LOPRESSOR) injection 5 mg  5 mg Intravenous Q6H PRN  Charlie Conti MD       • nitroglycerin (NITROSTAT) SL tablet 0.4 mg  0.4 mg Sublingual Q5 Min PRN Opal Ocampo MD       • sodium chloride 0.9 % flush 10 mL  10 mL Intravenous PRN Opal Ocampo MD       • sodium chloride 0.9 % flush 3 mL  3 mL Intravenous Q12H Opal Ocampo MD       • sodium chloride 0.9 % flush 3-10 mL  3-10 mL Intravenous PRN Opal Ocampo MD         Orders (last 24 hrs)      Start     Ordered    03/09/21 0500  ECG 12 Lead  Tomorrow AM      03/08/21 0205    03/08/21 0900  morphine injection 4 mg  Once      03/08/21 0809    03/08/21 0843  Hepatitis B Surface Antigen  Once      03/08/21 0842    03/08/21 0700  POC Glucose 4x Daily AC & at Bedtime  4 Times Daily Before Meals & at Bedtime     Comments: If bedtime blood glucose is greater than 350 mg/dl, call MD.      03/08/21 0146    03/08/21 0700  Adult Transthoracic Echo Limited W/ Cont if Necessary Per Protocol  Once     Comments: Friction rub on auscultation, likely from uremia but has history of endocarditis, evaluate further    03/08/21 0153    03/08/21 0647  POC Glucose Once  Once      03/08/21 0639    03/08/21 0600  CBC & Differential  Morning Draw      03/08/21 0204    03/08/21 0600  Comprehensive Metabolic Panel  Morning Draw      03/08/21 0204    03/08/21 0600  CBC Auto Differential  PROCEDURE ONCE      03/08/21 0204    03/08/21 0600  Troponin  Once      03/08/21 0209    03/08/21 0513  Hepatitis Panel, Acute  Once      03/08/21 0512    03/08/21 0400  Troponin  Now Then Every 6 Hours,   Status:  Canceled      03/08/21 0208    03/08/21 0210  metoprolol tartrate (LOPRESSOR) injection 5 mg  Every 6 Hours PRN      03/08/21 0210    03/08/21 0200  enoxaparin (LOVENOX) syringe 30 mg  Nightly      03/08/21 0013    03/08/21 0147  Do NOT Hold Basal or Correction Scale Insulin When Patient is NPO, Hold Scheduled Mealtime (Bolus) Insulin if NPO  Continuous      03/08/21 0146    03/08/21 0147  Follow [facility] Hypoglycemia Standing  Orders For Blood Glucose Less Than 70 mg/dL  Until Discontinued     Comments: ALERT PATIENT - NOT NPO & CAN SAFELY SWALLOW  Administer 4 oz Fruit Juice OR 4 oz Regular Soda OR 8 oz Milk OR 15-30 grams (1 tube) of Glucose Gel.  Recheck Blood Glucose Approximately 15 Minutes After Ingestion, Repeat Treatment & Continue to Recheck Blood Sugar Approximately Every 15 Minutes Until Blood Glucose is 70 or Higher.  Once Blood Glucose is 70 or Higher & if It Will Be More Than 60 Minutes Until Next Meal, Provide Appropriate Snack (Including Carbohydrate Food) Based on Meal Plan Order. Give Meal Tray As Soon As Possible.    PATIENT HAS IV ACCESS - UNRESPONSIVE, NPO OR UNABLE TO SAFELY SWALLOW  Administer 25g (50ml) D50W IV Push.  Recheck Blood Glucose Approximately 15 Minutes After Administration, if Blood Glucose Remains Less Than 70, Repeat Treatment   Recheck Blood Glucose Approximately 15 Minutes After 2nd Administration, if Blood Glucose Remains Less Than 70 After 2nd Dose of D50W, Contact Provider for Further Treatment Orders & Consider Adding IVF With D5W for Maintenance    PATIENT WITHOUT IV ACCESS - UNRESPONSIVE, NPO OR UNABLE TO SAFELY SWALLOW  Administer 1mg Glucagon SQ & Establish IV Access.  Turn Patient on Side - Nausea / Vomiting May Occur.  Recheck Blood Glucose Approximately 15 Minutes After Administration.  If Blood Glucose Remains Less Than 70, Administer 25g D50W IV Push (50ml).  Recheck Blood Glucose Approximately 15 Minutes After Administration of D50W, if Blood Glucose Remains Less Than 70, Contact Provider for Further Treatment Orders & Consider Adding IVF With D5 for Maintenance    Document Event & Patient Response to Interventions in EMR, Document Medications on MAR  Notify Provider if Hypoglycemia Treatment Needed    03/08/21 0146    03/08/21 0146  dextrose (GLUTOSE) oral gel 15 g  Every 15 Minutes PRN      03/08/21 0146    03/08/21 0146  dextrose (D50W) 25 g/ 50mL Intravenous Solution 25 g  Every 15  Minutes PRN      03/08/21 0146    03/08/21 0146  glucagon (human recombinant) (GLUCAGEN DIAGNOSTIC) injection 1 mg  Every 15 Minutes PRN      03/08/21 0146    03/08/21 0135  POC Glucose Once  Once      03/08/21 0129    03/08/21 0130  sodium chloride 0.9 % bolus 1,000 mL  Once in Dialysis      03/08/21 0040    03/08/21 0105  POC Glucose Once  Once      03/08/21 0057    03/08/21 0045  sodium chloride 0.9 % flush 3 mL  Every 12 Hours Scheduled      03/07/21 2355    03/08/21 0039  Hemodialysis Inpatient  Once      03/08/21 0040    03/08/21 0020  Timed Lactic Acid, Reflex  PROCEDURE ONCE      03/07/21 2218    03/08/21 0000  Intake & Output  Every 6 Hours      03/07/21 2355    03/08/21 0000  Inpatient Case Management  Consult  Once     Provider:  (Not yet assigned)    03/08/21 0000    03/07/21 2356  Vital Signs  Per Hospital Policy      03/07/21 2355    03/07/21 2356  Weigh Patient  Once      03/07/21 2355    03/07/21 2356  Oxygen Therapy- Nasal Cannula; Titrate for SPO2: 90% - 95%  Continuous      03/07/21 2355    03/07/21 2356  Notify PCP of Patient Admission  Once      03/07/21 2355    03/07/21 2356  Insert Peripheral IV  Once      03/07/21 2355    03/07/21 2356  Saline Lock & Maintain IV Access  Continuous,   Status:  Canceled      03/07/21 2355    03/07/21 2356  Telemetry - Maintain IV Access  Continuous      03/07/21 2355    03/07/21 2356  Continuous Cardiac Monitoring  Continuous,   Status:  Canceled      03/07/21 2355    03/07/21 2356  May Be Off Telemetry for Tests  Continuous      03/07/21 2355    03/07/21 2356  ACLS Protocol For Life Threatening Dysrhythmias (Unless Code Status Indicates Otherwise)  Continuous      03/07/21 2355    03/07/21 2356  Notify Provider if ACLS Protocol Activated  Until Discontinued      03/07/21 2355    03/07/21 2356  Cardiac Monitoring  Continuous      03/07/21 2355    03/07/21 2356  Pulse Oximetry, Continuous  Continuous      03/07/21 2355    03/07/21 4322  Activity -  Strict Bed Rest  Until Discontinued      03/07/21 2355 03/07/21 2356  Daily Weights  Daily      03/07/21 2355 03/07/21 2356  Diet Regular; Cardiac, Consistent Carbohydrate, Renal  Diet Effective Now      03/07/21 2355 03/07/21 2355  sodium chloride 0.9 % flush 3-10 mL  As Needed      03/07/21 2355 03/07/21 2355  Pharmacy to Dose enoxaparin (LOVENOX)  Continuous PRN,   Status:  Discontinued      03/07/21 2355 03/07/21 2355  Telemetry - Pulse Oximetry  Continuous PRN,   Status:  Canceled     Comments: If Patient Develops Unresponsiveness, Acute Dyspnea, Cyanosis or Suspected Hypoxemia Start Continuous Pulse Ox Monitoring, Apply Oxygen & Notify Provider    03/07/21 2355 03/07/21 2355  nitroglycerin (NITROSTAT) SL tablet 0.4 mg  Every 5 Minutes PRN      03/07/21 2355 03/07/21 2327  LORazepam (ATIVAN) injection 0.5 mg  Once      03/07/21 2325 03/07/21 2311  insulin regular (humuLIN R,novoLIN R) injection 10 Units  Once      03/07/21 2309 03/07/21 2311  dextrose (D50W) 25 g/ 50mL Intravenous Solution 25 g  Once      03/07/21 2309 03/07/21 2304  Magnesium  STAT      03/07/21 2304 03/07/21 2250  Code Status and Medical Interventions:  Continuous      03/07/21 2252 03/07/21 2250  Inpatient Admission  Once      03/07/21 2252 03/07/21 2234  COVID-19 and FLU A/B PCR - Swab, Nasopharynx  Once      03/07/21 2233 03/07/21 2220  insulin regular (humuLIN R,novoLIN R) injection 10 Units  Once      03/07/21 2218 03/07/21 2220  dextrose (D50W) 25 g/ 50mL Intravenous Solution 25 g  Once      03/07/21 2218 03/07/21 2220  albuterol (PROVENTIL) nebulizer solution 0.083% 2.5 mg/3mL  Once      03/07/21 2218 03/07/21 2220  calcium gluconate 1g/50ml 0.675% NaCl IV SOLN  Once      03/07/21 2218 03/07/21 2134  Blood Gas, Arterial With Co-Ox  Once      03/07/21 2132 03/07/21 2114  morphine injection 2 mg  Once      03/07/21 2112 03/07/21 2114  ondansetron (ZOFRAN) injection 4 mg  Once       03/07/21 2112 03/07/21 2113  sodium chloride 0.9 % bolus 500 mL  Once      03/07/21 2111 03/07/21 2112  Protime-INR  Once      03/07/21 2111 03/07/21 2112  aPTT  Once      03/07/21 2111 03/07/21 2112  Urinalysis With Culture If Indicated - Urine, Catheter  Once      03/07/21 2111 03/07/21 2112  Blood Gas, Arterial -With Co-Ox Panel: Yes  Once      03/07/21 2111 03/07/21 2112  BNP  Once      03/07/21 2111 03/07/21 2112  Troponin  Now Then Every 2 Hours      03/07/21 2111 03/07/21 2112  Blood Culture - Blood, Arm, Right  Once      03/07/21 2111 03/07/21 2112  Blood Culture - Blood, Arm, Left  Once      03/07/21 2111 03/07/21 2112  Lactic Acid, Plasma  Once      03/07/21 2111 03/07/21 2112  C-reactive Protein  Once      03/07/21 2111 03/07/21 2112  Sedimentation Rate  Once      03/07/21 2111 03/07/21 2112  Urine Drug Screen - Urine, Clean Catch  Once      03/07/21 2111 03/07/21 2112  ECG 12 Lead  Once      03/07/21 2111 03/07/21 2112  XR Chest 1 View  1 Time Imaging      03/07/21 2111 03/07/21 2112  Cardiac Monitoring  Once,   Status:  Canceled      03/07/21 2111 03/07/21 2112  Insert peripheral IV  Once      03/07/21 2111 03/07/21 2111  sodium chloride 0.9 % flush 10 mL  As Needed      03/07/21 2111 03/07/21 2111  CBC & Differential  Once      03/07/21 2111 03/07/21 2111  Comprehensive Metabolic Panel  Once      03/07/21 2111 03/07/21 2111  CBC Auto Differential  PROCEDURE ONCE      03/07/21 2112    Unscheduled  Oxygen Therapy- Nasal Cannula; Titrate for SPO2: 90% - 95%  Continuous PRN     Comments: If Patient Develops Unresponsiveness, Acute Dyspnea, Cyanosis or Suspected Hypoxemia Start Continuous Pulse Ox Monitoring, Apply Oxygen & Notify Provider    03/07/21 2355    Unscheduled  ECG 12 Lead  As Needed     Comments: Nurse to Release if Patient Expericences Acute Chest Pain or Dysrhythmias    03/07/21 5454    Unscheduled  Potassium  As Needed      Comments: For Ventricular Arrhythmias      03/07/21 2355    Unscheduled  Magnesium  As Needed     Comments: For Ventricular Arrhythmias      03/07/21 2355    Unscheduled  Troponin  As Needed     Comments: For Chest Pain      03/07/21 2355    Unscheduled  Digoxin Level  As Needed     Comments: For Atrial Arrhythmias      03/07/21 2355    Unscheduled  Blood Gas, Arterial -With Co-Ox Panel: Yes  As Needed     Comments: Per O2 PolicyNotify Physician      03/07/21 2355    --  calcium acetate (PHOS BINDER,) 667 MG capsule capsule  3 Times Daily With Meals      03/07/21 2339    --  methocarbamol (ROBAXIN) 500 MG tablet  3 Times Daily PRN      03/07/21 2339    --  isosorbide mononitrate (IMDUR) 30 MG 24 hr tablet  Daily      03/07/21 2339    --  amLODIPine (NORVASC) 5 MG tablet  Nightly      03/07/21 2339    --  hydrALAZINE (APRESOLINE) 10 MG tablet  3 Times Daily      03/07/21 2339    --  carvedilol (COREG) 12.5 MG tablet  2 Times Daily With Meals      03/07/21 2339    Pending  calcium acetate (PHOS BINDER)) capsule 667 mg  3 Times Daily With Meals      Pending    Pending  methocarbamol (ROBAXIN) tablet 500 mg  3 Times Daily PRN      Pending    Pending  isosorbide mononitrate (IMDUR) 24 hr tablet 30 mg  Daily      Pending    Pending  amLODIPine (NORVASC) tablet 5 mg  Nightly      Pending    Pending  hydrALAZINE (APRESOLINE) tablet 10 mg  3 Times Daily      Pending    Pending  carvedilol (COREG) tablet 12.5 mg  2 Times Daily With Meals      Pending                Physician Progress Notes (last 24 hours) (Notes from 03/07/21 0845 through 03/08/21 0845)    No notes of this type exist for this encounter.         Consult Notes (last 24 hours) (Notes from 03/07/21 0845 through 03/08/21 0845)    No notes of this type exist for this encounter.

## 2021-03-08 NOTE — ED PROVIDER NOTES
Subjective   45-year-old male presents the emergency department with past medical history of end-stage renal disease, hepatitis B hepatitis C, history of IV drug abuse and an extensive history of being noncompliant with any of his medical treatments who presents the ER complaining of muscle spasm and weakness in his extremities patient was last dialyzed on 227 patient just says he cannot ever get to clinic to be dialyzed today his upper extremities were so stiff that he became concerned.  Patient also says he still does Suboxone IV once a month.      History provided by:  Patient  Illness  Location:  Noncompliant with medical treatment and severe comorbidities presents with weakness and most likely is uremic from not having dialysis  Severity:  Severe  Onset quality:  Gradual  Timing:  Constant  Progression:  Unchanged  Chronicity:  Recurrent  Context:  The HPI  Relieved by:  Nothing  Worsened by:  Nothing  Ineffective treatments:  None tried  Associated symptoms: fatigue and myalgias    Associated symptoms: no abdominal pain, no chest pain and no fever        Review of Systems   Constitutional: Positive for fatigue. Negative for fever.   HENT: Negative.    Respiratory: Negative.    Cardiovascular: Negative.  Negative for chest pain.   Gastrointestinal: Negative.  Negative for abdominal pain.   Endocrine: Negative.    Genitourinary: Negative.  Negative for dysuria.   Musculoskeletal: Positive for myalgias.   Skin: Negative.    Neurological: Negative.    Psychiatric/Behavioral: Negative.    All other systems reviewed and are negative.      Past Medical History:   Diagnosis Date   • Anemia due to chronic kidney disease    • COPD (chronic obstructive pulmonary disease) (CMS/HCC)    • COVID-19 1/25/2021   • Current smoker    • Endocarditis    • ESRD (end stage renal disease) (CMS/HCC)    • Hepatitis B surface antigen positive 12/9/2020   • Hepatitis C    • History of transfusion    • Hypertension    • IV drug abuse  (CMS/[facility])    • Medically noncompliant    • Mild atherosclerosis of carotid artery, right 2/15/2021   • Retroperitoneal lymphadenopathy 12/8/2020   • Unable to read or write        Allergies   Allergen Reactions   • Penicillins Shortness Of Breath, Itching and Rash       Past Surgical History:   Procedure Laterality Date   • CENTRAL VENOUS CATHETER TUNNELED INSERTION DOUBLE LUMEN Right     Chest for hemodialysis   • INSERTION HEMODIALYSIS CATHETER N/A 1/29/2021    Procedure: HEMODIALYSIS CATHETER INSERTION;  Surgeon: Khoa Carl MD;  Location: [facility];  Service: General;  Laterality: N/A;       Family History   Problem Relation Age of Onset   • Alcohol abuse Mother    • Hypertension Mother    • Hypertension Other    • Kidney disease Maternal Grandmother        Social History     Socioeconomic History   • Marital status:      Spouse name: Not on file   • Number of children: Not on file   • Years of education: Not on file   • Highest education level: Not on file   Tobacco Use   • Smoking status: Current Every Day Smoker     Packs/day: 0.50     Years: 20.00     Pack years: 10.00     Types: Cigarettes   • Smokeless tobacco: Never Used   Substance and Sexual Activity   • Alcohol use: Not Currently     Comment: Quit 18 years ago   • Drug use: Not Currently     Types: Methamphetamines, IV     Comment: last used on Friday 5/18/2019   • Sexual activity: Defer           Objective   Physical Exam  Vitals and nursing note reviewed.   Constitutional:       General: He is in acute distress.      Appearance: Normal appearance. He is ill-appearing and diaphoretic.   HENT:      Head: Normocephalic.      Right Ear: Tympanic membrane normal.      Nose: Nose normal.      Mouth/Throat:      Mouth: Mucous membranes are moist.   Eyes:      Extraocular Movements: Extraocular movements intact.      Pupils: Pupils are equal, round, and reactive to light.   Cardiovascular:      Rate and Rhythm: Regular rhythm. Tachycardia  present.      Pulses: Normal pulses.   Pulmonary:      Effort: Pulmonary effort is normal.   Abdominal:      General: Abdomen is flat.   Musculoskeletal:         General: Normal range of motion.      Cervical back: Normal range of motion.   Skin:     General: Skin is warm.      Capillary Refill: Capillary refill takes less than 2 seconds.   Neurological:      Mental Status: He is alert and oriented to person, place, and time.   Psychiatric:         Mood and Affect: Mood normal.         Behavior: Behavior normal.         Thought Content: Thought content normal.         Judgment: Judgment normal.         Procedures           ED Course  ED Course as of Mar 08 0419   Sun Mar 07, 2021   2215 EKG interpretation time is 2215 wide-complex tachycardia 101 bpm QRS duration is 132 QT is 422 QTC is 99 there is a prolonged QTC however there is also changes that are consistent with hyperkalemia.    []   2240 Discussed the case with Dr. Diallo agrees with treatment ; advises at 30-1 hour after initial treatment of insulin and D50 may repeat but call dialysis to get pt dialyzed tonight before going to floor    []      ED Course User Index  [] Marla Luna,                                            MDM  Number of Diagnoses or Management Options  History of noncompliance with medical treatment: new and requires workup  Hyperkalemia: new and requires workup     Amount and/or Complexity of Data Reviewed  Clinical lab tests: ordered and reviewed  Tests in the radiology section of CPT®: ordered and reviewed  Tests in the medicine section of CPT®: reviewed and ordered  Discuss the patient with other providers: yes  Independent visualization of images, tracings, or specimens: yes    Risk of Complications, Morbidity, and/or Mortality  Presenting problems: high  Diagnostic procedures: high  Management options: high    Patient Progress  Patient progress: (guarded)      Final diagnoses:   Hyperkalemia   History of  noncompliance with medical treatment            Marla Luna,   03/08/21 6334

## 2021-03-08 NOTE — PROGRESS NOTES
Pharmacy to Dose Lovenox    Will start patient on Lovenox 30 mg daily SQ for DVT prophylaxis and CrCl = 8 ml/min.    Thanks,  Cora Guillaume, PharmD

## 2021-03-08 NOTE — NURSING NOTE
Patient is refusing to allow us to straight cath him for a urine specimen despite educating him on the importance of it. He is alert & oriented x4. MD aware.     KALA Pacheco

## 2021-03-09 NOTE — THERAPY EVALUATION
Patient Name: Mario Nair  : 1975    MRN: [id number]                              Today's Date: 3/9/2021       Admit Date: 3/7/2021    Visit Dx:     ICD-10-CM ICD-9-CM   1. Hyperkalemia  E87.5 276.7   2. History of noncompliance with medical treatment  Z91.19 V15.81     Patient Active Problem List   Diagnosis   • ESRD (end stage renal disease) (CMS/HCC)   • Anemia due to chronic kidney disease   • Current smoker   • Bladder wall thickening   • Retroperitoneal lymphadenopathy   • Hepatitis C antibody test positive   • COPD mixed type (CMS/HCC)   • Hepatitis B surface antigen positive   • Urinary incontinence without sensory awareness   • Mild atherosclerosis of carotid artery, right   • Hyperkalemia     Past Medical History:   Diagnosis Date   • Anemia due to chronic kidney disease    • COPD (chronic obstructive pulmonary disease) (CMS/HCC)    • COVID-19 2021   • Current smoker    • Endocarditis    • ESRD (end stage renal disease) (CMS/HCC)    • Hepatitis B surface antigen positive 2020   • Hepatitis C    • History of transfusion    • Hypertension    • IV drug abuse (CMS/HCC)    • Medically noncompliant    • Mild atherosclerosis of carotid artery, right 2/15/2021   • Retroperitoneal lymphadenopathy 2020   • Unable to read or write      Past Surgical History:   Procedure Laterality Date   • CENTRAL VENOUS CATHETER TUNNELED INSERTION DOUBLE LUMEN Right     Chest for hemodialysis   • INSERTION HEMODIALYSIS CATHETER N/A 2021    Procedure: HEMODIALYSIS CATHETER INSERTION;  Surgeon: Khoa Carl MD;  Location: [facility];  Service: General;  Laterality: N/A;     General Information     Row Name 21 1353          OT Time and Intention    Document Type  evaluation  -LM     Mode of Treatment  occupational therapy  -LM     Row Name 21 1353          General Information    Patient Profile Reviewed  yes  -LM     Prior Level of Function  independent:;all household  mobility;transfer;ADL's  -LM     Existing Precautions/Restrictions  fall  -LM     Row Name 03/09/21 [facility]3          Living Environment    Lives With  other relative(s)  -     Row Name 03/09/21 [facility]3          Cognition    Orientation Status (Cognition)  oriented x 3  -     Row Name 03/09/21 [facility]3          Safety Issues, Functional Mobility    Safety Issues Affecting Function (Mobility)  awareness of need for assistance;problem-solving;judgment;insight into deficits/self-awareness  -     Impairments Affecting Function (Mobility)  balance;coordination;endurance/activity tolerance;motor control;range of motion (ROM);strength  -       User Key  (r) = Recorded By, (t) = Taken By, (c) = Cosigned By    Initials Name Provider Type     Tamela Arndt, OT Occupational Therapist          Mobility/ADL's     [facility] Name 03/09/21 [facility]5          Activities of Daily Living    BADL Assessment/Intervention  bathing;upper body dressing;lower body dressing;grooming;feeding;toileting  -[facility] Name 03/09/21 [facility]5          Bathing Assessment/Intervention    [place] Level (Bathing)  dependent (less than 25% patient effort)  -[facility] Name 03/09/21 [facility]          Upper Body Dressing Assessment/Training    [place] Level (Upper Body Dressing)  dependent (less than 25% patient effort)  -     Row Name 03/09/21 [facility]          Lower Body Dressing Assessment/Training    [place] Level (Lower Body Dressing)  dependent (less than 25% patient effort)  -[facility] Name 03/09/21 [facility]          Grooming Assessment/Training    [place] Level (Grooming)  dependent (less than 25% patient effort)  -[facility] Name 03/09/21 [facility]          Self-Feeding Assessment/Training    [place] Level (Feeding)  dependent (less than 25% patient effort)  -     Row Name 03/09/21 [facility]          Toileting Assessment/Training    [place] Level (Toileting)  dependent (less than 25% patient effort)  -       User Key  (r) = Recorded By, (t) =  Taken By, (c) = Cosigned By    Initials Name Provider Type    LM Tamela Arndt OT Occupational Therapist        Obj/Interventions     Row Name 03/09/21 1355          Sensory Assessment (Somatosensory)    Sensory Assessment (Somatosensory)  UE sensation intact  -     Row Name 03/09/21 [facility]5          Vision Assessment/Intervention    Visual Impairment/Limitations  WNL  -LM     Row Name 03/09/21 [facility]5          Range of Motion Comprehensive    General Range of Motion  upper extremity range of motion deficits identified  -LM     Comment, General Range of Motion  demonstrates only trace bilateral elbow flex/ext and , otherwise zero arom  -LM     Row Name 03/09/21 [facility]          Strength Comprehensive (MMT)    General Manual Muscle Testing (MMT) Assessment  upper extremity strength deficits identified  -LM     Comment, General Manual Muscle Testing (MMT) Assessment  bilateral elbow 1/5,  2/5, otherwise 0/5  -LM       User Key  (r) = Recorded By, (t) = Taken By, (c) = Cosigned By    Initials Name Provider Type    LM Tamela Arndt OT Occupational Therapist        Goals/Plan     [facility] Name 03/09/21 [facility]          Transfer Goal 1 (OT)    Activity/Assistive Device (Transfer Goal 1, OT)  transfers, all;commode, 3-in-1  -LM     [place] Level/Cues Needed (Transfer Goal 1, OT)  minimum assist (75% or more patient effort)  -LM     Time Frame (Transfer Goal 1, OT)  by discharge  -[facility] Name 03/09/21 [facility]8          Dressing Goal 1 (OT)    Activity/Device (Dressing Goal 1, OT)  upper body dressing  -LM     [place]/Cues Needed (Dressing Goal 1, OT)  supervision required;minimum assist (75% or more patient effort)  -LM     Time Frame (Dressing Goal 1, OT)  by discharge  -     Row Name 03/09/21 [facility]8          Therapy Assessment/Plan (OT)    Planned Therapy Interventions (OT)  activity tolerance training;adaptive equipment training;BADL retraining;functional balance retraining;ROM/therapeutic  exercise;transfer/mobility retraining;strengthening exercise  -       User Key  (r) = Recorded By, (t) = Taken By, (c) = Cosigned By    Initials Name Provider Type    Tamela Sloan, OT Occupational Therapist        Clinical Impression     Row Name 03/09/21 1350          Plan of Care Review    Plan of Care Reviewed With  patient  -LM     Row Name 03/09/21 1357          Therapy Assessment/Plan (OT)    Patient/Family Therapy Goal Statement (OT)  i just want to go home again  -LM     Rehab Potential (OT)  fair, will monitor progress closely  -LM     Criteria for Skilled Therapeutic Interventions Met (OT)  yes;meets criteria;skilled treatment is necessary  -     Row Name 03/09/21 1357          Positioning and Restraints    Pre-Treatment Position  in bed  -LM     Post Treatment Position  bed  -LM     In Bed  encouraged to call for assist;call light within reach;notified nsg  -       User Key  (r) = Recorded By, (t) = Taken By, (c) = Cosigned By    Initials Name Provider Type    Tamela Sloan, OT Occupational Therapist        Outcome Measures    No documentation.         OT Recommendation and Plan  Planned Therapy Interventions (OT): activity tolerance training, adaptive equipment training, BADL retraining, functional balance retraining, ROM/therapeutic exercise, transfer/mobility retraining, strengthening exercise  Plan of Care Review  Plan of Care Reviewed With: patient     Time Calculation:     Therapy Charges for Today     Code Description Service Date Service Provider Modifiers Qty    [id number]  OT EVAL MOD COMPLEXITY 4 3/9/2021 Tamela Arndt OT GO 1               Tamela Arndt OT  3/9/2021

## 2021-03-09 NOTE — NURSING NOTE
I contacted the blood bank to see if blood is ready for patients transfusion. Blood bank stated that they were working on getting it ready and would notify me when it is finished.      KALA Pacheco

## 2021-03-09 NOTE — NURSING NOTE
Pt refused straight cath during shift. States that he is unable to call out when he needs to urinate. Still not able to obtain urine specimen.

## 2021-03-09 NOTE — DISCHARGE PLACEMENT REQUEST
"Mario Nair (45 y.o. Male)     Date of Birth Social Security Number Address Home Phone MRN    1975  [address] [place] [id number] [phone] [id number]    Orthodoxy Marital Status          None        Admission Date Admission Type Admitting Provider Attending Provider Department, Room/Bed    3/7/21 Emergency Opal Ocampo MD Cagle, William, DO [facility] CARE, P206/1P    Discharge Date Discharge Disposition Discharge Destination                       Attending Provider: Mark Tyson DO    Allergies: Penicillins    Isolation: None   Infection: Hepatitis B (06/05/19), COVID (History) (02/19/21)   Code Status: CPR    Ht: 188 cm (74\")   Wt: 72.7 kg (160 lb 3.2 oz)    Admission Cmt: None   Principal Problem: Hyperkalemia [E87.5]                 Active Insurance as of 3/7/2021     Primary Coverage     Payor Plan Insurance Group Employer/Plan Group    [employer] [place] [employer] [place]ICA[place]      Payor Plan Address Payor Plan Phone Number Payor Plan Fax Number Effective Dates    [address] [address] [phone]  5/5/2019 - None Entered    [facility] [id number]       Subscriber Name Subscriber Birth Date Member ID       NAIR,MARIO SOTO 1975 [id number]                 Emergency Contacts      (Rel.) Home Phone Work Phone Mobile Phone    SHANI SILVA (Relative) [phone] -- --    Constance Nair (Daughter) -- -- [phone]            Emergency Contact Information     Name Relation Home Work Mobile    SHANI SILVA Relative [phone]      Constance Nair Daughter   [phone]          Insurance Information                [facility] MEDICAID Phone: [phone]    Subscriber: Mario Nair Subscriber#: [id number]    Group#:  Precert#:           Treatment Team  Chat With All Active Members    Provider Relationship Specialty Contact    Mark Tyson DO  Attending --  [phone]    Wendy Otto, PCT  Patient Care Technician --     " "Ana Soto, RN  Registered Nurse --     Christo Guillaume, KILEY  Respiratory Therapist --  [phone]    Taniya Corado MD  Consulting Physician Nephrology  [phone]    Edel Nevarez, DESTINY  Physical Therapist Physical Therapy           Problem List         Codes Noted - Resolved       Hospital    * (Principal) Hyperkalemia ICD-10-CM: E87.5  ICD-9-CM: 276.7 3/7/2021 - Present       Non-Hospital    Urinary incontinence without sensory awareness ICD-10-CM: N39.42  ICD-9-CM: 788.34 2/15/2021 - Present    Mild atherosclerosis of carotid artery, right ICD-10-CM: I65.21  ICD-9-CM: 433.10 2/15/2021 - Present    Hepatitis C antibody test positive ICD-10-CM: R76.8  ICD-9-CM: 795.79 2020 - Present    COPD mixed type (CMS/HCC) ICD-10-CM: J44.9  ICD-9-CM: 496 2020 - Present    Hepatitis B surface antigen positive ICD-10-CM: R76.8  ICD-9-CM: 795.79 2020 - Present    Bladder wall thickening ICD-10-CM: N32.89  ICD-9-CM: 596.89 2020 - Present    Retroperitoneal lymphadenopathy ICD-10-CM: R59.0  ICD-9-CM: 785.6 2020 - Present    Anemia due to chronic kidney disease ICD-10-CM: N18.9, D63.1  ICD-9-CM: 285.21 Unknown - Present    Current smoker ICD-10-CM: F17.200  ICD-9-CM: 305.1 Unknown - Present    ESRD (end stage renal disease) (CMS/HCC) ICD-10-CM: N18.6  ICD-9-CM: 585.6 2019 - Present             History & Physical      Charlie Conti MD at 21 0142          Hospitalist History and Physical        Patient Identification  Name: Mario Nair  Age/Sex: 45 y.o. male  :  1975        MRN: [id number]  Visit Number: [id number]  Admit Date: 3/7/2021   PCP: Danny Rebolledo MD          Chief complaint \"no energy\", needs dialysis    History of Present Illness:  Patient is a 45 y.o. male with history of ESRD, anemia of chronic disease, endocarditis, Hep B and C, HTN, IV drug abuse, and medical noncompliance, who presents with complaints of worsening fatigue and generalized " weakness of several days duration. He was last hospitalized from 2/25-2/27 for severe hyperkalemia secondary to noncompliance, as he never stays in the hospital long enough for case management to arrange a seat for him in a local dialysis clinic. Once again he left AMA on the 27th after having 2 round of inpatient hemodialysis. He states he has not had dialysis since that time. He was critically hyperkalemic upon arrival to the ED this evening with K+ of 8.1. Creatinine was 11.65 with . BNP and troponin were both elevated, with troponin within same range it has been chronically on previous admissions. He denies any chest pain or dyspnea. ABG showed metabolic acidosis with bicarb of 9 and pH 7.273. CRP was 13, WBC was 12, lactate 2.1. Chest XR showed no acute cardiopulmonary findings. Patient has been admitted for emergent hemodialysis per nephrology recommendations. Very little information can be obtained from the patient at this time as he is lethargic and frequently falls asleep during conversation. He is also disoriented to time and president.     Review of Systems  Review of Systems   Unable to perform ROS: Mental status change       History  Past Medical History:   Diagnosis Date   • Anemia due to chronic kidney disease    • COPD (chronic obstructive pulmonary disease) (CMS/HCC)    • COVID-19 1/25/2021   • Current smoker    • Endocarditis    • ESRD (end stage renal disease) (CMS/HCC)    • Hepatitis B surface antigen positive 12/9/2020   • Hepatitis C    • History of transfusion    • Hypertension    • IV drug abuse (CMS/HCC)    • Medically noncompliant    • Mild atherosclerosis of carotid artery, right 2/15/2021   • Retroperitoneal lymphadenopathy 12/8/2020   • Unable to read or write      Past Surgical History:   Procedure Laterality Date   • CENTRAL VENOUS CATHETER TUNNELED INSERTION DOUBLE LUMEN Right     Chest for hemodialysis   • INSERTION HEMODIALYSIS CATHETER N/A 1/29/2021    Procedure:  HEMODIALYSIS CATHETER INSERTION;  Surgeon: Khoa Carl MD;  Location: [facility];  Service: General;  Laterality: N/A;     Family History   Problem Relation Age of Onset   • Alcohol abuse Mother    • Hypertension Mother    • Hypertension Other    • Kidney disease Maternal Grandmother      Social History     Tobacco Use   • Smoking status: Current Every Day Smoker     Packs/day: 0.50     Years: 20.00     Pack years: 10.00     Types: Cigarettes   • Smokeless tobacco: Never Used   Substance Use Topics   • Alcohol use: Not Currently     Comment: Quit 18 years ago   • Drug use: Not Currently     Types: Methamphetamines, IV     Comment: last used on Friday 5/18/2019     Medications Prior to Admission   Medication Sig Dispense Refill Last Dose   • amLODIPine (NORVASC) 5 MG tablet Take 5 mg by mouth Every Night.   3/6/2021 at Unknown time   • calcium acetate (PHOS BINDER,) 667 MG capsule capsule Take 667 mg by mouth 3 (Three) Times a Day With Meals.   3/6/2021 at Unknown time   • carvedilol (COREG) 12.5 MG tablet Take 12.5 mg by mouth 2 (Two) Times a Day With Meals.   3/6/2021 at Unknown time   • hydrALAZINE (APRESOLINE) 10 MG tablet Take 10 mg by mouth 3 (Three) Times a Day.   3/6/2021 at Unknown time   • isosorbide mononitrate (IMDUR) 30 MG 24 hr tablet Take 30 mg by mouth Daily.   3/6/2021 at Unknown time   • methocarbamol (ROBAXIN) 500 MG tablet Take 500 mg by mouth 3 (Three) Times a Day As Needed for Muscle Spasms.   Unknown at Unknown time     Allergies:  Penicillins    Objective     Vital Signs  Temp:  [98.5 °F (36.9 °C)] 98.5 °F (36.9 °C)  Heart Rate:  [] 129  Resp:  [20-28] 28  BP: (180-190)/(102-113) 187/113  Body mass index is 20.54 kg/m².    Physical Exam:  Physical Exam  Constitutional:       Appearance: He is ill-appearing and diaphoretic.      Comments: Lethargic, frequently falls asleep during interview   HENT:      Head: Normocephalic and atraumatic.      Right Ear: External ear normal.       Left Ear: External ear normal.      Mouth/Throat:      Mouth: Mucous membranes are dry.      Pharynx: Oropharynx is clear.   Eyes:      Extraocular Movements: Extraocular movements intact.      Conjunctiva/sclera: Conjunctivae normal.      Pupils: Pupils are equal, round, and reactive to light.   Cardiovascular:      Pulses: Normal pulses.      Heart sounds: Friction rub present.      Comments: Tachycardic, regular rhythm  Pulmonary:      Effort: Pulmonary effort is normal.      Breath sounds: Normal breath sounds. No wheezing or rales.   Abdominal:      General: Abdomen is flat. Bowel sounds are normal. There is distension (mild).      Palpations: Abdomen is soft.      Tenderness: There is no abdominal tenderness.   Musculoskeletal:         General: No swelling. Normal range of motion.      Cervical back: Normal range of motion and neck supple. No rigidity or tenderness.      Right lower leg: No edema.      Left lower leg: No edema.   Lymphadenopathy:      Cervical: No cervical adenopathy.   Skin:     General: Skin is warm.      Coloration: Skin is pale. Skin is not jaundiced.      Comments: Scattered scabs on both arms and to lesser extent both legs. HD catheter left upper chest wall.    Neurological:      Comments: Lethargic, falls asleep frequently during interview, oriented to self and place but not time or president. No focal deficits appreciated.    Psychiatric:      Comments: Unable to assess due to lethargy           Results Review:       Lab Results:  Results from last 7 days   Lab Units 03/07/21 2120   WBC 10*3/mm3 12.90*   HEMOGLOBIN g/dL 8.6*   PLATELETS 10*3/mm3 170     Results from last 7 days   Lab Units 03/07/21 2120   CRP mg/dL 13.61*     Results from last 7 days   Lab Units 03/07/21 2120   SODIUM mmol/L 133*   POTASSIUM mmol/L 8.1*   CHLORIDE mmol/L 93*   CO2 mmol/L 9.5*   BUN mg/dL 163*   CREATININE mg/dL 11.65*   CALCIUM mg/dL 6.0*   GLUCOSE mg/dL 153*     Results from last 7 days   Lab Units  03/07/21 2120   MAGNESIUM mg/dL 2.3     No results found for: HGBA1C  Results from last 7 days   Lab Units 03/07/21 2120   BILIRUBIN mg/dL 0.5   ALK PHOS U/L 127*   AST (SGOT) U/L 17   ALT (SGPT) U/L 19     Results from last 7 days   Lab Units 03/07/21  2324 03/07/21 2120   TROPONIN T ng/mL 0.130* 0.124*         Results from last 7 days   Lab Units 03/07/21 2120   INR  1.32*     Results from last 7 days   Lab Units 03/07/21 2132   PH, ARTERIAL pH units 7.273*   PO2 ART mm Hg 106.0   PCO2, ARTERIAL mm Hg 19.8*   HCO3 ART mmol/L 9.1*       I have reviewed the patient's laboratory results.    Imaging:  Imaging Results (Last 72 Hours)     Procedure Component Value Units Date/Time    XR Chest 1 View [[id number]] Collected: 03/07/21 2203     Updated: 03/07/21 2205    Narrative:      CR Chest 1 Vw    INDICATION:   Sepsis     COMPARISON:    1/29/2020    FINDINGS:  Single portable AP view(s) of the chest.  Left IJ permacath terminates at the level of the cavoatrial junction and right atrium. Normal cardiomediastinal contours. No confluent infiltrate the lungs. No pleural effusion. No pneumothorax.      Impression:      No acute cardiopulmonary findings.    Signer Name: DARYL CALLEJAS MD   Signed: 3/7/2021 10:03 PM   Workstation [facility]: [facility]    [facility]          I have personally reviewed the patient's radiologic imaging.        EKG: Wide complex tachycardia, , QTc 599. Nonspecific intraventricular block present on prior EKG's.     I have personally reviewed the patient's EKG.        Assessment/Plan     - Critical hyperkalemia: secondary to noncompliance, has still not established a seat at local dialysis clinic, always leaves AMA before /case management can help arrange. Received medical treatment for hyperkalemia in the ED, including D5/insulin, calcium gluconate. Now undergoing emergent HD at time of my assessment. Appreciate nephrology assistance. Repeat CMP after  dialysis has been completed.   - Uremia with encephalopathy and friction rub: secondary to noncompliance as well. Anticipate will improve with dialysis. Given history of endocarditis, will evaluate further with ECHO in the morning.   - SIRS, with tachycardia, leukocytosis, CRP elevation and lactic acidosis: suspect most likely reactive from metabolic derangements secondary to untreated ESRD, but given history of endocarditis will evaluate further with ECHO in the morning. Lactate already normalized on reflex draw. Continue to trend WBC, CRP. Urinalysis still pending.   - Troponin elevation: chronic and fairly flat trend thus far. No chest pain. EKG shows wide complex tachycardia secondary to severe hyperkalemia. Will repeat EKG in the morning after dialysis has been completed to re-evaluate. Repeat troponin later this morning.  - Hypoglycemia: likely iatrogenic from D5/insulin given in the ED for hyperkalemia. On hypoglycemia protocol now.   - Hypertension: anticipate will improve with HD. IV metoprolol ordered PRN.   - History IV drug abuse: UDS pending    DVT Prophylaxis: subcutaneous lovenox    Estimated Length of Stay >2 midnights    I discussed the patient's findings, assessment and plan with the patient and nursing staff in the PCU.    * patient is high risk due to critical hyperkalemia, uremia, ESRD with medical noncompliance with hemodialysis, acute encephalopathy, cardiac rhythm changes secondary to hyperkalemia    Charlie Conti MD  03/08/21  01:42 EST      Electronically signed by Charlie Conti MD at 03/08/21 0211       Prior to Admission Medications     Prescriptions Last Dose Informant Patient Reported? Taking?    amLODIPine (NORVASC) 5 MG tablet 3/6/2021 Pharmacy Yes Yes    Take 5 mg by mouth Every Night.    calcium acetate (PHOS BINDER,) 667 MG capsule capsule 3/6/2021 Pharmacy Yes Yes    Take 667 mg by mouth 3 (Three) Times a Day With Meals.    carvedilol (COREG) 12.5 MG tablet  3/6/2021 Pharmacy Yes Yes    Take 12.5 mg by mouth 2 (Two) Times a Day With Meals.    hydrALAZINE (APRESOLINE) 10 MG tablet 3/6/2021 Pharmacy Yes Yes    Take 10 mg by mouth 3 (Three) Times a Day.    isosorbide mononitrate (IMDUR) 30 MG 24 hr tablet 3/6/2021 Pharmacy Yes Yes    Take 30 mg by mouth Daily.    lidocaine (LIDODERM) 5 % Past Week Pharmacy Yes Yes    Place 1 patch on the skin as directed by provider Daily. Prior to [facility] Admission, Patient was on: applies to back    pantoprazole (PROTONIX) 40 MG EC tablet 3/6/2021 Pharmacy Yes Yes    Take 40 mg by mouth Daily.    methocarbamol (ROBAXIN) 500 MG tablet Unknown Pharmacy Yes No    Take 500 mg by mouth 3 (Three) Times a Day As Needed for Muscle Spasms.            Lab Results (last 24 hours)     Procedure Component Value Units Date/Time    POC Glucose Once [[id number]]  (Normal) Collected: 03/09/21 0637    Specimen: Blood Updated: 03/09/21 0643     Glucose 117 mg/dL     Comprehensive Metabolic Panel [[id number]]  (Abnormal) Collected: 03/09/21 0034    Specimen: Blood Updated: 03/09/21 0134     Glucose 115 mg/dL      BUN 69 mg/dL      Creatinine 6.90 mg/dL      Sodium 134 mmol/L      Potassium 4.8 mmol/L      Chloride 93 mmol/L      CO2 18.6 mmol/L      Calcium 6.2 mg/dL      Total Protein 6.7 g/dL      Albumin 2.42 g/dL      ALT (SGPT) 15 U/L      AST (SGOT) 19 U/L      Alkaline Phosphatase 118 U/L      Total Bilirubin 0.3 mg/dL      eGFR Non African Amer 9 mL/min/1.73      Comment: <15 Indicative of kidney failure.        eGFR   Amer --     Comment: <15 Indicative of kidney failure.        Globulin 4.3 gm/dL      A/G Ratio 0.6 g/dL      BUN/Creatinine Ratio 10.0     Anion Gap 22.4 mmol/L     Narrative:      GFR Normal >60  Chronic Kidney Disease <60  Kidney Failure <15      Phosphorus [[id number]]  (Abnormal) Collected: 03/09/21 0034    Specimen: Blood Updated: 03/09/21 0128     Phosphorus 8.2 mg/dL     Magnesium [[id number]]  (Normal) Collected: 03/09/21  0034    Specimen: Blood Updated: 03/09/21 0128     Magnesium 1.8 mg/dL     CBC & Differential [[id number]]  (Abnormal) Collected: 03/09/21 0034    Specimen: Blood Updated: 03/09/21 0105    Narrative:      The following orders were created for panel order CBC & Differential.  Procedure                               Abnormality         Status                     ---------                               -----------         ------                     CBC Auto Differential[[id number]]        Abnormal            Final result                 Please view results for these tests on the individual orders.    CBC Auto Differential [[id number]]  (Abnormal) Collected: 03/09/21 0034    Specimen: Blood Updated: 03/09/21 0105     WBC 7.31 10*3/mm3      RBC 2.31 10*6/mm3      Hemoglobin 6.7 g/dL      Hematocrit 21.1 %      MCV 91.3 fL      MCH 29.0 pg      MCHC 31.8 g/dL      RDW 16.8 %      RDW-SD 56.3 fl      MPV 9.7 fL      Platelets 132 10*3/mm3      Neutrophil % 72.4 %      Lymphocyte % 15.3 %      Monocyte % 11.1 %      Eosinophil % 0.5 %      Basophil % 0.4 %      Immature Grans % 0.3 %      Neutrophils, Absolute 5.29 10*3/mm3      Lymphocytes, Absolute 1.12 10*3/mm3      Monocytes, Absolute 0.81 10*3/mm3      Eosinophils, Absolute 0.04 10*3/mm3      Basophils, Absolute 0.03 10*3/mm3      Immature Grans, Absolute 0.02 10*3/mm3      nRBC 0.0 /100 WBC     Blood Culture - Blood, Arm, Right [[id number]] Collected: 03/07/21 2125    Specimen: Blood from Arm, Right Updated: 03/08/21 2145     Blood Culture No growth at 24 hours    Blood Culture - Blood, Arm, Left [[id number]] Collected: 03/07/21 2120    Specimen: Blood from Arm, Left Updated: 03/08/21 2145     Blood Culture No growth at 24 hours    POC Glucose Once [[id number]]  (Abnormal) Collected: 03/08/21 2101    Specimen: Blood Updated: 03/08/21 2108     Glucose 154 mg/dL     POC Glucose Once [[id number]]  (Normal) Collected: 03/08/21 1619    Specimen: Blood Updated: 03/08/21 1630      Glucose 125 mg/dL         Referral Orders (last 24 hours) (24h ago, onward)    None        Consult Orders (last 24 hours) (24h ago, onward)    None           Physician Progress Notes (last 24 hours) (Notes from 03/08/21 1115 through 03/09/21 1115)      Mark yTson DO at 03/08/21 8282        Hyperkalemia  End-stage renal disease noncompliant with dialysis  Medical nonadherence  Metabolic acidosis  Lactic acidemia  Metabolic encephalopathy secondary to uremia  Systemic inflammatory response syndrome  Troponin elevation  Hypoglycemia  Hypertension  Chronic hepatitis C  History of IV drug abuse  History of endocarditis    Patient admitted this morning with profound hyperkalemia at 8.1 as well as significant elevations in both creatinine and BUN with noted encephalopathy likely uremic in nature.  Patient also with a wide-complex tachycardia at presentation likely sign of impending sine wave formation due to patient's hyperkalemia.  Patient with expected systemic inflammatory response syndrome present at presentation.  White count mildly elevated at 12.9, chest x-ray without any significant acute abnormalities.  Admitted to the hospital this morning by overnight physician received dialysis and already improvement in both potassium and other electrolytes as well as overall chemistry panel evaluation.  Complains of pain all over but denies any chest pain.  Patient still with tachycardia but improved from admission, will restart patient's home Coreg 12.5 twice daily however will hold on other antihypertensives at this time given patient's normotension at this time.  Patient provided with lidocaine patch and Robaxin as at home.  Transthoracic echocardiogram obtained earlier this morning currently pending official read.    Electronically signed by Mark Tyson DO at 03/08/21 0100

## 2021-03-09 NOTE — PLAN OF CARE
Goal Outcome Evaluation:  Plan of Care Reviewed With: patient  Progress: no change  Outcome Summary: VSS and afebrile. Continues on RA and tolerating well. Complains of pain and MD notified. Call light witin reach. Bed alarm set.

## 2021-03-09 NOTE — NURSING NOTE
I contacted the blood bank regarding the patient still needing a transfusion. The blood bank stated that they are still waiting for more antibody testing to be performed in [place]. Waiting for results currently. They stated they would update us as they know more information. MD notified of current status regarding blood.     KALA Pacheco

## 2021-03-09 NOTE — THERAPY EVALUATION
Acute Care - Physical Therapy Initial Evaluation   Reza     Patient Name: Mario Nair  : 1975  MRN: [id number]  Today's Date: 3/9/2021   Onset of Illness/Injury or Date of Surgery: 21 (admit date)       PT Assessment (last 12 hours)      PT Evaluation and Treatment     Row Name 21 1501          Physical Therapy Time and Intention    Subjective Information  complains of;weakness;fatigue  -CT     Document Type  evaluation  -CT     Mode of Treatment  physical therapy  -CT     Patient Effort  adequate  -CT     Symptoms Noted During/After Treatment  fatigue  -CT     Comment  Pt tolerated evaluation fairly well. Pt reports he was independent PLOF and is now unable to move his BUE (see OT eval). Pt with movement in BLE. Pt is also max A x 2 for sitting balance.   -CT     Row Name 21 1501          General Information    Patient Profile Reviewed  yes  -CT     Onset of Illness/Injury or Date of Surgery  21 admit date  -CT     Referring Physician  [place]  -CT     Patient Observations  alert;cooperative;agree to therapy  -CT     Prior Level of Function  independent:;all household mobility;community mobility  -CT     Equipment Currently Used at Home  none  -CT     Existing Precautions/Restrictions  fall  -CT     Limitations/Impairments  safety/cognitive  -CT     Equipment Issued to Patient  gait belt  -CT     Risks Reviewed  patient:;LOB;nausea/vomiting;dizziness;increased discomfort;change in vital signs;increased drainage;lines disloged  -CT     Benefits Reviewed  patient:;improve function;increase independence;increase strength;increase balance;decrease pain;decrease risk of DVT;improve skin integrity;increase knowledge  -CT     Barriers to Rehab  medically complex;physical barrier  -CT     Row Name 21 1501          Previous Level of Function/Home Environm    Household Ambulation, Premorbid Functional Level  independent  -CT     Community Ambulation, Premorbid Functional Level   independent  -CT     Row Name 03/09/21 1501          Living Environment    Current Living Arrangements  home/apartment/condo  -CT     Lives With  child(tammy), adult  -CT     Row Name 03/09/21 1501          Cognition    Affect/Mental Status (Cognitive)  WFL  -CT     Orientation Status (Cognition)  oriented x 3  -CT     Follows Commands (Cognition)  follows one-step commands  -CT     Row Name 03/09/21 1501          Pain Scale: Word Pre/Post-Treatment    Pain: Word Scale, Pretreatment  4 - moderate pain  -CT     Posttreatment Pain Rating  6 - moderate-severe pain  -CT     Pain Location - Orientation  generalized  -CT     Row Name 03/09/21 1501          Range of Motion Comprehensive    Comment, General Range of Motion  BLE grossly WFL  -CT     Row Name 03/09/21 1501          Strength Comprehensive (MMT)    Comment, General Manual Muscle Testing (MMT) Assessment  BLE grossly 3/5 no resistance given  -CT     Row Name 03/09/21 1501          Bed Mobility    Bed Mobility  bed mobility (all) activities  -CT     All Activities, [place] (Bed Mobility)  dependent (less than 25% patient effort);2 person assist  -CT     Bed Mobility, Safety Issues  decreased use of arms for pushing/pulling;decreased use of legs for bridging/pushing  -CT     Assistive Device (Bed Mobility)  draw sheet;head of bed elevated  -CT     Row Name 03/09/21 1501          Transfers    Comment (Transfers)  unsafe to attempt  -CT     Row Name 03/09/21 1501          Gait/Stairs (Locomotion)    Comment (Gait/Stairs)  unable to assess  -CT     Row Name 03/09/21 1501          Safety Issues, Functional Mobility    Safety Issues Affecting Function (Mobility)  awareness of need for assistance;problem-solving;judgment;insight into deficits/self-awareness  -CT     Impairments Affecting Function (Mobility)  balance;coordination;endurance/activity tolerance;motor control;range of motion (ROM);strength  -CT     Row Name 03/09/21 1501          Balance    Balance  Assessment  sitting static balance  -CT     Static Sitting Balance  severe impairment Max A x 2  -CT     Row Name             Wound 03/08/21 0915 Right anterior foot Abrasion    Wound - Properties Group Placement Date: 03/08/21  -AM Placement Time: 0915  -AM Present on Hospital Admission: Y  -AM Side: Right  -AM Orientation: anterior  -AM Location: foot  -AM Primary Wound Type: Abrasion  -AM    Retired Wound - Properties Group Date first assessed: 03/08/21  -AM Time first assessed: 0915  -AM Present on Hospital Admission: Y  -AM Side: Right  -AM Location: foot  -AM Primary Wound Type: Abrasion  -AM    Row Name             Wound 01/29/21 0939 Left throat Incision    Wound - Properties Group Placement Date: 01/29/21  -CW Placement Time: 0939  -CW Present on Hospital Admission: N  -CW Side: Left  -CW Location: throat  -CW Primary Wound Type: Incision  -CW    Retired Wound - Properties Group Date first assessed: 01/29/21  -CW Time first assessed: 0939  -CW Present on Hospital Admission: N  -CW Side: Left  -CW Location: throat  -CW Primary Wound Type: Incision  -CW    Row Name 03/09/21 1501          Coping    Observed Emotional State  agitated;cooperative  -CT     Row Name 03/09/21 1501          Plan of Care Review    Plan of Care Reviewed With  patient  -CT     Row Name 03/09/21 1501          Physical Therapy Goals    Bed Mobility Goal Selection (PT)  bed mobility, PT goal 1  -CT     Transfer Goal Selection (PT)  transfer, PT goal 1  -CT     Row Name 03/09/21 1501          Bed Mobility Goal 1 (PT)    Activity/Assistive Device (Bed Mobility Goal 1, PT)  bed mobility activities, all  -CT     [place] Level/Cues Needed (Bed Mobility Goal 1, PT)  moderate assist (50-74% patient effort)  -CT     Time Frame (Bed Mobility Goal 1, PT)  by discharge  -CT     Row Name 03/09/21 1501          Transfer Goal 1 (PT)    Activity/Assistive Device (Transfer Goal 1, PT)  sit-to-stand/stand-to-sit;bed-to-chair/chair-to-bed  -CT      "[place] Level/Cues Needed (Transfer Goal 1, PT)  maximum assist (25-49% patient effort);2 person assist  -CT     Time Frame (Transfer Goal 1, PT)  by discharge  -CT     Row Name 03/09/21 1501          Positioning and Restraints    Pre-Treatment Position  in bed  -CT     Post Treatment Position  bed  -CT     In Bed  call light within reach;encouraged to call for assist;exit alarm on;notified nsg;side rails up x3  -CT     Row Name 03/09/21 1501          Therapy Assessment/Plan (PT)    Patient/Family Therapy Goals Statement (PT)  Pt  goals are to \"get back to normal\"  -CT     Functional Level at Time of Evaluation (PT)  Max/Dep x 2  -CT     PT Diagnosis (PT)  impaired functional mobility  -CT     Rehab Potential (PT)  fair, will monitor progress closely  -CT     Criteria for Skilled Interventions Met (PT)  yes;skilled treatment is necessary  -CT     Predicted Duration of Therapy Intervention (PT)  length of stay  -CT     Row Name 03/09/21 1501          Therapy Plan Review/Discharge Plan (PT)    Therapy Plan Review (PT)  evaluation/treatment results reviewed;care plan/treatment goals reviewed;risks/benefits reviewed;current/potential barriers reviewed;participants voiced agreement with care plan;participants included;patient  -CT       User Key  (r) = Recorded By, (t) = Taken By, (c) = Cosigned By    Initials Name Provider Type    CT Edel Nevarez, PT Physical Therapist    Annika Vargas, RN Registered Nurse    Ana Ribeiro, RN Registered Nurse        Physical Therapy Education                 Title: PT OT SLP Therapies (Done)     Topic: Physical Therapy (Done)     Point: Mobility training (Done)     Learning Progress Summary           Patient Acceptance, E,TB, VU by CT at 3/9/2021 1508                   Point: Home exercise program (Done)     Learning Progress Summary           Patient Acceptance, E,TB, VU by CT at 3/9/2021 1508                   Point: Body mechanics (Done)     Learning Progress Summary "           Patient Acceptance, E,TB, VU by CT at 3/9/2021 1508                   Point: Precautions (Done)     Learning Progress Summary           Patient Acceptance, E,TB, VU by CT at 3/9/2021 1508                               User Key     Initials Effective Dates Name Provider Type Discipline    CT 04/03/18 -  Edel Nevarez PT Physical Therapist PT              PT Recommendation and Plan  Anticipated Discharge Disposition (PT):  (tbd)  Planned Therapy Interventions (PT): balance training, bed mobility training, gait training, home exercise program, manual therapy techniques, motor coordination training, neuromuscular re-education, patient/family education, postural re-education, strengthening, transfer training  Therapy Frequency (PT): 3 times/wk (3-5 times/wk )  Plan of Care Reviewed With: patient       Time Calculation:   PT Charges     Row Name 03/09/21 1508             Time Calculation    PT Received On  03/09/21  -CT      PT Goal Re-Cert Due Date  03/23/21  -CT        User Key  (r) = Recorded By, (t) = Taken By, (c) = Cosigned By    Initials Name Provider Type    CT Edel Nevarez PT Physical Therapist        Therapy Charges for Today     Code Description Service Date Service Provider Modifiers Qty    [id number] HC PT EVAL MOD COMPLEXITY 4 3/9/2021 Edel Nevarez, PT GP 1               Edel Nevarez PT  3/9/2021

## 2021-03-09 NOTE — PROGRESS NOTES
Nephrology Progress Note      Subjective     Patient is known to have ESKD, non compliant to dialysis, he was emergently dialyzed on Sunday  C/o pain in neck that  Is chronic, no chest pain or shortness of breath  Seen on dialysis    Objective       Vital signs :     Temp:  [98.1 °F (36.7 °C)-98.9 °F (37.2 °C)] 98.1 °F (36.7 °C)  Heart Rate:  [] 98  Resp:  [15-20] 18  BP: ()/(53-91) 104/58      Intake/Output Summary (Last 24 hours) at 3/9/2021 0748  Last data filed at 3/9/2021 0705  Gross per 24 hour   Intake 420 ml   Output --   Net 420 ml       Physical Exam:    General Appearance : not in acute distress  Lungs : clear to auscultation, respirations regular  Heart :  regular rhythm & normal rate, normal S1, S2 and no murmur, no rub  Abdomen : normal bowel sounds, no masses, no hepatomegaly, no splenomegaly, soft non-tender and no guarding  Extremities : moves extremities well, no edema, no cyanosis and no redness  Skin :  no bleeding, bruising or rash  Neurologic :   orientated to person, place, time and situation, Grossly no focal deficits      Laboratory Data :     Albumin Albumin   Date Value Ref Range Status   03/09/2021 2.42 (L) 3.50 - 5.20 g/dL Final   03/08/2021 3.13 (L) 3.50 - 5.20 g/dL Final   03/07/2021 3.41 (L) 3.50 - 5.20 g/dL Final      Magnesium Magnesium   Date Value Ref Range Status   03/09/2021 1.8 1.6 - 2.6 mg/dL Final   03/07/2021 2.3 1.6 - 2.6 mg/dL Final          PTH               No results found for: PTH    CBC and coagulation:  Results from last 7 days   Lab Units 03/09/21  0034 03/08/21  0531 03/07/21  2324 03/07/21  2120   LACTATE mmol/L  --   --  1.9 2.1*   SED RATE mm/hr  --   --   --  >100*   CRP mg/dL  --   --   --  13.61*   WBC 10*3/mm3 7.31 8.38  --  12.90*   HEMOGLOBIN g/dL 6.7* 7.6*  --  8.6*   HEMATOCRIT % 21.1* 22.8*  --  27.6*   MCV fL 91.3 88.4  --  93.6   MCHC g/dL 31.8 33.3  --  31.2*   PLATELETS 10*3/mm3 132* 148  --  170   INR   --   --   --  1.32*     Acid/base  balance:  Results from last 7 days   Lab Units 03/07/21  2132   PH, ARTERIAL pH units 7.273*   PO2 ART mm Hg 106.0   PCO2, ARTERIAL mm Hg 19.8*   HCO3 ART mmol/L 9.1*     Renal and electrolytes:  Results from last 7 days   Lab Units 03/09/21  0034 03/08/21  0531 03/07/21 2120   SODIUM mmol/L 134* 135* 133*   POTASSIUM mmol/L 4.8 3.5 8.1*   MAGNESIUM mg/dL 1.8  --  2.3   CHLORIDE mmol/L 93* 94* 93*   CO2 mmol/L 18.6* 19.8* 9.5*   BUN mg/dL 69* 52* 163*   CREATININE mg/dL 6.90* 4.83* 11.65*   EGFR IF NONAFRICN AM mL/min/1.73 9* 13* 5*   CALCIUM mg/dL 6.2* 7.6* 6.0*   PHOSPHORUS mg/dL 8.2*  --   --      Estimated Creatinine Clearance: 14 mL/min (A) (by C-G formula based on SCr of 6.9 mg/dL (H)).    Liver and pancreatic function:  Results from last 7 days   Lab Units 03/09/21  0034 03/08/21  0531 03/07/21 2120   ALBUMIN g/dL 2.42* 3.13* 3.41*   BILIRUBIN mg/dL 0.3 0.4 0.5   ALK PHOS U/L 118* 128* 127*   AST (SGOT) U/L 19 17 17   ALT (SGPT) U/L 15 18 19         Cardiac:  Results from last 7 days   Lab Units 03/07/21  2120   PROBNP pg/mL 52,969.0*     Liver and pancreatic function:  Results from last 7 days   Lab Units 03/09/21  0034 03/08/21  0531 03/07/21 2120   ALBUMIN g/dL 2.42* 3.13* 3.41*   BILIRUBIN mg/dL 0.3 0.4 0.5   ALK PHOS U/L 118* 128* 127*   AST (SGOT) U/L 19 17 17   ALT (SGPT) U/L 15 18 19       Medications :     calcium acetate, 667 mg, Oral, TID With Meals  carvedilol, 12.5 mg, Oral, BID With Meals  enoxaparin, 30 mg, Subcutaneous, Nightly  lidocaine, 1 patch, Transdermal, Q24H  pantoprazole, 40 mg, Oral, Daily  sodium chloride, 1,000 mL, Intravenous, Once  sodium chloride, 3 mL, Intravenous, Q12H             Assessment/Plan     1. ESKD on IHDX  2. Sever life threatening hyperkalemia  3. SHPT  4. Acute on chronic Anemia likely due to CKD    Evaluated the patient on dialysis, doing ok.   Anemia, low HH, will start on IV iron and EPO  Added boost rashaad Corado MD  03/09/21  07:48 EST

## 2021-03-09 NOTE — PLAN OF CARE
Goal Outcome Evaluation:  Plan of Care Reviewed With: patient  Progress: improving  Outcome Summary: Patient vital signs currently stable and he remains on RA. He is still c/o constant severe back and shoulder pain. The patient reports better use of extremities today but still states that he has difficulty using them. PT did evaluate him today and noted difficulty with balance and upper extremities. MD notified and aware of patients condition. He is alert and oriented x4. No other significant changes. Safety measures in place. Continously monitoring.

## 2021-03-10 NOTE — PROGRESS NOTES
[facility]    Patient Identification:  Name:  Mario Nair  Age:  45 y.o.  Sex:  male  :  1975  MRN:  [id number]  Visit number:  [id number]  Primary Care Provider:  Danny Rebolledo MD    Length of stay in inpatient status:  2    Brief Update     Called by Dr. Lowry about the patient's neck CT showing kyphotic changes and C4 and C5 with phlegmon extending from C2-C7.    Imaging Results (Last 24 Hours)     Procedure Component Value Units Date/Time    CT Head Without Contrast [[id number]] Collected: 21     Updated: 21    Narrative:      EXAM:    CT Head Without Intravenous Contrast     EXAM DATE:    2021     CLINICAL HISTORY:    bilateral arm weakness, hx of cervical arthritis; E87.5-Hyperkalemia;  Z91.19-Patient's noncompliance with other medical treatment and regimen     TECHNIQUE:    Axial computed tomography images of the head/brain without intravenous  contrast.  Sagittal and coronal reformatted images were created and  reviewed.  This CT exam was performed using one or more of the following  dose reduction techniques:  automated exposure control, adjustment of  the mA and/or kV according to patient size, and/or use of iterative  reconstruction technique.     COMPARISON:    2020     FINDINGS:    Brain:  Unremarkable.  No hemorrhage.  No significant white matter  disease.  No edema.    Ventricles:  Unremarkable.  No ventriculomegaly.    Bones/joints:  Unremarkable.  No acute fracture.    Soft tissues:  Unremarkable.    Sinuses:  Unremarkable as visualized.  No acute sinusitis.    Mastoid air cells:  Unremarkable as visualized.  No mastoid effusion.       Impression:        Stable exam demonstrating no CT evidence of acute intracranial  abnormality.     This report was finalized on 3/9/2021 7:15 PM by Dr. Maxi Lowry MD.       CT Cervical Spine Without Contrast [[id number]] Collected: 21     Updated:  03/09/21 1936    Narrative:      EXAM:    CT Cervical Spine Without Intravenous Contrast     EXAM DATE:    03/09/2021     CLINICAL HISTORY:    bilateral arm weakness, pain in cervical spine; E87.5-Hyperkalemia;  Z91.19-Patient's noncompliance with other medical treatment and regimen     TECHNIQUE:    Axial computed tomography images of the cervical spine without  intravenous contrast.  Sagittal and coronal reformatted images were  created and reviewed.  This CT exam was performed using one or more of  the following dose reduction techniques:  automated exposure control,  adjustment of the mA and/or kV according to patient size, and/or use of  iterative reconstruction technique.     COMPARISON:    02/11/2021     FINDINGS:    Vertebrae:  Since the prior exam, interval development of disc space  loss and destructive endplate changes centered at the C4/5 level with  anterior wedging deformity of C4 and focal kyphosis. Appearance is  consistent with spondylodiscitis.  Degree of anterior angulation/focal  kyphosis results in widening of the facet joint space.    Discs/spinal canal/neural foramina:  There is mild-moderate stenosis  at the C4/5 level. Neural foraminal stenosis also noted at this level.   Stable disc osteophyte complex at C5/6 with mild central canal stenosis  and moderate neural foraminal stenosis.    Soft tissues:  There is marked prevertebral soft tissue swelling with  phlegmon seen in the prevertebral space that has a maximal AP thickness  of 2.7 cm and effaces the posterior margin of the oropharynx.    Retropharyngeal space:  Edema in the retropharyngeal space is also  noted.       Impression:      1.  Interval development of spondylodiscitis with destructive changes of  the disc space at C4/5, anterior wedging deformity of C4, and focal  kyphosis with widening of the C4/5 facet joints. Moderate central canal  and neural foraminal stenosis at this level.  2.  Development of marked prevertebral soft  tissue thickening with  phlegmon compatible with tracking infection in the prevertebral spaces.  Associated edema of the retropharyngeal fat space noted.  3.  Degenerative disc disease C5/6 is stable with stenosis again noted.  4.  Findings communicated to the on-call hospitalist, Dr. Ocampo, at  7:24 PM on 03/09/2021.     This report was finalized on 3/9/2021 7:27 PM by Dr. Maxi Lowry MD.           I contacted the [facility] and talk to Woo Woodruff (spine) and Malik ([facility] hospitalist).  The patient has been accepted to the hospitalist team at [facility] in [place], [place].  However, a telemetry bed will not be available until 3/10/2021.  They have asked us to start broad-spectrum IV antibiotics; I have started vancomycin, aztreonam and Flagyl.  Will inform Dr. Tyson of the transfer when he comes back on service in the morning.  Facesheet has been faxed to  ([phone]).      Opal Ocampo MD  [facility] Hospitalist  03/09/21  20:11 EST

## 2021-03-10 NOTE — PROGRESS NOTES
Pharmacy has adjusted the dose of the patient's Azactam from a dose of Azactam 2 gram every 8 hours to a dose of Azactam 2 gram every 12 hours based on an estimated creatinine clearance of 14 mL/min and the  Extended Infusion of Beta-Lactam and Monobactam Antibiotics Policy.    Memo Valdez, PharmD  20:21 EST.  03/09/21

## 2021-03-10 NOTE — PLAN OF CARE
Goal Outcome Evaluation:  Plan of Care Reviewed With: patient  Progress: no change  Outcome Summary: VSS and afebrile. Pt continues on RA and tolerating well. He is still c/o constant severe neck pain. No other complaints at this time. Call light within reach. Bed alarm set.

## 2021-03-10 NOTE — PLAN OF CARE
Goal Outcome Evaluation:  Plan of Care Reviewed With: patient, daughter  Progress: improving  Outcome Summary: Patient vital signs currently stable and he remains on RA and tolerating it well. He is still having severe, constant cervical back and bilateral shoulder pain. He is currently pending transfer to [place] for treatment of spinal phlegmon. I did speak with his daughter, Constance, on the phone today per his request and informed her of his current status. He is A&Ox4. He still has minimal use of his BUE. No other significant changes noted. Will continue to monitor.

## 2021-03-10 NOTE — THERAPY TREATMENT NOTE
Acute Care - Physical Therapy Treatment Note   Reza     Patient Name: Mario Nair  : 1975  MRN: [id number]  Today's Date: 3/10/2021   Onset of Illness/Injury or Date of Surgery: 21 (admit date)       PT Assessment (last 12 hours)      PT Evaluation and Treatment     Row Name 03/10/21 1452          Physical Therapy Time and Intention    Subjective Information  complains of;weakness;fatigue;pain  -CT     Document Type  therapy note (daily note)  -CT     Mode of Treatment  physical therapy  -CT     Patient Effort  adequate  -CT     Comment  Pt toelrated sitting EOB fairly well.   -CT     Row Name 03/10/21 1452          General Information    Patient Profile Reviewed  yes  -CT     Equipment Currently Used at Home  none  -CT     Existing Precautions/Restrictions  fall  -CT     Limitations/Impairments  safety/cognitive  -CT     Row Name 03/10/21 1452          Cognition    Affect/Mental Status (Cognitive)  WFL  -CT     Orientation Status (Cognition)  oriented x 3  -CT     Follows Commands (Cognition)  follows one-step commands  -CT     Row Name 03/10/21 1452          Pain Scale: Word Pre/Post-Treatment    Pain: Word Scale, Pretreatment  4 - moderate pain  -CT     Posttreatment Pain Rating  6 - moderate-severe pain  -CT     Row Name 03/10/21 1452          Bed Mobility    Bed Mobility  bed mobility (all) activities  -CT     All Activities, Schleicher (Bed Mobility)  dependent (less than 25% patient effort);2 person assist  -CT     Bed Mobility, Safety Issues  decreased use of arms for pushing/pulling;decreased use of legs for bridging/pushing  -CT     Assistive Device (Bed Mobility)  draw sheet;head of bed elevated  -CT     Comment (Bed Mobility)  pt sat EOB for aprox 5 minutes today  -CT     Row Name 03/10/21 1452          Safety Issues, Functional Mobility    Impairments Affecting Function (Mobility)  balance;coordination;endurance/activity tolerance;motor control;range of motion (ROM);strength  -CT      Row Name 03/10/21 1452          Balance    Static Sitting Balance  severe impairment  -CT     Row Name             Wound 03/08/21 0915 Right anterior foot Abrasion    Wound - Properties Group Placement Date: 03/08/21  -AM Placement Time: 0915  -AM Present on Hospital Admission: Y  -AM Side: Right  -AM Orientation: anterior  -AM Location: foot  -AM Primary Wound Type: Abrasion  -AM    Retired Wound - Properties Group Date first assessed: 03/08/21  -AM Time first assessed: 0915  -AM Present on Hospital Admission: Y  -AM Side: Right  -AM Location: foot  -AM Primary Wound Type: Abrasion  -AM    Row Name             Wound 01/29/21 0939 Left throat Incision    Wound - Properties Group Placement Date: 01/29/21  -CW Placement Time: 0939  -CW Present on Hospital Admission: N  -CW Side: Left  -CW Location: throat  -CW Primary Wound Type: Incision  -CW    Retired Wound - Properties Group Date first assessed: 01/29/21  -CW Time first assessed: 0939  -CW Present on Hospital Admission: N  -CW Side: Left  -CW Location: throat  -CW Primary Wound Type: Incision  -CW    Row Name 03/10/21 1452          Coping    Observed Emotional State  agitated;cooperative  -CT     Row Name 03/10/21 1452          Plan of Care Review    Plan of Care Reviewed With  patient  -CT     Row Name 03/10/21 1452          Positioning and Restraints    Pre-Treatment Position  in bed  -CT     Post Treatment Position  bed  -CT     In Bed  side lying right;call light within reach;encouraged to call for assist;exit alarm on;side rails up x3  -CT     Row Name 03/10/21 1452          Therapy Assessment/Plan (PT)    Rehab Potential (PT)  fair, will monitor progress closely  -CT     Criteria for Skilled Interventions Met (PT)  yes;skilled treatment is necessary  -CT       User Key  (r) = Recorded By, (t) = Taken By, (c) = Cosigned By    Initials Name Provider Type    CT Edel Nevarez, PT Physical Therapist    Annika Vargas RN Registered Nurse    DASH Soto  Ana, RN Registered Nurse        Physical Therapy Education                 Title: PT OT SLP Therapies (Done)     Topic: Physical Therapy (Done)     Point: Mobility training (Done)     Learning Progress Summary           Patient Acceptance, E,TB, VU by CT at 3/10/2021 1455    Acceptance, E, VU by AM at 3/10/2021 1250    Acceptance, E,TB, VU by CT at 3/9/2021 1508                   Point: Home exercise program (Done)     Learning Progress Summary           Patient Acceptance, E,TB, VU by CT at 3/10/2021 1455    Acceptance, E, VU by AM at 3/10/2021 1250    Acceptance, E,TB, VU by CT at 3/9/2021 1508                   Point: Body mechanics (Done)     Learning Progress Summary           Patient Acceptance, E,TB, VU by CT at 3/10/2021 1455    Acceptance, E, VU by AM at 3/10/2021 1250    Acceptance, E,TB, VU by CT at 3/9/2021 1508                   Point: Precautions (Done)     Learning Progress Summary           Patient Acceptance, E,TB, VU by CT at 3/10/2021 1455    Acceptance, E, VU by AM at 3/10/2021 1250    Acceptance, E,TB, VU by CT at 3/9/2021 1508                               User Key     Initials Effective Dates Name Provider Type Discipline    CT 04/03/18 -  Edel Nevarez PT Physical Therapist PT    AM 06/24/20 -  Ana Soto, RN Registered Nurse Nurse              PT Recommendation and Plan  Anticipated Discharge Disposition (PT):  (tbd)  Planned Therapy Interventions (PT): balance training, bed mobility training, gait training, home exercise program, manual therapy techniques, motor coordination training, neuromuscular re-education, patient/family education, postural re-education, strengthening, transfer training  Therapy Frequency (PT): 3 times/wk (3-5 times/wk )  Plan of Care Reviewed With: patient       Time Calculation:   PT Charges     Row Name 03/10/21 1455             Time Calculation    PT Received On  03/10/21  -CT      PT Goal Re-Cert Due Date  03/23/21  -CT         Time Calculation- PT     Total Timed Code Minutes- PT  33 minute(s)  -CT        User Key  (r) = Recorded By, (t) = Taken By, (c) = Cosigned By    Initials Name Provider Type    CT Edel Nevarez, PT Physical Therapist        Therapy Charges for Today     Code Description Service Date Service Provider Modifiers Qty    [id number]  PT EVAL MOD COMPLEXITY 4 3/9/2021 Edel Nevraez, PT GP 1    [id number]  PT THERAPEUTIC ACT EA 15 MIN 3/10/2021 Edel Nevarez, PT GP 2               Edel Nevarez PT  3/10/2021

## 2021-03-10 NOTE — PROGRESS NOTES
[facility] HOSPITALIST PROGRESS NOTE     Patient Identification:  Name:  Mario Nair  Age:  45 y.o.  Sex:  male  :  1975  MRN:  [id number]  Visit Number:  [id number]  ROOM: [address]     Primary Care Provider:  Danny Rebolledo MD    Length of stay in inpatient status:  2    Subjective     Chief Compliant:    Chief Complaint   Patient presents with   • Weakness - Generalized       History of Presenting Illness: Patient seen and evaluated in follow-up for end-stage renal disease with medical noncompliance and noncompliance with hemodialysis with severe hyperkalemia presentation.  Patient this morning with persistent bilateral upper extremity weakness and severe pain in his neck.    Objective     Current Hospital Meds:aztreonam, 2 g, Intravenous, Once  [START ON 3/10/2021] aztreonam, 2 g, Intravenous, Q12H  calcium acetate, 667 mg, Oral, TID With Meals  carvedilol, 12.5 mg, Oral, BID With Meals  enoxaparin, 30 mg, Subcutaneous, Nightly  [START ON 3/10/2021] epoetin mc/mc-epbx, 6,000 Units, Subcutaneous, Once per day on   iron sucrose (VENOFER) IVPB, 200 mg, Intravenous, Q24H  lidocaine, 1 patch, Transdermal, Q24H  metroNIDAZOLE, 500 mg, Intravenous, Q8H  pantoprazole, 40 mg, Oral, Daily  sodium chloride, 3 mL, Intravenous, Q12H  [START ON 3/10/2021] Vancomycin Pharmacy Intermittent Dosing, , Does not apply, Daily         Current Antimicrobial Therapy:  Anti-Infectives (From admission, onward)    Ordered     Dose/Rate Route Frequency Start Stop    21  Vancomycin Pharmacy Intermittent Dosing     Ordering Provider: Opal Ocampo MD     Does not apply Daily 03/10/21 0900 21 0859    21  aztreonam (AZACTAM) 2 g/100 mL 0.9% NS (mbp)     Ordering Provider: Opal Ocampo MD    2 g  over 4 Hours Intravenous Every 12 Hours 03/10/21 0900 21 0859    21  aztreonam (AZACTAM) 2 g/100 mL 0.9% NS (mbp)     Ordering Provider: Terrence  Opal POSADAS MD    2 g  over 30 Minutes Intravenous Once 03/09/21 2200 03/09/21 2011  metroNIDAZOLE (FLAGYL) 500 mg/100mL IVPB     Ordering Provider: Opal Ocampo MD    500 mg  over 30 Minutes Intravenous Every 8 Hours 03/09/21 2200 03/16/21 2159 03/09/21 2011  vancomycin 1500 mg/500 mL 0.9% NS IVPB ([facility])     Ordering Provider: Opal Ocampo MD    20 mg/kg × 72.7 kg Intravenous Once 03/09/21 2100 03/09/21 2055        Current Diuretic Therapy:  Diuretics (From admission, onward)    None        ----------------------------------------------------------------------------------------------------------------------  Vital Signs:  Temp:  [97.7 °F (36.5 °C)-98.6 °F (37 °C)] 98 °F (36.7 °C)  Heart Rate:  [] 106  Resp:  [12-22] 12  BP: ()/(49-91) 94/51  SpO2:  [95 %-99 %] 97 %  on   ;   Device (Oxygen Therapy): room air  Body mass index is 20.57 kg/m².    Wt Readings from Last 3 Encounters:   03/09/21 72.7 kg (160 lb 3.2 oz)   02/27/21 71 kg (156 lb 9.6 oz)   02/20/21 68.2 kg (150 lb 6.4 oz)     Intake & Output (last 3 days)       03/07 0701 - 03/08 0700 03/08 0701 - 03/09 0700 03/09 0701 - 03/10 0700    P.O. 250 180 465    I.V. (mL/kg)   85.6 (1.2)    IV Piggyback 550      Total Intake(mL/kg) 800 (11) 180 (2.5) 550.6 (7.6)    Urine (mL/kg/hr)   200 (0.2)    Other   1500    Total Output   1700    Net +800 +180 -1149.5           Urine Unmeasured Occurrence 1 x 4 x 1 x    Stool Unmeasured Occurrence 3 x 4 x         Diet Regular; Renal; High Protein  ----------------------------------------------------------------------------------------------------------------------  Physical exam:  Constitutional: Adult male, appears in mild distress and older than stated age.      HENT:  Head:  Normocephalic and atraumatic.  Mouth:  Moist mucous membranes.    Eyes:  Conjunctivae and EOM are normal. No scleral icterus.    Neck:  Neck supple but with increased pain with extension of the head and reduce pain with  flexion.  There is significant tenderness to palpation at the base of the cervical spine.  No JVD present.    Cardiovascular: Tachycardic but regular rhythm and normal heart sounds with no murmur.  Pulmonary/Chest:  No respiratory distress, no wheezes, no crackles, with normal breath sounds and good air movement.  Abdominal:  Soft.  Bowel sounds are normal.  No distension and no tenderness.   Musculoskeletal:  No edema, no tenderness, and no deformity.  No red or swollen joints anywhere.  There is 1 out of 5 strength in the upper extremities bilaterally both with flexion, extension, and  strength.  Right subtly stronger than left.  Neurological:  Alert and oriented to person, place, and time.  No cranial nerve deficit.  No tongue deviation.  No facial droop.  No slurred speech. Intact Sensation throughout including the upper extremities bilaterally.  Reflexes 1+ in upper and 2+ in the lower extremities bilaterally.  Skin:  Skin is warm and dry. No rash or lesion noted. No pallor.   Peripheral vascular:  Pulses in all 4 extremities with no clubbing, no cyanosis, no edema.  Psychiatric: Appropriate mood and affect, pleasant.   ----------------------------------------------------------------------------------------------------------------------  Tele:    ----------------------------------------------------------------------------------------------------------------------  Results from last 7 days   Lab Units 03/09/21  0034 03/08/21  0531 03/07/21  7808 03/07/21 2120   CRP mg/dL  --   --   --  13.61*   LACTATE mmol/L  --   --  1.9 2.1*   WBC 10*3/mm3 7.31 8.38  --  12.90*   HEMOGLOBIN g/dL 6.7* 7.6*  --  8.6*   HEMATOCRIT % 21.1* 22.8*  --  27.6*   MCV fL 91.3 88.4  --  93.6   MCHC g/dL 31.8 33.3  --  31.2*   PLATELETS 10*3/mm3 132* 148  --  170   INR   --   --   --  1.32*     Results from last 7 days   Lab Units 03/07/21 2132   PH, ARTERIAL pH units 7.273*   PO2 ART mm Hg 106.0   PCO2, ARTERIAL mm Hg 19.8*   HCO3  ART mmol/L 9.1*     Results from last 7 days   Lab Units 03/09/21  0034 03/08/21  0531 03/07/21  2120   SODIUM mmol/L 134* 135* 133*   POTASSIUM mmol/L 4.8 3.5 8.1*   MAGNESIUM mg/dL 1.8  --  2.3   CHLORIDE mmol/L 93* 94* 93*   CO2 mmol/L 18.6* 19.8* 9.5*   BUN mg/dL 69* 52* 163*   CREATININE mg/dL 6.90* 4.83* 11.65*   EGFR IF NONAFRICN AM mL/min/1.73 9* 13* 5*   CALCIUM mg/dL 6.2* 7.6* 6.0*   PHOSPHORUS mg/dL 8.2*  --   --    GLUCOSE mg/dL 115* 81 153*   ALBUMIN g/dL 2.42* 3.13* 3.41*   BILIRUBIN mg/dL 0.3 0.4 0.5   ALK PHOS U/L 118* 128* 127*   AST (SGOT) U/L 19 17 17   ALT (SGPT) U/L 15 18 19   Estimated Creatinine Clearance: 14 mL/min (A) (by C-G formula based on SCr of 6.9 mg/dL (H)).  No results found for: AMMONIA  Results from last 7 days   Lab Units 03/08/21  0531 03/07/21  2324 03/07/21 2120   TROPONIN T ng/mL 0.132* 0.130* 0.124*     Results from last 7 days   Lab Units 03/07/21  2120   PROBNP pg/mL 52,969.0*         Glucose   Date/Time Value Ref Range Status   03/09/2021 1612 123 70 - 130 mg/dL Final   03/09/2021 1102 109 70 - 130 mg/dL Final   03/09/2021 0637 117 70 - 130 mg/dL Final   03/08/2021 2101 154 (H) 70 - 130 mg/dL Final   03/08/2021 1619 125 70 - 130 mg/dL Final   03/08/2021 1106 148 (H) 70 - 130 mg/dL Final   03/08/2021 0639 125 70 - 130 mg/dL Final   03/08/2021 0129 85 70 - 130 mg/dL Final     Lab Results   Component Value Date    TSH 2.380 11/14/2020    FREET4 0.73 (L) 02/03/2021     No results found for: PREGTESTUR, PREGSERUM, HCG, HCGQUANT  Pain Management Panel     Pain Management Panel Latest Ref Rng & Units 2/12/2021 1/25/2021    AMPHETAMINES SCREEN, URINE Negative Negative Negative    BARBITURATES SCREEN Negative Negative Negative    BENZODIAZEPINE SCREEN, URINE Negative Negative Negative    BUPRENORPHINEUR Negative Negative Negative    COCAINE SCREEN, URINE Negative Negative Negative    METHADONE SCREEN, URINE Negative Negative Negative        Brief Urine Lab Results  (Last result in  the past 365 days)      Color   Clarity   Blood   Leuk Est   Nitrite   Protein   CREAT   Urine HCG        02/12/21 0045 Yellow Clear Moderate (2+) Small (1+) Negative 100 mg/dL (2+)             Blood Culture   Date Value Ref Range Status   03/07/2021 No growth at 24 hours  Preliminary   03/07/2021 No growth at 24 hours  Preliminary     No results found for: URINECX  No results found for: WOUNDCX  No results found for: STOOLCX  No results found for: RESPCX  No results found for: AFBCX  Results from last 7 days   Lab Units 03/07/21  2324 03/07/21 2120   LACTATE mmol/L 1.9 2.1*   SED RATE mm/hr  --  >100*   CRP mg/dL  --  13.61*       I have personally looked at the labs and they are summarized above.  ----------------------------------------------------------------------------------------------------------------------  Detailed radiology reports for the last 24 hours:    Imaging Results (Last 24 Hours)     Procedure Component Value Units Date/Time    CT Head Without Contrast [[id number]] Collected: 03/09/21 1913     Updated: 03/09/21 1937    Narrative:      EXAM:    CT Head Without Intravenous Contrast     EXAM DATE:    03/09/2021     CLINICAL HISTORY:    bilateral arm weakness, hx of cervical arthritis; E87.5-Hyperkalemia;  Z91.19-Patient's noncompliance with other medical treatment and regimen     TECHNIQUE:    Axial computed tomography images of the head/brain without intravenous  contrast.  Sagittal and coronal reformatted images were created and  reviewed.  This CT exam was performed using one or more of the following  dose reduction techniques:  automated exposure control, adjustment of  the mA and/or kV according to patient size, and/or use of iterative  reconstruction technique.     COMPARISON:    09/04/2020     FINDINGS:    Brain:  Unremarkable.  No hemorrhage.  No significant white matter  disease.  No edema.    Ventricles:  Unremarkable.  No ventriculomegaly.    Bones/joints:  Unremarkable.  No acute  fracture.    Soft tissues:  Unremarkable.    Sinuses:  Unremarkable as visualized.  No acute sinusitis.    Mastoid air cells:  Unremarkable as visualized.  No mastoid effusion.       Impression:        Stable exam demonstrating no CT evidence of acute intracranial  abnormality.     This report was finalized on 3/9/2021 7:15 PM by Dr. Maxi Lowry MD.       CT Cervical Spine Without Contrast [[id number]] Collected: 03/09/21 1915     Updated: 03/09/21 1936    Narrative:      EXAM:    CT Cervical Spine Without Intravenous Contrast     EXAM DATE:    03/09/2021     CLINICAL HISTORY:    bilateral arm weakness, pain in cervical spine; E87.5-Hyperkalemia;  Z91.19-Patient's noncompliance with other medical treatment and regimen     TECHNIQUE:    Axial computed tomography images of the cervical spine without  intravenous contrast.  Sagittal and coronal reformatted images were  created and reviewed.  This CT exam was performed using one or more of  the following dose reduction techniques:  automated exposure control,  adjustment of the mA and/or kV according to patient size, and/or use of  iterative reconstruction technique.     COMPARISON:    02/11/2021     FINDINGS:    Vertebrae:  Since the prior exam, interval development of disc space  loss and destructive endplate changes centered at the C4/5 level with  anterior wedging deformity of C4 and focal kyphosis. Appearance is  consistent with spondylodiscitis.  Degree of anterior angulation/focal  kyphosis results in widening of the facet joint space.    Discs/spinal canal/neural foramina:  There is mild-moderate stenosis  at the C4/5 level. Neural foraminal stenosis also noted at this level.   Stable disc osteophyte complex at C5/6 with mild central canal stenosis  and moderate neural foraminal stenosis.    Soft tissues:  There is marked prevertebral soft tissue swelling with  phlegmon seen in the prevertebral space that has a maximal AP thickness  of 2.7 cm and effaces the  posterior margin of the oropharynx.    Retropharyngeal space:  Edema in the retropharyngeal space is also  noted.       Impression:      1.  Interval development of spondylodiscitis with destructive changes of  the disc space at C4/5, anterior wedging deformity of C4, and focal  kyphosis with widening of the C4/5 facet joints. Moderate central canal  and neural foraminal stenosis at this level.  2.  Development of marked prevertebral soft tissue thickening with  phlegmon compatible with tracking infection in the prevertebral spaces.  Associated edema of the retropharyngeal fat space noted.  3.  Degenerative disc disease C5/6 is stable with stenosis again noted.  4.  Findings communicated to the on-call hospitalist, Dr. Ocampo, at  7:24 PM on 03/09/2021.     This report was finalized on 3/9/2021 7:27 PM by Dr. Maxi Lowry MD.           Assessment & Plan      Hyperkalemia, resolved  End-stage renal disease noncompliant with dialysis  Medical nonadherence  Metabolic acidosis  Lactic acidemia, resolved  Metabolic encephalopathy secondary to uremia  Anemia of both chronic disease and iron deficiency   -Patient with repeat dialysis session today   -Nephrology consulted and following for inpatient hemodialysis   -Patient agreeable to arrangement of hemodialysis chair,  currently working on arranging   -Yesterday noted to have a hemoglobin of 7.6 down from 8.6 at admission, further declined to 6.7 today   -Patient with chronic anemia requiring transfusion at last hospitalization before leaving [place], likely mixed component of both iron deficiency as well as his chronic kidney disease.    -Plan for transfusion of 1 unit of packed red blood cells, currently awaiting delivery of blood as patient with multiple antibodies and currently still being evaluated for compatible unit for transfusion.   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   -CT of the cervical spine with significant findings of interval development of spondylodiscitis with destructive changes of the disc space at C4/5, anterior wedging deformity of C4, and focal kyphosis with widening of the C4/5 facet joints.  There is also noted moderate central canal and neural foraminal stenosis at this level.  Development of marked.  Vertebral soft tissue thickening with phlegmon compatible with tracking infection in the prevertebral spaces there is associated edema of the retropharyngeal fat space noted.  Again noted are degenerative disc disease at C5/6 with stenosis.  Per radiology read on 3/9/2021 at 19:27   -Above findings highly concerning for developing abscess in need of neurosurgical evaluation and treatment, as the services are unavailable at this hospital, will call and arrange transportation and acceptance to higher level of care.   -Initiate broad-spectrum antibiotic coverage.    Troponin elevation   -Flat trend in the setting of end-stage renal disease, no further work-up at this time as patient is without chest pain.    Hypertension   -Currently mildly hypotensive, holding any home antihypertensives    Chronic hepatitis C  History of IV drug abuse  History of endocarditis   -Repeat echocardiogram with normal LV cavity size and wall thickness with normal contraction of the ventricular wall.  EF of 61 to 65%.  No evidence of pericardial effusion and pericardium appears  normal.   -Patient toxic screen was negative at admission, last positive on 10/16/2020 for opiates.      VTE Prophylaxis:   Mechanical Order History:     None      Pharmalogical Order History:      Ordered     Dose Route Frequency Stop    03/08/21 0013  enoxaparin (LOVENOX) syringe 30 mg      30 mg SC Nightly --    03/07/21 9049  Pharmacy to Dose enoxaparin (LOVENOX)  Status:  Discontinued     Question:  Indication of use  Answer:  Prophylaxis    -- XX Continuous PRN 03/08/21 0014                Disposition pending transfer to higher level of care    Mark Tyson DO  [facility]  03/09/21  21:05 EST

## 2021-03-10 NOTE — PROGRESS NOTES
Discharge Planning Assessment  BREANNE Cobb     Patient Name: Mario Nair  MRN: [id number]  Today's Date: 3/10/2021    Admit Date: 3/7/2021        Discharge Plan     Row Name 03/10/21 1327       Plan    Plan  Pt was admitted on 3/7/21. Pt is to be transferred to [facility] in [place] once a bed is available.SS will follow up concerning Pt's dialysis chair. SS will continue to follow and assist with discharge planning.    Patient/Family in Agreement with Plan  yes        Expected Discharge Date and Time     Expected Discharge Date Expected Discharge Time    Mar 11, 2021               Janice Renner

## 2021-03-10 NOTE — NURSING NOTE
Patient does not have an active password set up on his account. He is alert and oriented x4. He denies set up of a password currently. He did state that it was okay for us to provide information to his daughter, Constance, at any time. Her information is on his family contact list.     KALA Pacheco

## 2021-03-10 NOTE — PROGRESS NOTES
Nephrology Progress Note      Subjective     Pt denied chest pain or shortness of breath  'hurts everywhere'  Objective       Vital signs :     Temp:  [98 °F (36.7 °C)-98.8 °F (37.1 °C)] 98.7 °F (37.1 °C)  Heart Rate:  [] 87  Resp:  [8-25] 12  BP: ()/(49-95) 125/82      Intake/Output Summary (Last 24 hours) at 3/10/2021 1252  Last data filed at 3/10/2021 1223  Gross per 24 hour   Intake 3155.89 ml   Output 900 ml   Net 2255.89 ml       Physical Exam:    General Appearance : not in acute distress  Lungs : clear to auscultation, respirations regular  Heart :  regular rhythm & normal rate, normal S1, S2 and no murmur, no rub  Abdomen : normal bowel sounds, no masses, no hepatomegaly, no splenomegaly, soft non-tender and no guarding  Extremities : moves extremities well, no edema, no cyanosis and no redness  Skin :  no bleeding, bruising or rash  Neurologic :   orientated to person, place, time and situation, Grossly no focal deficits      Laboratory Data :     Albumin Albumin   Date Value Ref Range Status   03/10/2021 2.63 (L) 3.50 - 5.20 g/dL Final   03/09/2021 2.42 (L) 3.50 - 5.20 g/dL Final   03/08/2021 3.13 (L) 3.50 - 5.20 g/dL Final   03/07/2021 3.41 (L) 3.50 - 5.20 g/dL Final      Magnesium Magnesium   Date Value Ref Range Status   03/09/2021 1.8 1.6 - 2.6 mg/dL Final   03/07/2021 2.3 1.6 - 2.6 mg/dL Final          PTH               No results found for: PTH    CBC and coagulation:  Results from last 7 days   Lab Units 03/10/21  0813 03/09/21  0034 03/08/21  0531 03/07/21  2324 03/07/21  2120   LACTATE mmol/L  --   --   --  1.9 2.1*   SED RATE mm/hr  --   --   --   --  >100*   CRP mg/dL  --   --   --   --  13.61*   WBC 10*3/mm3 6.92 7.31 8.38  --  12.90*   HEMOGLOBIN g/dL 7.3*  7.3* 6.7* 7.6*  --  8.6*   HEMATOCRIT % 23.9*  23.9* 21.1* 22.8*  --  27.6*   MCV fL 94.1 91.3 88.4  --  93.6   MCHC g/dL 30.5* 31.8 33.3  --  31.2*   PLATELETS 10*3/mm3 113* 132* 148  --  170   INR   --   --   --   --  1.32*      Acid/base balance:  Results from last 7 days   Lab Units 03/07/21  2132   PH, ARTERIAL pH units 7.273*   PO2 ART mm Hg 106.0   PCO2, ARTERIAL mm Hg 19.8*   HCO3 ART mmol/L 9.1*     Renal and electrolytes:  Results from last 7 days   Lab Units 03/10/21  0009 03/09/21  0034 03/08/21  0531 03/07/21  2120   SODIUM mmol/L 135* 134* 135* 133*   POTASSIUM mmol/L 4.4 4.8 3.5 8.1*   MAGNESIUM mg/dL  --  1.8  --  2.3   CHLORIDE mmol/L 96* 93* 94* 93*   CO2 mmol/L 23.3 18.6* 19.8* 9.5*   BUN mg/dL 35* 69* 52* 163*   CREATININE mg/dL 4.19* 6.90* 4.83* 11.65*   EGFR IF NONAFRICN AM mL/min/1.73 16* 9* 13* 5*   CALCIUM mg/dL 7.9* 6.2* 7.6* 6.0*   PHOSPHORUS mg/dL  --  8.2*  --   --      Estimated Creatinine Clearance: 24.1 mL/min (A) (by C-G formula based on SCr of 4.19 mg/dL (H)).    Liver and pancreatic function:  Results from last 7 days   Lab Units 03/10/21  0009 03/09/21  0034 03/08/21  0531   ALBUMIN g/dL 2.63* 2.42* 3.13*   BILIRUBIN mg/dL 0.2 0.3 0.4   ALK PHOS U/L 130* 118* 128*   AST (SGOT) U/L 17 19 17   ALT (SGPT) U/L 13 15 18         Cardiac:  Results from last 7 days   Lab Units 03/07/21  2120   PROBNP pg/mL 52,969.0*     Liver and pancreatic function:  Results from last 7 days   Lab Units 03/10/21  0009 03/09/21  0034 03/08/21  0531   ALBUMIN g/dL 2.63* 2.42* 3.13*   BILIRUBIN mg/dL 0.2 0.3 0.4   ALK PHOS U/L 130* 118* 128*   AST (SGOT) U/L 17 19 17   ALT (SGPT) U/L 13 15 18       Medications :     aztreonam, 2 g, Intravenous, Q12H  calcium acetate, 667 mg, Oral, TID With Meals  carvedilol, 12.5 mg, Oral, BID With Meals  enoxaparin, 30 mg, Subcutaneous, Nightly  epoetin mc/mc-epbx, 6,000 Units, Subcutaneous, Once per day on Mon Wed Fri  iron sucrose (VENOFER) IVPB, 200 mg, Intravenous, Q24H  lactobacillus acidophilus, 1 capsule, Oral, Daily  lidocaine, 1 patch, Transdermal, Q24H  metroNIDAZOLE, 500 mg, Intravenous, Q8H  pantoprazole, 40 mg, Oral, Daily  sodium chloride, 3 mL, Intravenous, Q12H  Vancomycin  Pharmacy Intermittent Dosing, , Does not apply, Daily             Assessment/Plan     1. ESKD on IHDX  2. Sever life threatening hyperkalemia  3. SHPT  4. Acute on chronic Anemia likely due to CKD    Had uneventful dialysis yesterday, continue on TTS schedule.   Anemia, low HH, will start on IV iron and EPO  continue boost rashaad Corado MD  03/10/21  12:52 EST

## 2021-03-11 NOTE — CONSULTS
INFECTIOUS DISEASE CONSULTATION REPORT        Patient Identification:  Name:  Mario Nair  Age:  45 y.o.  Sex:  male  :  1975  MRN:  [id number]   Visit Number:  [id number]  Primary Care Physician:  Danny Rebolledo MD       LOS: 4 days        Subjective       Subjective     History of present illness:      Thank you Dr. Tyson for allowing us to participate in the care of your patient.  As you well know, Mr. Mario Nair is a 45 y.o. male with past medical history significant for end-stage renal disease, endocarditis, hepatitis B, hepatitis C, hypertension, IVDA, and medical noncompliance, who presented to [facility] Emergency Department on 3/7/2021 for fatigue and generalized weakness.  The patient was last hospitalized at [facility] from 2021 through  for severe hyperkalemia.  Patient left AMA on the  and had not had dialysis since that time until returning to the hospital.    Today, the patient is on room air with a T-max of 100.3 °F overnight. CRP on 3/7/2021 was elevated at 13.61.  WBC is normal. Lactic acid on admission was elevated at 2.1.  Urinalysis on 3/9/2021 was positive with WBCs too numerous to count and 4+ bacteria.  Urine culture finalized as greater than 100,000 colonies of Serratia marcescens.  CT cervical spine without contrast on 3/9/2021 showed interval development of spondylodiscitis with destructive changes of the disc space at C4/5, anterior wedging deformity of C4, and focal kyphosis with widening of the C4/5 facet joints.  Moderate central canal and neuroforaminal stenosis at this level.  Development of marked prevertebral soft tissue thickening with phlegmon compatible with tracking infection in the prevertebral spaces.  Associated edema of the retropharyngeal fat space noted.  Degenerative disc disease C5/6 is stable with stenosis again noted.  Chest x-ray on 3/7/2021 showed no acute cardiopulmonary findings.  CT head  without contrast on 3/9/2021 was unremarkable.  Blood cultures on 3/9/2021 are so far showing no growth.  Blood cultures on 3/7/2021 are so far showing no growth.    Per Dr. Tyson, the patient's abscess is in need of surgical evaluation and treatment and patient has been accepted for transfer to [facility] in [place], [place] by Dr. Woodruff with spine service and Dr. Gan on the hospitalist service.  He is currently on broad-spectrum coverage with vancomycin, Azactam, and Flagyl.  A bed is still not available at this time.    Infectious Disease consultation was requested for antimicrobial management.    ---------------------------------------------------------------------------------------------------------------------     Review Of Systems:    Constitutional: no fever, chills and night sweats. No appetite change or unexpected weight change. No fatigue.  Eyes: no eye drainage, itching or redness.  HEENT: no mouth sores, dysphagia or nose bleed.  Respiratory: no for shortness of breath, cough or production of sputum.  Cardiovascular: no chest pain, no palpitations, no orthopnea.  Gastrointestinal: no nausea, vomiting or diarrhea. No abdominal pain, hematemesis or rectal bleeding.  Genitourinary: no dysuria or polyuria.  Hematologic/lymphatic: no lymph node abnormalities, no easy bruising or easy bleeding.  Musculoskeletal: Neck pain.  Skin: No rash and no itching.  Neurological: no loss of consciousness, no seizure, no headache.  Bilateral upper extremity weakness, worse on the left.  Behavioral/Psych: no depression or suicidal ideation.  Endocrine: no hot flashes.  Immunologic: negative.    ---------------------------------------------------------------------------------------------------------------------     Past Medical History    Past Medical History:   Diagnosis Date   • Anemia due to chronic kidney disease    • COPD (chronic obstructive pulmonary disease) (CMS/[facility])    • COVID-19 1/25/2021    • Current smoker    • Endocarditis    • ESRD (end stage renal disease) (CMS/HCC)    • Hepatitis B surface antigen positive 12/9/2020   • Hepatitis C    • History of transfusion    • Hypertension    • IV drug abuse (CMS/HCC)    • Medically noncompliant    • Mild atherosclerosis of carotid artery, right 2/15/2021   • Retroperitoneal lymphadenopathy 12/8/2020   • Unable to read or write        Past Surgical History    Past Surgical History:   Procedure Laterality Date   • CENTRAL VENOUS CATHETER TUNNELED INSERTION DOUBLE LUMEN Right     Chest for hemodialysis   • INSERTION HEMODIALYSIS CATHETER N/A 1/29/2021    Procedure: HEMODIALYSIS CATHETER INSERTION;  Surgeon: Khoa Carl MD;  Location: [facility];  Service: General;  Laterality: N/A;       Family History    Family History   Problem Relation Age of Onset   • Alcohol abuse Mother    • Hypertension Mother    • Hypertension Other    • Kidney disease Maternal Grandmother        Social History    Social History     Tobacco Use   • Smoking status: Current Every Day Smoker     Packs/day: 0.50     Years: 20.00     Pack years: 10.00     Types: Cigarettes   • Smokeless tobacco: Never Used   Substance Use Topics   • Alcohol use: Not Currently     Comment: Quit 18 years ago   • Drug use: Not Currently     Types: Methamphetamines, IV     Comment: last used on Friday 5/18/2019       Allergies    Penicillins  ---------------------------------------------------------------------------------------------------------------------     Home Medications:    Prior to Admission Medications       Prescriptions Last Dose Informant Patient Reported? Taking?    amLODIPine (NORVASC) 5 MG tablet 3/6/2021 Pharmacy Yes Yes    Take 5 mg by mouth Every Night.    calcium acetate (PHOS BINDER,) 667 MG capsule capsule 3/6/2021 Pharmacy Yes Yes    Take 667 mg by mouth 3 (Three) Times a Day With Meals.    carvedilol (COREG) 12.5 MG tablet 3/6/2021 Pharmacy Yes Yes    Take 12.5 mg by mouth 2 (Two)  Times a Day With Meals.    hydrALAZINE (APRESOLINE) 10 MG tablet 3/6/2021 Pharmacy Yes Yes    Take 10 mg by mouth 3 (Three) Times a Day.    isosorbide mononitrate (IMDUR) 30 MG 24 hr tablet 3/6/2021 Pharmacy Yes Yes    Take 30 mg by mouth Daily.    lidocaine (LIDODERM) 5 % Past Week Pharmacy Yes Yes    Place 1 patch on the skin as directed by provider Daily. Prior to [facility] Admission, Patient was on: applies to back    pantoprazole (PROTONIX) 40 MG EC tablet 3/6/2021 Pharmacy Yes Yes    Take 40 mg by mouth Daily.    methocarbamol (ROBAXIN) 500 MG tablet Unknown Pharmacy Yes No    Take 500 mg by mouth 3 (Three) Times a Day As Needed for Muscle Spasms.          ---------------------------------------------------------------------------------------------------------------------    Objective       Objective     Hospital Scheduled Meds:  aztreonam, 2 g, Intravenous, Q12H  calcium acetate, 667 mg, Oral, TID With Meals  carvedilol, 12.5 mg, Oral, BID With Meals  enoxaparin, 30 mg, Subcutaneous, Nightly  epoetin mc/mc-epbx, 6,000 Units, Subcutaneous, Once per day on Mon Wed Fri  iron sucrose (VENOFER) IVPB, 200 mg, Intravenous, Q24H  lactobacillus acidophilus, 1 capsule, Oral, Daily  lidocaine, 1 patch, Transdermal, Q24H  metroNIDAZOLE, 500 mg, Intravenous, Q8H  pantoprazole, 40 mg, Oral, Daily  sodium chloride, 1,000 mL, Intravenous, Once in Dialysis  sodium chloride, 3 mL, Intravenous, Q12H  Vancomycin Pharmacy Intermittent Dosing, , Does not apply, Daily         ---------------------------------------------------------------------------------------------------------------------   Vital Signs:  Temp:  [97.7 °F (36.5 °C)-100.3 °F (37.9 °C)] 97.7 °F (36.5 °C)  Heart Rate:  [78-98] 89  Resp:  [10-18] 18  BP: (124-148)/(72-95) 135/88  Mean Arterial Pressure (Non-Invasive) for the past 24 hrs (Last 3 readings):   Noninvasive MAP (mmHg)   03/11/21 0502 104   03/11/21 0402 113   03/11/21 0302 106     SpO2 Percentage     03/11/21 0302 03/11/21 0402 03/11/21 0502   SpO2: 98% 99% 97%     SpO2:  [96 %-100 %] 97 %  on   ;   Device (Oxygen Therapy): room air    Body mass index is 21.34 kg/m².  Wt Readings from Last 3 Encounters:   03/11/21 75.4 kg (166 lb 3.2 oz)   02/27/21 71 kg (156 lb 9.6 oz)   02/20/21 68.2 kg (150 lb 6.4 oz)     ---------------------------------------------------------------------------------------------------------------------     Physical Exam:    Constitutional: Pleasant adult male in no apparent distress.  Receiving dialysis this morning.  HENT:  Head: Normocephalic and atraumatic.  Mouth:  Moist mucous membranes.    Eyes:  Conjunctivae and EOM are normal.  No scleral icterus.  Neck:  Neck supple.  No JVD present. Tenderness to palpation at the base of the cervical spine.  Cardiovascular:  Normal rate, regular rhythm and normal heart sounds with no murmur. No edema.  Pulmonary/Chest:  No respiratory distress, no wheezes, no crackles, with normal breath sounds and good air movement.  Abdominal:  Soft.  Bowel sounds are normal.  No distension and no tenderness.   Musculoskeletal:  No edema, no tenderness, and no deformity.  No swelling or redness of joints.  4/5 strength of right upper extremity and 2/5 strength of left upper extremity.   Neurological:  Alert and oriented to person, place, and time.  No facial droop.  No slurred speech.   Skin:  Skin is warm and dry.  No rash noted.  No pallor.   Psychiatric:  Normal mood and affect.  Behavior is normal.    ---------------------------------------------------------------------------------------------------------------------    Results from last 7 days   Lab Units 03/08/21  0531 03/07/21 2324 03/07/21 2120   TROPONIN T ng/mL 0.132* 0.130* 0.124*     Results from last 7 days   Lab Units 03/07/21  2120   PROBNP pg/mL 52,969.0*       Results from last 7 days   Lab Units 03/07/21  2132   PH, ARTERIAL pH units 7.273*   PO2 ART mm Hg 106.0   PCO2, ARTERIAL mm Hg 19.8*    HCO3 ART mmol/L 9.1*     Results from last 7 days   Lab Units 03/11/21  0038 03/10/21  0813 03/09/21  0034 03/07/21  2324 03/07/21 2120   CRP mg/dL  --   --   --   --  13.61*   LACTATE mmol/L  --   --   --  1.9 2.1*   WBC 10*3/mm3 9.81 6.92 7.31  --  12.90*   HEMOGLOBIN g/dL 7.5* 7.3*  7.3* 6.7*  --  8.6*   HEMATOCRIT % 23.1* 23.9*  23.9* 21.1*  --  27.6*   MCV fL 89.9 94.1 91.3  --  93.6   MCHC g/dL 32.5 30.5* 31.8  --  31.2*   PLATELETS 10*3/mm3 120* 113* 132*  --  170   INR   --   --   --   --  1.32*     Results from last 7 days   Lab Units 03/11/21  0038 03/10/21  0009 03/09/21  0034 03/07/21 2120   SODIUM mmol/L 127* 135* 134* 133*   POTASSIUM mmol/L 5.8* 4.4 4.8 8.1*   MAGNESIUM mg/dL  --   --  1.8 2.3   CHLORIDE mmol/L 95* 96* 93* 93*   CO2 mmol/L 19.3* 23.3 18.6* 9.5*   BUN mg/dL 69* 35* 69* 163*   CREATININE mg/dL 6.11* 4.19* 6.90* 11.65*   EGFR IF NONAFRICN AM mL/min/1.73 10* 16* 9* 5*   CALCIUM mg/dL 8.1* 7.9* 6.2* 6.0*   GLUCOSE mg/dL 118* 149* 115* 153*   ALBUMIN g/dL 2.37* 2.63* 2.42* 3.41*   BILIRUBIN mg/dL 0.2 0.2 0.3 0.5   ALK PHOS U/L 107 130* 118* 127*   AST (SGOT) U/L 14 17 19 17   ALT (SGPT) U/L 11 13 15 19   Estimated Creatinine Clearance: 16.5 mL/min (A) (by C-G formula based on SCr of 6.11 mg/dL (H)).  No results found for: AMMONIA    Glucose   Date/Time Value Ref Range Status   03/10/2021 1106 112 70 - 130 mg/dL Final   03/10/2021 0641 122 70 - 130 mg/dL Final   03/09/2021 2220 126 70 - 130 mg/dL Final   03/09/2021 1612 123 70 - 130 mg/dL Final   03/09/2021 1102 109 70 - 130 mg/dL Final   03/09/2021 0637 117 70 - 130 mg/dL Final   03/08/2021 2101 154 (H) 70 - 130 mg/dL Final   03/08/2021 1619 125 70 - 130 mg/dL Final     Lab Results   Component Value Date    HGBA1C 5.20 12/31/2020     Lab Results   Component Value Date    TSH 2.380 11/14/2020    FREET4 0.73 (L) 02/03/2021       Blood Culture   Date Value Ref Range Status   03/09/2021 No growth at 24 hours  Preliminary   03/09/2021 No  growth at 24 hours  Preliminary   03/07/2021 No growth at 3 days  Preliminary   03/07/2021 No growth at 3 days  Preliminary     Urine Culture   Date Value Ref Range Status   03/09/2021 >100,000 CFU/mL Serratia marcescens (A)  Final     No results found for: WOUNDCX  No results found for: STOOLCX  No results found for: RESPCX  Pain Management Panel       Pain Management Panel Latest Ref Rng & Units 3/9/2021 2/12/2021    AMPHETAMINES SCREEN, URINE Negative Negative Negative    BARBITURATES SCREEN Negative Negative Negative    BENZODIAZEPINE SCREEN, URINE Negative Negative Negative    BUPRENORPHINEUR Negative Negative Negative    COCAINE SCREEN, URINE Negative Negative Negative    METHADONE SCREEN, URINE Negative Negative Negative          I have personally reviewed the above laboratory results.   ---------------------------------------------------------------------------------------------------------------------  Imaging Results (Last 7 Days)       Procedure Component Value Units Date/Time    CT Head Without Contrast [[id number]] Collected: 03/09/21 1913     Updated: 03/09/21 1937    Narrative:      EXAM:    CT Head Without Intravenous Contrast     EXAM DATE:    03/09/2021     CLINICAL HISTORY:    bilateral arm weakness, hx of cervical arthritis; E87.5-Hyperkalemia;  Z91.19-Patient's noncompliance with other medical treatment and regimen     TECHNIQUE:    Axial computed tomography images of the head/brain without intravenous  contrast.  Sagittal and coronal reformatted images were created and  reviewed.  This CT exam was performed using one or more of the following  dose reduction techniques:  automated exposure control, adjustment of  the mA and/or kV according to patient size, and/or use of iterative  reconstruction technique.     COMPARISON:    09/04/2020     FINDINGS:    Brain:  Unremarkable.  No hemorrhage.  No significant white matter  disease.  No edema.    Ventricles:  Unremarkable.  No ventriculomegaly.     Bones/joints:  Unremarkable.  No acute fracture.    Soft tissues:  Unremarkable.    Sinuses:  Unremarkable as visualized.  No acute sinusitis.    Mastoid air cells:  Unremarkable as visualized.  No mastoid effusion.       Impression:        Stable exam demonstrating no CT evidence of acute intracranial  abnormality.     This report was finalized on 3/9/2021 7:15 PM by Dr. Maxi Lowry MD.       CT Cervical Spine Without Contrast [[id number]] Collected: 03/09/21 1915     Updated: 03/09/21 1936    Narrative:      EXAM:    CT Cervical Spine Without Intravenous Contrast     EXAM DATE:    03/09/2021     CLINICAL HISTORY:    bilateral arm weakness, pain in cervical spine; E87.5-Hyperkalemia;  Z91.19-Patient's noncompliance with other medical treatment and regimen     TECHNIQUE:    Axial computed tomography images of the cervical spine without  intravenous contrast.  Sagittal and coronal reformatted images were  created and reviewed.  This CT exam was performed using one or more of  the following dose reduction techniques:  automated exposure control,  adjustment of the mA and/or kV according to patient size, and/or use of  iterative reconstruction technique.     COMPARISON:    02/11/2021     FINDINGS:    Vertebrae:  Since the prior exam, interval development of disc space  loss and destructive endplate changes centered at the C4/5 level with  anterior wedging deformity of C4 and focal kyphosis. Appearance is  consistent with spondylodiscitis.  Degree of anterior angulation/focal  kyphosis results in widening of the facet joint space.    Discs/spinal canal/neural foramina:  There is mild-moderate stenosis  at the C4/5 level. Neural foraminal stenosis also noted at this level.   Stable disc osteophyte complex at C5/6 with mild central canal stenosis  and moderate neural foraminal stenosis.    Soft tissues:  There is marked prevertebral soft tissue swelling with  phlegmon seen in the prevertebral space that has a maximal AP  thickness  of 2.7 cm and effaces the posterior margin of the oropharynx.    Retropharyngeal space:  Edema in the retropharyngeal space is also  noted.       Impression:      1.  Interval development of spondylodiscitis with destructive changes of  the disc space at C4/5, anterior wedging deformity of C4, and focal  kyphosis with widening of the C4/5 facet joints. Moderate central canal  and neural foraminal stenosis at this level.  2.  Development of marked prevertebral soft tissue thickening with  phlegmon compatible with tracking infection in the prevertebral spaces.  Associated edema of the retropharyngeal fat space noted.  3.  Degenerative disc disease C5/6 is stable with stenosis again noted.  4.  Findings communicated to the on-call hospitalist, Dr. Ocampo, at  7:24 PM on 03/09/2021.     This report was finalized on 3/9/2021 7:27 PM by Dr. Maxi Lowry MD.       XR Chest 1 View [[id number]] Collected: 03/07/21 2203     Updated: 03/07/21 2205    Narrative:      CR Chest 1 Vw    INDICATION:   Sepsis     COMPARISON:    1/29/2020    FINDINGS:  Single portable AP view(s) of the chest.  Left IJ permacath terminates at the level of the cavoatrial junction and right atrium. Normal cardiomediastinal contours. No confluent infiltrate the lungs. No pleural effusion. No pneumothorax.      Impression:      No acute cardiopulmonary findings.    Signer Name: DARYL CALLEJAS MD   Signed: 3/7/2021 10:03 PM   Workstation Name: [email or web address]    [facility]          I have personally reviewed the above radiology results.   ---------------------------------------------------------------------------------------------------------------------      Assessment & Plan        Assessment/Plan       ASSESSMENT:    1.  Sepsis   2.  Epidural abscess  3.  UTI    PLAN:    The patient presented to [facility] Emergency Department on 3/7/2021 for fatigue and generalized weakness.  The patient was last hospitalized  at [facility] from 2/25/2021 through 2 27-21 for severe hyperkalemia.  Patient left AMA on the 27th and had not had dialysis since that time until returning to the hospital.    Today, the patient is on room air with a T-max of 100.3 °F overnight. CRP on 3/7/2021 was elevated at 13.61.  WBC is normal. Lactic acid on admission was elevated at 2.1.  Urinalysis on 3/9/2021 was positive with WBCs too numerous to count and 4+ bacteria.  Urine culture finalized as greater than 100,000 colonies of Serratia marcescens.  CT cervical spine without contrast on 3/9/2021 showed interval development of spondylodiscitis with destructive changes of the disc space at C4/5, anterior wedging deformity of C4, and focal kyphosis with widening of the C4/5 facet joints.  Moderate central canal and neuroforaminal stenosis at this level.  Development of marked prevertebral soft tissue thickening with phlegmon compatible with tracking infection in the prevertebral spaces.  Associated edema of the retropharyngeal fat space noted.  Degenerative disc disease C5/6 is stable with stenosis again noted.  Chest x-ray on 3/7/2021 showed no acute cardiopulmonary findings.  CT head without contrast on 3/9/2021 was unremarkable.  Blood cultures on 3/9/2021 are so far showing no growth.  Blood cultures on 3/7/2021 are so far showing no growth.    Per Dr. Tyson, the patient's abscess is in need of surgical evaluation and treatment and patient has been accepted for transfer to [facility] in [place], [place] by Dr. Woodruff with spine service and Dr. Gan on the hospitalist service.  He is currently on broad-spectrum coverage with vancomycin, Azactam, and Flagyl.  A bed is still not available at this time.    We are concerned with developing resistance in the setting of prolonged IV antibiotic use.  We will transition Azactam and Flagyl to [facility] pharmacy to dose through graded challenge today.  We will continue with vancomycin  but recommend to aim for a trough level around 15-20.  Recommend 2D echo.  If blood cultures become positive, would recommend a MARISELA.  Patient is awaiting transfer for emergent neurosurgical intervention.    Again, thank you Dr. Tyson for allowing us to participate in the care of your patient and please feel free to call for any questions you may have.    Code Status:   Code Status and Medical Interventions:   Ordered at: 03/07/21 4913     Level Of Support Discussed With:    Patient     Code Status:    CPR     Medical Interventions (Level of Support Prior to Arrest):    Full     CHRIS Mcbride  03/11/21  08:20 EST

## 2021-03-11 NOTE — PLAN OF CARE
Goal Outcome Evaluation:  Plan of Care Reviewed With: patient  Progress: no change  Outcome Summary: VSS and continues on RA, tolerating well. Continues to have severe neck pain and is referring PRN pain medication for it. Call light within reach.

## 2021-03-11 NOTE — PROGRESS NOTES
[facility] HOSPITALIST PROGRESS NOTE     Patient Identification:  Name:  Mario Nair  Age:  45 y.o.  Sex:  male  :  1975  MRN:  [id number]  Visit Number:  [id number]  ROOM: [address]     Primary Care Provider:  Danny Rebolledo MD    Length of stay in inpatient status:  3    Subjective     Chief Compliant:    Chief Complaint   Patient presents with   • Weakness - Generalized       History of Presenting Illness: Patient seen and evaluated in follow-up for end-stage renal disease with medical noncompliance and noncompliance with hemodialysis with severe hyperkalemia at presentation.  Patient now with new found cervical spine phlegmon extending from C2-C7 with kyphotic changes at C4 and C5.  Patient this morning still complaining of severe persistent neck pain and limited use of the upper extremities however his right upper extremity is improved from prior.    Objective     Current Hospital Meds:aztreonam, 2 g, Intravenous, Q12H  calcium acetate, 667 mg, Oral, TID With Meals  carvedilol, 12.5 mg, Oral, BID With Meals  enoxaparin, 30 mg, Subcutaneous, Nightly  epoetin mc/mc-epbx, 6,000 Units, Subcutaneous, Once per day on   iron sucrose (VENOFER) IVPB, 200 mg, Intravenous, Q24H  lactobacillus acidophilus, 1 capsule, Oral, Daily  lidocaine, 1 patch, Transdermal, Q24H  metroNIDAZOLE, 500 mg, Intravenous, Q8H  pantoprazole, 40 mg, Oral, Daily  sodium chloride, 3 mL, Intravenous, Q12H  Vancomycin Pharmacy Intermittent Dosing, , Does not apply, Daily         Current Antimicrobial Therapy:  Anti-Infectives (From admission, onward)    Ordered     Dose/Rate Route Frequency Start Stop    03/10/21 1533  vancomycin 1 g/250 mL 0.9% NS (vial-mate)     Ordering Provider: Mark Tyson,     1,000 mg  over 60 Minutes Intravenous Once 03/10/21 1700 03/10/21 1821    21  Vancomycin Pharmacy Intermittent Dosing     Ordering Provider: Opal Ocampo MD     Does not apply  Daily 03/10/21 0900 03/13/21 0859    03/09/21 2019  aztreonam (AZACTAM) 2 g/100 mL 0.9% NS (mbp)     Ordering Provider: Opal Ocampo MD    2 g  over 4 Hours Intravenous Every 12 Hours 03/10/21 0900 03/17/21 0859    03/09/21 2011  aztreonam (AZACTAM) 2 g/100 mL 0.9% NS (mbp)     Ordering Provider: Opal Ocampo MD    2 g  over 30 Minutes Intravenous Once 03/09/21 2200 03/09/21 2214 03/09/21 2011  metroNIDAZOLE (FLAGYL) 500 mg/100mL IVPB     Ordering Provider: Opal Ocampo MD    500 mg  over 30 Minutes Intravenous Every 8 Hours 03/09/21 2200 03/16/21 2159 03/09/21 2011  vancomycin 1500 mg/500 mL 0.9% NS IVPB ([facility])     Ordering Provider: Opal Ocampo MD    20 mg/kg × 72.7 kg Intravenous Once 03/09/21 2100 03/09/21 2055        Current Diuretic Therapy:  Diuretics (From admission, onward)    None        ----------------------------------------------------------------------------------------------------------------------  Vital Signs:  Temp:  [98.3 °F (36.8 °C)-98.8 °F (37.1 °C)] 98.3 °F (36.8 °C)  Heart Rate:  [] 93  Resp:  [8-25] 11  BP: ()/(49-95) 141/77  SpO2:  [95 %-100 %] 98 %  on   ;   Device (Oxygen Therapy): room air  Body mass index is 21.47 kg/m².    Wt Readings from Last 3 Encounters:   03/10/21 75.8 kg (167 lb 3.2 oz)   02/27/21 71 kg (156 lb 9.6 oz)   02/20/21 68.2 kg (150 lb 6.4 oz)     Intake & Output (last 3 days)       03/08 0701 - 03/09 0700 03/09 0701 - 03/10 0700 03/10 0701 - 03/11 0700    P.O. 180 1491 1267    I.V. (mL/kg)  1412.9 (18.6) 260.7 (3.4)    Blood  300     IV Piggyback       Total Intake(mL/kg) 180 (2.5) 3203.9 (42.3) 1527.7 (20.2)    Urine (mL/kg/hr)  400 (0.2) 850 (0.9)    Other  1500     Total Output  1900 850    Net +180 +1303.9 +677.7           Urine Unmeasured Occurrence 4 x 1 x 1 x    Stool Unmeasured Occurrence 4 x          Diet Regular; Renal; High  Protein  ----------------------------------------------------------------------------------------------------------------------  Physical exam:  Constitutional: Adult male, appears in mild distress and older than stated age.      HENT:  Head:  Normocephalic and atraumatic.  Mouth:  Moist mucous membranes.    Eyes:  Conjunctivae and EOM are normal. No scleral icterus.    Neck:  Neck supple but with increased pain with extension of the head and reduce pain with flexion.  There is significant tenderness to palpation at the base of the cervical spine.  No JVD present.    Cardiovascular: Tachycardic but regular rhythm and normal heart sounds with no murmur.  Pulmonary/Chest:  No respiratory distress, no wheezes, no crackles, with normal breath sounds and good air movement.  Abdominal:  Soft.  Bowel sounds are normal.  No distension and no tenderness.   Musculoskeletal:  No edema, no tenderness, and no deformity.  No red or swollen joints anywhere.  There is 3 out of 5 strength in the right upper extremity with 1 out of 5 strength still in the left with flexion, extension, and  strength. .  Neurological:  Alert and oriented to person, place, and time.  No cranial nerve deficit.  No tongue deviation.  No facial droop.  No slurred speech. Intact Sensation throughout including the upper extremities bilaterally.  Reflexes 1+ in upper and 2+ in the lower extremities bilaterally.  Skin:  Skin is warm and dry. No rash or lesion noted. No pallor.   Peripheral vascular:  Pulses in all 4 extremities with no clubbing, no cyanosis, no edema.  Psychiatric: Appropriate mood and affect, pleasant.   ----------------------------------------------------------------------------------------------------------------------  Tele:    ----------------------------------------------------------------------------------------------------------------------  Results from last 7 days   Lab Units 03/10/21  0813 03/09/21  0034 03/08/21  0531  03/07/21  2324 03/07/21  2120   CRP mg/dL  --   --   --   --  13.61*   LACTATE mmol/L  --   --   --  1.9 2.1*   WBC 10*3/mm3 6.92 7.31 8.38  --  12.90*   HEMOGLOBIN g/dL 7.3*  7.3* 6.7* 7.6*  --  8.6*   HEMATOCRIT % 23.9*  23.9* 21.1* 22.8*  --  27.6*   MCV fL 94.1 91.3 88.4  --  93.6   MCHC g/dL 30.5* 31.8 33.3  --  31.2*   PLATELETS 10*3/mm3 113* 132* 148  --  170   INR   --   --   --   --  1.32*     Results from last 7 days   Lab Units 03/07/21  2132   PH, ARTERIAL pH units 7.273*   PO2 ART mm Hg 106.0   PCO2, ARTERIAL mm Hg 19.8*   HCO3 ART mmol/L 9.1*     Results from last 7 days   Lab Units 03/10/21  0009 03/09/21  0034 03/08/21  0531 03/07/21  2120   SODIUM mmol/L 135* 134* 135* 133*   POTASSIUM mmol/L 4.4 4.8 3.5 8.1*   MAGNESIUM mg/dL  --  1.8  --  2.3   CHLORIDE mmol/L 96* 93* 94* 93*   CO2 mmol/L 23.3 18.6* 19.8* 9.5*   BUN mg/dL 35* 69* 52* 163*   CREATININE mg/dL 4.19* 6.90* 4.83* 11.65*   EGFR IF NONAFRICN AM mL/min/1.73 16* 9* 13* 5*   CALCIUM mg/dL 7.9* 6.2* 7.6* 6.0*   PHOSPHORUS mg/dL  --  8.2*  --   --    GLUCOSE mg/dL 149* 115* 81 153*   ALBUMIN g/dL 2.63* 2.42* 3.13* 3.41*   BILIRUBIN mg/dL 0.2 0.3 0.4 0.5   ALK PHOS U/L 130* 118* 128* 127*   AST (SGOT) U/L 17 19 17 17   ALT (SGPT) U/L 13 15 18 19   Estimated Creatinine Clearance: 24.1 mL/min (A) (by C-G formula based on SCr of 4.19 mg/dL (H)).  No results found for: AMMONIA  Results from last 7 days   Lab Units 03/08/21  0531 03/07/21  2324 03/07/21  2120   TROPONIN T ng/mL 0.132* 0.130* 0.124*     Results from last 7 days   Lab Units 03/07/21  2120   PROBNP pg/mL 52,969.0*         Glucose   Date/Time Value Ref Range Status   03/10/2021 1106 112 70 - 130 mg/dL Final   03/10/2021 0641 122 70 - 130 mg/dL Final   03/09/2021 2220 126 70 - 130 mg/dL Final   03/09/2021 1612 123 70 - 130 mg/dL Final   03/09/2021 1102 109 70 - 130 mg/dL Final   03/09/2021 0637 117 70 - 130 mg/dL Final   03/08/2021 2101 154 (H) 70 - 130 mg/dL Final   03/08/2021 1619  125 70 - 130 mg/dL Final     Lab Results   Component Value Date    TSH 2.380 11/14/2020    FREET4 0.73 (L) 02/03/2021     No results found for: PREGTESTUR, PREGSERUM, HCG, HCGQUANT  Pain Management Panel     Pain Management Panel Latest Ref Rng & Units 3/9/2021 2/12/2021    AMPHETAMINES SCREEN, URINE Negative Negative Negative    BARBITURATES SCREEN Negative Negative Negative    BENZODIAZEPINE SCREEN, URINE Negative Negative Negative    BUPRENORPHINEUR Negative Negative Negative    COCAINE SCREEN, URINE Negative Negative Negative    METHADONE SCREEN, URINE Negative Negative Negative        Brief Urine Lab Results  (Last result in the past 365 days)      Color   Clarity   Blood   Leuk Est   Nitrite   Protein   CREAT   Urine HCG        03/09/21 2057 Yellow Turbid Large (3+) Large (3+) Negative >=300 mg/dL (3+)             Blood Culture   Date Value Ref Range Status   03/07/2021 No growth at 24 hours  Preliminary   03/07/2021 No growth at 24 hours  Preliminary     No results found for: URINECX  No results found for: WOUNDCX  No results found for: STOOLCX  No results found for: RESPCX  No results found for: AFBCX  Results from last 7 days   Lab Units 03/07/21  2324 03/07/21  2120   LACTATE mmol/L 1.9 2.1*   SED RATE mm/hr  --  >100*   CRP mg/dL  --  13.61*       I have personally looked at the labs and they are summarized above.  ----------------------------------------------------------------------------------------------------------------------  Detailed radiology reports for the last 24 hours:    Imaging Results (Last 24 Hours)     ** No results found for the last 24 hours. **        Assessment & Plan      Hyperkalemia, resolved  End-stage renal disease noncompliant with dialysis  Medical nonadherence  Metabolic acidosis  Lactic acidemia, resolved  Metabolic encephalopathy secondary to uremia  Anemia of both chronic disease and iron deficiency   -Patient currently on Tuesday, Thursday, Saturday schedule.    -Nephrology consulted and following for inpatient hemodialysis   -Patient agreeable to arrangement of hemodialysis chair,  currently working on arranging   -Patient status post transfusion of 1 unit of packed red blood cells with hemoglobin improved to 7.3, will repeat CBC in a.m.   -Patient with chronic anemia requiring transfusion at last hospitalization before leaving [place], likely mixed component of both iron deficiency as well as his chronic kidney disease.    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   -CT of the cervical spine with significant findings of interval development of spondylodiscitis with destructive changes of the disc space at C4/5, anterior wedging deformity of C4, and focal kyphosis with widening of the C4/5 facet joints.  There is also noted moderate central canal and neural foraminal stenosis at this level.  Development of marked.  Vertebral soft tissue thickening with phlegmon compatible with tracking infection in the prevertebral spaces there is associated edema of the retropharyngeal fat space noted.  Again noted are degenerative disc disease at C5/6 with stenosis.  Per radiology read on 3/9/2021 at 19:27   -Above findings highly concerning for developing abscess in need of surgical evaluation and treatment, as the services are unavailable at this hospital, patient was accepted for transfer to [facility] in [facility] by Dr Woodruff with Spine service  and Dr. Gan on the hospitalist service..   -Currently on broad-spectrum antibiotics with vancomycin, aztreonam and Flagyl, patient was anticipated to be transferred to outside hospital today however bed still not available, therefore infectious disease while awaiting transfer.    Troponin elevation   -Flat trend in the setting of end-stage renal disease, no further work-up at this time as patient is without chest pain.    Hypertension   -Blood pressure improving, currently normotensive, still holding home antihypertensives.    Chronic hepatitis C  History of IV drug abuse  History of endocarditis   -Repeat echocardiogram with normal LV cavity size and wall thickness with normal contraction of the ventricular wall.  EF of 61 to 65%.  No evidence of pericardial effusion and pericardium appears normal.   -Patient toxic screen was negative at admission, last positive on 10/16/2020 for opiates.      VTE Prophylaxis:   Mechanical Order History:     None      Pharmalogical Order History:      Ordered     Dose Route Frequency Stop    03/08/21 0013  enoxaparin (LOVENOX) syringe 30 mg      30 mg SC Nightly --    03/07/21 2465  Pharmacy to Dose enoxaparin (LOVENOX)  Status:  Discontinued     Question:  Indication of use  Answer:  Prophylaxis    -- XX Continuous PRN 03/08/21 0014                Disposition pending transfer to higher level of care    Mark Tyson DO  [facility] Hospitalist  03/10/21  19:20 EST

## 2021-03-11 NOTE — PROGRESS NOTES
Nephrology Progress Note      Subjective   Evaluated on dialysis, had mild muscle cramps so UF goal was reduced  Denied any chest pain or shortness of breath  Objective       Vital signs :     Temp:  [97.7 °F (36.5 °C)-100.3 °F (37.9 °C)] 97.7 °F (36.5 °C)  Heart Rate:  [79-98] 91  Resp:  [10-20] 20  BP: (124-148)/(72-95) 134/84      Intake/Output Summary (Last 24 hours) at 3/11/2021 1111  Last data filed at 3/11/2021 0800  Gross per 24 hour   Intake 2551.12 ml   Output 1050 ml   Net 1501.12 ml       Physical Exam:    General Appearance : not in acute distress  Lungs : clear to auscultation, respirations regular  Heart :  regular rhythm & normal rate, normal S1, S2 and no murmur, no rub  Abdomen : normal bowel sounds, no masses, no hepatomegaly, no splenomegaly, soft non-tender and no guarding  Extremities : moves extremities well, no edema, no cyanosis and no redness  Skin :  no bleeding, bruising or rash  Neurologic :   orientated to person, place, time and situation, Grossly no focal deficits      Laboratory Data :     Albumin Albumin   Date Value Ref Range Status   03/11/2021 2.37 (L) 3.50 - 5.20 g/dL Final   03/10/2021 2.63 (L) 3.50 - 5.20 g/dL Final   03/09/2021 2.42 (L) 3.50 - 5.20 g/dL Final      Magnesium Magnesium   Date Value Ref Range Status   03/09/2021 1.8 1.6 - 2.6 mg/dL Final          PTH               No results found for: PTH    CBC and coagulation:  Results from last 7 days   Lab Units 03/11/21  0038 03/10/21  0813 03/09/21  0034 03/07/21  2324 03/07/21  2120   LACTATE mmol/L  --   --   --  1.9 2.1*   SED RATE mm/hr  --   --   --   --  >100*   CRP mg/dL 8.33*  --   --   --  13.61*   WBC 10*3/mm3 9.81 6.92 7.31  --  12.90*   HEMOGLOBIN g/dL 7.5* 7.3*  7.3* 6.7*  --  8.6*   HEMATOCRIT % 23.1* 23.9*  23.9* 21.1*  --  27.6*   MCV fL 89.9 94.1 91.3  --  93.6   MCHC g/dL 32.5 30.5* 31.8  --  31.2*   PLATELETS 10*3/mm3 120* 113* 132*  --  170   INR   --   --   --   --  1.32*     Acid/base  balance:  Results from last 7 days   Lab Units 03/07/21 2132   PH, ARTERIAL pH units 7.273*   PO2 ART mm Hg 106.0   PCO2, ARTERIAL mm Hg 19.8*   HCO3 ART mmol/L 9.1*     Renal and electrolytes:  Results from last 7 days   Lab Units 03/11/21 0038 03/10/21  0009 03/09/21  0034 03/08/21  0531 03/07/21 2120   SODIUM mmol/L 127* 135* 134* 135* 133*   POTASSIUM mmol/L 5.8* 4.4 4.8 3.5 8.1*   MAGNESIUM mg/dL  --   --  1.8  --  2.3   CHLORIDE mmol/L 95* 96* 93* 94* 93*   CO2 mmol/L 19.3* 23.3 18.6* 19.8* 9.5*   BUN mg/dL 69* 35* 69* 52* 163*   CREATININE mg/dL 6.11* 4.19* 6.90* 4.83* 11.65*   EGFR IF NONAFRICN AM mL/min/1.73 10* 16* 9* 13* 5*   CALCIUM mg/dL 8.1* 7.9* 6.2* 7.6* 6.0*   PHOSPHORUS mg/dL  --   --  8.2*  --   --      Estimated Creatinine Clearance: 16.5 mL/min (A) (by C-G formula based on SCr of 6.11 mg/dL (H)).    Liver and pancreatic function:  Results from last 7 days   Lab Units 03/11/21  0038 03/10/21  0009 03/09/21  0034   ALBUMIN g/dL 2.37* 2.63* 2.42*   BILIRUBIN mg/dL 0.2 0.2 0.3   ALK PHOS U/L 107 130* 118*   AST (SGOT) U/L 14 17 19   ALT (SGPT) U/L 11 13 15         Cardiac:  Results from last 7 days   Lab Units 03/07/21 2120   PROBNP pg/mL 52,969.0*     Liver and pancreatic function:  Results from last 7 days   Lab Units 03/11/21  0038 03/10/21  0009 03/09/21 0034   ALBUMIN g/dL 2.37* 2.63* 2.42*   BILIRUBIN mg/dL 0.2 0.2 0.3   ALK PHOS U/L 107 130* 118*   AST (SGOT) U/L 14 17 19   ALT (SGPT) U/L 11 13 15       Medications :     calcium acetate, 667 mg, Oral, TID With Meals  carvedilol, 12.5 mg, Oral, BID With Meals  enoxaparin, 30 mg, Subcutaneous, Nightly  epoetin mc/mc-epbx, 6,000 Units, Subcutaneous, Once per day on Mon Wed Fri  iron sucrose (VENOFER) IVPB, 200 mg, Intravenous, Q24H  lactobacillus acidophilus, 1 capsule, Oral, Daily  lidocaine, 1 patch, Transdermal, Q24H  pantoprazole, 40 mg, Oral, Daily  sodium chloride, 1,000 mL, Intravenous, Once in Dialysis  sodium chloride, 3 mL,  Intravenous, Q12H  Vancomycin Pharmacy Intermittent Dosing, , Does not apply, Daily      Pharmacy Consult - Pharmacy to dose,           Assessment/Plan     1. ESKD on IHDX  2. Sever life threatening hyperkalemia  3. SHPT  4. Acute on chronic Anemia likely due to CKD    Evaluated on dialysis, UF was reduced due to cramps otherwise no issues  Had uneventful dialysis yesterday, continue on TTS schedule.   Anemia, low HH, continu on IV iron and EPO  continue boost rashaad Corado MD  03/11/21  11:11 EST

## 2021-03-11 NOTE — PLAN OF CARE
Goal Outcome Evaluation:  Plan of Care Reviewed With: patient  Progress: no change  Outcome Summary: VSS. SR/ST. RA. Pt. had dialysis this AM with just under 1400ml removed. ID consulted for abx. Pt. is still awaiting a bed at [facility] in [place]. Continues to c/o severe neck and head pain. Prn meds given as requested. Refusing turns due to pain. Call light within reach. Will continue to monitor.

## 2021-03-11 NOTE — DISCHARGE SUMMARY
[facility] HOSPITALISTS DISCHARGE SUMMARY    Patient Identification:  Name:  Mario Nair  Age:  45 y.o.  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 male presented to [facility] complaining of having no energy and needing dialysis.      Patient is a 45-year-old male with a past medical history significant for end-stage renal disease with medical noncompliance as well as noncompliance with dialysis, anemia of chronic disease, history of endocarditis, chronic hepatitis C, past IV drug abuse, hypertension who presented to the hospital with complaints of worsening fatigue and generalized weakness.  Patient had been recently hospitalized from  for severe hyperkalemia due to noncompliance however left the hospital AMA before a dialysis seat could be arranged.  Patient returned back to the ER with the above symptoms and was found to be hypokalemic with a potassium of 8.1 and a creatinine of 11.65 with .  There was troponin elevation at admission however troponins were within previously elevated levels chronically seen at previous admissions.  Patient was also without any  chest pain or dyspnea.  Patient was admitted to the hospitalist service for life-threatening hyperkalemia and inpatient hemodialysis.  Patient underwent successful hemodialysis per nephrology service with improvement and potassium.  However after hemodialysis it was noted patient having complaints of severe neck pain which patient did carry a long history of chronic complaints of neck pain.  Had previously had multiple imaging which showed arthritic changes.  However upon physical examination patient was found to have 1 out of 5 strength in the upper extremities bilaterally with no other focal neurological deficits.  Due to this a CT of the head without contrast and of the cervical spine were obtained.  CT of the cervical spine was significant for interval development of spondylodiscitis with destructive changes of the disc space at C4/5, anterior wedging deformity of C4 and focal kyphosis with widening of the C4/5 facet joints.  There was also noted moderate central canal and neuroforaminal stenosis at this level.  There was also development of marked vertebral soft tissue thickening with phlegmon compatible with tracking infection in the paravertebral tibial spaces with associated edema of the retropharyngeal fat space.  Again noted was degenerative disc disease at C5/6 with stenosis.  The above findings were highly concerning for developing abscess in need of surgical evaluation and treatment and transfer to higher level of care was sought.  Patient was accepted for transfer to [facility] by Dr. Woodruff with the spine service and Dr. Gan of the hospitalist service.  Patient was initiated on empiric broad-spectrum antibiotic therapy while awaiting transfer.  However bed was not able to be immediately available and infectious disease was consulted given the above findings.  Infectious disease saw the patient and per the recommendations was initiated on meropenem in addition to vancomycin.  A 2D  echocardiogram was obtained which showed normal left ventricular cavity size and wall thickness with normal wall motion and a left ventricular ejection fraction of 61 to 65% with normal pericardium and no evidence of pericardial effusion.  Multiple sets of blood cultures obtained during this hospitalization have remained without any growth.  Patient has been on IV morphine for breakthrough pain with [place] for oral analgesia.   Patient had received dialysis earlier in the day.  He is on iron and erythropoietin per nephrology due to anemia of chronic renal disease and patient did receive 1 unit of packed red blood cells with improvement to hemoglobin greater than 7 after slow trend down during this hospitalization.  Patient had a bed become available at [facility] on 3/11/2021 but due to delay in transportation due to availability lost his bed and was placed back on a waiting list.  [facility] was contacted in the interim and after Neurosurgery service their reviewed imaging recommended immediate transfer to  for emergent Neurosurgery services. Patient is currently in C-spine collar and receiving decadron per Neurosurgery recommendations.  CT imaging obtained was sent as well on disk.       VITAL SIGNS:  Temp:  [97.3 °F (36.3 °C)-101.3 °F (38.5 °C)] 101.3 °F (38.5 °C)  Heart Rate:  [] 109  Resp:  [10-22] 22  BP: (113-148)/(68-95) 120/75  SpO2:  [96 %-100 %] 97 %  on   ;   Device (Oxygen Therapy): room air    Body mass index is 21.34 kg/m².  Wt Readings from Last 3 Encounters:   03/11/21 75.4 kg (166 lb 3.2 oz)   02/27/21 71 kg (156 lb 9.6 oz)   02/20/21 68.2 kg (150 lb 6.4 oz)       PHYSICAL EXAM:  Constitutional: Adult male, appears in mild distress and older than stated age.      HENT:  Head:  Normocephalic and atraumatic.  Mouth:  Moist mucous membranes.    Eyes:  Conjunctivae and EOM are normal. No scleral icterus.    Neck:  Neck supple but with increased pain with extension of the head  and reduce pain with flexion.  There is significant tenderness to palpation at the base of the cervical spine.  No JVD present.    Cardiovascular: Tachycardic but regular rhythm and normal heart sounds with no murmur.  Pulmonary/Chest:  No respiratory distress, no wheezes, no crackles, with normal breath sounds and good air movement.  Abdominal:  Soft.  Bowel sounds are normal.  No distension and no tenderness.   Musculoskeletal:  No edema, no tenderness, and no deformity.  No red or swollen joints anywhere.  There is 2 out of 5 strength in the right upper extremity with 0 out of 5 strength still in the left with flexion, extension, and  strength. .  Neurological:  Alert and oriented to person, place, and time.  No cranial nerve deficit.  No tongue deviation.  No facial droop.  No slurred speech. Intact Sensation throughout including the upper extremities bilaterally.  Skin:  Skin is warm and dry. No rash or lesion noted. No pallor.   Peripheral vascular:  Pulses in all 4 extremities with no clubbing, no cyanosis, no edema.  Psychiatric: Appropriate mood and affect, pleasant.     DISCHARGE DISPOSITION   Stable    DISCHARGE MEDICATIONS:     Discharge Medications      New Medications      Instructions Start Date   dextrose 40 % gel  Commonly known as: GLUTOSE   15 g, Oral, Every 15 Minutes PRN      dextrose 50 % solution  Commonly known as: D50W   25 g, Intravenous, Every 15 Minutes PRN      enoxaparin 30 MG/0.3ML solution syringe  Commonly known as: LOVENOX   30 mg, Subcutaneous, Nightly      epoetin mc-epbx 3000 UNIT/ML injection  Commonly known as: RETACRIT   6,000 Units, Subcutaneous, 3 Times Weekly   Start Date: March 12, 2021     glucagon (human recombinant) 1 MG injection  Commonly known as: GLUCAGEN DIAGNOSTIC   1 mg, Subcutaneous, Every 15 Minutes PRN      HYDROcodone-acetaminophen 5-325 MG per tablet  Commonly known as: NORCO   2 tablets, Oral, Every 6 Hours PRN      lactobacillus acidophilus capsule  capsule   1 capsule, Oral, Daily   Start Date: March 12, 2021     meropenem  Commonly known as: MERREM   500 mg, Intravenous, Every 24 Hours   Start Date: March 12, 2021     metoprolol tartrate 5 MG/5ML injection  Commonly known as: LOPRESSOR   5 mg, Intravenous, Every 6 Hours PRN      morphine 4 MG/ML injection   4 mg, Intravenous, Every 3 Hours PRN      VANCOMYCIN PHARMACY INTERMITTENT DOSING   Does not apply, Daily   Start Date: March 12, 2021        Changes to Medications      Instructions Start Date   lidocaine 5 %  Commonly known as: LIDODERM  What changed: Another medication with the same name was added. Make sure you understand how and when to take each.   1 patch, Transdermal, Every 24 Hours, Prior to [facility] Admission, Patient was on: applies to back      lidocaine 5 %  Commonly known as: LIDODERM  What changed: You were already taking a medication with the same name, and this prescription was added. Make sure you understand how and when to take each.   1 patch, Transdermal, Every 24 Hours Scheduled, Remove & Discard patch within 12 hours or as directed by MD   Start Date: March 12, 2021        Continue These Medications      Instructions Start Date   calcium acetate 667 MG capsule capsule  Commonly known as: PHOS BINDER)   667 mg, Oral, 3 Times Daily With Meals      carvedilol 12.5 MG tablet  Commonly known as: COREG   12.5 mg, Oral, 2 Times Daily With Meals      methocarbamol 500 MG tablet  Commonly known as: ROBAXIN   500 mg, Oral, 3 Times Daily PRN      pantoprazole 40 MG EC tablet  Commonly known as: PROTONIX   40 mg, Oral, Daily         Stop These Medications    amLODIPine 5 MG tablet  Commonly known as: NORVASC     hydrALAZINE 10 MG tablet  Commonly known as: APRESOLINE     isosorbide mononitrate 30 MG 24 hr tablet  Commonly known as: IMDUR              Follow-up Information     Danny Rebolledo MD .    Specialty: Family Medicine  Contact information:  [address] [facility]  [id number]  [phone]                    TEST  RESULTS PENDING AT DISCHARGE  Pending Labs     Order Current Status    Blood Culture - Blood, Arm, Left Preliminary result    Blood Culture - Blood, Arm, Right Preliminary result    Blood Culture - Blood, Hand, Left Preliminary result    Blood Culture - Blood, Hand, Right Preliminary result           CODE STATUS  Code Status and Medical Interventions:   Ordered at: 03/07/21 3882     Level Of Support Discussed With:    Patient     Code Status:    CPR     Medical Interventions (Level of Support Prior to Arrest):    Full       Mark Tyson DO  [facility] Hospitalist  03/11/21  15:42 EST    Please note that this discharge summary required more than 30 minutes to complete.

## 2021-03-12 NOTE — PROGRESS NOTES
[facility] HOSPITALIST PROGRESS NOTE     Patient Identification:  Name:  Mario Nair  Age:  45 y.o.  Sex:  male  :  1975  MRN:  [id number]  Visit Number:  [id number]  ROOM: [address]     Primary Care Provider:  Danny Rebolledo MD    Length of stay in inpatient status:  5    Subjective     Chief Compliant:    Chief Complaint   Patient presents with   • Weakness - Generalized       History of Presenting Illness: Patient seen and evaluated in follow-up for end-stage renal disease with medical noncompliance and noncompliance with hemodialysis with severe hyperkalemia at presentation.  Patient now with new found cervical spine phlegmon extending from C2-C7 with kyphotic changes at C4 and C5.  Patient still complaining of severe persistent neck pain and limited use of the upper extremities however is able to move right upper extremity some.    Objective     Current Hospital Meds:calcium acetate, 667 mg, Oral, TID With Meals  carvedilol, 12.5 mg, Oral, BID With Meals  enoxaparin, 30 mg, Subcutaneous, Nightly  epoetin mc/mc-epbx, 6,000 Units, Subcutaneous, Once per day on   iron sucrose (VENOFER) IVPB, 200 mg, Intravenous, Q24H  lactobacillus acidophilus, 1 capsule, Oral, Daily  lidocaine, 1 patch, Transdermal, Q24H  meropenem, 500 mg, Intravenous, Q24H  pantoprazole, 40 mg, Oral, Daily  sodium chloride, 3 mL, Intravenous, Q12H  Vancomycin Pharmacy Intermittent Dosing, , Does not apply, Daily    Pharmacy Consult - Pharmacy to dose,         Current Antimicrobial Therapy:  Anti-Infectives (From admission, onward)    Ordered     Dose/Rate Route Frequency Start Stop    21 1212  meropenem (MERREM) 500mg/100 mL 0.9% NS IVPB (mbp)     Ordering Provider: Tiffanie Lynn MD    500 mg  over 30 Minutes Intravenous Every 24 Hours 21 1400 21 1359    21 1539  meropenem (MERREM) 500mg/100 mL 0.9% NS IVPB (mbp)     Ordering Provider: Mark Tyson DO    500 mg  Intravenous Every 24 Hours 03/12/21 0000 03/25/21 2359    03/11/21 1539  Vancomycin HCl (VANCOMYCIN PHARMACY INTERMITTENT DOSING)     Ordering Provider: Mark Tyson DO     Does not apply Daily 03/12/21 0000 03/15/21 2359    03/11/21 1152  meropenem (MERREM) 1 g/100 mL 0.9% NS VTB (mbp)     Ordering Provider: Adrienne Han PA    1 g  over 30 Minutes Intravenous Once 03/11/21 1300 03/11/21 1353    03/10/21 1533  vancomycin 1 g/250 mL 0.9% NS (vial-mate)     Ordering Provider: Mark Tyson DO    1,000 mg  over 60 Minutes Intravenous Once 03/10/21 1700 03/10/21 1821    03/09/21 2013  Vancomycin Pharmacy Intermittent Dosing     Ordering Provider: Opal Ocampo MD     Does not apply Daily 03/10/21 0900 03/13/21 0859    03/09/21 2011  aztreonam (AZACTAM) 2 g/100 mL 0.9% NS (mbp)     Ordering Provider: Opal Ocampo MD    2 g  over 30 Minutes Intravenous Once 03/09/21 2200 03/09/21 2214 03/09/21 2011  vancomycin 1500 mg/500 mL 0.9% NS IVPB ([facility])     Ordering Provider: Opal Ocampo MD    20 mg/kg × 72.7 kg Intravenous Once 03/09/21 2100 03/09/21 2055        Current Diuretic Therapy:  Diuretics (From admission, onward)    None        ----------------------------------------------------------------------------------------------------------------------  Vital Signs:  Temp:  [97.3 °F (36.3 °C)-101.3 °F (38.5 °C)] 98.8 °F (37.1 °C)  Heart Rate:  [] 87  Resp:  [14-22] 18  BP: ()/(58-94) 127/88  SpO2:  [94 %-100 %] 99 %  on   ;   Device (Oxygen Therapy): room air  Body mass index is 21.13 kg/m².    Wt Readings from Last 3 Encounters:   03/12/21 74.7 kg (164 lb 9.6 oz)   02/27/21 71 kg (156 lb 9.6 oz)   02/20/21 68.2 kg (150 lb 6.4 oz)     Intake & Output (last 3 days)       03/09 0701 - 03/10 0700 03/10 0701 - 03/11 0700 03/11 0701 - 03/12 0700 03/12 0701 - 03/13 0700    P.O. 1491 1267 1185     I.V. (mL/kg) 1412.9 (18.6) 804.1 (10.7) 215.4 (2.9)     Blood 300       Total Intake(mL/kg)  3203.9 (42.3) 2071.1 (27.5) 1400.4 (18.7)     Urine (mL/kg/hr) 400 (0.2) 1350 (0.7) 1300 (0.7)     Other 1500  1397     Total Output 1900 1350 2697     Net +1303.9 +721.1 -1296.6             Urine Unmeasured Occurrence 1 x 1 x 1 x         Diet Regular; Renal; High Protein  ----------------------------------------------------------------------------------------------------------------------  Physical exam:  Constitutional: Adult male, appears in mild distress and older than stated age.      HENT:  Head:  Normocephalic and atraumatic.  Mouth:  Moist mucous membranes.    Eyes:  Conjunctivae and EOM are normal. No scleral icterus.    Neck:  Neck supple but with increased pain with extension of the head and reduce pain with flexion.  There is significant tenderness to palpation at the base of the cervical spine.  No JVD present.    Cardiovascular: Tachycardic but regular rhythm and normal heart sounds with no murmur.  Pulmonary/Chest:  No respiratory distress, no wheezes, no crackles, with normal breath sounds and good air movement.  Abdominal:  Soft.  Bowel sounds are normal.  No distension and no tenderness.   Musculoskeletal:  No edema, no tenderness, and no deformity.  No red or swollen joints anywhere.  There is 3 out of 5 strength in the right upper extremity with 1 out of 5 strength still in the left with flexion, extension, and  strength. .  Neurological:  Alert and oriented to person, place, and time.  No cranial nerve deficit.  No tongue deviation.  No facial droop.  No slurred speech. Intact Sensation throughout including the upper extremities bilaterally.  Reflexes 1+ in upper and 2+ in the lower extremities bilaterally.  Skin:  Skin is warm and dry. No rash or lesion noted. No pallor.   Peripheral vascular:  Pulses in all 4 extremities with no clubbing, no cyanosis, no edema.  Psychiatric: Appropriate mood and affect, pleasant.    ----------------------------------------------------------------------------------------------------------------------  Tele:    ----------------------------------------------------------------------------------------------------------------------  Results from last 7 days   Lab Units 03/12/21  0035 03/11/21  0038 03/10/21  0813 03/09/21  0034 03/07/21 2324 03/07/21 2120   CRP mg/dL 9.88* 8.33*  --   --   --  13.61*   LACTATE mmol/L  --   --   --   --  1.9 2.1*   WBC 10*3/mm3  --  9.81 6.92 7.31  --  12.90*   HEMOGLOBIN g/dL  --  7.5* 7.3*  7.3* 6.7*  --  8.6*   HEMATOCRIT %  --  23.1* 23.9*  23.9* 21.1*  --  27.6*   MCV fL  --  89.9 94.1 91.3  --  93.6   MCHC g/dL  --  32.5 30.5* 31.8  --  31.2*   PLATELETS 10*3/mm3  --  120* 113* 132*  --  170   INR   --   --   --   --   --  1.32*     Results from last 7 days   Lab Units 03/07/21  2132   PH, ARTERIAL pH units 7.273*   PO2 ART mm Hg 106.0   PCO2, ARTERIAL mm Hg 19.8*   HCO3 ART mmol/L 9.1*     Results from last 7 days   Lab Units 03/12/21  0035 03/11/21  0038 03/10/21  0009 03/09/21  0034 03/07/21 2120   SODIUM mmol/L 132* 127* 135* 134* 133*   POTASSIUM mmol/L 4.9 5.8* 4.4 4.8 8.1*   MAGNESIUM mg/dL  --   --   --  1.8 2.3   CHLORIDE mmol/L 94* 95* 96* 93* 93*   CO2 mmol/L 22.2 19.3* 23.3 18.6* 9.5*   BUN mg/dL 57* 69* 35* 69* 163*   CREATININE mg/dL 4.71* 6.11* 4.19* 6.90* 11.65*   EGFR IF NONAFRICN AM mL/min/1.73 14* 10* 16* 9* 5*   CALCIUM mg/dL 8.7 8.1* 7.9* 6.2* 6.0*   PHOSPHORUS mg/dL  --   --   --  8.2*  --    GLUCOSE mg/dL 111* 118* 149* 115* 153*   ALBUMIN g/dL  --  2.37* 2.63* 2.42* 3.41*   BILIRUBIN mg/dL  --  0.2 0.2 0.3 0.5   ALK PHOS U/L  --  107 130* 118* 127*   AST (SGOT) U/L  --  14 17 19 17   ALT (SGPT) U/L  --  11 13 15 19   Estimated Creatinine Clearance: 21.1 mL/min (A) (by C-G formula based on SCr of 4.71 mg/dL (H)).  No results found for: AMMONIA  Results from last 7 days   Lab Units 03/08/21  0531 03/07/21 2324 03/07/21 2120    TROPONIN T ng/mL 0.132* 0.130* 0.124*     Results from last 7 days   Lab Units 03/07/21  2120   PROBNP pg/mL 52,969.0*         Glucose   Date/Time Value Ref Range Status   03/10/2021 1106 112 70 - 130 mg/dL Final   03/10/2021 0641 122 70 - 130 mg/dL Final   03/09/2021 2220 126 70 - 130 mg/dL Final   03/09/2021 1612 123 70 - 130 mg/dL Final   03/09/2021 1102 109 70 - 130 mg/dL Final     Lab Results   Component Value Date    TSH 2.380 11/14/2020    FREET4 0.73 (L) 02/03/2021     No results found for: PREGTESTUR, PREGSERUM, HCG, HCGQUANT  Pain Management Panel     Pain Management Panel Latest Ref Rng & Units 3/9/2021 2/12/2021    AMPHETAMINES SCREEN, URINE Negative Negative Negative    BARBITURATES SCREEN Negative Negative Negative    BENZODIAZEPINE SCREEN, URINE Negative Negative Negative    BUPRENORPHINEUR Negative Negative Negative    COCAINE SCREEN, URINE Negative Negative Negative    METHADONE SCREEN, URINE Negative Negative Negative        Brief Urine Lab Results  (Last result in the past 365 days)      Color   Clarity   Blood   Leuk Est   Nitrite   Protein   CREAT   Urine HCG        03/09/21 2057 Yellow Turbid Large (3+) Large (3+) Negative >=300 mg/dL (3+)             Blood Culture   Date Value Ref Range Status   03/07/2021 No growth at 24 hours  Preliminary   03/07/2021 No growth at 24 hours  Preliminary     No results found for: URINECX  No results found for: WOUNDCX  No results found for: STOOLCX  No results found for: RESPCX  No results found for: AFBCX  Results from last 7 days   Lab Units 03/12/21  0035 03/11/21  0038 03/07/21  2324 03/07/21  2120   LACTATE mmol/L  --   --  1.9 2.1*   SED RATE mm/hr  --   --   --  >100*   CRP mg/dL 9.88* 8.33*  --  13.61*       I have personally looked at the labs and they are summarized above.  ----------------------------------------------------------------------------------------------------------------------  Detailed radiology reports for the last 24  hours:    Imaging Results (Last 24 Hours)     ** No results found for the last 24 hours. **        Assessment & Plan      Hyperkalemia, resolved  End-stage renal disease noncompliant with dialysis  Medical nonadherence  Metabolic acidosis  Lactic acidemia, resolved  Metabolic encephalopathy secondary to uremia  Anemia of both chronic disease and iron deficiency   -Patient currently on Tuesday, Thursday, Saturday schedule.   -Nephrology consulted and following for inpatient hemodialysis   -Patient agreeable to arrangement of hemodialysis chair,  currently working on arranging   -Patient status post transfusion of 1 unit of packed red blood cells with hemoglobin improved to 7.3, will repeat CBC in a.m.   -Patient with chronic anemia requiring transfusion at last hospitalization before leaving [place], likely mixed component of both iron deficiency as well as his chronic kidney disease.    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   -CT of the cervical spine with significant findings of interval development of spondylodiscitis with destructive changes of the disc space at C4/5, anterior wedging deformity of C4, and focal kyphosis with widening of the C4/5 facet joints.  There is also noted moderate central canal and neural foraminal stenosis at this level.  Development of marked.  Vertebral soft tissue thickening with phlegmon compatible  with tracking infection in the prevertebral spaces there is associated edema of the retropharyngeal fat space noted.  Again noted are degenerative disc disease at C5/6 with stenosis.  Per radiology read on 3/9/2021 at 19:27   -Above findings highly concerning for developing abscess in need of surgical evaluation and treatment, as the services are unavailable at this hospital, patient was accepted for transfer to [facility] in [facility] by Dr Woodruff with Spine service  and Dr. Gan on the hospitalist service..   -Currently on Merrem and Vancomycin per ID recs, appreciate their input.    Troponin elevation   -Flat trend in the setting of end-stage renal disease, no further work-up at this time as patient is without chest pain.    Hypertension   -Blood pressure improving, currently normotensive, still holding home antihypertensives.    Chronic hepatitis C  History of IV drug abuse  History of endocarditis   -Repeat echocardiogram with normal LV cavity size and wall thickness with normal contraction of the ventricular wall.  EF of 61 to 65%.  No evidence of pericardial effusion and pericardium appears normal.   -Patient toxic screen was negative at admission, last positive on 10/16/2020 for opiates.      VTE Prophylaxis:   Mechanical Order History:     None      Pharmalogical Order History:      Ordered     Dose Route Frequency Stop    03/08/21 0013  enoxaparin (LOVENOX) syringe 30 mg      30 mg SC Nightly --    03/07/21 8765  Pharmacy to Dose enoxaparin (LOVENOX)  Status:  Discontinued     Question:  Indication of use  Answer:  Prophylaxis    -- XX Continuous PRN 03/08/21 0014                Disposition pending transfer to higher level of care.  Patient unfortunately had bed become available however due to nursing staff at accepting facility refusing to take report until his assigned bed was been cleaned discharge was delayed and by that point EMS was unavailable to transport patient due to  staffing issues/tranporting other patients and patient lost his bed and is now back on waiting list.     Mark Tyson DO  [facility] ERist  03/12/21  08:06 EST

## 2021-03-12 NOTE — NURSING NOTE
Nurse attempted to call [facility] at 2017 without success. [facility] contacted at 2020 and stated they would return a call if they have transport available. [facility] has not been called at this time, but will contact if [facility] is unable to transport patient.

## 2021-03-12 NOTE — NURSING NOTE
Attempted to call report a second time to  [facility] at this time. Nurse said the bed still was not cleaned and could not take report. Spoke to lead nurse and said we could not arrange transport until report was called, they still refused to take report at this time. I was told again they would call when the bed was cleaned.

## 2021-03-12 NOTE — PLAN OF CARE
Goal Outcome Evaluation:  Plan of Care Reviewed With: patient  Progress: no change  Outcome Summary: [facility] PAINTER RA. Pt. lost his bed at  this morning, but he's back on the waitlist now. Hemoglobin 6.6 today. 2 units PRBC ordered. Pt. c/o frequent neck pain. Prn meds given as requested. Will continue to monitor.

## 2021-03-12 NOTE — NURSING NOTE
Nurse attempted to contact [facility] and [facility] for transport to [facility] in [place] without success.

## 2021-03-12 NOTE — PROGRESS NOTES
Nephrology Progress Note      Subjective   No chest pain or shortness of breath,  Objective       Vital signs :     Temp:  [97.3 °F (36.3 °C)-101.3 °F (38.5 °C)] 98.8 °F (37.1 °C)  Heart Rate:  [] 87  Resp:  [14-22] 18  BP: ()/(58-94) 127/88      Intake/Output Summary (Last 24 hours) at 3/12/2021 0726  Last data filed at 3/12/2021 0610  Gross per 24 hour   Intake 1400.38 ml   Output 2697 ml   Net -1296.62 ml       Physical Exam:    General Appearance : not in acute distress  Lungs : clear to auscultation, respirations regular  Heart :  regular rhythm & normal rate, normal S1, S2 and no murmur, no rub  Abdomen : normal bowel sounds, no masses, no hepatomegaly, no splenomegaly, soft non-tender and no guarding  Extremities : moves extremities well, no edema, no cyanosis and no redness  Skin :  no bleeding, bruising or rash  Neurologic :   orientated to person, place, time and situation, Grossly no focal deficits      Laboratory Data :     Albumin Albumin   Date Value Ref Range Status   03/11/2021 2.37 (L) 3.50 - 5.20 g/dL Final   03/10/2021 2.63 (L) 3.50 - 5.20 g/dL Final      Magnesium No results found for: MG       PTH               No results found for: PTH    CBC and coagulation:  Results from last 7 days   Lab Units 03/12/21  0035 03/11/21  0038 03/10/21  0813 03/09/21  0034 03/07/21  2324 03/07/21  2120   LACTATE mmol/L  --   --   --   --  1.9 2.1*   SED RATE mm/hr  --   --   --   --   --  >100*   CRP mg/dL 9.88* 8.33*  --   --   --  13.61*   WBC 10*3/mm3  --  9.81 6.92 7.31  --  12.90*   HEMOGLOBIN g/dL  --  7.5* 7.3*  7.3* 6.7*  --  8.6*   HEMATOCRIT %  --  23.1* 23.9*  23.9* 21.1*  --  27.6*   MCV fL  --  89.9 94.1 91.3  --  93.6   MCHC g/dL  --  32.5 30.5* 31.8  --  31.2*   PLATELETS 10*3/mm3  --  120* 113* 132*  --  170   INR   --   --   --   --   --  1.32*     Acid/base balance:  Results from last 7 days   Lab Units 03/07/21  2132   PH, ARTERIAL pH units 7.273*   PO2 ART mm Hg 106.0   PCO2,  ARTERIAL mm Hg 19.8*   HCO3 ART mmol/L 9.1*     Renal and electrolytes:  Results from last 7 days   Lab Units 03/12/21  0035 03/11/21 0038 03/10/21  0009 03/09/21  0034 03/08/21  0531 03/07/21 2120   SODIUM mmol/L 132* 127* 135* 134* 135* 133*   POTASSIUM mmol/L 4.9 5.8* 4.4 4.8 3.5 8.1*   MAGNESIUM mg/dL  --   --   --  1.8  --  2.3   CHLORIDE mmol/L 94* 95* 96* 93* 94* 93*   CO2 mmol/L 22.2 19.3* 23.3 18.6* 19.8* 9.5*   BUN mg/dL 57* 69* 35* 69* 52* 163*   CREATININE mg/dL 4.71* 6.11* 4.19* 6.90* 4.83* 11.65*   EGFR IF NONAFRICN AM mL/min/1.73 14* 10* 16* 9* 13* 5*   CALCIUM mg/dL 8.7 8.1* 7.9* 6.2* 7.6* 6.0*   PHOSPHORUS mg/dL  --   --   --  8.2*  --   --      Estimated Creatinine Clearance: 21.1 mL/min (A) (by C-G formula based on SCr of 4.71 mg/dL (H)).    Liver and pancreatic function:  Results from last 7 days   Lab Units 03/11/21  0038 03/10/21  0009 03/09/21  0034   ALBUMIN g/dL 2.37* 2.63* 2.42*   BILIRUBIN mg/dL 0.2 0.2 0.3   ALK PHOS U/L 107 130* 118*   AST (SGOT) U/L 14 17 19   ALT (SGPT) U/L 11 13 15         Cardiac:  Results from last 7 days   Lab Units 03/07/21 2120   PROBNP pg/mL 52,969.0*     Liver and pancreatic function:  Results from last 7 days   Lab Units 03/11/21  0038 03/10/21  0009 03/09/21  0034   ALBUMIN g/dL 2.37* 2.63* 2.42*   BILIRUBIN mg/dL 0.2 0.2 0.3   ALK PHOS U/L 107 130* 118*   AST (SGOT) U/L 14 17 19   ALT (SGPT) U/L 11 13 15       Medications :     calcium acetate, 667 mg, Oral, TID With Meals  carvedilol, 12.5 mg, Oral, BID With Meals  enoxaparin, 30 mg, Subcutaneous, Nightly  epoetin mc/mc-epbx, 6,000 Units, Subcutaneous, Once per day on Mon Wed Fri  iron sucrose (VENOFER) IVPB, 200 mg, Intravenous, Q24H  lactobacillus acidophilus, 1 capsule, Oral, Daily  lidocaine, 1 patch, Transdermal, Q24H  meropenem, 500 mg, Intravenous, Q24H  pantoprazole, 40 mg, Oral, Daily  sodium chloride, 3 mL, Intravenous, Q12H  Vancomycin Pharmacy Intermittent Dosing, , Does not apply,  Daily      Pharmacy Consult - Pharmacy to dose,           Assessment/Plan     1. ESKD on IHDX  2. Sever life threatening hyperkalemia  3. SHPT  4. Acute on chronic Anemia likely due to CKD    Pt had uneventful dialysis yesterday, will continue on TTS schedule.     Anemia, low HH, continu on IV iron and EPO  continue boost rashaad Corado MD  03/12/21  07:26 EST

## 2021-03-12 NOTE — NURSING NOTE
Nurse attempted to contact [facility] with no success. [facility] not available for transport at this time.

## 2021-03-12 NOTE — PROGRESS NOTES
PROGRESS NOTE         Patient Identification:  Name:  Mario Nair  Age:  45 y.o.  Sex:  male  :  1975  MRN:  [id number]  Visit Number:  [id number]  Primary Care Provider:  Danny Rebolledo MD         LOS: 5 days       ----------------------------------------------------------------------------------------------------------------------  Subjective       Chief Complaints:    Weakness - Generalized        Interval History:      The patient is sitting up in bed on room air in no apparent distress.  Continues to complain of bilateral upper extremity weakness, which is worsened today on the left.  Also continues to complain of neck pain with tenderness to palpation of the base of the cervical spine.  Afebrile, no diarrhea.  CRP is worsening at 9.88.  WBC remains normal.    Review of Systems:    Constitutional: no fever, chills and night sweats. No appetite change or unexpected weight change. No fatigue.  Eyes: no eye drainage, itching or redness.  HEENT: no mouth sores, dysphagia or nose bleed.  Respiratory: no for shortness of breath, cough or production of sputum.  Cardiovascular: no chest pain, no palpitations, no orthopnea.  Gastrointestinal: no nausea, vomiting or diarrhea. No abdominal pain, hematemesis or rectal bleeding.  Genitourinary: no dysuria or polyuria.  Hematologic/lymphatic: no lymph node abnormalities, no easy bruising or easy bleeding.  Musculoskeletal: Neck pain.  Skin: No rash and no itching.  Neurological: no loss of consciousness, no seizure, no headache.  Bilateral upper extremity weakness, worse on the left.  Behavioral/Psych: no depression or suicidal ideation.  Endocrine: no hot flashes.  Immunologic: negative.    ----------------------------------------------------------------------------------------------------------------------      Objective       Current [facility] Meds:  calcium acetate, 667 mg, Oral, TID With Meals  carvedilol, 12.5 mg, Oral, BID With  Meals  enoxaparin, 30 mg, Subcutaneous, Nightly  epoetin mc/mc-epbx, 6,000 Units, Subcutaneous, Once per day on Mon Wed Fri  iron sucrose (VENOFER) IVPB, 200 mg, Intravenous, Q24H  lactobacillus acidophilus, 1 capsule, Oral, Daily  lidocaine, 1 patch, Transdermal, Q24H  meropenem, 500 mg, Intravenous, Q24H  pantoprazole, 40 mg, Oral, Daily  sodium chloride, 3 mL, Intravenous, Q12H  vancomycin, 1,000 mg, Intravenous, Once  Vancomycin Pharmacy Intermittent Dosing, , Does not apply, Daily  Vancomycin Pharmacy Intermittent Dosing, , Does not apply, Daily      Pharmacy Consult - Pharmacy to dose,       ----------------------------------------------------------------------------------------------------------------------    Vital Signs:  Temp:  [98.1 °F (36.7 °C)-101.3 °F (38.5 °C)] 98.2 °F (36.8 °C)  Heart Rate:  [] 72  Resp:  [14-22] 18  BP: ()/(58-88) 103/78  Mean Arterial Pressure (Non-Invasive) for the past 24 hrs (Last 3 readings):   Noninvasive MAP (mmHg)   03/12/21 1102 89   03/12/21 1002 93   03/12/21 0902 89     SpO2 Percentage    03/12/21 0802 03/12/21 0902 03/12/21 1102   SpO2: 98% 99% 98%     SpO2:  [94 %-100 %] 98 %  on   ;   Device (Oxygen Therapy): room air    Body mass index is 21.13 kg/m².  Wt Readings from Last 3 Encounters:   03/12/21 74.7 kg (164 lb 9.6 oz)   02/27/21 71 kg (156 lb 9.6 oz)   02/20/21 68.2 kg (150 lb 6.4 oz)        Intake/Output Summary (Last 24 hours) at 3/12/2021 1220  Last data filed at 3/12/2021 1130  Gross per 24 hour   Intake 1020.38 ml   Output 1500 ml   Net -479.62 ml     Diet Regular; Renal; High Protein  ----------------------------------------------------------------------------------------------------------------------      Physical Exam:    Constitutional: Pleasant adult male in no apparent distress.  Receiving dialysis this morning.  HENT:  Head: Normocephalic and atraumatic.  Mouth:  Moist mucous membranes.    Eyes:  Conjunctivae and EOM are normal.  No  scleral icterus.  Neck:  Neck supple.  No JVD present. Tenderness to palpation at the base of the cervical spine.  Cardiovascular:  Normal rate, regular rhythm and normal heart sounds with no murmur. No edema.  Pulmonary/Chest:  No respiratory distress, no wheezes, no crackles, with normal breath sounds and good air movement.  Abdominal:  Soft.  Bowel sounds are normal.  No distension and no tenderness.   Musculoskeletal:  No edema, no tenderness, and no deformity.  No swelling or redness of joints.  3/5 strength of right upper extremity and 1/5 strength of left upper extremity.   Neurological:  Alert and oriented to person, place, and time.  No facial droop.  No slurred speech.   Skin:  Skin is warm and dry.  No rash noted.  No pallor.   Psychiatric:  Normal mood and affect.  Behavior is normal.    ----------------------------------------------------------------------------------------------------------------------  Results from last 7 days   Lab Units 03/08/21  0531 03/07/21 2324 03/07/21 2120   TROPONIN T ng/mL 0.132* 0.130* 0.124*     Results from last 7 days   Lab Units 03/07/21  2120   PROBNP pg/mL 52,969.0*       Results from last 7 days   Lab Units 03/07/21  2132   PH, ARTERIAL pH units 7.273*   PO2 ART mm Hg 106.0   PCO2, ARTERIAL mm Hg 19.8*   HCO3 ART mmol/L 9.1*     Results from last 7 days   Lab Units 03/12/21  0904 03/12/21  0035 03/11/21  0038 03/10/21  0813 03/07/21 2324 03/07/21 2120   CRP mg/dL  --  9.88* 8.33*  --   --  13.61*   LACTATE mmol/L  --   --   --   --  1.9 2.1*   WBC 10*3/mm3 6.43  --  9.81 6.92  --  12.90*   HEMOGLOBIN g/dL 6.6*  --  7.5* 7.3*  7.3*  --  8.6*   HEMATOCRIT % 21.5*  --  23.1* 23.9*  23.9*  --  27.6*   MCV fL 93.9  --  89.9 94.1  --  93.6   MCHC g/dL 30.7*  --  32.5 30.5*  --  31.2*   PLATELETS 10*3/mm3 130*  --  120* 113*  --  170   INR   --   --   --   --   --  1.32*     Results from last 7 days   Lab Units 03/12/21  0035 03/11/21  0038 03/10/21  0009  03/09/21  0034 03/07/21  2120   SODIUM mmol/L 132* 127* 135* 134* 133*   POTASSIUM mmol/L 4.9 5.8* 4.4 4.8 8.1*   MAGNESIUM mg/dL  --   --   --  1.8 2.3   CHLORIDE mmol/L 94* 95* 96* 93* 93*   CO2 mmol/L 22.2 19.3* 23.3 18.6* 9.5*   BUN mg/dL 57* 69* 35* 69* 163*   CREATININE mg/dL 4.71* 6.11* 4.19* 6.90* 11.65*   EGFR IF NONAFRICN AM mL/min/1.73 14* 10* 16* 9* 5*   CALCIUM mg/dL 8.7 8.1* 7.9* 6.2* 6.0*   GLUCOSE mg/dL 111* 118* 149* 115* 153*   ALBUMIN g/dL  --  2.37* 2.63* 2.42* 3.41*   BILIRUBIN mg/dL  --  0.2 0.2 0.3 0.5   ALK PHOS U/L  --  107 130* 118* 127*   AST (SGOT) U/L  --  14 17 19 17   ALT (SGPT) U/L  --  11 13 15 19   Estimated Creatinine Clearance: 21.1 mL/min (A) (by C-G formula based on SCr of 4.71 mg/dL (H)).  No results found for: AMMONIA    Glucose   Date/Time Value Ref Range Status   03/10/2021 1106 112 70 - 130 mg/dL Final   03/10/2021 0641 122 70 - 130 mg/dL Final   03/09/2021 2220 126 70 - 130 mg/dL Final   03/09/2021 1612 123 70 - 130 mg/dL Final     Lab Results   Component Value Date    HGBA1C 5.20 12/31/2020     Lab Results   Component Value Date    TSH 2.380 11/14/2020    FREET4 0.73 (L) 02/03/2021       Blood Culture   Date Value Ref Range Status   03/09/2021 No growth at 2 days  Preliminary   03/09/2021 No growth at 2 days  Preliminary   03/07/2021 No growth at 4 days  Preliminary   03/07/2021 No growth at 4 days  Preliminary     Urine Culture   Date Value Ref Range Status   03/09/2021 >100,000 CFU/mL Serratia marcescens (A)  Final     No results found for: WOUNDCX  No results found for: STOOLCX  No results found for: RESPCX  Pain Management Panel       Pain Management Panel Latest Ref Rng & Units 3/9/2021 2/12/2021    AMPHETAMINES SCREEN, URINE Negative Negative Negative    BARBITURATES SCREEN Negative Negative Negative    BENZODIAZEPINE SCREEN, URINE Negative Negative Negative    BUPRENORPHINEUR Negative Negative Negative    COCAINE SCREEN, URINE Negative Negative Negative     METHADONE SCREEN, URINE Negative Negative Negative              ----------------------------------------------------------------------------------------------------------------------  Imaging Results (Last 24 Hours)       ** No results found for the last 24 hours. **            ----------------------------------------------------------------------------------------------------------------------    Assessment/Plan       Assessment/Plan     ASSESSMENT:    1.  Sepsis   2.  Epidural abscess  3.  UTI    PLAN:    The patient is sitting up in bed on room air in no apparent distress.  Continues to complain of bilateral upper extremity weakness, which is worsened today on the left.  Also continues to complain of neck pain with tenderness to palpation of the base of the cervical spine.  Afebrile, no diarrhea.  CRP is worsening at 9.88.  WBC remains normal.    Lactic acid on admission was elevated at 2.1.  Urinalysis on 3/9/2021 was positive with WBCs too numerous to count and 4+ bacteria.  Urine culture finalized as greater than 100,000 colonies of Serratia marcescens.    2D echo on 3/8/2021 revealed no evidence of endocarditis.  CT cervical spine without contrast on 3/9/2021 showed interval development of spondylodiscitis with destructive changes of the disc space at C4/5, anterior wedging deformity of C4, and focal kyphosis with widening of the C4/5 facet joints.  Moderate central canal and neuroforaminal stenosis at this level.  Development of marked prevertebral soft tissue thickening with phlegmon compatible with tracking infection in the prevertebral spaces.  Associated edema of the retropharyngeal fat space noted.  Degenerative disc disease C5/6 is stable with stenosis again noted.  Chest x-ray on 3/7/2021 showed no acute cardiopulmonary findings.  CT head without contrast on 3/9/2021 was unremarkable.  Blood cultures on 3/9/2021 are so far showing no growth.  Blood cultures on 3/7/2021 are so far showing no growth.      Per Dr. Tyson, the patient's abscess is in need of surgical evaluation and treatment and patient has been accepted for transfer to [facility] in [place], [place] by Dr. Woodruff with spine service and Dr. Gan on the hospitalist service.  He is currently on broad-spectrum coverage with vancomycin, Azactam, and Flagyl.  A bed is still not available at this time.     Recommend to continue on broad-spectrum coverage with vancomycin and Merrem.  Discussed with microbiology and Serratia marcescens in the urine is susceptible to Merrem. If blood cultures become positive, would recommend a MARISELA, but so far there showing no growth.  Patient is awaiting transfer for emergent neurosurgical intervention.    Code Status:   Code Status and Medical Interventions:   Ordered at: 03/07/21 1453     Level Of Support Discussed With:    Patient     Code Status:    CPR     Medical Interventions (Level of Support Prior to Arrest):    Full       CHRIS Mcbride  03/12/21  12:20 EST

## 2021-03-12 NOTE — NURSING NOTE
[facility] called back and stated they could no longer transport the patient due to staffing issues. Nurse will contact [facility] and [facility] to inquire about transport.

## 2021-03-12 NOTE — PROGRESS NOTES
Discharge Planning Assessment  BREANNE Cobb     Patient Name: aMrio Nair  MRN: [id number]  Today's Date: 3/12/2021    Admit Date: 3/7/2021      Discharge Plan     Row Name 03/12/21 1621       Plan    Plan  Pt was admitted on 3/7/21. Pt is currently awaiting transfer to [place] for higher level of care. SS will continue to follow and assist as needed.    Patient/Family in Agreement with Plan  yes              Janice Renner

## 2021-03-12 NOTE — NURSING NOTE
Attempted to call report to  [facility] at this time. Was told the bed was not clean and they couldn't take report. His nurse there will call back for report.

## 2021-03-12 NOTE — PLAN OF CARE
Goal Outcome Evaluation:        Outcome Summary: Patient resting in bed at this time. Patient has requested PRN pain medication several times this shift before it was due to be administered. Patient is still pending transfer to [facility] in [place], but there has been no transport available with [facility], [facility], or [facility]. Call light within reach. Will continue to monitor.

## 2021-03-13 NOTE — PROGRESS NOTES
Nephrology Progress Note      Subjective   Evaluated on dialysis, doing fine, no complaints just  Feels tired  Objective       Vital signs :     Temp:  [97.8 °F (36.6 °C)-99.6 °F (37.6 °C)] 98.9 °F (37.2 °C)  Heart Rate:  [] 96  Resp:  [14-24] 18  BP: ()/(68-95) 139/88      Intake/Output Summary (Last 24 hours) at 3/13/2021 0738  Last data filed at 3/13/2021 0534  Gross per 24 hour   Intake 2239.77 ml   Output 1350 ml   Net 889.77 ml       Physical Exam:    General Appearance : not in acute distress  Lungs : clear to auscultation, respirations regular  Heart :  regular rhythm & normal rate, normal S1, S2 and no murmur, no rub  Abdomen : normal bowel sounds, no masses, no hepatomegaly, no splenomegaly, soft non-tender and no guarding  Extremities : moves extremities well, no edema, no cyanosis and no redness  Skin :  no bleeding, bruising or rash  Neurologic :   orientated to person, place, time and situation, Grossly no focal deficits      Laboratory Data :     Albumin Albumin   Date Value Ref Range Status   03/13/2021 2.71 (L) 3.50 - 5.20 g/dL Final   03/11/2021 2.37 (L) 3.50 - 5.20 g/dL Final      Magnesium No results found for: MG       PTH               No results found for: PTH    CBC and coagulation:  Results from last 7 days   Lab Units 03/13/21  0624 03/12/21  2314 03/12/21  0904 03/12/21  0035 03/11/21  0038 03/07/21  2324 03/07/21  2120   LACTATE mmol/L  --   --   --   --   --  1.9 2.1*   SED RATE mm/hr  --   --   --   --   --   --  >100*   CRP mg/dL 8.15*  --   --  9.88* 8.33*  --  13.61*   WBC 10*3/mm3 6.21  --  6.43  --  9.81  --  12.90*   HEMOGLOBIN g/dL 8.4* 9.1* 6.6*  --  7.5*  --  8.6*   HEMATOCRIT % 26.6* 29.7* 21.5*  --  23.1*  --  27.6*   MCV fL 92.0  --  93.9  --  89.9  --  93.6   MCHC g/dL 31.6  --  30.7*  --  32.5  --  31.2*   PLATELETS 10*3/mm3 126*  --  130*  --  120*  --  170   INR   --   --   --   --   --   --  1.32*     Acid/base balance:  Results from last 7 days   Lab Units  03/07/21  2132   PH, ARTERIAL pH units 7.273*   PO2 ART mm Hg 106.0   PCO2, ARTERIAL mm Hg 19.8*   HCO3 ART mmol/L 9.1*     Renal and electrolytes:  Results from last 7 days   Lab Units 03/13/21 0624 03/12/21 0035 03/11/21 0038 03/10/21  0009 03/09/21  0034 03/07/21 2120   SODIUM mmol/L 129* 132* 127* 135* 134* 133*   POTASSIUM mmol/L 6.1* 4.9 5.8* 4.4 4.8 8.1*   MAGNESIUM mg/dL  --   --   --   --  1.8 2.3   CHLORIDE mmol/L 93* 94* 95* 96* 93* 93*   CO2 mmol/L 20.6* 22.2 19.3* 23.3 18.6* 9.5*   BUN mg/dL 93* 57* 69* 35* 69* 163*   CREATININE mg/dL 6.82* 4.71* 6.11* 4.19* 6.90* 11.65*   EGFR IF NONAFRICN AM mL/min/1.73 9* 14* 10* 16* 9* 5*   CALCIUM mg/dL 8.9 8.7 8.1* 7.9* 6.2* 6.0*   PHOSPHORUS mg/dL  --   --   --   --  8.2*  --      Estimated Creatinine Clearance: 13.5 mL/min (A) (by C-G formula based on SCr of 6.82 mg/dL (H)).    Liver and pancreatic function:  Results from last 7 days   Lab Units 03/13/21 0624 03/11/21 0038 03/10/21  0009   ALBUMIN g/dL 2.71* 2.37* 2.63*   BILIRUBIN mg/dL 0.2 0.2 0.2   ALK PHOS U/L 125* 107 130*   AST (SGOT) U/L 13 14 17   ALT (SGPT) U/L 11 11 13         Cardiac:  Results from last 7 days   Lab Units 03/07/21 2120   PROBNP pg/mL 52,969.0*     Liver and pancreatic function:  Results from last 7 days   Lab Units 03/13/21 0624 03/11/21 0038 03/10/21  0009   ALBUMIN g/dL 2.71* 2.37* 2.63*   BILIRUBIN mg/dL 0.2 0.2 0.2   ALK PHOS U/L 125* 107 130*   AST (SGOT) U/L 13 14 17   ALT (SGPT) U/L 11 11 13       Medications :     calcium acetate, 667 mg, Oral, TID With Meals  carvedilol, 12.5 mg, Oral, BID With Meals  enoxaparin, 30 mg, Subcutaneous, Nightly  epoetin mc/mc-epbx, 6,000 Units, Subcutaneous, Once per day on Mon Wed Fri  iron sucrose (VENOFER) IVPB, 200 mg, Intravenous, Q24H  lactobacillus acidophilus, 1 capsule, Oral, Daily  lidocaine, 1 patch, Transdermal, Q24H  meropenem, 500 mg, Intravenous, Q24H  pantoprazole, 40 mg, Oral, Daily  sodium chloride, 1,000 mL,  Intravenous, Once in Dialysis  sodium chloride, 3 mL, Intravenous, Q12H  Vancomycin Pharmacy Intermittent Dosing, , Does not apply, Daily  Vancomycin Pharmacy Intermittent Dosing, , Does not apply, Daily      Pharmacy Consult - Pharmacy to dose,           Assessment/Plan     1. ESKD on IHDX  2. Sever life threatening hyperkalemia  3. SHPT  4. Acute on chronic Anemia likely due to CKD    Evaluated on dialysis, tolerating  It well, with  No out standing issues.    continue on TTS schedule.     Anemia, low HH, continu on IV iron and EPO  continue boost rashaad Corado MD  03/13/21  07:38 EST

## 2021-03-13 NOTE — PROGRESS NOTES
PROGRESS NOTE         Patient Identification:  Name:  Mario Nair  Age:  45 y.o.  Sex:  male  :  1975  MRN:  [id number]  Visit Number:  [id number]  Primary Care Provider:  Danny Rebolledo MD         LOS: 6 days       ----------------------------------------------------------------------------------------------------------------------  Subjective       Chief Complaints:    Weakness - Generalized        Interval History:      The patient is sitting up in bed on room air in no apparent distress.  Receiving dialysis this morning.  Continues to complain of bilateral upper extremity weakness, which is worsened bilaterally today.  Also continues to complain of neck pain with tenderness to palpation of the base of the cervical spine.  Afebrile, no diarrhea.  CRP is slightly improved at 8.15.  WBC remains normal.  Patient is still waiting for transfer to [facility] in [place], [place].    Review of Systems:    Constitutional: no fever, chills and night sweats. No appetite change or unexpected weight change. No fatigue.  Malaise.  Eyes: no eye drainage, itching or redness.  HEENT: no mouth sores, dysphagia or nose bleed.  Respiratory: no for shortness of breath, cough or production of sputum.  Cardiovascular: no chest pain, no palpitations, no orthopnea.  Gastrointestinal: no nausea, vomiting or diarrhea. No abdominal pain, hematemesis or rectal bleeding.  Genitourinary: no dysuria or polyuria.  Hematologic/lymphatic: no lymph node abnormalities, no easy bruising or easy bleeding.  Musculoskeletal: Neck pain.  Skin: No rash and no itching.  Neurological: no loss of consciousness, no seizure, no headache.  Bilateral upper extremity weakness, worse on the left.  Behavioral/Psych: no depression or suicidal ideation.  Endocrine: no hot flashes.  Immunologic:  negative.    ----------------------------------------------------------------------------------------------------------------------      Objective       Current Hospital Meds:  calcium acetate, 667 mg, Oral, TID With Meals  carvedilol, 12.5 mg, Oral, BID With Meals  enoxaparin, 30 mg, Subcutaneous, Nightly  epoetin mc/mc-epbx, 6,000 Units, Subcutaneous, Once per day on Mon Wed Fri  iron sucrose (VENOFER) IVPB, 200 mg, Intravenous, Q24H  lactobacillus acidophilus, 1 capsule, Oral, Daily  lidocaine, 1 patch, Transdermal, Q24H  meropenem, 500 mg, Intravenous, Q24H  pantoprazole, 40 mg, Oral, Daily  sodium chloride, 1,000 mL, Intravenous, Once in Dialysis  sodium chloride, 3 mL, Intravenous, Q12H  Vancomycin Pharmacy Intermittent Dosing, , Does not apply, Daily      Pharmacy Consult - Pharmacy to dose,       ----------------------------------------------------------------------------------------------------------------------    Vital Signs:  Temp:  [97.7 °F (36.5 °C)-99.6 °F (37.6 °C)] 97.7 °F (36.5 °C)  Heart Rate:  [] 88  Resp:  [14-24] 14  BP: ()/(68-95) 135/86  Mean Arterial Pressure (Non-Invasive) for the past 24 hrs (Last 3 readings):   Noninvasive MAP (mmHg)   03/13/21 0800 114   03/13/21 0503 119   03/13/21 0403 109     SpO2 Percentage    03/13/21 0403 03/13/21 0503 03/13/21 0800   SpO2: 95% 95% 96%     SpO2:  [90 %-99 %] 96 %  on   ;   Device (Oxygen Therapy): room air    Body mass index is 19.61 kg/m².  Wt Readings from Last 3 Encounters:   03/13/21 69.3 kg (152 lb 11.2 oz)   02/27/21 71 kg (156 lb 9.6 oz)   02/20/21 68.2 kg (150 lb 6.4 oz)        Intake/Output Summary (Last 24 hours) at 3/13/2021 1002  Last data filed at 3/13/2021 0804  Gross per 24 hour   Intake 2239.77 ml   Output 1550 ml   Net 689.77 ml     Diet Regular; Renal; High Protein  ----------------------------------------------------------------------------------------------------------------------      Physical  Exam:    Constitutional: Pleasant adult male in no apparent distress.  Receiving dialysis this morning.  HENT:  Head: Normocephalic and atraumatic.  Mouth:  Moist mucous membranes.    Eyes:  Conjunctivae and EOM are normal.  No scleral icterus.  Neck:  Neck supple.  No JVD present. Tenderness to palpation at the base of the cervical spine.  Cardiovascular:  Normal rate, regular rhythm and normal heart sounds with no murmur. No edema.  Pulmonary/Chest:  No respiratory distress, no wheezes, no crackles, with normal breath sounds and good air movement.  Abdominal:  Soft.  Bowel sounds are normal.  No distension and no tenderness.   Musculoskeletal:  No edema, no tenderness, and no deformity.  No swelling or redness of joints.  2/5 strength of right upper extremity and 1/5 strength of left upper extremity.   Neurological:  Alert and oriented to person, place, and time.  No facial droop.  No slurred speech.   Skin:  Skin is warm and dry.  No rash noted.  No pallor.   Psychiatric:  Normal mood and affect.  Behavior is normal.    ----------------------------------------------------------------------------------------------------------------------  Results from last 7 days   Lab Units 03/08/21  0531 03/07/21 2324 03/07/21 2120   TROPONIN T ng/mL 0.132* 0.130* 0.124*     Results from last 7 days   Lab Units 03/07/21  2120   PROBNP pg/mL 52,969.0*       Results from last 7 days   Lab Units 03/07/21 2132   PH, ARTERIAL pH units 7.273*   PO2 ART mm Hg 106.0   PCO2, ARTERIAL mm Hg 19.8*   HCO3 ART mmol/L 9.1*     Results from last 7 days   Lab Units 03/13/21  0624 03/12/21  2314 03/12/21  0904 03/12/21  0035 03/11/21  0038 03/07/21 2324 03/07/21  2120   CRP mg/dL 8.15*  --   --  9.88* 8.33*  --  13.61*   LACTATE mmol/L  --   --   --   --   --  1.9 2.1*   WBC 10*3/mm3 6.21  --  6.43  --  9.81  --  12.90*   HEMOGLOBIN g/dL 8.4* 9.1* 6.6*  --  7.5*  --  8.6*   HEMATOCRIT % 26.6* 29.7* 21.5*  --  23.1*  --  27.6*   MCV fL 92.0   --  93.9  --  89.9  --  93.6   MCHC g/dL 31.6  --  30.7*  --  32.5  --  31.2*   PLATELETS 10*3/mm3 126*  --  130*  --  120*  --  170   INR   --   --   --   --   --   --  1.32*     Results from last 7 days   Lab Units 03/13/21  0624 03/12/21  0035 03/11/21  0038 03/10/21  0009 03/09/21  0034 03/07/21  2120   SODIUM mmol/L 129* 132* 127* 135* 134* 133*   POTASSIUM mmol/L 6.1* 4.9 5.8* 4.4 4.8 8.1*   MAGNESIUM mg/dL  --   --   --   --  1.8 2.3   CHLORIDE mmol/L 93* 94* 95* 96* 93* 93*   CO2 mmol/L 20.6* 22.2 19.3* 23.3 18.6* 9.5*   BUN mg/dL 93* 57* 69* 35* 69* 163*   CREATININE mg/dL 6.82* 4.71* 6.11* 4.19* 6.90* 11.65*   EGFR IF NONAFRICN AM mL/min/1.73 9* 14* 10* 16* 9* 5*   CALCIUM mg/dL 8.9 8.7 8.1* 7.9* 6.2* 6.0*   GLUCOSE mg/dL 104* 111* 118* 149* 115* 153*   ALBUMIN g/dL 2.71*  --  2.37* 2.63* 2.42* 3.41*   BILIRUBIN mg/dL 0.2  --  0.2 0.2 0.3 0.5   ALK PHOS U/L 125*  --  107 130* 118* 127*   AST (SGOT) U/L 13  --  14 17 19 17   ALT (SGPT) U/L 11  --  11 13 15 19   Estimated Creatinine Clearance: 13.5 mL/min (A) (by C-G formula based on SCr of 6.82 mg/dL (H)).  No results found for: AMMONIA    Glucose   Date/Time Value Ref Range Status   03/10/2021 1106 112 70 - 130 mg/dL Final     Lab Results   Component Value Date    HGBA1C 5.20 12/31/2020     Lab Results   Component Value Date    TSH 2.380 11/14/2020    FREET4 0.73 (L) 02/03/2021       Blood Culture   Date Value Ref Range Status   03/09/2021 No growth at 2 days  Preliminary   03/09/2021 No growth at 2 days  Preliminary   03/07/2021 No growth at 4 days  Preliminary   03/07/2021 No growth at 4 days  Preliminary     Urine Culture   Date Value Ref Range Status   03/09/2021 >100,000 CFU/mL Serratia marcescens (A)  Final     No results found for: WOUNDCX  No results found for: STOOLCX  No results found for: RESPCX  Pain Management Panel       Pain Management Panel Latest Ref Rng & Units 3/9/2021 2/12/2021    AMPHETAMINES SCREEN, URINE Negative Negative Negative     BARBITURATES SCREEN Negative Negative Negative    BENZODIAZEPINE SCREEN, URINE Negative Negative Negative    BUPRENORPHINEUR Negative Negative Negative    COCAINE SCREEN, URINE Negative Negative Negative    METHADONE SCREEN, URINE Negative Negative Negative              ----------------------------------------------------------------------------------------------------------------------  Imaging Results (Last 24 Hours)       ** No results found for the last 24 hours. **            ----------------------------------------------------------------------------------------------------------------------    Assessment/Plan       Assessment/Plan     ASSESSMENT:    1.  Sepsis   2.  Epidural abscess  3.  UTI    PLAN:    The patient is sitting up in bed on room air in no apparent distress.  Receiving dialysis this morning.  Continues to complain of bilateral upper extremity weakness, which is worsened bilaterally today.  Also continues to complain of neck pain with tenderness to palpation of the base of the cervical spine.  Afebrile, no diarrhea.  CRP is slightly improved at 8.15.  WBC remains normal.  Patient is still waiting for transfer to [facility] in [place], [place].    Lactic acid on admission was elevated at 2.1.  Urinalysis on 3/9/2021 was positive with WBCs too numerous to count and 4+ bacteria.  Urine culture finalized as greater than 100,000 colonies of Serratia marcescens.    2D echo on 3/8/2021 revealed no evidence of endocarditis.  CT cervical spine without contrast on 3/9/2021 showed interval development of spondylodiscitis with destructive changes of the disc space at C4/5, anterior wedging deformity of C4, and focal kyphosis with widening of the C4/5 facet joints.  Moderate central canal and neuroforaminal stenosis at this level.  Development of marked prevertebral soft tissue thickening with phlegmon compatible with tracking infection in the prevertebral spaces.  Associated edema of the  retropharyngeal fat space noted.  Degenerative disc disease C5/6 is stable with stenosis again noted.  Chest x-ray on 3/7/2021 showed no acute cardiopulmonary findings.  CT head without contrast on 3/9/2021 was unremarkable.  Blood cultures on 3/9/2021 are so far showing no growth.  Blood cultures on 3/7/2021 are so far showing no growth.     Per Dr. Tyson, the patient's abscess is in need of surgical evaluation and treatment and patient has been accepted for transfer to [facility] in [place], [place] by Dr. Woodruff with spine service and Dr. Gan on the hospitalist service.      Recommend to continue on broad-spectrum coverage with vancomycin and Merrem.  Discussed with microbiology and Serratia marcescens in the urine is susceptible to Merrem. If blood cultures become positive, would recommend a MARISELA, but so far they are showing no growth.     We continue to be very concerned about delay of surgical intervention and have reached out to the primary team.  Patient is awaiting transfer for surgical intervention.    Code Status:   Code Status and Medical Interventions:   Ordered at: 03/07/21 0828     Level Of Support Discussed With:    Patient     Code Status:    CPR     Medical Interventions (Level of Support Prior to Arrest):    Full       CHRIS Mcbride  03/13/21  10:01 EST

## 2021-03-13 NOTE — NURSING NOTE
Pt's daughter called requested to speak with patient.  Called her from pt's room.  Held phone for pt so he could speak with daughter.  Wrote pt's daughter's number on white board for pt.

## 2021-03-13 NOTE — NURSING NOTE
[facility].  Attempted to call from pt's room 2 x, no answer, pt did not want to leave voice mail.

## 2021-03-13 NOTE — NURSING NOTE
Report called to nurse on 7 east at [facility] [phone]. Daughter Constance made aware that patient is being transferred and given room information. [facility] here to transfer patient.

## 2021-03-13 NOTE — NURSING NOTE
hospital called with bed assignment.  Patient will be going to [facility] room 734.  [facility] notified of room assignment.  States they will be on their way.  [facility] notified.  Dr. Tyson notified.  Primary nurse calling report to  now.

## 2021-03-13 NOTE — PLAN OF CARE
Goal Outcome Evaluation:        Outcome Summary: Patient resting in bed at this time. Patient continues to request PRN pain medications for neck pain. Call light within reach. Will continue to monitor.

## 2021-03-13 NOTE — NURSING NOTE
Awaiting bed assignment at [facility].  Dr. Tyson wishes for patient to be flown out.  [facility] called regarding transport and have accepted patient.  Will call [facility] with bed assignment.

## 2021-03-13 NOTE — PROGRESS NOTES
[facility] HOSPITALIST PROGRESS NOTE     Patient Identification:  Name:  Mario Nair  Age:  45 y.o.  Sex:  male  :  1975  MRN:  [id number]  Visit Number:  [id number]  ROOM: [address]     Primary Care Provider:  Danny Rebolledo MD    Length of stay in inpatient status:  5    Subjective     Chief Compliant:    Chief Complaint   Patient presents with   • Weakness - Generalized       History of Presenting Illness: Patient seen and evaluated in follow-up for end-stage renal disease with medical noncompliance and noncompliance with hemodialysis with severe hyperkalemia at presentation.  Patient now with new found cervical spine phlegmon extending from C2-C7 with kyphotic changes at C4 and C5.  Patient still complaining of severe persistent neck pain.    Objective     Current Hospital Meds:calcium acetate, 667 mg, Oral, TID With Meals  carvedilol, 12.5 mg, Oral, BID With Meals  enoxaparin, 30 mg, Subcutaneous, Nightly  epoetin mc/mc-epbx, 6,000 Units, Subcutaneous, Once per day on   iron sucrose (VENOFER) IVPB, 200 mg, Intravenous, Q24H  lactobacillus acidophilus, 1 capsule, Oral, Daily  lidocaine, 1 patch, Transdermal, Q24H  meropenem, 500 mg, Intravenous, Q24H  pantoprazole, 40 mg, Oral, Daily  sodium chloride, 3 mL, Intravenous, Q12H  Vancomycin Pharmacy Intermittent Dosing, , Does not apply, Daily  Vancomycin Pharmacy Intermittent Dosing, , Does not apply, Daily    Pharmacy Consult - Pharmacy to dose,         Current Antimicrobial Therapy:  Anti-Infectives (From admission, onward)    Ordered     Dose/Rate Route Frequency Start Stop    21 1212  meropenem (MERREM) 500mg/100 mL 0.9% NS IVPB (mbp)     Ordering Provider: Tiffanie Lynn MD    500 mg  over 30 Minutes Intravenous Every 24 Hours 21 1400 21 1359    21 1153  vancomycin 1 g/250 mL 0.9% NS (vial-mate)     Ordering Provider: Mark Tyson DO    1,000 mg  over 60 Minutes Intravenous  Once 03/12/21 1400 03/12/21 1438    03/12/21 1200  Vancomycin Pharmacy Intermittent Dosing     Ordering Provider: Tiffanie Lynn MD     Does not apply Daily 03/12/21 1300 03/17/21 0859    03/11/21 1539  meropenem (MERREM) 500mg/100 mL 0.9% NS IVPB (mbp)     Ordering Provider: Mark Tyson DO    500 mg Intravenous Every 24 Hours 03/12/21 0000 03/25/21 2359    03/11/21 1539  Vancomycin HCl (VANCOMYCIN PHARMACY INTERMITTENT DOSING)     Ordering Provider: Mark Tyson DO     Does not apply Daily 03/12/21 0000 03/15/21 2359    03/11/21 1152  meropenem (MERREM) 1 g/100 mL 0.9% NS VTB (mbp)     Ordering Provider: Adrienne Han PA    1 g  over 30 Minutes Intravenous Once 03/11/21 1300 03/11/21 1353    03/10/21 1533  vancomycin 1 g/250 mL 0.9% NS (vial-mate)     Ordering Provider: Mark Tyson DO    1,000 mg  over 60 Minutes Intravenous Once 03/10/21 1700 03/10/21 1821    03/09/21 2013  Vancomycin Pharmacy Intermittent Dosing     Ordering Provider: Opal Ocampo MD     Does not apply Daily 03/10/21 0900 03/13/21 0859    03/09/21 2011  aztreonam (AZACTAM) 2 g/100 mL 0.9% NS (mbp)     Ordering Provider: Opal Ocampo MD    2 g  over 30 Minutes Intravenous Once 03/09/21 2200 03/09/21 2214    03/09/21 2011  vancomycin 1500 mg/500 mL 0.9% NS IVPB ([facility])     Ordering Provider: Opal Ocampo MD    20 mg/kg × 72.7 kg Intravenous Once 03/09/21 2100 03/09/21 2055        Current Diuretic Therapy:  Diuretics (From admission, onward)    None        ----------------------------------------------------------------------------------------------------------------------  Vital Signs:  Temp:  [97.8 °F (36.6 °C)-99.8 °F (37.7 °C)] 98.4 °F (36.9 °C)  Heart Rate:  [] 84  Resp:  [14-24] 18  BP: ()/(62-88) 128/88  SpO2:  [94 %-100 %] 98 %  on   ;   Device (Oxygen Therapy): room air  Body mass index is 21.13 kg/m².    Wt Readings from Last 3 Encounters:   03/12/21 74.7 kg (164 lb 9.6 oz)   02/27/21 71 kg  (156 lb 9.6 oz)   02/20/21 68.2 kg (150 lb 6.4 oz)     Intake & Output (last 3 days)       03/10 0701 - 03/11 0700 03/11 0701 - 03/12 0700 03/12 0701 - 03/13 0700    P.O. 1267 1185 720    I.V. (mL/kg) 804.1 (10.7) 215.4 (2.9) 919.8 (12.3)    Blood   300    Total Intake(mL/kg) 2071.1 (27.5) 1400.4 (18.7) 1939.8 (26)    Urine (mL/kg/hr) 1350 (0.7) 1300 (0.7) 550 (0.6)    Other  1397     Total Output 1350 2697 550    Net +721.1 -1296.6 +1389.8           Urine Unmeasured Occurrence 1 x 1 x         Diet Regular; Renal; High Protein  ----------------------------------------------------------------------------------------------------------------------  Physical exam:  Constitutional: Adult male, appears in mild distress and older than stated age.      HENT:  Head:  Normocephalic and atraumatic.  Mouth:  Moist mucous membranes.    Eyes:  Conjunctivae and EOM are normal. No scleral icterus.    Neck:  Neck supple but with increased pain with extension of the head and reduce pain with flexion.  There is significant tenderness to palpation at the base of the cervical spine.  No JVD present.    Cardiovascular: Tachycardic but regular rhythm and normal heart sounds with no murmur.  Pulmonary/Chest:  No respiratory distress, no wheezes, no crackles, with normal breath sounds and good air movement.  Abdominal:  Soft.  Bowel sounds are normal.  No distension and no tenderness.   Musculoskeletal:  No edema, no tenderness, and no deformity.  No red or swollen joints anywhere.  There is 3 out of 5 strength in the right upper extremity with 1 out of 5 strength still in the left with flexion, extension, and  strength. .  Neurological:  Alert and oriented to person, place, and time.  No cranial nerve deficit.  No tongue deviation.  No facial droop.  No slurred speech. Intact Sensation throughout including the upper extremities bilaterally.  Reflexes 1+ in upper and 2+ in the lower extremities bilaterally.  Skin:  Skin is warm and dry.  No rash or lesion noted. No pallor.   Peripheral vascular:  Pulses in all 4 extremities with no clubbing, no cyanosis, no edema.  Psychiatric: Appropriate mood and affect, pleasant.   ----------------------------------------------------------------------------------------------------------------------  Tele:    ----------------------------------------------------------------------------------------------------------------------  Results from last 7 days   Lab Units 03/12/21  0904 03/12/21  0035 03/11/21  0038 03/10/21  0813 03/07/21  2324 03/07/21  2120   CRP mg/dL  --  9.88* 8.33*  --   --  13.61*   LACTATE mmol/L  --   --   --   --  1.9 2.1*   WBC 10*3/mm3 6.43  --  9.81 6.92  --  12.90*   HEMOGLOBIN g/dL 6.6*  --  7.5* 7.3*  7.3*  --  8.6*   HEMATOCRIT % 21.5*  --  23.1* 23.9*  23.9*  --  27.6*   MCV fL 93.9  --  89.9 94.1  --  93.6   MCHC g/dL 30.7*  --  32.5 30.5*  --  31.2*   PLATELETS 10*3/mm3 130*  --  120* 113*  --  170   INR   --   --   --   --   --  1.32*     Results from last 7 days   Lab Units 03/07/21  2132   PH, ARTERIAL pH units 7.273*   PO2 ART mm Hg 106.0   PCO2, ARTERIAL mm Hg 19.8*   HCO3 ART mmol/L 9.1*     Results from last 7 days   Lab Units 03/12/21  0035 03/11/21  0038 03/10/21  0009 03/09/21  0034 03/07/21 2120   SODIUM mmol/L 132* 127* 135* 134* 133*   POTASSIUM mmol/L 4.9 5.8* 4.4 4.8 8.1*   MAGNESIUM mg/dL  --   --   --  1.8 2.3   CHLORIDE mmol/L 94* 95* 96* 93* 93*   CO2 mmol/L 22.2 19.3* 23.3 18.6* 9.5*   BUN mg/dL 57* 69* 35* 69* 163*   CREATININE mg/dL 4.71* 6.11* 4.19* 6.90* 11.65*   EGFR IF NONAFRICN AM mL/min/1.73 14* 10* 16* 9* 5*   CALCIUM mg/dL 8.7 8.1* 7.9* 6.2* 6.0*   PHOSPHORUS mg/dL  --   --   --  8.2*  --    GLUCOSE mg/dL 111* 118* 149* 115* 153*   ALBUMIN g/dL  --  2.37* 2.63* 2.42* 3.41*   BILIRUBIN mg/dL  --  0.2 0.2 0.3 0.5   ALK PHOS U/L  --  107 130* 118* 127*   AST (SGOT) U/L  --  14 17 19 17   ALT (SGPT) U/L  --  11 13 15 19   Estimated Creatinine Clearance:  21.1 mL/min (A) (by C-G formula based on SCr of 4.71 mg/dL (H)).  No results found for: AMMONIA  Results from last 7 days   Lab Units 03/08/21  0531 03/07/21 2324 03/07/21 2120   TROPONIN T ng/mL 0.132* 0.130* 0.124*     Results from last 7 days   Lab Units 03/07/21 2120   PROBNP pg/mL 52,969.0*         Glucose   Date/Time Value Ref Range Status   03/10/2021 1106 112 70 - 130 mg/dL Final   03/10/2021 0641 122 70 - 130 mg/dL Final   03/09/2021 2220 126 70 - 130 mg/dL Final     Lab Results   Component Value Date    TSH 2.380 11/14/2020    FREET4 0.73 (L) 02/03/2021     No results found for: PREGTESTUR, PREGSERUM, HCG, HCGQUANT  Pain Management Panel     Pain Management Panel Latest Ref Rng & Units 3/9/2021 2/12/2021    AMPHETAMINES SCREEN, URINE Negative Negative Negative    BARBITURATES SCREEN Negative Negative Negative    BENZODIAZEPINE SCREEN, URINE Negative Negative Negative    BUPRENORPHINEUR Negative Negative Negative    COCAINE SCREEN, URINE Negative Negative Negative    METHADONE SCREEN, URINE Negative Negative Negative        Brief Urine Lab Results  (Last result in the past 365 days)      Color   Clarity   Blood   Leuk Est   Nitrite   Protein   CREAT   Urine HCG        03/09/21 2057 Yellow Turbid Large (3+) Large (3+) Negative >=300 mg/dL (3+)             Blood Culture   Date Value Ref Range Status   03/07/2021 No growth at 24 hours  Preliminary   03/07/2021 No growth at 24 hours  Preliminary     No results found for: URINECX  No results found for: WOUNDCX  No results found for: STOOLCX  No results found for: RESPCX  No results found for: AFBCX  Results from last 7 days   Lab Units 03/12/21  0035 03/11/21  0038 03/07/21 2324 03/07/21 2120   LACTATE mmol/L  --   --  1.9 2.1*   SED RATE mm/hr  --   --   --  >100*   CRP mg/dL 9.88* 8.33*  --  13.61*       I have personally looked at the labs and they are summarized  above.  ----------------------------------------------------------------------------------------------------------------------  Detailed radiology reports for the last 24 hours:    Imaging Results (Last 24 Hours)     ** No results found for the last 24 hours. **        Assessment & Plan      Hyperkalemia, resolved  End-stage renal disease noncompliant with dialysis  Medical nonadherence  Metabolic acidosis  Lactic acidemia, resolved  Metabolic encephalopathy secondary to uremia  Anemia of both chronic disease and iron deficiency   -Patient currently on Tuesday, Thursday, Saturday schedule.   -Nephrology consulted and following for inpatient hemodialysis   -Patient agreeable to arrangement of hemodialysis chair,  currently working on arranging   -Patient status post transfusion of 1 unit of packed red blood cells with hemoglobin improved to 7.3, will repeat CBC in a.m.   -Patient with chronic anemia requiring transfusion at last hospitalization before leaving [place], likely mixed component of both iron deficiency as well as his chronic kidney disease.    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   -CT of the cervical spine with significant findings of interval development of spondylodiscitis with destructive changes of the disc space at C4/5, anterior wedging deformity of C4, and focal kyphosis with widening of the C4/5 facet joints.   There is also noted moderate central canal and neural foraminal stenosis at this level.  Development of marked.  Vertebral soft tissue thickening with phlegmon compatible with tracking infection in the prevertebral spaces there is associated edema of the retropharyngeal fat space noted.  Again noted are degenerative disc disease at C5/6 with stenosis.  Per radiology read on 3/9/2021 at 19:27   -Above findings highly concerning for developing abscess in need of surgical evaluation and treatment, as the services are unavailable at this hospital, patient was accepted for transfer to [facility] in [facility] by Dr Woodruff with Spine service  and Dr. Gan on the hospitalist service..   -Currently on Merrem and Vancomycin per ID recs, appreciate their input.    Troponin elevation   -Flat trend in the setting of end-stage renal disease, no further work-up at this time as patient is without chest pain.    Hypertension   -Blood pressure improving, currently normotensive, still holding home antihypertensives.    Chronic hepatitis C  History of IV drug abuse  History of endocarditis   -Repeat echocardiogram with normal LV cavity size and wall thickness with normal contraction of the ventricular wall.  EF of 61 to 65%.  No evidence of pericardial effusion and pericardium appears normal.   -Patient toxic screen was negative at admission, last positive on 10/16/2020 for opiates.      VTE Prophylaxis:   Mechanical Order History:     None      Pharmalogical Order History:      Ordered     Dose Route Frequency Stop    03/08/21 0013  enoxaparin (LOVENOX) syringe 30 mg      30 mg SC Nightly --    03/07/21 4455  Pharmacy to Dose enoxaparin (LOVENOX)  Status:  Discontinued     Question:  Indication of use  Answer:  Prophylaxis    -- XX Continuous PRN 03/08/21 0014                Disposition pending transfer to higher level of care.     Mark Tyson DO  [facility] Hospitalist  03/12/21  19:11 EST

## 2021-03-13 NOTE — PLAN OF CARE
Goal Outcome Evaluation:     Progress: no change  Outcome Summary: Pt restin gin bed. Requiring more pain medication today. Provider aware. Pt denies any needs or concerns. waiting for bed at [facility] in [place].VSS. Will continue to monitor.

## 2021-03-14 NOTE — PAYOR COMM NOTE
"[facility]  MARCELL NUNO   PHONE  [phone]  FAX  [phone]  NPI:  [id number]    PATIENT D/C 3/13/2021    Mario Nair (45 y.o. Male)     Date of Birth Social Security Number Address Home Phone MRN    1975  [address] [place] [address] [phone] [id number]    [employer] Mar[employer] Status          None        Admission Date Admission Type Admitting Provider Attending Provider Department, Room/Bed    3/7/21 Emergency Opal Ocampo MD  [facility] PROGRESS CARE, P206/1P    Discharge Date Discharge Disposition Discharge Destination        3/13/2021 Short Term Hospital ([place] - External)              Attending Provider: (none)   Allergies: Penicillins    Isolation: None   Infection: Hepatitis B (06/05/19), COVID (History) (02/19/21)   Code Status: Prior    Ht: 188 cm (74\")   Wt: 69.3 kg (152 lb 11.2 oz)    Admission Cmt: None   Principal Problem: Hyperkalemia [E87.5]                 Active Insurance as of 3/7/2021     Primary Coverage     Payor Plan Insurance Group Employer/Plan Group    [employer] [place] [employer]ICA[place]      Payor Plan Address Payor Plan Phone Number Payor Plan Fax Number Effective Dates    [address] [address] [phone]  5/5/2019 - None Entered    [facility] [id number]       Subscriber Name Subscriber Birth Date Member ID       MARIO NAIR 1975 [id number]                 Emergency Contacts      (Rel.) Home Phone Work Phone Mobile Phone    SHANI SILVA (Relative) [phone] -- --    NairConstance matson (Daughter) -- -- [phone]              "

## 2021-04-27 NOTE — PROGRESS NOTES
Subjective   Mario Nair is a 46 y.o. male.     History of Present Illness     Hospital Follow-Up   Discharged from [facility] on 4/25/2021 after a 6 week admission for C4-C5 osteomyelitis.  He underwent a corpectomy on 3/14/2021.  Bone cultures ultimately grew Serratia marcescens.  TTE revealed no evidence of endocarditis.  He was treated with IV cefepime while in hospital and ultimately discharged on levofloxacin 500 every 2 days.  He is to remain in a c-collar and has been given an order for plain films of the cervical spine in about 10 days.  He was discharged on buprenorphine-naloxone 8-2 sublingual tablets 2 daily and is to follow-up with [facility] in [place] for ongoing pain management.  He was scheduled to follow-up with  infectious disease yesterday but was unable to do so and has no plans to reschedule.    ESRF  He has had repeated admissions for inpatient dialysis.  He has been noncompliant with outpatient dialysis and despite repeated efforts and outpatient care cannot be arranged.  He simply presents to the ER every week or two when he feels he needs it.  He is quite aware of the risks including hyperkalemia with arrhythmia and sudden death.  He intends to return to the ER tomorrow.  Repeated CT's of the chest, abdomen and pelvis revealed emphysematous changes, a small right pleural effusion, splenomegaly, an atrophic pancreas, bilateral renal atrophy and hydronephrosis with nonspecific lobulation, dilation of both ureters to the level of the bladder, hyperdense thickening of the bladder wall, perihepatic and perisplenic ascites, and degenerative changes of the lumbar spine.      Positive Hepatitis B and C Serology  Records are unavailable but he is apparently followed by Dr. Cerda.  He has not undergone recent reassessment due to repeated hospitalizations    The following portions of the patient's history were reviewed and updated as appropriate: allergies, current medications, past medical  history, past social history, past surgical history and problem list.    Review of Systems   Constitutional: Positive for fatigue. Negative for appetite change, chills, fever and unexpected weight change.   HENT: Negative for congestion, ear pain, rhinorrhea, sneezing, sore throat and voice change.    Eyes: Negative for visual disturbance.   Respiratory: Positive for shortness of breath. Negative for cough and wheezing.    Cardiovascular: Negative for chest pain, palpitations and leg swelling.   Gastrointestinal: Negative for abdominal pain, blood in stool, constipation, diarrhea, nausea and vomiting.   Genitourinary: Negative for dysuria and hematuria.   Musculoskeletal: Positive for arthralgias, back pain and neck pain. Negative for joint swelling and myalgias.   Skin: Negative for rash.   Neurological: Negative for tremors, weakness, numbness and headaches.   Psychiatric/Behavioral: Positive for sleep disturbance. Negative for dysphoric mood and suicidal ideas. The patient is nervous/anxious.      Objective   Physical Exam  Constitutional:       General: He is not in acute distress.     Appearance: Normal appearance. He is well-developed. He is not ill-appearing or diaphoretic.      Comments: Accompanied by his daughter.  Appeared older than stated age.  Bright and in fair spirits.  Cervical collar.  Sitting in a wheelchair.  No apparent distress. Left subclavian line   HENT:      Head: Atraumatic.      Right Ear: Tympanic membrane, ear canal and external ear normal.      Left Ear: Tympanic membrane, ear canal and external ear normal.   Eyes:      Conjunctiva/sclera: Conjunctivae normal.   Neck:      Thyroid: No thyroid mass or thyromegaly.      Vascular: No carotid bruit or JVD.      Trachea: Trachea normal. No tracheal deviation.   Cardiovascular:      Rate and Rhythm: Normal rate and regular rhythm.      Heart sounds: Normal heart sounds, S1 normal and S2 normal. No murmur heard.   No gallop. No S3 or S4  sounds.    Pulmonary:      Effort: Pulmonary effort is normal.      Breath sounds: Examination of the right-lower field reveals decreased breath sounds. Examination of the left-lower field reveals decreased breath sounds. Decreased breath sounds present. No wheezing or rales.      Comments: Pulmonary hyperinflation  Abdominal:      General: Bowel sounds are normal. There is no distension.      Palpations: Abdomen is soft. There is no hepatomegaly, splenomegaly or mass.      Tenderness: There is no abdominal tenderness.      Hernia: No hernia is present.   Musculoskeletal:      Right lower leg: No edema.      Left lower leg: No edema.   Lymphadenopathy:      Head:      Right side of head: No submental, submandibular, tonsillar, preauricular, posterior auricular or occipital adenopathy.      Left side of head: No submental, submandibular, tonsillar, preauricular, posterior auricular or occipital adenopathy.      Cervical: No cervical adenopathy.      Upper Body:      Right upper body: No supraclavicular adenopathy.      Left upper body: No supraclavicular adenopathy.   Skin:     General: Skin is warm and dry.      Coloration: Skin is not cyanotic, jaundiced or pale.      Findings: No rash.      Nails: There is no clubbing.   Neurological:      Mental Status: He is alert and oriented to person, place, and time.      Cranial Nerves: No cranial nerve deficit.      Motor: No tremor.      Coordination: Coordination normal.      Gait: Gait normal.   Psychiatric:         Attention and Perception: Attention normal.         Mood and Affect: Mood normal.         Speech: Speech normal.         Behavior: Behavior normal.         Thought Content: Thought content normal.       Assessment/Plan   Problems Addressed this Visit        Cardiac and Vasculature    Mild atherosclerosis of carotid artery, right   Reminded regarding risk factor modification with an emphasis on tobacco cessation.       Gastrointestinal Abdominal     Hepatitis  B surface antigen positive  Encouraged to follow-up with GI    Hepatitis C antibody test positive  As above.       Genitourinary and Reproductive     Bladder wall thickening  Encouraged to follow-up with urology    ESRD (end stage renal disease) (CMS/HCC)  History of noncompliance with outpatient dialysis  Encouraged to proceed to the ER tomorrow as he has planned as there a few other options at present    Urinary incontinence without sensory awareness       Hematology and Neoplasia    Anemia due to chronic kidney disease       Pulmonary and Pneumonias    COPD mixed type (CMS/HCC)   COPD is stable.  Reminded of the importance of smoking cessation       Symptoms and Signs    Retroperitoneal lymphadenopathy       Tobacco    Current smoker       Other    Osteomyelitis of cervical vertebra (CMS/HCC)  S/P corpectomy with cultures positive for Serratia marcescens  Continue levofloxacin  Encouraged to follow-up with infectious diseases  Patient will continue to wear his c-collar and is aware to have updated plain films of the cervical spine sometime around May 7    Relevant Medications    levoFLOXacin (LEVAQUIN) 500 MG tablet      Diagnoses       Codes Comments    Mild atherosclerosis of carotid artery, right    -  Primary ICD-10-CM: I65.21  ICD-9-CM: 433.10     Hepatitis C antibody test positive     ICD-10-CM: R76.8  ICD-9-CM: 795.79     Hepatitis B surface antigen positive     ICD-10-CM: R76.8  ICD-9-CM: 795.79     Urinary incontinence without sensory awareness     ICD-10-CM: N39.42  ICD-9-CM: 788.34     ESRD (end stage renal disease) (CMS/HCC)     ICD-10-CM: N18.6  ICD-9-CM: 585.6     Bladder wall thickening     ICD-10-CM: N32.89  ICD-9-CM: 596.89     Anemia due to stage 4 chronic kidney disease (CMS/HCC)     ICD-10-CM: N18.4, D63.1  ICD-9-CM: 285.21, 585.4     COPD mixed type (CMS/HCC)     ICD-10-CM: J44.9  ICD-9-CM: 496     Retroperitoneal lymphadenopathy     ICD-10-CM: R59.0  ICD-9-CM: 785.6     Current smoker      ICD-10-CM: F17.200  ICD-9-CM: 305.1     Osteomyelitis of cervical vertebra (CMS/[facility])     ICD-10-CM: M46.22  ICD-9-CM: 730.28

## 2021-04-28 PROBLEM — M46.22 OSTEOMYELITIS OF CERVICAL VERTEBRA (HCC): Status: ACTIVE | Noted: 2021-01-01

## 2021-04-28 PROBLEM — E87.5 HYPERKALEMIA: Status: RESOLVED | Noted: 2021-01-01 | Resolved: 2021-01-01

## 2021-05-25 NOTE — TELEPHONE ENCOUNTER
Caller: Constance Nair    Relationship: Emergency Contact    Best call back number: [phone]    Medication needed:   Requested Prescriptions     Pending Prescriptions Disp Refills   • pantoprazole (PROTONIX) 40 MG EC tablet       Sig: Take 1 tablet by mouth Daily.       When do you need the refill by: ASAP    What additional details did the patient provide when requesting the medication: PATIENT IS REQUESTING A REFILL ON -[facility]  1 MG TABLET PRESCRIBED BY A  IN [place]      Does the patient have less than a 3 day supply:  [x] Yes  [] No    What is the patient's preferred pharmacy: [facility] [place], [place] - [address] - [phone]  - [phone] FX

## 2021-08-15 NOTE — ED PROVIDER NOTES
Subjective   46-year-old male with a history of anemia of chronic disease IV drug abuse hypertension a stage renal disease noncompliance presents the emergency room with complaints of weakness.  Patient reports he has been weak for multiple months.  He states that his weakness is progressing getting worse over the past couple days.  He states he does get dialysis but has not received it multiple weeks.  He states he is unable to get transportation which is why he has not been able to get to dialysis.  He states he has had no chest pain or shortness of breath.  He reports some mild abdominal discomfort.  He denies fever chills.      Weakness - Generalized  Severity:  Moderate  Timing:  Constant  Progression:  Worsening  Relieved by:  Nothing  Worsened by:  Nothing  Ineffective treatments:  None tried  Associated symptoms: abdominal pain and nausea    Associated symptoms: no chest pain, no cough, no diarrhea, no fever, no headaches and no vomiting        Review of Systems   Constitutional: Negative for fever.   Respiratory: Negative for cough.    Cardiovascular: Negative for chest pain.   Gastrointestinal: Positive for abdominal pain and nausea. Negative for diarrhea and vomiting.   Neurological: Negative for headaches.   All other systems reviewed and are negative.      Past Medical History:   Diagnosis Date   • Anemia due to chronic kidney disease    • COPD (chronic obstructive pulmonary disease) (CMS/HCC)    • COVID-19 1/25/2021   • Current smoker    • Endocarditis    • ESRD (end stage renal disease) (CMS/[facility])    • Hepatitis B surface antigen positive 12/9/2020   • Hepatitis C    • History of transfusion    • Hypertension    • IV drug abuse (CMS/HCC)    • Medically noncompliant    • Mild atherosclerosis of carotid artery, right 2/15/2021   • Retroperitoneal lymphadenopathy 12/8/2020   • Unable to read or write        Allergies   Allergen Reactions   • Penicillins Shortness Of Breath, Itching and Rash       Past  Surgical History:   Procedure Laterality Date   • CENTRAL VENOUS CATHETER TUNNELED INSERTION DOUBLE LUMEN Right     Chest for hemodialysis   • INSERTION HEMODIALYSIS CATHETER N/A 1/29/2021    Procedure: HEMODIALYSIS CATHETER INSERTION;  Surgeon: Khoa Carl MD;  Location: [facility];  Service: General;  Laterality: N/A;       Family History   Problem Relation Age of Onset   • Alcohol abuse Mother    • Hypertension Mother    • Hypertension Other    • Kidney disease Maternal Grandmother        Social History     Socioeconomic History   • Marital status:      Spouse name: Not on file   • Number of children: Not on file   • Years of education: Not on file   • Highest education level: Not on file   Tobacco Use   • Smoking status: Current Every Day Smoker     Packs/day: 0.50     Years: 20.00     Pack years: 10.00     Types: Cigarettes   • Smokeless tobacco: Never Used   Substance and Sexual Activity   • Alcohol use: Not Currently     Comment: Quit 18 years ago   • Drug use: Not Currently     Types: Methamphetamines, IV     Comment: last used on Friday 5/18/2019   • Sexual activity: Defer           Objective   Physical Exam  Vitals and nursing note reviewed.   Constitutional:       Comments: Thin frail appears older than stated age.  Chronically ill-appearing.   HENT:      Head: Normocephalic and atraumatic.      Comments: Generalized pharyngeal edema.  Uvula midline.  Cardiovascular:      Rate and Rhythm: Normal rate and regular rhythm.      Comments: No murmur 2+ radial pulse bilaterally.  Pulmonary:      Effort: Pulmonary effort is normal.      Comments: No rhonchi's rales or wheezes.  Left upper chest with dialysis catheter in place.  Appears unkept.  No surrounding erythema.  Abdominal:      Palpations: Abdomen is soft.      Tenderness: There is generalized abdominal tenderness.   Genitourinary:     Penis: Normal.    Skin:     General: Skin is warm and dry.      Comments: Right groin with nodule fluctuant  nontender.   Neurological:      Mental Status: He is alert and oriented to person, place, and time.      Cranial Nerves: No cranial nerve deficit.   Psychiatric:         Mood and Affect: Mood normal.         Procedures           ED Course  ED Course as of Aug 16 0907   Sun Aug 15, 2021   1515 EKG is normal sinus rhythm with rate of 87 bpm AK interval is 176 ms QRS duration is 92 ms QT/QTc is 396/464 ms    [EG]   1606 Patient is noted to have low hemoglobin of 6.0 20.5 have ordered 1 unit of packed red blood cells.  Patient is elevated troponin but is chronically elevated patient has elevated BUN and creatinine he has not been dialyzed he says in multiple weeks he is unsure when his last dialysis was.  Patient elevated potassium 6.4 have spoken to Dr. Hylton with nephrology who states patient can be given medication to lower his potassium he will be dialyzed tomorrow he does not feel that patient needs to be emergently dialyzed.    [BB]   1636 Have attempted to contact her supervisor regarding possible bed assignment for patient.    [BB]   1721 CT Abdomen Pelvis Without Contrast    Result Date: 8/15/2021  1. There has been interval increase in ascites from the prior exam.  It is uncertain if there may be some potential wall thickening involving the cecal region versus artifact from adjacent ascites and underdistention. This area is not well evaluated. 2. Both kidneys are again noted to demonstrate chronic hydronephrosis with a thickened urinary bladder wall that appears similar to prior. 3. Stool in the rectum may suggest impaction versus constipation. Signer Name: Ling Grimm MD  Signed: 8/15/2021 5:16 PM  Workstation Name: [id number]  [facility]    XR Chest 1 View    Result Date: 8/15/2021  No acute cardiopulmonary findings. There is COPD with emphysema and a tiny left pleural effusion that may be new. Signer Name: Ling Grimm MD  Signed: 8/15/2021 3:31 PM  Workstation Name: [id number]   [facility]        [BB]   1724 CT Abdomen Pelvis Without Contrast    Result Date: 8/15/2021  1. There has been interval increase in ascites from the prior exam.  It is uncertain if there may be some potential wall thickening involving the cecal region versus artifact from adjacent ascites and underdistention. This area is not well evaluated. 2. Both kidneys are again noted to demonstrate chronic hydronephrosis with a thickened urinary bladder wall that appears similar to prior. 3. Stool in the rectum may suggest impaction versus constipation. Signer Name: Ling Grimm MD  Signed: 8/15/2021 5:16 PM  Workstation Name: [id number]  [facility]    XR Chest 1 View    Result Date: 8/15/2021  No acute cardiopulmonary findings. There is COPD with emphysema and a tiny left pleural effusion that may be new. Signer Name: Ling Grimm MD  Signed: 8/15/2021 3:31 PM  Workstation Name: [id number]  [facility]        [BB]   1732 Have noted no beds at current facility.  Patient is oriented x3.  Have discussed that given no beds available patient will need to be transferred to another facility.  Patient declines to be transferred to any facility.  Have discussed inability to care for patient at this facility given that there are no beds in the hospital and we are at max capacity regarding boarding patients as well.  Patient acknowledges verbalizes understanding however he still declines to be transferred discussed risk of death endorgan damage loss of lifestyle patient verbalized understanding but states he will not be transferred anywhere else he states he will not be able to get back have discussed other hospitals have  to assist him with getting home after he has completed his hospitalization course he acknowledges this but still declines to be transferred to any facility.    [BB]   1737 Patient troponin has improved to 0.195.    [BB]   1740 Of note  have contacted her supervisor again and noted that bed became available.  Have spoken to to hospitalist was agreeable to admit    [BB]      ED Course User Index  [BB] Laurent Nguyen MD  [EG] Chinyere Guaman,                                            MDM  Number of Diagnoses or Management Options  Anemia, unspecified type: new and requires workup  ESRD (end stage renal disease) (CMS/[facility]): new and requires workup  Hyperkalemia: new and requires workup     Amount and/or Complexity of Data Reviewed  Clinical lab tests: reviewed  Tests in the medicine section of CPT®: reviewed  Decide to obtain previous medical records or to obtain history from someone other than the patient: yes        Final diagnoses:   ESRD (end stage renal disease) (CMS/[facility])   Anemia, unspecified type   Hyperkalemia       ED Disposition  ED Disposition     ED Disposition Condition Comment    Decision to Admit  Level of Care: Med/Surg [1]   Diagnosis: ESRD (end stage renal disease) (CMS/[facility]) [160805]   Admitting Physician: GABBIE CAIN [[id number]]   Bed Request Comments: remote tele   Certification: I Certify That Inpatient Hospital Services Are Medically Necessary For Greater Than 2 Midnights            No follow-up provider specified.       Medication List      No changes were made to your prescriptions during this visit.          Maxi Ponce MD  08/16/21 0905

## 2021-08-15 NOTE — H&P
"    [facility]    Patient Identification:  Name:  Mario Nair  Age:  46 y.o.  Sex:  male  :  1975  MRN:  [id number]   Visit Number:  [id number]  Room number:  103/03  Admit Date: 8/15/2021   Primary Care Physician:  Danny Rebolledo MD     Chief complaint:    Chief Complaint   Patient presents with   • Weakness - Generalized   • Nausea   • Vomiting   • Abdominal Pain       History of presenting illness:  46 y.o. male with history of ESRD, ACD, IE, Hep B and C, HTN, IV drug abuse (that he currently denies), and medical noncompliance, who presented with generalized weakness and fatigue.  Per him he his last HD was \"4 months ago bcuz no one wants to get me in the dialysis unit maybe bcuz of my hepatitis\".  The pt left AMA multiple times int he past.  HE was found to have Hg o 6 and K >6.  Dr. Haddad was contacted by the ED and is planning on HD today.  From the previous charts, he never stayed in the hospital long enough for case management to arrange a seat for him in a local dialysis clinic.   No fever or chills.  No N/V.  Pt is asking for food in the ED.    ---------------------------------------------------------------------------------------------------------------------   Review of Systems Previous transfer to  for discitis / paraspinal abscess, pt said \"no one did anything\".  Multiple AMA discharges.     ---------------------------------------------------------------------------------------------------------------------   Past Medical History:   Diagnosis Date   • Anemia due to chronic kidney disease    • COPD (chronic obstructive pulmonary disease) (CMS/HCC)    • COVID-19 2021   • Current smoker    • Endocarditis    • ESRD (end stage renal disease) (CMS/HCC)    • Hepatitis B surface antigen positive 2020   • Hepatitis C    • History of transfusion    • Hypertension    • IV drug abuse (CMS/HCC)    • Medically noncompliant    • Mild " atherosclerosis of carotid artery, right 2/15/2021   • Retroperitoneal lymphadenopathy 12/8/2020   • Unable to read or write      Past Surgical History:   Procedure Laterality Date   • CENTRAL VENOUS CATHETER TUNNELED INSERTION DOUBLE LUMEN Right     Chest for hemodialysis   • INSERTION HEMODIALYSIS CATHETER N/A 1/29/2021    Procedure: HEMODIALYSIS CATHETER INSERTION;  Surgeon: Khoa Carl MD;  Location: [facility];  Service: General;  Laterality: N/A;     Family History   Problem Relation Age of Onset   • Alcohol abuse Mother    • Hypertension Mother    • Hypertension Other    • Kidney disease Maternal Grandmother      Social History     Socioeconomic History   • Marital status:      Spouse name: Not on file   • Number of children: Not on file   • Years of education: Not on file   • Highest education level: Not on file   Tobacco Use   • Smoking status: Current Every Day Smoker     Packs/day: 0.50     Years: 20.00     Pack years: 10.00     Types: Cigarettes   • Smokeless tobacco: Never Used   Substance and Sexual Activity   • Alcohol use: Not Currently     Comment: Quit 18 years ago   • Drug use: Not Currently     Types: Methamphetamines, IV     Comment: last used on Friday 5/18/2019   • Sexual activity: Defer     ---------------------------------------------------------------------------------------------------------------------   Allergies:  Penicillins  ---------------------------------------------------------------------------------------------------------------------   Prior to Admission Medications     Prescriptions Last Dose Informant Patient Reported? Taking?    amLODIPine (NORVASC) 5 MG tablet   Yes No    Take 5 mg by mouth Daily.    buprenorphine-naloxone (SUBOXONE) 8-2 MG per SL tablet   Yes No    Place 2 tablets under the tongue Daily.    calcium acetate (PHOS BINDER,) 667 MG capsule capsule   No No    TAKE 1 CAPSULE BY MOUTH THREE TIMES A DAY WITH A MEAL    carvedilol (COREG) 12.5 MG tablet   Pharmacy Yes No    Take 12.5 mg by mouth 2 (Two) Times a Day With Meals.    epoetin cm-epbx (RETACRIT) 3000 UNIT/ML injection   No No    Inject 2 mL under the skin into the appropriate area as directed 3 (Three) Times a Week. Indications: ESRD on Dialysis    glucagon, human recombinant, (GLUCAGEN DIAGNOSTIC) 1 MG injection   No No    Inject 1 mg under the skin into the appropriate area as directed Every 15 (Fifteen) Minutes As Needed (Blood Glucose Less Than 70) for up to 360 days.    lactobacillus acidophilus (RISAQUAD) capsule capsule   No No    Take 1 capsule by mouth Daily.    levoFLOXacin (LEVAQUIN) 500 MG tablet   No No    Take 1 tablet by mouth Every Other Day.    lidocaine (LIDODERM) 5 %   No No    Place 1 patch on the skin as directed by provider Daily. Remove & Discard patch within 12 hours or as directed by MD    methocarbamol (ROBAXIN) 500 MG tablet  Pharmacy Yes No    Take 500 mg by mouth 3 (Three) Times a Day As Needed for Muscle Spasms.    pantoprazole (PROTONIX) 40 MG EC tablet   No No    Take 1 tablet by mouth Daily.    simethicone (MYLICON) 80 MG chewable tablet   Yes No    Chew 80 mg 5 (Five) Times a Day As Needed for Flatulence.        ---------------------------------------------------------------------------------------------------------------------   Vital Signs:  Temp:  [97.6 °F (36.4 °C)-98.5 °F (36.9 °C)] 97.6 °F (36.4 °C)  Heart Rate:  [85-95] 85  Resp:  [14-16] 14  BP: (169-182)/(101-116) 182/116    No data found.  SpO2:  [100 %] 100 %  on   ;   Device (Oxygen Therapy): room air  Body mass index is 17.97 kg/m².    Wt Readings from Last 3 Encounters:   08/15/21 63.5 kg (140 lb)   03/13/21 69.3 kg (152 lb 11.2 oz)   02/27/21 71 kg (156 lb 9.6 oz)               ---------------------------------------------------------------------------------------------------------------------   Physical Exam:  Constitutional: Adult male, much older than stated age.      HENT:  Head:  Normocephalic and  atraumatic.  Mouth: Pale mucousa.   Neck:  Neck supple but with increased pain with extension of the head and reduce pain with flexion.  There is significant tenderness to palpation at the base of the cervical spine.  No JVD present.    Cardiovascular: Tachycardic but regular rhythm and normal heart sounds with no murmur.  Pulmonary/Chest:  Basal crackles, with normal breath sounds and good air movement.  Abdominal:  Soft.  Bowel sounds are normal.  No distension and no tenderness.   Musculoskeletal:  No edema, no tenderness, and no deformity.  No red or swollen joints anywhere.  There is 2 out of 5 strength in the right upper extremity with 0 out of 5 strength still in the left with flexion, extension, and  strength. .  Neurological:  Alert and oriented to person, place, and time.  No cranial nerve deficit.  No tongue deviation.  No facial droop.  No slurred speech. Intact Sensation throughout including the upper extremities bilaterally.  Skin:  Skin is warm and dry. No rash or lesion noted. No pallor.   Peripheral vascular:  Pulses in all 4 extremities with no clubbing, no cyanosis, no edema.  Psychiatric: Appropriate mood and affect, pleasant.   I have personally looked at both the EKG and the telemetry strips.  --------------------------------------------------------------------------------------------------------------------  Results from last 7 days   Lab Units 08/15/21  1709 08/15/21  1508   TROPONIN T ng/mL 0.195* 0.208*     Results from last 7 days   Lab Units 08/15/21  1508   CRP mg/dL 4.66*   LACTATE mmol/L 0.6   WBC 10*3/mm3 10.15   HEMOGLOBIN g/dL 6.0*   HEMATOCRIT % 20.5*   MCV fL 98.6*   MCHC g/dL 29.3*   PLATELETS 10*3/mm3 166     Results from last 7 days   Lab Units 08/15/21  1508   SODIUM mmol/L 131*   POTASSIUM mmol/L 6.4*   MAGNESIUM mg/dL 2.3   CHLORIDE mmol/L 96*   CO2 mmol/L 7.7*   BUN mg/dL 194*   CREATININE mg/dL 17.56*   EGFR IF NONAFRICN AM mL/min/1.73 3*   CALCIUM mg/dL 8.2*   GLUCOSE  mg/dL 72   ALBUMIN g/dL 3.56   BILIRUBIN mg/dL 0.4   ALK PHOS U/L 119*   AST (SGOT) U/L 12   ALT (SGPT) U/L 8   Estimated Creatinine Clearance: 4.8 mL/min (A) (by C-G formula based on SCr of 17.56 mg/dL (H)).    Glucose   Date/Time Value Ref Range Status   08/15/2021 1802 73 70 - 130 mg/dL Final     Comment:     Meter: [id number] : [id number] birdie greenwood     No results found for: AMMONIA        No results found for: BLOODCXNo results found for: RESPCXNo results found for: URINECXNo results found for: WOUNDCXNo results found for: BODYFLDCXNo results found for: STOOLCX  pH No results found for: PHART   pO2 No results found for: PO2ART   pCO2 No results found for: OEH4OJX   HCO3 No results found for: DML4SWK     I have personally looked at the labs and they are summarized above.  ----------------------------------------------------------------------------------------------------------------------  Imaging Results (Last 24 Hours)     Procedure Component Value Units Date/Time    CT Abdomen Pelvis Without Contrast [[id number]] Collected: 08/15/21 1716     Updated: 08/15/21 1718    Narrative:      CT Abdomen Pelvis WO    INDICATION:   Abdominal pain, elevated lipase    TECHNIQUE:   CT of the abdomen and pelvis without IV contrast. Coronal and sagittal reconstructions were obtained.  Radiation dose reduction techniques included automated exposure control or exposure modulation based on body size. Count of known CT and cardiac nuc  med studies performed in previous 12 months: 0.     COMPARISON:   CT of the abdomen and pelvis from 12/31/2020    FINDINGS:  Abdomen: Exam is somewhat limited due to lack of contrast. Lung bases are clear. There is ascites that has increased from prior. Both renal collecting systems are grossly dilated, and this appears similar to prior. Pancreas is not well identified but  there is no gross abnormality. Spleen appears unremarkable.    Pelvis: It is uncertain if there may be some potential  wall thickening involving the cecum versus artifact from adjacent ascites and under distention. Urinary bladder is again noted to be thick walled. Stool in the rectum may suggest potential impaction  versus constipation. The bones are osteopenic. No definite acute osseous abnormality.      Impression:      1. There has been interval increase in ascites from the prior exam.  It is uncertain if there may be some potential wall thickening involving the cecal region versus artifact from adjacent ascites and underdistention. This area is not well evaluated.  2. Both kidneys are again noted to demonstrate chronic hydronephrosis with a thickened urinary bladder wall that appears similar to prior.  3. Stool in the rectum may suggest impaction versus constipation.          Signer Name: Ling Grimm MD   Signed: 8/15/2021 5:16 PM   Workstation Name: [id number]    [facility]    XR Chest 1 View [[id number]] Collected: 08/15/21 1531     Updated: 08/15/21 1534    Narrative:      CR Chest 1 Vw    INDICATION:   Shortness of breath     COMPARISON:    Chest x-ray from 3/7/2021    FINDINGS:  Single portable AP view(s) of the chest.  The heart and mediastinal contours are normal. Large bore catheter is stable. The lungs are clear apart from grossly emphysematous changes of COPD. No pneumothorax or large pleural effusion. There is a tiny left  pleural effusion.      Impression:      No acute cardiopulmonary findings. There is COPD with emphysema and a tiny left pleural effusion that may be new.    Signer Name: Ling Grimm MD   Signed: 8/15/2021 3:31 PM   Workstation Name: [id number]    [facility]        I have personally reviewed the radiology images and read the final radiology report.    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  Pt isn't porvidind much hx, was discharged on levofloxacin   *End-stage renal disease noncompliant with dialysis  *Medical nonadherence  *Metabolic acidosis  *Anemia of both chronic disease and iron deficiency, symptomatic   *Chronic troponenemia  *Ess HTN, stage-II  *Chronic hepatitis C  *History of IV drug abuse  *History of endocarditis     --Medsurg admit with remote tele  --HD per Dr. Leslie ocampo contacted by the ED provider sign-out, planning for HD today  --hold on antihypertensives, monitor post HD  --Iron profile ADT  --fu on cbc/bmp in am post transfusion (getting 1 PRBC now)  --Obtain dc summary record from   --DVT proph        Marcelina Rogers MD  08/15/21  19:06 EDT

## 2021-08-15 NOTE — ED NOTES
Dialysis access to left subclavian cleaned at this time, pt reports that his daughter has been changing dressing, dressing is Brigitte Gale RN  08/15/21 8538

## 2021-08-15 NOTE — ED NOTES
Orange fall bracelet placed on pt, educ. Given, pt verb understanding, pt in wc in triage office view     Caroline Pacheco RN  08/15/21 3929

## 2021-08-15 NOTE — ED NOTES
MEDICAL SCREENING     Patient initially seen in triage.  The patient was advised further evaluation and diagnostic testing will be needed, some of the treatment and testing will be initiated in the lobby in order to begin the process.  The patient will be returned to the waiting area for the time being and possibly be re-assessed by a subsequent ED provider.  The patient will be brought back to the treatment area in as timely manner as possible.       Felisa Shipman, PA  08/15/21 5862

## 2021-08-15 NOTE — ED NOTES
Consent to transfuse 1 unit prbc signed by pt at this time     Brigitte Gould, RN  08/15/21 8092

## 2021-08-15 NOTE — ED NOTES
Patient's daughter called to leave numbers for us to call if needed and to give an update as soon as possible, Constance [phone], [phone].     Marika Del Toro  08/15/21 3043

## 2021-08-16 NOTE — PROGRESS NOTES
Subjective     History:   Mario Nair is a 46 y.o. male admitted on 8/15/2021 secondary to <principal problem not specified>     Procedures: None    Transfusions:   8/15/21: 1 unit of PRBC's   8/16/21: 1 unit of PRBC's     CC: Follow up hyperkalemia, anemia, ESRD on HD    Patient seen and examined with KALA Briggs. Awake and alert. Reports nausea with episodes of vomiting this AM. Reports fatigue. States he has felt sleepy. No reported CP. No obvious signs of bleeding. No acute events overnight per RN.     History taken from: patient, chart, and RN.      Objective     Vital Signs  Temp:  [97 °F (36.1 °C)-98.3 °F (36.8 °C)] 98.1 °F (36.7 °C)  Heart Rate:  [] 93  Resp:  [14-18] 16  BP: ()/() 150/88    Intake/Output Summary (Last 24 hours) at 8/16/2021 1932  Last data filed at 8/16/2021 1852  Gross per 24 hour   Intake 867 ml   Output 2500 ml   Net -1633 ml         Physical Exam:  General:    Awake, alert, in no acute distress, chronically ill appearing    Heart:      Normal S1 and S2. Regular rate and rhythm. No significant murmur, rubs or gallops appreciated.   Lungs:     Respirations regular, even and unlabored. Diminished breath sounds at bases. No wheezes, rales or rhonchi.   Abdomen:   Soft and nontender. No guarding, rebound tenderness or  organomegaly noted. Bowel sounds present x 4.   Extremities:  No clubbing, cyanosis or edema noted.      Results Review:    Results from last 7 days   Lab Units 08/16/21  1500 08/16/21  0908 08/15/21  1508   WBC 10*3/mm3  --  7.20 10.15   HEMOGLOBIN g/dL 9.0* 6.9* 6.0*   PLATELETS 10*3/mm3  --  114* 166     Results from last 7 days   Lab Units 08/16/21  1120 08/15/21  1508   SODIUM mmol/L 132* 131*   POTASSIUM mmol/L 6.2* 6.4*   CHLORIDE mmol/L 95* 96*   CO2 mmol/L 10.7* 7.7*   BUN mg/dL 210* 194*   CREATININE mg/dL 16.55* 17.56*   CALCIUM mg/dL 8.0* 8.2*   GLUCOSE mg/dL 93 72     Results from last 7 days   Lab Units 08/15/21  1508   BILIRUBIN mg/dL 0.4    ALK PHOS U/L 119*   AST (SGOT) U/L 12   ALT (SGPT) U/L 8     Results from last 7 days   Lab Units 08/15/21  1508   MAGNESIUM mg/dL 2.3         Results from last 7 days   Lab Units 08/15/21  1709 08/15/21  1508   TROPONIN T ng/mL 0.195* 0.208*       Imaging Results (Last 24 Hours)     ** No results found for the last 24 hours. **            Medications:  amLODIPine, 5 mg, Oral, Daily  carvedilol, 12.5 mg, Oral, BID With Meals  epoetin mc/mc-epbx, 6,000 Units, Subcutaneous, Once per day on Mon Wed Fri  heparin (porcine), 5,000 Units, Subcutaneous, Q8H  lactulose, 30 g, Oral, Once  pantoprazole, 40 mg, Oral, Daily  senna-docusate sodium, 2 tablet, Oral, BID  sodium chloride, 10 mL, Intravenous, Q12H               Assessment/Plan   Hyperkalemia: Likely 2/2 noncompliance with HD. Plan for HD today. Follow up BMP following dialysis. Monitor on telemetry. Nephrology input appreciated.     Acute on chronic normocytic anemia: Likely 2/2 anemia of chronic disease and iron deficiency. No obvious signs of bleeding. S/P 2 units of PRBC's. Cont epogen. Cont to monitor.     Uremia: Likely 2/2 noncompliance with HD. Dialysis per nephrology.    ESRD on HD with noncompliance: HD per nephrology. SS/CM consulted to assist with discharge planning. Nephrology input appreciated.     Essential HTN: BP elevated upon admission. Cont Norvasc and Coreg. Cont to monitor.     Elevated troponin: Chronically elevated and likely 2/2 above. Cont to monitor.     Hx of spondylodiscitis: Previously hospitalized at [facility]. Records requested.     Hx of substance abuse: Cont supportive treatment.    Medical noncompliance: Complicates all aspects of care.    DVT PPX: SQ heparin      Disposition: Unclear at present. SS consulted.        Memo Diego DO  08/16/21  19:32 EDT

## 2021-08-16 NOTE — PLAN OF CARE
Goal Outcome Evaluation:  Plan of Care Reviewed With: patient     Plan of care reviewed with patient per MERLIN Gould RN. Patient continues to await bed placement when bed is available. Currently receiving transfusion of PRBC. Complained of nausea. Anti-emetic administered per MERLIN Gould RN

## 2021-08-16 NOTE — PLAN OF CARE
Goal Outcome Evaluation:           Progress: no change  Outcome Summary: Pt admitted to floor from ER. Pt received dialysis in ER. Pt resting in bed. No complaints of pain. C/o nausea, prn phenergan given per MAR. No other acute changes noted.

## 2021-08-16 NOTE — ED NOTES
Transport here to take pt to 3n, pt clean and dry, belongings taken with pt     Brigitte Gould, RN  08/16/21 6142

## 2021-08-16 NOTE — CONSULTS
Nephrology Consult Note    Referring Provider: Dr. Rogers  Reason for Consultation: ESKD, Hyperkalemia, fluid overload    Subjective       History of present illness:  Mario Nair is a 46 y.o. male who presented to [facility] emergency department with chief complaint of not feeling well, nausea, generlized weakness. Pt is known to have ESKD, Hep B and C, HTN, IV drug abuse, and medical noncompliance and is does not have established outpatient dialysis and never stays inpatient to get out patient chair and leaves AMA.   Has mild shortness of breath as well. He did not have dialysis for may wks  Pt denied any history of  Chronic NSAIDS use. Patient denies hematuria, dysuria, difficulty passing urine. No prior history of renal stones. No family history of renal disease    History  Past Medical History:   Diagnosis Date   • Anemia due to chronic kidney disease    • COPD (chronic obstructive pulmonary disease) (CMS/[facility])    • COVID-19 1/25/2021   • Current smoker    • Endocarditis    • ESRD (end stage renal disease) (CMS/[facility])    • Hepatitis B surface antigen positive 12/9/2020   • Hepatitis C    • History of transfusion    • Hypertension    • IV drug abuse (CMS/[facility])    • Medically noncompliant    • Mild atherosclerosis of carotid artery, right 2/15/2021   • Retroperitoneal lymphadenopathy 12/8/2020   • Unable to read or write    ,   Past Surgical History:   Procedure Laterality Date   • CENTRAL VENOUS CATHETER TUNNELED INSERTION DOUBLE LUMEN Right     Chest for hemodialysis   • INSERTION HEMODIALYSIS CATHETER N/A 1/29/2021    Procedure: HEMODIALYSIS CATHETER INSERTION;  Surgeon: Khoa Carl MD;  Location: [facility];  Service: General;  Laterality: N/A;   ,   Family History   Problem Relation Age of Onset   • Alcohol abuse Mother    • Hypertension Mother    • Hypertension Other    • Kidney disease Maternal Grandmother    ,   Social History     Tobacco Use   • Smoking status: Current Every Day Smoker      Packs/day: 0.50     Years: 20.00     Pack years: 10.00     Types: Cigarettes   • Smokeless tobacco: Never Used   Substance Use Topics   • Alcohol use: Not Currently     Comment: Quit 18 years ago   • Drug use: Not Currently     Types: Methamphetamines, IV     Comment: last used on Friday 5/18/2019   , (Not in a hospital admission)  , Scheduled Meds:  amLODIPine, 5 mg, Oral, Daily  buprenorphine-naloxone, 1 tablet, Sublingual, Daily  carvedilol, 12.5 mg, Oral, BID With Meals  epoetin mc/mc-epbx, 6,000 Units, Subcutaneous, Once per day on Mon Wed Fri  heparin (porcine), 5,000 Units, Subcutaneous, Q8H  pantoprazole, 40 mg, Oral, Daily  senna-docusate sodium, 2 tablet, Oral, BID  sodium chloride, 10 mL, Intravenous, Q12H    , Continuous Infusions:   , PRN Meds:  hydrALAZINE  •  promethazine  •  sodium chloride and Allergies:  Penicillins    Review of Systems  More than 10 point review of systems was done. Pertinent items are noted in HPI, all other systems reviewed and negative    Objective     Vital Signs  Temp:  [97.6 °F (36.4 °C)-98.5 °F (36.9 °C)] 97.7 °F (36.5 °C)  Heart Rate:  [73-95] 74  Resp:  [14-18] 14  BP: (122-182)/() 122/79    I/O this shift:  In: 237 [P.O.:237]  Out: -   I/O last 3 completed shifts:  In: 330 [Blood:330]  Out: -     Physical Examination:  General Appearance: Alert, cooperative and oriented  Head: Normocephalic, without obvious abnormality and atraumatic  Throat: Oral mucosa moist  Neck: No adenopathy, suppple, no carotid bruit and no JVD  Lungs: Clear to auscultation, respirations regular and unlabored  Heart: Regular rhythm & normal rate, normal S1, S2, no murmur, no gallop, no rub   Abdomen: Normal bowel sounds, no masses and soft non-tender  Extremities: Moves extremities well, no redness and 2+ edema  Pulses: Palpable and equal bilaterally  Neurologic: Orientated to person, place, time, grossly no focal deficitis    Laboratory Data :      WBC WBC   Date Value Ref Range  Status   08/16/2021 7.20 3.40 - 10.80 10*3/mm3 Final   08/15/2021 10.15 3.40 - 10.80 10*3/mm3 Final      HGB Hemoglobin   Date Value Ref Range Status   08/16/2021 6.9 (C) 13.0 - 17.7 g/dL Final   08/15/2021 6.0 (C) 13.0 - 17.7 g/dL Final      HCT Hematocrit   Date Value Ref Range Status   08/16/2021 23.6 (L) 37.5 - 51.0 % Final   08/15/2021 20.5 (C) 37.5 - 51.0 % Final      Platlets No results found for: LABPLAT   MCV MCV   Date Value Ref Range Status   08/16/2021 97.9 (H) 79.0 - 97.0 fL Final   08/15/2021 98.6 (H) 79.0 - 97.0 fL Final          Sodium Sodium   Date Value Ref Range Status   08/15/2021 131 (L) 136 - 145 mmol/L Final      Potassium Potassium   Date Value Ref Range Status   08/15/2021 6.4 (C) 3.5 - 5.2 mmol/L Final     Comment:     Slight hemolysis detected by analyzer. Results may be affected.      Chloride Chloride   Date Value Ref Range Status   08/15/2021 96 (L) 98 - 107 mmol/L Final      CO2 CO2   Date Value Ref Range Status   08/15/2021 7.7 (L) 22.0 - 29.0 mmol/L Final      BUN BUN   Date Value Ref Range Status   08/15/2021 194 (H) 6 - 20 mg/dL Final      Creatinine Creatinine   Date Value Ref Range Status   08/15/2021 17.56 (H) 0.76 - 1.27 mg/dL Final      Calcium Calcium   Date Value Ref Range Status   08/15/2021 8.2 (L) 8.6 - 10.5 mg/dL Final      PO4 No results found for: CAPO4   Albumin Albumin   Date Value Ref Range Status   08/15/2021 3.56 3.50 - 5.20 g/dL Final      Magnesium Magnesium   Date Value Ref Range Status   08/15/2021 2.3 1.6 - 2.6 mg/dL Final      Uric Acid No results found for: URICACID     Radiology results :     Imaging Results (Last 72 Hours)     Procedure Component Value Units Date/Time    CT Abdomen Pelvis Without Contrast [[id number]] Collected: 08/15/21 1716     Updated: 08/15/21 1718    Narrative:      CT Abdomen Pelvis WO    INDICATION:   Abdominal pain, elevated lipase    TECHNIQUE:   CT of the abdomen and pelvis without IV contrast. Coronal and sagittal  reconstructions were obtained.  Radiation dose reduction techniques included automated exposure control or exposure modulation based on body size. Count of known CT and cardiac nuc  med studies performed in previous 12 months: 0.     COMPARISON:   CT of the abdomen and pelvis from 12/31/2020    FINDINGS:  Abdomen: Exam is somewhat limited due to lack of contrast. Lung bases are clear. There is ascites that has increased from prior. Both renal collecting systems are grossly dilated, and this appears similar to prior. Pancreas is not well identified but  there is no gross abnormality. Spleen appears unremarkable.    Pelvis: It is uncertain if there may be some potential wall thickening involving the cecum versus artifact from adjacent ascites and under distention. Urinary bladder is again noted to be thick walled. Stool in the rectum may suggest potential impaction  versus constipation. The bones are osteopenic. No definite acute osseous abnormality.      Impression:      1. There has been interval increase in ascites from the prior exam.  It is uncertain if there may be some potential wall thickening involving the cecal region versus artifact from adjacent ascites and underdistention. This area is not well evaluated.  2. Both kidneys are again noted to demonstrate chronic hydronephrosis with a thickened urinary bladder wall that appears similar to prior.  3. Stool in the rectum may suggest impaction versus constipation.          Signer Name: Ling Grimm MD   Signed: 8/15/2021 5:16 PM   Workstation Name: [id number]    [facility]    XR Chest 1 View [[id number]] Collected: 08/15/21 1531     Updated: 08/15/21 1534    Narrative:      CR Chest 1 Vw    INDICATION:   Shortness of breath     COMPARISON:    Chest x-ray from 3/7/2021    FINDINGS:  Single portable AP view(s) of the chest.  The heart and mediastinal contours are normal. Large bore catheter is stable. The lungs are clear apart from grossly  emphysematous changes of COPD. No pneumothorax or large pleural effusion. There is a tiny left  pleural effusion.      Impression:      No acute cardiopulmonary findings. There is COPD with emphysema and a tiny left pleural effusion that may be new.    Signer Name: Ling Grimm MD   Signed: 8/15/2021 3:31 PM   Workstation Name: [id number]    [facility]            Medications:      amLODIPine, 5 mg, Oral, Daily  buprenorphine-naloxone, 1 tablet, Sublingual, Daily  carvedilol, 12.5 mg, Oral, BID With Meals  epoetin mc/mc-epbx, 6,000 Units, Subcutaneous, Once per day on Mon Wed Fri  heparin (porcine), 5,000 Units, Subcutaneous, Q8H  pantoprazole, 40 mg, Oral, Daily  senna-docusate sodium, 2 tablet, Oral, BID  sodium chloride, 10 mL, Intravenous, Q12H           Assessment/Plan       ESRD (end stage renal disease) (CMS/[facility])      1. ESKD, non compliant to dialysis  2. Sever hyperkalemia, life threatening.   3. Hyponatremia, hypervolemia  4. Metabolic acidosis  5. Anemia    Pt K is 6.9, will proceed to emergent dialysis. Educated and counseled the patient about compliance  Hyponatremia is likely to be improving with ultrafiltration during dialysis.     Thanks Dr Rogers for the consult. Nephrology will follow the patient.   I discussed the patient's findings and my recommendations with patient and nursing staff    Taniya Corado MD  08/16/21  10:19 EDT

## 2021-08-16 NOTE — PAYOR COMM NOTE
"[facility]  NPI:[id number]    Utilization Review  Contact: Risa Verde RN  Phone: [phone]  Fax:[phone]    INITIATE INPATIENT AUTHORIZATION    Mario Nair (46 y.o. Male)     Date of Birth Social Security Number Address Home Phone MRN    1975  [address] [place] [id number] [phone] [id number]    Bahai Marital Status          None        Admission Date Admission Type Admitting Provider Attending Provider Department, Room/Bed    8/15/21 Emergency Marcelina Rogers MD Troxell, Christopher A, DO [facility] Emergency Department,     Discharge Date Discharge Disposition Discharge Destination                       Attending Provider: Memo Diego DO    Allergies: Penicillins    Isolation: None   Infection: Hepatitis B (19), COVID (History) (21), COVID (rule out) (08/15/21)   Code Status: CPR    Ht: 188 cm (74\")   Wt: 63.5 kg (140 lb)    Admission Cmt: None   Principal Problem: None                Active Insurance as of 8/15/2021     Primary Coverage     Payor Plan Insurance Group Employer/Plan Group    [employer] [place] [employer] [place]ICA[place]      Payor Plan Address Payor Plan Phone Number Payor Plan Fax Number Effective Dates     [address] [address] [phone]  2019 - None Entered    [facility] [id number]       Subscriber Name Subscriber Birth Date Member ID       MARIO NAIR 1975 [id number]                 Emergency Contacts      (Rel.) Home Phone Work Phone Mobile Phone    SHANI SILVA (Relative) [phone] -- --    Constance Nair (Daughter) [phone] -- [phone]               History & Physical      Marcelina Rogers MD at 08/15/21 190              [facility]    Patient Identification:  Name:  Mario Nair  Age:  46 y.o.  Sex:  male  :  1975  MRN:  [id number]   Visit Number:  [id number]  Room number:    Admit Date: 8/15/2021   Primary " "Care Physician:  Danny Rebolledo MD     Chief complaint:    Chief Complaint   Patient presents with   • Weakness - Generalized   • Nausea   • Vomiting   • Abdominal Pain       History of presenting illness:  46 y.o. male with history of ESRD, ACD, IE, Hep B and C, HTN, IV drug abuse (that he currently denies), and medical noncompliance, who presented with generalized weakness and fatigue.  Per him he his last HD was \"4 months ago bcuz no one wants to get me in the dialysis unit maybe bcuz of my hepatitis\".  The pt left AMA multiple times int he past.  HE was found to have Hg o 6 and K >6.  Dr. Haddad was contacted by the ED and is planning on HD today.  From the previous charts, he never stayed in the hospital long enough for case management to arrange a seat for him in a local dialysis clinic.   No fever or chills.  No N/V.  Pt is asking for food in the ED.    ---------------------------------------------------------------------------------------------------------------------   Review of Systems Previous transfer to  for discitis / paraspinal abscess, pt said \"no one did anything\".  Multiple AMA discharges.     ---------------------------------------------------------------------------------------------------------------------   Past Medical History:   Diagnosis Date   • Anemia due to chronic kidney disease    • COPD (chronic obstructive pulmonary disease) (CMS/[facility])    • COVID-19 1/25/2021   • Current smoker    • Endocarditis    • ESRD (end stage renal disease) (CMS/HCC)    • Hepatitis B surface antigen positive 12/9/2020   • Hepatitis C    • History of transfusion    • Hypertension    • IV drug abuse (CMS/HCC)    • Medically noncompliant    • Mild atherosclerosis of carotid artery, right 2/15/2021   • Retroperitoneal lymphadenopathy 12/8/2020   • Unable to read or write      Past Surgical History:   Procedure Laterality Date   • CENTRAL VENOUS CATHETER TUNNELED INSERTION DOUBLE LUMEN Right     Chest for " hemodialysis   • INSERTION HEMODIALYSIS CATHETER N/A 1/29/2021    Procedure: HEMODIALYSIS CATHETER INSERTION;  Surgeon: Khoa Carl MD;  Location: [facility];  Service: General;  Laterality: N/A;     Family History   Problem Relation Age of Onset   • Alcohol abuse Mother    • Hypertension Mother    • Hypertension Other    • Kidney disease Maternal Grandmother      Social History     Socioeconomic History   • Marital status:      Spouse name: Not on file   • Number of children: Not on file   • Years of education: Not on file   • Highest education level: Not on file   Tobacco Use   • Smoking status: Current Every Day Smoker     Packs/day: 0.50     Years: 20.00     Pack years: 10.00     Types: Cigarettes   • Smokeless tobacco: Never Used   Substance and Sexual Activity   • Alcohol use: Not Currently     Comment: Quit 18 years ago   • Drug use: Not Currently     Types: Methamphetamines, IV     Comment: last used on Friday 5/18/2019   • Sexual activity: Defer     ---------------------------------------------------------------------------------------------------------------------   Allergies:  Penicillins  ---------------------------------------------------------------------------------------------------------------------   Prior to Admission Medications     Prescriptions Last Dose Informant Patient Reported? Taking?    amLODIPine (NORVASC) 5 MG tablet   Yes No    Take 5 mg by mouth Daily.    buprenorphine-naloxone (SUBOXONE) 8-2 MG per SL tablet   Yes No    Place 2 tablets under the tongue Daily.    calcium acetate (PHOS BINDER,) 667 MG capsule capsule   No No    TAKE 1 CAPSULE BY MOUTH THREE TIMES A DAY WITH A MEAL    carvedilol (COREG) 12.5 MG tablet  Pharmacy Yes No    Take 12.5 mg by mouth 2 (Two) Times a Day With Meals.    epoetin mc-epbx (RETACRIT) 3000 UNIT/ML injection   No No    Inject 2 mL under the skin into the appropriate area as directed 3 (Three) Times a Week. Indications: ESRD on Dialysis     glucagon, human recombinant, (GLUCAGEN DIAGNOSTIC) 1 MG injection   No No    Inject 1 mg under the skin into the appropriate area as directed Every 15 (Fifteen) Minutes As Needed (Blood Glucose Less Than 70) for up to 360 days.    lactobacillus acidophilus (RISAQUAD) capsule capsule   No No    Take 1 capsule by mouth Daily.    levoFLOXacin (LEVAQUIN) 500 MG tablet   No No    Take 1 tablet by mouth Every Other Day.    lidocaine (LIDODERM) 5 %   No No    Place 1 patch on the skin as directed by provider Daily. Remove & Discard patch within 12 hours or as directed by MD    methocarbamol (ROBAXIN) 500 MG tablet  Pharmacy Yes No    Take 500 mg by mouth 3 (Three) Times a Day As Needed for Muscle Spasms.    pantoprazole (PROTONIX) 40 MG EC tablet   No No    Take 1 tablet by mouth Daily.    simethicone (MYLICON) 80 MG chewable tablet   Yes No    Chew 80 mg 5 (Five) Times a Day As Needed for Flatulence.        ---------------------------------------------------------------------------------------------------------------------   Vital Signs:  Temp:  [97.6 °F (36.4 °C)-98.5 °F (36.9 °C)] 97.6 °F (36.4 °C)  Heart Rate:  [85-95] 85  Resp:  [14-16] 14  BP: (169-182)/(101-116) 182/116    No data found.  SpO2:  [100 %] 100 %  on   ;   Device (Oxygen Therapy): room air  Body mass index is 17.97 kg/m².    Wt Readings from Last 3 Encounters:   08/15/21 63.5 kg (140 lb)   03/13/21 69.3 kg (152 lb 11.2 oz)   02/27/21 71 kg (156 lb 9.6 oz)               ---------------------------------------------------------------------------------------------------------------------   Physical Exam:  Constitutional: Adult male, much older than stated age.      HENT:  Head:  Normocephalic and atraumatic.  Mouth: Pale mucousa.   Neck:  Neck supple but with increased pain with extension of the head and reduce pain with flexion.  There is significant tenderness to palpation at the base of the cervical spine.  No JVD present.    Cardiovascular:  Tachycardic but regular rhythm and normal heart sounds with no murmur.  Pulmonary/Chest:  Basal crackles, with normal breath sounds and good air movement.  Abdominal:  Soft.  Bowel sounds are normal.  No distension and no tenderness.   Musculoskeletal:  No edema, no tenderness, and no deformity.  No red or swollen joints anywhere.  There is 2 out of 5 strength in the right upper extremity with 0 out of 5 strength still in the left with flexion, extension, and  strength. .  Neurological:  Alert and oriented to person, place, and time.  No cranial nerve deficit.  No tongue deviation.  No facial droop.  No slurred speech. Intact Sensation throughout including the upper extremities bilaterally.  Skin:  Skin is warm and dry. No rash or lesion noted. No pallor.   Peripheral vascular:  Pulses in all 4 extremities with no clubbing, no cyanosis, no edema.  Psychiatric: Appropriate mood and affect, pleasant.   I have personally looked at both the EKG and the telemetry strips.  --------------------------------------------------------------------------------------------------------------------  Results from last 7 days   Lab Units 08/15/21  1709 08/15/21  1508   TROPONIN T ng/mL 0.195* 0.208*     Results from last 7 days   Lab Units 08/15/21  1508   CRP mg/dL 4.66*   LACTATE mmol/L 0.6   WBC 10*3/mm3 10.15   HEMOGLOBIN g/dL 6.0*   HEMATOCRIT % 20.5*   MCV fL 98.6*   MCHC g/dL 29.3*   PLATELETS 10*3/mm3 166     Results from last 7 days   Lab Units 08/15/21  1508   SODIUM mmol/L 131*   POTASSIUM mmol/L 6.4*   MAGNESIUM mg/dL 2.3   CHLORIDE mmol/L 96*   CO2 mmol/L 7.7*   BUN mg/dL 194*   CREATININE mg/dL 17.56*   EGFR IF NONAFRICN AM mL/min/1.73 3*   CALCIUM mg/dL 8.2*   GLUCOSE mg/dL 72   ALBUMIN g/dL 3.56   BILIRUBIN mg/dL 0.4   ALK PHOS U/L 119*   AST (SGOT) U/L 12   ALT (SGPT) U/L 8   Estimated Creatinine Clearance: 4.8 mL/min (A) (by C-G formula based on SCr of 17.56 mg/dL (H)).    Glucose   Date/Time Value Ref Range  Status   08/15/2021 1802 73 70 - 130 mg/dL Final     Comment:     Meter: [id number] : [id number] birdie greenwood     No results found for: AMMONIA        No results found for: BLOODCXNo results found for: RESPCXNo results found for: URINECXNo results found for: WOUNDCXNo results found for: BODYFLDCXNo results found for: STOOLCX  pH No results found for: PHART   pO2 No results found for: PO2ART   pCO2 No results found for: LDS2ABO   HCO3 No results found for: QAB0WWI     I have personally looked at the labs and they are summarized above.  ----------------------------------------------------------------------------------------------------------------------  Imaging Results (Last 24 Hours)     Procedure Component Value Units Date/Time    CT Abdomen Pelvis Without Contrast [[id number]] Collected: 08/15/21 1716     Updated: 08/15/21 1718    Narrative:      CT Abdomen Pelvis WO    INDICATION:   Abdominal pain, elevated lipase    TECHNIQUE:   CT of the abdomen and pelvis without IV contrast. Coronal and sagittal reconstructions were obtained.  Radiation dose reduction techniques included automated exposure control or exposure modulation based on body size. Count of known CT and cardiac nuc  med studies performed in previous 12 months: 0.     COMPARISON:   CT of the abdomen and pelvis from 12/31/2020    FINDINGS:  Abdomen: Exam is somewhat limited due to lack of contrast. Lung bases are clear. There is ascites that has increased from prior. Both renal collecting systems are grossly dilated, and this appears similar to prior. Pancreas is not well identified but  there is no gross abnormality. Spleen appears unremarkable.    Pelvis: It is uncertain if there may be some potential wall thickening involving the cecum versus artifact from adjacent ascites and under distention. Urinary bladder is again noted to be thick walled. Stool in the rectum may suggest potential impaction  versus constipation. The bones are osteopenic.  No definite acute osseous abnormality.      Impression:      1. There has been interval increase in ascites from the prior exam.  It is uncertain if there may be some potential wall thickening involving the cecal region versus artifact from adjacent ascites and underdistention. This area is not well evaluated.  2. Both kidneys are again noted to demonstrate chronic hydronephrosis with a thickened urinary bladder wall that appears similar to prior.  3. Stool in the rectum may suggest impaction versus constipation.          Signer Name: Ling Grimm MD   Signed: 8/15/2021 5:16 PM   Workstation Name: [id number]    [facility]    XR Chest 1 View [[id number]] Collected: 08/15/21 1531     Updated: 08/15/21 1534    Narrative:      CR Chest 1 Vw    INDICATION:   Shortness of breath     COMPARISON:    Chest x-ray from 3/7/2021    FINDINGS:  Single portable AP view(s) of the chest.  The heart and mediastinal contours are normal. Large bore catheter is stable. The lungs are clear apart from grossly emphysematous changes of COPD. No pneumothorax or large pleural effusion. There is a tiny left  pleural effusion.      Impression:      No acute cardiopulmonary findings. There is COPD with emphysema and a tiny left pleural effusion that may be new.    Signer Name: Ling Grimm MD   Signed: 8/15/2021 3:31 PM   Workstation Name: [id number]    [facility]        I have personally reviewed the radiology images and read the final radiology report.    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  Pt isn't  porvidind much hx, was discharged on levofloxacin   *End-stage renal disease noncompliant with dialysis  *Medical nonadherence  *Metabolic acidosis  *Anemia of both chronic disease and iron deficiency, symptomatic   *Chronic troponenemia  *Ess HTN, stage-II  *Chronic hepatitis C  *History of IV drug abuse  *History of endocarditis     --Medsurg admit with remote tele  --HD per damaris, Dr. Nelson contacted by the ED provider sign-out, planning for HD today  --hold on antihypertensives, monitor post HD  --Iron profile ADT  --fu on cbc/bmp in am post transfusion (getting 1 PRBC now)  --Obtain dc summary record from   --DVT proph        Marcelina Rogers MD  08/15/21  19:06 EDT      Electronically signed by Marcelina Rogers MD at 08/15/21 1930          Emergency Department Notes      Caroline Pacheco RN at 08/15/21 1357        Orange fall bracelet placed on pt, educ. Given, pt verb understanding, pt in wc in triage office view     Caroline Pacheco RN  08/15/21 1358      Electronically signed by Caroline Pacheco, RN at 08/15/21 1358     Felisa Shipman PA at 08/15/21 1433        MEDICAL SCREENING     Patient initially seen in triage.  The patient was advised further evaluation and diagnostic testing will be needed, some of the treatment and testing will be initiated in the lobby in order to begin the process.  The patient will be returned to the waiting area for the time being and possibly be re-assessed by a subsequent ED provider.  The patient will be brought back to the treatment area in as timely manner as possible.       Felisa Shipman PA  08/15/21 1433      Electronically signed by Felisa Shipman PA at 08/15/21 1433     Brigitte Gould, RN at 08/15/21 1540        Dialysis access to left subclavian cleaned at this time, pt reports that his daughter has been changing dressing, dressing is black     Brigitte Gould, RN  08/15/21 2273      Electronically signed by Brigitte Gould, RN at  08/15/21 1853     Maxi Ponce MD at 08/15/21 1611          Subjective   46-year-old male with a history of anemia of chronic disease IV drug abuse hypertension a stage renal disease noncompliance presents the emergency room with complaints of weakness.  Patient reports he has been weak for multiple months.  He states that his weakness is progressing getting worse over the past couple days.  He states he does get dialysis but has not received it multiple weeks.  He states he is unable to get transportation which is why he has not been able to get to dialysis.  He states he has had no chest pain or shortness of breath.  He reports some mild abdominal discomfort.  He denies fever chills.      Weakness - Generalized  Severity:  Moderate  Timing:  Constant  Progression:  Worsening  Relieved by:  Nothing  Worsened by:  Nothing  Ineffective treatments:  None tried  Associated symptoms: abdominal pain and nausea    Associated symptoms: no chest pain, no cough, no diarrhea, no fever, no headaches and no vomiting        Review of Systems   Constitutional: Negative for fever.   Respiratory: Negative for cough.    Cardiovascular: Negative for chest pain.   Gastrointestinal: Positive for abdominal pain and nausea. Negative for diarrhea and vomiting.   Neurological: Negative for headaches.   All other systems reviewed and are negative.      Past Medical History:   Diagnosis Date   • Anemia due to chronic kidney disease    • COPD (chronic obstructive pulmonary disease) (CMS/HCC)    • COVID-19 1/25/2021   • Current smoker    • Endocarditis    • ESRD (end stage renal disease) (CMS/HCC)    • Hepatitis B surface antigen positive 12/9/2020   • Hepatitis C    • History of transfusion    • Hypertension    • IV drug abuse (CMS/HCC)    • Medically noncompliant    • Mild atherosclerosis of carotid artery, right 2/15/2021   • Retroperitoneal lymphadenopathy 12/8/2020   • Unable to read or write        Allergies   Allergen Reactions   •  Penicillins Shortness Of Breath, Itching and Rash       Past Surgical History:   Procedure Laterality Date   • CENTRAL VENOUS CATHETER TUNNELED INSERTION DOUBLE LUMEN Right     Chest for hemodialysis   • INSERTION HEMODIALYSIS CATHETER N/A 1/29/2021    Procedure: HEMODIALYSIS CATHETER INSERTION;  Surgeon: Khoa Carl MD;  Location: [facility];  Service: General;  Laterality: N/A;       Family History   Problem Relation Age of Onset   • Alcohol abuse Mother    • Hypertension Mother    • Hypertension Other    • Kidney disease Maternal Grandmother        Social History     Socioeconomic History   • Marital status:      Spouse name: Not on file   • Number of children: Not on file   • Years of education: Not on file   • Highest education level: Not on file   Tobacco Use   • Smoking status: Current Every Day Smoker     Packs/day: 0.50     Years: 20.00     Pack years: 10.00     Types: Cigarettes   • Smokeless tobacco: Never Used   Substance and Sexual Activity   • Alcohol use: Not Currently     Comment: Quit 18 years ago   • Drug use: Not Currently     Types: Methamphetamines, IV     Comment: last used on Friday 5/18/2019   • Sexual activity: Defer           Objective   Physical Exam  Vitals and nursing note reviewed.   Constitutional:       Comments: Thin frail appears older than stated age.  Chronically ill-appearing.   HENT:      Head: Normocephalic and atraumatic.      Comments: Generalized pharyngeal edema.  Uvula midline.  Cardiovascular:      Rate and Rhythm: Normal rate and regular rhythm.      Comments: No murmur 2+ radial pulse bilaterally.  Pulmonary:      Effort: Pulmonary effort is normal.      Comments: No rhonchi's rales or wheezes.  Left upper chest with dialysis catheter in place.  Appears unkept.  No surrounding erythema.  Abdominal:      Palpations: Abdomen is soft.      Tenderness: There is generalized abdominal tenderness.   Genitourinary:     Penis: Normal.    Skin:     General: Skin is  warm and dry.      Comments: Right groin with nodule fluctuant nontender.   Neurological:      Mental Status: He is alert and oriented to person, place, and time.      Cranial Nerves: No cranial nerve deficit.   Psychiatric:         Mood and Affect: Mood normal.         Procedures          ED Course  ED Course as of Aug 16 0907   Sun Aug 15, 2021   1515 EKG is normal sinus rhythm with rate of 87 bpm TN interval is 176 ms QRS duration is 92 ms QT/QTc is 396/464 ms    [EG]   1606 Patient is noted to have low hemoglobin of 6.0 20.5 have ordered 1 unit of packed red blood cells.  Patient is elevated troponin but is chronically elevated patient has elevated BUN and creatinine he has not been dialyzed he says in multiple weeks he is unsure when his last dialysis was.  Patient elevated potassium 6.4 have spoken to Dr. Hylton with nephrology who states patient can be given medication to lower his potassium he will be dialyzed tomorrow he does not feel that patient needs to be emergently dialyzed.    [BB]   1636 Have attempted to contact her supervisor regarding possible bed assignment for patient.    [BB]   1721 CT Abdomen Pelvis Without Contrast    Result Date: 8/15/2021  1. There has been interval increase in ascites from the prior exam.  It is uncertain if there may be some potential wall thickening involving the cecal region versus artifact from adjacent ascites and underdistention. This area is not well evaluated. 2. Both kidneys are again noted to demonstrate chronic hydronephrosis with a thickened urinary bladder wall that appears similar to prior. 3. Stool in the rectum may suggest impaction versus constipation. Signer Name: Ling Grimm MD  Signed: 8/15/2021 5:16 PM  Workstation Name: [id number]  [facility]    XR Chest 1 View    Result Date: 8/15/2021  No acute cardiopulmonary findings. There is COPD with emphysema and a tiny left pleural effusion that may be new. Signer Name: Ling Grimm MD   Signed: 8/15/2021 3:31 PM  Workstation Name: [id number]  [facility]        [BB]   1724 CT Abdomen Pelvis Without Contrast    Result Date: 8/15/2021  1. There has been interval increase in ascites from the prior exam.  It is uncertain if there may be some potential wall thickening involving the cecal region versus artifact from adjacent ascites and underdistention. This area is not well evaluated. 2. Both kidneys are again noted to demonstrate chronic hydronephrosis with a thickened urinary bladder wall that appears similar to prior. 3. Stool in the rectum may suggest impaction versus constipation. Signer Name: Ling Grimm MD  Signed: 8/15/2021 5:16 PM  Workstation Name: [id number]  [facility]    XR Chest 1 View    Result Date: 8/15/2021  No acute cardiopulmonary findings. There is COPD with emphysema and a tiny left pleural effusion that may be new. Signer Name: Ling Grimm MD  Signed: 8/15/2021 3:31 PM  Workstation Name: [id number]  [facility]        [BB]   1732 Have noted no beds at current facility.  Patient is oriented x3.  Have discussed that given no beds available patient will need to be transferred to another facility.  Patient declines to be transferred to any facility.  Have discussed inability to care for patient at this facility given that there are no beds in the hospital and we are at [place] capacity regarding boarding patients as well.  Patient acknowledges verbalizes understanding however he still declines to be transferred discussed risk of death endorgan damage loss of lifestyle patient verbalized understanding but states he will not be transferred anywhere else he states he will not be able to get back have discussed other hospitals have  to assist him with getting home after he has completed his hospitalization course he acknowledges this but still declines to be transferred to any facility.    [BB]   1737 Patient  troponin has improved to 0.195.    [BB]   1740 Of note have contacted her supervisor again and noted that bed became available.  Have spoken to to hospitalist was agreeable to admit    [BB]      ED Course User Index  [BB] Laurent Nguyen MD  [EG] Chinyere Guaman, DO                                           MDM  Number of Diagnoses or Management Options  Anemia, unspecified type: new and requires workup  ESRD (end stage renal disease) (CMS/HCC): new and requires workup  Hyperkalemia: new and requires workup     Amount and/or Complexity of Data Reviewed  Clinical lab tests: reviewed  Tests in the medicine section of CPT®: reviewed  Decide to obtain previous medical records or to obtain history from someone other than the patient: yes        Final diagnoses:   ESRD (end stage renal disease) (CMS/HCC)   Anemia, unspecified type   Hyperkalemia       ED Disposition  ED Disposition     ED Disposition Condition Comment    Decision to Admit  Level of Care: Med/Surg [1]   Diagnosis: ESRD (end stage renal disease) (CMS/HCC) [593007]   Admitting Physician: GABBIE CAIN [[id number]]   Bed Request Comments: remote tele   Certification: I Certify That Inpatient Hospital Services Are Medically Necessary For Greater Than 2 Midnights            No follow-up provider specified.       Medication List      No changes were made to your prescriptions during this visit.          Maxi Ponce MD  08/16/21 0907      Electronically signed by Maxi Ponce MD at 08/16/21 0907     Marika Del Toro at 08/15/21 1641        Patient's daughter called to leave numbers for us to call if needed and to give an update as soon as possible, Constance [phone], [phone].     Marika Del Toro  08/15/21 1643      Electronically signed by Marika Del Toro at 08/15/21 1643     Brigitte Gould, RN at 08/15/21 1833        Consent to transfuse 1 unit prbc signed by pt at this time     Brigitte Gould, RN  08/15/21  1834      Electronically signed by Brigitte Gould, RN at 08/15/21 1834     Marika Del Toro at 08/16/21 0710        ACCU CHEK done, results 104, nurse made aware.     Marika Del Toro  08/16/21 0710      Electronically signed by Marika Del Toro at 08/16/21 0710     Brigitte Gould, RN at 08/16/21 0815        Called pharmacy regarding retacrit      Brigitte Gould RN  08/16/21 0837      Electronically signed by Brigitte Gould RN at 08/16/21 0837     Marika Del Toro at 08/16/21 0828        ACCU CHEK done, results 104, nurse made aware.     Marika Del Toro  08/16/21 0829      Electronically signed by Marika Del Toro at 08/16/21 0829     Marika Del Toro at 08/16/21 0910        ACCU CHEK done, results 104, nurse aware.     Marika Del Toro  08/16/21 0910      Electronically signed by Marika Del Toro at 08/16/21 0910         Facility-Administered Medications as of 8/15/2021   Medication Dose Route Frequency Provider Last Rate Last Admin   • amLODIPine (NORVASC) tablet 5 mg  5 mg Oral Daily Opal Ocampo MD   5 mg at 08/15/21 2214   • [COMPLETED] aspirin chewable tablet 324 mg  324 mg Oral Once Laurent Nguyen MD   324 mg at 08/15/21 1826   • buprenorphine-naloxone (SUBOXONE) 8-2 MG per SL tablet 1 tablet  1 tablet Sublingual Daily Marcelina Rogers MD   1 tablet at 08/16/21 0827   • carvedilol (COREG) tablet 12.5 mg  12.5 mg Oral BID With Meals Opal Ocampo MD   12.5 mg at 08/16/21 0723   • [COMPLETED] dextrose (D50W) 25 g/ 50mL Intravenous Solution 25 g  25 g Intravenous Once Laurent Nguyen MD   25 g at 08/15/21 1620   • Epoetin Jose Antonio-epbx (RETACRIT) injection 6,000 Units  6,000 Units Subcutaneous Once per day on Mon Wed Fri Marcelina Rogers MD       • heparin (porcine) 5000 UNIT/ML injection 5,000 Units  5,000 Units Subcutaneous Q8H Marcelina Rogers MD   5,000 Units at 08/16/21 0647   • hydrALAZINE (APRESOLINE) injection 10 mg  10 mg  Intravenous Q6H PRN Opal Ocampo MD   10 mg at 08/16/21 0107   • [COMPLETED] insulin regular (humuLIN R,novoLIN R) injection 4 Units  4 Units Intravenous Once Laurent Nguyen MD   4 Units at 08/15/21 1622   • [COMPLETED] ondansetron (ZOFRAN) injection 4 mg  4 mg Intravenous Once Laurent Nguyen MD   4 mg at 08/15/21 1625   • pantoprazole (PROTONIX) EC tablet 40 mg  40 mg Oral Daily Marcelina Rogers MD   40 mg at 08/16/21 0827   • sennosides-docusate (PERICOLACE) 8.6-50 MG per tablet 2 tablet  2 tablet Oral BID Marcelina Rogers MD   2 tablet at 08/16/21 0827   • sodium chloride 0.9 % flush 10 mL  10 mL Intravenous Q12H Marcelina Rogers MD   10 mL at 08/16/21 0107   • sodium chloride 0.9 % flush 10 mL  10 mL Intravenous PRN Marcelina Rogers MD

## 2021-08-17 NOTE — PLAN OF CARE
Goal Outcome Evaluation:           Progress: no change  Outcome Summary: Pt resting in bed. No complaints of pain/nausea voiced this shift. No other acute changes noted.

## 2021-08-17 NOTE — PROGRESS NOTES
[facility] Medicine Services  CROSS COVER NOTE      Contacted by RN stating that the patient had 2 fluid-filled blisters located on his right inguinal region located next to his brief and another on his left knee.  Upon evaluation the patient had one intact fluid-filled blister with what appeared to be yellow pus and another located inferiorly that had already drained; a third much smaller blister located on the left knee is present as well.  The patient reports that he has had these before and used Neosporin ointment with resolution of the blisters. He denies any history of MRSA.  He further denied a genitourinary exam.  I have ordered a wound culture and mupirocin ointment to be applied.  KALA Verde present at bedside during exam.       Ely Moore PA-C  Hospitalist Service -- [facility]   Pager: [phone]    08/17/21  04:51 EDT

## 2021-08-17 NOTE — PROGRESS NOTES
Subjective     History:   Mario Nair is a 46 y.o. male admitted on 8/15/2021 secondary to <principal problem not specified>     Procedures: None    Transfusions:   8/15/21: 1 unit of PRBC's   8/16/21: 1 unit of PRBC's     CC: Follow up hyperkalemia, anemia, ESRD on HD    Patient seen and examined with Amelia RN. Sleeping upon my arrival but easily awakens. States he feels better today. Nausea improved. States he feels tired today but not as bad as yesterday. No reported CP or dyspnea. No acute events overnight per RN.     History taken from: patient, chart, and RN.      Objective     Vital Signs  Temp:  [97.5 °F (36.4 °C)-99.5 °F (37.5 °C)] 98.9 °F (37.2 °C)  Heart Rate:  [] 86  Resp:  [16-19] 19  BP: ()/(59-99) 107/59    Intake/Output Summary (Last 24 hours) at 8/17/2021 1951  Last data filed at 8/17/2021 1745  Gross per 24 hour   Intake 540 ml   Output --   Net 540 ml         Physical Exam:   General:    Awake, alert, in no acute distress, chronically ill appearing    Heart:      Normal S1 and S2. Regular rate and rhythm. No significant murmur, rubs or gallops appreciated.   Lungs:     Respirations regular, even and unlabored. Clear to ausculation B/L. No wheezes, rales or rhonchi.   Abdomen:   Soft and nontender. No guarding, rebound tenderness or  organomegaly noted. Bowel sounds present x 4.   Extremities:  No clubbing, cyanosis or edema noted.      Results Review:    Results from last 7 days   Lab Units 08/17/21  0400 08/16/21  1500 08/16/21  0908 08/15/21  1508   WBC 10*3/mm3 7.65  --  7.20 10.15   HEMOGLOBIN g/dL 8.4* 9.0* 6.9* 6.0*   PLATELETS 10*3/mm3 142  --  114* 166     Results from last 7 days   Lab Units 08/17/21  0400 08/16/21  1944 08/16/21  1120 08/15/21  1508   SODIUM mmol/L 134* 134* 132* 131*   POTASSIUM mmol/L 4.3 4.1 6.2* 6.4*   CHLORIDE mmol/L 91* 90* 95* 96*   CO2 mmol/L 20.5* 20.9* 10.7* 7.7*   BUN mg/dL 88* 83* 210* 194*   CREATININE mg/dL 8.96* 8.37* 16.55* 17.56*    CALCIUM mg/dL 8.3* 8.4* 8.0* 8.2*   GLUCOSE mg/dL 74 72 93 72     Results from last 7 days   Lab Units 08/17/21  0400 08/15/21  1508   BILIRUBIN mg/dL 0.4 0.4   ALK PHOS U/L 122* 119*   AST (SGOT) U/L 12 12   ALT (SGPT) U/L 9 8     Results from last 7 days   Lab Units 08/15/21  1508   MAGNESIUM mg/dL 2.3         Results from last 7 days   Lab Units 08/15/21  1709 08/15/21  1508   TROPONIN T ng/mL 0.195* 0.208*       Imaging Results (Last 24 Hours)     ** No results found for the last 24 hours. **            Medications:  amLODIPine, 5 mg, Oral, Daily  carvedilol, 12.5 mg, Oral, BID With Meals  epoetin mc/mc-epbx, 6,000 Units, Subcutaneous, Once per day on Mon Wed Fri  heparin (porcine), 5,000 Units, Subcutaneous, Q8H  lactulose, 30 g, Oral, Once  mupirocin, , Topical, Q12H  pantoprazole, 40 mg, Oral, Daily  senna-docusate sodium, 2 tablet, Oral, BID  sodium chloride, 10 mL, Intravenous, Q12H               Assessment/Plan   Hyperkalemia: Likely 2/2 noncompliance with HD. Resolved S/P HD. Monitor on telemetry. Nephrology input appreciated.     Acute on chronic normocytic anemia: Likely 2/2 anemia of chronic disease and iron deficiency. No obvious signs of bleeding. S/P 2 units of PRBC's. Improved and stable today. Cont epogen. Cont to monitor.     Uremia: Likely 2/2 noncompliance with HD. Improved. Dialysis per nephrology.    Hyponatremia: Likely hypervolemic. Improved with HD.     ESRD on HD with noncompliance: HD per nephrology ([facility] schedule). SS/CM consulted to assist with arranging dialysis chair. Nephrology input appreciated.     Essential HTN: BP elevated upon admission but now improved and stable. Cont Norvasc and Coreg. Cont to monitor.     Elevated troponin: Chronically elevated and likely 2/2 above. Cont to monitor.     Severe malnutrition: Cont supportive treatment.     Hx of spondylodiscitis: Previously hospitalized at [facility]. Records requested.     Hx of substance abuse: Cont supportive  treatment.    Medical noncompliance: Complicates all aspects of care.    DVT PPX: SQ heparin      Disposition: Plan to return home with family at discharge. SS consulted to assist with arranging outpatient dialysis chair.        Memo Diego DO  08/17/21  19:51 EDT

## 2021-08-17 NOTE — PLAN OF CARE
Goal Outcome Evaluation:  Plan of Care Reviewed With: patient           Outcome Summary: pt resting in bed, c/o N/V, gave prn phenegran, no other changes, will continue to monitor

## 2021-08-17 NOTE — CASE MANAGEMENT/SOCIAL WORK
Discharge Planning Assessment   [facility]     Patient Name: Mario Nair  MRN: [id number]  Today's Date: 8/17/2021    Admit Date: 8/15/2021    Discharge Needs Assessment     Row Name 08/17/21 124       Living Environment    Lives With  child(tammy), adult    Name(s) of Who Lives With Patient  Lives with dtr, Constance and sonNiko    Current Living Arrangements  home/apartment/condo    Primary Care Provided by  self    Provides Primary Care For  no one    Family Caregiver if Needed  child(tammy), adult    Quality of Family Relationships  unable to assess    Able to Return to Prior Arrangements  yes       Resource/Environmental Concerns    Resource/Environmental Concerns  none       Transition Planning    Patient/Family Anticipates Transition to  home with family    Patient/Family Anticipated Services at Transition  durable medical equipment    Transportation Anticipated  family or friend will provide       Discharge Needs Assessment    Equipment Currently Used at Home  wheelchair;commode;shower chair    Concerns to be Addressed  no discharge needs identified    Anticipated Changes Related to Illness  none    Equipment Needed After Discharge  none        Discharge Plan     Row Name 08/17/21 0686       Plan    Plan Pt lives at home with sonNiko and daughter, Constance. Pt states he does not utilize HH or DME at this time although nursing assessment indicates pt has bedside commode, wheelchair, and shower chair. Pt is requesting rolling walker to be arranged at discharge, no preference on company. Pt states PCP is Danny Rebolledo. Pt does not have a POA or living will. Pt's family to provide transportation home at discharge. Pt may need outpatient HD and would prefer [facility]. SS to follow and assist.    Patient/Family in Agreement with Plan  yes        Demographic Summary     Row Name 08/17/21 9354       General Information    Referral Source  nursing    Reason for Consult  -- discharge planning        Bonny Dumont  MSW

## 2021-08-17 NOTE — PROGRESS NOTES
Nutrition Services    Patient Name:  Mario Nair  YOB: 1975  MRN: [id number]  Admit Date:  8/15/2021    Malnutrition Severity Assessment      Patient meets criteria for : Severe Malnutrition     Malnutrition Type (last 8 hours)      Malnutrition Severity Assessment     Row Name 08/17/21 1527       Malnutrition Severity Assessment    Malnutrition Type  Chronic Disease - Related Malnutrition    Row Name 08/17/21 1527       Muscle Loss    Sikh Region  Moderate - slight depression    Clavicle Bone Region  Severe - protruding prominent bone    Acromion Bone Region  Severe - squared shoulders, bones, and acromion process protrusion prominent    Scapular Bone Region  Severe - prominent bones, depressions easily visible between ribs, scapula, spine, shoulders    Dorsal Hand Region  Severe - prominent depression    Patellar Region  Severe - prominent bone, square looking, very little muscle definition    Posterior Calf Region  Severe - thin with very little definition/firmness    Row Name 08/17/21 1527       Fat Loss    Orbital Region   Moderate -  somewhat hollowness, slightly dark circles    Upper Arm Region  Severe - mostly skin, very little space between folds, fingers touch    Row Name 08/17/21 1527       Criteria Met (Must meet criteria for severity in at least 2 of these categories: M Wasting, Fat Loss, Fluid, Secondary Signs, Wt. Status, Intake)    Patient meets criteria for   Severe Malnutrition               Electronically signed by:  Indira Licea  08/17/21 15:30 EDT

## 2021-08-17 NOTE — PROGRESS NOTES
Nephrology Progress Note      Subjective     Patient denied chest pain or shortness of breath.     Objective       Vital signs :     Temp:  [97 °F (36.1 °C)-99.5 °F (37.5 °C)] 99.3 °F (37.4 °C)  Heart Rate:  [] 103  Resp:  [14-18] 18  BP: ()/() 132/86      Intake/Output Summary (Last 24 hours) at 8/17/2021 0833  Last data filed at 8/17/2021 0500  Gross per 24 hour   Intake 657 ml   Output 2500 ml   Net -1843 ml       Physical Exam:    General Appearance : Not in acute distress  Lungs : clear to auscultation, respirations regular  Heart :  regular rhythm & normal rate, normal S1, S2 and no murmur, no rub  Abdomen : normal bowel sounds, no masses, no hepatomegaly, no splenomegaly, soft non-tender and no guarding  Extremities : moves extremities well, No edema, no cyanosis and no redness  Neurologic :   orientated to person, place, time and situation, Grossly no focal deficits      Laboratory Data :     Albumin Albumin   Date Value Ref Range Status   08/17/2021 3.34 (L) 3.50 - 5.20 g/dL Final   08/15/2021 3.56 3.50 - 5.20 g/dL Final      Magnesium Magnesium   Date Value Ref Range Status   08/15/2021 2.3 1.6 - 2.6 mg/dL Final          PTH               No results found for: PTH    CBC and coagulation:  Results from last 7 days   Lab Units 08/17/21  0400 08/16/21  1500 08/16/21  0908 08/15/21  1508 08/15/21  1508   LACTATE mmol/L  --   --   --   --  0.6   CRP mg/dL  --   --   --   --  4.66*   WBC 10*3/mm3 7.65  --  7.20  --  10.15   HEMOGLOBIN g/dL 8.4* 9.0* 6.9*   < > 6.0*   HEMATOCRIT % 27.1* 27.3* 23.6*   < > 20.5*   MCV fL 90.9  --  97.9*  --  98.6*   MCHC g/dL 31.0*  --  29.2*  --  29.3*   PLATELETS 10*3/mm3 142  --  114*  --  166    < > = values in this interval not displayed.     Acid/base balance:      Renal and electrolytes:  Results from last 7 days   Lab Units 08/17/21  0400 08/16/21  1944 08/16/21  1120 08/15/21  1508 08/15/21  1508   SODIUM mmol/L 134* 134* 132*  --  131*   POTASSIUM mmol/L  4.3 4.1 6.2*   < > 6.4*   MAGNESIUM mg/dL  --   --   --   --  2.3   CHLORIDE mmol/L 91* 90* 95*   < > 96*   CO2 mmol/L 20.5* 20.9* 10.7*   < > 7.7*   BUN mg/dL 88* 83* 210*   < > 194*   CREATININE mg/dL 8.96* 8.37* 16.55*  --  17.56*   EGFR IF NONAFRICN AM mL/min/1.73 6* 7* 3*   < > 3*   CALCIUM mg/dL 8.3* 8.4* 8.0*   < > 8.2*    < > = values in this interval not displayed.     Estimated Creatinine Clearance: 9.4 mL/min (A) (by C-G formula based on SCr of 8.96 mg/dL (H)).    Liver and pancreatic function:  Results from last 7 days   Lab Units 08/17/21  0400 08/15/21  1508   ALBUMIN g/dL 3.34* 3.56   BILIRUBIN mg/dL 0.4 0.4   ALK PHOS U/L 122* 119*   AST (SGOT) U/L 12 12   ALT (SGPT) U/L 9 8   AMYLASE U/L  --  491*   LIPASE U/L  --  690*         Cardiac:      Liver and pancreatic function:  Results from last 7 days   Lab Units 08/17/21  0400 08/15/21  1508   ALBUMIN g/dL 3.34* 3.56   BILIRUBIN mg/dL 0.4 0.4   ALK PHOS U/L 122* 119*   AST (SGOT) U/L 12 12   ALT (SGPT) U/L 9 8   AMYLASE U/L  --  491*   LIPASE U/L  --  690*       Medications :     amLODIPine, 5 mg, Oral, Daily  carvedilol, 12.5 mg, Oral, BID With Meals  epoetin mc/mc-epbx, 6,000 Units, Subcutaneous, Once per day on Mon Wed Fri  heparin (porcine), 5,000 Units, Subcutaneous, Q8H  lactulose, 30 g, Oral, Once  mupirocin, , Topical, Q12H  pantoprazole, 40 mg, Oral, Daily  senna-docusate sodium, 2 tablet, Oral, BID  sodium chloride, 10 mL, Intravenous, Q12H             Assessment/Plan     1. ESKD, non compliant to dialysis  2. Sever hyperkalemia, life threatening.   3. Hyponatremia, hypervolemia  4. Metabolic acidosis  5. Anemia, Hb below goal     Hyponatremia, hyperkalemia improved after dialysis. Will keep on MWF schedule  Continue on EPO 6,000IU 3/wk       Taniya Corado MD  08/17/21  08:33 EDT

## 2021-08-18 PROBLEM — E43 SEVERE MALNUTRITION (HCC): Status: ACTIVE | Noted: 2021-01-01

## 2021-08-18 NOTE — PROGRESS NOTES
Nephrology Progress Note      Subjective     Patient denied chest pain or shortness of breath.     Objective       Vital signs :     Temp:  [97.5 °F (36.4 °C)-98.9 °F (37.2 °C)] 97.6 °F (36.4 °C)  Heart Rate:  [86-97] 97  Resp:  [18-20] 20  BP: ()/(59-94) 138/94      Intake/Output Summary (Last 24 hours) at 8/18/2021 0850  Last data filed at 8/18/2021 0300  Gross per 24 hour   Intake 900 ml   Output --   Net 900 ml       Physical Exam:    General Appearance : Not in acute distress  Lungs : clear to auscultation, respirations regular  Heart :  regular rhythm & normal rate, normal S1, S2 and no murmur, no rub  Abdomen : normal bowel sounds, no masses, no hepatomegaly, no splenomegaly, soft non-tender and no guarding  Extremities : moves extremities well, No edema, no cyanosis and no redness  Neurologic :   orientated to person, place, time and situation, Grossly no focal deficits      Laboratory Data :     Albumin Albumin   Date Value Ref Range Status   08/17/2021 3.34 (L) 3.50 - 5.20 g/dL Final   08/15/2021 3.56 3.50 - 5.20 g/dL Final      Magnesium Magnesium   Date Value Ref Range Status   08/15/2021 2.3 1.6 - 2.6 mg/dL Final          PTH               No results found for: PTH    CBC and coagulation:  Results from last 7 days   Lab Units 08/18/21  0125 08/17/21  0400 08/16/21  1500 08/16/21  0908 08/16/21  0908 08/15/21  1508 08/15/21  1508   LACTATE mmol/L  --   --   --   --   --   --  0.6   CRP mg/dL  --   --   --   --   --   --  4.66*   WBC 10*3/mm3 7.90 7.65  --   --  7.20   < > 10.15   HEMOGLOBIN g/dL 7.9* 8.4* 9.0*   < > 6.9*   < > 6.0*   HEMATOCRIT % 24.5* 27.1* 27.3*   < > 23.6*   < > 20.5*   MCV fL 92.1 90.9  --   --  97.9*   < > 98.6*   MCHC g/dL 32.2 31.0*  --   --  29.2*   < > 29.3*   PLATELETS 10*3/mm3 135* 142  --   --  114*   < > 166    < > = values in this interval not displayed.     Acid/base balance:      Renal and electrolytes:  Results from last 7 days   Lab Units 08/18/21  0122  08/17/21  0400 08/16/21  1944 08/16/21  1120 08/16/21  1120 08/15/21  1508 08/15/21  1508   SODIUM mmol/L 130* 134* 134*  --  132*  --  131*   POTASSIUM mmol/L 4.1 4.3 4.1   < > 6.2*   < > 6.4*   MAGNESIUM mg/dL  --   --   --   --   --   --  2.3   CHLORIDE mmol/L 91* 91* 90*   < > 95*   < > 96*   CO2 mmol/L 18.8* 20.5* 20.9*   < > 10.7*   < > 7.7*   BUN mg/dL 100* 88* 83*   < > 210*   < > 194*   CREATININE mg/dL 10.20* 8.96* 8.37*  --  16.55*  --  17.56*   EGFR IF NONAFRICN AM mL/min/1.73 6* 6* 7*   < > 3*   < > 3*   CALCIUM mg/dL 7.6* 8.3* 8.4*   < > 8.0*   < > 8.2*    < > = values in this interval not displayed.     Estimated Creatinine Clearance: 8.2 mL/min (A) (by C-G formula based on SCr of 10.2 mg/dL (H)).    Liver and pancreatic function:  Results from last 7 days   Lab Units 08/17/21  0400 08/15/21  1508   ALBUMIN g/dL 3.34* 3.56   BILIRUBIN mg/dL 0.4 0.4   ALK PHOS U/L 122* 119*   AST (SGOT) U/L 12 12   ALT (SGPT) U/L 9 8   AMYLASE U/L  --  491*   LIPASE U/L  --  690*         Cardiac:      Liver and pancreatic function:  Results from last 7 days   Lab Units 08/17/21  0400 08/15/21  1508   ALBUMIN g/dL 3.34* 3.56   BILIRUBIN mg/dL 0.4 0.4   ALK PHOS U/L 122* 119*   AST (SGOT) U/L 12 12   ALT (SGPT) U/L 9 8   AMYLASE U/L  --  491*   LIPASE U/L  --  690*       Medications :     amLODIPine, 5 mg, Oral, Daily  carvedilol, 12.5 mg, Oral, BID With Meals  epoetin mc/mc-epbx, 6,000 Units, Subcutaneous, Once per day on Mon Wed Fri  heparin (porcine), 5,000 Units, Subcutaneous, Q8H  lactulose, 30 g, Oral, Once  mupirocin, , Topical, Q12H  pantoprazole, 40 mg, Oral, Daily  senna-docusate sodium, 2 tablet, Oral, BID  sodium chloride, 10 mL, Intravenous, Q12H             Assessment/Plan     1. ESKD, non compliant to dialysis  2. Sever hyperkalemia, life threatening.   3. Hyponatremia, hypervolemia  4. Metabolic acidosis  5. Anemia, Hb below goal     I scheduled the patient for dialysis but he refused and want to be  discharged. Educated and counseled but he was not willing to stay, he understood the consequences of not being dialyzed, verbalized appropriately.   TREVOR Corado MD  08/18/21  08:50 EDT

## 2021-08-18 NOTE — NURSING NOTE
"Transport came to get patient to take to dialysis this morning, transport calls stating pt states he says he told his nurse last night that he was not going to dialysis today that he was going home. I went to ask patient about dialysis, pt then states again that he is not going to dialysis today that he is going home. Informed pt that his doctor would not discharge him today that he would have to leave against medical advice. Pt states he knows that and he does everytime he is admitted to the hospital. Educated pt on the importance of staying and getting medical treatment and dialysis, and the risks of leaving AMA. Pt states he knows, but he has stayed before he \"no one will accept him\" for a dialysis chair. Notified Dr. Diego at this time. Dr. Diego then comes to see pt and explains the importance of staying as well as the risks. Pt verbalized understanding but still states he is leaving today. 20g L AC removed at this time, tip intact. AMA paper signed.   "

## 2021-08-18 NOTE — PAYOR COMM NOTE
"[facility]  NPI:[id number]    Utilization Review  Contact: Risa Verde RN  Phone: [phone]  Fax:[phone]    DISCHARGE NOTIFICATION      [id number]    Mario Nair BRITTANY (46 y.o. Male)     Date of Birth Social Security Number Address Home Phone MRN    1975  [address] [id number] [phone] [id number]    [employer] Marital Status          None        Admission Date Admission Type Admitting Provider Attending Provider Department, Room/Bed    8/15/21 Emergency Memo Diego DO  [address], 3348/1S    Discharge Date Discharge Disposition Discharge Destination        8/18/2021 Left Against Medical Advice              Attending Provider: (none)   Allergies: Penicillins    Isolation: None   Infection: Hepatitis B (06/05/19), COVID (History) (02/19/21), MRSA (08/18/21)   Code Status: CPR    Ht: 188 cm (74\")   Wt: 63.5 kg (140 lb)    Admission Cmt: None   Principal Problem: None                Active Insurance as of 8/15/2021     Primary Coverage     Payor Plan Insurance Group Employer/Plan Group    [employer] [place] [employer] [place]ICA[employer]      Payor Plan Address Payor Plan Phone Number Payor Plan Fax Number Effective Dates    [address] [address] [phone]  5/5/2019 - None Entered    [facility] [id number]       Subscriber Name Subscriber Birth Date Member ID       MARIO NAIR 1975 [id number]                 Emergency Contacts      (Rel.) Home Phone Work Phone Mobile Phone    SHANI SILVA (Relative) [phone] -- --    NairConstance matson (Daughter) [phone]              "

## 2021-08-18 NOTE — PLAN OF CARE
Goal Outcome Evaluation:  Plan of Care Reviewed With: patient           Outcome Summary: pt resting in bed, c/o nausea, gave prn meds, no other changes, will continue to monitor

## 2021-08-19 NOTE — DISCHARGE SUMMARY
Date of Admission: 8/15/2021    Date of Discharge:  8/18/2021    PCP: Danny Rebolledo MD    Admission Diagnosis:   Please see admission H&P    Discharge Diagnosis:   Hyperkalemia  Acute on chronic normocytic anemia  Uremia  Hyponatremia  ESRD on HD with noncompliance  Essential HTN  Elevated troponin  Severe malnutrition  Hx of spondylodiscitis  Hx of substance abuse  Medical noncompliance     Procedures Performed: None    Transfusions:   8/15/21: 1 unit of PRBC's   8/16/21: 1 unit of PRBC's     Consults:   Consults     Date and Time Order Name Status Description    8/15/2021  7:05 PM Inpatient Nephrology Consult Completed             History of Present Illness:  Mario Nair is a 46 y.o. male ESRD on HD with noncompliance with HD who presented to [facility] ED with CC of nausea, vomiting, generalized weakness and fatigue. Please see admission H&P for complete details.     In the ED, workup revealed hyperkalemia, uremia, acute on chronic normocytic anemia, hyponatremia and elevated troponin. 1 unit of PRBC's was ordered.        Hospital Course  Mario Nair was initially boarded in the ED and later admitted to the med/surg floor with telemetry once a bed became available. Nephrology was consulted for management of his dialysis.     Follow up labs revealed persistent anemia and an additional 1 unit of PRBC's was ordered with subsequent improvement. His anemia was thought to be 2/2 anemia of chronic disease and iron deficiency with no obvious signs of bleeding noted. Nephrology added epogen. His hyperkalemia resolved s/p HD and his uremia improved as well. His nausea and vomiting resolved and he reported some improvement in his generalized weakness and fatigue. His BP was elevated upon admission but improved with HD and restarting Norvasc and Coreg.     Mr. Nair was seen and examined with KALA Cisneros on 8/18/21. He had left AMA without allowing time for SS/CM to arrange outpatient dialysis chair multiple times  in the past and again stated he wished to leave AMA. He was strongly encouraged to remain hospitalized until a chair could be arranged but he remained adamant he wanted to go home. He was alert and oriented and expressed understanding of the potential risks of leaving AMA. He ultimately signed out once he arranged transportation.     Condition on Discharge: Guarded as pt left AMA.     Vital Signs  Vitals:    08/18/21 0708   BP: 138/94   Pulse: 97   Resp: 20   Temp: 97.6 °F (36.4 °C)   SpO2: 98%       Physical Exam:  General:    Awake, alert, in no acute distress, thin, chronically ill appearing    Heart:      Normal S1 and S2. Regular rate and rhythm. No significant murmur, rubs or gallops appreciated.   Lungs:     Respirations regular, even and unlabored. Lungs clear to auscultation B/L. No wheezes, rales or rhonchi.   Abdomen:   Soft and nontender. No guarding, rebound tenderness or  organomegaly noted. Bowel sounds present x 4.   Extremities:  No clubbing, cyanosis or edema noted.      Discharge Disposition: Pt left AMA.       Discharge Medications:     Discharge Medications      Patient Not Prescribed Medications Upon Discharge           Discharge Diet:   renal diet      Activity at Discharge:  activity as tolerated    Follow-up Appointments:  Follow-up Information     Danny Rebolledo MD .    Specialty: Family Medicine  Contact information:  [address] [place] [id number] [phone]                       Test Results Pending at Discharge:  Culture results     Memo Diego DO  08/18/21  22:07 EDT      Time: Less than 30 minutes spent on this discharge.

## 2021-09-07 PROBLEM — K21.9 GERD (GASTROESOPHAGEAL REFLUX DISEASE): Status: ACTIVE | Noted: 2021-01-01

## 2021-09-07 PROBLEM — R53.1 WEAKNESS: Status: ACTIVE | Noted: 2021-01-01

## 2021-09-07 PROBLEM — I10 ESSENTIAL HYPERTENSION: Status: ACTIVE | Noted: 2021-01-01

## 2021-09-07 NOTE — ASSESSMENT & PLAN NOTE
Chronic, unstable  Refuses to see a local nephrologist such as Dr. Diallo  We will try to establish patient with another nephrologist, closest in proximity as possible per patient preference  Has no set dialysis schedule but would likely benefit from routine follow-ups with nephrology to prevent hospitalization  Continue medication as prescribed  Continue to monitor

## 2021-09-07 NOTE — ASSESSMENT & PLAN NOTE
Chronic, unstable  Renal condition is worsening.  Continue current treatment regimen.  Regular aerobic exercise.  Stop smoking.  Continue current medications.  Renal condition will be reassessed in 3 months.  Refuses to see a local nephrologist, such as Dr. Diallo  Will try to establish patient with another nephrologist, closest in proximity as possible per patient preference  No set dialysis schedule at present but would likely benefit from routine follow-ups with nephrology to prevent hospitalization  Continue medication as prescribed  Continue to monitor

## 2021-09-07 NOTE — ASSESSMENT & PLAN NOTE
Hypertension is unchanged.  Continue current treatment regimen.  Regular aerobic exercise.  Stop smoking.  Ambulatory blood pressure monitoring.  Blood pressure will be reassessed in 3 months.

## 2021-09-07 NOTE — PROGRESS NOTES
Chief Complaint  Follow-up    Subjective          Mario Nair is a 46 y.o. male who presents today to [facility] for follow up    HPI:   History of Present Illness  ESRD-patient with recent hospitalization on August 15 related to end-stage renal disease.  Patient states that periodically he has to go to the hospital to receive hemodialysis but he is not on a set routine hemodialysis schedule.  He states during his last visit he was anemic and it is noted he had elevated potassium levels.  Patient had left hospital visit AMA.  He is here today for refills on his routine medications and needs a walker due to weakness.    HTN-needs refills on routine medications Norvasc and Coreg.  Reports he has been taking as prescribed until he ran out.  Not currently monitoring blood pressure daily at home.    Osteomyelitis of cervical vertebra-complains of pain in the neck uses lidocaine patches as needed, needs refills.    GERD-stable.  Needing refills    Tobacco use-chronic, states he has been smoking since he was 13 years old no desire to quit smoking today.     Objective     Problem List:  Patient Active Problem List   Diagnosis   • ESRD (end stage renal disease) (CMS/HCC)   • Anemia due to chronic kidney disease   • Current smoker   • Bladder wall thickening   • Retroperitoneal lymphadenopathy   • Hepatitis C antibody test positive   • COPD mixed type (CMS/HCC)   • Hepatitis B surface antigen positive   • Urinary incontinence without sensory awareness   • Mild atherosclerosis of carotid artery, right   • Osteomyelitis of cervical vertebra (CMS/HCC)   • Severe malnutrition (CMS/HCC)   • Weakness   • Essential hypertension   • GERD (gastroesophageal reflux disease)       Allergy:   Allergies   Allergen Reactions   • Penicillins Shortness Of Breath, Itching and Rash        Discontinued Medications:  Medications Discontinued During This Encounter   Medication Reason   • amLODIPine (NORVASC) 5  MG tablet Reorder   • pantoprazole (PROTONIX) 40 MG EC tablet Reorder   • carvedilol (COREG) 12.5 MG tablet Reorder   • calcium acetate (PHOS BINDER,) 667 MG capsule capsule Reorder   • lidocaine (LIDODERM) 5 % Reorder       Current Medications:   Current Outpatient Medications   Medication Sig Dispense Refill   • amLODIPine (NORVASC) 5 MG tablet Take 1 tablet by mouth Daily. 30 tablet 2   • calcium acetate (PHOS BINDER,) 667 MG capsule capsule Take 2 capsules by mouth 3 (Three) Times a Day. 180 capsule 2   • carvedilol (COREG) 12.5 MG tablet Take 1 tablet by mouth 2 (Two) Times a Day With Meals. 60 tablet 2   • lidocaine (LIDODERM) 5 % Place 1 patch on the skin as directed by provider Daily. Remove & Discard patch within 12 hours or as directed by MD 30 each 2   • pantoprazole (PROTONIX) 40 MG EC tablet Take 1 tablet by mouth Daily. 30 tablet 2     No current facility-administered medications for this visit.       Past Medical History:  Past Medical History:   Diagnosis Date   • Anemia due to chronic kidney disease    • COPD (chronic obstructive pulmonary disease) (CMS/HCC)    • COVID-19 1/25/2021   • Current smoker    • Endocarditis    • ESRD (end stage renal disease) (CMS/HCC)    • Hepatitis B surface antigen positive 12/9/2020   • Hepatitis C    • History of transfusion    • Hypertension    • IV drug abuse (CMS/HCC)    • Medically noncompliant    • Mild atherosclerosis of carotid artery, right 2/15/2021   • Retroperitoneal lymphadenopathy 12/8/2020   • Unable to read or write        Past Surgical History:  Past Surgical History:   Procedure Laterality Date   • CENTRAL VENOUS CATHETER TUNNELED INSERTION DOUBLE LUMEN Right     Chest for hemodialysis   • INSERTION HEMODIALYSIS CATHETER N/A 1/29/2021    Procedure: HEMODIALYSIS CATHETER INSERTION;  Surgeon: Khoa Carl MD;  Location: [facility];  Service: General;  Laterality: N/A;       Review of Systems:  Review of Systems   Constitutional: Positive for  "activity change. Negative for chills.        Significant other present during exam   HENT: Negative.    Respiratory: Negative.  Negative for cough and shortness of breath.    Cardiovascular: Negative.    Gastrointestinal: Negative.    Genitourinary: Negative.    Musculoskeletal: Positive for neck pain.   Neurological: Positive for weakness. Negative for dizziness, numbness and headaches.   Psychiatric/Behavioral: Negative.        Physical Exam:  Physical Exam  Vitals and nursing note reviewed.   Constitutional:       General: He is not in acute distress.     Appearance: He is ill-appearing.   HENT:      Head: Normocephalic and atraumatic.      Nose: Nose normal.      Mouth/Throat:      Mouth: Mucous membranes are moist.      Pharynx: Oropharynx is clear.   Cardiovascular:      Rate and Rhythm: Normal rate and regular rhythm.   Pulmonary:      Effort: Pulmonary effort is normal.      Breath sounds: Normal breath sounds.   Abdominal:      General: Abdomen is flat.   Musculoskeletal:         General: No deformity or signs of injury.      Cervical back: Normal range of motion.      Comments: In wheelchair   Skin:     General: Skin is warm and dry.      Comments: Discolored   Neurological:      General: No focal deficit present.      Mental Status: He is alert and oriented to person, place, and time.      Motor: Weakness present.   Psychiatric:         Mood and Affect: Mood normal.         Behavior: Behavior normal.         Thought Content: Thought content normal.         Judgment: Judgment normal.         Vital Signs:   /90   Pulse 100   Temp 97.7 °F (36.5 °C)   Ht 188 cm (74.02\")   Wt 72.6 kg (160 lb)   SpO2 100%   BMI 20.53 kg/m²      Lab Results:   No results displayed because visit has over 200 results.      No results displayed because visit has over 200 results.          EKG Results:  No orders to display       Imaging Results:  CT Abdomen Pelvis Without Contrast    Result Date: 8/15/2021  1. There has " been interval increase in ascites from the prior exam.  It is uncertain if there may be some potential wall thickening involving the cecal region versus artifact from adjacent ascites and underdistention. This area is not well evaluated. 2. Both kidneys are again noted to demonstrate chronic hydronephrosis with a thickened urinary bladder wall that appears similar to prior. 3. Stool in the rectum may suggest impaction versus constipation. Signer Name: Ling Grimm MD  Signed: 8/15/2021 5:16 PM  Workstation Name: [id number]  [facility]    XR Chest 1 View    Result Date: 8/15/2021  No acute cardiopulmonary findings. There is COPD with emphysema and a tiny left pleural effusion that may be new. Signer Name: Ling Grimm MD  Signed: 8/15/2021 3:31 PM  Workstation Name: [id number]  [facility]                Assessment and Plan   Diagnoses and all orders for this visit:    1. ESRD (end stage renal disease) (CMS/[facility]) (Primary)  Assessment & Plan:  Chronic, unstable  Renal condition is worsening.  Continue current treatment regimen.  Regular aerobic exercise.  Stop smoking.  Continue current medications.  Renal condition will be reassessed in 3 months.  Refuses to see a local nephrologist, such as Dr. Diallo  Will try to establish patient with another nephrologist, closest in proximity as possible per patient preference  No set dialysis schedule at present but would likely benefit from routine follow-ups with nephrology to prevent hospitalization  Continue medication as prescribed  Continue to monitor      2. Weakness  Comments:  Order/Rx for walker given to pt  Discussed PT/OT, declines  Encouraged ambulation and physical activity  Orders:  -     Walker    3. Essential hypertension  Assessment & Plan:  Hypertension is unchanged.  Continue current treatment regimen.  Regular aerobic exercise.  Stop smoking.  Ambulatory blood pressure monitoring.  Blood pressure will be reassessed  in 3 months.      4. Osteomyelitis of cervical vertebra (CMS/[facility])  Assessment & Plan:  Chronic, stable  Continue lidocaine patches as prescribed      5. Gastroesophageal reflux disease without esophagitis  Assessment & Plan:  Chronic, stable  Continue Protonix as prescribed      6. Current smoker  Assessment & Plan:  Offered smoking cessation assistance  Patient declines  Counseled patient regarding the risk of smoking and tobacco use      Other orders  -     amLODIPine (NORVASC) 5 MG tablet; Take 1 tablet by mouth Daily.  Dispense: 30 tablet; Refill: 2  -     calcium acetate (PHOS BINDER,) 667 MG capsule capsule; Take 2 capsules by mouth 3 (Three) Times a Day.  Dispense: 180 capsule; Refill: 2  -     carvedilol (COREG) 12.5 MG tablet; Take 1 tablet by mouth 2 (Two) Times a Day With Meals.  Dispense: 60 tablet; Refill: 2  -     lidocaine (LIDODERM) 5 %; Place 1 patch on the skin as directed by provider Daily. Remove & Discard patch within 12 hours or as directed by MD  Dispense: 30 each; Refill: 2  -     pantoprazole (PROTONIX) 40 MG EC tablet; Take 1 tablet by mouth Daily.  Dispense: 30 tablet; Refill: 2      Meds ordered during this visit:  New Medications Ordered This Visit   Medications   • amLODIPine (NORVASC) 5 MG tablet     Sig: Take 1 tablet by mouth Daily.     Dispense:  30 tablet     Refill:  2   • calcium acetate (PHOS BINDER,) 667 MG capsule capsule     Sig: Take 2 capsules by mouth 3 (Three) Times a Day.     Dispense:  180 capsule     Refill:  2   • carvedilol (COREG) 12.5 MG tablet     Sig: Take 1 tablet by mouth 2 (Two) Times a Day With Meals.     Dispense:  60 tablet     Refill:  2   • lidocaine (LIDODERM) 5 %     Sig: Place 1 patch on the skin as directed by provider Daily. Remove & Discard patch within 12 hours or as directed by MD     Dispense:  30 each     Refill:  2   • pantoprazole (PROTONIX) 40 MG EC tablet     Sig: Take 1 tablet by mouth Daily.     Dispense:  30 tablet     Refill:  2        Patient Instructions:  Patient instructions given for the following visit diagnosis:    ICD-10-CM ICD-9-CM   1. ESRD (end stage renal disease) (CMS/[facility])  N18.6 585.6   2. Weakness  R53.1 780.79   3. Essential hypertension  I10 401.9   4. Osteomyelitis of cervical vertebra (CMS/[facility])  M46.22 730.28   5. Gastroesophageal reflux disease without esophagitis  K21.9 530.81   6. Current smoker  F17.200 305.1       Follow Up   Return in about 3 months (around 12/7/2021).        This document has been electronically signed by NICKY Schmidt  September 7, 2021 15:10 EDT    Patient was given instructions and counseling regarding his condition or for health maintenance advice. Please see specific information pulled into the AVS if appropriate.     Part of this note may be an electronic transcription/translation of spoken language to printed text using the Dragon Dictation System.

## 2021-09-07 NOTE — ASSESSMENT & PLAN NOTE
Offered smoking cessation assistance  Patient declines  Counseled patient regarding the risk of smoking and tobacco use   Pt straight cathed by this RN and ED RN Oxana. Pt noted to have 200 cc of pale yellow output. Urine collected and sent to lab for analysis. HCGU in process.

## 2022-08-29 NOTE — DISCHARGE SUMMARY
[facility] DISCHARGE SUMMARY    Patient Identification:  Name:  Mario Nair  Age:  45 y.o.  Sex:  male  :  1975  MRN:  [id number]  Visit Number:  [id number]    Date of Admission: 2021  Date of Discharge:  2021     PCP: Danny Rebolledo MD    DISCHARGE DIAGNOSIS  Sepsis  Acute Pyelonephritis  Serratia Bacteremia  Asymptomatic Covid 19 Infection  Hx IVDU w/ Hx Endocarditis in past  ESRD on HD c/b Medical Non-adherence and Hyperkalemia  Anemia requiring transfusions  HTN  HLD  Emphysema w/o COPD exacerbation  Hx HBV/HCV  Medical Non-adherence    CONSULTS   Surgery  Infectious Disease    PROCEDURES PERFORMED  2021: Removal of right IJ tunneled hemodialysis catheter  2021: Placement of a left internal jugular tunneled hemodialysis catheter    HOSPITAL COURSE  Patient is a 45 y.o. male presented to [facility] complaining of needing dialysis, weakness.  Please see the admitting history and physical for further details.      #Sepsis 2/2 Acute Pyelonephritis/Serratia Bacteremia  #Asymptomatic Covid 19 Infection  #Hx IVDU w/ Hx Endocarditis in past  Patient presents with neck pain, encephalitis/meningitis ruled out, found to have bacteremia 2/2 UTI and suspect complicated by frequently leaving AMA, previously covid + on . Admission labs showed NML WBC count, CRP 13, lactate 0.7, covid +, UA w/ culture showing infection and Bcx's growing Serratia sp., Repeat Bcx's NGTD. CTPE showed no PE, possible covid PNA. Gen Surgery consulted and removed R TDC and replaced RIJ on admission, on  L TDC placed.  ID consulted and followed.  Patient was treated w/ high dose Ceftriaxone while inpatient.  He was also treated on Eliquis for DVT PPx.  Had been awaiting [facility] vs placement and needing an outpatient dialysis chair but patient elected to leave AMA today, we discussed how detrimental this could be for his overall health and patient endorsed  understanding, reported he knew he wasn't going to get a dialysis chair and has signed himself out AMA following his blood transfusion.  I did Rx him Cefdinir to complete on 2/9 as per ID recs.  We did alert his [facility] department regarding his choice to leave AMA for outpatient monitoring for continued isolation through 2/14.      #ESRD on HD c/b Medical Non-adherence and Hyperkalemia, also anemia requiring transfusions  Reportedly hadn't had dialysis > 1 month on admission, Cr 15, K 6.1. CT showed stable severe chronic hydronephrosis and retroperitoneal lymphadenopathy. Consulted Nephrology, followed for HD, HyperK resolved, started on epo.  Trended Hgb and did require transfusion this AM.  SW was assisting w/ HD chair but unsuccessful prior to his leaving AMA.    #HTN/HLD  Echo 1/27 w/ LVEF 61-65%, NML LV function, diastolic dysfunction, no effusion, no vegetations noted.  Continued ASA 81, statin, coreg, imdur while inpatient.  Unable to make recommendations at discharge as patient left AMA.    #Emphysema w/o COPD exacerbation  Noted on CT, treated duonebs PRN    #Hx HBV/HCV  Supportive Care    #Medical Non-adherence  Patient's frequent leaving AMA and non-adherence w/ home medical therapy will likely drastically affect both long and short term morbidity and mortality.     VITAL SIGNS:  Temp:  [97.3 °F (36.3 °C)-99.9 °F (37.7 °C)] 97.5 °F (36.4 °C)  Heart Rate:  [] 92  Resp:  [16-18] 16  BP: (109-126)/(60-84) 115/78  SpO2:  [97 %-99 %] 98 %  on   ;   Device (Oxygen Therapy): room air    Body mass index is 19.81 kg/m².  Wt Readings from Last 3 Encounters:   02/04/21 70 kg (154 lb 4.8 oz)   12/31/20 67.5 kg (148 lb 12.8 oz)   12/08/20 78.5 kg (173 lb)     PHYSICAL EXAM:  This physical exam has been personally performed remotely in the unit aided by real-time audio/visual communication tools. RN present at bedside during this exam and assisted during exam. The use of a video visit has been reviewed with  the patient and verbal informed consent has been obtained.      Physical Exam:  General: Patient appears awake, alert, and in no acute distress.  Head: Normocephalic, atraumatic  Eyes: EOMI. Conjunctivae and sclerae normal.  Ears: Ears appear intact with no abnormalities noted.   Neck: Trachea midline. No obvious JVD.  Heart: Tele reveals regular rate and rhythm  Lungs: Respirations appear to be regular, even and unlabored with no signs of respiratory distress. No audible wheezing. On room air  Abdomen: No obvious abdominal distension.  MS: Muscle tone appears normal. No gross deformities.  Extremities: No clubbing, cyanosis or edema noted.  Skin: No visible bleeding, bruising, or rash.  Neurologic: Alert and oriented x3. No gross focal deficits.     *Exam unchanged since this AM    DISCHARGE DISPOSITION   Guarded    DISCHARGE MEDICATIONS:     Discharge Medications      New Medications      Instructions Start Date   cefdinir 300 MG capsule  Commonly known as: OMNICEF   300 mg, Oral, 2 Times Daily         Stop These Medications    amLODIPine 5 MG tablet  Commonly known as: NORVASC     calcium acetate 667 MG capsule capsule  Commonly known as: PHOS BINDER)     carvedilol 12.5 MG tablet  Commonly known as: COREG     hydrALAZINE 10 MG tablet  Commonly known as: APRESOLINE     isosorbide mononitrate 30 MG 24 hr tablet  Commonly known as: IMDUR     pantoprazole 40 MG EC tablet  Commonly known as: PROTONIX            Follow-up Information     Danny Rebolledo MD .    Specialty: Family Medicine  Contact information:  [address] [facility] [address] [phone]                  TEST  RESULTS PENDING AT DISCHARGE  None     CODE STATUS  Code Status and Medical Interventions:   Ordered at: 01/25/21 1707     Code Status:    CPR     Medical Interventions (Level of Support Prior to Arrest):    Full     Obi Arvizu MD  [facility]  02/04/21  13:07 EST    Please note that this discharge summary  required more than 30 minutes to complete.     Post-Care Instructions: I reviewed with the patient in detail post-care instructions. Patient is to wear sunprotection, and avoid picking at any of the treated lesions. Pt may apply Vaseline to crusted or scabbing areas. Spray Paint Text: The liquid nitrogen was applied to the skin utilizing a spray paint frosting technique. Show Spray Paint Technique Variable?: Yes Add 52 Modifier (Optional): no Consent: The patient's consent was obtained including but not limited to risks of crusting, scabbing, blistering, scarring, darker or lighter pigmentary change, recurrence, incomplete removal and infection. Medical Necessity Information: It is in your best interest to select a reason for this procedure from the list below. All of these items fulfill various CMS LCD requirements except the new and changing color options. Medical Necessity Clause: This procedure was medically necessary because the lesions that were treated were: Detail Level: Detailed

## (undated) DEVICE — DRAPE,UTILTY,TAPE,15X26, 4EA/PK: Brand: MEDLINE

## (undated) DEVICE — PATIENT RETURN ELECTRODE, SINGLE-USE, CONTACT QUALITY MONITORING, ADULT, WITH 9FT CORD, FOR PATIENTS WEIGING OVER 33LBS. (15KG): Brand: MEGADYNE

## (undated) DEVICE — DRAPE,T,LAPARO,TRANS,STERILE: Brand: MEDLINE

## (undated) DEVICE — BIOPATCH™ ANTIMICROBIAL DRESSING WITH CHLORHEXIDINE GLUCONATE IS A HYDROPHILLIC POLYURETHANE ABSORPTIVE FOAM WITH CHLORHEXIDINE GLUCONATE (CHG) WHICH INHIBITS BACTERIAL GROWTH UNDER THE DRESSING. THE DRESSING IS INTENDED TO BE USED TO ABSORB EXUDATE, COVER A WOUND CAUSED BY VASCULAR AND NONVASCULAR PERCUTANEOUS MEDICAL DEVICES DURING SURGERY, AS WELL AS REDUCE LOCAL INFECTION AND COLONIZATION OF MICROORGANISMS.: Brand: BIOPATCH

## (undated) DEVICE — SKIN AFFIX SURG ADHESIVE 72/CS 0.55ML: Brand: MEDLINE

## (undated) DEVICE — KT CATH HEMO CANNON2 PLS LNG TRM 15F 13IN

## (undated) DEVICE — UNDERGLV SURG BIOGEL INDICAT PF 8 GRN

## (undated) DEVICE — KT CVR ULTRASND PROB PULL UP LXF 5X8

## (undated) DEVICE — SUT NLY 2/0 664G

## (undated) DEVICE — ST ACC MICROPUNCTURE .018 ECHO/TRANSLSS/PLDM/TP 4F/10CM 21G

## (undated) DEVICE — GLV SURG PREMIERPRO MIC LTX PF SZ8 BRN

## (undated) DEVICE — GLV SURG SENSICARE W/ALOE PF LF 7.5 STRL

## (undated) DEVICE — SUT MNCRYL 4/0 PS2 18 IN

## (undated) DEVICE — PK BASIC 70

## (undated) DEVICE — HOLDER: Brand: DEROYAL

## (undated) DEVICE — DRP C/ARM W/BAND W/CLIPS 41X74IN

## (undated) DEVICE — SYR LUERLOK 30CC